# Patient Record
Sex: MALE | Race: WHITE | NOT HISPANIC OR LATINO | Employment: OTHER | URBAN - METROPOLITAN AREA
[De-identification: names, ages, dates, MRNs, and addresses within clinical notes are randomized per-mention and may not be internally consistent; named-entity substitution may affect disease eponyms.]

---

## 2021-05-04 ENCOUNTER — APPOINTMENT (OUTPATIENT)
Dept: CT IMAGING | Facility: HOSPITAL | Age: 71
End: 2021-05-04

## 2021-05-04 ENCOUNTER — HOSPITAL ENCOUNTER (INPATIENT)
Facility: HOSPITAL | Age: 71
LOS: 14 days | Discharge: NURSING, REHAB OR LONG-TERM CARE | End: 2021-05-18
Attending: EMERGENCY MEDICINE | Admitting: HOSPITALIST

## 2021-05-04 ENCOUNTER — APPOINTMENT (OUTPATIENT)
Dept: GENERAL RADIOLOGY | Facility: HOSPITAL | Age: 71
End: 2021-05-04

## 2021-05-04 DIAGNOSIS — R53.1 ACUTE RIGHT-SIDED WEAKNESS: Primary | ICD-10-CM

## 2021-05-04 DIAGNOSIS — I96 GANGRENE OF TOE OF LEFT FOOT (HCC): ICD-10-CM

## 2021-05-04 DIAGNOSIS — Z74.09 IMPAIRED FUNCTIONAL MOBILITY, BALANCE, GAIT, AND ENDURANCE: ICD-10-CM

## 2021-05-04 PROBLEM — I10 HTN (HYPERTENSION): Status: ACTIVE | Noted: 2021-05-04

## 2021-05-04 PROBLEM — D69.6 THROMBOCYTOPENIA: Status: ACTIVE | Noted: 2021-05-04

## 2021-05-04 PROBLEM — R09.89 SUSPECTED CEREBROVASCULAR ACCIDENT (CVA): Status: ACTIVE | Noted: 2021-05-04

## 2021-05-04 PROBLEM — D72.829 LEUKOCYTOSIS: Status: ACTIVE | Noted: 2021-05-04

## 2021-05-04 PROBLEM — I50.9 CHF (CONGESTIVE HEART FAILURE): Status: ACTIVE | Noted: 2021-05-04

## 2021-05-04 PROBLEM — Z95.0 PRESENCE OF CARDIAC PACEMAKER: Status: ACTIVE | Noted: 2021-05-04

## 2021-05-04 LAB
ALBUMIN SERPL-MCNC: 4.8 G/DL (ref 3.5–5.2)
ALBUMIN/GLOB SERPL: 1.5 G/DL
ALP SERPL-CCNC: 43 U/L (ref 39–117)
ALT SERPL W P-5'-P-CCNC: 26 U/L (ref 1–41)
ALT SERPL W P-5'-P-CCNC: 29 U/L (ref 1–41)
ANION GAP SERPL CALCULATED.3IONS-SCNC: 18 MMOL/L (ref 5–15)
APTT PPP: 31.1 SECONDS (ref 22–39)
AST SERPL-CCNC: 26 U/L (ref 1–40)
AST SERPL-CCNC: 26 U/L (ref 1–40)
BASOPHILS # BLD AUTO: 0.04 10*3/MM3 (ref 0–0.2)
BASOPHILS NFR BLD AUTO: 0.3 % (ref 0–1.5)
BILIRUB SERPL-MCNC: 0.5 MG/DL (ref 0–1.2)
BUN SERPL-MCNC: 13 MG/DL (ref 8–23)
BUN/CREAT SERPL: 18.6 (ref 7–25)
CALCIUM SPEC-SCNC: 9.8 MG/DL (ref 8.6–10.5)
CHLORIDE SERPL-SCNC: 94 MMOL/L (ref 98–107)
CK SERPL-CCNC: 118 U/L (ref 20–200)
CO2 SERPL-SCNC: 23 MMOL/L (ref 22–29)
CREAT SERPL-MCNC: 0.7 MG/DL (ref 0.76–1.27)
DEPRECATED RDW RBC AUTO: 42.8 FL (ref 37–54)
EOSINOPHIL # BLD AUTO: 0 10*3/MM3 (ref 0–0.4)
EOSINOPHIL NFR BLD AUTO: 0 % (ref 0.3–6.2)
ERYTHROCYTE [DISTWIDTH] IN BLOOD BY AUTOMATED COUNT: 12.8 % (ref 12.3–15.4)
GFR SERPL CREATININE-BSD FRML MDRD: 112 ML/MIN/1.73
GLOBULIN UR ELPH-MCNC: 3.1 GM/DL
GLUCOSE SERPL-MCNC: 240 MG/DL (ref 65–99)
HCT VFR BLD AUTO: 46.6 % (ref 37.5–51)
HGB BLD-MCNC: 16.5 G/DL (ref 13–17.7)
HOLD SPECIMEN: NORMAL
HOLD SPECIMEN: NORMAL
IMM GRANULOCYTES # BLD AUTO: 0.08 10*3/MM3 (ref 0–0.05)
IMM GRANULOCYTES NFR BLD AUTO: 0.6 % (ref 0–0.5)
INR PPP: 1 (ref 0.8–1.2)
LYMPHOCYTES # BLD AUTO: 1.03 10*3/MM3 (ref 0.7–3.1)
LYMPHOCYTES NFR BLD AUTO: 7.8 % (ref 19.6–45.3)
MCH RBC QN AUTO: 32.4 PG (ref 26.6–33)
MCHC RBC AUTO-ENTMCNC: 35.4 G/DL (ref 31.5–35.7)
MCV RBC AUTO: 91.6 FL (ref 79–97)
MONOCYTES # BLD AUTO: 0.49 10*3/MM3 (ref 0.1–0.9)
MONOCYTES NFR BLD AUTO: 3.7 % (ref 5–12)
NEUTROPHILS NFR BLD AUTO: 11.61 10*3/MM3 (ref 1.7–7)
NEUTROPHILS NFR BLD AUTO: 87.6 % (ref 42.7–76)
NRBC BLD AUTO-RTO: 0 /100 WBC (ref 0–0.2)
PLAT MORPH BLD: NORMAL
PLATELET # BLD AUTO: 105 10*3/MM3 (ref 140–450)
PMV BLD AUTO: 12.9 FL (ref 6–12)
POTASSIUM SERPL-SCNC: 5 MMOL/L (ref 3.5–5.2)
PROT SERPL-MCNC: 7.9 G/DL (ref 6–8.5)
PROTHROMBIN TIME: 11.8 SECONDS (ref 12.8–15.2)
QT INTERVAL: 466 MS
QTC INTERVAL: 547 MS
RBC # BLD AUTO: 5.09 10*6/MM3 (ref 4.14–5.8)
RBC MORPH BLD: NORMAL
SODIUM SERPL-SCNC: 135 MMOL/L (ref 136–145)
TROPONIN T SERPL-MCNC: <0.01 NG/ML (ref 0–0.03)
WBC # BLD AUTO: 13.25 10*3/MM3 (ref 3.4–10.8)
WBC MORPH BLD: NORMAL
WHOLE BLOOD HOLD SPECIMEN: NORMAL
WHOLE BLOOD HOLD SPECIMEN: NORMAL

## 2021-05-04 PROCEDURE — 70496 CT ANGIOGRAPHY HEAD: CPT

## 2021-05-04 PROCEDURE — 85730 THROMBOPLASTIN TIME PARTIAL: CPT | Performed by: EMERGENCY MEDICINE

## 2021-05-04 PROCEDURE — 82330 ASSAY OF CALCIUM: CPT

## 2021-05-04 PROCEDURE — 0042T HC CT CEREBRAL PERFUSION W/WO CONTRAST: CPT

## 2021-05-04 PROCEDURE — 80053 COMPREHEN METABOLIC PANEL: CPT | Performed by: INTERNAL MEDICINE

## 2021-05-04 PROCEDURE — 84450 TRANSFERASE (AST) (SGOT): CPT | Performed by: EMERGENCY MEDICINE

## 2021-05-04 PROCEDURE — G0378 HOSPITAL OBSERVATION PER HR: HCPCS

## 2021-05-04 PROCEDURE — 0 IOPAMIDOL PER 1 ML: Performed by: EMERGENCY MEDICINE

## 2021-05-04 PROCEDURE — 93005 ELECTROCARDIOGRAM TRACING: CPT | Performed by: EMERGENCY MEDICINE

## 2021-05-04 PROCEDURE — 84145 PROCALCITONIN (PCT): CPT | Performed by: NURSE PRACTITIONER

## 2021-05-04 PROCEDURE — 82550 ASSAY OF CK (CPK): CPT | Performed by: NURSE PRACTITIONER

## 2021-05-04 PROCEDURE — 85014 HEMATOCRIT: CPT

## 2021-05-04 PROCEDURE — 84132 ASSAY OF SERUM POTASSIUM: CPT

## 2021-05-04 PROCEDURE — 99223 1ST HOSP IP/OBS HIGH 75: CPT | Performed by: NURSE PRACTITIONER

## 2021-05-04 PROCEDURE — 84460 ALANINE AMINO (ALT) (SGPT): CPT | Performed by: EMERGENCY MEDICINE

## 2021-05-04 PROCEDURE — 85610 PROTHROMBIN TIME: CPT

## 2021-05-04 PROCEDURE — 71045 X-RAY EXAM CHEST 1 VIEW: CPT

## 2021-05-04 PROCEDURE — 82803 BLOOD GASES ANY COMBINATION: CPT

## 2021-05-04 PROCEDURE — 85007 BL SMEAR W/DIFF WBC COUNT: CPT | Performed by: EMERGENCY MEDICINE

## 2021-05-04 PROCEDURE — 87636 SARSCOV2 & INF A&B AMP PRB: CPT | Performed by: EMERGENCY MEDICINE

## 2021-05-04 PROCEDURE — 84295 ASSAY OF SERUM SODIUM: CPT

## 2021-05-04 PROCEDURE — 82565 ASSAY OF CREATININE: CPT

## 2021-05-04 PROCEDURE — 99222 1ST HOSP IP/OBS MODERATE 55: CPT | Performed by: FAMILY MEDICINE

## 2021-05-04 PROCEDURE — 70498 CT ANGIOGRAPHY NECK: CPT

## 2021-05-04 PROCEDURE — 84484 ASSAY OF TROPONIN QUANT: CPT | Performed by: EMERGENCY MEDICINE

## 2021-05-04 PROCEDURE — 85025 COMPLETE CBC W/AUTO DIFF WBC: CPT | Performed by: EMERGENCY MEDICINE

## 2021-05-04 PROCEDURE — 99285 EMERGENCY DEPT VISIT HI MDM: CPT

## 2021-05-04 PROCEDURE — 87040 BLOOD CULTURE FOR BACTERIA: CPT | Performed by: NURSE PRACTITIONER

## 2021-05-04 PROCEDURE — 70450 CT HEAD/BRAIN W/O DYE: CPT

## 2021-05-04 PROCEDURE — 82947 ASSAY GLUCOSE BLOOD QUANT: CPT

## 2021-05-04 RX ORDER — LISINOPRIL 10 MG/1
15 TABLET ORAL 2 TIMES DAILY
COMMUNITY
End: 2021-05-18 | Stop reason: HOSPADM

## 2021-05-04 RX ORDER — CARVEDILOL 25 MG/1
50 TABLET ORAL 2 TIMES DAILY WITH MEALS
COMMUNITY
End: 2022-10-24 | Stop reason: HOSPADM

## 2021-05-04 RX ORDER — ASPIRIN 300 MG/1
300 SUPPOSITORY RECTAL DAILY
Status: DISCONTINUED | OUTPATIENT
Start: 2021-05-04 | End: 2021-05-06

## 2021-05-04 RX ORDER — SODIUM CHLORIDE 0.9 % (FLUSH) 0.9 %
10 SYRINGE (ML) INJECTION AS NEEDED
Status: DISCONTINUED | OUTPATIENT
Start: 2021-05-04 | End: 2021-05-18 | Stop reason: HOSPADM

## 2021-05-04 RX ORDER — ASPIRIN 325 MG
325 TABLET ORAL DAILY
Status: DISCONTINUED | OUTPATIENT
Start: 2021-05-04 | End: 2021-05-06

## 2021-05-04 RX ADMIN — ASPIRIN 300 MG: 300 SUPPOSITORY RECTAL at 21:36

## 2021-05-04 RX ADMIN — IOPAMIDOL 115 ML: 755 INJECTION, SOLUTION INTRAVENOUS at 20:42

## 2021-05-05 ENCOUNTER — APPOINTMENT (OUTPATIENT)
Dept: CT IMAGING | Facility: HOSPITAL | Age: 71
End: 2021-05-05

## 2021-05-05 ENCOUNTER — APPOINTMENT (OUTPATIENT)
Dept: CARDIOLOGY | Facility: HOSPITAL | Age: 71
End: 2021-05-05

## 2021-05-05 PROBLEM — R73.9 HYPERGLYCEMIA: Status: ACTIVE | Noted: 2021-05-05

## 2021-05-05 LAB
ANION GAP SERPL CALCULATED.3IONS-SCNC: 12 MMOL/L (ref 5–15)
BACTERIA UR QL AUTO: ABNORMAL /HPF
BASE EXCESS BLDA CALC-SCNC: 3 MMOL/L (ref -5–5)
BASOPHILS # BLD AUTO: 0.02 10*3/MM3 (ref 0–0.2)
BASOPHILS NFR BLD AUTO: 0.2 % (ref 0–1.5)
BH CV ECHO MEAS - AO MAX PG (FULL): 0.9 MMHG
BH CV ECHO MEAS - AO MAX PG: 2.8 MMHG
BH CV ECHO MEAS - AO V2 MAX: 84.2 CM/SEC
BH CV ECHO MEAS - BSA(HAYCOCK): 2 M^2
BH CV ECHO MEAS - BSA: 2 M^2
BH CV ECHO MEAS - BZI_BMI: 25.8 KILOGRAMS/M^2
BH CV ECHO MEAS - BZI_METRIC_HEIGHT: 177.8 CM
BH CV ECHO MEAS - BZI_METRIC_WEIGHT: 81.6 KG
BH CV ECHO MEAS - EDV(CUBED): 180.2 ML
BH CV ECHO MEAS - EDV(TEICH): 156.7 ML
BH CV ECHO MEAS - EF(CUBED): 40.7 %
BH CV ECHO MEAS - EF(TEICH): 33.2 %
BH CV ECHO MEAS - ESV(CUBED): 106.9 ML
BH CV ECHO MEAS - ESV(TEICH): 104.7 ML
BH CV ECHO MEAS - FS: 16 %
BH CV ECHO MEAS - IVS/LVPW: 0.85
BH CV ECHO MEAS - IVSD: 1.1 CM
BH CV ECHO MEAS - LAD MAJOR: 4.7 CM
BH CV ECHO MEAS - LAT PEAK E' VEL: 3.5 CM/SEC
BH CV ECHO MEAS - LATERAL E/E' RATIO: 13
BH CV ECHO MEAS - LV IVRT: 0.16 SEC
BH CV ECHO MEAS - LV MASS(C)D: 277.4 GRAMS
BH CV ECHO MEAS - LV MASS(C)DI: 139 GRAMS/M^2
BH CV ECHO MEAS - LV MAX PG: 1.9 MMHG
BH CV ECHO MEAS - LV MEAN PG: 0.78 MMHG
BH CV ECHO MEAS - LV V1 MAX: 69.6 CM/SEC
BH CV ECHO MEAS - LV V1 MEAN: 39.6 CM/SEC
BH CV ECHO MEAS - LV V1 VTI: 13 CM
BH CV ECHO MEAS - LVIDD: 5.6 CM
BH CV ECHO MEAS - LVIDS: 4.7 CM
BH CV ECHO MEAS - LVPWD: 1.3 CM
BH CV ECHO MEAS - MED PEAK E' VEL: 4.8 CM/SEC
BH CV ECHO MEAS - MEDIAL E/E' RATIO: 9.5
BH CV ECHO MEAS - MV A MAX VEL: 98.7 CM/SEC
BH CV ECHO MEAS - MV DEC TIME: 0.24 SEC
BH CV ECHO MEAS - MV E MAX VEL: 47.4 CM/SEC
BH CV ECHO MEAS - MV E/A: 0.48
BH CV ECHO MEAS - SI(CUBED): 36.7 ML/M^2
BH CV ECHO MEAS - SI(TEICH): 26.1 ML/M^2
BH CV ECHO MEAS - SV(CUBED): 73.3 ML
BH CV ECHO MEAS - SV(TEICH): 52 ML
BH CV ECHO MEASUREMENTS AVERAGE E/E' RATIO: 11.42
BH CV LOWER ARTERIAL LEFT ABI RATIO: 0.85
BH CV LOWER ARTERIAL LEFT CALF RATIO: 0.89
BH CV LOWER ARTERIAL LEFT DORSALIS PEDIS SYS MAX: 136 MMHG
BH CV LOWER ARTERIAL LEFT GREAT TOE SYS MAX: 0 MMHG
BH CV LOWER ARTERIAL LEFT HIGH THIGH SYS MAX: 216 MMHG
BH CV LOWER ARTERIAL LEFT LOW THIGH SYS MAX: 219 MMHG
BH CV LOWER ARTERIAL LEFT POPLITEAL SYS MAX: 142 MMHG
BH CV LOWER ARTERIAL LEFT POST TIBIAL SYS MAX: 133 MMHG
BH CV LOWER ARTERIAL LEFT TBI RATIO: 0
BH CV LOWER ARTERIAL RIGHT ABI RATIO: 0.92
BH CV LOWER ARTERIAL RIGHT CALF RATIO: 0.96
BH CV LOWER ARTERIAL RIGHT DORSALIS PEDIS SYS MAX: 147 MMHG
BH CV LOWER ARTERIAL RIGHT GREAT TOE SYS MAX: 112 MMHG
BH CV LOWER ARTERIAL RIGHT HIGH THIGH SYS MAX: 200 MMHG
BH CV LOWER ARTERIAL RIGHT LOW THIGH SYS MAX: 200 MMHG
BH CV LOWER ARTERIAL RIGHT POPLITEAL SYS MAX: 153 MMHG
BH CV LOWER ARTERIAL RIGHT POST TIBIAL SYS MAX: 147 MMHG
BH CV LOWER ARTERIAL RIGHT TBI RATIO: 0.7
BH CV VAS BP LEFT ARM: NORMAL MMHG
BILIRUB UR QL STRIP: NEGATIVE
BLADDER EPITHELIAL: ABNORMAL
BUN SERPL-MCNC: 15 MG/DL (ref 8–23)
BUN/CREAT SERPL: 20.5 (ref 7–25)
CA-I BLDA-SCNC: 1.2 MMOL/L (ref 1.2–1.32)
CALCIUM SPEC-SCNC: 9.2 MG/DL (ref 8.6–10.5)
CHLORIDE SERPL-SCNC: 99 MMOL/L (ref 98–107)
CHOLEST SERPL-MCNC: 187 MG/DL (ref 0–200)
CLARITY UR: CLEAR
CO2 BLDA-SCNC: 30 MMOL/L (ref 24–29)
CO2 SERPL-SCNC: 25 MMOL/L (ref 22–29)
COLOR UR: YELLOW
CREAT BLDA-MCNC: 0.6 MG/DL (ref 0.6–1.3)
CREAT SERPL-MCNC: 0.73 MG/DL (ref 0.76–1.27)
D-LACTATE SERPL-SCNC: 2.5 MMOL/L (ref 0.5–2)
D-LACTATE SERPL-SCNC: 2.6 MMOL/L (ref 0.5–2)
DEPRECATED RDW RBC AUTO: 43.2 FL (ref 37–54)
EOSINOPHIL # BLD AUTO: 0.01 10*3/MM3 (ref 0–0.4)
EOSINOPHIL NFR BLD AUTO: 0.1 % (ref 0.3–6.2)
ERYTHROCYTE [DISTWIDTH] IN BLOOD BY AUTOMATED COUNT: 13 % (ref 12.3–15.4)
FLUAV RNA RESP QL NAA+PROBE: NOT DETECTED
FLUBV RNA RESP QL NAA+PROBE: NOT DETECTED
GFR SERPL CREATININE-BSD FRML MDRD: 107 ML/MIN/1.73
GLUCOSE BLDC GLUCOMTR-MCNC: 177 MG/DL (ref 70–130)
GLUCOSE BLDC GLUCOMTR-MCNC: 205 MG/DL (ref 70–130)
GLUCOSE BLDC GLUCOMTR-MCNC: 214 MG/DL (ref 70–130)
GLUCOSE BLDC GLUCOMTR-MCNC: 244 MG/DL (ref 70–130)
GLUCOSE BLDC GLUCOMTR-MCNC: 278 MG/DL (ref 70–130)
GLUCOSE SERPL-MCNC: 199 MG/DL (ref 65–99)
GLUCOSE UR STRIP-MCNC: ABNORMAL MG/DL
HBA1C MFR BLD: 7.6 % (ref 4.8–5.6)
HCO3 BLDA-SCNC: 28.2 MMOL/L (ref 22–26)
HCT VFR BLD AUTO: 43.7 % (ref 37.5–51)
HCT VFR BLDA CALC: 49 % (ref 38–51)
HDLC SERPL-MCNC: 38 MG/DL (ref 40–60)
HGB BLD-MCNC: 15.4 G/DL (ref 13–17.7)
HGB BLDA-MCNC: 16.7 G/DL (ref 12–17)
HGB UR QL STRIP.AUTO: ABNORMAL
HOLD SPECIMEN: NORMAL
HYALINE CASTS UR QL AUTO: ABNORMAL /LPF
IMM GRANULOCYTES # BLD AUTO: 0.05 10*3/MM3 (ref 0–0.05)
IMM GRANULOCYTES NFR BLD AUTO: 0.4 % (ref 0–0.5)
IVRT: 158 MSEC
KETONES UR QL STRIP: ABNORMAL
LDLC SERPL CALC-MCNC: 124 MG/DL (ref 0–100)
LDLC/HDLC SERPL: 3.19 {RATIO}
LEUKOCYTE ESTERASE UR QL STRIP.AUTO: NEGATIVE
LV EF 2D ECHO EST: 25 %
LYMPHOCYTES # BLD AUTO: 1.33 10*3/MM3 (ref 0.7–3.1)
LYMPHOCYTES NFR BLD AUTO: 10.5 % (ref 19.6–45.3)
MAGNESIUM SERPL-MCNC: 1.6 MG/DL (ref 1.6–2.4)
MAXIMAL PREDICTED HEART RATE: 152 BPM
MCH RBC QN AUTO: 32.3 PG (ref 26.6–33)
MCHC RBC AUTO-ENTMCNC: 35.2 G/DL (ref 31.5–35.7)
MCV RBC AUTO: 91.6 FL (ref 79–97)
MONOCYTES # BLD AUTO: 1.15 10*3/MM3 (ref 0.1–0.9)
MONOCYTES NFR BLD AUTO: 9.1 % (ref 5–12)
NEUTROPHILS NFR BLD AUTO: 10.11 10*3/MM3 (ref 1.7–7)
NEUTROPHILS NFR BLD AUTO: 79.7 % (ref 42.7–76)
NITRITE UR QL STRIP: NEGATIVE
NRBC BLD AUTO-RTO: 0 /100 WBC (ref 0–0.2)
PCO2 BLDA: 46.3 MM HG (ref 35–45)
PH BLDA: 7.39 PH UNITS (ref 7.35–7.6)
PH UR STRIP.AUTO: 6.5 [PH] (ref 5–8)
PLAT MORPH BLD: NORMAL
PLATELET # BLD AUTO: 119 10*3/MM3 (ref 140–450)
PMV BLD AUTO: 11.6 FL (ref 6–12)
PO2 BLDA: 22 MMHG (ref 80–105)
POTASSIUM BLDA-SCNC: 4.8 MMOL/L (ref 3.5–4.9)
POTASSIUM SERPL-SCNC: 4.4 MMOL/L (ref 3.5–5.2)
PROCALCITONIN SERPL-MCNC: 0.09 NG/ML (ref 0–0.25)
PROT UR QL STRIP: ABNORMAL
RBC # BLD AUTO: 4.77 10*6/MM3 (ref 4.14–5.8)
RBC # UR: ABNORMAL /HPF
RBC MORPH BLD: NORMAL
REF LAB TEST METHOD: ABNORMAL
SAO2 % BLDA: 37 % (ref 95–98)
SARS-COV-2 RNA RESP QL NAA+PROBE: NOT DETECTED
SODIUM BLD-SCNC: 134 MMOL/L (ref 138–146)
SODIUM SERPL-SCNC: 136 MMOL/L (ref 136–145)
SP GR UR STRIP: 1.05 (ref 1–1.03)
SQUAMOUS #/AREA URNS HPF: ABNORMAL /HPF
STRESS TARGET HR: 129 BPM
TRIGL SERPL-MCNC: 138 MG/DL (ref 0–150)
UPPER ARTERIAL LEFT ARM BRACHIAL SYS MAX: 160 MMHG
UROBILINOGEN UR QL STRIP: ABNORMAL
VLDLC SERPL-MCNC: 25 MG/DL (ref 5–40)
WBC # BLD AUTO: 12.67 10*3/MM3 (ref 3.4–10.8)
WBC MORPH BLD: NORMAL
WBC UR QL AUTO: ABNORMAL /HPF

## 2021-05-05 PROCEDURE — 25010000002 PIPERACILLIN SOD-TAZOBACTAM PER 1 G: Performed by: FAMILY MEDICINE

## 2021-05-05 PROCEDURE — 63710000001 INSULIN LISPRO (HUMAN) PER 5 UNITS: Performed by: PHYSICIAN ASSISTANT

## 2021-05-05 PROCEDURE — G0108 DIAB MANAGE TRN  PER INDIV: HCPCS | Performed by: REGISTERED NURSE

## 2021-05-05 PROCEDURE — 99233 SBSQ HOSP IP/OBS HIGH 50: CPT | Performed by: FAMILY MEDICINE

## 2021-05-05 PROCEDURE — 85007 BL SMEAR W/DIFF WBC COUNT: CPT | Performed by: NURSE PRACTITIONER

## 2021-05-05 PROCEDURE — 97530 THERAPEUTIC ACTIVITIES: CPT

## 2021-05-05 PROCEDURE — 82962 GLUCOSE BLOOD TEST: CPT

## 2021-05-05 PROCEDURE — 83605 ASSAY OF LACTIC ACID: CPT | Performed by: NURSE PRACTITIONER

## 2021-05-05 PROCEDURE — 93923 UPR/LXTR ART STDY 3+ LVLS: CPT | Performed by: INTERNAL MEDICINE

## 2021-05-05 PROCEDURE — 97166 OT EVAL MOD COMPLEX 45 MIN: CPT

## 2021-05-05 PROCEDURE — 80061 LIPID PANEL: CPT | Performed by: NURSE PRACTITIONER

## 2021-05-05 PROCEDURE — 93923 UPR/LXTR ART STDY 3+ LVLS: CPT

## 2021-05-05 PROCEDURE — 63710000001 INSULIN LISPRO (HUMAN) PER 5 UNITS: Performed by: FAMILY MEDICINE

## 2021-05-05 PROCEDURE — 99221 1ST HOSP IP/OBS SF/LOW 40: CPT | Performed by: THORACIC SURGERY (CARDIOTHORACIC VASCULAR SURGERY)

## 2021-05-05 PROCEDURE — 87040 BLOOD CULTURE FOR BACTERIA: CPT | Performed by: NURSE PRACTITIONER

## 2021-05-05 PROCEDURE — 92523 SPEECH SOUND LANG COMPREHEN: CPT

## 2021-05-05 PROCEDURE — 99291 CRITICAL CARE FIRST HOUR: CPT | Performed by: PSYCHIATRY & NEUROLOGY

## 2021-05-05 PROCEDURE — 81001 URINALYSIS AUTO W/SCOPE: CPT | Performed by: NURSE PRACTITIONER

## 2021-05-05 PROCEDURE — 85025 COMPLETE CBC W/AUTO DIFF WBC: CPT | Performed by: NURSE PRACTITIONER

## 2021-05-05 PROCEDURE — 25010000003 MAGNESIUM SULFATE 4 GM/100ML SOLUTION: Performed by: FAMILY MEDICINE

## 2021-05-05 PROCEDURE — 25010000002 PIPERACILLIN SOD-TAZOBACTAM PER 1 G: Performed by: NURSE PRACTITIONER

## 2021-05-05 PROCEDURE — 83735 ASSAY OF MAGNESIUM: CPT | Performed by: NURSE PRACTITIONER

## 2021-05-05 PROCEDURE — 73700 CT LOWER EXTREMITY W/O DYE: CPT

## 2021-05-05 PROCEDURE — 25010000002 VANCOMYCIN PER 500 MG

## 2021-05-05 PROCEDURE — 25010000002 VANCOMYCIN 10 G RECONSTITUTED SOLUTION

## 2021-05-05 PROCEDURE — 93306 TTE W/DOPPLER COMPLETE: CPT | Performed by: INTERNAL MEDICINE

## 2021-05-05 PROCEDURE — 80048 BASIC METABOLIC PNL TOTAL CA: CPT | Performed by: NURSE PRACTITIONER

## 2021-05-05 PROCEDURE — 93306 TTE W/DOPPLER COMPLETE: CPT

## 2021-05-05 PROCEDURE — 92610 EVALUATE SWALLOWING FUNCTION: CPT

## 2021-05-05 PROCEDURE — 83036 HEMOGLOBIN GLYCOSYLATED A1C: CPT | Performed by: NURSE PRACTITIONER

## 2021-05-05 RX ORDER — VANCOMYCIN HYDROCHLORIDE 1 G/200ML
1000 INJECTION, SOLUTION INTRAVENOUS EVERY 12 HOURS
Status: DISCONTINUED | OUTPATIENT
Start: 2021-05-05 | End: 2021-05-07

## 2021-05-05 RX ORDER — SODIUM CHLORIDE 0.9 % (FLUSH) 0.9 %
10 SYRINGE (ML) INJECTION EVERY 12 HOURS SCHEDULED
Status: DISCONTINUED | OUTPATIENT
Start: 2021-05-05 | End: 2021-05-12

## 2021-05-05 RX ORDER — MAGNESIUM SULFATE HEPTAHYDRATE 40 MG/ML
4 INJECTION, SOLUTION INTRAVENOUS AS NEEDED
Status: DISCONTINUED | OUTPATIENT
Start: 2021-05-05 | End: 2021-05-18 | Stop reason: HOSPADM

## 2021-05-05 RX ORDER — SODIUM CHLORIDE 0.9 % (FLUSH) 0.9 %
10 SYRINGE (ML) INJECTION AS NEEDED
Status: DISCONTINUED | OUTPATIENT
Start: 2021-05-05 | End: 2021-05-18 | Stop reason: HOSPADM

## 2021-05-05 RX ORDER — ATORVASTATIN CALCIUM 40 MG/1
80 TABLET, FILM COATED ORAL NIGHTLY
Status: DISCONTINUED | OUTPATIENT
Start: 2021-05-05 | End: 2021-05-18 | Stop reason: HOSPADM

## 2021-05-05 RX ORDER — NICOTINE POLACRILEX 4 MG
15 LOZENGE BUCCAL
Status: DISCONTINUED | OUTPATIENT
Start: 2021-05-05 | End: 2021-05-18 | Stop reason: HOSPADM

## 2021-05-05 RX ORDER — SODIUM CHLORIDE 0.9 % (FLUSH) 0.9 %
10 SYRINGE (ML) INJECTION EVERY 12 HOURS SCHEDULED
Status: DISCONTINUED | OUTPATIENT
Start: 2021-05-05 | End: 2021-05-18 | Stop reason: HOSPADM

## 2021-05-05 RX ORDER — CLOPIDOGREL BISULFATE 75 MG/1
300 TABLET ORAL ONCE
Status: COMPLETED | OUTPATIENT
Start: 2021-05-05 | End: 2021-05-05

## 2021-05-05 RX ORDER — ACETAMINOPHEN 325 MG/1
650 TABLET ORAL EVERY 4 HOURS PRN
Status: DISCONTINUED | OUTPATIENT
Start: 2021-05-05 | End: 2021-05-18 | Stop reason: HOSPADM

## 2021-05-05 RX ORDER — DEXTROSE MONOHYDRATE 25 G/50ML
25 INJECTION, SOLUTION INTRAVENOUS
Status: DISCONTINUED | OUTPATIENT
Start: 2021-05-05 | End: 2021-05-18 | Stop reason: HOSPADM

## 2021-05-05 RX ORDER — CLOPIDOGREL BISULFATE 75 MG/1
75 TABLET ORAL DAILY
Status: DISCONTINUED | OUTPATIENT
Start: 2021-05-06 | End: 2021-05-06

## 2021-05-05 RX ORDER — MAGNESIUM SULFATE HEPTAHYDRATE 40 MG/ML
2 INJECTION, SOLUTION INTRAVENOUS AS NEEDED
Status: DISCONTINUED | OUTPATIENT
Start: 2021-05-05 | End: 2021-05-18 | Stop reason: HOSPADM

## 2021-05-05 RX ADMIN — SODIUM CHLORIDE 250 ML: 9 INJECTION, SOLUTION INTRAVENOUS at 01:17

## 2021-05-05 RX ADMIN — VANCOMYCIN HYDROCHLORIDE 1750 MG: 10 INJECTION, POWDER, LYOPHILIZED, FOR SOLUTION INTRAVENOUS at 07:32

## 2021-05-05 RX ADMIN — INSULIN LISPRO 4 UNITS: 100 INJECTION, SOLUTION INTRAVENOUS; SUBCUTANEOUS at 13:10

## 2021-05-05 RX ADMIN — SODIUM CHLORIDE, PRESERVATIVE FREE 10 ML: 5 INJECTION INTRAVENOUS at 02:14

## 2021-05-05 RX ADMIN — MAGNESIUM SULFATE HEPTAHYDRATE 4 G: 40 INJECTION, SOLUTION INTRAVENOUS at 15:44

## 2021-05-05 RX ADMIN — TAZOBACTAM SODIUM AND PIPERACILLIN SODIUM 3.38 G: 375; 3 INJECTION, SOLUTION INTRAVENOUS at 06:41

## 2021-05-05 RX ADMIN — TAZOBACTAM SODIUM AND PIPERACILLIN SODIUM 3.38 G: 375; 3 INJECTION, SOLUTION INTRAVENOUS at 13:10

## 2021-05-05 RX ADMIN — TAZOBACTAM SODIUM AND PIPERACILLIN SODIUM 3.38 G: 375; 3 INJECTION, SOLUTION INTRAVENOUS at 20:43

## 2021-05-05 RX ADMIN — CLOPIDOGREL BISULFATE 300 MG: 75 TABLET ORAL at 11:06

## 2021-05-05 RX ADMIN — VANCOMYCIN HYDROCHLORIDE 1000 MG: 1 INJECTION, SOLUTION INTRAVENOUS at 18:35

## 2021-05-05 RX ADMIN — ATORVASTATIN CALCIUM 80 MG: 40 TABLET, FILM COATED ORAL at 20:43

## 2021-05-05 RX ADMIN — SODIUM CHLORIDE, PRESERVATIVE FREE 10 ML: 5 INJECTION INTRAVENOUS at 20:43

## 2021-05-05 RX ADMIN — ACETAMINOPHEN 650 MG: 325 TABLET ORAL at 20:43

## 2021-05-05 RX ADMIN — INSULIN LISPRO 3 UNITS: 100 INJECTION, SOLUTION INTRAVENOUS; SUBCUTANEOUS at 18:35

## 2021-05-06 ENCOUNTER — APPOINTMENT (OUTPATIENT)
Dept: CARDIOLOGY | Facility: HOSPITAL | Age: 71
End: 2021-05-06

## 2021-05-06 ENCOUNTER — APPOINTMENT (OUTPATIENT)
Dept: MRI IMAGING | Facility: HOSPITAL | Age: 71
End: 2021-05-06

## 2021-05-06 LAB
GLUCOSE BLDC GLUCOMTR-MCNC: 168 MG/DL (ref 70–130)
GLUCOSE BLDC GLUCOMTR-MCNC: 173 MG/DL (ref 70–130)
GLUCOSE BLDC GLUCOMTR-MCNC: 194 MG/DL (ref 70–130)
GLUCOSE BLDC GLUCOMTR-MCNC: 201 MG/DL (ref 70–130)
MAGNESIUM SERPL-MCNC: 2.3 MG/DL (ref 1.6–2.4)
VANCOMYCIN TROUGH SERPL-MCNC: 17 MCG/ML (ref 5–20)

## 2021-05-06 PROCEDURE — 82962 GLUCOSE BLOOD TEST: CPT

## 2021-05-06 PROCEDURE — 99231 SBSQ HOSP IP/OBS SF/LOW 25: CPT | Performed by: NURSE PRACTITIONER

## 2021-05-06 PROCEDURE — 99222 1ST HOSP IP/OBS MODERATE 55: CPT | Performed by: INTERNAL MEDICINE

## 2021-05-06 PROCEDURE — 93926 LOWER EXTREMITY STUDY: CPT

## 2021-05-06 PROCEDURE — 80202 ASSAY OF VANCOMYCIN: CPT

## 2021-05-06 PROCEDURE — 63710000001 INSULIN LISPRO (HUMAN) PER 5 UNITS: Performed by: FAMILY MEDICINE

## 2021-05-06 PROCEDURE — 97112 NEUROMUSCULAR REEDUCATION: CPT

## 2021-05-06 PROCEDURE — 93926 LOWER EXTREMITY STUDY: CPT | Performed by: INTERNAL MEDICINE

## 2021-05-06 PROCEDURE — 97162 PT EVAL MOD COMPLEX 30 MIN: CPT

## 2021-05-06 PROCEDURE — 83735 ASSAY OF MAGNESIUM: CPT | Performed by: FAMILY MEDICINE

## 2021-05-06 PROCEDURE — 99232 SBSQ HOSP IP/OBS MODERATE 35: CPT | Performed by: FAMILY MEDICINE

## 2021-05-06 PROCEDURE — 25010000002 VANCOMYCIN PER 500 MG

## 2021-05-06 PROCEDURE — 25010000002 PIPERACILLIN SOD-TAZOBACTAM PER 1 G: Performed by: NURSE PRACTITIONER

## 2021-05-06 RX ORDER — ASPIRIN 81 MG/1
81 TABLET, CHEWABLE ORAL DAILY
Status: DISCONTINUED | OUTPATIENT
Start: 2021-05-07 | End: 2021-05-18 | Stop reason: HOSPADM

## 2021-05-06 RX ADMIN — INSULIN LISPRO 2 UNITS: 100 INJECTION, SOLUTION INTRAVENOUS; SUBCUTANEOUS at 12:39

## 2021-05-06 RX ADMIN — CLOPIDOGREL BISULFATE 75 MG: 75 TABLET ORAL at 09:07

## 2021-05-06 RX ADMIN — APIXABAN 5 MG: 5 TABLET, FILM COATED ORAL at 23:11

## 2021-05-06 RX ADMIN — ASPIRIN 325 MG ORAL TABLET 325 MG: 325 PILL ORAL at 09:07

## 2021-05-06 RX ADMIN — Medication: at 17:57

## 2021-05-06 RX ADMIN — SODIUM CHLORIDE, PRESERVATIVE FREE 10 ML: 5 INJECTION INTRAVENOUS at 23:11

## 2021-05-06 RX ADMIN — VANCOMYCIN HYDROCHLORIDE 1000 MG: 1 INJECTION, SOLUTION INTRAVENOUS at 17:56

## 2021-05-06 RX ADMIN — TAZOBACTAM SODIUM AND PIPERACILLIN SODIUM 3.38 G: 375; 3 INJECTION, SOLUTION INTRAVENOUS at 05:42

## 2021-05-06 RX ADMIN — ACETAMINOPHEN 650 MG: 325 TABLET ORAL at 23:11

## 2021-05-06 RX ADMIN — INSULIN LISPRO 3 UNITS: 100 INJECTION, SOLUTION INTRAVENOUS; SUBCUTANEOUS at 09:07

## 2021-05-06 RX ADMIN — TAZOBACTAM SODIUM AND PIPERACILLIN SODIUM 3.38 G: 375; 3 INJECTION, SOLUTION INTRAVENOUS at 23:11

## 2021-05-06 RX ADMIN — INSULIN LISPRO 2 UNITS: 100 INJECTION, SOLUTION INTRAVENOUS; SUBCUTANEOUS at 17:56

## 2021-05-06 RX ADMIN — TAZOBACTAM SODIUM AND PIPERACILLIN SODIUM 3.38 G: 375; 3 INJECTION, SOLUTION INTRAVENOUS at 12:39

## 2021-05-06 RX ADMIN — VANCOMYCIN HYDROCHLORIDE 1000 MG: 1 INJECTION, SOLUTION INTRAVENOUS at 05:42

## 2021-05-06 RX ADMIN — ACETAMINOPHEN 650 MG: 325 TABLET ORAL at 05:41

## 2021-05-06 RX ADMIN — ATORVASTATIN CALCIUM 80 MG: 40 TABLET, FILM COATED ORAL at 23:11

## 2021-05-06 RX ADMIN — APIXABAN 5 MG: 5 TABLET, FILM COATED ORAL at 12:39

## 2021-05-07 ENCOUNTER — APPOINTMENT (OUTPATIENT)
Dept: MRI IMAGING | Facility: HOSPITAL | Age: 71
End: 2021-05-07

## 2021-05-07 LAB
BH CV GRAFT BRACHIAL PRESSURE LEFT: 204 MMHG
BH CV GRAFT BRACHIAL PRESSURE RIGHT: 200 MMHG
BH CV LEA LEFT ANT TIBIAL A DISTAL PSV: 22 CM/S
BH CV LEA LEFT ANT TIBIAL A MID PSV: 41 CM/S
BH CV LEA LEFT ANT TIBIAL A PROX EDV: 5.33 CM/S
BH CV LEA LEFT ANT TIBIAL A PROX PSV: 54.7 CM/S
BH CV LEA LEFT CFA DISTAL PSV: 93.5 CM/S
BH CV LEA LEFT CFA PROX PSV: 127 CM/S
BH CV LEA LEFT DFA PROX PSV: 148 CM/S
BH CV LEA LEFT DPA PRESSURE: 140 MMHG
BH CV LEA LEFT PERONEAL  MID PSV: 28.6 CM/S
BH CV LEA LEFT POPITEAL A  DISTAL EDV: 6.87 CM/S
BH CV LEA LEFT POPITEAL A  DISTAL PSV: 36.3 CM/S
BH CV LEA LEFT POPITEAL A  MID EDV: 5.3 CM/S
BH CV LEA LEFT POPITEAL A  MID PSV: 34.2 CM/S
BH CV LEA LEFT POPITEAL A  PROX EDV: 6.38 CM/S
BH CV LEA LEFT POPITEAL A  PROX PSV: 58.9 CM/S
BH CV LEA LEFT PTA DISTAL EDV: 9.43 CM/S
BH CV LEA LEFT PTA DISTAL PSV: 51.1 CM/S
BH CV LEA LEFT PTA MID EDV: 7.86 CM/S
BH CV LEA LEFT PTA MID PSV: 46 CM/S
BH CV LEA LEFT PTA PRESSURE: 130 MMHG
BH CV LEA LEFT PTA PROX EDV: 18.9 CM/S
BH CV LEA LEFT PTA PROX PSV: 87.6 CM/S
BH CV LEA LEFT SFA DISTAL EDV: 18.7 CM/S
BH CV LEA LEFT SFA DISTAL PSV: 234 CM/S
BH CV LEA LEFT SFA MID EDV: 27 CM/S
BH CV LEA LEFT SFA MID PSV: 154 CM/S
BH CV LEA LEFT SFA PROX EDV: 104 CM/S
BH CV LEA LEFT SFA PROX PSV: 517 CM/S
BH CV LEA LEFT TIBEOPERONEAL EDV: 8.55 CM/S
BH CV LEA LEFT TIBEOPERONEAL PSV: 47.4 CM/S
BH CV LEA RIGHT DPA PRESSURE: 140 MMHG
BH CV LEA RIGHT PTA PRESSURE: 150 MMHG
BH CV LOWER ARTERIAL LEFT ABI RATIO: 0.67
BH CV LOWER ARTERIAL LEFT HIGHEST VELOCITY RATIO: 8.87
BH CV LOWER ARTERIAL LEFT VELOCITY RATIO LOCATION: NORMAL
BH CV LOWER ARTERIAL RIGHT ABI RATIO: 0.74
GLUCOSE BLDC GLUCOMTR-MCNC: 121 MG/DL (ref 70–130)
GLUCOSE BLDC GLUCOMTR-MCNC: 180 MG/DL (ref 70–130)
GLUCOSE BLDC GLUCOMTR-MCNC: 185 MG/DL (ref 70–130)
GLUCOSE BLDC GLUCOMTR-MCNC: 188 MG/DL (ref 70–130)
MAXIMAL PREDICTED HEART RATE: 152 BPM
STRESS TARGET HR: 129 BPM

## 2021-05-07 PROCEDURE — 25010000002 CEFTRIAXONE PER 250 MG: Performed by: INTERNAL MEDICINE

## 2021-05-07 PROCEDURE — 25010000002 PIPERACILLIN SOD-TAZOBACTAM PER 1 G: Performed by: NURSE PRACTITIONER

## 2021-05-07 PROCEDURE — 25010000002 PIPERACILLIN SOD-TAZOBACTAM PER 1 G: Performed by: FAMILY MEDICINE

## 2021-05-07 PROCEDURE — 97110 THERAPEUTIC EXERCISES: CPT | Performed by: PHYSICAL THERAPIST

## 2021-05-07 PROCEDURE — 99232 SBSQ HOSP IP/OBS MODERATE 35: CPT | Performed by: NURSE PRACTITIONER

## 2021-05-07 PROCEDURE — 97530 THERAPEUTIC ACTIVITIES: CPT

## 2021-05-07 PROCEDURE — 82962 GLUCOSE BLOOD TEST: CPT

## 2021-05-07 PROCEDURE — 99231 SBSQ HOSP IP/OBS SF/LOW 25: CPT | Performed by: THORACIC SURGERY (CARDIOTHORACIC VASCULAR SURGERY)

## 2021-05-07 PROCEDURE — 25010000002 VANCOMYCIN PER 500 MG

## 2021-05-07 PROCEDURE — 99231 SBSQ HOSP IP/OBS SF/LOW 25: CPT | Performed by: NURSE PRACTITIONER

## 2021-05-07 PROCEDURE — 63710000001 INSULIN LISPRO (HUMAN) PER 5 UNITS: Performed by: FAMILY MEDICINE

## 2021-05-07 PROCEDURE — 97116 GAIT TRAINING THERAPY: CPT | Performed by: PHYSICAL THERAPIST

## 2021-05-07 RX ORDER — L.ACID,PARA/B.BIFIDUM/S.THERM 8B CELL
1 CAPSULE ORAL DAILY
Status: DISCONTINUED | OUTPATIENT
Start: 2021-05-07 | End: 2021-05-18 | Stop reason: HOSPADM

## 2021-05-07 RX ORDER — SODIUM BICARBONATE 650 MG/1
650 TABLET ORAL ONCE
Status: COMPLETED | OUTPATIENT
Start: 2021-05-07 | End: 2021-05-07

## 2021-05-07 RX ORDER — CARVEDILOL 12.5 MG/1
25 TABLET ORAL 2 TIMES DAILY WITH MEALS
Status: DISCONTINUED | OUTPATIENT
Start: 2021-05-07 | End: 2021-05-18 | Stop reason: HOSPADM

## 2021-05-07 RX ADMIN — APIXABAN 5 MG: 5 TABLET, FILM COATED ORAL at 20:34

## 2021-05-07 RX ADMIN — TAZOBACTAM SODIUM AND PIPERACILLIN SODIUM 3.38 G: 375; 3 INJECTION, SOLUTION INTRAVENOUS at 04:04

## 2021-05-07 RX ADMIN — TAZOBACTAM SODIUM AND PIPERACILLIN SODIUM 3.38 G: 375; 3 INJECTION, SOLUTION INTRAVENOUS at 12:31

## 2021-05-07 RX ADMIN — SODIUM CHLORIDE 2 G: 900 INJECTION INTRAVENOUS at 17:02

## 2021-05-07 RX ADMIN — APIXABAN 5 MG: 5 TABLET, FILM COATED ORAL at 08:57

## 2021-05-07 RX ADMIN — INSULIN LISPRO 2 UNITS: 100 INJECTION, SOLUTION INTRAVENOUS; SUBCUTANEOUS at 08:58

## 2021-05-07 RX ADMIN — VANCOMYCIN HYDROCHLORIDE 1000 MG: 1 INJECTION, SOLUTION INTRAVENOUS at 05:31

## 2021-05-07 RX ADMIN — ASPIRIN 81 MG: 81 TABLET, CHEWABLE ORAL at 08:57

## 2021-05-07 RX ADMIN — SODIUM BICARBONATE 650 MG TABLET 650 MG: at 20:42

## 2021-05-07 RX ADMIN — Medication 1 CAPSULE: at 17:02

## 2021-05-07 RX ADMIN — ACETAMINOPHEN 650 MG: 325 TABLET ORAL at 09:04

## 2021-05-07 RX ADMIN — SODIUM CHLORIDE, PRESERVATIVE FREE 10 ML: 5 INJECTION INTRAVENOUS at 08:58

## 2021-05-07 RX ADMIN — CARVEDILOL 25 MG: 12.5 TABLET, FILM COATED ORAL at 17:02

## 2021-05-07 RX ADMIN — SODIUM CHLORIDE, PRESERVATIVE FREE 10 ML: 5 INJECTION INTRAVENOUS at 20:34

## 2021-05-07 RX ADMIN — INSULIN LISPRO 2 UNITS: 100 INJECTION, SOLUTION INTRAVENOUS; SUBCUTANEOUS at 12:10

## 2021-05-07 RX ADMIN — ATORVASTATIN CALCIUM 80 MG: 40 TABLET, FILM COATED ORAL at 20:33

## 2021-05-08 LAB
ANION GAP SERPL CALCULATED.3IONS-SCNC: 13 MMOL/L (ref 5–15)
BASOPHILS # BLD AUTO: 0.04 10*3/MM3 (ref 0–0.2)
BASOPHILS NFR BLD AUTO: 0.5 % (ref 0–1.5)
BUN SERPL-MCNC: 19 MG/DL (ref 8–23)
BUN/CREAT SERPL: 18.6 (ref 7–25)
CALCIUM SPEC-SCNC: 9.3 MG/DL (ref 8.6–10.5)
CHLORIDE SERPL-SCNC: 99 MMOL/L (ref 98–107)
CO2 SERPL-SCNC: 26 MMOL/L (ref 22–29)
CREAT SERPL-MCNC: 1.02 MG/DL (ref 0.76–1.27)
CRP SERPL-MCNC: 1.29 MG/DL (ref 0–0.5)
DEPRECATED RDW RBC AUTO: 43.9 FL (ref 37–54)
EOSINOPHIL # BLD AUTO: 0.11 10*3/MM3 (ref 0–0.4)
EOSINOPHIL NFR BLD AUTO: 1.3 % (ref 0.3–6.2)
ERYTHROCYTE [DISTWIDTH] IN BLOOD BY AUTOMATED COUNT: 12.9 % (ref 12.3–15.4)
GFR SERPL CREATININE-BSD FRML MDRD: 73 ML/MIN/1.73
GLUCOSE BLDC GLUCOMTR-MCNC: 117 MG/DL (ref 70–130)
GLUCOSE BLDC GLUCOMTR-MCNC: 134 MG/DL (ref 70–130)
GLUCOSE BLDC GLUCOMTR-MCNC: 143 MG/DL (ref 70–130)
GLUCOSE BLDC GLUCOMTR-MCNC: 189 MG/DL (ref 70–130)
GLUCOSE SERPL-MCNC: 125 MG/DL (ref 65–99)
HCT VFR BLD AUTO: 42.3 % (ref 37.5–51)
HGB BLD-MCNC: 14.7 G/DL (ref 13–17.7)
IMM GRANULOCYTES # BLD AUTO: 0.03 10*3/MM3 (ref 0–0.05)
IMM GRANULOCYTES NFR BLD AUTO: 0.3 % (ref 0–0.5)
LYMPHOCYTES # BLD AUTO: 1.34 10*3/MM3 (ref 0.7–3.1)
LYMPHOCYTES NFR BLD AUTO: 15.3 % (ref 19.6–45.3)
MCH RBC QN AUTO: 32.4 PG (ref 26.6–33)
MCHC RBC AUTO-ENTMCNC: 34.8 G/DL (ref 31.5–35.7)
MCV RBC AUTO: 93.2 FL (ref 79–97)
MONOCYTES # BLD AUTO: 0.96 10*3/MM3 (ref 0.1–0.9)
MONOCYTES NFR BLD AUTO: 10.9 % (ref 5–12)
NEUTROPHILS NFR BLD AUTO: 6.29 10*3/MM3 (ref 1.7–7)
NEUTROPHILS NFR BLD AUTO: 71.7 % (ref 42.7–76)
NRBC BLD AUTO-RTO: 0 /100 WBC (ref 0–0.2)
PLATELET # BLD AUTO: 101 10*3/MM3 (ref 140–450)
PMV BLD AUTO: 12.3 FL (ref 6–12)
POTASSIUM SERPL-SCNC: 4.2 MMOL/L (ref 3.5–5.2)
RBC # BLD AUTO: 4.54 10*6/MM3 (ref 4.14–5.8)
SODIUM SERPL-SCNC: 138 MMOL/L (ref 136–145)
WBC # BLD AUTO: 8.77 10*3/MM3 (ref 3.4–10.8)

## 2021-05-08 PROCEDURE — 80048 BASIC METABOLIC PNL TOTAL CA: CPT | Performed by: NURSE PRACTITIONER

## 2021-05-08 PROCEDURE — 99232 SBSQ HOSP IP/OBS MODERATE 35: CPT | Performed by: PSYCHIATRY & NEUROLOGY

## 2021-05-08 PROCEDURE — 25010000002 CEFTRIAXONE PER 250 MG: Performed by: INTERNAL MEDICINE

## 2021-05-08 PROCEDURE — 86140 C-REACTIVE PROTEIN: CPT | Performed by: INTERNAL MEDICINE

## 2021-05-08 PROCEDURE — 85025 COMPLETE CBC W/AUTO DIFF WBC: CPT | Performed by: NURSE PRACTITIONER

## 2021-05-08 PROCEDURE — 99231 SBSQ HOSP IP/OBS SF/LOW 25: CPT | Performed by: THORACIC SURGERY (CARDIOTHORACIC VASCULAR SURGERY)

## 2021-05-08 PROCEDURE — 99232 SBSQ HOSP IP/OBS MODERATE 35: CPT | Performed by: NURSE PRACTITIONER

## 2021-05-08 PROCEDURE — 82962 GLUCOSE BLOOD TEST: CPT

## 2021-05-08 PROCEDURE — 92507 TX SP LANG VOICE COMM INDIV: CPT | Performed by: SPEECH-LANGUAGE PATHOLOGIST

## 2021-05-08 PROCEDURE — 63710000001 INSULIN LISPRO (HUMAN) PER 5 UNITS: Performed by: FAMILY MEDICINE

## 2021-05-08 RX ORDER — CALCIUM CARBONATE 200(500)MG
2 TABLET,CHEWABLE ORAL 3 TIMES DAILY PRN
Status: DISCONTINUED | OUTPATIENT
Start: 2021-05-08 | End: 2021-05-18 | Stop reason: HOSPADM

## 2021-05-08 RX ADMIN — APIXABAN 5 MG: 5 TABLET, FILM COATED ORAL at 08:21

## 2021-05-08 RX ADMIN — SODIUM CHLORIDE, PRESERVATIVE FREE 10 ML: 5 INJECTION INTRAVENOUS at 08:20

## 2021-05-08 RX ADMIN — APIXABAN 5 MG: 5 TABLET, FILM COATED ORAL at 20:50

## 2021-05-08 RX ADMIN — SODIUM CHLORIDE 2 G: 900 INJECTION INTRAVENOUS at 16:56

## 2021-05-08 RX ADMIN — ANTACID TABLETS 2 TABLET: 500 TABLET, CHEWABLE ORAL at 20:49

## 2021-05-08 RX ADMIN — ASPIRIN 81 MG: 81 TABLET, CHEWABLE ORAL at 08:21

## 2021-05-08 RX ADMIN — Medication 1 CAPSULE: at 08:21

## 2021-05-08 RX ADMIN — CARVEDILOL 25 MG: 12.5 TABLET, FILM COATED ORAL at 08:21

## 2021-05-08 RX ADMIN — INSULIN LISPRO 2 UNITS: 100 INJECTION, SOLUTION INTRAVENOUS; SUBCUTANEOUS at 12:50

## 2021-05-08 RX ADMIN — SODIUM CHLORIDE, PRESERVATIVE FREE 10 ML: 5 INJECTION INTRAVENOUS at 20:50

## 2021-05-08 RX ADMIN — CARVEDILOL 25 MG: 12.5 TABLET, FILM COATED ORAL at 17:01

## 2021-05-08 RX ADMIN — ATORVASTATIN CALCIUM 80 MG: 40 TABLET, FILM COATED ORAL at 20:50

## 2021-05-08 RX ADMIN — ANTACID TABLETS 2 TABLET: 500 TABLET, CHEWABLE ORAL at 16:55

## 2021-05-08 RX ADMIN — ACETAMINOPHEN 650 MG: 325 TABLET ORAL at 20:49

## 2021-05-09 LAB
ANION GAP SERPL CALCULATED.3IONS-SCNC: 14 MMOL/L (ref 5–15)
BASOPHILS # BLD AUTO: 0.03 10*3/MM3 (ref 0–0.2)
BASOPHILS NFR BLD AUTO: 0.3 % (ref 0–1.5)
BUN SERPL-MCNC: 21 MG/DL (ref 8–23)
BUN/CREAT SERPL: 25.9 (ref 7–25)
CALCIUM SPEC-SCNC: 9.5 MG/DL (ref 8.6–10.5)
CHLORIDE SERPL-SCNC: 101 MMOL/L (ref 98–107)
CO2 SERPL-SCNC: 23 MMOL/L (ref 22–29)
CREAT SERPL-MCNC: 0.81 MG/DL (ref 0.76–1.27)
DEPRECATED RDW RBC AUTO: 43.8 FL (ref 37–54)
EOSINOPHIL # BLD AUTO: 0.11 10*3/MM3 (ref 0–0.4)
EOSINOPHIL NFR BLD AUTO: 1.3 % (ref 0.3–6.2)
ERYTHROCYTE [DISTWIDTH] IN BLOOD BY AUTOMATED COUNT: 12.9 % (ref 12.3–15.4)
GFR SERPL CREATININE-BSD FRML MDRD: 95 ML/MIN/1.73
GLUCOSE BLDC GLUCOMTR-MCNC: 116 MG/DL (ref 70–130)
GLUCOSE BLDC GLUCOMTR-MCNC: 126 MG/DL (ref 70–130)
GLUCOSE BLDC GLUCOMTR-MCNC: 94 MG/DL (ref 70–130)
GLUCOSE BLDC GLUCOMTR-MCNC: 96 MG/DL (ref 70–130)
GLUCOSE SERPL-MCNC: 118 MG/DL (ref 65–99)
HCT VFR BLD AUTO: 41.9 % (ref 37.5–51)
HGB BLD-MCNC: 14.5 G/DL (ref 13–17.7)
IMM GRANULOCYTES # BLD AUTO: 0.03 10*3/MM3 (ref 0–0.05)
IMM GRANULOCYTES NFR BLD AUTO: 0.3 % (ref 0–0.5)
LYMPHOCYTES # BLD AUTO: 1.19 10*3/MM3 (ref 0.7–3.1)
LYMPHOCYTES NFR BLD AUTO: 13.8 % (ref 19.6–45.3)
MCH RBC QN AUTO: 32.3 PG (ref 26.6–33)
MCHC RBC AUTO-ENTMCNC: 34.6 G/DL (ref 31.5–35.7)
MCV RBC AUTO: 93.3 FL (ref 79–97)
MONOCYTES # BLD AUTO: 0.7 10*3/MM3 (ref 0.1–0.9)
MONOCYTES NFR BLD AUTO: 8.1 % (ref 5–12)
NEUTROPHILS NFR BLD AUTO: 6.54 10*3/MM3 (ref 1.7–7)
NEUTROPHILS NFR BLD AUTO: 76.2 % (ref 42.7–76)
NRBC BLD AUTO-RTO: 0 /100 WBC (ref 0–0.2)
PLATELET # BLD AUTO: 130 10*3/MM3 (ref 140–450)
PMV BLD AUTO: 11.3 FL (ref 6–12)
POTASSIUM SERPL-SCNC: 4.5 MMOL/L (ref 3.5–5.2)
RBC # BLD AUTO: 4.49 10*6/MM3 (ref 4.14–5.8)
SODIUM SERPL-SCNC: 138 MMOL/L (ref 136–145)
WBC # BLD AUTO: 8.6 10*3/MM3 (ref 3.4–10.8)

## 2021-05-09 PROCEDURE — 97530 THERAPEUTIC ACTIVITIES: CPT

## 2021-05-09 PROCEDURE — 97116 GAIT TRAINING THERAPY: CPT

## 2021-05-09 PROCEDURE — 97110 THERAPEUTIC EXERCISES: CPT

## 2021-05-09 PROCEDURE — 97535 SELF CARE MNGMENT TRAINING: CPT

## 2021-05-09 PROCEDURE — 82962 GLUCOSE BLOOD TEST: CPT

## 2021-05-09 PROCEDURE — 99231 SBSQ HOSP IP/OBS SF/LOW 25: CPT | Performed by: THORACIC SURGERY (CARDIOTHORACIC VASCULAR SURGERY)

## 2021-05-09 PROCEDURE — 85025 COMPLETE CBC W/AUTO DIFF WBC: CPT | Performed by: NURSE PRACTITIONER

## 2021-05-09 PROCEDURE — 99232 SBSQ HOSP IP/OBS MODERATE 35: CPT | Performed by: PSYCHIATRY & NEUROLOGY

## 2021-05-09 PROCEDURE — 25010000002 CEFTRIAXONE PER 250 MG: Performed by: INTERNAL MEDICINE

## 2021-05-09 PROCEDURE — 80048 BASIC METABOLIC PNL TOTAL CA: CPT | Performed by: NURSE PRACTITIONER

## 2021-05-09 PROCEDURE — 99232 SBSQ HOSP IP/OBS MODERATE 35: CPT | Performed by: NURSE PRACTITIONER

## 2021-05-09 RX ORDER — LISINOPRIL 10 MG/1
10 TABLET ORAL
Status: DISCONTINUED | OUTPATIENT
Start: 2021-05-09 | End: 2021-05-18 | Stop reason: HOSPADM

## 2021-05-09 RX ADMIN — LISINOPRIL 10 MG: 10 TABLET ORAL at 08:41

## 2021-05-09 RX ADMIN — CARVEDILOL 25 MG: 12.5 TABLET, FILM COATED ORAL at 17:03

## 2021-05-09 RX ADMIN — ACETAMINOPHEN 650 MG: 325 TABLET ORAL at 22:41

## 2021-05-09 RX ADMIN — SODIUM CHLORIDE 2 G: 900 INJECTION INTRAVENOUS at 17:03

## 2021-05-09 RX ADMIN — SODIUM CHLORIDE, PRESERVATIVE FREE 10 ML: 5 INJECTION INTRAVENOUS at 08:41

## 2021-05-09 RX ADMIN — ANTACID TABLETS 2 TABLET: 500 TABLET, CHEWABLE ORAL at 12:18

## 2021-05-09 RX ADMIN — APIXABAN 5 MG: 5 TABLET, FILM COATED ORAL at 08:41

## 2021-05-09 RX ADMIN — ACETAMINOPHEN 650 MG: 325 TABLET ORAL at 03:39

## 2021-05-09 RX ADMIN — SODIUM CHLORIDE, PRESERVATIVE FREE 10 ML: 5 INJECTION INTRAVENOUS at 20:32

## 2021-05-09 RX ADMIN — ANTACID TABLETS 2 TABLET: 500 TABLET, CHEWABLE ORAL at 20:30

## 2021-05-09 RX ADMIN — CARVEDILOL 25 MG: 12.5 TABLET, FILM COATED ORAL at 08:41

## 2021-05-09 RX ADMIN — Medication 1 CAPSULE: at 08:40

## 2021-05-09 RX ADMIN — ATORVASTATIN CALCIUM 80 MG: 40 TABLET, FILM COATED ORAL at 20:30

## 2021-05-09 RX ADMIN — ASPIRIN 81 MG: 81 TABLET, CHEWABLE ORAL at 08:40

## 2021-05-10 ENCOUNTER — ANESTHESIA EVENT (OUTPATIENT)
Dept: PERIOP | Facility: HOSPITAL | Age: 71
End: 2021-05-10

## 2021-05-10 PROBLEM — I96 GANGRENE OF TOE OF LEFT FOOT: Status: ACTIVE | Noted: 2021-05-04

## 2021-05-10 LAB
BACTERIA SPEC AEROBE CULT: NORMAL
BACTERIA SPEC AEROBE CULT: NORMAL
GLUCOSE BLDC GLUCOMTR-MCNC: 101 MG/DL (ref 70–130)
GLUCOSE BLDC GLUCOMTR-MCNC: 112 MG/DL (ref 70–130)
GLUCOSE BLDC GLUCOMTR-MCNC: 126 MG/DL (ref 70–130)
GLUCOSE BLDC GLUCOMTR-MCNC: 81 MG/DL (ref 70–130)

## 2021-05-10 PROCEDURE — 99231 SBSQ HOSP IP/OBS SF/LOW 25: CPT | Performed by: NURSE PRACTITIONER

## 2021-05-10 PROCEDURE — 99232 SBSQ HOSP IP/OBS MODERATE 35: CPT | Performed by: INTERNAL MEDICINE

## 2021-05-10 PROCEDURE — 99231 SBSQ HOSP IP/OBS SF/LOW 25: CPT | Performed by: THORACIC SURGERY (CARDIOTHORACIC VASCULAR SURGERY)

## 2021-05-10 PROCEDURE — 82962 GLUCOSE BLOOD TEST: CPT

## 2021-05-10 PROCEDURE — 97530 THERAPEUTIC ACTIVITIES: CPT

## 2021-05-10 RX ORDER — LIDOCAINE HYDROCHLORIDE 10 MG/ML
0.5 INJECTION, SOLUTION EPIDURAL; INFILTRATION; INTRACAUDAL; PERINEURAL ONCE AS NEEDED
Status: CANCELLED | OUTPATIENT
Start: 2021-05-10

## 2021-05-10 RX ORDER — FAMOTIDINE 20 MG/1
20 TABLET, FILM COATED ORAL DAILY
Status: DISCONTINUED | OUTPATIENT
Start: 2021-05-10 | End: 2021-05-18 | Stop reason: HOSPADM

## 2021-05-10 RX ORDER — CEFUROXIME AXETIL 250 MG/1
500 TABLET ORAL EVERY 12 HOURS SCHEDULED
Status: DISCONTINUED | OUTPATIENT
Start: 2021-05-10 | End: 2021-05-18 | Stop reason: HOSPADM

## 2021-05-10 RX ORDER — SODIUM CHLORIDE 0.9 % (FLUSH) 0.9 %
10 SYRINGE (ML) INJECTION EVERY 12 HOURS SCHEDULED
Status: CANCELLED | OUTPATIENT
Start: 2021-05-10

## 2021-05-10 RX ORDER — FAMOTIDINE 10 MG/ML
20 INJECTION, SOLUTION INTRAVENOUS ONCE
Status: CANCELLED | OUTPATIENT
Start: 2021-05-10 | End: 2021-05-10

## 2021-05-10 RX ADMIN — ANTACID TABLETS 2 TABLET: 500 TABLET, CHEWABLE ORAL at 22:13

## 2021-05-10 RX ADMIN — SODIUM CHLORIDE, PRESERVATIVE FREE 10 ML: 5 INJECTION INTRAVENOUS at 08:08

## 2021-05-10 RX ADMIN — CARVEDILOL 25 MG: 12.5 TABLET, FILM COATED ORAL at 08:07

## 2021-05-10 RX ADMIN — Medication 1 CAPSULE: at 08:07

## 2021-05-10 RX ADMIN — LISINOPRIL 10 MG: 10 TABLET ORAL at 08:07

## 2021-05-10 RX ADMIN — SODIUM CHLORIDE, PRESERVATIVE FREE 10 ML: 5 INJECTION INTRAVENOUS at 21:52

## 2021-05-10 RX ADMIN — CEFUROXIME AXETIL 500 MG: 250 TABLET ORAL at 13:54

## 2021-05-10 RX ADMIN — CARVEDILOL 25 MG: 12.5 TABLET, FILM COATED ORAL at 18:32

## 2021-05-10 RX ADMIN — ACETAMINOPHEN 650 MG: 325 TABLET ORAL at 22:13

## 2021-05-10 RX ADMIN — ATORVASTATIN CALCIUM 80 MG: 40 TABLET, FILM COATED ORAL at 21:51

## 2021-05-10 RX ADMIN — ASPIRIN 81 MG: 81 TABLET, CHEWABLE ORAL at 08:07

## 2021-05-10 RX ADMIN — SODIUM CHLORIDE, PRESERVATIVE FREE 10 ML: 5 INJECTION INTRAVENOUS at 21:51

## 2021-05-10 RX ADMIN — CEFUROXIME AXETIL 500 MG: 250 TABLET ORAL at 21:51

## 2021-05-10 RX ADMIN — FAMOTIDINE 20 MG: 20 TABLET, FILM COATED ORAL at 16:38

## 2021-05-11 ENCOUNTER — ANESTHESIA (OUTPATIENT)
Dept: PERIOP | Facility: HOSPITAL | Age: 71
End: 2021-05-11

## 2021-05-11 ENCOUNTER — APPOINTMENT (OUTPATIENT)
Dept: GENERAL RADIOLOGY | Facility: HOSPITAL | Age: 71
End: 2021-05-11

## 2021-05-11 ENCOUNTER — ANESTHESIA EVENT (OUTPATIENT)
Dept: PERIOP | Facility: HOSPITAL | Age: 71
End: 2021-05-11

## 2021-05-11 LAB
ANION GAP SERPL CALCULATED.3IONS-SCNC: 12 MMOL/L (ref 5–15)
BASOPHILS # BLD AUTO: 0.04 10*3/MM3 (ref 0–0.2)
BASOPHILS NFR BLD AUTO: 0.5 % (ref 0–1.5)
BUN SERPL-MCNC: 25 MG/DL (ref 8–23)
BUN/CREAT SERPL: 28.1 (ref 7–25)
CALCIUM SPEC-SCNC: 9.8 MG/DL (ref 8.6–10.5)
CHLORIDE SERPL-SCNC: 100 MMOL/L (ref 98–107)
CO2 SERPL-SCNC: 26 MMOL/L (ref 22–29)
CREAT SERPL-MCNC: 0.89 MG/DL (ref 0.76–1.27)
DEPRECATED RDW RBC AUTO: 45.1 FL (ref 37–54)
EOSINOPHIL # BLD AUTO: 0.11 10*3/MM3 (ref 0–0.4)
EOSINOPHIL NFR BLD AUTO: 1.4 % (ref 0.3–6.2)
ERYTHROCYTE [DISTWIDTH] IN BLOOD BY AUTOMATED COUNT: 12.8 % (ref 12.3–15.4)
GFR SERPL CREATININE-BSD FRML MDRD: 85 ML/MIN/1.73
GLUCOSE BLDC GLUCOMTR-MCNC: 102 MG/DL (ref 70–130)
GLUCOSE BLDC GLUCOMTR-MCNC: 118 MG/DL (ref 70–130)
GLUCOSE BLDC GLUCOMTR-MCNC: 123 MG/DL (ref 70–130)
GLUCOSE BLDC GLUCOMTR-MCNC: 130 MG/DL (ref 70–130)
GLUCOSE BLDC GLUCOMTR-MCNC: 179 MG/DL (ref 70–130)
GLUCOSE SERPL-MCNC: 101 MG/DL (ref 65–99)
HCT VFR BLD AUTO: 42.4 % (ref 37.5–51)
HGB BLD-MCNC: 14.5 G/DL (ref 13–17.7)
IMM GRANULOCYTES # BLD AUTO: 0.04 10*3/MM3 (ref 0–0.05)
IMM GRANULOCYTES NFR BLD AUTO: 0.5 % (ref 0–0.5)
LYMPHOCYTES # BLD AUTO: 1.57 10*3/MM3 (ref 0.7–3.1)
LYMPHOCYTES NFR BLD AUTO: 19.8 % (ref 19.6–45.3)
MCH RBC QN AUTO: 32.7 PG (ref 26.6–33)
MCHC RBC AUTO-ENTMCNC: 34.2 G/DL (ref 31.5–35.7)
MCV RBC AUTO: 95.7 FL (ref 79–97)
MONOCYTES # BLD AUTO: 0.9 10*3/MM3 (ref 0.1–0.9)
MONOCYTES NFR BLD AUTO: 11.4 % (ref 5–12)
NEUTROPHILS NFR BLD AUTO: 5.25 10*3/MM3 (ref 1.7–7)
NEUTROPHILS NFR BLD AUTO: 66.4 % (ref 42.7–76)
NRBC BLD AUTO-RTO: 0 /100 WBC (ref 0–0.2)
PLATELET # BLD AUTO: 125 10*3/MM3 (ref 140–450)
PMV BLD AUTO: 12 FL (ref 6–12)
POTASSIUM SERPL-SCNC: 4.4 MMOL/L (ref 3.5–5.2)
RBC # BLD AUTO: 4.43 10*6/MM3 (ref 4.14–5.8)
SODIUM SERPL-SCNC: 138 MMOL/L (ref 136–145)
WBC # BLD AUTO: 7.91 10*3/MM3 (ref 3.4–10.8)

## 2021-05-11 PROCEDURE — 047L3ZZ DILATION OF LEFT FEMORAL ARTERY, PERCUTANEOUS APPROACH: ICD-10-PCS | Performed by: THORACIC SURGERY (CARDIOTHORACIC VASCULAR SURGERY)

## 2021-05-11 PROCEDURE — C1894 INTRO/SHEATH, NON-LASER: HCPCS | Performed by: THORACIC SURGERY (CARDIOTHORACIC VASCULAR SURGERY)

## 2021-05-11 PROCEDURE — 25010000002 FENTANYL CITRATE (PF) 100 MCG/2ML SOLUTION: Performed by: NURSE ANESTHETIST, CERTIFIED REGISTERED

## 2021-05-11 PROCEDURE — 80048 BASIC METABOLIC PNL TOTAL CA: CPT | Performed by: INTERNAL MEDICINE

## 2021-05-11 PROCEDURE — 0 IODIXANOL PER 1 ML: Performed by: THORACIC SURGERY (CARDIOTHORACIC VASCULAR SURGERY)

## 2021-05-11 PROCEDURE — 99024 POSTOP FOLLOW-UP VISIT: CPT | Performed by: THORACIC SURGERY (CARDIOTHORACIC VASCULAR SURGERY)

## 2021-05-11 PROCEDURE — 25010000002 MIDAZOLAM PER 1 MG: Performed by: NURSE ANESTHETIST, CERTIFIED REGISTERED

## 2021-05-11 PROCEDURE — 25010000003 LIDOCAINE 1 % SOLUTION: Performed by: THORACIC SURGERY (CARDIOTHORACIC VASCULAR SURGERY)

## 2021-05-11 PROCEDURE — C2623 CATH, TRANSLUMIN, DRUG-COAT: HCPCS | Performed by: THORACIC SURGERY (CARDIOTHORACIC VASCULAR SURGERY)

## 2021-05-11 PROCEDURE — 25010000002 HEPARIN (PORCINE) PER 1000 UNITS: Performed by: THORACIC SURGERY (CARDIOTHORACIC VASCULAR SURGERY)

## 2021-05-11 PROCEDURE — B41D1ZZ FLUOROSCOPY OF AORTA AND BILATERAL LOWER EXTREMITY ARTERIES USING LOW OSMOLAR CONTRAST: ICD-10-PCS | Performed by: THORACIC SURGERY (CARDIOTHORACIC VASCULAR SURGERY)

## 2021-05-11 PROCEDURE — 75716 ARTERY X-RAYS ARMS/LEGS: CPT

## 2021-05-11 PROCEDURE — C1769 GUIDE WIRE: HCPCS | Performed by: THORACIC SURGERY (CARDIOTHORACIC VASCULAR SURGERY)

## 2021-05-11 PROCEDURE — 37224 PR REVSC OPN/PRG FEM/POP W/ANGIOPLASTY UNI: CPT | Performed by: THORACIC SURGERY (CARDIOTHORACIC VASCULAR SURGERY)

## 2021-05-11 PROCEDURE — 75716 ARTERY X-RAYS ARMS/LEGS: CPT | Performed by: THORACIC SURGERY (CARDIOTHORACIC VASCULAR SURGERY)

## 2021-05-11 PROCEDURE — C1887 CATHETER, GUIDING: HCPCS | Performed by: THORACIC SURGERY (CARDIOTHORACIC VASCULAR SURGERY)

## 2021-05-11 PROCEDURE — 75625 CONTRAST EXAM ABDOMINL AORTA: CPT

## 2021-05-11 PROCEDURE — 75625 CONTRAST EXAM ABDOMINL AORTA: CPT | Performed by: THORACIC SURGERY (CARDIOTHORACIC VASCULAR SURGERY)

## 2021-05-11 PROCEDURE — 25010000002 PHENYLEPHRINE 10 MG/ML SOLUTION 1 ML VIAL: Performed by: NURSE ANESTHETIST, CERTIFIED REGISTERED

## 2021-05-11 PROCEDURE — 25010000002 PHENYLEPHRINE 10 MG/ML SOLUTION: Performed by: NURSE ANESTHETIST, CERTIFIED REGISTERED

## 2021-05-11 PROCEDURE — 85025 COMPLETE CBC W/AUTO DIFF WBC: CPT | Performed by: INTERNAL MEDICINE

## 2021-05-11 PROCEDURE — 25010000003 LIDOCAINE 1 % SOLUTION: Performed by: NURSE ANESTHETIST, CERTIFIED REGISTERED

## 2021-05-11 PROCEDURE — 82962 GLUCOSE BLOOD TEST: CPT

## 2021-05-11 PROCEDURE — 25010000002 PROPOFOL 10 MG/ML EMULSION: Performed by: NURSE ANESTHETIST, CERTIFIED REGISTERED

## 2021-05-11 PROCEDURE — 25010000002 HEPARIN (PORCINE) PER 1000 UNITS: Performed by: NURSE ANESTHETIST, CERTIFIED REGISTERED

## 2021-05-11 PROCEDURE — 99232 SBSQ HOSP IP/OBS MODERATE 35: CPT | Performed by: INTERNAL MEDICINE

## 2021-05-11 RX ORDER — IODIXANOL 320 MG/ML
INJECTION, SOLUTION INTRAVASCULAR AS NEEDED
Status: DISCONTINUED | OUTPATIENT
Start: 2021-05-11 | End: 2021-05-11 | Stop reason: HOSPADM

## 2021-05-11 RX ORDER — SODIUM CHLORIDE 0.9 % (FLUSH) 0.9 %
10 SYRINGE (ML) INJECTION AS NEEDED
Status: DISCONTINUED | OUTPATIENT
Start: 2021-05-11 | End: 2021-05-11 | Stop reason: HOSPADM

## 2021-05-11 RX ORDER — HEPARIN SODIUM 1000 [USP'U]/ML
INJECTION, SOLUTION INTRAVENOUS; SUBCUTANEOUS AS NEEDED
Status: DISCONTINUED | OUTPATIENT
Start: 2021-05-11 | End: 2021-05-11 | Stop reason: SURG

## 2021-05-11 RX ORDER — PROPOFOL 10 MG/ML
VIAL (ML) INTRAVENOUS AS NEEDED
Status: DISCONTINUED | OUTPATIENT
Start: 2021-05-11 | End: 2021-05-11 | Stop reason: SURG

## 2021-05-11 RX ORDER — PROPOFOL 10 MG/ML
VIAL (ML) INTRAVENOUS CONTINUOUS PRN
Status: DISCONTINUED | OUTPATIENT
Start: 2021-05-11 | End: 2021-05-11 | Stop reason: SURG

## 2021-05-11 RX ORDER — FAMOTIDINE 20 MG/1
20 TABLET, FILM COATED ORAL ONCE
Status: COMPLETED | OUTPATIENT
Start: 2021-05-11 | End: 2021-05-11

## 2021-05-11 RX ORDER — MIDAZOLAM HYDROCHLORIDE 1 MG/ML
0.5 INJECTION INTRAMUSCULAR; INTRAVENOUS
Status: CANCELLED | OUTPATIENT
Start: 2021-05-11

## 2021-05-11 RX ORDER — SODIUM CHLORIDE 450 MG/100ML
100 INJECTION, SOLUTION INTRAVENOUS CONTINUOUS
Status: DISCONTINUED | OUTPATIENT
Start: 2021-05-11 | End: 2021-05-12

## 2021-05-11 RX ORDER — HYDROMORPHONE HYDROCHLORIDE 1 MG/ML
0.25 INJECTION, SOLUTION INTRAMUSCULAR; INTRAVENOUS; SUBCUTANEOUS
Status: DISCONTINUED | OUTPATIENT
Start: 2021-05-11 | End: 2021-05-11 | Stop reason: HOSPADM

## 2021-05-11 RX ORDER — MIDAZOLAM HYDROCHLORIDE 1 MG/ML
INJECTION INTRAMUSCULAR; INTRAVENOUS AS NEEDED
Status: DISCONTINUED | OUTPATIENT
Start: 2021-05-11 | End: 2021-05-11 | Stop reason: SURG

## 2021-05-11 RX ORDER — LIDOCAINE HYDROCHLORIDE 10 MG/ML
INJECTION, SOLUTION INFILTRATION; PERINEURAL AS NEEDED
Status: DISCONTINUED | OUTPATIENT
Start: 2021-05-11 | End: 2021-05-11 | Stop reason: SURG

## 2021-05-11 RX ORDER — LABETALOL HYDROCHLORIDE 5 MG/ML
5 INJECTION, SOLUTION INTRAVENOUS
Status: DISCONTINUED | OUTPATIENT
Start: 2021-05-11 | End: 2021-05-11 | Stop reason: HOSPADM

## 2021-05-11 RX ORDER — LIDOCAINE HYDROCHLORIDE 10 MG/ML
0.5 INJECTION, SOLUTION EPIDURAL; INFILTRATION; INTRACAUDAL; PERINEURAL ONCE AS NEEDED
Status: CANCELLED | OUTPATIENT
Start: 2021-05-11

## 2021-05-11 RX ORDER — SODIUM CHLORIDE, SODIUM LACTATE, POTASSIUM CHLORIDE, CALCIUM CHLORIDE 600; 310; 30; 20 MG/100ML; MG/100ML; MG/100ML; MG/100ML
9 INJECTION, SOLUTION INTRAVENOUS CONTINUOUS
Status: DISCONTINUED | OUTPATIENT
Start: 2021-05-11 | End: 2021-05-11

## 2021-05-11 RX ORDER — FENTANYL CITRATE 50 UG/ML
INJECTION, SOLUTION INTRAMUSCULAR; INTRAVENOUS AS NEEDED
Status: DISCONTINUED | OUTPATIENT
Start: 2021-05-11 | End: 2021-05-11 | Stop reason: SURG

## 2021-05-11 RX ORDER — FENTANYL CITRATE 50 UG/ML
25 INJECTION, SOLUTION INTRAMUSCULAR; INTRAVENOUS
Status: DISCONTINUED | OUTPATIENT
Start: 2021-05-11 | End: 2021-05-11 | Stop reason: HOSPADM

## 2021-05-11 RX ORDER — SODIUM CHLORIDE, SODIUM LACTATE, POTASSIUM CHLORIDE, CALCIUM CHLORIDE 600; 310; 30; 20 MG/100ML; MG/100ML; MG/100ML; MG/100ML
9 INJECTION, SOLUTION INTRAVENOUS CONTINUOUS
Status: CANCELLED | OUTPATIENT
Start: 2021-05-11

## 2021-05-11 RX ORDER — SODIUM CHLORIDE 0.9 % (FLUSH) 0.9 %
10 SYRINGE (ML) INJECTION EVERY 12 HOURS SCHEDULED
Status: CANCELLED | OUTPATIENT
Start: 2021-05-11

## 2021-05-11 RX ORDER — HYDROCODONE BITARTRATE AND ACETAMINOPHEN 5; 325 MG/1; MG/1
1 TABLET ORAL EVERY 6 HOURS PRN
Status: DISPENSED | OUTPATIENT
Start: 2021-05-11 | End: 2021-05-18

## 2021-05-11 RX ORDER — LIDOCAINE HYDROCHLORIDE 10 MG/ML
INJECTION, SOLUTION INFILTRATION; PERINEURAL AS NEEDED
Status: DISCONTINUED | OUTPATIENT
Start: 2021-05-11 | End: 2021-05-11 | Stop reason: HOSPADM

## 2021-05-11 RX ORDER — PHENYLEPHRINE HYDROCHLORIDE 10 MG/ML
INJECTION INTRAVENOUS AS NEEDED
Status: DISCONTINUED | OUTPATIENT
Start: 2021-05-11 | End: 2021-05-11 | Stop reason: SURG

## 2021-05-11 RX ORDER — MORPHINE SULFATE 2 MG/ML
2 INJECTION, SOLUTION INTRAMUSCULAR; INTRAVENOUS EVERY 4 HOURS PRN
Status: ACTIVE | OUTPATIENT
Start: 2021-05-11 | End: 2021-05-18

## 2021-05-11 RX ORDER — SODIUM CHLORIDE 0.9 % (FLUSH) 0.9 %
10 SYRINGE (ML) INJECTION AS NEEDED
Status: CANCELLED | OUTPATIENT
Start: 2021-05-11

## 2021-05-11 RX ORDER — MIDAZOLAM HYDROCHLORIDE 1 MG/ML
0.5 INJECTION INTRAMUSCULAR; INTRAVENOUS
Status: DISCONTINUED | OUTPATIENT
Start: 2021-05-11 | End: 2021-05-11 | Stop reason: HOSPADM

## 2021-05-11 RX ADMIN — Medication 1 CAPSULE: at 13:47

## 2021-05-11 RX ADMIN — ATORVASTATIN CALCIUM 80 MG: 40 TABLET, FILM COATED ORAL at 21:30

## 2021-05-11 RX ADMIN — FAMOTIDINE 20 MG: 20 TABLET, FILM COATED ORAL at 06:39

## 2021-05-11 RX ADMIN — PROPOFOL 40 MG: 10 INJECTION, EMULSION INTRAVENOUS at 06:59

## 2021-05-11 RX ADMIN — ACETAMINOPHEN 650 MG: 325 TABLET ORAL at 21:45

## 2021-05-11 RX ADMIN — CARVEDILOL 25 MG: 12.5 TABLET, FILM COATED ORAL at 16:43

## 2021-05-11 RX ADMIN — SODIUM CHLORIDE 100 ML/HR: 4.5 INJECTION, SOLUTION INTRAVENOUS at 13:34

## 2021-05-11 RX ADMIN — SODIUM CHLORIDE, PRESERVATIVE FREE 10 ML: 5 INJECTION INTRAVENOUS at 21:58

## 2021-05-11 RX ADMIN — CEFUROXIME AXETIL 500 MG: 250 TABLET ORAL at 15:54

## 2021-05-11 RX ADMIN — LIDOCAINE HYDROCHLORIDE 50 MG: 10 INJECTION, SOLUTION INFILTRATION; PERINEURAL at 06:59

## 2021-05-11 RX ADMIN — ASPIRIN 81 MG: 81 TABLET, CHEWABLE ORAL at 13:48

## 2021-05-11 RX ADMIN — HEPARIN SODIUM 5000 UNITS: 1000 INJECTION, SOLUTION INTRAVENOUS; SUBCUTANEOUS at 07:21

## 2021-05-11 RX ADMIN — PHENYLEPHRINE HYDROCHLORIDE 0.3 MCG/KG/MIN: 10 INJECTION INTRAVENOUS at 07:32

## 2021-05-11 RX ADMIN — SODIUM CHLORIDE, PRESERVATIVE FREE 10 ML: 5 INJECTION INTRAVENOUS at 13:36

## 2021-05-11 RX ADMIN — SODIUM CHLORIDE, PRESERVATIVE FREE 10 ML: 5 INJECTION INTRAVENOUS at 06:15

## 2021-05-11 RX ADMIN — CEFUROXIME AXETIL 500 MG: 250 TABLET ORAL at 21:30

## 2021-05-11 RX ADMIN — SODIUM CHLORIDE, PRESERVATIVE FREE 10 ML: 5 INJECTION INTRAVENOUS at 21:59

## 2021-05-11 RX ADMIN — PHENYLEPHRINE HYDROCHLORIDE 80 MCG: 10 INJECTION INTRAVENOUS at 07:14

## 2021-05-11 RX ADMIN — LISINOPRIL 10 MG: 10 TABLET ORAL at 13:47

## 2021-05-11 RX ADMIN — MIDAZOLAM HYDROCHLORIDE 2 MG: 1 INJECTION, SOLUTION INTRAMUSCULAR; INTRAVENOUS at 06:56

## 2021-05-11 RX ADMIN — SODIUM CHLORIDE, POTASSIUM CHLORIDE, SODIUM LACTATE AND CALCIUM CHLORIDE 9 ML/HR: 600; 310; 30; 20 INJECTION, SOLUTION INTRAVENOUS at 06:15

## 2021-05-11 RX ADMIN — ANTACID TABLETS 2 TABLET: 500 TABLET, CHEWABLE ORAL at 21:58

## 2021-05-11 RX ADMIN — SODIUM CHLORIDE 100 ML/HR: 4.5 INJECTION, SOLUTION INTRAVENOUS at 23:43

## 2021-05-11 RX ADMIN — PHENYLEPHRINE HYDROCHLORIDE 80 MCG: 10 INJECTION INTRAVENOUS at 07:11

## 2021-05-11 RX ADMIN — FENTANYL CITRATE 100 MCG: 50 INJECTION, SOLUTION INTRAMUSCULAR; INTRAVENOUS at 06:59

## 2021-05-11 RX ADMIN — FAMOTIDINE 20 MG: 20 TABLET, FILM COATED ORAL at 13:47

## 2021-05-11 RX ADMIN — PROPOFOL 85 MCG/KG/MIN: 10 INJECTION, EMULSION INTRAVENOUS at 07:01

## 2021-05-11 NOTE — ANESTHESIA POSTPROCEDURE EVALUATION
Patient: Timmy Guajardo    Procedure Summary     Date: 05/11/21 Room / Location:  [facility] 02 / [facility] HYBRID [facility]    Anesthesia Start: 0654 Anesthesia Stop:     Procedure: AORTAGRAM WITH RUNOFFS, LEFT SFA BALLOON ANGIOPLASTY (N/A ) Diagnosis:       Gangrene of toe of left foot (CMS/HCC)      (Gangrene of toe of left foot (CMS/HCC) [I96])    Surgeons: Tim Mahan MD Provider: Hiren Colbert MD    Anesthesia Type: general, MAC ASA Status: 3          Anesthesia Type: general, MAC    Vitals  Vitals Value Taken Time   BP 95/64 05/11/21 0845   Temp 98.1 °F (36.7 °C) 05/11/21 0845   Pulse 83 05/11/21 0845   Resp 16 05/11/21 0845   SpO2 98 % 05/11/21 0845           Post Anesthesia Care and Evaluation    Patient location during evaluation: PACU  Patient participation: complete - patient cannot participate  Level of consciousness: responsive to physical stimuli  Pain score: 0  Pain management: adequate  Airway patency: patent  Anesthetic complications: No anesthetic complications    Cardiovascular status: stable  Respiratory status: acceptable, nasal cannula, oral airway and spontaneous ventilation  Hydration status: stable    Comments: Pt transferred to PACU with O2. Vital signs stable. Report to PACU RN and care accepted.

## 2021-05-11 NOTE — ANESTHESIA PREPROCEDURE EVALUATION
Anesthesia Evaluation     Patient summary reviewed and Nursing notes reviewed   NPO Solid Status: > 8 hours  NPO Liquid Status: > 2 hours           Airway   Mallampati: I  TM distance: >3 FB  Neck ROM: full  No difficulty expected  Dental    (+) upper dentures and poor dentition        Pulmonary    (+) a smoker Former,   (-) COPD, asthma, shortness of breath, recent URI, sleep apnea  Cardiovascular     ECG reviewed    (+) pacemaker (4 year battery life) ICD interrogated Yesterday, hypertension, CAD, CHF Systolic <55%,   (-) past MI, angina    ROS comment: ECG Atrial-sensed ventricular-paced rhythm  Abnormal ECG    ECHO ·EF = 25% LV cavity is moderately dilateds   Valves are anatomically and functionally normal.    Neuro/Psych  (+) CVA (R sided weak Slurred speech  BUT CT NEG ),     (-) seizures  GI/Hepatic/Renal/Endo    (+)  GERD,  diabetes mellitus type 2,   (-) liver disease, no renal disease, no thyroid disorder    Musculoskeletal     Abdominal    Substance History      OB/GYN          Other   blood dyscrasia thrombocytopenia,     ROS/Med Hx Other: PLTS 130 K today          Phys Exam Other: sevedal lowers- all broken careous teeth              Anesthesia Plan    ASA 3     general and MAC   (Aim to Maintiain MAP >65 recent TIA /CVA  +/- non invasive BP monitoring  )  intravenous induction     Anesthetic plan, all risks, benefits, and alternatives have been provided, discussed and informed consent has been obtained with: patient.    Plan discussed with CRNA.

## 2021-05-12 ENCOUNTER — ANESTHESIA EVENT CONVERTED (OUTPATIENT)
Dept: ANESTHESIOLOGY | Facility: HOSPITAL | Age: 71
End: 2021-05-12

## 2021-05-12 ENCOUNTER — ANESTHESIA (OUTPATIENT)
Dept: PERIOP | Facility: HOSPITAL | Age: 71
End: 2021-05-12

## 2021-05-12 LAB
ANION GAP SERPL CALCULATED.3IONS-SCNC: 9 MMOL/L (ref 5–15)
BASOPHILS # BLD AUTO: 0.06 10*3/MM3 (ref 0–0.2)
BASOPHILS NFR BLD AUTO: 0.8 % (ref 0–1.5)
BUN SERPL-MCNC: 18 MG/DL (ref 8–23)
BUN/CREAT SERPL: 17 (ref 7–25)
CALCIUM SPEC-SCNC: 9 MG/DL (ref 8.6–10.5)
CHLORIDE SERPL-SCNC: 101 MMOL/L (ref 98–107)
CO2 SERPL-SCNC: 27 MMOL/L (ref 22–29)
CREAT SERPL-MCNC: 1.06 MG/DL (ref 0.76–1.27)
DEPRECATED RDW RBC AUTO: 44.1 FL (ref 37–54)
EOSINOPHIL # BLD AUTO: 0.12 10*3/MM3 (ref 0–0.4)
EOSINOPHIL NFR BLD AUTO: 1.7 % (ref 0.3–6.2)
ERYTHROCYTE [DISTWIDTH] IN BLOOD BY AUTOMATED COUNT: 12.8 % (ref 12.3–15.4)
GFR SERPL CREATININE-BSD FRML MDRD: 69 ML/MIN/1.73
GLUCOSE BLDC GLUCOMTR-MCNC: 102 MG/DL (ref 70–130)
GLUCOSE BLDC GLUCOMTR-MCNC: 112 MG/DL (ref 70–130)
GLUCOSE BLDC GLUCOMTR-MCNC: 137 MG/DL (ref 70–130)
GLUCOSE BLDC GLUCOMTR-MCNC: 215 MG/DL (ref 70–130)
GLUCOSE BLDC GLUCOMTR-MCNC: 264 MG/DL (ref 70–130)
GLUCOSE SERPL-MCNC: 87 MG/DL (ref 65–99)
HCT VFR BLD AUTO: 38.5 % (ref 37.5–51)
HGB BLD-MCNC: 13.3 G/DL (ref 13–17.7)
IMM GRANULOCYTES # BLD AUTO: 0.03 10*3/MM3 (ref 0–0.05)
IMM GRANULOCYTES NFR BLD AUTO: 0.4 % (ref 0–0.5)
LYMPHOCYTES # BLD AUTO: 1.17 10*3/MM3 (ref 0.7–3.1)
LYMPHOCYTES NFR BLD AUTO: 16.5 % (ref 19.6–45.3)
MCH RBC QN AUTO: 32.4 PG (ref 26.6–33)
MCHC RBC AUTO-ENTMCNC: 34.5 G/DL (ref 31.5–35.7)
MCV RBC AUTO: 93.9 FL (ref 79–97)
MONOCYTES # BLD AUTO: 0.91 10*3/MM3 (ref 0.1–0.9)
MONOCYTES NFR BLD AUTO: 12.8 % (ref 5–12)
NEUTROPHILS NFR BLD AUTO: 4.81 10*3/MM3 (ref 1.7–7)
NEUTROPHILS NFR BLD AUTO: 67.8 % (ref 42.7–76)
NRBC BLD AUTO-RTO: 0 /100 WBC (ref 0–0.2)
PLATELET # BLD AUTO: 75 10*3/MM3 (ref 140–450)
PMV BLD AUTO: 12.8 FL (ref 6–12)
POTASSIUM SERPL-SCNC: 4 MMOL/L (ref 3.5–5.2)
RBC # BLD AUTO: 4.1 10*6/MM3 (ref 4.14–5.8)
SODIUM SERPL-SCNC: 137 MMOL/L (ref 136–145)
WBC # BLD AUTO: 7.1 10*3/MM3 (ref 3.4–10.8)

## 2021-05-12 PROCEDURE — 63710000001 INSULIN LISPRO (HUMAN) PER 5 UNITS: Performed by: PHYSICIAN ASSISTANT

## 2021-05-12 PROCEDURE — 76942 ECHO GUIDE FOR BIOPSY: CPT

## 2021-05-12 PROCEDURE — 25010000002 PROPOFOL 10 MG/ML EMULSION: Performed by: NURSE ANESTHETIST, CERTIFIED REGISTERED

## 2021-05-12 PROCEDURE — 88311 DECALCIFY TISSUE: CPT | Performed by: THORACIC SURGERY (CARDIOTHORACIC VASCULAR SURGERY)

## 2021-05-12 PROCEDURE — 85025 COMPLETE CBC W/AUTO DIFF WBC: CPT | Performed by: PHYSICIAN ASSISTANT

## 2021-05-12 PROCEDURE — 25010000002 DEXAMETHASONE SODIUM PHOSPHATE 10 MG/ML SOLUTION: Performed by: NURSE ANESTHETIST, CERTIFIED REGISTERED

## 2021-05-12 PROCEDURE — 99024 POSTOP FOLLOW-UP VISIT: CPT | Performed by: THORACIC SURGERY (CARDIOTHORACIC VASCULAR SURGERY)

## 2021-05-12 PROCEDURE — 88305 TISSUE EXAM BY PATHOLOGIST: CPT | Performed by: THORACIC SURGERY (CARDIOTHORACIC VASCULAR SURGERY)

## 2021-05-12 PROCEDURE — 25010000002 FENTANYL CITRATE (PF) 100 MCG/2ML SOLUTION: Performed by: NURSE ANESTHETIST, CERTIFIED REGISTERED

## 2021-05-12 PROCEDURE — 87176 TISSUE HOMOGENIZATION CULTR: CPT | Performed by: PHYSICIAN ASSISTANT

## 2021-05-12 PROCEDURE — 25010000002 ONDANSETRON PER 1 MG: Performed by: NURSE ANESTHETIST, CERTIFIED REGISTERED

## 2021-05-12 PROCEDURE — 0Y6Q0Z2 DETACHMENT AT LEFT 1ST TOE, MID, OPEN APPROACH: ICD-10-PCS | Performed by: THORACIC SURGERY (CARDIOTHORACIC VASCULAR SURGERY)

## 2021-05-12 PROCEDURE — 87205 SMEAR GRAM STAIN: CPT | Performed by: PHYSICIAN ASSISTANT

## 2021-05-12 PROCEDURE — 99232 SBSQ HOSP IP/OBS MODERATE 35: CPT | Performed by: HOSPITALIST

## 2021-05-12 PROCEDURE — 82962 GLUCOSE BLOOD TEST: CPT

## 2021-05-12 PROCEDURE — 87070 CULTURE OTHR SPECIMN AEROBIC: CPT | Performed by: PHYSICIAN ASSISTANT

## 2021-05-12 PROCEDURE — 88304 TISSUE EXAM BY PATHOLOGIST: CPT | Performed by: THORACIC SURGERY (CARDIOTHORACIC VASCULAR SURGERY)

## 2021-05-12 PROCEDURE — 28810 AMPUTATION TOE & METATARSAL: CPT | Performed by: THORACIC SURGERY (CARDIOTHORACIC VASCULAR SURGERY)

## 2021-05-12 PROCEDURE — 80048 BASIC METABOLIC PNL TOTAL CA: CPT | Performed by: PHYSICIAN ASSISTANT

## 2021-05-12 PROCEDURE — 87075 CULTR BACTERIA EXCEPT BLOOD: CPT | Performed by: PHYSICIAN ASSISTANT

## 2021-05-12 PROCEDURE — 28810 AMPUTATION TOE & METATARSAL: CPT | Performed by: PHYSICIAN ASSISTANT

## 2021-05-12 PROCEDURE — 25010000002 BUPRENORPHINE PER 0.1 MG: Performed by: NURSE ANESTHETIST, CERTIFIED REGISTERED

## 2021-05-12 RX ORDER — ONDANSETRON 4 MG/1
4 TABLET, FILM COATED ORAL EVERY 6 HOURS PRN
Status: DISCONTINUED | OUTPATIENT
Start: 2021-05-12 | End: 2021-05-18 | Stop reason: HOSPADM

## 2021-05-12 RX ORDER — AMOXICILLIN 250 MG
2 CAPSULE ORAL 2 TIMES DAILY PRN
Status: DISCONTINUED | OUTPATIENT
Start: 2021-05-12 | End: 2021-05-18 | Stop reason: HOSPADM

## 2021-05-12 RX ORDER — ONDANSETRON 2 MG/ML
4 INJECTION INTRAMUSCULAR; INTRAVENOUS ONCE AS NEEDED
Status: DISCONTINUED | OUTPATIENT
Start: 2021-05-12 | End: 2021-05-12 | Stop reason: HOSPADM

## 2021-05-12 RX ORDER — BUPIVACAINE HYDROCHLORIDE 2.5 MG/ML
INJECTION, SOLUTION EPIDURAL; INFILTRATION; INTRACAUDAL
Status: COMPLETED | OUTPATIENT
Start: 2021-05-12 | End: 2021-05-12

## 2021-05-12 RX ORDER — FENTANYL CITRATE 50 UG/ML
50 INJECTION, SOLUTION INTRAMUSCULAR; INTRAVENOUS
Status: DISCONTINUED | OUTPATIENT
Start: 2021-05-12 | End: 2021-05-12 | Stop reason: HOSPADM

## 2021-05-12 RX ORDER — HEPARIN SODIUM 5000 [USP'U]/ML
5000 INJECTION, SOLUTION INTRAVENOUS; SUBCUTANEOUS EVERY 8 HOURS SCHEDULED
Status: DISCONTINUED | OUTPATIENT
Start: 2021-05-13 | End: 2021-05-13

## 2021-05-12 RX ORDER — ONDANSETRON 2 MG/ML
INJECTION INTRAMUSCULAR; INTRAVENOUS AS NEEDED
Status: DISCONTINUED | OUTPATIENT
Start: 2021-05-12 | End: 2021-05-12 | Stop reason: SURG

## 2021-05-12 RX ORDER — PROPOFOL 10 MG/ML
VIAL (ML) INTRAVENOUS AS NEEDED
Status: DISCONTINUED | OUTPATIENT
Start: 2021-05-12 | End: 2021-05-12 | Stop reason: SURG

## 2021-05-12 RX ORDER — FENTANYL CITRATE 50 UG/ML
INJECTION, SOLUTION INTRAMUSCULAR; INTRAVENOUS
Status: COMPLETED | OUTPATIENT
Start: 2021-05-12 | End: 2021-05-12

## 2021-05-12 RX ORDER — BUPIVACAINE HCL/0.9 % NACL/PF 0.125 %
PLASTIC BAG, INJECTION (ML) EPIDURAL AS NEEDED
Status: DISCONTINUED | OUTPATIENT
Start: 2021-05-12 | End: 2021-05-12 | Stop reason: SURG

## 2021-05-12 RX ORDER — BUPRENORPHINE HYDROCHLORIDE 0.32 MG/ML
INJECTION INTRAMUSCULAR; INTRAVENOUS
Status: COMPLETED | OUTPATIENT
Start: 2021-05-12 | End: 2021-05-12

## 2021-05-12 RX ORDER — SODIUM CHLORIDE, SODIUM LACTATE, POTASSIUM CHLORIDE, CALCIUM CHLORIDE 600; 310; 30; 20 MG/100ML; MG/100ML; MG/100ML; MG/100ML
9 INJECTION, SOLUTION INTRAVENOUS CONTINUOUS
Status: DISCONTINUED | OUTPATIENT
Start: 2021-05-12 | End: 2021-05-12

## 2021-05-12 RX ORDER — MAGNESIUM HYDROXIDE 1200 MG/15ML
LIQUID ORAL AS NEEDED
Status: DISCONTINUED | OUTPATIENT
Start: 2021-05-12 | End: 2021-05-12 | Stop reason: HOSPADM

## 2021-05-12 RX ORDER — LIDOCAINE HYDROCHLORIDE 10 MG/ML
INJECTION, SOLUTION EPIDURAL; INFILTRATION; INTRACAUDAL; PERINEURAL AS NEEDED
Status: DISCONTINUED | OUTPATIENT
Start: 2021-05-12 | End: 2021-05-12 | Stop reason: SURG

## 2021-05-12 RX ORDER — EPHEDRINE SULFATE 50 MG/ML
INJECTION, SOLUTION INTRAVENOUS AS NEEDED
Status: DISCONTINUED | OUTPATIENT
Start: 2021-05-12 | End: 2021-05-12 | Stop reason: SURG

## 2021-05-12 RX ORDER — DEXAMETHASONE SODIUM PHOSPHATE 10 MG/ML
INJECTION, SOLUTION INTRAMUSCULAR; INTRAVENOUS
Status: COMPLETED | OUTPATIENT
Start: 2021-05-12 | End: 2021-05-12

## 2021-05-12 RX ORDER — ONDANSETRON 2 MG/ML
4 INJECTION INTRAMUSCULAR; INTRAVENOUS EVERY 6 HOURS PRN
Status: DISCONTINUED | OUTPATIENT
Start: 2021-05-12 | End: 2021-05-18 | Stop reason: HOSPADM

## 2021-05-12 RX ORDER — SODIUM CHLORIDE 450 MG/100ML
50 INJECTION, SOLUTION INTRAVENOUS CONTINUOUS
Status: DISCONTINUED | OUTPATIENT
Start: 2021-05-12 | End: 2021-05-14

## 2021-05-12 RX ADMIN — ATORVASTATIN CALCIUM 80 MG: 40 TABLET, FILM COATED ORAL at 21:30

## 2021-05-12 RX ADMIN — INSULIN LISPRO 4 UNITS: 100 INJECTION, SOLUTION INTRAVENOUS; SUBCUTANEOUS at 17:04

## 2021-05-12 RX ADMIN — ASPIRIN 81 MG: 81 TABLET, CHEWABLE ORAL at 07:53

## 2021-05-12 RX ADMIN — LIDOCAINE HYDROCHLORIDE 50 MG: 10 INJECTION, SOLUTION EPIDURAL; INFILTRATION; INTRACAUDAL; PERINEURAL at 09:57

## 2021-05-12 RX ADMIN — Medication 160 MCG: at 10:10

## 2021-05-12 RX ADMIN — ANTACID TABLETS 2 TABLET: 500 TABLET, CHEWABLE ORAL at 21:30

## 2021-05-12 RX ADMIN — BUPRENORPHINE HYDROCHLORIDE 0.3 MG: 0.32 INJECTION INTRAMUSCULAR; INTRAVENOUS at 09:08

## 2021-05-12 RX ADMIN — FENTANYL CITRATE 100 MCG: 50 INJECTION, SOLUTION INTRAMUSCULAR; INTRAVENOUS at 09:08

## 2021-05-12 RX ADMIN — PROPOFOL 120 MG: 10 INJECTION, EMULSION INTRAVENOUS at 09:57

## 2021-05-12 RX ADMIN — SODIUM CHLORIDE 50 ML/HR: 4.5 INJECTION, SOLUTION INTRAVENOUS at 14:46

## 2021-05-12 RX ADMIN — BUPIVACAINE HYDROCHLORIDE 30 ML: 2.5 INJECTION, SOLUTION EPIDURAL; INFILTRATION; INTRACAUDAL; PERINEURAL at 09:08

## 2021-05-12 RX ADMIN — CEFUROXIME AXETIL 500 MG: 250 TABLET ORAL at 07:56

## 2021-05-12 RX ADMIN — CARVEDILOL 25 MG: 12.5 TABLET, FILM COATED ORAL at 17:04

## 2021-05-12 RX ADMIN — SODIUM CHLORIDE, PRESERVATIVE FREE 10 ML: 5 INJECTION INTRAVENOUS at 21:31

## 2021-05-12 RX ADMIN — DEXAMETHASONE SODIUM PHOSPHATE 2 MG: 10 INJECTION, SOLUTION INTRAMUSCULAR; INTRAVENOUS at 09:08

## 2021-05-12 RX ADMIN — CEFUROXIME AXETIL 500 MG: 250 TABLET ORAL at 21:30

## 2021-05-12 RX ADMIN — EPHEDRINE SULFATE 10 MG: 50 INJECTION, SOLUTION INTRAVENOUS at 10:17

## 2021-05-12 RX ADMIN — LISINOPRIL 10 MG: 10 TABLET ORAL at 07:56

## 2021-05-12 RX ADMIN — FAMOTIDINE 20 MG: 20 TABLET, FILM COATED ORAL at 07:55

## 2021-05-12 RX ADMIN — HYDROCODONE BITARTRATE AND ACETAMINOPHEN 1 TABLET: 5; 325 TABLET ORAL at 21:31

## 2021-05-12 RX ADMIN — Medication 1 CAPSULE: at 07:53

## 2021-05-12 RX ADMIN — CARVEDILOL 25 MG: 12.5 TABLET, FILM COATED ORAL at 07:56

## 2021-05-12 RX ADMIN — ONDANSETRON 4 MG: 2 INJECTION INTRAMUSCULAR; INTRAVENOUS at 10:27

## 2021-05-12 NOTE — ANESTHESIA PROCEDURE NOTES
Left Popliteal SS Block      Patient reassessed immediately prior to procedure    Patient location during procedure: pre-op  Reason for block: at surgeon's request and post-op pain management  Performed by  CRNA: Edda Morales CRNA  Assisted by: Alie Escobar RN  Preanesthetic Checklist  Completed: patient identified, IV checked, site marked, risks and benefits discussed, surgical consent, monitors and equipment checked, pre-op evaluation and timeout performed  Prep:  Pt Position: right lateral decubitus  Sterile barriers:cap, gloves, mask and sterile barriers  Prep: ChloraPrep  Patient monitoring: blood pressure monitoring, continuous pulse oximetry and EKG  Procedure  Sedation:yes  Performed under: local infiltration  Guidance:ultrasound guided  Images:still images obtained, printed/placed on chart    Laterality:left  Block Type:popliteal  Injection Technique:single-shot  Needle Type:echogenic  Needle Gauge:20 G  Resistance on Injection: none    Medications Used: fentaNYL citrate (PF) (SUBLIMAZE) injection, 100 mcg  bupivacaine PF (MARCAINE) 0.25 % injection, 30 mL  buprenorphine (BUPRENEX) injection, 0.3 mg  dexamethasone sodium phosphate injection, 2 mg  Med admintered at 5/12/2021 9:08 AM      Post Assessment  Injection Assessment: negative aspiration for heme, no paresthesia on injection and incremental injection  Patient Tolerance:comfortable throughout block  Complications:no  Additional Notes  Procedure:                                                         The pt was placed in  lateral position.  The Insertion site was  Prepped with Chloraprep.The pt was anesthetized with  IV Sedation( see meds).  Skin and cutaneous tissue was infiltrated and anesthetized with 1% Lidocaine 3 mls via a 25g needle.  A BBraun 4 inch 20g echogenic needle was then  inserted approximately 3 cm proximal to the popliteal fossa at the lateral mid biceps femoris and advanced In-plane with Ultrasound guidance.  Normal  Saline PSF was utilized for hydrodissection of tissue.  The popliteal artery was visualized and the common peroneal and tibial bifurcation was located.  LA injection spread was visualized, injection was incremental 1-5ml, injection pressure was normal or little, no intraneural injection, no vascular injection. Thank you.

## 2021-05-12 NOTE — ANESTHESIA POSTPROCEDURE EVALUATION
Patient: Timmy Guajardo    Procedure Summary     Date: 05/12/21 Room / Location:  [facility] /  [facility]    Anesthesia Start: 0951 Anesthesia Stop:     Procedure: AMPUTATION DIGIT left great toe amputation (Left Toes) Diagnosis:       Gangrene of toe of left foot (CMS/HCC)      (Gangrene of toe of left foot (CMS/HCC) [I96])    Surgeons: Dario Marmolejo MD Provider: Hiren Colbert MD    Anesthesia Type: general with block ASA Status: 3          Anesthesia Type: general with block    Vitals  Vitals Value Taken Time   /56 05/12/21 1043   Temp 97.6 °F (36.4 °C) 05/12/21 1043   Pulse 71 05/12/21 1043   Resp 14 05/12/21 1039   SpO2 99 % 05/12/21 1043           Post Anesthesia Care and Evaluation    Patient location during evaluation: PACU  Patient participation: waiting for patient participation  Level of consciousness: responsive to physical stimuli  Pain management: adequate  Airway patency: patent  Anesthetic complications: No anesthetic complications  PONV Status: none  Cardiovascular status: hemodynamically stable and acceptable  Respiratory status: nonlabored ventilation, acceptable and nasal cannula  Hydration status: acceptable

## 2021-05-12 NOTE — ANESTHESIA PREPROCEDURE EVALUATION
Anesthesia Evaluation     Patient summary reviewed and Nursing notes reviewed   NPO Solid Status: > 8 hours  NPO Liquid Status: > 8 hours           Airway   Mallampati: I  TM distance: >3 FB  Neck ROM: full  No difficulty expected  Dental    (+) poor dentition and upper dentures        Pulmonary    (+) a smoker Former,   (-) COPD, asthma, shortness of breath, recent URI, sleep apnea  Cardiovascular     ECG reviewed    (+) pacemaker (4 year life ) pacemaker interrogated Yesterday, hypertension, CAD, CHF Diastolic >=55%, hyperlipidemia,   (-) past MI, angina    ROS comment: ECG Atrial-sensed ventricular-paced rhythm    ECHO ·EF = 25% LV cavity is moderately dilateds   Valves are anatomically and functionally normal.    Neuro/Psych  (+) CVA (R side weak and slurred speach but OK now ),     (-) seizures  GI/Hepatic/Renal/Endo    (+)  GERD,  diabetes mellitus type 2,   (-) liver disease, no renal disease, no thyroid disorder    Musculoskeletal     Abdominal    Substance History      OB/GYN          Other        ROS/Med Hx Other:     HCT 42 ---->38   PLTS 125K -----> 75K since yesrterday            Phys Exam Other: sevedal lowers- all broken careous teeth                Anesthesia Plan    ASA 3     general with block   (Sciatic single shot   Light GA Aim to keep MAP >65 (EF 25%) )  intravenous induction     Anesthetic plan, all risks, benefits, and alternatives have been provided, discussed and informed consent has been obtained with: patient.    Plan discussed with CRNA.

## 2021-05-12 NOTE — ANESTHESIA PROCEDURE NOTES
Airway  Urgency: elective    Date/Time: 5/12/2021 9:58 AM  Airway not difficult    General Information and Staff    Patient location during procedure: OR  CRNA: Sancho Silva CRNA    Indications and Patient Condition  Indications for airway management: airway protection    Preoxygenated: yes  Mask difficulty assessment: 0 - not attempted    Final Airway Details  Final airway type: supraglottic airway      Successful airway: I-gel  Size 4    Number of attempts at approach: 1  Assessment: lips, teeth, and gum same as pre-op and atraumatic intubation    Additional Comments  LMA placed without difficulty, ventilation with assist, equal breath sounds and symmetric chest rise and fall

## 2021-05-13 LAB
ANION GAP SERPL CALCULATED.3IONS-SCNC: 7 MMOL/L (ref 5–15)
BASOPHILS # BLD AUTO: 0.01 10*3/MM3 (ref 0–0.2)
BASOPHILS NFR BLD AUTO: 0.1 % (ref 0–1.5)
BUN SERPL-MCNC: 15 MG/DL (ref 8–23)
BUN/CREAT SERPL: 16.3 (ref 7–25)
CALCIUM SPEC-SCNC: 9 MG/DL (ref 8.6–10.5)
CHLORIDE SERPL-SCNC: 102 MMOL/L (ref 98–107)
CO2 SERPL-SCNC: 26 MMOL/L (ref 22–29)
CREAT SERPL-MCNC: 0.92 MG/DL (ref 0.76–1.27)
DEPRECATED RDW RBC AUTO: 42.8 FL (ref 37–54)
EOSINOPHIL # BLD AUTO: 0 10*3/MM3 (ref 0–0.4)
EOSINOPHIL NFR BLD AUTO: 0 % (ref 0.3–6.2)
ERYTHROCYTE [DISTWIDTH] IN BLOOD BY AUTOMATED COUNT: 12.2 % (ref 12.3–15.4)
GFR SERPL CREATININE-BSD FRML MDRD: 82 ML/MIN/1.73
GLUCOSE BLDC GLUCOMTR-MCNC: 111 MG/DL (ref 70–130)
GLUCOSE BLDC GLUCOMTR-MCNC: 141 MG/DL (ref 70–130)
GLUCOSE BLDC GLUCOMTR-MCNC: 173 MG/DL (ref 70–130)
GLUCOSE BLDC GLUCOMTR-MCNC: 196 MG/DL (ref 70–130)
GLUCOSE SERPL-MCNC: 226 MG/DL (ref 65–99)
HCT VFR BLD AUTO: 38 % (ref 37.5–51)
HGB BLD-MCNC: 13.2 G/DL (ref 13–17.7)
IMM GRANULOCYTES # BLD AUTO: 0.13 10*3/MM3 (ref 0–0.05)
IMM GRANULOCYTES NFR BLD AUTO: 1.1 % (ref 0–0.5)
LYMPHOCYTES # BLD AUTO: 0.58 10*3/MM3 (ref 0.7–3.1)
LYMPHOCYTES NFR BLD AUTO: 5 % (ref 19.6–45.3)
MCH RBC QN AUTO: 32.7 PG (ref 26.6–33)
MCHC RBC AUTO-ENTMCNC: 34.7 G/DL (ref 31.5–35.7)
MCV RBC AUTO: 94.1 FL (ref 79–97)
MONOCYTES # BLD AUTO: 0.91 10*3/MM3 (ref 0.1–0.9)
MONOCYTES NFR BLD AUTO: 7.8 % (ref 5–12)
NEUTROPHILS NFR BLD AUTO: 10.02 10*3/MM3 (ref 1.7–7)
NEUTROPHILS NFR BLD AUTO: 86 % (ref 42.7–76)
NRBC BLD AUTO-RTO: 0 /100 WBC (ref 0–0.2)
PLATELET # BLD AUTO: 126 10*3/MM3 (ref 140–450)
PMV BLD AUTO: 11.8 FL (ref 6–12)
POTASSIUM SERPL-SCNC: 4.5 MMOL/L (ref 3.5–5.2)
RBC # BLD AUTO: 4.04 10*6/MM3 (ref 4.14–5.8)
SODIUM SERPL-SCNC: 135 MMOL/L (ref 136–145)
WBC # BLD AUTO: 11.65 10*3/MM3 (ref 3.4–10.8)

## 2021-05-13 PROCEDURE — 25010000002 HEPARIN (PORCINE) PER 1000 UNITS: Performed by: PHYSICIAN ASSISTANT

## 2021-05-13 PROCEDURE — 99024 POSTOP FOLLOW-UP VISIT: CPT | Performed by: THORACIC SURGERY (CARDIOTHORACIC VASCULAR SURGERY)

## 2021-05-13 PROCEDURE — 97168 OT RE-EVAL EST PLAN CARE: CPT

## 2021-05-13 PROCEDURE — 80048 BASIC METABOLIC PNL TOTAL CA: CPT | Performed by: PHYSICIAN ASSISTANT

## 2021-05-13 PROCEDURE — 63710000001 INSULIN LISPRO (HUMAN) PER 5 UNITS: Performed by: PHYSICIAN ASSISTANT

## 2021-05-13 PROCEDURE — 97110 THERAPEUTIC EXERCISES: CPT

## 2021-05-13 PROCEDURE — 85025 COMPLETE CBC W/AUTO DIFF WBC: CPT | Performed by: PHYSICIAN ASSISTANT

## 2021-05-13 PROCEDURE — 99232 SBSQ HOSP IP/OBS MODERATE 35: CPT | Performed by: HOSPITALIST

## 2021-05-13 PROCEDURE — 82962 GLUCOSE BLOOD TEST: CPT

## 2021-05-13 RX ADMIN — ANTACID TABLETS 2 TABLET: 500 TABLET, CHEWABLE ORAL at 22:19

## 2021-05-13 RX ADMIN — SODIUM CHLORIDE, PRESERVATIVE FREE 10 ML: 5 INJECTION INTRAVENOUS at 22:20

## 2021-05-13 RX ADMIN — CARVEDILOL 25 MG: 12.5 TABLET, FILM COATED ORAL at 08:26

## 2021-05-13 RX ADMIN — CEFUROXIME AXETIL 500 MG: 250 TABLET ORAL at 22:19

## 2021-05-13 RX ADMIN — HEPARIN SODIUM 5000 UNITS: 5000 INJECTION INTRAVENOUS; SUBCUTANEOUS at 06:50

## 2021-05-13 RX ADMIN — LISINOPRIL 10 MG: 10 TABLET ORAL at 08:26

## 2021-05-13 RX ADMIN — HYDROCODONE BITARTRATE AND ACETAMINOPHEN 1 TABLET: 5; 325 TABLET ORAL at 18:02

## 2021-05-13 RX ADMIN — HYDROCODONE BITARTRATE AND ACETAMINOPHEN 1 TABLET: 5; 325 TABLET ORAL at 08:31

## 2021-05-13 RX ADMIN — ATORVASTATIN CALCIUM 80 MG: 40 TABLET, FILM COATED ORAL at 22:19

## 2021-05-13 RX ADMIN — ASPIRIN 81 MG: 81 TABLET, CHEWABLE ORAL at 08:26

## 2021-05-13 RX ADMIN — SODIUM CHLORIDE 50 ML/HR: 4.5 INJECTION, SOLUTION INTRAVENOUS at 08:31

## 2021-05-13 RX ADMIN — FAMOTIDINE 20 MG: 20 TABLET, FILM COATED ORAL at 08:25

## 2021-05-13 RX ADMIN — CARVEDILOL 25 MG: 12.5 TABLET, FILM COATED ORAL at 18:02

## 2021-05-13 RX ADMIN — INSULIN LISPRO 2 UNITS: 100 INJECTION, SOLUTION INTRAVENOUS; SUBCUTANEOUS at 08:25

## 2021-05-13 RX ADMIN — SODIUM CHLORIDE, PRESERVATIVE FREE 10 ML: 5 INJECTION INTRAVENOUS at 08:26

## 2021-05-13 RX ADMIN — CEFUROXIME AXETIL 500 MG: 250 TABLET ORAL at 08:26

## 2021-05-13 RX ADMIN — Medication 1 CAPSULE: at 08:26

## 2021-05-13 RX ADMIN — APIXABAN 5 MG: 5 TABLET, FILM COATED ORAL at 22:19

## 2021-05-14 LAB
BACTERIA SPEC AEROBE CULT: NORMAL
CRP SERPL-MCNC: 1.16 MG/DL (ref 0–0.5)
CYTO UR: NORMAL
DEPRECATED RDW RBC AUTO: 45.1 FL (ref 37–54)
ERYTHROCYTE [DISTWIDTH] IN BLOOD BY AUTOMATED COUNT: 12.9 % (ref 12.3–15.4)
GLUCOSE BLDC GLUCOMTR-MCNC: 108 MG/DL (ref 70–130)
GLUCOSE BLDC GLUCOMTR-MCNC: 115 MG/DL (ref 70–130)
GLUCOSE BLDC GLUCOMTR-MCNC: 149 MG/DL (ref 70–130)
GLUCOSE BLDC GLUCOMTR-MCNC: 314 MG/DL (ref 70–130)
GRAM STN SPEC: NORMAL
HCT VFR BLD AUTO: 35.6 % (ref 37.5–51)
HGB BLD-MCNC: 12 G/DL (ref 13–17.7)
LAB AP CASE REPORT: NORMAL
LAB AP CLINICAL INFORMATION: NORMAL
MCH RBC QN AUTO: 32.1 PG (ref 26.6–33)
MCHC RBC AUTO-ENTMCNC: 33.7 G/DL (ref 31.5–35.7)
MCV RBC AUTO: 95.2 FL (ref 79–97)
PATH REPORT.FINAL DX SPEC: NORMAL
PATH REPORT.GROSS SPEC: NORMAL
PLATELET # BLD AUTO: 90 10*3/MM3 (ref 140–450)
PMV BLD AUTO: 12.3 FL (ref 6–12)
RBC # BLD AUTO: 3.74 10*6/MM3 (ref 4.14–5.8)
WBC # BLD AUTO: 8.75 10*3/MM3 (ref 3.4–10.8)

## 2021-05-14 PROCEDURE — 82962 GLUCOSE BLOOD TEST: CPT

## 2021-05-14 PROCEDURE — 97164 PT RE-EVAL EST PLAN CARE: CPT

## 2021-05-14 PROCEDURE — 63710000001 INSULIN LISPRO (HUMAN) PER 5 UNITS: Performed by: PHYSICIAN ASSISTANT

## 2021-05-14 PROCEDURE — 99232 SBSQ HOSP IP/OBS MODERATE 35: CPT | Performed by: HOSPITALIST

## 2021-05-14 PROCEDURE — 97110 THERAPEUTIC EXERCISES: CPT

## 2021-05-14 PROCEDURE — 86140 C-REACTIVE PROTEIN: CPT | Performed by: INTERNAL MEDICINE

## 2021-05-14 PROCEDURE — 85027 COMPLETE CBC AUTOMATED: CPT | Performed by: INTERNAL MEDICINE

## 2021-05-14 PROCEDURE — 99024 POSTOP FOLLOW-UP VISIT: CPT | Performed by: PHYSICIAN ASSISTANT

## 2021-05-14 RX ORDER — AMLODIPINE BESYLATE 5 MG/1
5 TABLET ORAL
Status: DISCONTINUED | OUTPATIENT
Start: 2021-05-14 | End: 2021-05-15

## 2021-05-14 RX ORDER — TERAZOSIN 1 MG/1
1 CAPSULE ORAL NIGHTLY
Status: DISCONTINUED | OUTPATIENT
Start: 2021-05-14 | End: 2021-05-14

## 2021-05-14 RX ADMIN — FAMOTIDINE 20 MG: 20 TABLET, FILM COATED ORAL at 08:05

## 2021-05-14 RX ADMIN — INSULIN LISPRO 5 UNITS: 100 INJECTION, SOLUTION INTRAVENOUS; SUBCUTANEOUS at 12:43

## 2021-05-14 RX ADMIN — SODIUM CHLORIDE, PRESERVATIVE FREE 10 ML: 5 INJECTION INTRAVENOUS at 08:06

## 2021-05-14 RX ADMIN — HYDROCODONE BITARTRATE AND ACETAMINOPHEN 1 TABLET: 5; 325 TABLET ORAL at 08:05

## 2021-05-14 RX ADMIN — ATORVASTATIN CALCIUM 80 MG: 40 TABLET, FILM COATED ORAL at 22:05

## 2021-05-14 RX ADMIN — HYDROCODONE BITARTRATE AND ACETAMINOPHEN 1 TABLET: 5; 325 TABLET ORAL at 22:05

## 2021-05-14 RX ADMIN — CEFUROXIME AXETIL 500 MG: 250 TABLET ORAL at 08:58

## 2021-05-14 RX ADMIN — CARVEDILOL 25 MG: 12.5 TABLET, FILM COATED ORAL at 06:14

## 2021-05-14 RX ADMIN — CARVEDILOL 25 MG: 12.5 TABLET, FILM COATED ORAL at 17:16

## 2021-05-14 RX ADMIN — HYDROCODONE BITARTRATE AND ACETAMINOPHEN 1 TABLET: 5; 325 TABLET ORAL at 17:16

## 2021-05-14 RX ADMIN — Medication 1 CAPSULE: at 08:05

## 2021-05-14 RX ADMIN — HYDROCODONE BITARTRATE AND ACETAMINOPHEN 1 TABLET: 5; 325 TABLET ORAL at 00:09

## 2021-05-14 RX ADMIN — APIXABAN 5 MG: 5 TABLET, FILM COATED ORAL at 22:05

## 2021-05-14 RX ADMIN — ASPIRIN 81 MG: 81 TABLET, CHEWABLE ORAL at 08:05

## 2021-05-14 RX ADMIN — AMLODIPINE BESYLATE 5 MG: 5 TABLET ORAL at 09:25

## 2021-05-14 RX ADMIN — SODIUM CHLORIDE, PRESERVATIVE FREE 10 ML: 5 INJECTION INTRAVENOUS at 22:06

## 2021-05-14 RX ADMIN — APIXABAN 5 MG: 5 TABLET, FILM COATED ORAL at 08:05

## 2021-05-14 RX ADMIN — SODIUM CHLORIDE 50 ML/HR: 4.5 INJECTION, SOLUTION INTRAVENOUS at 05:12

## 2021-05-14 RX ADMIN — CEFUROXIME AXETIL 500 MG: 250 TABLET ORAL at 22:06

## 2021-05-14 RX ADMIN — LISINOPRIL 10 MG: 10 TABLET ORAL at 06:14

## 2021-05-15 LAB
GLUCOSE BLDC GLUCOMTR-MCNC: 121 MG/DL (ref 70–130)
GLUCOSE BLDC GLUCOMTR-MCNC: 133 MG/DL (ref 70–130)
GLUCOSE BLDC GLUCOMTR-MCNC: 145 MG/DL (ref 70–130)
GLUCOSE BLDC GLUCOMTR-MCNC: 178 MG/DL (ref 70–130)

## 2021-05-15 PROCEDURE — 63710000001 INSULIN LISPRO (HUMAN) PER 5 UNITS: Performed by: PHYSICIAN ASSISTANT

## 2021-05-15 PROCEDURE — 82962 GLUCOSE BLOOD TEST: CPT

## 2021-05-15 PROCEDURE — 99232 SBSQ HOSP IP/OBS MODERATE 35: CPT | Performed by: HOSPITALIST

## 2021-05-15 RX ORDER — TERAZOSIN 1 MG/1
1 CAPSULE ORAL NIGHTLY
Status: DISCONTINUED | OUTPATIENT
Start: 2021-05-15 | End: 2021-05-18 | Stop reason: HOSPADM

## 2021-05-15 RX ORDER — AMLODIPINE BESYLATE 10 MG/1
10 TABLET ORAL
Status: DISCONTINUED | OUTPATIENT
Start: 2021-05-16 | End: 2021-05-18 | Stop reason: HOSPADM

## 2021-05-15 RX ADMIN — HYDROCODONE BITARTRATE AND ACETAMINOPHEN 1 TABLET: 5; 325 TABLET ORAL at 21:00

## 2021-05-15 RX ADMIN — AMLODIPINE BESYLATE 5 MG: 5 TABLET ORAL at 08:42

## 2021-05-15 RX ADMIN — APIXABAN 5 MG: 5 TABLET, FILM COATED ORAL at 21:00

## 2021-05-15 RX ADMIN — LISINOPRIL 10 MG: 10 TABLET ORAL at 08:42

## 2021-05-15 RX ADMIN — TERAZOSIN HYDROCHLORIDE 1 MG: 1 CAPSULE ORAL at 21:00

## 2021-05-15 RX ADMIN — SODIUM CHLORIDE, PRESERVATIVE FREE 10 ML: 5 INJECTION INTRAVENOUS at 21:00

## 2021-05-15 RX ADMIN — INSULIN LISPRO 2 UNITS: 100 INJECTION, SOLUTION INTRAVENOUS; SUBCUTANEOUS at 11:57

## 2021-05-15 RX ADMIN — CARVEDILOL 25 MG: 12.5 TABLET, FILM COATED ORAL at 17:09

## 2021-05-15 RX ADMIN — SODIUM CHLORIDE, PRESERVATIVE FREE 10 ML: 5 INJECTION INTRAVENOUS at 08:42

## 2021-05-15 RX ADMIN — FAMOTIDINE 20 MG: 20 TABLET, FILM COATED ORAL at 08:42

## 2021-05-15 RX ADMIN — CEFUROXIME AXETIL 500 MG: 250 TABLET ORAL at 21:00

## 2021-05-15 RX ADMIN — CARVEDILOL 25 MG: 12.5 TABLET, FILM COATED ORAL at 05:04

## 2021-05-15 RX ADMIN — APIXABAN 5 MG: 5 TABLET, FILM COATED ORAL at 08:42

## 2021-05-15 RX ADMIN — Medication 1 CAPSULE: at 08:42

## 2021-05-15 RX ADMIN — HYDROCODONE BITARTRATE AND ACETAMINOPHEN 1 TABLET: 5; 325 TABLET ORAL at 04:55

## 2021-05-15 RX ADMIN — CEFUROXIME AXETIL 500 MG: 250 TABLET ORAL at 08:42

## 2021-05-15 RX ADMIN — ASPIRIN 81 MG: 81 TABLET, CHEWABLE ORAL at 08:42

## 2021-05-15 RX ADMIN — ATORVASTATIN CALCIUM 80 MG: 40 TABLET, FILM COATED ORAL at 21:00

## 2021-05-15 NOTE — PLAN OF CARE
Goal Outcome Evaluation:  Plan of Care Reviewed With: patient     Outcome Summary: VSS, BP better controlled after norvasc started, room air, Paced on monitor, pt on bedrest dsg CDI, pain controlled with oral meds, NIHSS 1 r/t facial droop

## 2021-05-15 NOTE — PROGRESS NOTES
[facility] Medicine Services  PROGRESS NOTE    Patient Name: Timmy Guajardo  : 1950  MRN: [id number]    Date of Admission: 2021  Primary Care Physician: Arsen Seals APRN    Subjective   Subjective     CC:   Toe gangrene    HPI:   Blood pressure reported as high today.  He was on fluids.  Stopped fluids and started antihypertensive.  No bleeding reported after restart on oral anticoagulant (Eliquis)    ROS:    Gen- No fevers, chills  CV- No chest pain, palpitations  Resp- No cough, dyspnea  GI- No N/V/D, abd pain        Objective   Objective     Vital Signs:   Temp:  [96.8 °F (36 °C)-96.9 °F (36.1 °C)] 96.8 °F (36 °C)  Heart Rate:  [70-78] 70  Resp:  [18] 18  BP: (174-191)/() 180/106  Total (NIH Stroke Scale): 1     Physical Exam:    Constitutional: No acute distress, awake, alert  HENT: NCAT, mucous membranes moist  Respiratory: Clear to auscultation bilaterally, respiratory effort normal   Cardiovascular: RRR, s1 and s2  Gastrointestinal: Positive bowel sounds, soft, nontender, nondistended  Musculoskeletal:  Left foot wrapped with surgical dressing post op  Psychiatric: Appropriate affect, cooperative  Neurologic: Oriented x 3, strength symmetric in all extremities, Cranial Nerves grossly intact to confrontation, speech clear  Skin: No rashes    Results Reviewed:  Results from last 7 days   Lab Units 21  0400 21  0605 21  0532   WBC 10*3/mm3 8.75 11.65* 7.10   HEMOGLOBIN g/dL 12.0* 13.2 13.3   HEMATOCRIT % 35.6* 38.0 38.5   PLATELETS 10*3/mm3 90* 126* 75*     Results from last 7 days   Lab Units 21  0605 21  0532 21  0441   SODIUM mmol/L 135* 137 138   POTASSIUM mmol/L 4.5 4.0 4.4   CHLORIDE mmol/L 102 101 100   CO2 mmol/L 26.0 27.0 26.0   BUN mg/dL 15 18 25*   CREATININE mg/dL 0.92 1.06 0.89   GLUCOSE mg/dL 226* 87 101*   CALCIUM mg/dL 9.0 9.0 9.8     Estimated Creatinine Clearance: 82.4 mL/min (by C-G formula based on SCr  of 0.92 mg/dL).    Microbiology Results Abnormal     Procedure Component Value - Date/Time    Tissue / Bone Culture - Tissue, Toe, Left [[id number]] Collected: 05/12/21 1036    Lab Status: Final result Specimen: Tissue from Toe, Left Updated: 05/14/21 0914     Tissue Culture Heavy growth (4+) Normal Skin Kay     Gram Stain Rare (1+) WBCs seen      Rare (1+) Gram positive cocci in pairs and clusters      Rare (1+) Gram positive bacilli    Blood Culture - Blood, Wrist, Right [[id number]] Collected: 05/05/21 0002    Lab Status: Final result Specimen: Blood from Wrist, Right Updated: 05/10/21 0045     Blood Culture No growth at 5 days    Blood Culture - Blood, Arm, Left [[id number]] Collected: 05/04/21 2358    Lab Status: Final result Specimen: Blood from Arm, Left Updated: 05/10/21 0045     Blood Culture No growth at 5 days    COVID PRE-OP / PRE-PROCEDURE SCREENING ORDER (NO ISOLATION) - Swab, Nasopharynx [[id number]]  (Normal) Collected: 05/04/21 2354    Lab Status: Final result Specimen: Swab from Nasopharynx Updated: 05/05/21 0225    Narrative:      The following orders were created for panel order COVID PRE-OP / PRE-PROCEDURE SCREENING ORDER (NO ISOLATION) - Swab, Nasopharynx.  Procedure                               Abnormality         Status                     ---------                               -----------         ------                     COVID-19 and FLU A/B PCR...[[id number]]  Normal              Final result                 Please view results for these tests on the individual orders.    COVID-19 and FLU A/B PCR - Swab, Nasopharynx [[id number]]  (Normal) Collected: 05/04/21 2354    Lab Status: Final result Specimen: Swab from Nasopharynx Updated: 05/05/21 0225     COVID19 Not Detected     Influenza A PCR Not Detected     Influenza B PCR Not Detected    Narrative:      Fact sheet for providers: https://www.fda.gov/media/496353/download    Fact sheet for patients:  https://www.fda.gov/media/923295/download    Test performed by PCR.          Imaging Results (Last 24 Hours)     ** No results found for the last 24 hours. **          Results for orders placed during the hospital encounter of 05/04/21    Adult Transthoracic Echo Complete W/ Cont if Necessary Per Protocol (With Agitated Saline)    Interpretation Summary  · Left ventricular systolic function is moderately decreased. Estimated left ventricular EF = 25%  · The left ventricular cavity is moderately dilated.  · The cardiac valves are anatomically and functionally normal.  · Saline test results are negative.      I have reviewed the medications:  Scheduled Meds:amLODIPine, 5 mg, Oral, Q24H  apixaban, 5 mg, Oral, Q12H  aspirin, 81 mg, Oral, Daily  atorvastatin, 80 mg, Oral, Nightly  carvedilol, 25 mg, Oral, BID With Meals  cefuroxime, 500 mg, Oral, Q12H  famotidine, 20 mg, Oral, Daily  insulin lispro, 0-7 Units, Subcutaneous, TID AC  lactobacillus acidophilus, 1 capsule, Oral, Daily  lisinopril, 10 mg, Oral, Q24H  sodium chloride, 10 mL, Intravenous, Q12H  terazosin, 1 mg, Oral, Nightly      Continuous Infusions:   PRN Meds:.•  acetaminophen  •  calcium carbonate  •  dextrose  •  dextrose  •  glucagon (human recombinant)  •  HYDROcodone-acetaminophen  •  magnesium hydroxide  •  magnesium sulfate **OR** magnesium sulfate **OR** magnesium sulfate  •  Morphine  •  ondansetron **OR** ondansetron  •  sennosides-docusate  •  sodium chloride  •  sodium chloride  •  sodium chloride    Assessment/Plan   Assessment & Plan     Active Hospital Problems    Diagnosis  POA   • **Suspected cerebrovascular accident (CVA) [R09.89]  Yes   • Hyperglycemia [R73.9]  Yes   • Acute right-sided weakness [R53.1]  Yes   • Leukocytosis [D72.829]  Yes   • CHF (congestive heart failure) (CMS/HCC) [I50.9]  Yes   • Thrombocytopenia (CMS/HCC) [D69.6]  Yes   • HTN (hypertension) [I10]  Yes   • Presence of cardiac pacemaker [Z95.0]  Yes   • Gangrene of toe  of left foot (CMS/[facility]) [I96]  Unknown      Resolved Hospital Problems   No resolved problems to display.        Brief Hospital Course to date:  Timmy Guajardo is a 70 y.o. male with acute CVA. He had right-sided weakness and found to have a dry gangrenous necrotic left great toe.    Patient had revascularization on May 11, 2021 (Tuesday) - Dr. Mahan - Left SFA angioplasty with drug eluting ballon  Went to the OR Wednesday morning for Left Great Toe amputation with Dr. Marmolejo.    5/13 - restart Eliquis 5 mg BID the day after surgery on Thursday evening    Suspected CVA with right-sided weakness  -Negative head CT  -Unable to do MRI due to dual-chamber ICD  -Neurology consulted  -Medical management  -Aspirin + Eliquis and Lipitor  -Plavix was discontinued  Gangrene of the left great toe  -Status post OR with amputation today  Peripheral vascular disease/Severe Left SFA stenosis  -Status post drug-eluting angioplasty of the entire superficial femoral artery  Systolic congestive heart failure/cardiomyopathy  -Echo showed ejection fraction of 25%  -previous EF was worse in past at 15% and ICD placed  -Neurology following  Diabetes  -Hemoglobin A1c of 7.6  -Insulin sliding scale  Hypertension  -Coreg and lisinopril    Stop IV fluids today  Add to antihypertensive regimen earlier in day today    DVT Prophylaxis:  Eliquis 5 mg oral every 12 hours    Disposition: I expect the patient to be discharged to [facility]    CODE STATUS:   Code Status and Medical Interventions:   Ordered at: 05/05/21 0155     Level Of Support Discussed With:    Patient     Code Status:    CPR     Medical Interventions (Level of Support Prior to Arrest):    Full       Pastor Painting MD  05/14/21

## 2021-05-15 NOTE — PLAN OF CARE
Goal Outcome Evaluation:  Plan of Care Reviewed With: patient, family  Progress: no change         Pt has denied any complaints during shift. Dressing changed. Left toe incision CDI with sutures. Discussed with CTSx, pt can get out of bed, NWB on LLE. VSS on RA.

## 2021-05-15 NOTE — PROGRESS NOTES
Cardiothoracic Surgery Progress Note      POD #3Left great toe amputation to the proximal phalangeal bone     LOS: 11 days      Subjective:   No acute events overnight.  Patient denies any complaints of chest pain or dyspnea.    Objective:  Vital Signs vital signs below noted T-max past 2497.7 °F  Temp:  [97.3 °F (36.3 °C)-97.9 °F (36.6 °C)] 97.9 °F (36.6 °C)  Heart Rate:  [70-82] 82  Resp:  [18] 18  BP: (124-182)/() 155/86    Physical Exam:   General Appearance: Oriented x3   Lungs:   Heart:   Skin:   Incision: Amputation site clean, dry and intact  Wound examined and is clean and dry   Neurologically has right facial droop  Results:  Results from last 7 days   Lab Units 05/14/21  0400   WBC 10*3/mm3 8.75   HEMOGLOBIN g/dL 12.0*   HEMATOCRIT % 35.6*   PLATELETS 10*3/mm3 90*     Results from last 7 days   Lab Units 05/13/21  0605   SODIUM mmol/L 135*   POTASSIUM mmol/L 4.5   CHLORIDE mmol/L 102   CO2 mmol/L 26.0   BUN mg/dL 15   CREATININE mg/dL 0.92   GLUCOSE mg/dL 226*   CALCIUM mg/dL 9.0         Assessment: #1.  Recent left CVA-neurologically improving  #2 dry gangrene left second toe with severe peripheral vascular disease treated with drug-eluting angioplasty of the entire superficial palmar.  Good results clinically.  3.  Postop day 3 left great toe amputation.  Amputation site appears to be viable at this time  Right heart disease with EF of 15 to 25%  Plan: Daily dressing changes amputation site.  Overall medical manage per hospitalist.  Antibiotics per infectious disease.  Plavix has been discontinued  On aspirin statins and Eliquis    Bandar Bustos PA-C - 05/15/21 - 09:16 EDT

## 2021-05-15 NOTE — PROGRESS NOTES
[facility] Medicine Services  PROGRESS NOTE    Patient Name: Timmy Guajardo  : 1950  MRN: [id number]    Date of Admission: 2021  Primary Care Physician: Arsen Seals APRN    Subjective   Subjective     CC:   Toe gangrene    HPI:   Blood pressure has been high for last few days even after medication added.  He is doing well today.  No issues reported during day time.  No family in room.    ROS:    Gen- No fevers, chills  CV- No chest pain, palpitations  Resp- No cough, dyspnea  GI- No N/V/D, abd pain        Objective   Objective     Vital Signs:   Temp:  [97.3 °F (36.3 °C)-97.9 °F (36.6 °C)] 97.6 °F (36.4 °C)  Heart Rate:  [70-82] 78  Resp:  [18] 18  BP: (124-184)/() 161/88  Total (NIH Stroke Scale): 1     Physical Exam:    Constitutional: No acute distress, awake, alert  HENT: NCAT, mucous membranes moist  Respiratory: Clear to auscultation bilaterally, respiratory effort normal   Cardiovascular: RRR, s1 and s2  Gastrointestinal: Positive bowel sounds, soft, nontender, nondistended  Musculoskeletal:  Left foot wrapped with surgical dressing post op  Psychiatric: Appropriate affect, cooperative  Neurologic: Oriented x 3, strength symmetric in all extremities, Cranial Nerves grossly intact to confrontation, speech clear  Skin: No rashes    Results Reviewed:  Results from last 7 days   Lab Units 21  0400 21  0605 21  0532   WBC 10*3/mm3 8.75 11.65* 7.10   HEMOGLOBIN g/dL 12.0* 13.2 13.3   HEMATOCRIT % 35.6* 38.0 38.5   PLATELETS 10*3/mm3 90* 126* 75*     Results from last 7 days   Lab Units 21  0605 21  0532 21  0441   SODIUM mmol/L 135* 137 138   POTASSIUM mmol/L 4.5 4.0 4.4   CHLORIDE mmol/L 102 101 100   CO2 mmol/L 26.0 27.0 26.0   BUN mg/dL 15 18 25*   CREATININE mg/dL 0.92 1.06 0.89   GLUCOSE mg/dL 226* 87 101*   CALCIUM mg/dL 9.0 9.0 9.8     Estimated Creatinine Clearance: 83.8 mL/min (by C-G formula based on SCr of 0.92  mg/dL).    Microbiology Results Abnormal     Procedure Component Value - Date/Time    Tissue / Bone Culture - Tissue, Toe, Left [[id number]] Collected: 05/12/21 1036    Lab Status: Final result Specimen: Tissue from Toe, Left Updated: 05/14/21 0914     Tissue Culture Heavy growth (4+) Normal Skin Kay     Gram Stain Rare (1+) WBCs seen      Rare (1+) Gram positive cocci in pairs and clusters      Rare (1+) Gram positive bacilli    Blood Culture - Blood, Wrist, Right [[id number]] Collected: 05/05/21 0002    Lab Status: Final result Specimen: Blood from Wrist, Right Updated: 05/10/21 0045     Blood Culture No growth at 5 days    Blood Culture - Blood, Arm, Left [[id number]] Collected: 05/04/21 2358    Lab Status: Final result Specimen: Blood from Arm, Left Updated: 05/10/21 0045     Blood Culture No growth at 5 days    COVID PRE-OP / PRE-PROCEDURE SCREENING ORDER (NO ISOLATION) - Swab, Nasopharynx [[id number]]  (Normal) Collected: 05/04/21 2354    Lab Status: Final result Specimen: Swab from Nasopharynx Updated: 05/05/21 0225    Narrative:      The following orders were created for panel order COVID PRE-OP / PRE-PROCEDURE SCREENING ORDER (NO ISOLATION) - Swab, Nasopharynx.  Procedure                               Abnormality         Status                     ---------                               -----------         ------                     COVID-19 and FLU A/B PCR...[[id number]]  Normal              Final result                 Please view results for these tests on the individual orders.    COVID-19 and FLU A/B PCR - Swab, Nasopharynx [[id number]]  (Normal) Collected: 05/04/21 2354    Lab Status: Final result Specimen: Swab from Nasopharynx Updated: 05/05/21 0225     COVID19 Not Detected     Influenza A PCR Not Detected     Influenza B PCR Not Detected    Narrative:      Fact sheet for providers: https://www.fda.gov/media/729679/download    Fact sheet for patients: https://www.fda.gov/media/727356/download    Test  performed by PCR.          Imaging Results (Last 24 Hours)     ** No results found for the last 24 hours. **          Results for orders placed during the hospital encounter of 05/04/21    Adult Transthoracic Echo Complete W/ Cont if Necessary Per Protocol (With Agitated Saline)    Interpretation Summary  · Left ventricular systolic function is moderately decreased. Estimated left ventricular EF = 25%  · The left ventricular cavity is moderately dilated.  · The cardiac valves are anatomically and functionally normal.  · Saline test results are negative.      I have reviewed the medications:  Scheduled Meds:[START ON 5/16/2021] amLODIPine, 10 mg, Oral, Q24H  apixaban, 5 mg, Oral, Q12H  aspirin, 81 mg, Oral, Daily  atorvastatin, 80 mg, Oral, Nightly  carvedilol, 25 mg, Oral, BID With Meals  cefuroxime, 500 mg, Oral, Q12H  famotidine, 20 mg, Oral, Daily  insulin lispro, 0-7 Units, Subcutaneous, TID AC  lactobacillus acidophilus, 1 capsule, Oral, Daily  lisinopril, 10 mg, Oral, Q24H  sodium chloride, 10 mL, Intravenous, Q12H  terazosin, 1 mg, Oral, Nightly      Continuous Infusions:   PRN Meds:.•  acetaminophen  •  calcium carbonate  •  dextrose  •  dextrose  •  glucagon (human recombinant)  •  HYDROcodone-acetaminophen  •  magnesium hydroxide  •  magnesium sulfate **OR** magnesium sulfate **OR** magnesium sulfate  •  Morphine  •  ondansetron **OR** ondansetron  •  sennosides-docusate  •  sodium chloride  •  sodium chloride  •  sodium chloride    Assessment/Plan   Assessment & Plan     Active Hospital Problems    Diagnosis  POA   • **Suspected cerebrovascular accident (CVA) [R09.89]  Yes   • Hyperglycemia [R73.9]  Yes   • Acute right-sided weakness [R53.1]  Yes   • Leukocytosis [D72.829]  Yes   • CHF (congestive heart failure) (CMS/HCC) [I50.9]  Yes   • Thrombocytopenia (CMS/HCC) [D69.6]  Yes   • HTN (hypertension) [I10]  Yes   • Presence of cardiac pacemaker [Z95.0]  Yes   • Gangrene of toe of left foot (CMS/HCC) [I96]   Unknown      Resolved Hospital Problems   No resolved problems to display.        Brief Hospital Course to date:  Timmy Guajardo is a 70 y.o. male with acute CVA. He had right-sided weakness and found to have a dry gangrenous necrotic left great toe.    Patient had revascularization on May 11, 2021 (Tuesday) - Dr. Mahan - Left SFA angioplasty with drug eluting ballon  Went to the OR Wednesday morning for Left Great Toe amputation with Dr. Marmolejo.    5/13 - restart Eliquis 5 mg BID the day after surgery on Thursday evening    BP has been high for several days even after giving additional medicine    Suspected CVA with right-sided weakness  -Negative head CT  -Unable to do MRI due to dual-chamber ICD  -Neurology consulted  -Medical management  -Aspirin + Eliquis and Lipitor  -Plavix was discontinued  Gangrene of the left great toe  -Status post OR with amputation today  Peripheral vascular disease/Severe Left SFA stenosis  -Status post drug-eluting angioplasty of the entire superficial femoral artery  Systolic congestive heart failure/cardiomyopathy  -Echo showed ejection fraction of 25%  -previous EF was worse in past at 15% and ICD placed  -Neurology following  Diabetes  -Hemoglobin A1c of 7.6  -Insulin sliding scale  Refractory Hypertension  -Coreg and lisinopril  -184/104 after Norvasc  -has been high for several days  -start with baby dose terazosin at bedtime    Increase Norvasc today  Add baby dose of terazosin at bedtime    DVT Prophylaxis:  Eliquis 5 mg oral every 12 hours    Disposition: I expect the patient to be discharged to [facility]    CODE STATUS:   Code Status and Medical Interventions:   Ordered at: 05/05/21 0155     Level Of Support Discussed With:    Patient     Code Status:    CPR     Medical Interventions (Level of Support Prior to Arrest):    Full       Pastor aPinting MD  05/15/21

## 2021-05-16 LAB
GLUCOSE BLDC GLUCOMTR-MCNC: 108 MG/DL (ref 70–130)
GLUCOSE BLDC GLUCOMTR-MCNC: 173 MG/DL (ref 70–130)
GLUCOSE BLDC GLUCOMTR-MCNC: 212 MG/DL (ref 70–130)
GLUCOSE BLDC GLUCOMTR-MCNC: 233 MG/DL (ref 70–130)

## 2021-05-16 PROCEDURE — 99024 POSTOP FOLLOW-UP VISIT: CPT | Performed by: THORACIC SURGERY (CARDIOTHORACIC VASCULAR SURGERY)

## 2021-05-16 PROCEDURE — 63710000001 INSULIN LISPRO (HUMAN) PER 5 UNITS: Performed by: PHYSICIAN ASSISTANT

## 2021-05-16 PROCEDURE — 82962 GLUCOSE BLOOD TEST: CPT

## 2021-05-16 PROCEDURE — 99233 SBSQ HOSP IP/OBS HIGH 50: CPT | Performed by: HOSPITALIST

## 2021-05-16 RX ADMIN — CEFUROXIME AXETIL 500 MG: 250 TABLET ORAL at 09:03

## 2021-05-16 RX ADMIN — APIXABAN 5 MG: 5 TABLET, FILM COATED ORAL at 09:03

## 2021-05-16 RX ADMIN — SODIUM CHLORIDE, PRESERVATIVE FREE 10 ML: 5 INJECTION INTRAVENOUS at 22:01

## 2021-05-16 RX ADMIN — AMLODIPINE BESYLATE 10 MG: 10 TABLET ORAL at 05:32

## 2021-05-16 RX ADMIN — TERAZOSIN HYDROCHLORIDE 1 MG: 1 CAPSULE ORAL at 22:00

## 2021-05-16 RX ADMIN — HYDROCODONE BITARTRATE AND ACETAMINOPHEN 1 TABLET: 5; 325 TABLET ORAL at 17:19

## 2021-05-16 RX ADMIN — CEFUROXIME AXETIL 500 MG: 250 TABLET ORAL at 22:00

## 2021-05-16 RX ADMIN — Medication 1 CAPSULE: at 09:03

## 2021-05-16 RX ADMIN — CARVEDILOL 25 MG: 12.5 TABLET, FILM COATED ORAL at 05:32

## 2021-05-16 RX ADMIN — INSULIN LISPRO 2 UNITS: 100 INJECTION, SOLUTION INTRAVENOUS; SUBCUTANEOUS at 22:00

## 2021-05-16 RX ADMIN — LISINOPRIL 10 MG: 10 TABLET ORAL at 09:05

## 2021-05-16 RX ADMIN — INSULIN LISPRO 3 UNITS: 100 INJECTION, SOLUTION INTRAVENOUS; SUBCUTANEOUS at 09:03

## 2021-05-16 RX ADMIN — HYDROCODONE BITARTRATE AND ACETAMINOPHEN 1 TABLET: 5; 325 TABLET ORAL at 22:00

## 2021-05-16 RX ADMIN — INSULIN LISPRO 3 UNITS: 100 INJECTION, SOLUTION INTRAVENOUS; SUBCUTANEOUS at 12:22

## 2021-05-16 RX ADMIN — HYDROCODONE BITARTRATE AND ACETAMINOPHEN 1 TABLET: 5; 325 TABLET ORAL at 05:32

## 2021-05-16 RX ADMIN — FAMOTIDINE 20 MG: 20 TABLET, FILM COATED ORAL at 09:03

## 2021-05-16 RX ADMIN — ATORVASTATIN CALCIUM 80 MG: 40 TABLET, FILM COATED ORAL at 22:00

## 2021-05-16 RX ADMIN — ASPIRIN 81 MG: 81 TABLET, CHEWABLE ORAL at 09:03

## 2021-05-16 RX ADMIN — CARVEDILOL 25 MG: 12.5 TABLET, FILM COATED ORAL at 16:53

## 2021-05-16 RX ADMIN — SODIUM CHLORIDE, PRESERVATIVE FREE 10 ML: 5 INJECTION INTRAVENOUS at 09:04

## 2021-05-16 RX ADMIN — DOCUSATE SODIUM 50MG AND SENNOSIDES 8.6MG 2 TABLET: 8.6; 5 TABLET, FILM COATED ORAL at 05:32

## 2021-05-16 RX ADMIN — APIXABAN 5 MG: 5 TABLET, FILM COATED ORAL at 22:00

## 2021-05-16 NOTE — PROGRESS NOTES
Cardiothoracic Surgery Progress Note      POD #: 4-left great toe amputation to the proximal phalangeal bone     LOS: 12 days      Subjective: Awake and alert    Objective:  Vital Signs  vital signs below noted T-max past 24 hours 97.9 °F  Temp:  [97.3 °F (36.3 °C)-97.9 °F (36.6 °C)] 97.3 °F (36.3 °C)  Heart Rate:  [70-84] 72  Resp:  [18] 18  BP: (151-189)/() 151/88    Physical Exam:   General Appearance: Oriented x3   Lungs:   Heart:   Skin:   Incision: The amputation site dressing change.  Sutures intact skin margins viable skin flaps viable.     Results:  Results from last 7 days   Lab Units 05/14/21  0400   WBC 10*3/mm3 8.75   HEMOGLOBIN g/dL 12.0*   HEMATOCRIT % 35.6*   PLATELETS 10*3/mm3 90*     Results from last 7 days   Lab Units 05/13/21  0605   SODIUM mmol/L 135*   POTASSIUM mmol/L 4.5   CHLORIDE mmol/L 102   CO2 mmol/L 26.0   BUN mg/dL 15   CREATININE mg/dL 0.92   GLUCOSE mg/dL 226*   CALCIUM mg/dL 9.0         Assessment: #1.  Postop day 4 left great toe amputation site healing well  2.  Dry gangrene left second toe secondary to severe peripheral vessel disease treated with drug-eluting angioplasty of the entire superficial femoral artery.      Plan: Continue to address change amputation site overall medical manage per hospitalist antibiotics per infectious disease.      Dario Marmolejo MD - 05/16/21 - 08:55 EDT

## 2021-05-16 NOTE — PLAN OF CARE
Goal Outcome Evaluation:  Plan of Care Reviewed With: patient, family  Progress: improving       Pt denies any pain or discomfort currently. BP improving with med adjustments. NIHSS 1. VSS on RA. Pulmonary toileting encouraged. Left toe dressing CDI.

## 2021-05-16 NOTE — PLAN OF CARE
Goal Outcome Evaluation:  Plan of Care Reviewed With: patient     Outcome Summary: VSS, room air, BP better controlled with meds adjusted, paced on monitor, pain controlled with oral meds, NIHSS 1 r/t facial drop, NWB to left foot, dsg CDI waffle boots on, await further instuctions on mobility plan for [facility] at [facility]

## 2021-05-16 NOTE — PROGRESS NOTES
[facility] Medicine Services  PROGRESS NOTE    Patient Name: Timmy Guajardo  : 1950  MRN: [id number]    Date of Admission: 2021  Primary Care Physician: Arsen Seals APRN    Subjective   Subjective     CC:   Toe gangrene    HPI:  Up in chair without complaints.      Review of Systems   Constitutional: Positive for activity change and fatigue.   HENT: Negative.    Respiratory: Negative.    Cardiovascular: Negative.    Gastrointestinal: Negative.    Genitourinary: Negative.    Psychiatric/Behavioral: Negative.        Objective   Objective     Vital Signs:   Temp:  [97.3 °F (36.3 °C)-97.9 °F (36.6 °C)] 97.3 °F (36.3 °C)  Heart Rate:  [70-84] 72  Resp:  [18] 18  BP: (132-189)/() 132/70  Total (NIH Stroke Scale): 1     Physical Exam:  NAD, alert and oriented x 3  OP clear, MMM  PERRL  Neck supple  No LAD  RRR  CTAB  +BS, ND, NT, soft  LLE dressed  PARISH  Normal affect    Results Reviewed:  Results from last 7 days   Lab Units 21  0400 21  0605 21  0532   WBC 10*3/mm3 8.75 11.65* 7.10   HEMOGLOBIN g/dL 12.0* 13.2 13.3   HEMATOCRIT % 35.6* 38.0 38.5   PLATELETS 10*3/mm3 90* 126* 75*     Results from last 7 days   Lab Units 21  0605 21  0532 21  0441   SODIUM mmol/L 135* 137 138   POTASSIUM mmol/L 4.5 4.0 4.4   CHLORIDE mmol/L 102 101 100   CO2 mmol/L 26.0 27.0 26.0   BUN mg/dL 15 18 25*   CREATININE mg/dL 0.92 1.06 0.89   GLUCOSE mg/dL 226* 87 101*   CALCIUM mg/dL 9.0 9.0 9.8     Estimated Creatinine Clearance: 84.5 mL/min (by C-G formula based on SCr of 0.92 mg/dL).    Microbiology Results Abnormal     Procedure Component Value - Date/Time    Tissue / Bone Culture - Tissue, Toe, Left [[id number]] Collected: 21 1036    Lab Status: Final result Specimen: Tissue from Toe, Left Updated: 21 0914     Tissue Culture Heavy growth (4+) Normal Skin Kay     Gram Stain Rare (1+) WBCs seen      Rare (1+) Gram positive cocci in pairs and  clusters      Rare (1+) Gram positive bacilli    Blood Culture - Blood, Wrist, Right [[id number]] Collected: 05/05/21 0002    Lab Status: Final result Specimen: Blood from Wrist, Right Updated: 05/10/21 0045     Blood Culture No growth at 5 days    Blood Culture - Blood, Arm, Left [[id number]] Collected: 05/04/21 2358    Lab Status: Final result Specimen: Blood from Arm, Left Updated: 05/10/21 0045     Blood Culture No growth at 5 days    COVID PRE-OP / PRE-PROCEDURE SCREENING ORDER (NO ISOLATION) - Swab, Nasopharynx [[id number]]  (Normal) Collected: 05/04/21 2354    Lab Status: Final result Specimen: Swab from Nasopharynx Updated: 05/05/21 0225    Narrative:      The following orders were created for panel order COVID PRE-OP / PRE-PROCEDURE SCREENING ORDER (NO ISOLATION) - Swab, Nasopharynx.  Procedure                               Abnormality         Status                     ---------                               -----------         ------                     COVID-19 and FLU A/B PCR...[[id number]]  Normal              Final result                 Please view results for these tests on the individual orders.    COVID-19 and FLU A/B PCR - Swab, Nasopharynx [[id number]]  (Normal) Collected: 05/04/21 2354    Lab Status: Final result Specimen: Swab from Nasopharynx Updated: 05/05/21 0225     COVID19 Not Detected     Influenza A PCR Not Detected     Influenza B PCR Not Detected    Narrative:      Fact sheet for providers: https://www.fda.gov/media/934227/download    Fact sheet for patients: https://www.fda.gov/media/195626/download    Test performed by PCR.          Imaging Results (Last 24 Hours)     ** No results found for the last 24 hours. **          Results for orders placed during the hospital encounter of 05/04/21    Adult Transthoracic Echo Complete W/ Cont if Necessary Per Protocol (With Agitated Saline)    Interpretation Summary  · Left ventricular systolic function is moderately decreased. Estimated left  ventricular EF = 25%  · The left ventricular cavity is moderately dilated.  · The cardiac valves are anatomically and functionally normal.  · Saline test results are negative.      I have reviewed the medications:  Scheduled Meds:amLODIPine, 10 mg, Oral, Q24H  apixaban, 5 mg, Oral, Q12H  aspirin, 81 mg, Oral, Daily  atorvastatin, 80 mg, Oral, Nightly  carvedilol, 25 mg, Oral, BID With Meals  cefuroxime, 500 mg, Oral, Q12H  famotidine, 20 mg, Oral, Daily  insulin lispro, 0-7 Units, Subcutaneous, TID AC  lactobacillus acidophilus, 1 capsule, Oral, Daily  lisinopril, 10 mg, Oral, Q24H  sodium chloride, 10 mL, Intravenous, Q12H  terazosin, 1 mg, Oral, Nightly      Continuous Infusions:   PRN Meds:.•  acetaminophen  •  calcium carbonate  •  dextrose  •  dextrose  •  glucagon (human recombinant)  •  HYDROcodone-acetaminophen  •  magnesium hydroxide  •  magnesium sulfate **OR** magnesium sulfate **OR** magnesium sulfate  •  Morphine  •  ondansetron **OR** ondansetron  •  sennosides-docusate  •  sodium chloride  •  sodium chloride  •  sodium chloride    Assessment/Plan   Assessment & Plan     Active Hospital Problems    Diagnosis  POA   • **Suspected cerebrovascular accident (CVA) [R09.89]  Yes   • Hyperglycemia [R73.9]  Yes   • Acute right-sided weakness [R53.1]  Yes   • Leukocytosis [D72.829]  Yes   • CHF (congestive heart failure) (CMS/HCC) [I50.9]  Yes   • Thrombocytopenia (CMS/HCC) [D69.6]  Yes   • HTN (hypertension) [I10]  Yes   • Presence of cardiac pacemaker [Z95.0]  Yes   • Gangrene of toe of left foot (CMS/HCC) [I96]  Unknown      Resolved Hospital Problems   No resolved problems to display.        Brief Hospital Course to date:  Timmy Guajardo is a 70 y.o. male with acute CVA. He had right-sided weakness and found to have a dry gangrenous necrotic left great toe.      Suspected CVA with right-sided weakness  -negative head CT, MRI not performed secondary to ICD  -asa/eliquis/lipitor  -plavix stopped    Gangrene  of L great toe  -s/p amputation by Dr. Marmolejo    Peripheral vascular disease/Severe Left SFA stenosis  -s/p PCI    Systolic congestive heart failure/cardiomyopathy  -Most recent EF 25%, ICD placed    Diabetes  -SSI    Refractory Hypertension  -Titrating medications    TCP    DVT Prophylaxis:  Eliquis 5 mg oral every 12 hours    Disposition: I expect the patient to be discharged to [facility]    CODE STATUS:   Code Status and Medical Interventions:   Ordered at: 05/05/21 0155     Level Of Support Discussed With:    Patient     Code Status:    CPR     Medical Interventions (Level of Support Prior to Arrest):    Full       Trey Agudelo MD  05/16/21

## 2021-05-17 LAB
BACTERIA SPEC ANAEROBE CULT: NORMAL
GLUCOSE BLDC GLUCOMTR-MCNC: 125 MG/DL (ref 70–130)
GLUCOSE BLDC GLUCOMTR-MCNC: 148 MG/DL (ref 70–130)
GLUCOSE BLDC GLUCOMTR-MCNC: 191 MG/DL (ref 70–130)
GLUCOSE BLDC GLUCOMTR-MCNC: 230 MG/DL (ref 70–130)

## 2021-05-17 PROCEDURE — 97530 THERAPEUTIC ACTIVITIES: CPT

## 2021-05-17 PROCEDURE — 99024 POSTOP FOLLOW-UP VISIT: CPT | Performed by: THORACIC SURGERY (CARDIOTHORACIC VASCULAR SURGERY)

## 2021-05-17 PROCEDURE — 97110 THERAPEUTIC EXERCISES: CPT

## 2021-05-17 PROCEDURE — 63710000001 INSULIN LISPRO (HUMAN) PER 5 UNITS: Performed by: PHYSICIAN ASSISTANT

## 2021-05-17 PROCEDURE — 82962 GLUCOSE BLOOD TEST: CPT

## 2021-05-17 PROCEDURE — 99233 SBSQ HOSP IP/OBS HIGH 50: CPT | Performed by: HOSPITALIST

## 2021-05-17 RX ADMIN — ATORVASTATIN CALCIUM 80 MG: 40 TABLET, FILM COATED ORAL at 22:35

## 2021-05-17 RX ADMIN — CEFUROXIME AXETIL 500 MG: 250 TABLET ORAL at 08:38

## 2021-05-17 RX ADMIN — TERAZOSIN HYDROCHLORIDE 1 MG: 1 CAPSULE ORAL at 22:36

## 2021-05-17 RX ADMIN — CARVEDILOL 25 MG: 12.5 TABLET, FILM COATED ORAL at 16:51

## 2021-05-17 RX ADMIN — AMLODIPINE BESYLATE 10 MG: 10 TABLET ORAL at 08:38

## 2021-05-17 RX ADMIN — CARVEDILOL 25 MG: 12.5 TABLET, FILM COATED ORAL at 08:38

## 2021-05-17 RX ADMIN — ASPIRIN 81 MG: 81 TABLET, CHEWABLE ORAL at 08:38

## 2021-05-17 RX ADMIN — CEFUROXIME AXETIL 500 MG: 250 TABLET ORAL at 22:36

## 2021-05-17 RX ADMIN — FAMOTIDINE 20 MG: 20 TABLET, FILM COATED ORAL at 08:38

## 2021-05-17 RX ADMIN — SODIUM CHLORIDE, PRESERVATIVE FREE 10 ML: 5 INJECTION INTRAVENOUS at 22:39

## 2021-05-17 RX ADMIN — SODIUM CHLORIDE, PRESERVATIVE FREE 10 ML: 5 INJECTION INTRAVENOUS at 08:41

## 2021-05-17 RX ADMIN — INSULIN LISPRO 2 UNITS: 100 INJECTION, SOLUTION INTRAVENOUS; SUBCUTANEOUS at 16:51

## 2021-05-17 RX ADMIN — APIXABAN 5 MG: 5 TABLET, FILM COATED ORAL at 08:38

## 2021-05-17 RX ADMIN — LISINOPRIL 10 MG: 10 TABLET ORAL at 08:38

## 2021-05-17 RX ADMIN — HYDROCODONE BITARTRATE AND ACETAMINOPHEN 1 TABLET: 5; 325 TABLET ORAL at 22:39

## 2021-05-17 RX ADMIN — APIXABAN 5 MG: 5 TABLET, FILM COATED ORAL at 22:36

## 2021-05-17 RX ADMIN — Medication 1 CAPSULE: at 08:38

## 2021-05-17 RX ADMIN — INSULIN LISPRO 3 UNITS: 100 INJECTION, SOLUTION INTRAVENOUS; SUBCUTANEOUS at 12:38

## 2021-05-17 NOTE — PLAN OF CARE
Goal Outcome Evaluation:  Plan of Care Reviewed With: patient     Outcome Summary: Patient transitions to eob with standby assist, increased effort with use of bedrails and elevated HOB. Pt requires min to mod assist for functional mobility and transfers, difficulty maintaining L l/e NWB status limited by R sided weakness and balance deficits. Recommend inpt rehab

## 2021-05-17 NOTE — PROGRESS NOTES
[facility] Medicine Services  PROGRESS NOTE    Patient Name: Timmy Guajardo  : 1950  MRN: [id number]    Date of Admission: 2021  Primary Care Physician: Arsen Seals APRN    Subjective   Subjective     CC:   Toe gangrene    HPI:  Up in chair without complaints.  No n/v. Tolerating PO. Poor sleep. No dyspnea.    Review of Systems   Constitutional: Positive for activity change and fatigue.   HENT: Negative.    Respiratory: Negative.    Cardiovascular: Negative.    Gastrointestinal: Negative.    Genitourinary: Negative.    Psychiatric/Behavioral: Positive for dysphoric mood and sleep disturbance.       Objective   Objective     Vital Signs:   Temp:  [97 °F (36.1 °C)-97.9 °F (36.6 °C)] 97.9 °F (36.6 °C)  Heart Rate:  [70-82] (P) 74  Resp:  [18] 18  BP: (143-160)/(90-97) (P) 163/83  Total (NIH Stroke Scale): 1     Physical Exam:  NAD, alert and oriented x 3, stable  OP clear, MMM, stable  PERRL, stable  Neck supple  No LAD, stable  RRR, stable  CTAB, stable  +BS, ND, NT, soft, stable  LLE dressed, stable  PARISH  Normal affect    Results Reviewed:  Results from last 7 days   Lab Units 21  0400 21  0605 21  0532   WBC 10*3/mm3 8.75 11.65* 7.10   HEMOGLOBIN g/dL 12.0* 13.2 13.3   HEMATOCRIT % 35.6* 38.0 38.5   PLATELETS 10*3/mm3 90* 126* 75*     Results from last 7 days   Lab Units 21  0605 21  0532 21  0441   SODIUM mmol/L 135* 137 138   POTASSIUM mmol/L 4.5 4.0 4.4   CHLORIDE mmol/L 102 101 100   CO2 mmol/L 26.0 27.0 26.0   BUN mg/dL 15 18 25*   CREATININE mg/dL 0.92 1.06 0.89   GLUCOSE mg/dL 226* 87 101*   CALCIUM mg/dL 9.0 9.0 9.8     Estimated Creatinine Clearance: 83.8 mL/min (by C-G formula based on SCr of 0.92 mg/dL).    Microbiology Results Abnormal     Procedure Component Value - Date/Time    Tissue / Bone Culture - Tissue, Toe, Left [[id number]] Collected: 21 1036    Lab Status: Final result Specimen: Tissue from Toe, Left  Updated: 05/14/21 0914     Tissue Culture Heavy growth (4+) Normal Skin Kay     Gram Stain Rare (1+) WBCs seen      Rare (1+) Gram positive cocci in pairs and clusters      Rare (1+) Gram positive bacilli    Blood Culture - Blood, Wrist, Right [[id number]] Collected: 05/05/21 0002    Lab Status: Final result Specimen: Blood from Wrist, Right Updated: 05/10/21 0045     Blood Culture No growth at 5 days    Blood Culture - Blood, Arm, Left [[id number]] Collected: 05/04/21 2358    Lab Status: Final result Specimen: Blood from Arm, Left Updated: 05/10/21 0045     Blood Culture No growth at 5 days    COVID PRE-OP / PRE-PROCEDURE SCREENING ORDER (NO ISOLATION) - Swab, Nasopharynx [[id number]]  (Normal) Collected: 05/04/21 2354    Lab Status: Final result Specimen: Swab from Nasopharynx Updated: 05/05/21 0225    Narrative:      The following orders were created for panel order COVID PRE-OP / PRE-PROCEDURE SCREENING ORDER (NO ISOLATION) - Swab, Nasopharynx.  Procedure                               Abnormality         Status                     ---------                               -----------         ------                     COVID-19 and FLU A/B PCR...[[id number]]  Normal              Final result                 Please view results for these tests on the individual orders.    COVID-19 and FLU A/B PCR - Swab, Nasopharynx [[id number]]  (Normal) Collected: 05/04/21 2354    Lab Status: Final result Specimen: Swab from Nasopharynx Updated: 05/05/21 0225     COVID19 Not Detected     Influenza A PCR Not Detected     Influenza B PCR Not Detected    Narrative:      Fact sheet for providers: https://www.fda.gov/media/473581/download    Fact sheet for patients: https://www.fda.gov/media/306047/download    Test performed by PCR.          Imaging Results (Last 24 Hours)     ** No results found for the last 24 hours. **          Results for orders placed during the hospital encounter of 05/04/21    Adult Transthoracic Echo Complete W/  Cont if Necessary Per Protocol (With Agitated Saline)    Interpretation Summary  · Left ventricular systolic function is moderately decreased. Estimated left ventricular EF = 25%  · The left ventricular cavity is moderately dilated.  · The cardiac valves are anatomically and functionally normal.  · Saline test results are negative.      I have reviewed the medications:  Scheduled Meds:amLODIPine, 10 mg, Oral, Q24H  apixaban, 5 mg, Oral, Q12H  aspirin, 81 mg, Oral, Daily  atorvastatin, 80 mg, Oral, Nightly  carvedilol, 25 mg, Oral, BID With Meals  cefuroxime, 500 mg, Oral, Q12H  famotidine, 20 mg, Oral, Daily  insulin lispro, 0-7 Units, Subcutaneous, TID AC  lactobacillus acidophilus, 1 capsule, Oral, Daily  lisinopril, 10 mg, Oral, Q24H  sodium chloride, 10 mL, Intravenous, Q12H  terazosin, 1 mg, Oral, Nightly      Continuous Infusions:   PRN Meds:.•  acetaminophen  •  calcium carbonate  •  dextrose  •  dextrose  •  glucagon (human recombinant)  •  HYDROcodone-acetaminophen  •  magnesium hydroxide  •  magnesium sulfate **OR** magnesium sulfate **OR** magnesium sulfate  •  Morphine  •  ondansetron **OR** ondansetron  •  sennosides-docusate  •  sodium chloride  •  sodium chloride  •  sodium chloride    Assessment/Plan   Assessment & Plan     Active Hospital Problems    Diagnosis  POA   • **Suspected cerebrovascular accident (CVA) [R09.89]  Yes   • Hyperglycemia [R73.9]  Yes   • Acute right-sided weakness [R53.1]  Yes   • Leukocytosis [D72.829]  Yes   • CHF (congestive heart failure) (CMS/HCC) [I50.9]  Yes   • Thrombocytopenia (CMS/HCC) [D69.6]  Yes   • HTN (hypertension) [I10]  Yes   • Presence of cardiac pacemaker [Z95.0]  Yes   • Gangrene of toe of left foot (CMS/HCC) [I96]  Unknown      Resolved Hospital Problems   No resolved problems to display.        Brief Hospital Course to date:  Timmy Guajardo is a 70 y.o. male with acute CVA. He had right-sided weakness and found to have a dry gangrenous necrotic left  great toe.      Suspected CVA with right-sided weakness  -negative head CT, MRI not performed secondary to ICD  -asa/eliquis/lipitor  -plavix stopped  -PT/OT/rehab    Gangrene of L great toe  -s/p amputation by Dr. Marmolejo  -abx per ID, last recommendation for cefuroxime 500 mg BID for 2-3 weeks, with [facility]/Jaleel follow up    Peripheral vascular disease/Severe Left SFA stenosis  -s/p PCI/ballon angioplasty, on aspirin, eliquis    Systolic congestive heart failure/cardiomyopathy  -Most recent EF 25%, ICD placed    Diabetes  -SSI, stable on current regimen    Refractory Hypertension  -Titrating medications, improved    TCP  -stable    DVT Prophylaxis:  Eliquis 5 mg oral every 12 hours    Disposition: I expect the patient to be discharged to [facility]    CODE STATUS:   Code Status and Medical Interventions:   Ordered at: 05/05/21 0155     Level Of Support Discussed With:    Patient     Code Status:    CPR     Medical Interventions (Level of Support Prior to Arrest):    Full       Trey Agudelo MD  05/17/21

## 2021-05-17 NOTE — PLAN OF CARE
Goal Outcome Evaluation:  Plan of Care Reviewed With: patient     Outcome Summary: VSS, room air, Paced on monitor, pain controlled with oral meds, no issues overnight, possible dc [facility] today

## 2021-05-17 NOTE — PLAN OF CARE
Goal Outcome Evaluation:  Plan of Care Reviewed With: patient, family  Progress: improving       NIH 1 d/t right facial droop. No complaints of pain or other discomfort. VSS on RA. Awaiting insurance precert for [facility]. Dressings CDI.

## 2021-05-17 NOTE — PROGRESS NOTES
Cardiothoracic Surgery Progress Note      POD #: 5-left great toe amputation through the proximal phalangeal bone     LOS: 13 days      Subjective: Awake and alert    Objective:  Vital Signs vital signs below noted T-max past 2497.1 degrees.  Temp:  [97 °F (36.1 °C)-97.1 °F (36.2 °C)] 97 °F (36.1 °C)  Heart Rate:  [70-82] 82  Resp:  [18] 18  BP: (132-160)/(70-97) 144/97    Physical Exam:   General Appearance: Oriented x3   Lungs:   Heart:   Skin:   Incision: Amputation site dressing change.  Severe sutures intact.  Skin margins viable.  Skin flaps viable.     Results:  Results from last 7 days   Lab Units 05/14/21  0400   WBC 10*3/mm3 8.75   HEMOGLOBIN g/dL 12.0*   HEMATOCRIT % 35.6*   PLATELETS 10*3/mm3 90*     Results from last 7 days   Lab Units 05/13/21  0605   SODIUM mmol/L 135*   POTASSIUM mmol/L 4.5   CHLORIDE mmol/L 102   CO2 mmol/L 26.0   BUN mg/dL 15   CREATININE mg/dL 0.92   GLUCOSE mg/dL 226*   CALCIUM mg/dL 9.0         Assessment: #1.  Postop day 5 left great toe amputation primary closure healing well  2.  Dry gangrene of left second toe secondary peripheral vascular disease treated preoperatively drug-eluting angioplasty of the entire superficial femoral artery      Plan: Continue daily dressing change amputation site.  Medical manage per hospitalist.  Antibiotics per infectious these.  Okay to rehab facility anytime for my concerns.      Dario Marmolejo MD - 05/17/21 - 07:11 EDT

## 2021-05-17 NOTE — THERAPY TREATMENT NOTE
Patient Name: Timmy Guajardo  : 1950    MRN: [id number]                              Today's Date: 2021       Admit Date: 2021    Visit Dx:     ICD-10-CM ICD-9-CM   1. Acute right-sided weakness  R53.1 728.87   2. Impaired functional mobility, balance, gait, and endurance  Z74.09 V49.89   3. Gangrene of toe of left foot (CMS/HCC)  I96 785.4     Patient Active Problem List   Diagnosis   • Acute right-sided weakness   • Suspected cerebrovascular accident (CVA)   • Leukocytosis   • CHF (congestive heart failure) (CMS/HCC)   • Thrombocytopenia (CMS/HCC)   • HTN (hypertension)   • Presence of cardiac pacemaker   • Hyperglycemia   • Gangrene of toe of left foot (CMS/HCC)     Past Medical History:   Diagnosis Date   • Cardiomyopathy (CMS/HCC)    • CHF (congestive heart failure) (CMS/HCC)    • Coronary artery disease    • Diabetes mellitus (CMS/HCC)    • GERD (gastroesophageal reflux disease)    • HTN (hypertension)    • Stroke (CMS/HCC)     Right sided weakness     Past Surgical History:   Procedure Laterality Date   • AMPUTATION DIGIT Left 2021    Procedure: AMPUTATION DIGIT - GREAT TOE AMPUTATION LEFT;  Surgeon: Dario Marmolejo MD;  Location: [facility] OR;  Service: General;  Laterality: Left;   • AORTAGRAM N/A 2021    Procedure: AORTAGRAM WITH RUNOFFS, LEFT SFA BALLOON ANGIOPLASTY;  Surgeon: Tim Mahan MD;  Location: [facility];  Service: Vascular;  Laterality: N/A;  FL TIME 6 MIN 54 SECS  82 MGY  CONTRAST VISIPAQUE 100ML     • CARDIAC CATHETERIZATION     • CARDIAC PACEMAKER PLACEMENT      pacemaker/defibrillator, [employer]   • HERNIA REPAIR Bilateral     Inguinal hernia     General Information     Row Name 21 1046          OT Time and Intention    Document Type  therapy note (daily note)  -KF     Mode of Treatment  occupational therapy  -KF     Row Name 21 1046          General Information    Patient Profile Reviewed  yes  -KF     Existing  Precautions/Restrictions  fall;left;non-weight bearing;other (see comments) offloading shoe  -KF     Barriers to Rehab  medically complex  -     Row Name 05/17/21 1046          Cognition    Orientation Status (Cognition)  oriented x 3;verbal cues/prompts needed for orientation;situation  -     Row Name 05/17/21 1046          Safety Issues, Functional Mobility    Safety Issues Affecting Function (Mobility)  problem-solving;insight into deficits/self-awareness;safety precaution awareness  -     Impairments Affecting Function (Mobility)  coordination;motor control;motor planning;strength;balance;grasp  -KF       User Key  (r) = Recorded By, (t) = Taken By, (c) = Cosigned By    Initials Name Provider Type    KF Fanny Minor OT Occupational Therapist          Mobility/ADL's     Row Name 05/17/21 1047          Bed Mobility    Comment (Bed Mobility)  UIC  -     Row Name 05/17/21 1047          Transfers    Transfers  sit-stand transfer  -     Comment (Transfers)  3 partial STS with chair pushups then accomplished 2 full STS from recliner with physical assist for LLE NWB  -KF     Sit-Stand [place] (Transfers)  minimum assist (75% patient effort);2 person assist  -     Row Name 05/17/21 1047          Sit-Stand Transfer    Assistive Device (Sit-Stand Transfers)  -- BUE support  -     Row Name 05/17/21 1047          Functional Mobility    Functional Mobility- Comment  deferred to PT  -[facility] Name 05/17/21 1047          Activities of Daily Living    BADL Assessment/Intervention  lower body dressing  -     Row Name 05/17/21 1047          Lower Body Dressing Assessment/Training    Howell Level (Lower Body Dressing)  minimum assist (75% patient effort);verbal cues;nonverbal cues (demo/gesture);don;doff;shoes/slippers  -     Position (Lower Body Dressing)  supported sitting  -KF     Comment (Lower Body Dressing)  offloading shoe  -KF       User Key  (r) = Recorded By, (t) = Taken By, (c) =  Cosigned By    Initials Name Provider Type    KF Fanny Minor, OT Occupational Therapist        Obj/Interventions     Row Name 05/17/21 1049          Elbow/Forearm (Therapeutic Exercise)    Elbow/Forearm (Therapeutic Exercise)  strengthening exercise  -KF     Elbow/Forearm Strengthening (Therapeutic Exercise)  resistance band;red;flexion;extension;right;5 repetitions  -KF     Row Name 05/17/21 1049          Hand (Therapeutic Exercise)    Hand (Therapeutic Exercise)  strengthening exercise  -KF     Hand Strengthening (Therapeutic Exercise)  finger flexion;bilateral;finger extension; strengthening;therapy putty;10 repetitions;green  -KF     Row Name 05/17/21 1049          Balance    Balance Assessment  sitting static balance;standing static balance;sitting dynamic balance  -KF     Static Sitting Balance  WFL;unsupported;sitting in chair  -KF     Dynamic Sitting Balance  mild impairment;unsupported;sitting in chair  -KF     Static Standing Balance  mild impairment;supported;standing  -KF     Balance Interventions  sit to stand;sitting;occupation based/functional task;weight shifting activity  -KF     Row Name 05/17/21 1049          Therapeutic Exercise    Therapeutic Exercise  hand;elbow/forearm  -KF       User Key  (r) = Recorded By, (t) = Taken By, (c) = Cosigned By    Initials Name Provider Type    KF Fanny Minor, OT Occupational Therapist        Goals/Plan    No documentation.       Clinical Impression     Row Name 05/17/21 1051          Pain Assessment    Additional Documentation  Pain Scale: FACES Pre/Post-Treatment (Group)  -     Row Name 05/17/21 1051          Pain Scale: FACES Pre/Post-Treatment    Pain: FACES Scale, Pretreatment  2-->hurts little bit  -KF     Posttreatment Pain Rating  2-->hurts little bit  -KF     Pain Location - Side  Right  -KF     Pain Location - Orientation  lower  -KF     Pain Location  extremity  -KF     Pre/Posttreatment Pain Comment  stated just sore from sleeping  on it , RN notified  -KF     Row Name 05/17/21 1051          Plan of Care Review    Plan of Care Reviewed With  patient  -KF     Progress  improving  -KF     Outcome Summary  Pt completed chair pushups 3 reps with progression to 2 full STS with BUE support, tolerated well BUE ther ex with yellow theraband and green resistance putty for B hand coordination tasks for ADL completion, completed don/doff offloading shoe today Bonnie with Vc's for sequencing from chair progressing in LBD, recom IPOT d/c rehab  -     Row Name 05/17/21 1051          Therapy Assessment/Plan (OT)    Rehab Potential (OT)  good, to achieve stated therapy goals  -KF     Criteria for Skilled Therapeutic Interventions Met (OT)  yes;skilled treatment is necessary  -KF     Therapy Frequency (OT)  daily  -KF     Row Name 05/17/21 1051          Therapy Plan Review/Discharge Plan (OT)    Anticipated Discharge Disposition (OT)  inpatient rehabilitation facility  -     Row Name 05/17/21 1051          Vital Signs    Pre Systolic BP Rehab  163  -KF     Pre Treatment Diastolic BP  83  -KF     Post Systolic BP Rehab  121  -KF     Post Treatment Diastolic BP  70  -KF     O2 Delivery Pre Treatment  room air  -KF     Pre Patient Position  Sitting  -KF     Intra Patient Position  Standing  -KF     Post Patient Position  Sitting  -KF     Rest Breaks   2  -KF     Row Name 05/17/21 1051          Positioning and Restraints    Pre-Treatment Position  sitting in chair/recliner  -KF     Post Treatment Position  chair  -KF     In Chair  notified nsg;reclined;sitting;call light within reach;encouraged to call for assist;waffle cushion;legs elevated;waffle boot/both  -KF       User Key  (r) = Recorded By, (t) = Taken By, (c) = Cosigned By    Initials Name Provider Type    Fanny Lozano, OT Occupational Therapist        Outcome Measures     Row Name 05/17/21 1054          How much help from another is currently needed...    Putting on and taking off regular lower  body clothing?  2  -KF     Bathing (including washing, rinsing, and drying)  2  -KF     Toileting (which includes using toilet bed pan or urinal)  2  -KF     Putting on and taking off regular upper body clothing  2  -KF     Taking care of personal grooming (such as brushing teeth)  3  -KF     Eating meals  3  -KF     AM-PAC 6 Clicks Score (OT)  14  -KF     Row Name 05/17/21 1054          Functional Assessment    Outcome Measure Options  AM-PAC 6 Clicks Daily Activity (OT)  -KF       User Key  (r) = Recorded By, (t) = Taken By, (c) = Cosigned By    Initials Name Provider Type    KF Fanny Minor, OT Occupational Therapist        Occupational Therapy Education                 Title: PT OT SLP Therapies (Done)     Topic: Occupational Therapy (Done)     Point: ADL training (Done)     Description:   Instruct learner(s) on proper safety adaptation and remediation techniques during self care or transfers.   Instruct in proper use of assistive devices.              Learning Progress Summary           Patient Acceptance, E,TB,D, VU,DU,NR by  at 5/17/2021 1054    Acceptance, E, VU,NR by AN at 5/13/2021 1408    Comment: Re-eval POC.    Acceptance, E, VU,NR by AN at 5/7/2021 1011    Comment: ADL retraining.    Acceptance, E, VU,NR by AN at 5/5/2021 0806    Comment: Educated pt on current deficits, OT role, POC and discharge recommendations.                   Point: Home exercise program (Done)     Description:   Instruct learner(s) on appropriate technique for monitoring, assisting and/or progressing therapeutic exercises/activities.              Learning Progress Summary           Patient Acceptance, E,TB,D, VU,DU,NR by  at 5/17/2021 1054    Acceptance, E, VU,NR by AN at 5/13/2021 1408    Comment: Re-eval POC.    Acceptance, E,D, DU,NR by BAILEY at 5/9/2021 1621    Comment: UE HEP; spirometer use/goal setting   Family Acceptance, E,D, DU,NR by BAILEY at 5/9/2021 1621    Comment: UE HEP; spirometer use/goal setting                    Point: Precautions (Done)     Description:   Instruct learner(s) on prescribed precautions during self-care and functional transfers.              Learning Progress Summary           Patient Acceptance, E,TB,D, VU,DU,NR by  at 5/17/2021 1054    Acceptance, E,D, DU,NR by BAILEY at 5/9/2021 1621    Comment: UE HEP; spirometer use/goal setting   Family Acceptance, E,D, DU,NR by BAILEY at 5/9/2021 1621    Comment: UE HEP; spirometer use/goal setting                   Point: Body mechanics (Done)     Description:   Instruct learner(s) on proper positioning and spine alignment during self-care, functional mobility activities and/or exercises.              Learning Progress Summary           Patient Acceptance, E,TB,D, VU,DU,NR by  at 5/17/2021 1054    Acceptance, E,D, DU,NR by BAILEY at 5/9/2021 1621    Comment: UE HEP; spirometer use/goal setting   Family Acceptance, E,D, DU,NR by BAILEY at 5/9/2021 1621    Comment: UE HEP; spirometer use/goal setting                               User Key     Initials Effective Dates Name Provider Type Discipline     06/22/15 -  Ninfa Caballero, OT Occupational Therapist OT    KF 04/03/18 -  Fanny Minor, OT Occupational Therapist OT    BAILEY 02/14/20 -  Barbara Gee OT Occupational Therapist OT              OT Recommendation and Plan  Therapy Frequency (OT): daily  Plan of Care Review  Plan of Care Reviewed With: patient  Progress: improving  Outcome Summary: Pt completed chair pushups 3 reps with progression to 2 full STS with BUE support, tolerated well BUE ther ex with yellow theraband and green resistance putty for B hand coordination tasks for ADL completion, completed don/doff offloading shoe today Bonnie with Vc's for sequencing from chair progressing in LBD, recom IPOT d/c rehab     Time Calculation:   Time Calculation- OT     Row Name 05/17/21 1014             Time Calculation- OT    OT Start Time  1014  -KF      OT Stop Time  1045  -      OT Time Calculation (min)  31 min   -KF      OT Received On  05/17/21  -KF         Timed Charges    57453 - OT Therapeutic Exercise Minutes  13  -KF      34111 - OT Therapeutic Activity Minutes  15  -KF      54984 - OT Self Care/Mgmt Minutes  3  -KF         Total Minutes    Timed Charges Total Minutes  31  -KF       Total Minutes  31  -KF        User Key  (r) = Recorded By, (t) = Taken By, (c) = Cosigned By    Initials Name Provider Type    Fanny Lozano, OT Occupational Therapist        Therapy Charges for Today     Code Description Service Date Service Provider Modifiers Qty    10718901012 HC OT THERAPEUTIC ACT EA 15 MIN 5/17/2021 Fanny Minor OT GO 1    84919264812 HC OT THER PROC EA 15 MIN 5/17/2021 Fanny Minor OT GO 1    30383573257 HC OT THER SUPP EA 15 MIN 5/17/2021 Fanny Minor OT GO 1               Fanny Minor OT  5/17/2021

## 2021-05-17 NOTE — THERAPY TREATMENT NOTE
Patient Name: Timmy Guajardo  : 1950    MRN: [id number]                              Today's Date: 2021       Admit Date: 2021    Visit Dx:     ICD-10-CM ICD-9-CM   1. Acute right-sided weakness  R53.1 728.87   2. Impaired functional mobility, balance, gait, and endurance  Z74.09 V49.89   3. Gangrene of toe of left foot (CMS/HCC)  I96 785.4     Patient Active Problem List   Diagnosis   • Acute right-sided weakness   • Suspected cerebrovascular accident (CVA)   • Leukocytosis   • CHF (congestive heart failure) (CMS/HCC)   • Thrombocytopenia (CMS/HCC)   • HTN (hypertension)   • Presence of cardiac pacemaker   • Hyperglycemia   • Gangrene of toe of left foot (CMS/HCC)     Past Medical History:   Diagnosis Date   • Cardiomyopathy (CMS/HCC)    • CHF (congestive heart failure) (CMS/HCC)    • Coronary artery disease    • Diabetes mellitus (CMS/HCC)    • GERD (gastroesophageal reflux disease)    • HTN (hypertension)    • Stroke (CMS/HCC)     Right sided weakness     Past Surgical History:   Procedure Laterality Date   • AMPUTATION DIGIT Left 2021    Procedure: AMPUTATION DIGIT - GREAT TOE AMPUTATION LEFT;  Surgeon: Dario Marmolejo MD;  Location: [facility] OR;  Service: General;  Laterality: Left;   • AORTAGRAM N/A 2021    Procedure: AORTAGRAM WITH RUNOFFS, LEFT SFA BALLOON ANGIOPLASTY;  Surgeon: Tim Mahan MD;  Location: [facility];  Service: Vascular;  Laterality: N/A;  FL TIME 6 MIN 54 SECS  82 MGY  CONTRAST VISIPAQUE 100ML     • CARDIAC CATHETERIZATION     • CARDIAC PACEMAKER PLACEMENT      pacemaker/defibrillator, [employer]   • HERNIA REPAIR Bilateral     Inguinal hernia     General Information     Row Name 21 1007          Physical Therapy Time and Intention    Document Type  therapy note (daily note)  -KM     Mode of Treatment  physical therapy  -KM     Row Name 21 1007          General Information    Patient Profile Reviewed  yes  -KM     Existing  Precautions/Restrictions  fall;other (see comments);non-weight bearing L off loading shoe, L nwb following L 1st toe amp  -KM     Row Name 05/17/21 1007          Cognition    Orientation Status (Cognition)  oriented x 4  -KM     Row Name 05/17/21 1007          Safety Issues, Functional Mobility    Impairments Affecting Function (Mobility)  coordination;motor control;motor planning;strength  -KM       User Key  (r) = Recorded By, (t) = Taken By, (c) = Cosigned By    Initials Name Provider Type     Chasity Orr, PT Physical Therapist        Mobility     Row Name 05/17/21 1008          Bed Mobility    Bed Mobility  scooting/bridging;sit-supine  -KM     Scooting/Bridging [place] (Bed Mobility)  verbal cues;standby assist  -KM     Supine-Sit [place] (Bed Mobility)  verbal cues;set up;contact guard  -KM     Assistive Device (Bed Mobility)  head of bed elevated;bed rails  -     Row Name 05/17/21 1008          Transfers    Comment (Transfers)  pt requesting use BSC, transfer training bed to bsc and then to recliner  -     Row Name 05/17/21 1008          Bed-Chair Transfer    Bed-Chair [place] (Transfers)  verbal cues;moderate assist (50% patient effort) sit pivot bed to bsc to R side, wearing L offloading shoe  -     Row Name 05/17/21 1008          Sit-Stand Transfer    Sit-Stand [place] (Transfers)  verbal cues;minimum assist (75% patient effort);1 person assist;other (see comments) cues for hand placement, stood with r wx and cues for L nwb, CNA assisted with pericare  -KM     Assistive Device (Sit-Stand Transfers)  walker, front-wheeled  -KM     Row Name 05/17/21 1008          Gait/Stairs (Locomotion)    [place] Level (Gait)  moderate assist (50% patient effort)  -KM     Assistive Device (Gait)  walker, front-wheeled  -KM     Distance in Feet (Gait)  2  -KM     Deviations/Abnormal Patterns (Gait)  base of support, narrow;weight shifting decreased  -KM     Comment (Gait/Stairs)   Pt sidestepped to R, wearing L offloading shoe, pt unable to keep L foot of floor therefore side stepped bsc to chair to R, L heel contacting floor  -KM       User Key  (r) = Recorded By, (t) = Taken By, (c) = Cosigned By    Initials Name Provider Type    Chasity Adorno PT Physical Therapist        Obj/Interventions     Row Name 05/17/21 1013          Hip (Therapeutic Exercise)    Hip Strengthening (Therapeutic Exercise)  bilateral;aBduction;aDduction;external rotation;internal rotation;10 repetitions  -KM     Row Name 05/17/21 1013          Knee (Therapeutic Exercise)    Knee Strengthening (Therapeutic Exercise)  bilateral;LAQ (long arc quad);10 repetitions  -KM     Row Name 05/17/21 1013          Ankle (Therapeutic Exercise)    Ankle AROM (Therapeutic Exercise)  bilateral;10 repetitions  -KM     Row Name 05/17/21 1013          Balance    Static Sitting Balance  WFL  -KM     Static Standing Balance  mild impairment;supported  -KM     Dynamic Standing Balance  moderate impairment;supported  -KM     Balance Interventions  sit to stand;weight shifting activity;occupation based/functional task  -KM       User Key  (r) = Recorded By, (t) = Taken By, (c) = Cosigned By    Initials Name Provider Type    Chasity Adorno PT Physical Therapist        Goals/Plan    No documentation.       Clinical Impression     Row Name 05/17/21 1014          Pain Scale: Numbers Pre/Post-Treatment    Pretreatment Pain Rating  0/10 - no pain  -KM     Posttreatment Pain Rating  0/10 - no pain  -KM     Row Name 05/17/21 1014          Plan of Care Review    Plan of Care Reviewed With  patient  -KM     Outcome Summary  Patient transitions to eob with standby assist, increased effort with use of bedrails and elevated HOB. Pt requires min to mod assist for functional mobility and transfers, difficulty maintaining L l/e NWB status limited by R sided weakness and balance deficits. Recommend inpt rehab  -KM     Row Name 05/17/21  1014          Positioning and Restraints    Pre-Treatment Position  in bed  -KM     Post Treatment Position  chair  -KM     In Chair  reclined;call light within reach;encouraged to call for assist;heels elevated  -KM       User Key  (r) = Recorded By, (t) = Taken By, (c) = Cosigned By    Initials Name Provider Type    Chasity Adorno, PT Physical Therapist        Outcome Measures     Row Name 05/17/21 1017          How much help from another person do you currently need...    Turning from your back to your side while in flat bed without using bedrails?  3  -KM     Moving from lying on back to sitting on the side of a flat bed without bedrails?  3  -KM     Moving to and from a bed to a chair (including a wheelchair)?  2  -KM     Standing up from a chair using your arms (e.g., wheelchair, bedside chair)?  2  -KM     Climbing 3-5 steps with a railing?  1  -KM     To walk in hospital room?  2  -KM     AM-PAC 6 Clicks Score (PT)  13  -KM     Row Name 05/17/21 1017          Functional Assessment    Outcome Measure Options  AM-PAC 6 Clicks Basic Mobility (PT)  -KM       User Key  (r) = Recorded By, (t) = Taken By, (c) = Cosigned By    Initials Name Provider Type    Chasity Adorno, PT Physical Therapist        Physical Therapy Education                 Title: PT OT SLP Therapies (Done)     Topic: Physical Therapy (Done)     Point: Mobility training (Done)     Learning Progress Summary           Patient Acceptance, E, VU by KM at 5/17/2021 1018    Acceptance, E,TB, VU by KG at 5/10/2021 1612    Acceptance, E,TB, VU by KG at 5/9/2021 1434    Acceptance, E,D, VU,DU by  at 5/7/2021 1413    Comment: Educated patient on safe hand placement with transfers, gait mechanics, ther ex, bed mobility sequencing, and plan for progression of care.    Eager, E,D, VU,DU by  at 5/6/2021 1348                   Point: Home exercise program (Done)     Learning Progress Summary           Patient Acceptance, E, VU by KM at  5/17/2021 1018    Acceptance, E, NR by EJ at 5/14/2021 1436    Acceptance, E,TB, VU by KG at 5/10/2021 1612    Acceptance, E,TB, VU by KG at 5/9/2021 1434    Acceptance, E,D, VU,DU by CS at 5/7/2021 1413    Comment: Educated patient on safe hand placement with transfers, gait mechanics, ther ex, bed mobility sequencing, and plan for progression of care.    Eager, E,D, VU,DU by  at 5/6/2021 1348                   Point: Body mechanics (Done)     Learning Progress Summary           Patient Acceptance, E, VU by KM at 5/17/2021 1018    Acceptance, E,TB, VU by KG at 5/10/2021 1612    Acceptance, E,TB, VU by KG at 5/9/2021 1434    Acceptance, E,D, VU,DU by  at 5/7/2021 1413    Comment: Educated patient on safe hand placement with transfers, gait mechanics, ther ex, bed mobility sequencing, and plan for progression of care.    Eager, E,D, VU,DU by  at 5/6/2021 1348                   Point: Precautions (Done)     Learning Progress Summary           Patient Acceptance, E, VU by KM at 5/17/2021 1018    Acceptance, E, NR by ARASH at 5/14/2021 1436    Acceptance, E,TB, VU by KG at 5/10/2021 1612    Acceptance, E,TB, VU by KG at 5/9/2021 1434    Acceptance, E,D, VU,DU by  at 5/7/2021 1413    Comment: Educated patient on safe hand placement with transfers, gait mechanics, ther ex, bed mobility sequencing, and plan for progression of care.    Eager, E,D, VU,DU by  at 5/6/2021 1348                               User Key     Initials Effective Dates Name Provider Type Discipline    EJ 11/20/18 -  Xi Llanes, PT Physical Therapist PT    SJ 06/19/15 -  Debbie Ruiz, PT Physical Therapist PT    KM 06/19/15 -  Chasity Orr, PT Physical Therapist PT    CS 03/26/19 -  Latisha Lloyd, PT Physical Therapist PT    KG 08/28/19 -  Barbara Marcial Physical Therapist PT              PT Recommendation and Plan     Plan of Care Reviewed With: patient  Outcome Summary: Patient transitions to eob with standby  assist, increased effort with use of bedrails and elevated HOB. Pt requires min to mod assist for functional mobility and transfers, difficulty maintaining L l/e NWB status limited by R sided weakness and balance deficits. Recommend inpt rehab     Time Calculation:   PT Charges     Row Name 05/17/21 1018             Time Calculation    Start Time  0931  -KM      PT Received On  05/17/21  -KM      PT Goal Re-Cert Due Date  05/24/21  -KM         Time Calculation- PT    Total Timed Code Minutes- PT  24 minute(s)  -KM         Timed Charges    50391 - PT Therapeutic Activity Minutes  24  -KM         Total Minutes    Timed Charges Total Minutes  24  -KM       Total Minutes  24  -KM        User Key  (r) = Recorded By, (t) = Taken By, (c) = Cosigned By    Initials Name Provider Type    Chasity Adorno, PT Physical Therapist        Therapy Charges for Today     Code Description Service Date Service Provider Modifiers Qty    [id number]  PT THERAPEUTIC ACT EA 15 MIN 5/17/2021 Chasity Orr PT GP 2          PT G-Codes  Outcome Measure Options: AM-PAC 6 Clicks Basic Mobility (PT)  AM-PAC 6 Clicks Score (PT): 13  AM-PAC 6 Clicks Score (OT): 15  Modified [place] Scale: 4 - Moderately severe disability.  Unable to walk without assistance, and unable to attend to own bodily needs without assistance.    Chasity Orr, PT  5/17/2021

## 2021-05-17 NOTE — PLAN OF CARE
Goal Outcome Evaluation:  Plan of Care Reviewed With: patient  Progress: improving  Outcome Summary: Pt completed chair pushups 3 reps with progression to 2 full STS with BUE support, tolerated well BUE ther ex with yellow theraband and green resistance putty for B hand coordination tasks for ADL completion, completed don/doff offloading shoe today Bonnie with Vc's for sequencing from chair progressing in LBD, recom IPOT d/c rehab

## 2021-05-17 NOTE — CASE MANAGEMENT/SOCIAL WORK
Discharge Planning Assessment  Psychiatric     Patient Name: Timmy Guajardo  MRN: [id number]  Today's Date: 5/17/2021    Admit Date: 5/4/2021    Discharge Needs Assessment    No documentation.       Discharge Plan     Row Name 05/17/21 1459       Plan    Plan  IRF    Patient/Family in Agreement with Plan  yes    Plan Comments  Cont PO abx.  Spoke with April at [facility].  Updated PT/OT notes reviewed and she will start insurance precert today.  CM will cont to follow.    Final Discharge Disposition Code  62 - inpatient rehab facility        Continued Care and Services - Admitted Since 5/4/2021     Destination Coordination complete    Service Provider Request Status Selected Services Address Phone Fax Patient Preferred    [facility]   Selected Inpatient Rehabilitation [address] [address] [facility] [id number] [phone] --              Expected Discharge Date and Time     Expected Discharge Date Expected Discharge Time    May 19, 2021         Demographic Summary    No documentation.       Functional Status    No documentation.       Psychosocial    No documentation.       Abuse/Neglect    No documentation.       Legal    No documentation.       Substance Abuse    No documentation.       Patient Forms    No documentation.           Debbie Bennett, RN

## 2021-05-18 VITALS
OXYGEN SATURATION: 92 % | RESPIRATION RATE: 16 BRPM | WEIGHT: 172 LBS | DIASTOLIC BLOOD PRESSURE: 75 MMHG | HEART RATE: 70 BPM | HEIGHT: 70 IN | SYSTOLIC BLOOD PRESSURE: 151 MMHG | BODY MASS INDEX: 24.62 KG/M2 | TEMPERATURE: 98.4 F

## 2021-05-18 LAB
GLUCOSE BLDC GLUCOMTR-MCNC: 119 MG/DL (ref 70–130)
GLUCOSE BLDC GLUCOMTR-MCNC: 174 MG/DL (ref 70–130)

## 2021-05-18 PROCEDURE — 99024 POSTOP FOLLOW-UP VISIT: CPT | Performed by: THORACIC SURGERY (CARDIOTHORACIC VASCULAR SURGERY)

## 2021-05-18 PROCEDURE — 99239 HOSP IP/OBS DSCHRG MGMT >30: CPT | Performed by: HOSPITALIST

## 2021-05-18 PROCEDURE — 63710000001 INSULIN LISPRO (HUMAN) PER 5 UNITS: Performed by: PHYSICIAN ASSISTANT

## 2021-05-18 PROCEDURE — 82962 GLUCOSE BLOOD TEST: CPT

## 2021-05-18 RX ORDER — CEFUROXIME AXETIL 500 MG/1
500 TABLET ORAL EVERY 12 HOURS SCHEDULED
Qty: 28 TABLET | Refills: 0
Start: 2021-05-18 | End: 2021-06-01

## 2021-05-18 RX ORDER — AMLODIPINE BESYLATE 10 MG/1
10 TABLET ORAL
Status: ON HOLD
Start: 2021-05-19 | End: 2022-10-16 | Stop reason: ALTCHOICE

## 2021-05-18 RX ORDER — FAMOTIDINE 20 MG/1
20 TABLET, FILM COATED ORAL DAILY
Status: ON HOLD
Start: 2021-05-19

## 2021-05-18 RX ORDER — L.ACID,PARA/B.BIFIDUM/S.THERM 8B CELL
1 CAPSULE ORAL DAILY
Status: ON HOLD
Start: 2021-05-19 | End: 2022-10-16

## 2021-05-18 RX ORDER — LISINOPRIL 10 MG/1
10 TABLET ORAL
Start: 2021-05-19 | End: 2022-10-24 | Stop reason: HOSPADM

## 2021-05-18 RX ORDER — ASPIRIN 81 MG/1
81 TABLET, CHEWABLE ORAL DAILY
Start: 2021-05-19 | End: 2022-10-24 | Stop reason: HOSPADM

## 2021-05-18 RX ORDER — ATORVASTATIN CALCIUM 80 MG/1
80 TABLET, FILM COATED ORAL NIGHTLY
Status: ON HOLD
Start: 2021-05-18

## 2021-05-18 RX ORDER — HYDROCODONE BITARTRATE AND ACETAMINOPHEN 5; 325 MG/1; MG/1
1 TABLET ORAL EVERY 6 HOURS PRN
Start: 2021-05-18 | End: 2022-04-25

## 2021-05-18 RX ORDER — TERAZOSIN 1 MG/1
1 CAPSULE ORAL NIGHTLY
Start: 2021-05-18 | End: 2022-04-25

## 2021-05-18 RX ADMIN — CARVEDILOL 25 MG: 12.5 TABLET, FILM COATED ORAL at 08:36

## 2021-05-18 RX ADMIN — SODIUM CHLORIDE, PRESERVATIVE FREE 10 ML: 5 INJECTION INTRAVENOUS at 08:40

## 2021-05-18 RX ADMIN — AMLODIPINE BESYLATE 10 MG: 10 TABLET ORAL at 08:36

## 2021-05-18 RX ADMIN — Medication 1 CAPSULE: at 08:36

## 2021-05-18 RX ADMIN — ACETAMINOPHEN 650 MG: 325 TABLET ORAL at 08:53

## 2021-05-18 RX ADMIN — CEFUROXIME AXETIL 500 MG: 250 TABLET ORAL at 08:36

## 2021-05-18 RX ADMIN — LISINOPRIL 10 MG: 10 TABLET ORAL at 08:37

## 2021-05-18 RX ADMIN — FAMOTIDINE 20 MG: 20 TABLET, FILM COATED ORAL at 08:36

## 2021-05-18 RX ADMIN — APIXABAN 5 MG: 5 TABLET, FILM COATED ORAL at 08:40

## 2021-05-18 RX ADMIN — ASPIRIN 81 MG: 81 TABLET, CHEWABLE ORAL at 08:36

## 2021-05-18 RX ADMIN — INSULIN LISPRO 2 UNITS: 100 INJECTION, SOLUTION INTRAVENOUS; SUBCUTANEOUS at 12:18

## 2021-05-18 NOTE — CASE MANAGEMENT/SOCIAL WORK
Case Management Discharge Note      Final Note: Pt has insurance approval for a bed today on the stroke unit at [facility]. Spoke to pt and his sister and they are agreeable. Pt's sister cannot transport until late this afternoon after 4 pm. CM secured the [facility] van for 2:30 pm today, however the time may change and CM will notify pt's nurse when to have him at the maternity entrance for transport. Nurse to call report to [phone] and fax summary to [phone].    Provided Post Acute Provider List?: Yes  Delivered To: Patient  Method of Delivery: In person    Selected Continued Care - Admitted Since 5/4/2021     Destination Coordination complete    Service Provider Selected Services Address Phone Fax Patient Preferred    [facility]  Inpatient Rehabilitation [address] [id number] [phone] --          Durable Medical Equipment    No services have been selected for the patient.              Dialysis/Infusion    No services have been selected for the patient.              Home Medical Care    No services have been selected for the patient.              Therapy    No services have been selected for the patient.              Community Resources    No services have been selected for the patient.                       Final Discharge Disposition Code: 62 - inpatient rehab facility

## 2021-05-18 NOTE — DISCHARGE SUMMARY
[facility] Medicine Services  DISCHARGE SUMMARY    Patient Name: Timmy Guajardo  : 1950  MRN: [id number]    Date of Admission: 2021  8:11 PM  Date of Discharge:  2021  Primary Care Physician: Arsen Seals APRN    Consults     Date and Time Order Name Status Description    2021  1:11 PM Inpatient Infectious Diseases Consult Completed     2021 11:58 AM Inpatient Cardiology Consult Completed     2021  4:06 AM Inpatient Cardiothoracic Surgery Consult Completed     2021  1:55 AM Inpatient Cardiology Consult Completed     2021  9:24 PM Inpatient Neurology Consult Stroke Completed     2021  8:11 PM Inpatient Neurology Consult Stroke Completed           Hospital Course     Presenting Problem:   Acute right-sided weakness [R53.1]    Active Hospital Problems    Diagnosis  POA   • **Suspected cerebrovascular accident (CVA) [R09.89]  Yes   • Hyperglycemia [R73.9]  Yes   • Acute right-sided weakness [R53.1]  Yes   • Leukocytosis [D72.829]  Yes   • CHF (congestive heart failure) (CMS/HCC) [I50.9]  Yes   • Thrombocytopenia (CMS/HCC) [D69.6]  Yes   • HTN (hypertension) [I10]  Yes   • Presence of cardiac pacemaker [Z95.0]  Yes   • Gangrene of toe of left foot (CMS/HCC) [I96]  Unknown      Resolved Hospital Problems   No resolved problems to display.          Hospital Course:  Brief Hospital Course to date:  Timmy Guajardo is a 70 y.o. male with acute CVA. He had right-sided weakness and found to have a dry gangrenous necrotic left great toe.        Suspected CVA with right-sided weakness  -He was evaluated by the stroke neurology team and has a suspected stroke, although MRI could not be performed due to ICD. He was initially placed on aspirin, plavix, and lipitor but has since transitioned to eliquis, aspirin, and lipitor and will be discharged to a rehab facility. He will need stroke neurology follow up.     Gangrene of L great toe  -He underwent  amputation by Dr. Marmolejo without complications. He was followed by infectious disease and was placed on IV antibiotics and will be discharged on cefuroxime 500 mg PO BID for 2-3 weeks with follow up with ID and Dr. Marmolejo and will continue wound care per vascular surgery instructions.     Peripheral vascular disease/Severe Left SFA stenosis  -He underwent angiography with balloon angioplasty and will remain on aspirin, eliquis, and lipitor therapy with vascular surgery follow up     Systolic congestive heart failure/cardiomyopathy  -He recent underwent ICD placement and will remain on his current cardiac regimen      Discharge Follow Up Recommendations for outpatient labs/diagnostics:  As written below      Day of Discharge     HPI:   Denies pain or dyspnea. No f/c. No n/v. Intermittent constipation noted.    Review of Systems  Negative except for above    Vital Signs:   Temp:  [97 °F (36.1 °C)-98.4 °F (36.9 °C)] 97 °F (36.1 °C)  Heart Rate:  [70-95] (P) 95  Resp:  [18] 18  BP: (134-162)/(71-90) (P) 151/75     Physical Exam  NAD, alert and oriented  OP clear, MMM  PERRL  Neck supple  No LAD  RRR  CTAB  +BS, ND, NT, soft  NO c/c/e  No rashes  LLE dressed  Normal affect    Pertinent  and/or Most Recent Results     LAB RESULTS:      Lab 05/14/21  0400 05/13/21  0605 05/12/21  0532   WBC 8.75 11.65* 7.10   HEMOGLOBIN 12.0* 13.2 13.3   HEMATOCRIT 35.6* 38.0 38.5   PLATELETS 90* 126* 75*   NEUTROS ABS  --  10.02* 4.81   IMMATURE GRANS (ABS)  --  0.13* 0.03   LYMPHS ABS  --  0.58* 1.17   MONOS ABS  --  0.91* 0.91*   EOS ABS  --  0.00 0.12   MCV 95.2 94.1 93.9   CRP 1.16*  --   --          Lab 05/13/21  0605 05/12/21  0532   SODIUM 135* 137   POTASSIUM 4.5 4.0   CHLORIDE 102 101   CO2 26.0 27.0   ANION GAP 7.0 9.0   BUN 15 18   CREATININE 0.92 1.06   GLUCOSE 226* 87   CALCIUM 9.0 9.0                         Brief Urine Lab Results  (Last result in the past 365 days)      Color   Clarity   Blood   Leuk Est   Nitrite   Protein    CREAT   Urine HCG        05/05/21 0002 Yellow Clear Moderate (2+) Negative Negative >=300 mg/dL (3+)             Microbiology Results (last 10 days)     Procedure Component Value - Date/Time    Anaerobic Culture - Tissue, Toe, Left [[id number]] Collected: 05/12/21 1036    Lab Status: Final result Specimen: Tissue from Toe, Left Updated: 05/17/21 0920     Anaerobic Culture No anaerobes isolated at 5 days    Tissue / Bone Culture - Tissue, Toe, Left [[id number]] Collected: 05/12/21 1036    Lab Status: Final result Specimen: Tissue from Toe, Left Updated: 05/14/21 0914     Tissue Culture Heavy growth (4+) Normal Skin Kay     Gram Stain Rare (1+) WBCs seen      Rare (1+) Gram positive cocci in pairs and clusters      Rare (1+) Gram positive bacilli          Left Popliteal SS Block    Result Date: 5/12/2021  Edda Morales CRNA     5/12/2021  9:11 AM Left Popliteal SS Block Patient reassessed immediately prior to procedure Patient location during procedure: pre-op Reason for block: at surgeon's request and post-op pain management Performed by CRNA: Edda Morales CRNA Assisted by: Alie Escobar RN Preanesthetic Checklist Completed: patient identified, IV checked, site marked, risks and benefits discussed, surgical consent, monitors and equipment checked, pre-op evaluation and timeout performed Prep: Pt Position: right lateral decubitus Sterile barriers:cap, gloves, mask and sterile barriers Prep: ChloraPrep Patient monitoring: blood pressure monitoring, continuous pulse oximetry and EKG Procedure Sedation:yes Performed under: local infiltration Guidance:ultrasound guided Images:still images obtained, printed/placed on chart Laterality:left Block Type:popliteal Injection Technique:single-shot Needle Type:echogenic Needle Gauge:20 G Resistance on Injection: none Medications Used: fentaNYL citrate (PF) (SUBLIMAZE) injection, 100 mcg bupivacaine PF (MARCAINE) 0.25 % injection, 30 mL buprenorphine (BUPRENEX)  injection, 0.3 mg dexamethasone sodium phosphate injection, 2 mg Med admintered at 5/12/2021 9:08 AM Post Assessment Injection Assessment: negative aspiration for heme, no paresthesia on injection and incremental injection Patient Tolerance:comfortable throughout block Complications:no Additional Notes Procedure:                                                     The pt was placed in  lateral position.  The Insertion site was  Prepped with Chloraprep.The pt was anesthetized with  IV Sedation( see meds).  Skin and cutaneous tissue was infiltrated and anesthetized with 1% Lidocaine 3 mls via a 25g needle.  A BBraun 4 inch 20g echogenic needle was then  inserted approximately 3 cm proximal to the popliteal fossa at the lateral mid biceps femoris and advanced In-plane with Ultrasound guidance.  Normal Saline PSF was utilized for hydrodissection of tissue.  The popliteal artery was visualized and the common peroneal and tibial bifurcation was located.  LA injection spread was visualized, injection was incremental 1-5ml, injection pressure was normal or little, no intraneural injection, no vascular injection. Thank you.       Results for orders placed during the hospital encounter of 05/04/21    Duplex Lower Extremity Art / Grafts - Left CAR    Interpretation Summary  · Left RADHA is moderately reduced, 0.67  · There is atherosclerotic disease throughout the left lower extremity  · There are abnormal monophasic waveforms starting at the level of the SFA  · The left proximal superficial femoral artery demonstrates 76-99% stenosis.  · The left proximal deep femoral artery demonstrates <50% stenosis.  · The left distal superficial femoral artery demonstrates 50-75% stenosis.  · The infrapopliteal vessels are patent      Results for orders placed during the hospital encounter of 05/04/21    Duplex Lower Extremity Art / Grafts - Left CAR    Interpretation Summary  · Left RADHA is moderately reduced, 0.67  · There is  atherosclerotic disease throughout the left lower extremity  · There are abnormal monophasic waveforms starting at the level of the SFA  · The left proximal superficial femoral artery demonstrates 76-99% stenosis.  · The left proximal deep femoral artery demonstrates <50% stenosis.  · The left distal superficial femoral artery demonstrates 50-75% stenosis.  · The infrapopliteal vessels are patent      Results for orders placed during the hospital encounter of 05/04/21    Adult Transthoracic Echo Complete W/ Cont if Necessary Per Protocol (With Agitated Saline)    Interpretation Summary  · Left ventricular systolic function is moderately decreased. Estimated left ventricular EF = 25%  · The left ventricular cavity is moderately dilated.  · The cardiac valves are anatomically and functionally normal.  · Saline test results are negative.      Plan for Follow-up of Pending Labs/Results: Reviewed    Discharge Details        Discharge Medications      New Medications      Instructions Start Date   amLODIPine 10 MG tablet  Commonly known as: NORVASC   10 mg, Oral, Every 24 Hours Scheduled   Start Date: May 19, 2021     apixaban 5 MG tablet tablet  Commonly known as: ELIQUIS   5 mg, Oral, Every 12 Hours Scheduled      aspirin 81 MG chewable tablet   81 mg, Oral, Daily   Start Date: May 19, 2021     atorvastatin 80 MG tablet  Commonly known as: LIPITOR   80 mg, Oral, Nightly      cefuroxime 500 MG tablet  Commonly known as: CEFTIN   500 mg, Oral, Every 12 Hours Scheduled      famotidine 20 MG tablet  Commonly known as: PEPCID   20 mg, Oral, Daily   Start Date: May 19, 2021     HYDROcodone-acetaminophen 5-325 MG per tablet  Commonly known as: NORCO   1 tablet, Oral, Every 6 Hours PRN      insulin lispro 100 UNIT/ML injection  Commonly known as: humaLOG   0-7 Units, Subcutaneous, 3 Times Daily Before Meals      lactobacillus acidophilus capsule capsule   1 capsule, Oral, Daily   Start Date: May 19, 2021     terazosin 1 MG  capsule  Commonly known as: HYTRIN   1 mg, Oral, Nightly         Changes to Medications      Instructions Start Date   lisinopril 10 MG tablet  Commonly known as: PRINIVIL,ZESTRIL  What changed:   · how much to take  · when to take this   10 mg, Oral, Every 24 Hours Scheduled   Start Date: May 19, 2021        Continue These Medications      Instructions Start Date   carvedilol 25 MG tablet  Commonly known as: COREG   50 mg, Oral, 2 Times Daily With Meals         Stop These Medications    SPIRONOLACTONE PO            No Known Allergies      Discharge Disposition:  Rehab Facility or Unit (DC - External)    Diet:  Hospital:  Diet Order   Procedures   • Diet Regular; Cardiac       Activity:  Activity Instructions     Other Activity Instructions      Activity Instructions: restrictions per CT surgery/vascular surgery regarding weight bearing status          Restrictions or Other Recommendations:         CODE STATUS:    Code Status and Medical Interventions:   Ordered at: 05/05/21 0155     Level Of Support Discussed With:    Patient     Code Status:    CPR     Medical Interventions (Level of Support Prior to Arrest):    Full       Future Appointments   Date Time Provider Department Center   5/26/2021 11:00 AM [facility]       Additional Instructions for the Follow-ups that You Need to Schedule     Discharge Follow-up with PCP   As directed       Currently Documented PCP:    Arsen Seals APRN    PCP Phone Number:    [phone]     Follow Up Details: 1-2 weeks         Discharge Follow-up with Specified Provider: CARMEN Rivera 1-2 weeks or NADIA follow up   As directed      To: CARMEN Rivera 1-2 weeks or NADIA follow up         Discharge Follow-up with Specified Provider: Jaleel per his office   As directed      To: Jaleel, per his office         Discharge Follow-up with Specified Provider: Stroke NeurologyRasheed 3-4 weeks   As directed      To: Stroke NeurologyRasheed 3-4 weeks                      Trey Agudelo MD  05/18/21      Time Spent on Discharge:  I spent 33 minutes on this discharge activity which included: face-to-face encounter with the patient, reviewing the data in the system, coordination of the care with the nursing staff as well as consultants, documentation, and entering orders.

## 2021-05-18 NOTE — PROGRESS NOTES
Cardiothoracic Surgery Progress Note      POD #: 6-right great toe amputation through the proximal phalangeal bone     LOS: 14 days      Subjective: Awake and alert  Objective:  Vital Signs vital signs below noted T-max past 2490.4 °F  Temp:  [97 °F (36.1 °C)-98.4 °F (36.9 °C)] 97 °F (36.1 °C)  Heart Rate:  [70-85] 70  Resp:  [18] 18  BP: (134-163)/(71-94) 148/90    Physical Exam:   General Appearance: Oriented x3   Lungs:   Heart:   Skin:   Incision: Amputation site dressing change.  Sutures intact skin margins are viable.     Results:  Results from last 7 days   Lab Units 05/14/21  0400   WBC 10*3/mm3 8.75   HEMOGLOBIN g/dL 12.0*   HEMATOCRIT % 35.6*   PLATELETS 10*3/mm3 90*     Results from last 7 days   Lab Units 05/13/21  0605   SODIUM mmol/L 135*   POTASSIUM mmol/L 4.5   CHLORIDE mmol/L 102   CO2 mmol/L 26.0   BUN mg/dL 15   CREATININE mg/dL 0.92   GLUCOSE mg/dL 226*   CALCIUM mg/dL 9.0         Assessment: #1.  Postop day 6 left great toe amputation and primary closure healing well  2 dry gangrene left second toe treated preoperative with drug-eluting angioplasty of the entire superficial artery.      Plan: Continue daily dressing changes amputation site.  Medical manage per hospitalist.  Antibiotics per infectious disease.  Rehab facility okay with me at any time.      Dario Marmolejo MD - 05/18/21 - 06:37 EDT

## 2021-05-18 NOTE — PROGRESS NOTES
Infectious Disease Progress Note  Timmy Guajardo  1950  [id number]    Date of Consult: 5/7/2021  Admission Date: 5/4/2021    Requesting Provider: YUE Arroyo  Evaluating Physician: Piter Rivera MD    Chief Complaint: weakness    Reason for Consultation: toe gangrene    History of present illness:   Patient is a 70 y.o.  Yr old male with history of DM2, CVA, CHF (EF 25%), pacemaker, HTN. Presented to [facility] on 5/4 with acute right sided weakness and diagnosed with a stroke. Pt says he had dense hemiparesis on the right and was unable to move for 4 hours prior to being found. Noted to have dry gangrene of the left great toe. Started on vancomycin and zosyn. Dr Marmolejo consulted and recommending eventual amputation of toe. Afebrile. Blood cultures negative to date. ID was asked to managed antibiotics.     5/7/21: Currently resting comfortably.  Patient denies ever having history of staph infections.  No known antibiotic allergies.  He says right-sided weakness has significantly improved since arrival    5/8/21: Afebrile overnight.  Comfortable.  Slowly improving right-sided weakness.  Tolerated ceftriaxone.  No new pain in his toe.  He is agreeable to amputation at some point when needed.    5/10/21: Resting comfortably.  Right-sided strength slowly improving.  He has been up walking with physical therapy.  Angiogram planned tomorrow.  He is agreeable to discharge to [facility] once improved.  Toe stable without worsening pain, drainage.  He complains that IV antibiotics make him lose his appetite but no nausea or emesis.    5/12/21: Status post angioplasty of the left lower extremity yesterday with stent placement  Status post great toe amputation by Dr. Marmolejo this morning.  Recovering well postop.  Appetite good.  Pain well controlled.  Says he is less nausea with oral antibiotics than he did with IV antibiotics.  No diarrhea    5/13/21: Comfortable at rest.  Appetite improved.  Tolerating oral  antibiotics.  Toe dressed.  No worsening drainage or pain per patient    5/14/21: Resting comfortably.  No acute events overnight.  Tolerating oral antibiotics well.  Less GI side effects with oral antibiotics versus IV.  Toe amputation site stable with no worsening pain or drainage    5/18/21: stable over the weekend. No new complaints. Mild GI upset from PO abx but tolerable per patient and better than IV antibiotics. No emesis. Tolerating diet. Foot healing well per Dr Marmolejo. Discharged planned to [facility] today.     ROS:  No fevers or chills. No n/v/d. No rash. No new ADR to Abx.       No Known Allergies    Antibiotics:  Anti-Infectives (From admission, onward)    Ordered     Dose/Rate Route Frequency Start Stop    05/18/21 1037  cefuroxime (CEFTIN) 500 MG tablet     Ordering Provider: Trey Agudelo MD    500 mg Oral Every 12 Hours Scheduled 05/18/21 0000 06/01/21 2359    05/10/21 1203  cefuroxime (CEFTIN) tablet 500 mg     Piter Rivera MD reviewed the order on 05/14/21 1004.   Ordering Provider: Piter Rivera MD    500 mg Oral Every 12 Hours Scheduled 05/10/21 1300 05/23/21 2059    05/05/21 0438  ! Vancomycin level Thursday at 1730. Hold 1800 dose until lab collected     Ordering Provider: Aldo Veras IV PharmD     Does not apply Once 05/06/21 1700 05/06/21 1757    05/05/21 0432  vancomycin 1750 mg/500 mL 0.9% NS IVPB ([facility])     Ordering Provider: Aldo Veras IV PharmD    20 mg/kg × 81.6 kg  over 120 Minutes Intravenous Once 05/05/21 0530 05/05/21 0932    05/05/21 0406  piperacillin-tazobactam (ZOSYN) 3.375 g in iso-osmotic dextrose 50 ml (premix)     Ordering Provider: Adilia Portillo APRN    3.375 g  over 30 Minutes Intravenous Once 05/05/21 0500 05/05/21 0711          Other Medications:  Current Facility-Administered Medications   Medication Dose Route Frequency Provider Last Rate Last Admin   • acetaminophen (TYLENOL) tablet 650 mg  650 mg Oral Q4H PRN Farrukh Esposito  NATIVIDAD   650 mg at 05/18/21 0853   • amLODIPine (NORVASC) tablet 10 mg  10 mg Oral Q24H Pastor Painting MD   10 mg at 05/18/21 0836   • apixaban (ELIQUIS) tablet 5 mg  5 mg Oral Q12H Pastor Painting MD   5 mg at 05/18/21 0840   • aspirin chewable tablet 81 mg  81 mg Oral Daily Farrukh Esposito PA-C   81 mg at 05/18/21 0836   • atorvastatin (LIPITOR) tablet 80 mg  80 mg Oral Nightly Farrukh Esposito PA-C   80 mg at 05/17/21 2235   • calcium carbonate (TUMS) chewable tablet 500 mg (200 mg elemental)  2 tablet Oral TID PRN Farrukh Esposito PA-C   2 tablet at 05/13/21 2219   • carvedilol (COREG) tablet 25 mg  25 mg Oral BID With Meals Farrukh Esposito PA-C   25 mg at 05/18/21 0836   • cefuroxime (CEFTIN) tablet 500 mg  500 mg Oral Q12H Piter Rivera MD   500 mg at 05/18/21 0836   • dextrose (D50W) 25 g/ 50mL Intravenous Solution 25 g  25 g Intravenous Q15 Min PRN Farrukh Esposito PA-C       • dextrose (GLUTOSE) oral gel 15 g  15 g Oral Q15 Min PRN Farrukh Esposito PA-C       • famotidine (PEPCID) tablet 20 mg  20 mg Oral Daily Farrukh Esposito PA-C   20 mg at 05/18/21 0836   • glucagon (human recombinant) (GLUCAGEN DIAGNOSTIC) injection 1 mg  1 mg Subcutaneous Q15 Min PRN Farrukh Esposito PA-C       • insulin lispro (humaLOG) injection 0-7 Units  0-7 Units Subcutaneous TID AC Farrukh Esposito PA-C   2 Units at 05/17/21 1651   • lactobacillus acidophilus (RISAQUAD) capsule 1 capsule  1 capsule Oral Daily Farrukh Esposito PA-C   1 capsule at 05/18/21 0836   • lisinopril (PRINIVIL,ZESTRIL) tablet 10 mg  10 mg Oral Q24H Farrukh Esposiot PA-C   10 mg at 05/18/21 0837   • magnesium hydroxide (MILK OF MAGNESIA) suspension 2400 mg/10mL 10 mL  10 mL Oral Daily PRN Farrukh Esposito PA-C       • Magnesium Sulfate 2 gram Bolus, followed by 8 gram infusion (total Mg dose 10 grams)- Mg less than or equal to 1mg/dL  2 g Intravenous PRN  "Farrukh Esposito PA-C        Or   • Magnesium Sulfate 2 gram / 50mL Infusion (GIVE X 3 BAGS TO EQUAL 6GM TOTAL DOSE) - Mg 1.1 - 1.5 mg/dl  2 g Intravenous PRN Farrukh Esposito PA-C        Or   • Magnesium Sulfate 4 gram infusion- Mg 1.6-1.9 mg/dL  4 g Intravenous PRN Farrukh Esposito PA-C 25 mL/hr at 05/05/21 1544 4 g at 05/05/21 1544   • ondansetron (ZOFRAN) tablet 4 mg  4 mg Oral Q6H PRN Farrukh Esposito PA-C        Or   • ondansetron (ZOFRAN) injection 4 mg  4 mg Intravenous Q6H PRN Farruhk Esposito PA-C       • sennosides-docusate (PERICOLACE) 8.6-50 MG per tablet 2 tablet  2 tablet Oral BID PRN Farrukh Esposito PA-C   2 tablet at 05/16/21 0532   • sodium chloride 0.9 % flush 10 mL  10 mL Intravenous PRN Farrukh Esposito PA-C       • sodium chloride 0.9 % flush 10 mL  10 mL Intravenous PRN Farrukh Esposito PA-C       • sodium chloride 0.9 % flush 10 mL  10 mL Intravenous Q12H Farrukh Esposito PA-C   10 mL at 05/18/21 0840   • sodium chloride 0.9 % flush 10 mL  10 mL Intravenous PRN Farrukh Esposito PA-C   10 mL at 05/11/21 2158   • terazosin (HYTRIN) capsule 1 mg  1 mg Oral Nightly Pastor Painting MD   1 mg at 05/17/21 2236       Physical Exam:   Vital Signs   /75   Pulse 70   Temp 97 °F (36.1 °C) (Oral)   Resp 18   Ht 177.8 cm (70\")   Wt 78 kg (172 lb)   SpO2 93%   BMI 24.68 kg/m²     GENERAL: Awake and alert, in no acute distress.   HEENT: Normocephalic, atraumatic.  EOMI. No conjunctival injection. No icterus.   HEART: RRR; No murmur. Pacemaker in place  LUNGS: no cough. Normal respiratory effort. Nonlabored.  ABDOMEN: Soft, nontender, nondistended   : Without Rosenberg catheter.  MSK: no major joint swellling  SKIN: Status post amputation of the left great toe, dressed postoperatively, CDI.     NEURO: Oriented to PPT. Right sided weakness, 4/5 strength, improved  PSYCHIATRIC: Normal insight and judgement. Cooperative " with PE  PIV    Laboratory Data    Results from last 7 days   Lab Units 05/14/21  0400 05/13/21  0605 05/12/21  0532   WBC 10*3/mm3 8.75 11.65* 7.10   HEMOGLOBIN g/dL 12.0* 13.2 13.3   HEMATOCRIT % 35.6* 38.0 38.5   PLATELETS 10*3/mm3 90* 126* 75*     Results from last 7 days   Lab Units 05/13/21  0605   SODIUM mmol/L 135*   POTASSIUM mmol/L 4.5   CHLORIDE mmol/L 102   CO2 mmol/L 26.0   BUN mg/dL 15   CREATININE mg/dL 0.92   GLUCOSE mg/dL 226*   CALCIUM mg/dL 9.0     Estimated Creatinine Clearance: 84.8 mL/min (by C-G formula based on SCr of 0.92 mg/dL).          Results from last 7 days   Lab Units 05/14/21  0400   CRP mg/dL 1.16*       Microbiology:  5/4 blood cx NGTD    COVID negative    5/12 left great toe tissue cultures: gram stain with GPC and GPB, heavy growth of normal nathan on culture     Radiology:  No radiology results for the last 7 days       Impression:   1. Acute CVA and right sided weakness: improved since admission. Neurology, stroke team following. Likely discharge to [facility] for rehab  2. Dry gangrene of left great toe s/p amputation on 5/12: no clear evidence of active infection on external exam. Blood cx negative. CT w/o contrast did not show any evidence of osteomyelitis.  Operative cultures from gangrenous tissue with evidence of heavy growth normal skin nathan.  Pathology with viable margin. Site healing well per Dr Marmolejo.  3. PVD: Status post angioplasty and stent placement on 5/11  4. Neutrophilic Leukocytosis: resolved  5. DM2: reasonably well controlled  6. Chronic systolic heart failure: EF 25%  7. Pacemaker  8. HTN    PLAN:   - Follow CBC, CMP, CRP  - pathology and cultures reviewed    - PO cefuroxime 500 mg BID for 2 weeks post-op through 5/26.   - probiotic daily while on antibiotics     Noted plans for possible transfer to [facility] later today. I will check him there tomorrow.     Piter Rivera MD  5/18/2021

## 2021-05-24 ENCOUNTER — OFFICE VISIT (OUTPATIENT)
Dept: CARDIOLOGY | Facility: HOSPITAL | Age: 71
End: 2021-05-24

## 2021-05-24 VITALS
DIASTOLIC BLOOD PRESSURE: 75 MMHG | HEIGHT: 70 IN | SYSTOLIC BLOOD PRESSURE: 136 MMHG | TEMPERATURE: 97.6 F | BODY MASS INDEX: 24.68 KG/M2 | OXYGEN SATURATION: 96 % | HEART RATE: 110 BPM

## 2021-05-24 RX ORDER — ONDANSETRON 4 MG/1
4 TABLET, FILM COATED ORAL EVERY 8 HOURS PRN
COMMUNITY
End: 2022-04-25

## 2021-05-24 RX ORDER — CLONIDINE HYDROCHLORIDE 0.1 MG/1
0.1 TABLET ORAL AS NEEDED
COMMUNITY
End: 2022-04-25

## 2021-05-24 RX ORDER — SPIRONOLACTONE 25 MG/1
12.5 TABLET ORAL DAILY
COMMUNITY
Start: 2021-04-25 | End: 2022-04-25

## 2021-05-24 RX ORDER — AMOXICILLIN 250 MG
1 CAPSULE ORAL DAILY
COMMUNITY
End: 2022-04-25

## 2021-05-24 RX ORDER — OXYCODONE HYDROCHLORIDE 5 MG/1
5 CAPSULE ORAL EVERY 4 HOURS PRN
COMMUNITY
End: 2022-04-25

## 2021-05-24 RX ORDER — FUROSEMIDE 20 MG/1
20 TABLET ORAL DAILY
COMMUNITY
End: 2022-04-25

## 2021-05-24 RX ORDER — NITROGLYCERIN 0.4 MG/1
0.4 TABLET SUBLINGUAL
COMMUNITY
End: 2022-04-25

## 2021-05-24 RX ORDER — UREA 10 %
3 LOTION (ML) TOPICAL NIGHTLY PRN
Status: ON HOLD | COMMUNITY
End: 2022-10-16

## 2021-05-26 ENCOUNTER — OFFICE VISIT (OUTPATIENT)
Dept: CARDIAC SURGERY | Facility: CLINIC | Age: 71
End: 2021-05-26

## 2021-05-26 VITALS
TEMPERATURE: 98.1 F | SYSTOLIC BLOOD PRESSURE: 112 MMHG | HEIGHT: 71 IN | BODY MASS INDEX: 23.94 KG/M2 | HEART RATE: 68 BPM | DIASTOLIC BLOOD PRESSURE: 64 MMHG | OXYGEN SATURATION: 100 % | WEIGHT: 171 LBS

## 2021-05-26 DIAGNOSIS — I96 GANGRENE (HCC): Primary | ICD-10-CM

## 2021-05-26 PROCEDURE — 99024 POSTOP FOLLOW-UP VISIT: CPT | Performed by: THORACIC SURGERY (CARDIOTHORACIC VASCULAR SURGERY)

## 2021-05-27 NOTE — PROGRESS NOTES
"Patient Information  Timmy Guajardo                                                                                          [address]  [facility] [address]      1950  [unfilled]  [unfilled]    Chief Complaint   Patient presents with   • Follow-up     Hosp f/u s/p Left great toe amputation 5/12/21. Pt state that he is doing very well, has had some phantom pain but a is taking  Tylenol  to combat the pain and it is  doing well to help manage the pain.        History of Present Illness: Patient seen today for first postop visit following left great toe amputation on May 12, 2021.  Patient has known diabetes.  Patient had drug-eluting angioplasty prior to the toe amputation performed on the superficial femoral artery.    Vitals:    05/26/21 1523   BP: 112/64   Pulse: 68   Temp: 98.1 °F (36.7 °C)   SpO2: 100%   Weight: 77.6 kg (171 lb)   Height: 180.3 cm (71\")        Physical Exam examination reveals the toe amputation site to be healing reasonably well.  Betadine swab and new Xeroform gauze dressing with Kerlix wrap was applied.  Lab/other results:    Assessment: Status post a left great toe amputation on 5/12/2021 which is healing well at this time.    Plan: We will plan to see the patient back in 1 month for follow-up visit and probably remove the skin sutures at that time.    Dario Marmolejo M.D.   "

## 2021-06-07 ENCOUNTER — APPOINTMENT (OUTPATIENT)
Dept: CARDIOLOGY | Facility: HOSPITAL | Age: 71
End: 2021-06-07

## 2021-06-15 ENCOUNTER — DOCUMENTATION (OUTPATIENT)
Dept: DIABETES SERVICES | Facility: HOSPITAL | Age: 71
End: 2021-06-15

## 2021-06-15 NOTE — PLAN OF CARE
"Attempted to call patient for OP stroke follow up class several times yesterday and today. Phone did not ring only stated \"call cannot be completed at this time.\" No other phone listed for patient. Home and cell are the same number.   Patient was provided our contact information during his hospital admission.   "

## 2021-06-30 ENCOUNTER — OFFICE VISIT (OUTPATIENT)
Dept: CARDIAC SURGERY | Facility: CLINIC | Age: 71
End: 2021-06-30

## 2021-06-30 VITALS
TEMPERATURE: 97.8 F | WEIGHT: 171 LBS | HEIGHT: 70 IN | SYSTOLIC BLOOD PRESSURE: 118 MMHG | OXYGEN SATURATION: 98 % | DIASTOLIC BLOOD PRESSURE: 76 MMHG | BODY MASS INDEX: 24.48 KG/M2

## 2021-06-30 DIAGNOSIS — I96 GANGRENE (HCC): Primary | ICD-10-CM

## 2021-06-30 PROCEDURE — 99024 POSTOP FOLLOW-UP VISIT: CPT | Performed by: THORACIC SURGERY (CARDIOTHORACIC VASCULAR SURGERY)

## 2021-07-01 NOTE — PROGRESS NOTES
"Patient Information  Timmy Guajardo                                                                                          [address] [facility] [address]      1950  [unfilled]  [unfilled]    Chief Complaint   Patient presents with   • Follow-up     1 month follow up for left great toe amp. Patient reports drainage since last Thursday        History of Present Illness: Patient seen today for 2nd follow-up visit following left great toe amputation performed in May 12, 2021 for diabetes and ulceration.  He had drug-eluting angioplasty prior to this toe amputation of the superficial femoral artery of the left side.  Vitals:    06/30/21 1302   BP: 118/76   BP Location: Left arm   Patient Position: Sitting   Temp: 97.8 °F (36.6 °C)   SpO2: 98%   Weight: 77.6 kg (171 lb)   Height: 177.8 cm (70\")        Physical Exam examination reveals the amputation site to be healed well.  All suture removed and Steri-Strips were applied.    Lab/other results:    Assessment: #1.  Status post left great toe amputation on 5/12/2021 for diabetic ulceration and peripheral vascular is healed well  2.  Status post drug-eluting angioplasty to the left superficial femoral artery    Plan: We will plan to see the patient back in approximately 3 weeks for follow-up visit.  At that time if he is completely healed at the amputation site we will let him weight-bear.  Dario Marmolejo M.D.   "

## 2021-08-04 ENCOUNTER — OFFICE VISIT (OUTPATIENT)
Dept: CARDIAC SURGERY | Facility: CLINIC | Age: 71
End: 2021-08-04

## 2021-08-04 VITALS
OXYGEN SATURATION: 99 % | DIASTOLIC BLOOD PRESSURE: 61 MMHG | HEIGHT: 70 IN | SYSTOLIC BLOOD PRESSURE: 91 MMHG | HEART RATE: 74 BPM | BODY MASS INDEX: 23.19 KG/M2 | WEIGHT: 162 LBS | TEMPERATURE: 97.1 F

## 2021-08-04 DIAGNOSIS — I96 GANGRENE (HCC): Primary | ICD-10-CM

## 2021-08-04 PROCEDURE — 99024 POSTOP FOLLOW-UP VISIT: CPT | Performed by: THORACIC SURGERY (CARDIOTHORACIC VASCULAR SURGERY)

## 2021-08-05 NOTE — PROGRESS NOTES
"Patient Information  Timmy Guajardo                                                                                          [address] [facility] [address]      1950  [unfilled]  [unfilled]    Chief Complaint   Patient presents with   • Post-op Follow-up     3 week f/u after LT great toe amp on 5/12/21.       History of Present Illness: Patient seen today for third postop visit for left great toe amputation performed on May 12, 2021 for diabetes and ulceration from neuropathy.  Patient also has peripheral vessel disease had a drug-eluting angioplasty to this left leg prior to the great toe amputation.    Vitals:    08/04/21 1442   BP: 91/61   BP Location: Right arm   Pulse: 74   Temp: 97.1 °F (36.2 °C)   SpO2: 99%   Weight: 73.5 kg (162 lb)   Height: 177.8 cm (70\")        Physical Exam patient left great toe amputation site is completely healed.    Lab/other results:    Assessment: #1.  Status post left great toe amputation on May 12, 2021 for diabetic ulceration peripheral disease healed  2.  Status post drug-eluting angioplasty left superficial femoral artery with excellent results.    Plan: We will plan to see the patient on as needed basis.  He now is not limited to nonweightbearing.  He may wear his regular shoes as needed.    Dario Marmolejo M.D.   "

## 2022-01-31 PROCEDURE — U0004 COV-19 TEST NON-CDC HGH THRU: HCPCS | Performed by: PHYSICIAN ASSISTANT

## 2022-04-25 ENCOUNTER — CONSULT (OUTPATIENT)
Dept: ONCOLOGY | Facility: CLINIC | Age: 72
End: 2022-04-25

## 2022-04-25 ENCOUNTER — LAB (OUTPATIENT)
Dept: LAB | Facility: HOSPITAL | Age: 72
End: 2022-04-25

## 2022-04-25 VITALS
TEMPERATURE: 98.3 F | SYSTOLIC BLOOD PRESSURE: 92 MMHG | HEART RATE: 101 BPM | RESPIRATION RATE: 16 BRPM | DIASTOLIC BLOOD PRESSURE: 69 MMHG | OXYGEN SATURATION: 99 % | HEIGHT: 70 IN | BODY MASS INDEX: 23.82 KG/M2 | WEIGHT: 166.4 LBS

## 2022-04-25 DIAGNOSIS — D69.6 THROMBOCYTOPENIA: Primary | ICD-10-CM

## 2022-04-25 DIAGNOSIS — D69.6 THROMBOCYTOPENIA: ICD-10-CM

## 2022-04-25 LAB
ALBUMIN SERPL-MCNC: 4.5 G/DL (ref 3.5–5.2)
ALBUMIN/GLOB SERPL: 1.5 G/DL
ALP SERPL-CCNC: 59 U/L (ref 39–117)
ALT SERPL W P-5'-P-CCNC: 24 U/L (ref 1–41)
ANION GAP SERPL CALCULATED.3IONS-SCNC: 12 MMOL/L (ref 5–15)
AST SERPL-CCNC: 23 U/L (ref 1–40)
BILIRUB SERPL-MCNC: 0.7 MG/DL (ref 0–1.2)
BUN SERPL-MCNC: 14 MG/DL (ref 8–23)
BUN/CREAT SERPL: 12.6 (ref 7–25)
CALCIUM SPEC-SCNC: 9.5 MG/DL (ref 8.6–10.5)
CHLORIDE SERPL-SCNC: 99 MMOL/L (ref 98–107)
CO2 SERPL-SCNC: 26 MMOL/L (ref 22–29)
CREAT SERPL-MCNC: 1.11 MG/DL (ref 0.76–1.27)
EGFRCR SERPLBLD CKD-EPI 2021: 71.9 ML/MIN/1.73
ERYTHROCYTE [DISTWIDTH] IN BLOOD BY AUTOMATED COUNT: 12.5 % (ref 12.3–15.4)
GLOBULIN UR ELPH-MCNC: 3.1 GM/DL
GLUCOSE SERPL-MCNC: 197 MG/DL (ref 65–99)
HCT VFR BLD AUTO: 47.1 % (ref 37.5–51)
HGB BLD-MCNC: 15.3 G/DL (ref 13–17.7)
LYMPHOCYTES # BLD AUTO: 1.6 10*3/MM3 (ref 0.7–3.1)
LYMPHOCYTES NFR BLD AUTO: 18.5 % (ref 19.6–45.3)
MCH RBC QN AUTO: 29.9 PG (ref 26.6–33)
MCHC RBC AUTO-ENTMCNC: 32.5 G/DL (ref 31.5–35.7)
MCV RBC AUTO: 92.2 FL (ref 79–97)
MONOCYTES # BLD AUTO: 0.2 10*3/MM3 (ref 0.1–0.9)
MONOCYTES NFR BLD AUTO: 2.4 % (ref 5–12)
NEUTROPHILS NFR BLD AUTO: 7 10*3/MM3 (ref 1.7–7)
NEUTROPHILS NFR BLD AUTO: 79.1 % (ref 42.7–76)
PLATELET # BLD AUTO: 162 10*3/MM3 (ref 140–450)
PLATELETS (CITRATED) BY AUTOMATED COUNT: 77 10*3/MM3 (ref 140–450)
PMV BLD AUTO: 7.7 FL (ref 6–12)
POTASSIUM SERPL-SCNC: 5 MMOL/L (ref 3.5–5.2)
PROT SERPL-MCNC: 7.6 G/DL (ref 6–8.5)
RBC # BLD AUTO: 5.11 10*6/MM3 (ref 4.14–5.8)
SODIUM SERPL-SCNC: 137 MMOL/L (ref 136–145)
WBC NRBC COR # BLD: 8.8 10*3/MM3 (ref 3.4–10.8)

## 2022-04-25 PROCEDURE — 99203 OFFICE O/P NEW LOW 30 MIN: CPT | Performed by: INTERNAL MEDICINE

## 2022-04-25 PROCEDURE — 36415 COLL VENOUS BLD VENIPUNCTURE: CPT

## 2022-04-25 PROCEDURE — 85060 BLOOD SMEAR INTERPRETATION: CPT

## 2022-04-25 PROCEDURE — 85025 COMPLETE CBC W/AUTO DIFF WBC: CPT

## 2022-04-25 PROCEDURE — 80053 COMPREHEN METABOLIC PANEL: CPT

## 2022-04-25 PROCEDURE — 85049 AUTOMATED PLATELET COUNT: CPT

## 2022-04-25 RX ORDER — ORAL SEMAGLUTIDE 7 MG/1
TABLET ORAL DAILY
Status: ON HOLD | COMMUNITY
Start: 2022-04-06 | End: 2023-03-26

## 2022-04-25 RX ORDER — TERAZOSIN 1 MG/1
1 CAPSULE ORAL
Status: ON HOLD | COMMUNITY
Start: 2022-03-30

## 2022-04-25 RX ORDER — PANTOPRAZOLE SODIUM 40 MG/1
TABLET, DELAYED RELEASE ORAL
Status: ON HOLD | COMMUNITY
Start: 2022-03-31 | End: 2022-10-16

## 2022-04-25 RX ORDER — DULAGLUTIDE 0.75 MG/.5ML
INJECTION, SOLUTION SUBCUTANEOUS
Status: ON HOLD | COMMUNITY
Start: 2022-04-07

## 2022-04-25 NOTE — PROGRESS NOTES
ID: 71 y.o. year old male from [facility] [id number]    PCP: Arsen Seals APRN    REFERRING PHYSICIAN: YUE Fong    Reason for Consultation: Severe thrombocytopenia    Dear Mr. Seals    It is a pleasure to meet Mr. Nicolas today.  He is a very pleasant 71-year-old gentleman who presents with severe thrombocytopenia.  He is currently on aspirin and Eliquis.  He has a recent history of angioplasty in the left SFA to help with flow in his legs.  He had amputation of the great toe on the left leg.  Clinically he is doing reasonably well.  He denies any significant bruising though he has some traumatic bleeding in his right arm.  He reports that it was doing reasonably well at clotting also.        Past Medical History:   Diagnosis Date   • Cardiomyopathy (HCC)    • CHF (congestive heart failure) (HCC)    • Coronary artery disease    • Diabetes mellitus (HCC)    • GERD (gastroesophageal reflux disease)    • HTN (hypertension)    • Stroke (HCC)     Right sided weakness   • Stroke (HCC)        Past Surgical History:   Procedure Laterality Date   • AMPUTATION DIGIT Left 5/12/2021    Procedure: AMPUTATION DIGIT - GREAT TOE AMPUTATION LEFT;  Surgeon: Dario Marmolejo MD;  Location: [facility];  Service: General;  Laterality: Left;   • AORTAGRAM N/A 5/11/2021    Procedure: AORTAGRAM WITH RUNOFFS, LEFT SFA BALLOON ANGIOPLASTY;  Surgeon: Tim Mahan MD;  Location: [facility];  Service: Vascular;  Laterality: N/A;  FL TIME 6 MIN 54 SECS  82 MGY  CONTRAST VISIPAQUE 100ML     • CARDIAC CATHETERIZATION     • CARDIAC PACEMAKER PLACEMENT      pacemaker/defibrillator, [employer]   • HERNIA REPAIR Bilateral     Inguinal hernia       Social History     Socioeconomic History   • Marital status: Single   • Number of children: 0   Tobacco Use   • Smoking status: Former Smoker     Packs/day: 1.00     Years: 30.00     Pack years: 30.00   • Smokeless tobacco: Never Used   • Tobacco comment: quit 2015   Vaping Use    • Vaping Use: Never used   Substance and Sexual Activity   • Alcohol use: Never   • Drug use: Never   • Sexual activity: Defer       Family History   Problem Relation Age of Onset   • Alzheimer's disease Mother    • Kidney failure Mother    • Cancer Father    • Heart failure Father        Review of Systems:    16 point review of systems was performed and reviewed and scanned into the EMR    Review of Systems - Oncology      Current Outpatient Medications:   •  amLODIPine (NORVASC) 10 MG tablet, Take 1 tablet by mouth Daily., Disp: , Rfl:   •  apixaban (ELIQUIS) 5 MG tablet tablet, Take 1 tablet by mouth Every 12 (Twelve) Hours. Indications: Other - full anticoagulation, Disp: 60 tablet, Rfl:   •  aspirin 81 MG chewable tablet, Chew 1 tablet Daily., Disp: , Rfl:   •  atorvastatin (LIPITOR) 80 MG tablet, Take 1 tablet by mouth Every Night., Disp: , Rfl:   •  carvedilol (COREG) 25 MG tablet, Take 50 mg by mouth 2 (Two) Times a Day With Meals., Disp: , Rfl:   •  famotidine (PEPCID) 20 MG tablet, Take 1 tablet by mouth Daily., Disp: , Rfl:   •  lisinopril (PRINIVIL,ZESTRIL) 10 MG tablet, Take 1 tablet by mouth Daily., Disp: , Rfl:   •  melatonin 1 MG tablet, Take 3 mg by mouth At Night As Needed for Sleep., Disp: , Rfl:   •  pantoprazole (PROTONIX) 40 MG EC tablet, , Disp: , Rfl:   •  Rybelsus 7 MG tablet, , Disp: , Rfl:   •  terazosin (HYTRIN) 1 MG capsule, Take 1 capsule by mouth., Disp: , Rfl:   •  Trulicity 0.75 MG/0.5ML solution pen-injector, , Disp: , Rfl:   •  furosemide (LASIX) 20 MG tablet, Take 20 mg by mouth Daily., Disp: , Rfl:   •  lactobacillus acidophilus (RISAQUAD) capsule capsule, Take 1 capsule by mouth Daily. (Patient taking differently: Take 1 capsule by mouth.), Disp: , Rfl:     Pain Medications             aspirin 81 MG chewable tablet Chew 1 tablet Daily.           No Known Allergies      ECOG score: 0           Objective     Vitals:    04/25/22 1421   BP: 92/69   Pulse: 101   Resp: 16   Temp:  98.3 °F (36.8 °C)   SpO2: 99%     Body mass index is 23.88 kg/m².  Body surface area is 1.93 meters squared.        04/25/22 1421   Weight: 75.5 kg (166 lb 6.4 oz)     Pain Score    04/25/22 1421   PainSc: 0-No pain          Physical Exam    General: well appearing, in no acute distress  HEENT: sclera anicteric, oropharynx clear, neck is supple  Lymphatics: no cervical, supraclavicular, or axillary adenopathy  Cardiovascular: regular rate and rhythm, no murmurs, rubs or gallops  Lungs: clear to auscultation bilaterally  Abdomen: soft, nontender, nondistended.  No palpable organomegaly  Extremities: no lower extremity edema  Skin: no rashes, lesions, bruising, or petechiae  Msk:  Shows no weakness of the large muscle groups  Psych: Mood is stable        Lab Results   Component Value Date    GLUCOSE 226 (H) 05/13/2021    BUN 15 05/13/2021    CREATININE 0.92 05/13/2021     (L) 05/13/2021    K 4.5 05/13/2021     05/13/2021    CO2 26.0 05/13/2021    CALCIUM 9.0 05/13/2021    PROTEINTOT 7.9 05/04/2021    ALBUMIN 4.80 05/04/2021    BILITOT 0.5 05/04/2021    ALKPHOS 43 05/04/2021    AST 26 05/04/2021    AST 26 05/04/2021    ALT 26 05/04/2021    ALT 29 05/04/2021       Lab Results   Component Value Date    HGB 15.3 04/25/2022    HCT 47.1 04/25/2022    MCV 92.2 04/25/2022     04/25/2022    WBC 8.80 04/25/2022    NEUTROABS 7.00 04/25/2022    LYMPHSABS 1.60 04/25/2022    MONOSABS 0.20 04/25/2022    EOSABS 0.00 05/13/2021    BASOSABS 0.01 05/13/2021       Lab Results   Component Value Date     04/25/2022    PLT 90 (L) 05/14/2021     (L) 05/13/2021    PLT 75 (L) 05/12/2021     (L) 05/11/2021           Assessment/Plan      1.  Thrombocytopenia.  This appears to be either transient or pseudothrombocytopenia.  Regardless his numbers are returned to normal.  At this time no intervention is necessary.  He can continue antiplatelet therapy for his vasculopathy.  I will see him on a as needed  basis.        Thank you for allowing me to participate in the care of this patient.    Yours sincerely,    Trisha Beckett MD  [facility]  Hematology and Oncology         Orders Placed This Encounter   Procedures   • Comprehensive Metabolic Panel     Standing Status:   Future     Number of Occurrences:   1     Standing Expiration Date:   4/25/2023     Order Specific Question:   Release to patient     Answer:   Immediate   • Platelet Ct On Citrated Bld     Standing Status:   Future     Number of Occurrences:   1     Standing Expiration Date:   4/25/2023     Order Specific Question:   Release to patient     Answer:   Immediate   • ROMY     Standing Status:   Future     Number of Occurrences:   1     Standing Expiration Date:   4/25/2023     Order Specific Question:   Release to patient     Answer:   Immediate   • CBC & Differential     Standing Status:   Future     Number of Occurrences:   1     Standing Expiration Date:   4/25/2023     Order Specific Question:   Manual Differential     Answer:   No     Order Specific Question:   Release to patient     Answer:   Immediate

## 2022-04-26 LAB
CYTOLOGIST CVX/VAG CYTO: NORMAL
PATH INTERP BLD-IMP: NORMAL

## 2022-10-16 ENCOUNTER — APPOINTMENT (OUTPATIENT)
Dept: GENERAL RADIOLOGY | Facility: HOSPITAL | Age: 72
End: 2022-10-16

## 2022-10-16 ENCOUNTER — HOSPITAL ENCOUNTER (INPATIENT)
Facility: HOSPITAL | Age: 72
LOS: 8 days | Discharge: NURSING, REHAB OR LONG-TERM CARE | End: 2022-10-24
Attending: EMERGENCY MEDICINE | Admitting: INTERNAL MEDICINE

## 2022-10-16 DIAGNOSIS — S72.141A CLOSED DISPLACED INTERTROCHANTERIC FRACTURE OF RIGHT FEMUR, INITIAL ENCOUNTER: Primary | ICD-10-CM

## 2022-10-16 LAB
ABO GROUP BLD: NORMAL
ABO GROUP BLD: NORMAL
ALBUMIN SERPL-MCNC: 4.6 G/DL (ref 3.5–5.2)
ALBUMIN/GLOB SERPL: 1.4 G/DL
ALP SERPL-CCNC: 44 U/L (ref 39–117)
ALT SERPL W P-5'-P-CCNC: 18 U/L (ref 1–41)
ANION GAP SERPL CALCULATED.3IONS-SCNC: 12 MMOL/L (ref 5–15)
AST SERPL-CCNC: 20 U/L (ref 1–40)
BASOPHILS # BLD AUTO: 0.04 10*3/MM3 (ref 0–0.2)
BASOPHILS NFR BLD AUTO: 0.3 % (ref 0–1.5)
BILIRUB SERPL-MCNC: 0.7 MG/DL (ref 0–1.2)
BLD GP AB SCN SERPL QL: NEGATIVE
BUN SERPL-MCNC: 18 MG/DL (ref 8–23)
BUN/CREAT SERPL: 16.2 (ref 7–25)
CALCIUM SPEC-SCNC: 9.2 MG/DL (ref 8.6–10.5)
CHLORIDE SERPL-SCNC: 102 MMOL/L (ref 98–107)
CO2 SERPL-SCNC: 22 MMOL/L (ref 22–29)
CREAT SERPL-MCNC: 1.11 MG/DL (ref 0.76–1.27)
DEPRECATED RDW RBC AUTO: 44.7 FL (ref 37–54)
EGFRCR SERPLBLD CKD-EPI 2021: 71.4 ML/MIN/1.73
EOSINOPHIL # BLD AUTO: 0.01 10*3/MM3 (ref 0–0.4)
EOSINOPHIL NFR BLD AUTO: 0.1 % (ref 0.3–6.2)
ERYTHROCYTE [DISTWIDTH] IN BLOOD BY AUTOMATED COUNT: 12.5 % (ref 12.3–15.4)
GLOBULIN UR ELPH-MCNC: 3.3 GM/DL
GLUCOSE BLDC GLUCOMTR-MCNC: 197 MG/DL (ref 70–130)
GLUCOSE SERPL-MCNC: 239 MG/DL (ref 65–99)
HCT VFR BLD AUTO: 42.3 % (ref 37.5–51)
HGB BLD-MCNC: 14.4 G/DL (ref 13–17.7)
IMM GRANULOCYTES # BLD AUTO: 0.08 10*3/MM3 (ref 0–0.05)
IMM GRANULOCYTES NFR BLD AUTO: 0.5 % (ref 0–0.5)
LYMPHOCYTES # BLD AUTO: 1.14 10*3/MM3 (ref 0.7–3.1)
LYMPHOCYTES NFR BLD AUTO: 7.8 % (ref 19.6–45.3)
MCH RBC QN AUTO: 32.7 PG (ref 26.6–33)
MCHC RBC AUTO-ENTMCNC: 34 G/DL (ref 31.5–35.7)
MCV RBC AUTO: 96.1 FL (ref 79–97)
MONOCYTES # BLD AUTO: 1.08 10*3/MM3 (ref 0.1–0.9)
MONOCYTES NFR BLD AUTO: 7.4 % (ref 5–12)
NEUTROPHILS NFR BLD AUTO: 12.34 10*3/MM3 (ref 1.7–7)
NEUTROPHILS NFR BLD AUTO: 83.9 % (ref 42.7–76)
NRBC BLD AUTO-RTO: 0 /100 WBC (ref 0–0.2)
PLATELET # BLD AUTO: 111 10*3/MM3 (ref 140–450)
PMV BLD AUTO: 11 FL (ref 6–12)
POTASSIUM SERPL-SCNC: 5.5 MMOL/L (ref 3.5–5.2)
PROT SERPL-MCNC: 7.9 G/DL (ref 6–8.5)
RBC # BLD AUTO: 4.4 10*6/MM3 (ref 4.14–5.8)
RH BLD: POSITIVE
RH BLD: POSITIVE
SARS-COV-2 RDRP RESP QL NAA+PROBE: NORMAL
SODIUM SERPL-SCNC: 136 MMOL/L (ref 136–145)
T&S EXPIRATION DATE: NORMAL
WBC NRBC COR # BLD: 14.69 10*3/MM3 (ref 3.4–10.8)

## 2022-10-16 PROCEDURE — 71045 X-RAY EXAM CHEST 1 VIEW: CPT

## 2022-10-16 PROCEDURE — 85025 COMPLETE CBC W/AUTO DIFF WBC: CPT | Performed by: NURSE PRACTITIONER

## 2022-10-16 PROCEDURE — 99285 EMERGENCY DEPT VISIT HI MDM: CPT

## 2022-10-16 PROCEDURE — 63710000001 ONDANSETRON ODT 4 MG TABLET DISPERSIBLE: Performed by: EMERGENCY MEDICINE

## 2022-10-16 PROCEDURE — 86900 BLOOD TYPING SEROLOGIC ABO: CPT | Performed by: NURSE PRACTITIONER

## 2022-10-16 PROCEDURE — 73502 X-RAY EXAM HIP UNI 2-3 VIEWS: CPT

## 2022-10-16 PROCEDURE — 86901 BLOOD TYPING SEROLOGIC RH(D): CPT

## 2022-10-16 PROCEDURE — 73552 X-RAY EXAM OF FEMUR 2/>: CPT

## 2022-10-16 PROCEDURE — 25010000002 HYDROMORPHONE PER 4 MG: Performed by: EMERGENCY MEDICINE

## 2022-10-16 PROCEDURE — 80053 COMPREHEN METABOLIC PANEL: CPT | Performed by: NURSE PRACTITIONER

## 2022-10-16 PROCEDURE — 86850 RBC ANTIBODY SCREEN: CPT | Performed by: NURSE PRACTITIONER

## 2022-10-16 PROCEDURE — 99223 1ST HOSP IP/OBS HIGH 75: CPT | Performed by: INTERNAL MEDICINE

## 2022-10-16 PROCEDURE — 25010000002 MORPHINE PER 10 MG: Performed by: NURSE PRACTITIONER

## 2022-10-16 PROCEDURE — 86900 BLOOD TYPING SEROLOGIC ABO: CPT

## 2022-10-16 PROCEDURE — 93005 ELECTROCARDIOGRAM TRACING: CPT | Performed by: NURSE PRACTITIONER

## 2022-10-16 PROCEDURE — 82962 GLUCOSE BLOOD TEST: CPT

## 2022-10-16 PROCEDURE — 87635 SARS-COV-2 COVID-19 AMP PRB: CPT | Performed by: NURSE PRACTITIONER

## 2022-10-16 PROCEDURE — 86901 BLOOD TYPING SEROLOGIC RH(D): CPT | Performed by: NURSE PRACTITIONER

## 2022-10-16 RX ORDER — ACETAMINOPHEN 160 MG/5ML
650 SOLUTION ORAL EVERY 4 HOURS PRN
Status: DISCONTINUED | OUTPATIENT
Start: 2022-10-16 | End: 2022-10-17

## 2022-10-16 RX ORDER — CHOLECALCIFEROL (VITAMIN D3) 125 MCG
5 CAPSULE ORAL NIGHTLY PRN
Status: DISCONTINUED | OUTPATIENT
Start: 2022-10-16 | End: 2022-10-24 | Stop reason: HOSPADM

## 2022-10-16 RX ORDER — ONDANSETRON 4 MG/1
4 TABLET, ORALLY DISINTEGRATING ORAL ONCE
Status: COMPLETED | OUTPATIENT
Start: 2022-10-16 | End: 2022-10-16

## 2022-10-16 RX ORDER — MORPHINE SULFATE 2 MG/ML
1 INJECTION, SOLUTION INTRAMUSCULAR; INTRAVENOUS
Status: DISCONTINUED | OUTPATIENT
Start: 2022-10-16 | End: 2022-10-18 | Stop reason: SDUPTHER

## 2022-10-16 RX ORDER — PANTOPRAZOLE SODIUM 40 MG/1
40 TABLET, DELAYED RELEASE ORAL NIGHTLY
Status: DISCONTINUED | OUTPATIENT
Start: 2022-10-16 | End: 2022-10-24 | Stop reason: HOSPADM

## 2022-10-16 RX ORDER — DEXTROSE MONOHYDRATE 25 G/50ML
25 INJECTION, SOLUTION INTRAVENOUS
Status: DISCONTINUED | OUTPATIENT
Start: 2022-10-16 | End: 2022-10-24 | Stop reason: HOSPADM

## 2022-10-16 RX ORDER — FAMOTIDINE 20 MG/1
20 TABLET, FILM COATED ORAL DAILY
Status: DISCONTINUED | OUTPATIENT
Start: 2022-10-17 | End: 2022-10-24 | Stop reason: HOSPADM

## 2022-10-16 RX ORDER — INSULIN LISPRO 100 [IU]/ML
0-7 INJECTION, SOLUTION INTRAVENOUS; SUBCUTANEOUS
Status: DISCONTINUED | OUTPATIENT
Start: 2022-10-17 | End: 2022-10-24 | Stop reason: HOSPADM

## 2022-10-16 RX ORDER — ATORVASTATIN CALCIUM 40 MG/1
80 TABLET, FILM COATED ORAL NIGHTLY
Status: DISCONTINUED | OUTPATIENT
Start: 2022-10-16 | End: 2022-10-24 | Stop reason: HOSPADM

## 2022-10-16 RX ORDER — HYDROMORPHONE HYDROCHLORIDE 1 MG/ML
0.5 INJECTION, SOLUTION INTRAMUSCULAR; INTRAVENOUS; SUBCUTANEOUS ONCE
Status: COMPLETED | OUTPATIENT
Start: 2022-10-16 | End: 2022-10-16

## 2022-10-16 RX ORDER — ACETAMINOPHEN 325 MG/1
650 TABLET ORAL EVERY 4 HOURS PRN
Status: DISCONTINUED | OUTPATIENT
Start: 2022-10-16 | End: 2022-10-17

## 2022-10-16 RX ORDER — NALOXONE HCL 0.4 MG/ML
0.4 VIAL (ML) INJECTION
Status: DISCONTINUED | OUTPATIENT
Start: 2022-10-16 | End: 2022-10-18 | Stop reason: SDUPTHER

## 2022-10-16 RX ORDER — SODIUM CHLORIDE 0.9 % (FLUSH) 0.9 %
10 SYRINGE (ML) INJECTION AS NEEDED
Status: DISCONTINUED | OUTPATIENT
Start: 2022-10-16 | End: 2022-10-24 | Stop reason: HOSPADM

## 2022-10-16 RX ORDER — SODIUM CHLORIDE 0.9 % (FLUSH) 0.9 %
10 SYRINGE (ML) INJECTION EVERY 12 HOURS SCHEDULED
Status: DISCONTINUED | OUTPATIENT
Start: 2022-10-16 | End: 2022-10-24 | Stop reason: HOSPADM

## 2022-10-16 RX ORDER — HYDROCODONE BITARTRATE AND ACETAMINOPHEN 7.5; 325 MG/1; MG/1
1 TABLET ORAL ONCE
Status: COMPLETED | OUTPATIENT
Start: 2022-10-16 | End: 2022-10-16

## 2022-10-16 RX ORDER — TERAZOSIN 1 MG/1
1 CAPSULE ORAL NIGHTLY
Status: DISCONTINUED | OUTPATIENT
Start: 2022-10-16 | End: 2022-10-24 | Stop reason: HOSPADM

## 2022-10-16 RX ORDER — ACETAMINOPHEN 650 MG/1
650 SUPPOSITORY RECTAL EVERY 4 HOURS PRN
Status: DISCONTINUED | OUTPATIENT
Start: 2022-10-16 | End: 2022-10-17

## 2022-10-16 RX ORDER — NICOTINE POLACRILEX 4 MG
15 LOZENGE BUCCAL
Status: DISCONTINUED | OUTPATIENT
Start: 2022-10-16 | End: 2022-10-24 | Stop reason: HOSPADM

## 2022-10-16 RX ORDER — PANTOPRAZOLE SODIUM 40 MG/1
40 TABLET, DELAYED RELEASE ORAL DAILY
Status: ON HOLD | COMMUNITY

## 2022-10-16 RX ORDER — CARVEDILOL 12.5 MG/1
50 TABLET ORAL 2 TIMES DAILY WITH MEALS
Status: DISCONTINUED | OUTPATIENT
Start: 2022-10-16 | End: 2022-10-24 | Stop reason: HOSPADM

## 2022-10-16 RX ADMIN — PANTOPRAZOLE SODIUM 40 MG: 40 TABLET, DELAYED RELEASE ORAL at 22:06

## 2022-10-16 RX ADMIN — HYDROMORPHONE HYDROCHLORIDE 0.5 MG: 1 INJECTION, SOLUTION INTRAMUSCULAR; INTRAVENOUS; SUBCUTANEOUS at 17:41

## 2022-10-16 RX ADMIN — ATORVASTATIN CALCIUM 80 MG: 40 TABLET, FILM COATED ORAL at 22:06

## 2022-10-16 RX ADMIN — ONDANSETRON 4 MG: 4 TABLET, ORALLY DISINTEGRATING ORAL at 15:45

## 2022-10-16 RX ADMIN — MORPHINE SULFATE 1 MG: 2 INJECTION, SOLUTION INTRAMUSCULAR; INTRAVENOUS at 22:05

## 2022-10-16 RX ADMIN — TERAZOSIN HYDROCHLORIDE 1 MG: 1 CAPSULE ORAL at 22:06

## 2022-10-16 RX ADMIN — HYDROCODONE BITARTRATE AND ACETAMINOPHEN 1 TABLET: 7.5; 325 TABLET ORAL at 15:45

## 2022-10-16 RX ADMIN — CARVEDILOL 50 MG: 12.5 TABLET, FILM COATED ORAL at 22:06

## 2022-10-16 RX ADMIN — LISINOPRIL 15 MG: 10 TABLET ORAL at 22:06

## 2022-10-16 RX ADMIN — Medication 10 ML: at 22:07

## 2022-10-17 ENCOUNTER — ANESTHESIA EVENT (OUTPATIENT)
Dept: PERIOP | Facility: HOSPITAL | Age: 72
End: 2022-10-17

## 2022-10-17 ENCOUNTER — ANESTHESIA (OUTPATIENT)
Dept: TELEMETRY | Facility: HOSPITAL | Age: 72
End: 2022-10-17

## 2022-10-17 ENCOUNTER — ANESTHESIA EVENT (OUTPATIENT)
Dept: TELEMETRY | Facility: HOSPITAL | Age: 72
End: 2022-10-17

## 2022-10-17 ENCOUNTER — ANESTHESIA EVENT CONVERTED (OUTPATIENT)
Dept: ANESTHESIOLOGY | Facility: HOSPITAL | Age: 72
End: 2022-10-17

## 2022-10-17 ENCOUNTER — APPOINTMENT (OUTPATIENT)
Dept: CARDIOLOGY | Facility: HOSPITAL | Age: 72
End: 2022-10-17

## 2022-10-17 ENCOUNTER — APPOINTMENT (OUTPATIENT)
Dept: GENERAL RADIOLOGY | Facility: HOSPITAL | Age: 72
End: 2022-10-17

## 2022-10-17 ENCOUNTER — ANESTHESIA (OUTPATIENT)
Dept: PERIOP | Facility: HOSPITAL | Age: 72
End: 2022-10-17

## 2022-10-17 LAB
ANION GAP SERPL CALCULATED.3IONS-SCNC: 12 MMOL/L (ref 5–15)
ANION GAP SERPL CALCULATED.3IONS-SCNC: 13 MMOL/L (ref 5–15)
BASOPHILS # BLD AUTO: 0.02 10*3/MM3 (ref 0–0.2)
BASOPHILS NFR BLD AUTO: 0.2 % (ref 0–1.5)
BILIRUB UR QL STRIP: NEGATIVE
BUN SERPL-MCNC: 25 MG/DL (ref 8–23)
BUN SERPL-MCNC: 31 MG/DL (ref 8–23)
BUN/CREAT SERPL: 17.5 (ref 7–25)
BUN/CREAT SERPL: 22.3 (ref 7–25)
CALCIUM SPEC-SCNC: 8.7 MG/DL (ref 8.6–10.5)
CALCIUM SPEC-SCNC: 8.9 MG/DL (ref 8.6–10.5)
CHLORIDE SERPL-SCNC: 101 MMOL/L (ref 98–107)
CHLORIDE SERPL-SCNC: 99 MMOL/L (ref 98–107)
CLARITY UR: CLEAR
CO2 SERPL-SCNC: 22 MMOL/L (ref 22–29)
CO2 SERPL-SCNC: 23 MMOL/L (ref 22–29)
COLOR UR: YELLOW
CREAT SERPL-MCNC: 1.39 MG/DL (ref 0.76–1.27)
CREAT SERPL-MCNC: 1.43 MG/DL (ref 0.76–1.27)
DEPRECATED RDW RBC AUTO: 45.8 FL (ref 37–54)
EGFRCR SERPLBLD CKD-EPI 2021: 52.7 ML/MIN/1.73
EGFRCR SERPLBLD CKD-EPI 2021: 54.5 ML/MIN/1.73
EOSINOPHIL # BLD AUTO: 0.01 10*3/MM3 (ref 0–0.4)
EOSINOPHIL NFR BLD AUTO: 0.1 % (ref 0.3–6.2)
ERYTHROCYTE [DISTWIDTH] IN BLOOD BY AUTOMATED COUNT: 12.7 % (ref 12.3–15.4)
GLUCOSE BLDC GLUCOMTR-MCNC: 156 MG/DL (ref 70–130)
GLUCOSE BLDC GLUCOMTR-MCNC: 176 MG/DL (ref 70–130)
GLUCOSE SERPL-MCNC: 163 MG/DL (ref 65–99)
GLUCOSE SERPL-MCNC: 163 MG/DL (ref 65–99)
GLUCOSE UR STRIP-MCNC: NEGATIVE MG/DL
HBA1C MFR BLD: 6.5 % (ref 4.8–5.6)
HCT VFR BLD AUTO: 36.2 % (ref 37.5–51)
HGB BLD-MCNC: 11.9 G/DL (ref 13–17.7)
HGB UR QL STRIP.AUTO: NEGATIVE
IMM GRANULOCYTES # BLD AUTO: 0.05 10*3/MM3 (ref 0–0.05)
IMM GRANULOCYTES NFR BLD AUTO: 0.5 % (ref 0–0.5)
INR PPP: 1.35 (ref 0.84–1.13)
KETONES UR QL STRIP: NEGATIVE
LEUKOCYTE ESTERASE UR QL STRIP.AUTO: NEGATIVE
LYMPHOCYTES # BLD AUTO: 1.27 10*3/MM3 (ref 0.7–3.1)
LYMPHOCYTES NFR BLD AUTO: 12.7 % (ref 19.6–45.3)
MAGNESIUM SERPL-MCNC: 2 MG/DL (ref 1.6–2.4)
MCH RBC QN AUTO: 32.9 PG (ref 26.6–33)
MCHC RBC AUTO-ENTMCNC: 32.9 G/DL (ref 31.5–35.7)
MCV RBC AUTO: 100 FL (ref 79–97)
MONOCYTES # BLD AUTO: 0.91 10*3/MM3 (ref 0.1–0.9)
MONOCYTES NFR BLD AUTO: 9.1 % (ref 5–12)
NEUTROPHILS NFR BLD AUTO: 7.76 10*3/MM3 (ref 1.7–7)
NEUTROPHILS NFR BLD AUTO: 77.4 % (ref 42.7–76)
NITRITE UR QL STRIP: NEGATIVE
NRBC BLD AUTO-RTO: 0 /100 WBC (ref 0–0.2)
NT-PROBNP SERPL-MCNC: 391.9 PG/ML (ref 0–900)
PH UR STRIP.AUTO: 5.5 [PH] (ref 5–8)
PLATELET # BLD AUTO: 89 10*3/MM3 (ref 140–450)
PMV BLD AUTO: 10.8 FL (ref 6–12)
POTASSIUM SERPL-SCNC: 4.8 MMOL/L (ref 3.5–5.2)
POTASSIUM SERPL-SCNC: 5.4 MMOL/L (ref 3.5–5.2)
POTASSIUM SERPL-SCNC: 6.1 MMOL/L (ref 3.5–5.2)
PROT UR QL STRIP: NEGATIVE
PROTHROMBIN TIME: 16.6 SECONDS (ref 11.4–14.4)
QT INTERVAL: 438 MS
QTC INTERVAL: 529 MS
RBC # BLD AUTO: 3.62 10*6/MM3 (ref 4.14–5.8)
SODIUM SERPL-SCNC: 135 MMOL/L (ref 136–145)
SODIUM SERPL-SCNC: 135 MMOL/L (ref 136–145)
SP GR UR STRIP: 1.02 (ref 1–1.03)
UROBILINOGEN UR QL STRIP: NORMAL
WBC NRBC COR # BLD: 10.02 10*3/MM3 (ref 3.4–10.8)

## 2022-10-17 PROCEDURE — 93306 TTE W/DOPPLER COMPLETE: CPT

## 2022-10-17 PROCEDURE — 63710000001 INSULIN LISPRO (HUMAN) PER 5 UNITS: Performed by: NURSE PRACTITIONER

## 2022-10-17 PROCEDURE — 83036 HEMOGLOBIN GLYCOSYLATED A1C: CPT | Performed by: NURSE PRACTITIONER

## 2022-10-17 PROCEDURE — 25010000002 ROPIVACAINE PER 1 MG: Performed by: ORTHOPAEDIC SURGERY

## 2022-10-17 PROCEDURE — 99222 1ST HOSP IP/OBS MODERATE 55: CPT | Performed by: INTERNAL MEDICINE

## 2022-10-17 PROCEDURE — 25010000002 ROPIVACAINE PER 1 MG: Performed by: NURSE ANESTHETIST, CERTIFIED REGISTERED

## 2022-10-17 PROCEDURE — 99233 SBSQ HOSP IP/OBS HIGH 50: CPT | Performed by: INTERNAL MEDICINE

## 2022-10-17 PROCEDURE — 85025 COMPLETE CBC W/AUTO DIFF WBC: CPT | Performed by: NURSE PRACTITIONER

## 2022-10-17 PROCEDURE — 81003 URINALYSIS AUTO W/O SCOPE: CPT | Performed by: INTERNAL MEDICINE

## 2022-10-17 PROCEDURE — 63710000001 INSULIN REGULAR HUMAN PER 5 UNITS: Performed by: INTERNAL MEDICINE

## 2022-10-17 PROCEDURE — 85610 PROTHROMBIN TIME: CPT | Performed by: NURSE PRACTITIONER

## 2022-10-17 PROCEDURE — 80048 BASIC METABOLIC PNL TOTAL CA: CPT | Performed by: NURSE PRACTITIONER

## 2022-10-17 PROCEDURE — 25010000002 CALCIUM GLUCONATE-NACL 1-0.675 GM/50ML-% SOLUTION: Performed by: INTERNAL MEDICINE

## 2022-10-17 PROCEDURE — 83880 ASSAY OF NATRIURETIC PEPTIDE: CPT | Performed by: ORTHOPAEDIC SURGERY

## 2022-10-17 PROCEDURE — 99221 1ST HOSP IP/OBS SF/LOW 40: CPT | Performed by: ORTHOPAEDIC SURGERY

## 2022-10-17 PROCEDURE — 82962 GLUCOSE BLOOD TEST: CPT

## 2022-10-17 PROCEDURE — 93010 ELECTROCARDIOGRAM REPORT: CPT | Performed by: INTERNAL MEDICINE

## 2022-10-17 PROCEDURE — 80048 BASIC METABOLIC PNL TOTAL CA: CPT | Performed by: INTERNAL MEDICINE

## 2022-10-17 PROCEDURE — 93005 ELECTROCARDIOGRAM TRACING: CPT | Performed by: INTERNAL MEDICINE

## 2022-10-17 PROCEDURE — 25010000002 SULFUR HEXAFLUORIDE MICROSPH 60.7-25 MG RECONSTITUTED SUSPENSION: Performed by: INTERNAL MEDICINE

## 2022-10-17 PROCEDURE — 83735 ASSAY OF MAGNESIUM: CPT | Performed by: INTERNAL MEDICINE

## 2022-10-17 PROCEDURE — 93306 TTE W/DOPPLER COMPLETE: CPT | Performed by: INTERNAL MEDICINE

## 2022-10-17 PROCEDURE — G0463 HOSPITAL OUTPT CLINIC VISIT: HCPCS | Performed by: ORTHOPAEDIC SURGERY

## 2022-10-17 PROCEDURE — 84132 ASSAY OF SERUM POTASSIUM: CPT | Performed by: ANESTHESIOLOGY

## 2022-10-17 PROCEDURE — 63710000001 INSULIN LISPRO (HUMAN) PER 5 UNITS: Performed by: ORTHOPAEDIC SURGERY

## 2022-10-17 RX ORDER — OXYCODONE HYDROCHLORIDE AND ACETAMINOPHEN 5; 325 MG/1; MG/1
1 TABLET ORAL EVERY 6 HOURS PRN
Status: DISCONTINUED | OUTPATIENT
Start: 2022-10-17 | End: 2022-10-17

## 2022-10-17 RX ORDER — ROPIVACAINE HYDROCHLORIDE 2 MG/ML
INJECTION, SOLUTION EPIDURAL; INFILTRATION; PERINEURAL CONTINUOUS
Status: DISCONTINUED | OUTPATIENT
Start: 2022-10-17 | End: 2022-10-24 | Stop reason: HOSPADM

## 2022-10-17 RX ORDER — ACETAMINOPHEN 325 MG/1
650 TABLET ORAL EVERY 8 HOURS SCHEDULED
Status: DISCONTINUED | OUTPATIENT
Start: 2022-10-17 | End: 2022-10-24 | Stop reason: HOSPADM

## 2022-10-17 RX ORDER — FAMOTIDINE 20 MG/1
20 TABLET, FILM COATED ORAL ONCE
Status: CANCELLED | OUTPATIENT
Start: 2022-10-17 | End: 2022-10-17

## 2022-10-17 RX ORDER — HYDROCODONE BITARTRATE AND ACETAMINOPHEN 7.5; 325 MG/1; MG/1
1 TABLET ORAL ONCE
Status: COMPLETED | OUTPATIENT
Start: 2022-10-17 | End: 2022-10-17

## 2022-10-17 RX ORDER — CEFAZOLIN SODIUM IN 0.9 % NACL 2 G/100 ML
2 PLASTIC BAG, INJECTION (ML) INTRAVENOUS ONCE
Status: DISCONTINUED | OUTPATIENT
Start: 2022-10-17 | End: 2022-10-17 | Stop reason: HOSPADM

## 2022-10-17 RX ORDER — SODIUM CHLORIDE, SODIUM LACTATE, POTASSIUM CHLORIDE, CALCIUM CHLORIDE 600; 310; 30; 20 MG/100ML; MG/100ML; MG/100ML; MG/100ML
9 INJECTION, SOLUTION INTRAVENOUS CONTINUOUS
Status: DISCONTINUED | OUTPATIENT
Start: 2022-10-17 | End: 2022-10-17

## 2022-10-17 RX ORDER — FAMOTIDINE 10 MG/ML
20 INJECTION, SOLUTION INTRAVENOUS ONCE
Status: CANCELLED | OUTPATIENT
Start: 2022-10-17 | End: 2022-10-17

## 2022-10-17 RX ORDER — SODIUM CHLORIDE 0.9 % (FLUSH) 0.9 %
10 SYRINGE (ML) INJECTION EVERY 12 HOURS SCHEDULED
Status: DISCONTINUED | OUTPATIENT
Start: 2022-10-17 | End: 2022-10-17 | Stop reason: HOSPADM

## 2022-10-17 RX ORDER — SODIUM CHLORIDE 9 MG/ML
9 INJECTION, SOLUTION INTRAVENOUS CONTINUOUS
Status: DISCONTINUED | OUTPATIENT
Start: 2022-10-17 | End: 2022-10-17

## 2022-10-17 RX ORDER — TRANEXAMIC ACID 10 MG/ML
1000 INJECTION, SOLUTION INTRAVENOUS ONCE
Status: DISCONTINUED | OUTPATIENT
Start: 2022-10-17 | End: 2022-10-17 | Stop reason: HOSPADM

## 2022-10-17 RX ORDER — LIDOCAINE HYDROCHLORIDE 10 MG/ML
0.5 INJECTION, SOLUTION EPIDURAL; INFILTRATION; INTRACAUDAL; PERINEURAL ONCE AS NEEDED
Status: DISCONTINUED | OUTPATIENT
Start: 2022-10-17 | End: 2022-10-17 | Stop reason: HOSPADM

## 2022-10-17 RX ORDER — ROPIVACAINE HYDROCHLORIDE 5 MG/ML
INJECTION, SOLUTION EPIDURAL; INFILTRATION; PERINEURAL
Status: COMPLETED | OUTPATIENT
Start: 2022-10-17 | End: 2022-10-17

## 2022-10-17 RX ORDER — SODIUM CHLORIDE 9 MG/ML
50 INJECTION, SOLUTION INTRAVENOUS CONTINUOUS
Status: DISCONTINUED | OUTPATIENT
Start: 2022-10-17 | End: 2022-10-17

## 2022-10-17 RX ORDER — SODIUM CHLORIDE 0.9 % (FLUSH) 0.9 %
10 SYRINGE (ML) INJECTION AS NEEDED
Status: DISCONTINUED | OUTPATIENT
Start: 2022-10-17 | End: 2022-10-17 | Stop reason: HOSPADM

## 2022-10-17 RX ORDER — DEXTROSE MONOHYDRATE 25 G/50ML
50 INJECTION, SOLUTION INTRAVENOUS ONCE
Status: COMPLETED | OUTPATIENT
Start: 2022-10-17 | End: 2022-10-17

## 2022-10-17 RX ORDER — SODIUM CHLORIDE 9 MG/ML
50 INJECTION, SOLUTION INTRAVENOUS CONTINUOUS
Status: ACTIVE | OUTPATIENT
Start: 2022-10-17 | End: 2022-10-17

## 2022-10-17 RX ORDER — CALCIUM GLUCONATE 20 MG/ML
1 INJECTION, SOLUTION INTRAVENOUS ONCE
Status: COMPLETED | OUTPATIENT
Start: 2022-10-17 | End: 2022-10-17

## 2022-10-17 RX ORDER — OXYCODONE HYDROCHLORIDE 5 MG/1
5 TABLET ORAL EVERY 6 HOURS PRN
Status: DISCONTINUED | OUTPATIENT
Start: 2022-10-17 | End: 2022-10-18 | Stop reason: SDUPTHER

## 2022-10-17 RX ORDER — MIDAZOLAM HYDROCHLORIDE 1 MG/ML
0.5 INJECTION INTRAMUSCULAR; INTRAVENOUS
Status: DISCONTINUED | OUTPATIENT
Start: 2022-10-17 | End: 2022-10-17 | Stop reason: HOSPADM

## 2022-10-17 RX ADMIN — ROPIVACAINE HYDROCHLORIDE 25 ML: 5 INJECTION, SOLUTION EPIDURAL; INFILTRATION; PERINEURAL at 10:27

## 2022-10-17 RX ADMIN — INSULIN HUMAN 7 UNITS: 100 INJECTION, SOLUTION PARENTERAL at 14:28

## 2022-10-17 RX ADMIN — Medication 10 ML: at 19:37

## 2022-10-17 RX ADMIN — SODIUM CHLORIDE 250 ML: 9 INJECTION, SOLUTION INTRAVENOUS at 13:44

## 2022-10-17 RX ADMIN — SODIUM CHLORIDE 50 ML/HR: 9 INJECTION, SOLUTION INTRAVENOUS at 08:11

## 2022-10-17 RX ADMIN — Medication 10 ML: at 08:10

## 2022-10-17 RX ADMIN — CARVEDILOL 50 MG: 12.5 TABLET, FILM COATED ORAL at 17:38

## 2022-10-17 RX ADMIN — DEXTROSE MONOHYDRATE 50 ML: 25 INJECTION, SOLUTION INTRAVENOUS at 14:27

## 2022-10-17 RX ADMIN — SODIUM CHLORIDE 75 ML/HR: 9 INJECTION, SOLUTION INTRAVENOUS at 14:40

## 2022-10-17 RX ADMIN — ACETAMINOPHEN 650 MG: 325 TABLET, FILM COATED ORAL at 21:39

## 2022-10-17 RX ADMIN — OXYCODONE 5 MG: 5 TABLET ORAL at 15:50

## 2022-10-17 RX ADMIN — SODIUM ZIRCONIUM CYCLOSILICATE 10 G: 10 POWDER, FOR SUSPENSION ORAL at 19:37

## 2022-10-17 RX ADMIN — Medication 1000 MG: at 15:14

## 2022-10-17 RX ADMIN — CALCIUM GLUCONATE 1 G: 20 INJECTION, SOLUTION INTRAVENOUS at 14:35

## 2022-10-17 RX ADMIN — SODIUM ZIRCONIUM CYCLOSILICATE 10 G: 10 POWDER, FOR SUSPENSION ORAL at 14:35

## 2022-10-17 RX ADMIN — INSULIN LISPRO 2 UNITS: 100 INJECTION, SOLUTION INTRAVENOUS; SUBCUTANEOUS at 17:37

## 2022-10-17 RX ADMIN — PANTOPRAZOLE SODIUM 40 MG: 40 TABLET, DELAYED RELEASE ORAL at 19:38

## 2022-10-17 RX ADMIN — HYDROCODONE BITARTRATE AND ACETAMINOPHEN 1 TABLET: 7.5; 325 TABLET ORAL at 01:07

## 2022-10-17 RX ADMIN — SULFUR HEXAFLUORIDE 3 ML: KIT at 09:21

## 2022-10-17 RX ADMIN — ATORVASTATIN CALCIUM 80 MG: 40 TABLET, FILM COATED ORAL at 19:38

## 2022-10-17 RX ADMIN — SODIUM ZIRCONIUM CYCLOSILICATE 10 G: 10 POWDER, FOR SUSPENSION ORAL at 01:12

## 2022-10-17 RX ADMIN — INSULIN LISPRO 2 UNITS: 100 INJECTION, SOLUTION INTRAVENOUS; SUBCUTANEOUS at 08:11

## 2022-10-17 RX ADMIN — FAMOTIDINE 20 MG: 20 TABLET ORAL at 08:10

## 2022-10-17 NOTE — ANESTHESIA PROCEDURE NOTES
FICB      Patient reassessed immediately prior to procedure    Reason for block: at surgeon's request and post-op pain management  Performed by  CRNA/CAA: Eleni Saini CRNA  Assisted by: Cha Green RN  Preanesthetic Checklist  Completed: patient identified, IV checked, site marked, risks and benefits discussed, surgical consent, monitors and equipment checked, pre-op evaluation and timeout performed  Prep:  Pt Position: supine  Sterile barriers:cap, gloves, mask and washed/disinfected hands  Prep: ChloraPrep  Patient monitoring: blood pressure monitoring, continuous pulse oximetry and EKG  Procedure  Performed under: local infiltration  Guidance:ultrasound guided    ULTRASOUND INTERPRETATION.  Using ultrasound guidance a 20 G gauge needle was placed in close proximity to the nerve, at which point, under ultrasound guidance anesthetic was injected in the area of the nerve and spread of the anesthesia was seen on ultrasound in close proximity thereto.  There were no abnormalities seen on ultrasound; a digital image was taken; and the patient tolerated the procedure with no complications. Images:still images obtained, printed/placed on chart    Laterality:right  Block Type:fascia iliaca compartment  Injection Technique:catheter  Needle Type:echogenic and Tuohy  Needle Gauge:18 G  Resistance on Injection: none  Catheter Size:20 G (20g)  Cath Depth at skin: 10 cm    Medications Used: ropivacaine (NAROPIN) 0.5 % injection - Injection   25 mL - 10/17/2022 10:27:00 AM      Medications  Preservative Free Saline:25ml  Comment:Diluted to ROp 0.25% 50 ml given    Post Assessment  Injection Assessment: negative aspiration for heme, no paresthesia on injection and incremental injection  Patient Tolerance:comfortable throughout block  Complications:no  Additional Notes  SINGLE shot   A high-frequency linear transducer, with sterile cover, was placed in parasagittal plane on top of the Anterior Superior Iliac Spine (ASIS)  "and moved medially to identify the Internal Oblique muscle, Sartorius muscle, Iliacus Muscle, Fascia Iliaca (FI) and Fascia Latae. The insertion site was prepped and draped in sterile fashion. Skin and cutaneous tissue was infiltrated with 2-5 ml of 1% Lidocaine. Using ultrasound-guidance, a 20-gauge B-Brambila 4\" Ultraplex 360 non-stimulating echogenic needle was advanced in plane from caudad to cephalad. Preservative-free normal saline was utilized for hydro-dissection of tissue, advancement of needle, and to confirm final needle placement below FI. Local anesthetic in incremental 3-5 ml injections. Aspiration every 5 ml to prevent intravascular injection. Injection was completed with negative aspiration of blood and negative intravascular injection. Injection pressures were normal with minimal resistance.    CATHETER  A high-frequency linear transducer, with sterile cover, was placed in parasagittal plane on top of the Anterior Superior Iliac Spine (ASIS) and moved medially to identify the Internal Oblique muscle, Sartorius muscle, Iliacus Muscle, Fascia Iliaca (FI) and Fascia Latae. The insertion site was prepped and draped in sterile fashion. Skin and cutaneous tissue was infiltrated with 2-5 ml of 1% Lidocaine. Using ultrasound-guidance, an 18-gauge Contiplex Ultra 360 Touhy needle was advanced in plane from caudad to cephalad. Preservative-free normal saline was utilized for hydro-dissection of tissue, advancement of Touhy, and to confirm final needle placement below FI. Local anesthetic in incremental 3-5 ml injections. Aspiration every 5 ml to prevent intravascular injection. Injection was completed with negative aspiration of blood and negative intravascular injection. Injection pressures were normal with minimal resistance. A 20-gauge Contiplex Echo catheter was placed through the needle and advance out the tip of the Touhy 3-5 cm. The Touhy needle was then removed, and final catheter position verified below " "the FI. The catheter was secured in the usual fashion with skin glue, benzoin, steri-strips, CHG tegaderm and Label noting \"Nerve Block Catheter\". Jerk tape applied at yellow connector and catheter connection.          "

## 2022-10-18 ENCOUNTER — APPOINTMENT (OUTPATIENT)
Dept: GENERAL RADIOLOGY | Facility: HOSPITAL | Age: 72
End: 2022-10-18

## 2022-10-18 ENCOUNTER — ANESTHESIA (OUTPATIENT)
Dept: PERIOP | Facility: HOSPITAL | Age: 72
End: 2022-10-18

## 2022-10-18 ENCOUNTER — ANESTHESIA EVENT (OUTPATIENT)
Dept: PERIOP | Facility: HOSPITAL | Age: 72
End: 2022-10-18

## 2022-10-18 LAB
ANION GAP SERPL CALCULATED.3IONS-SCNC: 14 MMOL/L (ref 5–15)
BUN SERPL-MCNC: 35 MG/DL (ref 8–23)
BUN/CREAT SERPL: 22.4 (ref 7–25)
CALCIUM SPEC-SCNC: 8.3 MG/DL (ref 8.6–10.5)
CHLORIDE SERPL-SCNC: 101 MMOL/L (ref 98–107)
CO2 SERPL-SCNC: 21 MMOL/L (ref 22–29)
CREAT SERPL-MCNC: 1.56 MG/DL (ref 0.76–1.27)
EGFRCR SERPLBLD CKD-EPI 2021: 47.5 ML/MIN/1.73
GLUCOSE BLDC GLUCOMTR-MCNC: 134 MG/DL (ref 70–130)
GLUCOSE BLDC GLUCOMTR-MCNC: 135 MG/DL (ref 70–130)
GLUCOSE BLDC GLUCOMTR-MCNC: 140 MG/DL (ref 70–130)
GLUCOSE BLDC GLUCOMTR-MCNC: 154 MG/DL (ref 70–130)
GLUCOSE BLDC GLUCOMTR-MCNC: 175 MG/DL (ref 70–130)
GLUCOSE SERPL-MCNC: 127 MG/DL (ref 65–99)
MAGNESIUM SERPL-MCNC: 1.8 MG/DL (ref 1.6–2.4)
POTASSIUM SERPL-SCNC: 4.4 MMOL/L (ref 3.5–5.2)
SODIUM SERPL-SCNC: 136 MMOL/L (ref 136–145)

## 2022-10-18 PROCEDURE — 76000 FLUOROSCOPY <1 HR PHYS/QHP: CPT

## 2022-10-18 PROCEDURE — 0QS636Z REPOSITION RIGHT UPPER FEMUR WITH INTRAMEDULLARY INTERNAL FIXATION DEVICE, PERCUTANEOUS APPROACH: ICD-10-PCS | Performed by: ORTHOPAEDIC SURGERY

## 2022-10-18 PROCEDURE — C1713 ANCHOR/SCREW BN/BN,TIS/BN: HCPCS | Performed by: ORTHOPAEDIC SURGERY

## 2022-10-18 PROCEDURE — 25010000002 DEXAMETHASONE PER 1 MG: Performed by: NURSE ANESTHETIST, CERTIFIED REGISTERED

## 2022-10-18 PROCEDURE — 73502 X-RAY EXAM HIP UNI 2-3 VIEWS: CPT

## 2022-10-18 PROCEDURE — 27245 TREAT THIGH FRACTURE: CPT | Performed by: ORTHOPAEDIC SURGERY

## 2022-10-18 PROCEDURE — C1769 GUIDE WIRE: HCPCS | Performed by: ORTHOPAEDIC SURGERY

## 2022-10-18 PROCEDURE — 25010000002 ONDANSETRON PER 1 MG: Performed by: NURSE ANESTHETIST, CERTIFIED REGISTERED

## 2022-10-18 PROCEDURE — 63710000001 INSULIN LISPRO (HUMAN) PER 5 UNITS: Performed by: ORTHOPAEDIC SURGERY

## 2022-10-18 PROCEDURE — 25010000002 PROPOFOL 10 MG/ML EMULSION: Performed by: NURSE ANESTHETIST, CERTIFIED REGISTERED

## 2022-10-18 PROCEDURE — 0 LIDOCAINE 1 % SOLUTION: Performed by: NURSE ANESTHETIST, CERTIFIED REGISTERED

## 2022-10-18 PROCEDURE — 83735 ASSAY OF MAGNESIUM: CPT | Performed by: INTERNAL MEDICINE

## 2022-10-18 PROCEDURE — P9612 CATHETERIZE FOR URINE SPEC: HCPCS

## 2022-10-18 PROCEDURE — 99231 SBSQ HOSP IP/OBS SF/LOW 25: CPT | Performed by: INTERNAL MEDICINE

## 2022-10-18 PROCEDURE — 80048 BASIC METABOLIC PNL TOTAL CA: CPT | Performed by: INTERNAL MEDICINE

## 2022-10-18 PROCEDURE — 25010000002 ALBUMIN HUMAN 5% PER 50 ML: Performed by: NURSE ANESTHETIST, CERTIFIED REGISTERED

## 2022-10-18 PROCEDURE — 25010000002 CEFAZOLIN IN DEXTROSE 2-4 GM/100ML-% SOLUTION: Performed by: ORTHOPAEDIC SURGERY

## 2022-10-18 PROCEDURE — 99232 SBSQ HOSP IP/OBS MODERATE 35: CPT | Performed by: INTERNAL MEDICINE

## 2022-10-18 PROCEDURE — 82962 GLUCOSE BLOOD TEST: CPT

## 2022-10-18 PROCEDURE — 97162 PT EVAL MOD COMPLEX 30 MIN: CPT

## 2022-10-18 PROCEDURE — 25010000002 FENTANYL CITRATE (PF) 50 MCG/ML SOLUTION

## 2022-10-18 PROCEDURE — P9041 ALBUMIN (HUMAN),5%, 50ML: HCPCS | Performed by: NURSE ANESTHETIST, CERTIFIED REGISTERED

## 2022-10-18 PROCEDURE — 27245 TREAT THIGH FRACTURE: CPT | Performed by: PHYSICIAN ASSISTANT

## 2022-10-18 PROCEDURE — 25010000002 PHENYLEPHRINE 10 MG/ML SOLUTION 1 ML VIAL: Performed by: NURSE ANESTHETIST, CERTIFIED REGISTERED

## 2022-10-18 DEVICE — BLD FEM FIX HELI TFN ADV PERF 100MM STRL: Type: IMPLANTABLE DEVICE | Site: HIP | Status: FUNCTIONAL

## 2022-10-18 DEVICE — SCRW LK STRDRV TI 5X34MM STRL: Type: IMPLANTABLE DEVICE | Site: HIP | Status: FUNCTIONAL

## 2022-10-18 DEVICE — DEV CONTRL TISS STRATAFIX SPIRAL MNCRYL UD 3/0 PLS 60CM: Type: IMPLANTABLE DEVICE | Site: HIP | Status: FUNCTIONAL

## 2022-10-18 DEVICE — NAIL FEM TFN ADV PROX 125D SHT 10X170MM STRL: Type: IMPLANTABLE DEVICE | Site: HIP | Status: FUNCTIONAL

## 2022-10-18 RX ORDER — HYDROMORPHONE HYDROCHLORIDE 1 MG/ML
0.5 INJECTION, SOLUTION INTRAMUSCULAR; INTRAVENOUS; SUBCUTANEOUS
Status: DISCONTINUED | OUTPATIENT
Start: 2022-10-18 | End: 2022-10-18 | Stop reason: HOSPADM

## 2022-10-18 RX ORDER — HYDROMORPHONE HYDROCHLORIDE 1 MG/ML
0.5 INJECTION, SOLUTION INTRAMUSCULAR; INTRAVENOUS; SUBCUTANEOUS
Status: DISCONTINUED | OUTPATIENT
Start: 2022-10-18 | End: 2022-10-24 | Stop reason: HOSPADM

## 2022-10-18 RX ORDER — MAGNESIUM HYDROXIDE 1200 MG/15ML
LIQUID ORAL AS NEEDED
Status: DISCONTINUED | OUTPATIENT
Start: 2022-10-18 | End: 2022-10-18 | Stop reason: HOSPADM

## 2022-10-18 RX ORDER — MIDAZOLAM HYDROCHLORIDE 1 MG/ML
0.5 INJECTION INTRAMUSCULAR; INTRAVENOUS
Status: DISCONTINUED | OUTPATIENT
Start: 2022-10-18 | End: 2022-10-18 | Stop reason: HOSPADM

## 2022-10-18 RX ORDER — ROCURONIUM BROMIDE 10 MG/ML
INJECTION, SOLUTION INTRAVENOUS AS NEEDED
Status: DISCONTINUED | OUTPATIENT
Start: 2022-10-18 | End: 2022-10-18 | Stop reason: SURG

## 2022-10-18 RX ORDER — LIDOCAINE HYDROCHLORIDE 10 MG/ML
INJECTION, SOLUTION INFILTRATION; PERINEURAL AS NEEDED
Status: DISCONTINUED | OUTPATIENT
Start: 2022-10-18 | End: 2022-10-18 | Stop reason: SURG

## 2022-10-18 RX ORDER — ETOMIDATE 2 MG/ML
INJECTION INTRAVENOUS AS NEEDED
Status: DISCONTINUED | OUTPATIENT
Start: 2022-10-18 | End: 2022-10-18 | Stop reason: SURG

## 2022-10-18 RX ORDER — INSULIN LISPRO 100 [IU]/ML
0-7 INJECTION, SOLUTION INTRAVENOUS; SUBCUTANEOUS ONCE
Status: DISCONTINUED | OUTPATIENT
Start: 2022-10-18 | End: 2022-10-18 | Stop reason: HOSPADM

## 2022-10-18 RX ORDER — SODIUM CHLORIDE 0.9 % (FLUSH) 0.9 %
3 SYRINGE (ML) INJECTION EVERY 12 HOURS SCHEDULED
Status: DISCONTINUED | OUTPATIENT
Start: 2022-10-18 | End: 2022-10-18 | Stop reason: HOSPADM

## 2022-10-18 RX ORDER — OXYCODONE HYDROCHLORIDE 5 MG/1
5 TABLET ORAL EVERY 4 HOURS PRN
Status: DISCONTINUED | OUTPATIENT
Start: 2022-10-18 | End: 2022-10-24 | Stop reason: HOSPADM

## 2022-10-18 RX ORDER — FAMOTIDINE 10 MG/ML
20 INJECTION, SOLUTION INTRAVENOUS ONCE
Status: CANCELLED | OUTPATIENT
Start: 2022-10-18 | End: 2022-10-18

## 2022-10-18 RX ORDER — ASPIRIN 325 MG
325 TABLET, DELAYED RELEASE (ENTERIC COATED) ORAL DAILY
Status: DISCONTINUED | OUTPATIENT
Start: 2022-10-19 | End: 2022-10-19

## 2022-10-18 RX ORDER — FENTANYL CITRATE 50 UG/ML
50 INJECTION, SOLUTION INTRAMUSCULAR; INTRAVENOUS
Status: DISCONTINUED | OUTPATIENT
Start: 2022-10-18 | End: 2022-10-18 | Stop reason: HOSPADM

## 2022-10-18 RX ORDER — PROPOFOL 10 MG/ML
VIAL (ML) INTRAVENOUS AS NEEDED
Status: DISCONTINUED | OUTPATIENT
Start: 2022-10-18 | End: 2022-10-18 | Stop reason: SURG

## 2022-10-18 RX ORDER — TRANEXAMIC ACID 10 MG/ML
1000 INJECTION, SOLUTION INTRAVENOUS ONCE
Status: COMPLETED | OUTPATIENT
Start: 2022-10-18 | End: 2022-10-18

## 2022-10-18 RX ORDER — CEFAZOLIN SODIUM 2 G/100ML
2 INJECTION, SOLUTION INTRAVENOUS EVERY 8 HOURS
Status: COMPLETED | OUTPATIENT
Start: 2022-10-18 | End: 2022-10-19

## 2022-10-18 RX ORDER — SODIUM CHLORIDE 0.9 % (FLUSH) 0.9 %
10 SYRINGE (ML) INJECTION EVERY 12 HOURS SCHEDULED
Status: DISCONTINUED | OUTPATIENT
Start: 2022-10-18 | End: 2022-10-24 | Stop reason: HOSPADM

## 2022-10-18 RX ORDER — PROMETHAZINE HYDROCHLORIDE 25 MG/1
25 SUPPOSITORY RECTAL ONCE AS NEEDED
Status: DISCONTINUED | OUTPATIENT
Start: 2022-10-18 | End: 2022-10-18 | Stop reason: HOSPADM

## 2022-10-18 RX ORDER — DROPERIDOL 2.5 MG/ML
0.62 INJECTION, SOLUTION INTRAMUSCULAR; INTRAVENOUS ONCE AS NEEDED
Status: DISCONTINUED | OUTPATIENT
Start: 2022-10-18 | End: 2022-10-18 | Stop reason: HOSPADM

## 2022-10-18 RX ORDER — ONDANSETRON 4 MG/1
4 TABLET, FILM COATED ORAL EVERY 6 HOURS PRN
Status: DISCONTINUED | OUTPATIENT
Start: 2022-10-18 | End: 2022-10-24 | Stop reason: HOSPADM

## 2022-10-18 RX ORDER — PROMETHAZINE HYDROCHLORIDE 25 MG/1
25 TABLET ORAL ONCE AS NEEDED
Status: DISCONTINUED | OUTPATIENT
Start: 2022-10-18 | End: 2022-10-18 | Stop reason: HOSPADM

## 2022-10-18 RX ORDER — NALOXONE HCL 0.4 MG/ML
0.4 VIAL (ML) INJECTION
Status: DISCONTINUED | OUTPATIENT
Start: 2022-10-18 | End: 2022-10-24 | Stop reason: HOSPADM

## 2022-10-18 RX ORDER — SODIUM CHLORIDE 9 MG/ML
9 INJECTION, SOLUTION INTRAVENOUS ONCE
Status: COMPLETED | OUTPATIENT
Start: 2022-10-18 | End: 2022-10-18

## 2022-10-18 RX ORDER — TRANEXAMIC ACID 10 MG/ML
1000 INJECTION, SOLUTION INTRAVENOUS ONCE
Status: DISCONTINUED | OUTPATIENT
Start: 2022-10-18 | End: 2022-10-18

## 2022-10-18 RX ORDER — SODIUM CHLORIDE 9 MG/ML
50 INJECTION, SOLUTION INTRAVENOUS CONTINUOUS
Status: ACTIVE | OUTPATIENT
Start: 2022-10-18 | End: 2022-10-19

## 2022-10-18 RX ORDER — NALOXONE HCL 0.4 MG/ML
0.4 VIAL (ML) INJECTION AS NEEDED
Status: DISCONTINUED | OUTPATIENT
Start: 2022-10-18 | End: 2022-10-18 | Stop reason: HOSPADM

## 2022-10-18 RX ORDER — IPRATROPIUM BROMIDE AND ALBUTEROL SULFATE 2.5; .5 MG/3ML; MG/3ML
3 SOLUTION RESPIRATORY (INHALATION) ONCE AS NEEDED
Status: DISCONTINUED | OUTPATIENT
Start: 2022-10-18 | End: 2022-10-18 | Stop reason: HOSPADM

## 2022-10-18 RX ORDER — CEFAZOLIN SODIUM 2 G/100ML
2 INJECTION, SOLUTION INTRAVENOUS ONCE
Status: COMPLETED | OUTPATIENT
Start: 2022-10-18 | End: 2022-10-18

## 2022-10-18 RX ORDER — FENTANYL CITRATE 50 UG/ML
INJECTION, SOLUTION INTRAMUSCULAR; INTRAVENOUS
Status: COMPLETED
Start: 2022-10-18 | End: 2022-10-18

## 2022-10-18 RX ORDER — LIDOCAINE HYDROCHLORIDE 10 MG/ML
0.5 INJECTION, SOLUTION EPIDURAL; INFILTRATION; INTRACAUDAL; PERINEURAL ONCE AS NEEDED
Status: DISCONTINUED | OUTPATIENT
Start: 2022-10-18 | End: 2022-10-18 | Stop reason: HOSPADM

## 2022-10-18 RX ORDER — SODIUM CHLORIDE 0.9 % (FLUSH) 0.9 %
3-10 SYRINGE (ML) INJECTION AS NEEDED
Status: DISCONTINUED | OUTPATIENT
Start: 2022-10-18 | End: 2022-10-18 | Stop reason: HOSPADM

## 2022-10-18 RX ORDER — SODIUM CHLORIDE 0.9 % (FLUSH) 0.9 %
10 SYRINGE (ML) INJECTION AS NEEDED
Status: DISCONTINUED | OUTPATIENT
Start: 2022-10-18 | End: 2022-10-18 | Stop reason: HOSPADM

## 2022-10-18 RX ORDER — SODIUM CHLORIDE, SODIUM LACTATE, POTASSIUM CHLORIDE, CALCIUM CHLORIDE 600; 310; 30; 20 MG/100ML; MG/100ML; MG/100ML; MG/100ML
9 INJECTION, SOLUTION INTRAVENOUS CONTINUOUS
Status: DISCONTINUED | OUTPATIENT
Start: 2022-10-18 | End: 2022-10-18

## 2022-10-18 RX ORDER — BUPIVACAINE HCL/0.9 % NACL/PF 0.125 %
PLASTIC BAG, INJECTION (ML) EPIDURAL AS NEEDED
Status: DISCONTINUED | OUTPATIENT
Start: 2022-10-18 | End: 2022-10-18 | Stop reason: SURG

## 2022-10-18 RX ORDER — ONDANSETRON 2 MG/ML
INJECTION INTRAMUSCULAR; INTRAVENOUS AS NEEDED
Status: DISCONTINUED | OUTPATIENT
Start: 2022-10-18 | End: 2022-10-18 | Stop reason: SURG

## 2022-10-18 RX ORDER — DEXAMETHASONE SODIUM PHOSPHATE 4 MG/ML
INJECTION, SOLUTION INTRA-ARTICULAR; INTRALESIONAL; INTRAMUSCULAR; INTRAVENOUS; SOFT TISSUE AS NEEDED
Status: DISCONTINUED | OUTPATIENT
Start: 2022-10-18 | End: 2022-10-18 | Stop reason: SURG

## 2022-10-18 RX ORDER — SODIUM CHLORIDE 9 MG/ML
75 INJECTION, SOLUTION INTRAVENOUS CONTINUOUS
Status: ACTIVE | OUTPATIENT
Start: 2022-10-18 | End: 2022-10-18

## 2022-10-18 RX ORDER — ONDANSETRON 2 MG/ML
4 INJECTION INTRAMUSCULAR; INTRAVENOUS ONCE AS NEEDED
Status: DISCONTINUED | OUTPATIENT
Start: 2022-10-18 | End: 2022-10-18 | Stop reason: HOSPADM

## 2022-10-18 RX ORDER — ONDANSETRON 2 MG/ML
4 INJECTION INTRAMUSCULAR; INTRAVENOUS EVERY 6 HOURS PRN
Status: DISCONTINUED | OUTPATIENT
Start: 2022-10-18 | End: 2022-10-24 | Stop reason: HOSPADM

## 2022-10-18 RX ORDER — SODIUM CHLORIDE 0.9 % (FLUSH) 0.9 %
1-10 SYRINGE (ML) INJECTION AS NEEDED
Status: DISCONTINUED | OUTPATIENT
Start: 2022-10-18 | End: 2022-10-24 | Stop reason: HOSPADM

## 2022-10-18 RX ORDER — LABETALOL HYDROCHLORIDE 5 MG/ML
5 INJECTION, SOLUTION INTRAVENOUS
Status: DISCONTINUED | OUTPATIENT
Start: 2022-10-18 | End: 2022-10-18 | Stop reason: HOSPADM

## 2022-10-18 RX ORDER — DROPERIDOL 2.5 MG/ML
0.62 INJECTION, SOLUTION INTRAMUSCULAR; INTRAVENOUS
Status: DISCONTINUED | OUTPATIENT
Start: 2022-10-18 | End: 2022-10-18 | Stop reason: HOSPADM

## 2022-10-18 RX ORDER — HYDROCODONE BITARTRATE AND ACETAMINOPHEN 5; 325 MG/1; MG/1
1 TABLET ORAL ONCE AS NEEDED
Status: DISCONTINUED | OUTPATIENT
Start: 2022-10-18 | End: 2022-10-18 | Stop reason: HOSPADM

## 2022-10-18 RX ORDER — SODIUM CHLORIDE 0.9 % (FLUSH) 0.9 %
10 SYRINGE (ML) INJECTION EVERY 12 HOURS SCHEDULED
Status: CANCELLED | OUTPATIENT
Start: 2022-10-18

## 2022-10-18 RX ORDER — NALOXONE HCL 0.4 MG/ML
0.1 VIAL (ML) INJECTION
Status: DISCONTINUED | OUTPATIENT
Start: 2022-10-18 | End: 2022-10-18 | Stop reason: SDUPTHER

## 2022-10-18 RX ORDER — FAMOTIDINE 20 MG/1
20 TABLET, FILM COATED ORAL ONCE
Status: CANCELLED | OUTPATIENT
Start: 2022-10-18 | End: 2022-10-18

## 2022-10-18 RX ORDER — ALBUMIN, HUMAN INJ 5% 5 %
SOLUTION INTRAVENOUS CONTINUOUS PRN
Status: DISCONTINUED | OUTPATIENT
Start: 2022-10-18 | End: 2022-10-18 | Stop reason: SURG

## 2022-10-18 RX ADMIN — SODIUM CHLORIDE 75 ML/HR: 9 INJECTION, SOLUTION INTRAVENOUS at 08:04

## 2022-10-18 RX ADMIN — Medication 10 ML: at 12:15

## 2022-10-18 RX ADMIN — ALBUMIN HUMAN: 0.05 INJECTION, SOLUTION INTRAVENOUS at 13:55

## 2022-10-18 RX ADMIN — Medication 10 ML: at 08:04

## 2022-10-18 RX ADMIN — LIDOCAINE HYDROCHLORIDE 50 MG: 10 INJECTION, SOLUTION INFILTRATION; PERINEURAL at 12:44

## 2022-10-18 RX ADMIN — DEXAMETHASONE SODIUM PHOSPHATE 8 MG: 4 INJECTION, SOLUTION INTRA-ARTICULAR; INTRALESIONAL; INTRAMUSCULAR; INTRAVENOUS; SOFT TISSUE at 12:52

## 2022-10-18 RX ADMIN — PHENYLEPHRINE HYDROCHLORIDE 1.5 MCG/KG/MIN: 10 INJECTION INTRAVENOUS at 12:51

## 2022-10-18 RX ADMIN — OXYCODONE 5 MG: 5 TABLET ORAL at 08:09

## 2022-10-18 RX ADMIN — Medication 10 ML: at 19:38

## 2022-10-18 RX ADMIN — ATORVASTATIN CALCIUM 80 MG: 40 TABLET, FILM COATED ORAL at 19:34

## 2022-10-18 RX ADMIN — CEFAZOLIN SODIUM 2 G: 2 INJECTION, SOLUTION INTRAVENOUS at 12:40

## 2022-10-18 RX ADMIN — FAMOTIDINE 20 MG: 20 TABLET ORAL at 08:09

## 2022-10-18 RX ADMIN — ROCURONIUM BROMIDE 50 MG: 10 INJECTION, SOLUTION INTRAVENOUS at 12:44

## 2022-10-18 RX ADMIN — PANTOPRAZOLE SODIUM 40 MG: 40 TABLET, DELAYED RELEASE ORAL at 19:35

## 2022-10-18 RX ADMIN — TRANEXAMIC ACID 1000 MG: 10 INJECTION, SOLUTION INTRAVENOUS at 14:00

## 2022-10-18 RX ADMIN — ACETAMINOPHEN 650 MG: 325 TABLET, FILM COATED ORAL at 16:32

## 2022-10-18 RX ADMIN — ONDANSETRON 4 MG: 2 INJECTION INTRAMUSCULAR; INTRAVENOUS at 14:09

## 2022-10-18 RX ADMIN — CEFAZOLIN SODIUM 2 G: 2 INJECTION, SOLUTION INTRAVENOUS at 19:34

## 2022-10-18 RX ADMIN — ACETAMINOPHEN 650 MG: 325 TABLET, FILM COATED ORAL at 21:29

## 2022-10-18 RX ADMIN — ACETAMINOPHEN 650 MG: 325 TABLET, FILM COATED ORAL at 04:57

## 2022-10-18 RX ADMIN — ETOMIDATE 24 MG: 2 INJECTION, SOLUTION INTRAVENOUS at 12:44

## 2022-10-18 RX ADMIN — FENTANYL CITRATE 50 MCG: 50 INJECTION, SOLUTION INTRAMUSCULAR; INTRAVENOUS at 15:03

## 2022-10-18 RX ADMIN — SODIUM CHLORIDE 9 ML/HR: 9 INJECTION, SOLUTION INTRAVENOUS at 12:15

## 2022-10-18 RX ADMIN — PROPOFOL 20 MG: 10 INJECTION, EMULSION INTRAVENOUS at 12:44

## 2022-10-18 RX ADMIN — OXYCODONE 5 MG: 5 TABLET ORAL at 16:32

## 2022-10-18 RX ADMIN — SODIUM CHLORIDE 120 ML/HR: 9 INJECTION, SOLUTION INTRAVENOUS at 16:27

## 2022-10-18 RX ADMIN — TERAZOSIN HYDROCHLORIDE 1 MG: 1 CAPSULE ORAL at 19:35

## 2022-10-18 RX ADMIN — CARVEDILOL 50 MG: 12.5 TABLET, FILM COATED ORAL at 08:09

## 2022-10-18 RX ADMIN — INSULIN LISPRO 2 UNITS: 100 INJECTION, SOLUTION INTRAVENOUS; SUBCUTANEOUS at 17:30

## 2022-10-18 RX ADMIN — Medication 100 MCG: at 12:44

## 2022-10-18 RX ADMIN — SODIUM CHLORIDE, POTASSIUM CHLORIDE, SODIUM LACTATE AND CALCIUM CHLORIDE: 600; 310; 30; 20 INJECTION, SOLUTION INTRAVENOUS at 12:40

## 2022-10-18 RX ADMIN — SUGAMMADEX 200 MG: 100 INJECTION, SOLUTION INTRAVENOUS at 14:09

## 2022-10-18 RX ADMIN — TRANEXAMIC ACID 1000 MG: 10 INJECTION, SOLUTION INTRAVENOUS at 12:50

## 2022-10-18 RX ADMIN — CARVEDILOL 50 MG: 12.5 TABLET, FILM COATED ORAL at 17:31

## 2022-10-18 NOTE — ANESTHESIA PROCEDURE NOTES
Airway  Urgency: elective    Airway not difficult    General Information and Staff    Patient location during procedure: OR  CRNA/CAA: Neyda Duran CRNA    Indications and Patient Condition  Indications for airway management: airway protection    Preoxygenated: yes  MILS not maintained throughout  Mask difficulty assessment: 1 - vent by mask    Final Airway Details  Final airway type: endotracheal airway      Successful airway: ETT  Cuffed: yes   Successful intubation technique: direct laryngoscopy  Facilitating devices/methods: intubating stylet  Endotracheal tube insertion site: oral  Blade: Vane  Blade size: 4  ETT size (mm): 7.5  Cormack-Lehane Classification: grade IIa - partial view of glottis  Placement verified by: chest auscultation and capnometry   Cuff volume (mL): 6  Measured from: lips  ETT/EBT  to lips (cm): 1  Number of attempts at approach: 1  Assessment: lips, teeth, and gum same as pre-op and atraumatic intubation    Additional Comments  Negative epigastric sounds, Breath sound equal bilaterally with symmetric chest rise and fall

## 2022-10-18 NOTE — ANESTHESIA POSTPROCEDURE EVALUATION
Patient: Timmy Guajardo    Procedure Summary     Date: 10/18/22 Room / Location:  [facility] /  [facility]    Anesthesia Start: 1240 Anesthesia Stop: 1432    Procedure: RIGHT HIP TROCHANTERIC NAILING WITH INTRAMEDULLARY HIP SCREW (Right: Hip) Diagnosis:     Surgeons: Bj Steinberg MD Provider: Jose M Trujillo MD    Anesthesia Type: general ASA Status: 4          Anesthesia Type: general    Vitals  Vitals Value Taken Time   /79 10/18/22 1429   Temp     Pulse 77 10/18/22 1431   Resp     SpO2 99 % 10/18/22 1431   Vitals shown include unvalidated device data.        Post Anesthesia Care and Evaluation    Patient location during evaluation: PACU  Patient participation: complete - patient participated  Level of consciousness: awake and alert  Pain management: adequate    Airway patency: patent  Anesthetic complications: No anesthetic complications  PONV Status: none  Cardiovascular status: hemodynamically stable and acceptable  Respiratory status: nonlabored ventilation, acceptable and nasal cannula  Hydration status: acceptable

## 2022-10-18 NOTE — ANESTHESIA PREPROCEDURE EVALUATION
Anesthesia Evaluation     Patient summary reviewed and Nursing notes reviewed   NPO Solid Status: > 8 hours  NPO Liquid Status: > 8 hours           Airway   Mallampati: I  TM distance: >3 FB  Neck ROM: full  No difficulty expected  Dental    (+) edentulous    Pulmonary    (+) decreased breath sounds,   Cardiovascular   Exercise tolerance: poor (<4 METS)    Rhythm: regular  Rate: normal    (+) CHF Systolic <55%,       Neuro/Psych  GI/Hepatic/Renal/Endo      Musculoskeletal     Abdominal    Substance History      OB/GYN          Other                        Anesthesia Plan    ASA 4     general     intravenous induction     Anesthetic plan, risks, benefits, and alternatives have been provided, discussed and informed consent has been obtained with: patient.        CODE STATUS:    Level Of Support Discussed With: Patient  Code Status (Patient has no pulse and is not breathing): CPR (Attempt to Resuscitate)  Medical Interventions (Patient has pulse or is breathing): Full Support

## 2022-10-19 LAB
ANION GAP SERPL CALCULATED.3IONS-SCNC: 11 MMOL/L (ref 5–15)
BUN SERPL-MCNC: 27 MG/DL (ref 8–23)
BUN/CREAT SERPL: 24.1 (ref 7–25)
CALCIUM SPEC-SCNC: 8 MG/DL (ref 8.6–10.5)
CHLORIDE SERPL-SCNC: 106 MMOL/L (ref 98–107)
CO2 SERPL-SCNC: 20 MMOL/L (ref 22–29)
CREAT SERPL-MCNC: 1.12 MG/DL (ref 0.76–1.27)
DEPRECATED RDW RBC AUTO: 44.6 FL (ref 37–54)
EGFRCR SERPLBLD CKD-EPI 2021: 70.7 ML/MIN/1.73
ERYTHROCYTE [DISTWIDTH] IN BLOOD BY AUTOMATED COUNT: 12.5 % (ref 12.3–15.4)
GLUCOSE BLDC GLUCOMTR-MCNC: 138 MG/DL (ref 70–130)
GLUCOSE BLDC GLUCOMTR-MCNC: 160 MG/DL (ref 70–130)
GLUCOSE BLDC GLUCOMTR-MCNC: 167 MG/DL (ref 70–130)
GLUCOSE SERPL-MCNC: 214 MG/DL (ref 65–99)
HCT VFR BLD AUTO: 25.7 % (ref 37.5–51)
HGB BLD-MCNC: 8.6 G/DL (ref 13–17.7)
MAGNESIUM SERPL-MCNC: 2.1 MG/DL (ref 1.6–2.4)
MCH RBC QN AUTO: 33.1 PG (ref 26.6–33)
MCHC RBC AUTO-ENTMCNC: 33.5 G/DL (ref 31.5–35.7)
MCV RBC AUTO: 98.8 FL (ref 79–97)
PLATELET # BLD AUTO: 76 10*3/MM3 (ref 140–450)
PMV BLD AUTO: 10.5 FL (ref 6–12)
POTASSIUM SERPL-SCNC: 4.5 MMOL/L (ref 3.5–5.2)
RBC # BLD AUTO: 2.6 10*6/MM3 (ref 4.14–5.8)
SODIUM SERPL-SCNC: 137 MMOL/L (ref 136–145)
WBC NRBC COR # BLD: 9.04 10*3/MM3 (ref 3.4–10.8)

## 2022-10-19 PROCEDURE — 97166 OT EVAL MOD COMPLEX 45 MIN: CPT

## 2022-10-19 PROCEDURE — 25010000002 CEFAZOLIN IN DEXTROSE 2-4 GM/100ML-% SOLUTION: Performed by: ORTHOPAEDIC SURGERY

## 2022-10-19 PROCEDURE — 99231 SBSQ HOSP IP/OBS SF/LOW 25: CPT | Performed by: INTERNAL MEDICINE

## 2022-10-19 PROCEDURE — 80048 BASIC METABOLIC PNL TOTAL CA: CPT | Performed by: ORTHOPAEDIC SURGERY

## 2022-10-19 PROCEDURE — 99232 SBSQ HOSP IP/OBS MODERATE 35: CPT | Performed by: INTERNAL MEDICINE

## 2022-10-19 PROCEDURE — 85027 COMPLETE CBC AUTOMATED: CPT | Performed by: ORTHOPAEDIC SURGERY

## 2022-10-19 PROCEDURE — 99024 POSTOP FOLLOW-UP VISIT: CPT | Performed by: ORTHOPAEDIC SURGERY

## 2022-10-19 PROCEDURE — 97530 THERAPEUTIC ACTIVITIES: CPT

## 2022-10-19 PROCEDURE — P9612 CATHETERIZE FOR URINE SPEC: HCPCS

## 2022-10-19 PROCEDURE — 63710000001 ONDANSETRON PER 8 MG: Performed by: ORTHOPAEDIC SURGERY

## 2022-10-19 PROCEDURE — 63710000001 INSULIN LISPRO (HUMAN) PER 5 UNITS: Performed by: ORTHOPAEDIC SURGERY

## 2022-10-19 PROCEDURE — 97110 THERAPEUTIC EXERCISES: CPT

## 2022-10-19 PROCEDURE — 82962 GLUCOSE BLOOD TEST: CPT

## 2022-10-19 PROCEDURE — 97116 GAIT TRAINING THERAPY: CPT

## 2022-10-19 PROCEDURE — 83735 ASSAY OF MAGNESIUM: CPT | Performed by: ORTHOPAEDIC SURGERY

## 2022-10-19 PROCEDURE — 97535 SELF CARE MNGMENT TRAINING: CPT

## 2022-10-19 RX ORDER — POLYETHYLENE GLYCOL 3350 17 G/17G
17 POWDER, FOR SOLUTION ORAL DAILY
Status: DISCONTINUED | OUTPATIENT
Start: 2022-10-19 | End: 2022-10-24 | Stop reason: HOSPADM

## 2022-10-19 RX ORDER — AMOXICILLIN 250 MG
2 CAPSULE ORAL 2 TIMES DAILY PRN
Status: DISCONTINUED | OUTPATIENT
Start: 2022-10-19 | End: 2022-10-24 | Stop reason: HOSPADM

## 2022-10-19 RX ORDER — CYCLOBENZAPRINE HCL 10 MG
5 TABLET ORAL 3 TIMES DAILY PRN
Status: DISCONTINUED | OUTPATIENT
Start: 2022-10-19 | End: 2022-10-24 | Stop reason: HOSPADM

## 2022-10-19 RX ORDER — CYCLOBENZAPRINE HCL 5 MG
2.5 TABLET ORAL 3 TIMES DAILY PRN
Status: DISCONTINUED | OUTPATIENT
Start: 2022-10-19 | End: 2022-10-19

## 2022-10-19 RX ORDER — DOCUSATE SODIUM 100 MG/1
100 CAPSULE, LIQUID FILLED ORAL 2 TIMES DAILY
Status: DISCONTINUED | OUTPATIENT
Start: 2022-10-19 | End: 2022-10-24 | Stop reason: HOSPADM

## 2022-10-19 RX ORDER — ASPIRIN 81 MG/1
81 TABLET, CHEWABLE ORAL DAILY
Status: DISCONTINUED | OUTPATIENT
Start: 2022-10-20 | End: 2022-10-20

## 2022-10-19 RX ADMIN — CEFAZOLIN SODIUM 2 G: 2 INJECTION, SOLUTION INTRAVENOUS at 05:09

## 2022-10-19 RX ADMIN — DOCUSATE SODIUM 100 MG: 100 CAPSULE, LIQUID FILLED ORAL at 12:16

## 2022-10-19 RX ADMIN — ATORVASTATIN CALCIUM 80 MG: 40 TABLET, FILM COATED ORAL at 19:29

## 2022-10-19 RX ADMIN — ACETAMINOPHEN 650 MG: 325 TABLET, FILM COATED ORAL at 20:47

## 2022-10-19 RX ADMIN — Medication 10 ML: at 19:31

## 2022-10-19 RX ADMIN — OXYCODONE 5 MG: 5 TABLET ORAL at 18:23

## 2022-10-19 RX ADMIN — MAGNESIUM HYDROXIDE 10 ML: 2400 SUSPENSION ORAL at 16:52

## 2022-10-19 RX ADMIN — INSULIN LISPRO 2 UNITS: 100 INJECTION, SOLUTION INTRAVENOUS; SUBCUTANEOUS at 08:55

## 2022-10-19 RX ADMIN — Medication 10 ML: at 08:56

## 2022-10-19 RX ADMIN — ONDANSETRON HYDROCHLORIDE 4 MG: 4 TABLET, FILM COATED ORAL at 18:23

## 2022-10-19 RX ADMIN — Medication 10 ML: at 19:32

## 2022-10-19 RX ADMIN — Medication 5 MG: at 20:47

## 2022-10-19 RX ADMIN — DOCUSATE SODIUM 100 MG: 100 CAPSULE, LIQUID FILLED ORAL at 19:31

## 2022-10-19 RX ADMIN — CARVEDILOL 50 MG: 12.5 TABLET, FILM COATED ORAL at 18:14

## 2022-10-19 RX ADMIN — PANTOPRAZOLE SODIUM 40 MG: 40 TABLET, DELAYED RELEASE ORAL at 19:31

## 2022-10-19 RX ADMIN — ACETAMINOPHEN 650 MG: 325 TABLET, FILM COATED ORAL at 05:09

## 2022-10-19 RX ADMIN — CARVEDILOL 50 MG: 12.5 TABLET, FILM COATED ORAL at 08:56

## 2022-10-19 RX ADMIN — POLYETHYLENE GLYCOL 3350 17 G: 17 POWDER, FOR SOLUTION ORAL at 12:16

## 2022-10-19 RX ADMIN — CYCLOBENZAPRINE HYDROCHLORIDE 2.5 MG: 5 TABLET, FILM COATED ORAL at 14:28

## 2022-10-19 RX ADMIN — ACETAMINOPHEN 650 MG: 325 TABLET, FILM COATED ORAL at 13:20

## 2022-10-19 RX ADMIN — TERAZOSIN HYDROCHLORIDE 1 MG: 1 CAPSULE ORAL at 19:30

## 2022-10-19 RX ADMIN — ASPIRIN 325 MG: 325 TABLET, COATED ORAL at 08:56

## 2022-10-19 RX ADMIN — INSULIN LISPRO 2 UNITS: 100 INJECTION, SOLUTION INTRAVENOUS; SUBCUTANEOUS at 12:15

## 2022-10-19 RX ADMIN — FAMOTIDINE 20 MG: 20 TABLET ORAL at 08:56

## 2022-10-19 RX ADMIN — SENNOSIDES AND DOCUSATE SODIUM 2 TABLET: 50; 8.6 TABLET ORAL at 16:52

## 2022-10-20 ENCOUNTER — APPOINTMENT (OUTPATIENT)
Dept: CT IMAGING | Facility: HOSPITAL | Age: 72
End: 2022-10-20

## 2022-10-20 LAB
ANION GAP SERPL CALCULATED.3IONS-SCNC: 9 MMOL/L (ref 5–15)
BUN SERPL-MCNC: 22 MG/DL (ref 8–23)
BUN/CREAT SERPL: 25.6 (ref 7–25)
CALCIUM SPEC-SCNC: 7.9 MG/DL (ref 8.6–10.5)
CHLORIDE SERPL-SCNC: 108 MMOL/L (ref 98–107)
CO2 SERPL-SCNC: 21 MMOL/L (ref 22–29)
CREAT SERPL-MCNC: 0.86 MG/DL (ref 0.76–1.27)
DEPRECATED RDW RBC AUTO: 44.4 FL (ref 37–54)
EGFRCR SERPLBLD CKD-EPI 2021: 93.1 ML/MIN/1.73
ERYTHROCYTE [DISTWIDTH] IN BLOOD BY AUTOMATED COUNT: 13.1 % (ref 12.3–15.4)
GLUCOSE BLDC GLUCOMTR-MCNC: 126 MG/DL (ref 70–130)
GLUCOSE BLDC GLUCOMTR-MCNC: 153 MG/DL (ref 70–130)
GLUCOSE BLDC GLUCOMTR-MCNC: 161 MG/DL (ref 70–130)
GLUCOSE SERPL-MCNC: 123 MG/DL (ref 65–99)
HCT VFR BLD AUTO: 22 % (ref 37.5–51)
HCT VFR BLD AUTO: 22.9 % (ref 37.5–51)
HCT VFR BLD AUTO: 25.3 % (ref 37.5–51)
HGB BLD-MCNC: 7.6 G/DL (ref 13–17.7)
HGB BLD-MCNC: 7.7 G/DL (ref 13–17.7)
HGB BLD-MCNC: 8.5 G/DL (ref 13–17.7)
IRON 24H UR-MRATE: 39 MCG/DL (ref 59–158)
IRON SATN MFR SERPL: 18 % (ref 20–50)
MCH RBC QN AUTO: 32.9 PG (ref 26.6–33)
MCHC RBC AUTO-ENTMCNC: 35 G/DL (ref 31.5–35.7)
MCV RBC AUTO: 94 FL (ref 79–97)
PLATELET # BLD AUTO: 52 10*3/MM3 (ref 140–450)
PLATELETS (CITRATED) BY AUTOMATED COUNT: 75 10*3/MM3 (ref 140–450)
PMV BLD AUTO: 12 FL (ref 6–12)
POTASSIUM SERPL-SCNC: 4.3 MMOL/L (ref 3.5–5.2)
RBC # BLD AUTO: 2.34 10*6/MM3 (ref 4.14–5.8)
SODIUM SERPL-SCNC: 138 MMOL/L (ref 136–145)
TIBC SERPL-MCNC: 218 MCG/DL (ref 298–536)
TRANSFERRIN SERPL-MCNC: 146 MG/DL (ref 200–360)
WBC NRBC COR # BLD: 6.86 10*3/MM3 (ref 3.4–10.8)

## 2022-10-20 PROCEDURE — 80048 BASIC METABOLIC PNL TOTAL CA: CPT | Performed by: INTERNAL MEDICINE

## 2022-10-20 PROCEDURE — 72192 CT PELVIS W/O DYE: CPT

## 2022-10-20 PROCEDURE — 63710000001 INSULIN LISPRO (HUMAN) PER 5 UNITS: Performed by: ORTHOPAEDIC SURGERY

## 2022-10-20 PROCEDURE — 85049 AUTOMATED PLATELET COUNT: CPT | Performed by: INTERNAL MEDICINE

## 2022-10-20 PROCEDURE — 97116 GAIT TRAINING THERAPY: CPT

## 2022-10-20 PROCEDURE — 85027 COMPLETE CBC AUTOMATED: CPT | Performed by: INTERNAL MEDICINE

## 2022-10-20 PROCEDURE — 82962 GLUCOSE BLOOD TEST: CPT

## 2022-10-20 PROCEDURE — P9612 CATHETERIZE FOR URINE SPEC: HCPCS

## 2022-10-20 PROCEDURE — 36430 TRANSFUSION BLD/BLD COMPNT: CPT

## 2022-10-20 PROCEDURE — 85018 HEMOGLOBIN: CPT | Performed by: INTERNAL MEDICINE

## 2022-10-20 PROCEDURE — 99233 SBSQ HOSP IP/OBS HIGH 50: CPT | Performed by: INTERNAL MEDICINE

## 2022-10-20 PROCEDURE — 85060 BLOOD SMEAR INTERPRETATION: CPT | Performed by: INTERNAL MEDICINE

## 2022-10-20 PROCEDURE — 25010000002 NA FERRIC GLUC CPLX PER 12.5 MG: Performed by: INTERNAL MEDICINE

## 2022-10-20 PROCEDURE — 97535 SELF CARE MNGMENT TRAINING: CPT

## 2022-10-20 PROCEDURE — 99221 1ST HOSP IP/OBS SF/LOW 40: CPT | Performed by: INTERNAL MEDICINE

## 2022-10-20 PROCEDURE — 99024 POSTOP FOLLOW-UP VISIT: CPT | Performed by: ORTHOPAEDIC SURGERY

## 2022-10-20 PROCEDURE — P9035 PLATELET PHERES LEUKOREDUCED: HCPCS

## 2022-10-20 PROCEDURE — P9100 PATHOGEN TEST FOR PLATELETS: HCPCS

## 2022-10-20 PROCEDURE — 83540 ASSAY OF IRON: CPT | Performed by: INTERNAL MEDICINE

## 2022-10-20 PROCEDURE — 84466 ASSAY OF TRANSFERRIN: CPT | Performed by: INTERNAL MEDICINE

## 2022-10-20 PROCEDURE — 85014 HEMATOCRIT: CPT | Performed by: INTERNAL MEDICINE

## 2022-10-20 RX ADMIN — OXYCODONE 5 MG: 5 TABLET ORAL at 13:08

## 2022-10-20 RX ADMIN — TERAZOSIN HYDROCHLORIDE 1 MG: 1 CAPSULE ORAL at 20:54

## 2022-10-20 RX ADMIN — CYCLOBENZAPRINE 5 MG: 10 TABLET, FILM COATED ORAL at 09:29

## 2022-10-20 RX ADMIN — ACETAMINOPHEN 650 MG: 325 TABLET, FILM COATED ORAL at 13:08

## 2022-10-20 RX ADMIN — ATORVASTATIN CALCIUM 80 MG: 40 TABLET, FILM COATED ORAL at 20:54

## 2022-10-20 RX ADMIN — SODIUM CHLORIDE 125 MG: 9 INJECTION, SOLUTION INTRAVENOUS at 10:17

## 2022-10-20 RX ADMIN — DOCUSATE SODIUM 100 MG: 100 CAPSULE, LIQUID FILLED ORAL at 20:54

## 2022-10-20 RX ADMIN — Medication 10 ML: at 09:18

## 2022-10-20 RX ADMIN — SENNOSIDES AND DOCUSATE SODIUM 2 TABLET: 50; 8.6 TABLET ORAL at 13:07

## 2022-10-20 RX ADMIN — ACETAMINOPHEN 650 MG: 325 TABLET, FILM COATED ORAL at 21:01

## 2022-10-20 RX ADMIN — POLYETHYLENE GLYCOL 3350 17 G: 17 POWDER, FOR SOLUTION ORAL at 09:17

## 2022-10-20 RX ADMIN — ASPIRIN 81 MG 81 MG: 81 TABLET ORAL at 09:17

## 2022-10-20 RX ADMIN — PANTOPRAZOLE SODIUM 40 MG: 40 TABLET, DELAYED RELEASE ORAL at 20:54

## 2022-10-20 RX ADMIN — FAMOTIDINE 20 MG: 20 TABLET ORAL at 09:18

## 2022-10-20 RX ADMIN — INSULIN LISPRO 2 UNITS: 100 INJECTION, SOLUTION INTRAVENOUS; SUBCUTANEOUS at 16:53

## 2022-10-20 RX ADMIN — INSULIN LISPRO 2 UNITS: 100 INJECTION, SOLUTION INTRAVENOUS; SUBCUTANEOUS at 13:07

## 2022-10-20 RX ADMIN — OXYCODONE 5 MG: 5 TABLET ORAL at 23:27

## 2022-10-20 RX ADMIN — Medication 10 ML: at 20:54

## 2022-10-20 RX ADMIN — CARVEDILOL 50 MG: 12.5 TABLET, FILM COATED ORAL at 18:30

## 2022-10-20 RX ADMIN — CARVEDILOL 50 MG: 12.5 TABLET, FILM COATED ORAL at 09:18

## 2022-10-20 RX ADMIN — Medication 5 MG: at 20:54

## 2022-10-20 RX ADMIN — ACETAMINOPHEN 650 MG: 325 TABLET, FILM COATED ORAL at 04:40

## 2022-10-20 RX ADMIN — DOCUSATE SODIUM 100 MG: 100 CAPSULE, LIQUID FILLED ORAL at 09:18

## 2022-10-21 LAB
ANION GAP SERPL CALCULATED.3IONS-SCNC: 10 MMOL/L (ref 5–15)
BH BB BLOOD EXPIRATION DATE: NORMAL
BH BB BLOOD TYPE BARCODE: 6200
BH BB DISPENSE STATUS: NORMAL
BH BB PRODUCT CODE: NORMAL
BH BB UNIT NUMBER: NORMAL
BUN SERPL-MCNC: 16 MG/DL (ref 8–23)
BUN/CREAT SERPL: 22.9 (ref 7–25)
CALCIUM SPEC-SCNC: 8.1 MG/DL (ref 8.6–10.5)
CHLORIDE SERPL-SCNC: 107 MMOL/L (ref 98–107)
CO2 SERPL-SCNC: 23 MMOL/L (ref 22–29)
CREAT SERPL-MCNC: 0.7 MG/DL (ref 0.76–1.27)
CYTOLOGIST CVX/VAG CYTO: NORMAL
DEPRECATED RDW RBC AUTO: 45.9 FL (ref 37–54)
EGFRCR SERPLBLD CKD-EPI 2021: 99.1 ML/MIN/1.73
ERYTHROCYTE [DISTWIDTH] IN BLOOD BY AUTOMATED COUNT: 13.3 % (ref 12.3–15.4)
GLUCOSE BLDC GLUCOMTR-MCNC: 107 MG/DL (ref 70–130)
GLUCOSE BLDC GLUCOMTR-MCNC: 133 MG/DL (ref 70–130)
GLUCOSE BLDC GLUCOMTR-MCNC: 198 MG/DL (ref 70–130)
GLUCOSE SERPL-MCNC: 114 MG/DL (ref 65–99)
HCT VFR BLD AUTO: 23.6 % (ref 37.5–51)
HGB BLD-MCNC: 8 G/DL (ref 13–17.7)
MAGNESIUM SERPL-MCNC: 2.2 MG/DL (ref 1.6–2.4)
MCH RBC QN AUTO: 32.5 PG (ref 26.6–33)
MCHC RBC AUTO-ENTMCNC: 33.9 G/DL (ref 31.5–35.7)
MCV RBC AUTO: 95.9 FL (ref 79–97)
PATH INTERP BLD-IMP: NORMAL
PLATELET # BLD AUTO: 118 10*3/MM3 (ref 140–450)
PMV BLD AUTO: 12 FL (ref 6–12)
POTASSIUM SERPL-SCNC: 4.4 MMOL/L (ref 3.5–5.2)
QT INTERVAL: 426 MS
QTC INTERVAL: 518 MS
RBC # BLD AUTO: 2.46 10*6/MM3 (ref 4.14–5.8)
SODIUM SERPL-SCNC: 140 MMOL/L (ref 136–145)
UNIT  ABO: NORMAL
UNIT  RH: NORMAL
WBC NRBC COR # BLD: 6.47 10*3/MM3 (ref 3.4–10.8)

## 2022-10-21 PROCEDURE — 83735 ASSAY OF MAGNESIUM: CPT | Performed by: INTERNAL MEDICINE

## 2022-10-21 PROCEDURE — 85027 COMPLETE CBC AUTOMATED: CPT | Performed by: INTERNAL MEDICINE

## 2022-10-21 PROCEDURE — 80048 BASIC METABOLIC PNL TOTAL CA: CPT | Performed by: INTERNAL MEDICINE

## 2022-10-21 PROCEDURE — 97530 THERAPEUTIC ACTIVITIES: CPT

## 2022-10-21 PROCEDURE — 63710000001 INSULIN LISPRO (HUMAN) PER 5 UNITS: Performed by: ORTHOPAEDIC SURGERY

## 2022-10-21 PROCEDURE — 99232 SBSQ HOSP IP/OBS MODERATE 35: CPT | Performed by: INTERNAL MEDICINE

## 2022-10-21 PROCEDURE — 99024 POSTOP FOLLOW-UP VISIT: CPT | Performed by: ORTHOPAEDIC SURGERY

## 2022-10-21 PROCEDURE — 97116 GAIT TRAINING THERAPY: CPT

## 2022-10-21 PROCEDURE — P9612 CATHETERIZE FOR URINE SPEC: HCPCS

## 2022-10-21 PROCEDURE — 25010000002 NA FERRIC GLUC CPLX PER 12.5 MG: Performed by: INTERNAL MEDICINE

## 2022-10-21 PROCEDURE — 82962 GLUCOSE BLOOD TEST: CPT

## 2022-10-21 RX ORDER — BISACODYL 10 MG
10 SUPPOSITORY, RECTAL RECTAL DAILY PRN
Status: DISCONTINUED | OUTPATIENT
Start: 2022-10-21 | End: 2022-10-24 | Stop reason: HOSPADM

## 2022-10-21 RX ORDER — LISINOPRIL 5 MG/1
5 TABLET ORAL
Status: DISCONTINUED | OUTPATIENT
Start: 2022-10-21 | End: 2022-10-24 | Stop reason: HOSPADM

## 2022-10-21 RX ADMIN — CYCLOBENZAPRINE 5 MG: 10 TABLET, FILM COATED ORAL at 14:20

## 2022-10-21 RX ADMIN — Medication 10 ML: at 19:47

## 2022-10-21 RX ADMIN — DOCUSATE SODIUM 100 MG: 100 CAPSULE, LIQUID FILLED ORAL at 19:46

## 2022-10-21 RX ADMIN — CARVEDILOL 50 MG: 12.5 TABLET, FILM COATED ORAL at 17:59

## 2022-10-21 RX ADMIN — ACETAMINOPHEN 650 MG: 325 TABLET, FILM COATED ORAL at 21:47

## 2022-10-21 RX ADMIN — POLYETHYLENE GLYCOL 3350 17 G: 17 POWDER, FOR SOLUTION ORAL at 08:50

## 2022-10-21 RX ADMIN — OXYCODONE 5 MG: 5 TABLET ORAL at 08:57

## 2022-10-21 RX ADMIN — TERAZOSIN HYDROCHLORIDE 1 MG: 1 CAPSULE ORAL at 19:46

## 2022-10-21 RX ADMIN — CARVEDILOL 50 MG: 12.5 TABLET, FILM COATED ORAL at 08:50

## 2022-10-21 RX ADMIN — FAMOTIDINE 20 MG: 20 TABLET ORAL at 08:50

## 2022-10-21 RX ADMIN — LISINOPRIL 5 MG: 5 TABLET ORAL at 16:44

## 2022-10-21 RX ADMIN — ACETAMINOPHEN 650 MG: 325 TABLET, FILM COATED ORAL at 14:20

## 2022-10-21 RX ADMIN — DOCUSATE SODIUM 100 MG: 100 CAPSULE, LIQUID FILLED ORAL at 08:50

## 2022-10-21 RX ADMIN — MAGNESIUM HYDROXIDE 10 ML: 2400 SUSPENSION ORAL at 11:41

## 2022-10-21 RX ADMIN — ATORVASTATIN CALCIUM 80 MG: 40 TABLET, FILM COATED ORAL at 19:45

## 2022-10-21 RX ADMIN — SODIUM CHLORIDE 125 MG: 9 INJECTION, SOLUTION INTRAVENOUS at 08:50

## 2022-10-21 RX ADMIN — INSULIN LISPRO 2 UNITS: 100 INJECTION, SOLUTION INTRAVENOUS; SUBCUTANEOUS at 11:41

## 2022-10-21 RX ADMIN — OXYCODONE 5 MG: 5 TABLET ORAL at 19:03

## 2022-10-21 RX ADMIN — CYCLOBENZAPRINE 5 MG: 10 TABLET, FILM COATED ORAL at 00:56

## 2022-10-21 RX ADMIN — PANTOPRAZOLE SODIUM 40 MG: 40 TABLET, DELAYED RELEASE ORAL at 19:46

## 2022-10-21 RX ADMIN — Medication 10 ML: at 08:50

## 2022-10-21 RX ADMIN — SENNOSIDES AND DOCUSATE SODIUM 2 TABLET: 50; 8.6 TABLET ORAL at 11:41

## 2022-10-21 RX ADMIN — ACETAMINOPHEN 650 MG: 325 TABLET, FILM COATED ORAL at 05:55

## 2022-10-21 RX ADMIN — APIXABAN 2.5 MG: 2.5 TABLET, FILM COATED ORAL at 19:46

## 2022-10-22 LAB
ANION GAP SERPL CALCULATED.3IONS-SCNC: 8 MMOL/L (ref 5–15)
BUN SERPL-MCNC: 14 MG/DL (ref 8–23)
BUN/CREAT SERPL: 20 (ref 7–25)
CALCIUM SPEC-SCNC: 8.2 MG/DL (ref 8.6–10.5)
CHLORIDE SERPL-SCNC: 105 MMOL/L (ref 98–107)
CO2 SERPL-SCNC: 25 MMOL/L (ref 22–29)
CREAT SERPL-MCNC: 0.7 MG/DL (ref 0.76–1.27)
DEPRECATED RDW RBC AUTO: 45.4 FL (ref 37–54)
EGFRCR SERPLBLD CKD-EPI 2021: 99.1 ML/MIN/1.73
ERYTHROCYTE [DISTWIDTH] IN BLOOD BY AUTOMATED COUNT: 13.2 % (ref 12.3–15.4)
GLUCOSE BLDC GLUCOMTR-MCNC: 126 MG/DL (ref 70–130)
GLUCOSE BLDC GLUCOMTR-MCNC: 139 MG/DL (ref 70–130)
GLUCOSE BLDC GLUCOMTR-MCNC: 144 MG/DL (ref 70–130)
GLUCOSE SERPL-MCNC: 126 MG/DL (ref 65–99)
HCT VFR BLD AUTO: 22.6 % (ref 37.5–51)
HGB BLD-MCNC: 7.6 G/DL (ref 13–17.7)
MCH RBC QN AUTO: 32.1 PG (ref 26.6–33)
MCHC RBC AUTO-ENTMCNC: 33.6 G/DL (ref 31.5–35.7)
MCV RBC AUTO: 95.4 FL (ref 79–97)
PLATELET # BLD AUTO: 113 10*3/MM3 (ref 140–450)
PMV BLD AUTO: 12.2 FL (ref 6–12)
POTASSIUM SERPL-SCNC: 4.4 MMOL/L (ref 3.5–5.2)
RBC # BLD AUTO: 2.37 10*6/MM3 (ref 4.14–5.8)
SODIUM SERPL-SCNC: 138 MMOL/L (ref 136–145)
WBC NRBC COR # BLD: 5.69 10*3/MM3 (ref 3.4–10.8)

## 2022-10-22 PROCEDURE — 99232 SBSQ HOSP IP/OBS MODERATE 35: CPT | Performed by: INTERNAL MEDICINE

## 2022-10-22 PROCEDURE — P9612 CATHETERIZE FOR URINE SPEC: HCPCS

## 2022-10-22 PROCEDURE — 97110 THERAPEUTIC EXERCISES: CPT

## 2022-10-22 PROCEDURE — 82962 GLUCOSE BLOOD TEST: CPT

## 2022-10-22 PROCEDURE — 97530 THERAPEUTIC ACTIVITIES: CPT

## 2022-10-22 PROCEDURE — 85027 COMPLETE CBC AUTOMATED: CPT | Performed by: INTERNAL MEDICINE

## 2022-10-22 PROCEDURE — 80048 BASIC METABOLIC PNL TOTAL CA: CPT | Performed by: INTERNAL MEDICINE

## 2022-10-22 PROCEDURE — 25010000002 NA FERRIC GLUC CPLX PER 12.5 MG: Performed by: INTERNAL MEDICINE

## 2022-10-22 RX ADMIN — PANTOPRAZOLE SODIUM 40 MG: 40 TABLET, DELAYED RELEASE ORAL at 20:03

## 2022-10-22 RX ADMIN — OXYCODONE 5 MG: 5 TABLET ORAL at 23:16

## 2022-10-22 RX ADMIN — ACETAMINOPHEN 650 MG: 325 TABLET, FILM COATED ORAL at 15:17

## 2022-10-22 RX ADMIN — LISINOPRIL 5 MG: 5 TABLET ORAL at 08:39

## 2022-10-22 RX ADMIN — CYCLOBENZAPRINE 5 MG: 10 TABLET, FILM COATED ORAL at 15:17

## 2022-10-22 RX ADMIN — DOCUSATE SODIUM 100 MG: 100 CAPSULE, LIQUID FILLED ORAL at 20:04

## 2022-10-22 RX ADMIN — Medication 10 ML: at 08:43

## 2022-10-22 RX ADMIN — ACETAMINOPHEN 650 MG: 325 TABLET, FILM COATED ORAL at 04:42

## 2022-10-22 RX ADMIN — BISACODYL 10 MG: 10 SUPPOSITORY RECTAL at 17:47

## 2022-10-22 RX ADMIN — Medication 10 ML: at 20:03

## 2022-10-22 RX ADMIN — APIXABAN 2.5 MG: 2.5 TABLET, FILM COATED ORAL at 20:04

## 2022-10-22 RX ADMIN — APIXABAN 2.5 MG: 2.5 TABLET, FILM COATED ORAL at 08:39

## 2022-10-22 RX ADMIN — Medication 10 ML: at 20:04

## 2022-10-22 RX ADMIN — DOCUSATE SODIUM 100 MG: 100 CAPSULE, LIQUID FILLED ORAL at 08:39

## 2022-10-22 RX ADMIN — SENNOSIDES AND DOCUSATE SODIUM 2 TABLET: 50; 8.6 TABLET ORAL at 08:39

## 2022-10-22 RX ADMIN — CYCLOBENZAPRINE 5 MG: 10 TABLET, FILM COATED ORAL at 04:42

## 2022-10-22 RX ADMIN — OXYCODONE 5 MG: 5 TABLET ORAL at 08:43

## 2022-10-22 RX ADMIN — TERAZOSIN HYDROCHLORIDE 1 MG: 1 CAPSULE ORAL at 20:03

## 2022-10-22 RX ADMIN — OXYCODONE 5 MG: 5 TABLET ORAL at 01:43

## 2022-10-22 RX ADMIN — OXYCODONE 5 MG: 5 TABLET ORAL at 18:19

## 2022-10-22 RX ADMIN — SODIUM CHLORIDE 125 MG: 9 INJECTION, SOLUTION INTRAVENOUS at 08:46

## 2022-10-22 RX ADMIN — Medication 5 MG: at 23:16

## 2022-10-22 RX ADMIN — MAGNESIUM HYDROXIDE 10 ML: 2400 SUSPENSION ORAL at 04:42

## 2022-10-22 RX ADMIN — CARVEDILOL 50 MG: 12.5 TABLET, FILM COATED ORAL at 08:39

## 2022-10-22 RX ADMIN — ACETAMINOPHEN 650 MG: 325 TABLET, FILM COATED ORAL at 21:59

## 2022-10-22 RX ADMIN — CARVEDILOL 50 MG: 12.5 TABLET, FILM COATED ORAL at 17:47

## 2022-10-22 RX ADMIN — FAMOTIDINE 20 MG: 20 TABLET ORAL at 08:39

## 2022-10-22 RX ADMIN — POLYETHYLENE GLYCOL 3350 17 G: 17 POWDER, FOR SOLUTION ORAL at 08:39

## 2022-10-22 RX ADMIN — ATORVASTATIN CALCIUM 80 MG: 40 TABLET, FILM COATED ORAL at 20:03

## 2022-10-23 LAB
BASOPHILS # BLD AUTO: 0.03 10*3/MM3 (ref 0–0.2)
BASOPHILS NFR BLD AUTO: 0.5 % (ref 0–1.5)
DEPRECATED RDW RBC AUTO: 45.7 FL (ref 37–54)
EOSINOPHIL # BLD AUTO: 0.14 10*3/MM3 (ref 0–0.4)
EOSINOPHIL NFR BLD AUTO: 2.4 % (ref 0.3–6.2)
ERYTHROCYTE [DISTWIDTH] IN BLOOD BY AUTOMATED COUNT: 13.1 % (ref 12.3–15.4)
GLUCOSE BLDC GLUCOMTR-MCNC: 115 MG/DL (ref 70–130)
GLUCOSE BLDC GLUCOMTR-MCNC: 119 MG/DL (ref 70–130)
GLUCOSE BLDC GLUCOMTR-MCNC: 98 MG/DL (ref 70–130)
HCT VFR BLD AUTO: 24.4 % (ref 37.5–51)
HGB BLD-MCNC: 8.2 G/DL (ref 13–17.7)
IMM GRANULOCYTES # BLD AUTO: 0.06 10*3/MM3 (ref 0–0.05)
IMM GRANULOCYTES NFR BLD AUTO: 1 % (ref 0–0.5)
LYMPHOCYTES # BLD AUTO: 0.7 10*3/MM3 (ref 0.7–3.1)
LYMPHOCYTES NFR BLD AUTO: 11.9 % (ref 19.6–45.3)
MAGNESIUM SERPL-MCNC: 2.1 MG/DL (ref 1.6–2.4)
MCH RBC QN AUTO: 33.1 PG (ref 26.6–33)
MCHC RBC AUTO-ENTMCNC: 33.6 G/DL (ref 31.5–35.7)
MCV RBC AUTO: 98.4 FL (ref 79–97)
MONOCYTES # BLD AUTO: 0.69 10*3/MM3 (ref 0.1–0.9)
MONOCYTES NFR BLD AUTO: 11.8 % (ref 5–12)
NEUTROPHILS NFR BLD AUTO: 4.25 10*3/MM3 (ref 1.7–7)
NEUTROPHILS NFR BLD AUTO: 72.4 % (ref 42.7–76)
NRBC BLD AUTO-RTO: 0 /100 WBC (ref 0–0.2)
PHOSPHATE SERPL-MCNC: 3.4 MG/DL (ref 2.5–4.5)
PLATELET # BLD AUTO: 145 10*3/MM3 (ref 140–450)
PMV BLD AUTO: 10 FL (ref 6–12)
RBC # BLD AUTO: 2.48 10*6/MM3 (ref 4.14–5.8)
WBC NRBC COR # BLD: 5.87 10*3/MM3 (ref 3.4–10.8)

## 2022-10-23 PROCEDURE — P9612 CATHETERIZE FOR URINE SPEC: HCPCS

## 2022-10-23 PROCEDURE — 84100 ASSAY OF PHOSPHORUS: CPT | Performed by: INTERNAL MEDICINE

## 2022-10-23 PROCEDURE — 83735 ASSAY OF MAGNESIUM: CPT | Performed by: INTERNAL MEDICINE

## 2022-10-23 PROCEDURE — 85025 COMPLETE CBC W/AUTO DIFF WBC: CPT | Performed by: INTERNAL MEDICINE

## 2022-10-23 PROCEDURE — 99232 SBSQ HOSP IP/OBS MODERATE 35: CPT | Performed by: INTERNAL MEDICINE

## 2022-10-23 PROCEDURE — C9803 HOPD COVID-19 SPEC COLLECT: HCPCS | Performed by: INTERNAL MEDICINE

## 2022-10-23 PROCEDURE — 97110 THERAPEUTIC EXERCISES: CPT

## 2022-10-23 PROCEDURE — U0004 COV-19 TEST NON-CDC HGH THRU: HCPCS | Performed by: INTERNAL MEDICINE

## 2022-10-23 PROCEDURE — 82962 GLUCOSE BLOOD TEST: CPT

## 2022-10-23 RX ADMIN — ACETAMINOPHEN 650 MG: 325 TABLET, FILM COATED ORAL at 21:40

## 2022-10-23 RX ADMIN — CARVEDILOL 50 MG: 12.5 TABLET, FILM COATED ORAL at 08:19

## 2022-10-23 RX ADMIN — FAMOTIDINE 20 MG: 20 TABLET ORAL at 08:19

## 2022-10-23 RX ADMIN — Medication 10 ML: at 08:22

## 2022-10-23 RX ADMIN — DOCUSATE SODIUM 100 MG: 100 CAPSULE, LIQUID FILLED ORAL at 08:19

## 2022-10-23 RX ADMIN — PANTOPRAZOLE SODIUM 40 MG: 40 TABLET, DELAYED RELEASE ORAL at 21:40

## 2022-10-23 RX ADMIN — LISINOPRIL 5 MG: 5 TABLET ORAL at 08:19

## 2022-10-23 RX ADMIN — CARVEDILOL 50 MG: 12.5 TABLET, FILM COATED ORAL at 16:52

## 2022-10-23 RX ADMIN — Medication 10 ML: at 21:40

## 2022-10-23 RX ADMIN — APIXABAN 2.5 MG: 2.5 TABLET, FILM COATED ORAL at 08:19

## 2022-10-23 RX ADMIN — ATORVASTATIN CALCIUM 80 MG: 40 TABLET, FILM COATED ORAL at 21:40

## 2022-10-23 RX ADMIN — TERAZOSIN HYDROCHLORIDE 1 MG: 1 CAPSULE ORAL at 21:40

## 2022-10-23 RX ADMIN — ACETAMINOPHEN 650 MG: 325 TABLET, FILM COATED ORAL at 04:46

## 2022-10-23 RX ADMIN — ACETAMINOPHEN 650 MG: 325 TABLET, FILM COATED ORAL at 13:39

## 2022-10-23 RX ADMIN — CYCLOBENZAPRINE 5 MG: 10 TABLET, FILM COATED ORAL at 11:59

## 2022-10-23 RX ADMIN — APIXABAN 2.5 MG: 2.5 TABLET, FILM COATED ORAL at 21:40

## 2022-10-24 VITALS
BODY MASS INDEX: 25.93 KG/M2 | TEMPERATURE: 98.3 F | HEART RATE: 75 BPM | OXYGEN SATURATION: 96 % | SYSTOLIC BLOOD PRESSURE: 123 MMHG | RESPIRATION RATE: 18 BRPM | DIASTOLIC BLOOD PRESSURE: 72 MMHG | WEIGHT: 181.1 LBS | HEIGHT: 70 IN

## 2022-10-24 LAB
ANION GAP SERPL CALCULATED.3IONS-SCNC: 10 MMOL/L (ref 5–15)
BASOPHILS # BLD AUTO: 0.02 10*3/MM3 (ref 0–0.2)
BASOPHILS NFR BLD AUTO: 0.3 % (ref 0–1.5)
BH CV ECHO MEAS - AO MAX PG: 6.8 MMHG
BH CV ECHO MEAS - AO MEAN PG: 3.3 MMHG
BH CV ECHO MEAS - AO ROOT DIAM: 3.5 CM
BH CV ECHO MEAS - AO V2 MAX: 130.3 CM/SEC
BH CV ECHO MEAS - AO V2 VTI: 20.1 CM
BH CV ECHO MEAS - AVA(I,D): 1.2 CM2
BH CV ECHO MEAS - EDV(CUBED): 143.9 ML
BH CV ECHO MEAS - EDV(MOD-SP2): 111 ML
BH CV ECHO MEAS - EDV(MOD-SP4): 124 ML
BH CV ECHO MEAS - EF(MOD-BP): 31.9 %
BH CV ECHO MEAS - EF(MOD-SP2): 30.1 %
BH CV ECHO MEAS - EF(MOD-SP4): 31.6 %
BH CV ECHO MEAS - ESV(CUBED): 88 ML
BH CV ECHO MEAS - ESV(MOD-SP2): 77.6 ML
BH CV ECHO MEAS - ESV(MOD-SP4): 84.8 ML
BH CV ECHO MEAS - FS: 15.1 %
BH CV ECHO MEAS - IVS/LVPW: 1.05 CM
BH CV ECHO MEAS - IVSD: 1.44 CM
BH CV ECHO MEAS - LA DIMENSION: 4 CM
BH CV ECHO MEAS - LAT PEAK E' VEL: 7.9 CM/SEC
BH CV ECHO MEAS - LV MASS(C)D: 313.8 GRAMS
BH CV ECHO MEAS - LV MAX PG: 1.11 MMHG
BH CV ECHO MEAS - LV MEAN PG: 0.62 MMHG
BH CV ECHO MEAS - LV V1 MAX: 52.7 CM/SEC
BH CV ECHO MEAS - LV V1 VTI: 9.1 CM
BH CV ECHO MEAS - LVIDD: 5.2 CM
BH CV ECHO MEAS - LVIDS: 4.4 CM
BH CV ECHO MEAS - LVOT AREA: 2.7 CM2
BH CV ECHO MEAS - LVOT DIAM: 1.84 CM
BH CV ECHO MEAS - LVPWD: 1.37 CM
BH CV ECHO MEAS - MED PEAK E' VEL: 6.3 CM/SEC
BH CV ECHO MEAS - MV A MAX VEL: 36 CM/SEC
BH CV ECHO MEAS - MV DEC SLOPE: 751 CM/SEC2
BH CV ECHO MEAS - MV DEC TIME: 0.13 MSEC
BH CV ECHO MEAS - MV E MAX VEL: 85.3 CM/SEC
BH CV ECHO MEAS - MV E/A: 2.37
BH CV ECHO MEAS - MV MAX PG: 4.4 MMHG
BH CV ECHO MEAS - MV MEAN PG: 1.21 MMHG
BH CV ECHO MEAS - MV P1/2T: 39.2 MSEC
BH CV ECHO MEAS - MV V2 VTI: 17.8 CM
BH CV ECHO MEAS - MVA(P1/2T): 5.6 CM2
BH CV ECHO MEAS - MVA(VTI): 1.36 CM2
BH CV ECHO MEAS - PA ACC TIME: 0.1 SEC
BH CV ECHO MEAS - PA PR(ACCEL): 33.1 MMHG
BH CV ECHO MEAS - SV(LVOT): 24.2 ML
BH CV ECHO MEAS - SV(MOD-SP2): 33.4 ML
BH CV ECHO MEAS - SV(MOD-SP4): 39.2 ML
BH CV ECHO MEAS - TAPSE (>1.6): 1.42 CM
BH CV ECHO MEASUREMENTS AVERAGE E/E' RATIO: 12.01
BH CV VAS BP LEFT ARM: NORMAL MMHG
BH CV XLRA - RV BASE: 3.6 CM
BH CV XLRA - RV LENGTH: 6.1 CM
BH CV XLRA - RV MID: 2.11 CM
BH CV XLRA - TDI S': 10.2 CM/SEC
BUN SERPL-MCNC: 15 MG/DL (ref 8–23)
BUN/CREAT SERPL: 20.5 (ref 7–25)
CALCIUM SPEC-SCNC: 8.9 MG/DL (ref 8.6–10.5)
CHLORIDE SERPL-SCNC: 98 MMOL/L (ref 98–107)
CO2 SERPL-SCNC: 24 MMOL/L (ref 22–29)
CREAT SERPL-MCNC: 0.73 MG/DL (ref 0.76–1.27)
DEPRECATED RDW RBC AUTO: 43.2 FL (ref 37–54)
EGFRCR SERPLBLD CKD-EPI 2021: 97.9 ML/MIN/1.73
EOSINOPHIL # BLD AUTO: 0.17 10*3/MM3 (ref 0–0.4)
EOSINOPHIL NFR BLD AUTO: 2.6 % (ref 0.3–6.2)
ERYTHROCYTE [DISTWIDTH] IN BLOOD BY AUTOMATED COUNT: 13.3 % (ref 12.3–15.4)
GLUCOSE BLDC GLUCOMTR-MCNC: 154 MG/DL (ref 70–130)
GLUCOSE BLDC GLUCOMTR-MCNC: 93 MG/DL (ref 70–130)
GLUCOSE SERPL-MCNC: 89 MG/DL (ref 65–99)
HCT VFR BLD AUTO: 26.6 % (ref 37.5–51)
HGB BLD-MCNC: 9.4 G/DL (ref 13–17.7)
IMM GRANULOCYTES # BLD AUTO: 0.07 10*3/MM3 (ref 0–0.05)
IMM GRANULOCYTES NFR BLD AUTO: 1.1 % (ref 0–0.5)
LV EF 2D ECHO EST: 30 %
LYMPHOCYTES # BLD AUTO: 0.96 10*3/MM3 (ref 0.7–3.1)
LYMPHOCYTES NFR BLD AUTO: 14.5 % (ref 19.6–45.3)
MAGNESIUM SERPL-MCNC: 2.1 MG/DL (ref 1.6–2.4)
MAXIMAL PREDICTED HEART RATE: 150 BPM
MCH RBC QN AUTO: 33.1 PG (ref 26.6–33)
MCHC RBC AUTO-ENTMCNC: 35.3 G/DL (ref 31.5–35.7)
MCV RBC AUTO: 93.7 FL (ref 79–97)
MONOCYTES # BLD AUTO: 0.77 10*3/MM3 (ref 0.1–0.9)
MONOCYTES NFR BLD AUTO: 11.6 % (ref 5–12)
NEUTROPHILS NFR BLD AUTO: 4.62 10*3/MM3 (ref 1.7–7)
NEUTROPHILS NFR BLD AUTO: 69.9 % (ref 42.7–76)
NRBC BLD AUTO-RTO: 0 /100 WBC (ref 0–0.2)
PHOSPHATE SERPL-MCNC: 4.4 MG/DL (ref 2.5–4.5)
PLATELET # BLD AUTO: 161 10*3/MM3 (ref 140–450)
PMV BLD AUTO: 10.8 FL (ref 6–12)
POTASSIUM SERPL-SCNC: 4.3 MMOL/L (ref 3.5–5.2)
RBC # BLD AUTO: 2.84 10*6/MM3 (ref 4.14–5.8)
SARS-COV-2 RNA PNL SPEC NAA+PROBE: NOT DETECTED
SODIUM SERPL-SCNC: 132 MMOL/L (ref 136–145)
STRESS TARGET HR: 128 BPM
WBC NRBC COR # BLD: 6.61 10*3/MM3 (ref 3.4–10.8)

## 2022-10-24 PROCEDURE — 83735 ASSAY OF MAGNESIUM: CPT | Performed by: INTERNAL MEDICINE

## 2022-10-24 PROCEDURE — 90662 IIV NO PRSV INCREASED AG IM: CPT | Performed by: ORTHOPAEDIC SURGERY

## 2022-10-24 PROCEDURE — 99239 HOSP IP/OBS DSCHRG MGMT >30: CPT | Performed by: NURSE PRACTITIONER

## 2022-10-24 PROCEDURE — 97530 THERAPEUTIC ACTIVITIES: CPT

## 2022-10-24 PROCEDURE — 82962 GLUCOSE BLOOD TEST: CPT

## 2022-10-24 PROCEDURE — 99024 POSTOP FOLLOW-UP VISIT: CPT | Performed by: ORTHOPAEDIC SURGERY

## 2022-10-24 PROCEDURE — 97535 SELF CARE MNGMENT TRAINING: CPT

## 2022-10-24 PROCEDURE — G0008 ADMIN INFLUENZA VIRUS VAC: HCPCS | Performed by: ORTHOPAEDIC SURGERY

## 2022-10-24 PROCEDURE — 25010000002 INFLUENZA VAC HIGH-DOSE QUAD 0.7 ML SUSPENSION PREFILLED SYRINGE: Performed by: ORTHOPAEDIC SURGERY

## 2022-10-24 PROCEDURE — 84100 ASSAY OF PHOSPHORUS: CPT | Performed by: INTERNAL MEDICINE

## 2022-10-24 PROCEDURE — 85025 COMPLETE CBC W/AUTO DIFF WBC: CPT | Performed by: INTERNAL MEDICINE

## 2022-10-24 PROCEDURE — 63710000001 INSULIN LISPRO (HUMAN) PER 5 UNITS: Performed by: ORTHOPAEDIC SURGERY

## 2022-10-24 PROCEDURE — 80048 BASIC METABOLIC PNL TOTAL CA: CPT | Performed by: INTERNAL MEDICINE

## 2022-10-24 PROCEDURE — 97110 THERAPEUTIC EXERCISES: CPT

## 2022-10-24 RX ORDER — POLYETHYLENE GLYCOL 3350 17 G/17G
17 POWDER, FOR SOLUTION ORAL DAILY
Status: ON HOLD
Start: 2022-10-25

## 2022-10-24 RX ORDER — ACETAMINOPHEN 325 MG/1
650 TABLET ORAL EVERY 8 HOURS SCHEDULED
Status: ON HOLD
Start: 2022-10-24

## 2022-10-24 RX ORDER — CARVEDILOL 25 MG/1
50 TABLET ORAL 2 TIMES DAILY WITH MEALS
Status: ON HOLD
Start: 2022-10-24

## 2022-10-24 RX ORDER — CYCLOBENZAPRINE HCL 5 MG
5 TABLET ORAL 3 TIMES DAILY PRN
Status: ON HOLD
Start: 2022-10-24

## 2022-10-24 RX ORDER — PSEUDOEPHEDRINE HCL 30 MG
100 TABLET ORAL 2 TIMES DAILY
Status: ON HOLD
Start: 2022-10-24

## 2022-10-24 RX ORDER — CHOLECALCIFEROL (VITAMIN D3) 125 MCG
5 CAPSULE ORAL NIGHTLY PRN
Status: ON HOLD
Start: 2022-10-24

## 2022-10-24 RX ORDER — OXYCODONE HYDROCHLORIDE 5 MG/1
5 TABLET ORAL EVERY 6 HOURS PRN
Status: ON HOLD
Start: 2022-10-24

## 2022-10-24 RX ORDER — LISINOPRIL 5 MG/1
5 TABLET ORAL
Status: ON HOLD
Start: 2022-10-25

## 2022-10-24 RX ADMIN — APIXABAN 2.5 MG: 2.5 TABLET, FILM COATED ORAL at 08:52

## 2022-10-24 RX ADMIN — LISINOPRIL 5 MG: 5 TABLET ORAL at 08:52

## 2022-10-24 RX ADMIN — INFLUENZA A VIRUS A/VICTORIA/2570/2019 IVR-215 (H1N1) ANTIGEN (FORMALDEHYDE INACTIVATED), INFLUENZA A VIRUS A/DARWIN/9/2021 SAN-010 (H3N2) ANTIGEN (FORMALDEHYDE INACTIVATED), INFLUENZA B VIRUS B/PHUKET/3073/2013 ANTIGEN (FORMALDEHYDE INACTIVATED), AND INFLUENZA B VIRUS B/MICHIGAN/01/2021 ANTIGEN (FORMALDEHYDE INACTIVATED) 0.7 ML: 60; 60; 60; 60 INJECTION, SUSPENSION INTRAMUSCULAR at 13:41

## 2022-10-24 RX ADMIN — CARVEDILOL 50 MG: 12.5 TABLET, FILM COATED ORAL at 08:53

## 2022-10-24 RX ADMIN — ACETAMINOPHEN 650 MG: 325 TABLET, FILM COATED ORAL at 06:21

## 2022-10-24 RX ADMIN — INSULIN LISPRO 2 UNITS: 100 INJECTION, SOLUTION INTRAVENOUS; SUBCUTANEOUS at 12:26

## 2022-10-24 RX ADMIN — ACETAMINOPHEN 650 MG: 325 TABLET, FILM COATED ORAL at 13:50

## 2022-10-24 RX ADMIN — FAMOTIDINE 20 MG: 20 TABLET ORAL at 08:52

## 2022-10-24 RX ADMIN — POLYETHYLENE GLYCOL 3350 17 G: 17 POWDER, FOR SOLUTION ORAL at 08:50

## 2022-10-24 RX ADMIN — DOCUSATE SODIUM 100 MG: 100 CAPSULE, LIQUID FILLED ORAL at 08:52

## 2022-11-23 ENCOUNTER — HOSPITAL ENCOUNTER (EMERGENCY)
Facility: HOSPITAL | Age: 72
Discharge: HOME | End: 2022-11-23
Attending: EMERGENCY MEDICINE | Admitting: EMERGENCY MEDICINE

## 2022-11-23 ENCOUNTER — OFFICE VISIT (OUTPATIENT)
Dept: ORTHOPEDIC SURGERY | Facility: CLINIC | Age: 72
End: 2022-11-23

## 2022-11-23 VITALS
DIASTOLIC BLOOD PRESSURE: 88 MMHG | SYSTOLIC BLOOD PRESSURE: 150 MMHG | TEMPERATURE: 98.7 F | RESPIRATION RATE: 20 BRPM | HEIGHT: 70 IN | BODY MASS INDEX: 22.9 KG/M2 | HEART RATE: 82 BPM | WEIGHT: 160 LBS | OXYGEN SATURATION: 97 %

## 2022-11-23 VITALS — TEMPERATURE: 97.1 F

## 2022-11-23 DIAGNOSIS — S72.141D CLOSED DISPLACED INTERTROCHANTERIC FRACTURE OF RIGHT FEMUR WITH ROUTINE HEALING, SUBSEQUENT ENCOUNTER: ICD-10-CM

## 2022-11-23 DIAGNOSIS — Z09 SURGERY FOLLOW-UP: Primary | ICD-10-CM

## 2022-11-23 DIAGNOSIS — R82.998 DARK URINE: Primary | ICD-10-CM

## 2022-11-23 LAB
ALBUMIN SERPL-MCNC: 4.1 G/DL (ref 3.5–5.2)
ALBUMIN/GLOB SERPL: 1.2 G/DL
ALP SERPL-CCNC: 87 U/L (ref 39–117)
ALT SERPL W P-5'-P-CCNC: 27 U/L (ref 1–41)
ANION GAP SERPL CALCULATED.3IONS-SCNC: 14 MMOL/L (ref 5–15)
AST SERPL-CCNC: 35 U/L (ref 1–40)
BACTERIA UR QL AUTO: ABNORMAL /HPF
BASOPHILS # BLD AUTO: 0.02 10*3/MM3 (ref 0–0.2)
BASOPHILS NFR BLD AUTO: 0.3 % (ref 0–1.5)
BILIRUB SERPL-MCNC: 0.8 MG/DL (ref 0–1.2)
BILIRUB UR QL STRIP: ABNORMAL
BUN SERPL-MCNC: 15 MG/DL (ref 8–23)
BUN/CREAT SERPL: 17 (ref 7–25)
CALCIUM SPEC-SCNC: 9.2 MG/DL (ref 8.6–10.5)
CHLORIDE SERPL-SCNC: 104 MMOL/L (ref 98–107)
CLARITY UR: CLEAR
CO2 SERPL-SCNC: 23 MMOL/L (ref 22–29)
COLOR UR: ABNORMAL
CREAT SERPL-MCNC: 0.88 MG/DL (ref 0.76–1.27)
D-LACTATE SERPL-SCNC: 2.8 MMOL/L (ref 0.5–2)
D-LACTATE SERPL-SCNC: 2.8 MMOL/L (ref 0.5–2)
DEPRECATED RDW RBC AUTO: 48.5 FL (ref 37–54)
EGFRCR SERPLBLD CKD-EPI 2021: 92.5 ML/MIN/1.73
EOSINOPHIL # BLD AUTO: 0.14 10*3/MM3 (ref 0–0.4)
EOSINOPHIL NFR BLD AUTO: 1.9 % (ref 0.3–6.2)
ERYTHROCYTE [DISTWIDTH] IN BLOOD BY AUTOMATED COUNT: 13.4 % (ref 12.3–15.4)
GLOBULIN UR ELPH-MCNC: 3.5 GM/DL
GLUCOSE SERPL-MCNC: 164 MG/DL (ref 65–99)
GLUCOSE UR STRIP-MCNC: NEGATIVE MG/DL
HCT VFR BLD AUTO: 41.8 % (ref 37.5–51)
HGB BLD-MCNC: 13.8 G/DL (ref 13–17.7)
HGB UR QL STRIP.AUTO: NEGATIVE
HOLD SPECIMEN: NORMAL
HYALINE CASTS UR QL AUTO: ABNORMAL /LPF
IMM GRANULOCYTES # BLD AUTO: 0.02 10*3/MM3 (ref 0–0.05)
IMM GRANULOCYTES NFR BLD AUTO: 0.3 % (ref 0–0.5)
KETONES UR QL STRIP: ABNORMAL
LEUKOCYTE ESTERASE UR QL STRIP.AUTO: ABNORMAL
LIPASE SERPL-CCNC: 53 U/L (ref 13–60)
LYMPHOCYTES # BLD AUTO: 0.97 10*3/MM3 (ref 0.7–3.1)
LYMPHOCYTES NFR BLD AUTO: 13.4 % (ref 19.6–45.3)
MCH RBC QN AUTO: 32 PG (ref 26.6–33)
MCHC RBC AUTO-ENTMCNC: 33 G/DL (ref 31.5–35.7)
MCV RBC AUTO: 97 FL (ref 79–97)
MONOCYTES # BLD AUTO: 0.76 10*3/MM3 (ref 0.1–0.9)
MONOCYTES NFR BLD AUTO: 10.5 % (ref 5–12)
NEUTROPHILS NFR BLD AUTO: 5.34 10*3/MM3 (ref 1.7–7)
NEUTROPHILS NFR BLD AUTO: 73.6 % (ref 42.7–76)
NITRITE UR QL STRIP: NEGATIVE
NRBC BLD AUTO-RTO: 0 /100 WBC (ref 0–0.2)
PH UR STRIP.AUTO: <=5 [PH] (ref 5–8)
PLATELET # BLD AUTO: 91 10*3/MM3 (ref 140–450)
PMV BLD AUTO: 12.6 FL (ref 6–12)
POTASSIUM SERPL-SCNC: 4 MMOL/L (ref 3.5–5.2)
PROT SERPL-MCNC: 7.6 G/DL (ref 6–8.5)
PROT UR QL STRIP: ABNORMAL
RBC # BLD AUTO: 4.31 10*6/MM3 (ref 4.14–5.8)
RBC # UR STRIP: ABNORMAL /HPF
REF LAB TEST METHOD: ABNORMAL
SODIUM SERPL-SCNC: 141 MMOL/L (ref 136–145)
SP GR UR STRIP: 1.03 (ref 1–1.03)
SQUAMOUS #/AREA URNS HPF: ABNORMAL /HPF
UROBILINOGEN UR QL STRIP: ABNORMAL
WBC # UR STRIP: ABNORMAL /HPF
WBC NRBC COR # BLD: 7.25 10*3/MM3 (ref 3.4–10.8)
WHOLE BLOOD HOLD COAG: NORMAL
WHOLE BLOOD HOLD SPECIMEN: NORMAL

## 2022-11-23 PROCEDURE — 81001 URINALYSIS AUTO W/SCOPE: CPT | Performed by: EMERGENCY MEDICINE

## 2022-11-23 PROCEDURE — 99283 EMERGENCY DEPT VISIT LOW MDM: CPT

## 2022-11-23 PROCEDURE — 83605 ASSAY OF LACTIC ACID: CPT

## 2022-11-23 PROCEDURE — 83605 ASSAY OF LACTIC ACID: CPT | Performed by: EMERGENCY MEDICINE

## 2022-11-23 PROCEDURE — 80053 COMPREHEN METABOLIC PANEL: CPT

## 2022-11-23 PROCEDURE — 83690 ASSAY OF LIPASE: CPT

## 2022-11-23 PROCEDURE — 36415 COLL VENOUS BLD VENIPUNCTURE: CPT

## 2022-11-23 PROCEDURE — 85025 COMPLETE CBC W/AUTO DIFF WBC: CPT

## 2022-11-23 PROCEDURE — 99024 POSTOP FOLLOW-UP VISIT: CPT | Performed by: ORTHOPAEDIC SURGERY

## 2022-11-23 RX ORDER — ASCORBIC ACID 500 MG
TABLET ORAL
Status: ON HOLD | COMMUNITY
Start: 2022-11-21

## 2022-11-23 RX ORDER — SODIUM CHLORIDE 9 MG/ML
10 INJECTION INTRAVENOUS AS NEEDED
Status: DISCONTINUED | OUTPATIENT
Start: 2022-11-23 | End: 2022-11-24 | Stop reason: HOSPADM

## 2022-11-23 RX ORDER — ZINC SULFATE 50(220)MG
CAPSULE ORAL
Status: ON HOLD | COMMUNITY
Start: 2022-11-22

## 2022-11-23 NOTE — PROGRESS NOTES
[facility] Note    Subjective     Chief Complaint   Patient presents with   • Post-op     5 weeks status post HIP TROCHANTERIC NAILING WITH INTRAMEDULLARY HIP SCREW RIGHT- 10/18/22        HPI    It has been 5  week(s) since Mr. Guajardo's last visit. He returns to clinic today for postoperative follow-up of right hip fracture fixation. The issue has been ongoing for 5 week(s). He rates his pain a 5/10 on the pain scale. Previous/current treatments: physical therapy. Current symptoms: pain. The pain is worse with sitting;Overall, he is doing better.  Apparently has not been weightbearing significantly, and has been in wheelchair for the most part.  He just came home from [facility].  He had a previous stroke that affected his right side.    I have reviewed the following portions of the patient's history and agree with: History of Present Illness and Review of Systems    Patient Active Problem List   Diagnosis   • Acute right-sided weakness   • Suspected cerebrovascular accident (CVA)   • Leukocytosis   • CHF (congestive heart failure) ([facility])   • Thrombocytopenia ([facility])   • HTN (hypertension)   • Presence of cardiac pacemaker   • Hyperglycemia   • Gangrene of toe of left foot ([facility])   • Closed displaced intertrochanteric fracture of right femur, initial encounter ([facility])     Past Medical History:   Diagnosis Date   • Cardiomyopathy ([facility])    • CHF (congestive heart failure) ([facility])    • Coronary artery disease    • Diabetes mellitus ([facility])    • GERD (gastroesophageal reflux disease)    • HTN (hypertension)    • Stroke ([facility])     Right sided weakness   • Stroke ([facility])       Past Surgical History:   Procedure Laterality Date   • AMPUTATION DIGIT Left 5/12/2021    Procedure: AMPUTATION DIGIT - GREAT TOE AMPUTATION LEFT;  Surgeon: Dario Marmolejo MD;  Location: [facility] to [facility];  Service: General;  Laterality: Left;   • AORTAGRAM N/A 5/11/2021    Procedure: AORTAGRAM WITH RUNOFFS, LEFT SFA BALLOON ANGIOPLASTY;   Surgeon: Tim Mahan MD;  Location: [facility]BRID [facility]LIA;  Service: Vascular;  Laterality: N/A;  FL TIME 6 MIN 54 SECS  82 MGY  CONTRAST VISIPAQUE 100ML     • CARDIAC CATHETERIZATION     • CARDIAC PACEMAKER PLACEMENT      pacemaker/defibrillator, [employer]   • HERNIA REPAIR Bilateral     Inguinal hernia   • HIP TROCHANTERIC NAILING WITH INTRAMEDULLARY HIP SCREW Right 10/18/2022    Procedure: HIP TROCHANTERIC NAILING WITH INTRAMEDULLARY HIP SCREW RIGHT;  Surgeon: Bj Steinberg MD;  Location: [facility] OR;  Service: Orthopedics;  Laterality: Right;      Family History   Problem Relation Age of Onset   • Alzheimer's disease Mother    • Kidney failure Mother    • Cancer Father    • Heart failure Father      Social History     Socioeconomic History   • Marital status: Single   • Number of children: 0   Tobacco Use   • Smoking status: Former     Packs/day: 1.00     Years: 30.00     Pack years: 30.00     Types: Cigarettes   • Smokeless tobacco: Never   • Tobacco comments:     quit 2015   Vaping Use   • Vaping Use: Never used   Substance and Sexual Activity   • Alcohol use: Never   • Drug use: Never   • Sexual activity: Defer      Current Outpatient Medications on File Prior to Visit   Medication Sig Dispense Refill   • acetaminophen (TYLENOL) 325 MG tablet Take 2 tablets by mouth Every 8 (Eight) Hours.     • apixaban (ELIQUIS) 2.5 MG tablet tablet Take 1 tablet by mouth Every 12 (Twelve) Hours. Indications: Prevention of Unwanted Clot in Veins     • atorvastatin (LIPITOR) 80 MG tablet Take 1 tablet by mouth Every Night.     • carvedilol (COREG) 25 MG tablet Take 2 tablets by mouth 2 (Two) Times a Day With Meals.     • cyclobenzaprine (FLEXERIL) 5 MG tablet Take 1 tablet by mouth 3 (Three) Times a Day As Needed for Muscle Spasms.     • docusate sodium 100 MG capsule Take 1 capsule by mouth 2 (Two) Times a Day.     • famotidine (PEPCID) 20 MG tablet Take 1 tablet by mouth Daily.     • lisinopril (PRINIVIL,ZESTRIL) 5  MG tablet Take 1 tablet by mouth Daily.     • melatonin 5 MG tablet tablet Take 1 tablet by mouth At Night As Needed (sleep).     • oxyCODONE (ROXICODONE) 5 MG immediate release tablet Take 1 tablet by mouth Every 6 (Six) Hours As Needed for Moderate Pain.     • pantoprazole (PROTONIX) 40 MG EC tablet Take 1 tablet by mouth Daily. evening     • polyethylene glycol (MIRALAX) 17 g packet Take 17 g by mouth Daily.     • Rybelsus 7 MG tablet Take  by mouth Daily.     • terazosin (HYTRIN) 1 MG capsule Take 1 capsule by mouth.     • Trulicity 0.75 MG/0.5ML solution pen-injector      • vitamin C (ASCORBIC ACID) 500 MG tablet      • Zinc 220 (50 Zn) MG capsule        No current facility-administered medications on file prior to visit.      Allergies   Allergen Reactions   • Other Unknown - Low Severity     Mercury metals- as a child        Review of Systems   Constitutional: Negative for activity change, appetite change, chills, diaphoresis, fatigue, fever and unexpected weight change.   HENT: Negative for congestion, dental problem, drooling, ear discharge, ear pain, facial swelling, hearing loss, mouth sores, nosebleeds, postnasal drip, rhinorrhea, sinus pressure, sneezing, sore throat, tinnitus, trouble swallowing and voice change.    Eyes: Negative for photophobia, pain, discharge, redness, itching and visual disturbance.   Respiratory: Negative for apnea, cough, choking, chest tightness, shortness of breath, wheezing and stridor.    Cardiovascular: Negative for chest pain, palpitations and leg swelling.   Gastrointestinal: Negative for abdominal distention, abdominal pain, anal bleeding, blood in stool, constipation, diarrhea, nausea, rectal pain and vomiting.   Endocrine: Negative for cold intolerance, heat intolerance, polydipsia, polyphagia and polyuria.   Genitourinary: Negative for decreased urine volume, difficulty urinating, dysuria, enuresis, flank pain, frequency, genital sores, hematuria and urgency.    Musculoskeletal: Positive for arthralgias. Negative for back pain, gait problem, joint swelling, myalgias, neck pain and neck stiffness.   Skin: Negative for color change, pallor, rash and wound.   Allergic/Immunologic: Negative for environmental allergies, food allergies and immunocompromised state.   Neurological: Negative for dizziness, tremors, seizures, syncope, facial asymmetry, speech difficulty, weakness, light-headedness, numbness and headaches.   Hematological: Negative for adenopathy. Does not bruise/bleed easily.   Psychiatric/Behavioral: Negative for agitation, behavioral problems, confusion, decreased concentration, dysphoric mood, hallucinations, self-injury, sleep disturbance and suicidal ideas. The patient is not nervous/anxious and is not hyperactive.         Objective      Physical Exam  Temp 97.1 °F (36.2 °C)     There is no height or weight on file to calculate BMI.    General:   Mental Status:  Alert   Appearance: Cooperative, in no acute distress   Build and Nutrition: Deconditioned male   Orientation: Alert and oriented to person, place and time   Posture: Normal   Gait: Sitting in a wheelchair    Integument:   Right hip: Wounds well-healed    Lower Extremity:   Right Hip:    Tenderness:  None    Swelling:  None    Crepitus:  None    Range of motion:  External Rotation: 30°       Internal Rotation: 30°       Flexion:  100°       Extension:  0°    Deformities:  None  Functional testing: Negative Stinchfield      Imaging/Studies  Imaging Results (Last 24 Hours)     Procedure Component Value Units Date/Time    XR Hip With or Without Pelvis 2 - 3 View Right [[id number]] Resulted: 11/23/22 1148     Updated: 11/23/22 1149    Narrative:      Right Hip Radiographs  Indication: Status post intertrochanteric fracture with intramedullary   fixation  Views: low AP pelvis and lateral of the right hip    Comparison: 10/18/2022    Findings:   Slight subsidence of the fracture compared to the initial  postoperative   imaging, with no signs of hardware loosening or failure.            Assessment and Plan     Diagnoses and all orders for this visit:    1. Surgery follow-up (Primary)  -     XR Hip With or Without Pelvis 2 - 3 View Right    2. Closed displaced intertrochanteric fracture of right femur with routine healing, subsequent encounter        1. Surgery follow-up    2. Closed displaced intertrochanteric fracture of right femur with routine healing, subsequent encounter        I reviewed my findings with the patient and his sister.  Fracture appears to be healing, and at this point I will see him back in 2 months with a repeat x-ray.  I will see him back sooner for any problems.  Encouraged him to weight-bear with a walker, and continue with therapy.  He just came home from [facility], and therapy is being established following that stay.    Return in about 2 months (around 1/23/2023) for Recheck with X-Rays.      Bj Steinberg MD  11/23/22  12:01 EST

## 2022-11-24 NOTE — ED PROVIDER NOTES
Subjective   History of Present Illness    Pt presents with dark urine noticed today.  He is recovering from hip surgery and recently went home from [facility].  Now his sister, a retired nurse, is helping care for him. She felt his urine was dark and called his PCP office and the on call recommended he come in for evaluation.  He denies fever, chills, back pain, dysuria, frequency/urgency.  He feels at baseline but does feel like he's urinated less than usual.  No known history of renal disease.    History provided by:  Patient      Review of Systems   Constitutional: Negative for fever.   Respiratory: Negative for cough.    Gastrointestinal: Negative for abdominal pain and vomiting.   Genitourinary: Positive for decreased urine volume. Negative for difficulty urinating, dysuria, frequency and urgency.   All other systems reviewed and are negative.      Past Medical History:   Diagnosis Date   • Cardiomyopathy (HCC)    • CHF (congestive heart failure) (HCC)    • Coronary artery disease    • Diabetes mellitus (HCC)    • GERD (gastroesophageal reflux disease)    • HTN (hypertension)    • Stroke (HCC)     Right sided weakness   • Stroke (HCC)        Allergies   Allergen Reactions   • Other Unknown - Low Severity     Mercury metals- as a child       Past Surgical History:   Procedure Laterality Date   • AMPUTATION DIGIT Left 5/12/2021    Procedure: AMPUTATION DIGIT - GREAT TOE AMPUTATION LEFT;  Surgeon: Dario Marmolejo MD;  Location: [facility] OR;  Service: General;  Laterality: Left;   • AORTAGRAM N/A 5/11/2021    Procedure: AORTAGRAM WITH RUNOFFS, LEFT SFA BALLOON ANGIOPLASTY;  Surgeon: Tim Mahan MD;  Location: [facility];  Service: Vascular;  Laterality: N/A;  FL TIME 6 MIN 54 SECS  82 MGY  CONTRAST VISIPAQUE 100ML     • CARDIAC CATHETERIZATION     • CARDIAC PACEMAKER PLACEMENT      pacemaker/defibrillator, [employer]   • HERNIA REPAIR Bilateral     Inguinal hernia   • HIP TROCHANTERIC NAILING WITH  INTRAMEDULLARY HIP SCREW Right 10/18/2022    Procedure: HIP TROCHANTERIC NAILING WITH INTRAMEDULLARY HIP SCREW RIGHT;  Surgeon: Bj Steinberg MD;  [facility];  Service: Orthopedics;  Laterality: Right;       Family History   Problem Relation Age of Onset   • Alzheimer's disease Mother    • Kidney failure Mother    • Cancer Father    • Heart failure Father        Social History     Socioeconomic History   • Marital status: Single   • Number of children: 0   Tobacco Use   • Smoking status: Former     Packs/day: 1.00     Years: 30.00     Pack years: 30.00     Types: Cigarettes   • Smokeless tobacco: Never   • Tobacco comments:     quit 2015   Vaping Use   • Vaping Use: Never used   Substance and Sexual Activity   • Alcohol use: Never   • Drug use: Never   • Sexual activity: Defer           Objective   Physical Exam  Vitals and nursing note reviewed.   Constitutional:       General: He is not in acute distress.     Appearance: Normal appearance. He is not ill-appearing.   HENT:      Head: Normocephalic and atraumatic.      Mouth/Throat:      Mouth: Mucous membranes are moist.   Eyes:      General: No scleral icterus.        Right eye: No discharge.         Left eye: No discharge.      Conjunctiva/sclera: Conjunctivae normal.   Cardiovascular:      Rate and Rhythm: Normal rate and regular rhythm.      Heart sounds: No murmur heard.  Pulmonary:      Effort: Pulmonary effort is normal. No respiratory distress.      Breath sounds: Normal breath sounds. No wheezing.   Abdominal:      General: Bowel sounds are normal. There is no distension.      Palpations: Abdomen is soft.      Tenderness: There is no abdominal tenderness. There is no guarding or rebound.   Musculoskeletal:         General: No swelling. Normal range of motion.      Cervical back: Normal range of motion and neck supple.   Skin:     General: Skin is warm and dry.      Findings: No rash.   Neurological:      General: No focal deficit present.       Mental Status: He is alert. Mental status is at baseline.   Psychiatric:         Mood and Affect: Mood normal.         Behavior: Behavior normal.         Thought Content: Thought content normal.         Procedures           ED Course         UA negative. Normal labs.  Patient stable on serial rechecks.  Discussed findings, concerns, plan of care, expected course, reasons to return and followup.  Provided the opportunity to ask questions.    Recommend oral hydration.                                  MDM  Number of Diagnoses or Management Options     Amount and/or Complexity of Data Reviewed  Clinical lab tests: ordered and reviewed        Final diagnoses:   Dark urine       ED Disposition  ED Disposition     ED Disposition   Discharge    Condition   Stable    Comment   --             Arsen Seals, APRN  [address] [address]  [phone]    In 1 week  As needed         Medication List      No changes were made to your prescriptions during this visit.          Ezequiel Ndiaye MD  11/24/22 0123

## 2023-03-20 ENCOUNTER — TRANSCRIBE ORDERS (OUTPATIENT)
Dept: NUTRITION | Facility: HOSPITAL | Age: 73
End: 2023-03-20
Payer: MEDICARE

## 2023-03-20 DIAGNOSIS — R63.8 DECREASED ORAL INTAKE: ICD-10-CM

## 2023-03-20 DIAGNOSIS — R63.4 UNINTENTIONAL WEIGHT LOSS: Primary | ICD-10-CM

## 2023-03-21 ENCOUNTER — HOSPITAL ENCOUNTER (OUTPATIENT)
Dept: NUTRITION | Facility: HOSPITAL | Age: 73
Setting detail: RECURRING SERIES
Discharge: HOME | End: 2023-03-21

## 2023-03-21 PROCEDURE — 97802 MEDICAL NUTRITION INDIV IN: CPT

## 2023-03-21 NOTE — CONSULTS
[facility]          Initial 60 Minute Nutrition Visit    Date: 2023   Patient Name: Timmy Guajardo  : 1950   MRN: [id number]   Referring Provider: Jim Pina PA-C    Reason for Visit: unintentional wt loss, and decreased oral intake; pt is also being treated for Diabetes  Visit Format: PHONE    Nutrition Assessment       Social History:   Social History     Socioeconomic History   • Marital status: Single   • Number of children: 0   Tobacco Use   • Smoking status: Former     Packs/day: 1.00     Years: 30.00     Pack years: 30.00     Types: Cigarettes   • Smokeless tobacco: Never   • Tobacco comments:     quit 2015   Vaping Use   • Vaping Use: Never used   Substance and Sexual Activity   • Alcohol use: Never   • Drug use: Never   • Sexual activity: Defer     Active Problem List:   Patient Active Problem List    Diagnosis    • Closed displaced intertrochanteric fracture of right femur, initial encounter ([facility]) [S72.141A]    • Hyperglycemia [R73.9]    • Acute right-sided weakness [R53.1]    • Suspected cerebrovascular accident (CVA) [R09.89]    • Leukocytosis [D72.829]    • CHF (congestive heart failure) ([facility]) [I50.9]    • Thrombocytopenia ([facility]) [D69.6]    • HTN (hypertension) [I10]    • Presence of cardiac pacemaker [Z95.0]    • Gangrene of toe of left foot ([facility]) [I96]       Current Medications:   Current Outpatient Medications:   •  acetaminophen (TYLENOL) 325 MG tablet, Take 2 tablets by mouth Every 8 (Eight) Hours., Disp: , Rfl:   •  apixaban (ELIQUIS) 2.5 MG tablet tablet, Take 1 tablet by mouth Every 12 (Twelve) Hours. Indications: Prevention of Unwanted Clot in Veins, Disp: , Rfl:   •  atorvastatin (LIPITOR) 80 MG tablet, Take 1 tablet by mouth Every Night., Disp: , Rfl:   •  carvedilol (COREG) 25 MG tablet, Take 2 tablets by mouth 2 (Two) Times a Day With Meals., Disp: , Rfl:   •  cyclobenzaprine (FLEXERIL) 5 MG tablet, Take 1 tablet by mouth 3 (Three)  Times a Day As Needed for Muscle Spasms., Disp: , Rfl:   •  docusate sodium 100 MG capsule, Take 1 capsule by mouth 2 (Two) Times a Day., Disp: , Rfl:   •  famotidine (PEPCID) 20 MG tablet, Take 1 tablet by mouth Daily., Disp: , Rfl:   •  lisinopril (PRINIVIL,ZESTRIL) 5 MG tablet, Take 1 tablet by mouth Daily., Disp: , Rfl:   •  melatonin 5 MG tablet tablet, Take 1 tablet by mouth At Night As Needed (sleep)., Disp: , Rfl:   •  oxyCODONE (ROXICODONE) 5 MG immediate release tablet, Take 1 tablet by mouth Every 6 (Six) Hours As Needed for Moderate Pain., Disp: , Rfl:   •  pantoprazole (PROTONIX) 40 MG EC tablet, Take 1 tablet by mouth Daily. evening, Disp: , Rfl:   •  polyethylene glycol (MIRALAX) 17 g packet, Take 17 g by mouth Daily., Disp: , Rfl:   •  Rybelsus 7 MG tablet, Take  by mouth Daily., Disp: , Rfl:   •  terazosin (HYTRIN) 1 MG capsule, Take 1 capsule by mouth., Disp: , Rfl:   •  Trulicity 0.75 MG/0.5ML solution pen-injector, , Disp: , Rfl:   •  vitamin C (ASCORBIC ACID) 500 MG tablet, , Disp: , Rfl:   •  Zinc 220 (50 Zn) MG capsule, , Disp: , Rfl:     Labs: n/a (no recent A1c)    Hunger Vital Sign Food Insecurity Assessment:  Within the past 12 months I/we worried whether our food would run out before I/we got money to buy more: unknown   Within the past 12 months the food I/we bought just didn't last and I/we didn't have money to get more: unknown   Use of food assistance programs (WIC, food stamps, food maynard) no       Food & Nutrition Related History       Food Allergies: none indicated  Food Intolerances: garlic suspected   Food Behavior: can't taste from having COVID months ago  Nutrition Impact Symptoms: constipation  Gastrointestinal conditions that impact intake or food choices: none indicated  Details at home: lives with nephew and is getting ready to move into his sister's house   Who prepares most meals: nephew or sister   Who does grocery shopping: nephew or sister   How many meals are  "purchased from fast food/sit down restaurants per week: unknown, did not discuss  Difficulty chewing: pt says he chews fine but did mention his missing teeth  Difficulty swallowin - Normal  Diet requirement related to personal preference or cultural belief: none indicated  History of eating disorder/disordered eating habits: None  Language/communication details: english   Barriers to learning: No barriers identified at this time    24 Hour Recall:   Time Food/beverages consumed   B Cereal and milk       L Peanut butter and jelly sandwich       D Beef stew   mashed potatoes                  Additional comments: Pt says he has been starting to eat 3-4 times a day. Pt will drink an ensure as a snack but mentioned his doctor encouraged him not to drink an ensure as a meal. RD assured him it was okay to drink as part of his meal. Pt struggles with lack of appetite and is struggling with options he can incorporate. 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 Pt doesn't eat much meat because he says it's tastes bad at this time. RD focused on options he wanted to try and incorporate.     Anthropometrics      Height:   Ht Readings from Last 1 Encounters:   22 177.8 cm (70\")     Weight:   Wt Readings from Last 3 Encounters:   22 72.6 kg (160 lb)   10/24/22 82.1 kg (181 lb 1.6 oz)   22 75.5 kg (166 lb 6.4 oz)     BMI: There is no height or weight on file to calculate BMI.   Weight Change: Pt says he's lost 20 lbs within the last 2 months      Physical Activity     Barriers to physical activity: was in the hospital for a broken leg when he got COVID, he hasn't been active much since he broke his leg     Physical activity comments: Pt wants to incorporate more activity eventually.      Estimated Needs     Estimated Energy Needs: did not assess needs at this time    Estimated Protein Needs: did not assess " needs at this time    Estimated Fluid Needs: did not assess needs at this time    Discussion / Education      Consistency of meals: We discussed the importance of eating consistent, balanced meals to meet nutrient needs to promote satiety and satisfaction when eating. RD recommended patient to try and incorporate a small meal or snack every 3-4 hours. RD explained meal patterns should be individualized from person to person.      High Calorie Options: RD encouraged patient to try and incorporate nut butter on toast or with fruit, using butter for flavor or to cook with and/or cream based soups or pasta sauces. Pt has been incorporating some fruit, which should help with the constipation he's experiencing. RD encouraged drinkable greek yogurt, cottage cheese, cheese and crackers, etc.      Assessment of patient engagement: Engaged    Measurement of understanding: Patient verbalized understanding    Resources Provided: RD mailed resources after session:  The 3 macronutrients  Quick meal ideas  Quick breakfast ideas    Goal (s)      Goal 1: Eat a small meal/snack with protein and fiber every 3-4 hours     Plan of Care     PES Statement:   Inadequate oral intake related to skipping meals and not eating balanced meals as evidenced by dietary recall and interview.     Follow Up Visit      Follow Up:   4/21 at 10 AM    Total of 60 minutes spent with patient on nutrition counseling. Education based on Academy of Nutrition and Dietetics guidelines. Patient was provided with RD's contact information. Thank you for this referral.      Electronically signed by:  Tereza Cobb RD  03/21/23 13:24 EDT

## 2023-03-24 ENCOUNTER — APPOINTMENT (OUTPATIENT)
Dept: CT IMAGING | Facility: HOSPITAL | Age: 73
DRG: 163 | End: 2023-03-24
Payer: MEDICARE

## 2023-03-24 ENCOUNTER — HOSPITAL ENCOUNTER (INPATIENT)
Facility: HOSPITAL | Age: 73
LOS: 27 days | Discharge: NURSING, REHAB OR LONG-TERM CARE | DRG: 163 | End: 2023-04-20
Attending: EMERGENCY MEDICINE | Admitting: INTERNAL MEDICINE
Payer: MEDICARE

## 2023-03-24 DIAGNOSIS — K22.70 BARRETT'S ESOPHAGUS WITHOUT DYSPLASIA: ICD-10-CM

## 2023-03-24 DIAGNOSIS — R63.4 WEIGHT LOSS: Primary | ICD-10-CM

## 2023-03-24 DIAGNOSIS — R06.02 SHORTNESS OF BREATH: ICD-10-CM

## 2023-03-24 DIAGNOSIS — J90 PLEURAL EFFUSION: ICD-10-CM

## 2023-03-24 DIAGNOSIS — R07.89 CHEST PAIN, ATYPICAL: ICD-10-CM

## 2023-03-24 DIAGNOSIS — R63.4 UNINTENTIONAL WEIGHT LOSS: ICD-10-CM

## 2023-03-24 PROBLEM — I26.99 BILATERAL PULMONARY EMBOLISM: Status: ACTIVE | Noted: 2023-03-24

## 2023-03-24 PROBLEM — R53.81 DEBILITY: Status: ACTIVE | Noted: 2023-03-24

## 2023-03-24 PROBLEM — E43 SEVERE MALNUTRITION: Status: ACTIVE | Noted: 2023-03-24

## 2023-03-24 PROBLEM — E88.09 HYPOALBUMINEMIA: Status: ACTIVE | Noted: 2023-03-24

## 2023-03-24 PROBLEM — Z86.73 HISTORY OF CVA (CEREBROVASCULAR ACCIDENT): Status: ACTIVE | Noted: 2023-03-24

## 2023-03-24 LAB
ALBUMIN SERPL-MCNC: 2.8 G/DL (ref 3.5–5.2)
ALBUMIN/GLOB SERPL: 0.6 G/DL
ALP SERPL-CCNC: 98 U/L (ref 39–117)
ALT SERPL W P-5'-P-CCNC: 19 U/L (ref 1–41)
ANION GAP SERPL CALCULATED.3IONS-SCNC: 13 MMOL/L (ref 5–15)
AST SERPL-CCNC: 21 U/L (ref 1–40)
B PARAPERT DNA SPEC QL NAA+PROBE: NOT DETECTED
B PERT DNA SPEC QL NAA+PROBE: NOT DETECTED
BASOPHILS # BLD AUTO: 0.05 10*3/MM3 (ref 0–0.2)
BASOPHILS NFR BLD AUTO: 0.3 % (ref 0–1.5)
BILIRUB SERPL-MCNC: 0.5 MG/DL (ref 0–1.2)
BUN SERPL-MCNC: 26 MG/DL (ref 8–23)
BUN/CREAT SERPL: 26.8 (ref 7–25)
C PNEUM DNA NPH QL NAA+NON-PROBE: NOT DETECTED
CALCIUM SPEC-SCNC: 9 MG/DL (ref 8.6–10.5)
CHLORIDE SERPL-SCNC: 94 MMOL/L (ref 98–107)
CO2 SERPL-SCNC: 23 MMOL/L (ref 22–29)
CREAT SERPL-MCNC: 0.97 MG/DL (ref 0.76–1.27)
D-LACTATE SERPL-SCNC: 3.1 MMOL/L (ref 0.5–2)
D-LACTATE SERPL-SCNC: 3.2 MMOL/L (ref 0.5–2)
DEPRECATED RDW RBC AUTO: 53.3 FL (ref 37–54)
EGFRCR SERPLBLD CKD-EPI 2021: 84 ML/MIN/1.73
EOSINOPHIL # BLD AUTO: 0 10*3/MM3 (ref 0–0.4)
EOSINOPHIL NFR BLD AUTO: 0 % (ref 0.3–6.2)
ERYTHROCYTE [DISTWIDTH] IN BLOOD BY AUTOMATED COUNT: 14.8 % (ref 12.3–15.4)
FLUAV SUBTYP SPEC NAA+PROBE: NOT DETECTED
FLUBV RNA ISLT QL NAA+PROBE: NOT DETECTED
GLOBULIN UR ELPH-MCNC: 4.4 GM/DL
GLUCOSE SERPL-MCNC: 233 MG/DL (ref 65–99)
HADV DNA SPEC NAA+PROBE: NOT DETECTED
HCOV 229E RNA SPEC QL NAA+PROBE: NOT DETECTED
HCOV HKU1 RNA SPEC QL NAA+PROBE: NOT DETECTED
HCOV NL63 RNA SPEC QL NAA+PROBE: NOT DETECTED
HCOV OC43 RNA SPEC QL NAA+PROBE: NOT DETECTED
HCT VFR BLD AUTO: 41.9 % (ref 37.5–51)
HGB BLD-MCNC: 12.9 G/DL (ref 13–17.7)
HMPV RNA NPH QL NAA+NON-PROBE: NOT DETECTED
HOLD SPECIMEN: NORMAL
HPIV1 RNA ISLT QL NAA+PROBE: NOT DETECTED
HPIV2 RNA SPEC QL NAA+PROBE: NOT DETECTED
HPIV3 RNA NPH QL NAA+PROBE: NOT DETECTED
HPIV4 P GENE NPH QL NAA+PROBE: NOT DETECTED
IMM GRANULOCYTES # BLD AUTO: 0.17 10*3/MM3 (ref 0–0.05)
IMM GRANULOCYTES NFR BLD AUTO: 0.9 % (ref 0–0.5)
LIPASE SERPL-CCNC: 15 U/L (ref 13–60)
LYMPHOCYTES # BLD AUTO: 0.83 10*3/MM3 (ref 0.7–3.1)
LYMPHOCYTES NFR BLD AUTO: 4.6 % (ref 19.6–45.3)
M PNEUMO IGG SER IA-ACNC: NOT DETECTED
MAGNESIUM SERPL-MCNC: 2.1 MG/DL (ref 1.6–2.4)
MCH RBC QN AUTO: 30 PG (ref 26.6–33)
MCHC RBC AUTO-ENTMCNC: 30.8 G/DL (ref 31.5–35.7)
MCV RBC AUTO: 97.4 FL (ref 79–97)
MONOCYTES # BLD AUTO: 0.99 10*3/MM3 (ref 0.1–0.9)
MONOCYTES NFR BLD AUTO: 5.5 % (ref 5–12)
NEUTROPHILS NFR BLD AUTO: 15.92 10*3/MM3 (ref 1.7–7)
NEUTROPHILS NFR BLD AUTO: 88.7 % (ref 42.7–76)
NRBC BLD AUTO-RTO: 0 /100 WBC (ref 0–0.2)
PLATELET # BLD AUTO: 193 10*3/MM3 (ref 140–450)
PMV BLD AUTO: 9.1 FL (ref 6–12)
POTASSIUM SERPL-SCNC: 4.7 MMOL/L (ref 3.5–5.2)
PROCALCITONIN SERPL-MCNC: 0.14 NG/ML (ref 0–0.25)
PROT ?TM UR-MCNC: 62.2 MG/DL
PROT SERPL-MCNC: 7.2 G/DL (ref 6–8.5)
RBC # BLD AUTO: 4.3 10*6/MM3 (ref 4.14–5.8)
RHINOVIRUS RNA SPEC NAA+PROBE: NOT DETECTED
RSV RNA NPH QL NAA+NON-PROBE: NOT DETECTED
SARS-COV-2 RNA NPH QL NAA+NON-PROBE: NOT DETECTED
SODIUM SERPL-SCNC: 130 MMOL/L (ref 136–145)
T4 FREE SERPL-MCNC: 1.23 NG/DL (ref 0.93–1.7)
TSH SERPL DL<=0.05 MIU/L-ACNC: 1.61 UIU/ML (ref 0.27–4.2)
WBC NRBC COR # BLD: 17.96 10*3/MM3 (ref 3.4–10.8)
WHOLE BLOOD HOLD COAG: NORMAL
WHOLE BLOOD HOLD SPECIMEN: NORMAL

## 2023-03-24 PROCEDURE — 99223 1ST HOSP IP/OBS HIGH 75: CPT | Performed by: INTERNAL MEDICINE

## 2023-03-24 PROCEDURE — 25510000001 IOPAMIDOL PER 1 ML: Performed by: EMERGENCY MEDICINE

## 2023-03-24 PROCEDURE — 87077 CULTURE AEROBIC IDENTIFY: CPT | Performed by: INTERNAL MEDICINE

## 2023-03-24 PROCEDURE — 83605 ASSAY OF LACTIC ACID: CPT | Performed by: EMERGENCY MEDICINE

## 2023-03-24 PROCEDURE — 83690 ASSAY OF LIPASE: CPT | Performed by: EMERGENCY MEDICINE

## 2023-03-24 PROCEDURE — 80074 ACUTE HEPATITIS PANEL: CPT | Performed by: INTERNAL MEDICINE

## 2023-03-24 PROCEDURE — 84443 ASSAY THYROID STIM HORMONE: CPT | Performed by: EMERGENCY MEDICINE

## 2023-03-24 PROCEDURE — 80053 COMPREHEN METABOLIC PANEL: CPT | Performed by: EMERGENCY MEDICINE

## 2023-03-24 PROCEDURE — 85025 COMPLETE CBC W/AUTO DIFF WBC: CPT | Performed by: EMERGENCY MEDICINE

## 2023-03-24 PROCEDURE — 84156 ASSAY OF PROTEIN URINE: CPT | Performed by: INTERNAL MEDICINE

## 2023-03-24 PROCEDURE — 87086 URINE CULTURE/COLONY COUNT: CPT | Performed by: INTERNAL MEDICINE

## 2023-03-24 PROCEDURE — 81001 URINALYSIS AUTO W/SCOPE: CPT | Performed by: INTERNAL MEDICINE

## 2023-03-24 PROCEDURE — 74177 CT ABD & PELVIS W/CONTRAST: CPT

## 2023-03-24 PROCEDURE — 36415 COLL VENOUS BLD VENIPUNCTURE: CPT

## 2023-03-24 PROCEDURE — 82570 ASSAY OF URINE CREATININE: CPT | Performed by: INTERNAL MEDICINE

## 2023-03-24 PROCEDURE — 25510000001 IOPAMIDOL 61 % SOLUTION: Performed by: EMERGENCY MEDICINE

## 2023-03-24 PROCEDURE — 71275 CT ANGIOGRAPHY CHEST: CPT

## 2023-03-24 PROCEDURE — 83735 ASSAY OF MAGNESIUM: CPT | Performed by: EMERGENCY MEDICINE

## 2023-03-24 PROCEDURE — 84439 ASSAY OF FREE THYROXINE: CPT | Performed by: EMERGENCY MEDICINE

## 2023-03-24 PROCEDURE — 99284 EMERGENCY DEPT VISIT MOD MDM: CPT

## 2023-03-24 PROCEDURE — 84145 PROCALCITONIN (PCT): CPT | Performed by: INTERNAL MEDICINE

## 2023-03-24 PROCEDURE — 0202U NFCT DS 22 TRGT SARS-COV-2: CPT | Performed by: INTERNAL MEDICINE

## 2023-03-24 PROCEDURE — 87186 SC STD MICRODIL/AGAR DIL: CPT | Performed by: INTERNAL MEDICINE

## 2023-03-24 RX ORDER — HEPARIN SODIUM 1000 [USP'U]/ML
80 INJECTION, SOLUTION INTRAVENOUS; SUBCUTANEOUS ONCE
Status: COMPLETED | OUTPATIENT
Start: 2023-03-24 | End: 2023-03-25

## 2023-03-24 RX ORDER — HEPARIN SODIUM 10000 [USP'U]/100ML
18 INJECTION, SOLUTION INTRAVENOUS
Status: DISCONTINUED | OUTPATIENT
Start: 2023-03-24 | End: 2023-03-25

## 2023-03-24 RX ORDER — INSULIN LISPRO 100 [IU]/ML
0-7 INJECTION, SOLUTION INTRAVENOUS; SUBCUTANEOUS EVERY 6 HOURS SCHEDULED
Status: DISCONTINUED | OUTPATIENT
Start: 2023-03-25 | End: 2023-04-05

## 2023-03-24 RX ORDER — SODIUM CHLORIDE, SODIUM LACTATE, POTASSIUM CHLORIDE, CALCIUM CHLORIDE 600; 310; 30; 20 MG/100ML; MG/100ML; MG/100ML; MG/100ML
75 INJECTION, SOLUTION INTRAVENOUS CONTINUOUS
Status: ACTIVE | OUTPATIENT
Start: 2023-03-24 | End: 2023-03-26

## 2023-03-24 RX ORDER — LACTULOSE 10 G/15ML
30 SOLUTION ORAL ONCE
Status: COMPLETED | OUTPATIENT
Start: 2023-03-24 | End: 2023-03-25

## 2023-03-24 RX ORDER — DEXTROSE MONOHYDRATE 25 G/50ML
25 INJECTION, SOLUTION INTRAVENOUS
Status: DISCONTINUED | OUTPATIENT
Start: 2023-03-24 | End: 2023-04-20 | Stop reason: HOSPADM

## 2023-03-24 RX ORDER — IBUPROFEN 600 MG/1
1 TABLET ORAL
Status: DISCONTINUED | OUTPATIENT
Start: 2023-03-24 | End: 2023-04-20 | Stop reason: HOSPADM

## 2023-03-24 RX ORDER — HEPARIN SODIUM 1000 [USP'U]/ML
50 INJECTION, SOLUTION INTRAVENOUS; SUBCUTANEOUS AS NEEDED
Status: DISCONTINUED | OUTPATIENT
Start: 2023-03-24 | End: 2023-03-25 | Stop reason: SDUPTHER

## 2023-03-24 RX ORDER — SODIUM CHLORIDE 9 MG/ML
10 INJECTION INTRAVENOUS AS NEEDED
Status: DISCONTINUED | OUTPATIENT
Start: 2023-03-24 | End: 2023-04-06 | Stop reason: SDUPTHER

## 2023-03-24 RX ORDER — HEPARIN SODIUM 1000 [USP'U]/ML
25 INJECTION, SOLUTION INTRAVENOUS; SUBCUTANEOUS AS NEEDED
Status: DISCONTINUED | OUTPATIENT
Start: 2023-03-24 | End: 2023-03-25 | Stop reason: SDUPTHER

## 2023-03-24 RX ORDER — OXYCODONE HYDROCHLORIDE 5 MG/1
5 TABLET ORAL EVERY 6 HOURS PRN
Status: CANCELLED | OUTPATIENT
Start: 2023-03-24

## 2023-03-24 RX ORDER — NICOTINE POLACRILEX 4 MG
15 LOZENGE BUCCAL
Status: DISCONTINUED | OUTPATIENT
Start: 2023-03-24 | End: 2023-04-20 | Stop reason: HOSPADM

## 2023-03-24 RX ORDER — LUBIPROSTONE 24 UG/1
24 CAPSULE ORAL ONCE
Status: COMPLETED | OUTPATIENT
Start: 2023-03-24 | End: 2023-03-25

## 2023-03-24 RX ADMIN — IOPAMIDOL 100 ML: 612 INJECTION, SOLUTION INTRAVENOUS at 20:18

## 2023-03-24 RX ADMIN — IOPAMIDOL 80 ML: 755 INJECTION, SOLUTION INTRAVENOUS at 21:47

## 2023-03-24 RX ADMIN — SODIUM CHLORIDE, POTASSIUM CHLORIDE, SODIUM LACTATE AND CALCIUM CHLORIDE 1000 ML: 600; 310; 30; 20 INJECTION, SOLUTION INTRAVENOUS at 23:26

## 2023-03-25 LAB
ANION GAP SERPL CALCULATED.3IONS-SCNC: 19 MMOL/L (ref 5–15)
APTT PPP: 60.4 SECONDS (ref 60–90)
APTT PPP: 63.1 SECONDS (ref 60–90)
APTT PPP: 71.4 SECONDS (ref 60–90)
APTT PPP: >200 SECONDS (ref 60–90)
BACTERIA UR QL AUTO: ABNORMAL /HPF
BILIRUB UR QL STRIP: NEGATIVE
BUN SERPL-MCNC: 21 MG/DL (ref 8–23)
BUN/CREAT SERPL: 29.6 (ref 7–25)
CALCIUM SPEC-SCNC: 8.4 MG/DL (ref 8.6–10.5)
CHLORIDE SERPL-SCNC: 102 MMOL/L (ref 98–107)
CLARITY UR: ABNORMAL
CO2 SERPL-SCNC: 16 MMOL/L (ref 22–29)
COLOR UR: YELLOW
CREAT SERPL-MCNC: 0.71 MG/DL (ref 0.76–1.27)
CREAT UR-MCNC: 92.4 MG/DL
CRP SERPL-MCNC: 10.22 MG/DL (ref 0–0.5)
EGFRCR SERPLBLD CKD-EPI 2021: 98.7 ML/MIN/1.73
FINE GRAN CASTS URNS QL MICRO: ABNORMAL /LPF
GLUCOSE BLDC GLUCOMTR-MCNC: 105 MG/DL (ref 70–130)
GLUCOSE BLDC GLUCOMTR-MCNC: 140 MG/DL (ref 70–130)
GLUCOSE BLDC GLUCOMTR-MCNC: 148 MG/DL (ref 70–130)
GLUCOSE BLDC GLUCOMTR-MCNC: 71 MG/DL (ref 70–130)
GLUCOSE BLDC GLUCOMTR-MCNC: 86 MG/DL (ref 70–130)
GLUCOSE SERPL-MCNC: 139 MG/DL (ref 65–99)
GLUCOSE UR STRIP-MCNC: NEGATIVE MG/DL
HAV IGM SERPL QL IA: NORMAL
HBA1C MFR BLD: 6.3 % (ref 4.8–5.6)
HBV CORE IGM SERPL QL IA: NORMAL
HBV SURFACE AG SERPL QL IA: NORMAL
HCV AB SER DONR QL: NORMAL
HGB UR QL STRIP.AUTO: ABNORMAL
HYALINE CASTS UR QL AUTO: ABNORMAL /LPF
INR PPP: 3.62 (ref 0.84–1.13)
KETONES UR QL STRIP: NEGATIVE
LEUKOCYTE ESTERASE UR QL STRIP.AUTO: ABNORMAL
MAGNESIUM SERPL-MCNC: 1.8 MG/DL (ref 1.6–2.4)
NITRITE UR QL STRIP: NEGATIVE
PH UR STRIP.AUTO: <=5 [PH] (ref 5–8)
PHOSPHATE SERPL-MCNC: 3.9 MG/DL (ref 2.5–4.5)
POTASSIUM SERPL-SCNC: 4 MMOL/L (ref 3.5–5.2)
PREALB SERPL-MCNC: 9 MG/DL (ref 20–40)
PROT UR QL STRIP: ABNORMAL
PROTHROMBIN TIME: 36.4 SECONDS (ref 11.4–14.4)
RBC # UR STRIP: ABNORMAL /HPF
REF LAB TEST METHOD: ABNORMAL
SODIUM SERPL-SCNC: 137 MMOL/L (ref 136–145)
SP GR UR STRIP: 1.05 (ref 1–1.03)
SQUAMOUS #/AREA URNS HPF: ABNORMAL /HPF
UFH PPP CHRO-ACNC: >1.1 IU/ML (ref 0.3–0.7)
UROBILINOGEN UR QL STRIP: ABNORMAL
VIT B12 BLD-MCNC: 260 PG/ML (ref 211–946)
WBC # UR STRIP: ABNORMAL /HPF
WBC CASTS #/AREA URNS LPF: ABNORMAL /LPF

## 2023-03-25 PROCEDURE — 85610 PROTHROMBIN TIME: CPT | Performed by: INTERNAL MEDICINE

## 2023-03-25 PROCEDURE — 99233 SBSQ HOSP IP/OBS HIGH 50: CPT | Performed by: INTERNAL MEDICINE

## 2023-03-25 PROCEDURE — 83036 HEMOGLOBIN GLYCOSYLATED A1C: CPT | Performed by: INTERNAL MEDICINE

## 2023-03-25 PROCEDURE — 82607 VITAMIN B-12: CPT | Performed by: INTERNAL MEDICINE

## 2023-03-25 PROCEDURE — 85730 THROMBOPLASTIN TIME PARTIAL: CPT

## 2023-03-25 PROCEDURE — 25010000002 HEPARIN (PORCINE) 25000-0.45 UT/250ML-% SOLUTION

## 2023-03-25 PROCEDURE — 85520 HEPARIN ASSAY: CPT

## 2023-03-25 PROCEDURE — 25010000002 VANCOMYCIN 10 G RECONSTITUTED SOLUTION

## 2023-03-25 PROCEDURE — 85730 THROMBOPLASTIN TIME PARTIAL: CPT | Performed by: INTERNAL MEDICINE

## 2023-03-25 PROCEDURE — 25010000002 HEPARIN (PORCINE) 25000-0.45 UT/250ML-% SOLUTION: Performed by: INTERNAL MEDICINE

## 2023-03-25 PROCEDURE — 83735 ASSAY OF MAGNESIUM: CPT | Performed by: INTERNAL MEDICINE

## 2023-03-25 PROCEDURE — 84134 ASSAY OF PREALBUMIN: CPT

## 2023-03-25 PROCEDURE — 84100 ASSAY OF PHOSPHORUS: CPT | Performed by: INTERNAL MEDICINE

## 2023-03-25 PROCEDURE — 86140 C-REACTIVE PROTEIN: CPT

## 2023-03-25 PROCEDURE — 80048 BASIC METABOLIC PNL TOTAL CA: CPT | Performed by: INTERNAL MEDICINE

## 2023-03-25 PROCEDURE — 25010000002 PIPERACILLIN SOD-TAZOBACTAM PER 1 G: Performed by: INTERNAL MEDICINE

## 2023-03-25 PROCEDURE — 25010000002 HEPARIN (PORCINE) PER 1000 UNITS: Performed by: INTERNAL MEDICINE

## 2023-03-25 PROCEDURE — 82962 GLUCOSE BLOOD TEST: CPT

## 2023-03-25 RX ORDER — CARVEDILOL 12.5 MG/1
50 TABLET ORAL 2 TIMES DAILY WITH MEALS
Status: DISCONTINUED | OUTPATIENT
Start: 2023-03-25 | End: 2023-04-20 | Stop reason: HOSPADM

## 2023-03-25 RX ORDER — POLYETHYLENE GLYCOL 3350 17 G/17G
17 POWDER, FOR SOLUTION ORAL DAILY PRN
Status: DISCONTINUED | OUTPATIENT
Start: 2023-03-25 | End: 2023-04-20 | Stop reason: HOSPADM

## 2023-03-25 RX ORDER — ACETAMINOPHEN 325 MG/1
650 TABLET ORAL EVERY 8 HOURS SCHEDULED
Status: DISCONTINUED | OUTPATIENT
Start: 2023-03-25 | End: 2023-04-12

## 2023-03-25 RX ORDER — ASCORBIC ACID 500 MG
1000 TABLET ORAL DAILY
Status: DISCONTINUED | OUTPATIENT
Start: 2023-03-25 | End: 2023-04-20 | Stop reason: HOSPADM

## 2023-03-25 RX ORDER — BISACODYL 10 MG
10 SUPPOSITORY, RECTAL RECTAL DAILY PRN
Status: DISCONTINUED | OUTPATIENT
Start: 2023-03-25 | End: 2023-04-20 | Stop reason: HOSPADM

## 2023-03-25 RX ORDER — HEPARIN SODIUM 10000 [USP'U]/100ML
20 INJECTION, SOLUTION INTRAVENOUS
Status: DISPENSED | OUTPATIENT
Start: 2023-03-25 | End: 2023-03-29

## 2023-03-25 RX ORDER — ATORVASTATIN CALCIUM 40 MG/1
80 TABLET, FILM COATED ORAL NIGHTLY
Status: DISCONTINUED | OUTPATIENT
Start: 2023-03-25 | End: 2023-04-20 | Stop reason: HOSPADM

## 2023-03-25 RX ORDER — ACETAMINOPHEN 160 MG/5ML
650 SOLUTION ORAL EVERY 4 HOURS PRN
Status: DISCONTINUED | OUTPATIENT
Start: 2023-03-25 | End: 2023-04-12

## 2023-03-25 RX ORDER — SODIUM CHLORIDE 9 MG/ML
40 INJECTION, SOLUTION INTRAVENOUS AS NEEDED
Status: DISCONTINUED | OUTPATIENT
Start: 2023-03-25 | End: 2023-04-20 | Stop reason: HOSPADM

## 2023-03-25 RX ORDER — ASPIRIN 81 MG/1
81 TABLET, CHEWABLE ORAL DAILY
Status: DISCONTINUED | OUTPATIENT
Start: 2023-03-25 | End: 2023-04-20 | Stop reason: HOSPADM

## 2023-03-25 RX ORDER — ONDANSETRON 2 MG/ML
4 INJECTION INTRAMUSCULAR; INTRAVENOUS EVERY 6 HOURS PRN
Status: DISCONTINUED | OUTPATIENT
Start: 2023-03-25 | End: 2023-04-12 | Stop reason: SDUPTHER

## 2023-03-25 RX ORDER — LISINOPRIL 5 MG/1
5 TABLET ORAL
Status: DISCONTINUED | OUTPATIENT
Start: 2023-03-25 | End: 2023-04-13

## 2023-03-25 RX ORDER — CHOLECALCIFEROL (VITAMIN D3) 125 MCG
5 CAPSULE ORAL NIGHTLY PRN
Status: DISCONTINUED | OUTPATIENT
Start: 2023-03-25 | End: 2023-04-20 | Stop reason: HOSPADM

## 2023-03-25 RX ORDER — CYCLOBENZAPRINE HCL 10 MG
5 TABLET ORAL 3 TIMES DAILY PRN
Status: DISCONTINUED | OUTPATIENT
Start: 2023-03-25 | End: 2023-04-20 | Stop reason: HOSPADM

## 2023-03-25 RX ORDER — ACETAMINOPHEN 325 MG/1
650 TABLET ORAL EVERY 4 HOURS PRN
Status: DISCONTINUED | OUTPATIENT
Start: 2023-03-25 | End: 2023-04-12

## 2023-03-25 RX ORDER — DOCUSATE SODIUM 100 MG/1
100 CAPSULE, LIQUID FILLED ORAL 2 TIMES DAILY
Status: DISCONTINUED | OUTPATIENT
Start: 2023-03-25 | End: 2023-04-20 | Stop reason: HOSPADM

## 2023-03-25 RX ORDER — SODIUM CHLORIDE 0.9 % (FLUSH) 0.9 %
10 SYRINGE (ML) INJECTION AS NEEDED
Status: DISCONTINUED | OUTPATIENT
Start: 2023-03-25 | End: 2023-04-20 | Stop reason: HOSPADM

## 2023-03-25 RX ORDER — SODIUM CHLORIDE 0.9 % (FLUSH) 0.9 %
10 SYRINGE (ML) INJECTION EVERY 12 HOURS SCHEDULED
Status: DISCONTINUED | OUTPATIENT
Start: 2023-03-25 | End: 2023-04-20 | Stop reason: HOSPADM

## 2023-03-25 RX ORDER — FAMOTIDINE 20 MG/1
20 TABLET, FILM COATED ORAL DAILY
Status: DISCONTINUED | OUTPATIENT
Start: 2023-03-25 | End: 2023-04-20 | Stop reason: HOSPADM

## 2023-03-25 RX ORDER — BISACODYL 5 MG/1
5 TABLET, DELAYED RELEASE ORAL DAILY PRN
Status: DISCONTINUED | OUTPATIENT
Start: 2023-03-25 | End: 2023-04-20 | Stop reason: HOSPADM

## 2023-03-25 RX ORDER — POLYETHYLENE GLYCOL 3350 17 G/17G
17 POWDER, FOR SOLUTION ORAL DAILY
Status: DISCONTINUED | OUTPATIENT
Start: 2023-03-25 | End: 2023-04-20 | Stop reason: HOSPADM

## 2023-03-25 RX ORDER — AMOXICILLIN 250 MG
2 CAPSULE ORAL 2 TIMES DAILY
Status: DISCONTINUED | OUTPATIENT
Start: 2023-03-25 | End: 2023-04-20 | Stop reason: HOSPADM

## 2023-03-25 RX ORDER — ACETAMINOPHEN 650 MG/1
650 SUPPOSITORY RECTAL EVERY 4 HOURS PRN
Status: DISCONTINUED | OUTPATIENT
Start: 2023-03-25 | End: 2023-04-12

## 2023-03-25 RX ORDER — TERAZOSIN 1 MG/1
1 CAPSULE ORAL NIGHTLY
Status: DISCONTINUED | OUTPATIENT
Start: 2023-03-25 | End: 2023-04-20 | Stop reason: HOSPADM

## 2023-03-25 RX ORDER — PANTOPRAZOLE SODIUM 40 MG/1
40 TABLET, DELAYED RELEASE ORAL NIGHTLY
Status: DISCONTINUED | OUTPATIENT
Start: 2023-03-25 | End: 2023-04-20 | Stop reason: HOSPADM

## 2023-03-25 RX ADMIN — ASPIRIN 81 MG CHEWABLE TABLET 81 MG: 81 TABLET CHEWABLE at 16:32

## 2023-03-25 RX ADMIN — ACETAMINOPHEN 325MG 650 MG: 325 TABLET ORAL at 01:37

## 2023-03-25 RX ADMIN — DOCUSATE SODIUM 100 MG: 100 CAPSULE, LIQUID FILLED ORAL at 20:17

## 2023-03-25 RX ADMIN — HEPARIN SODIUM 18 UNITS/KG/HR: 10000 INJECTION, SOLUTION INTRAVENOUS at 01:00

## 2023-03-25 RX ADMIN — PANTOPRAZOLE SODIUM 40 MG: 40 TABLET, DELAYED RELEASE ORAL at 20:21

## 2023-03-25 RX ADMIN — LACTULOSE 30 G: 20 SOLUTION ORAL at 00:50

## 2023-03-25 RX ADMIN — Medication 10 ML: at 08:46

## 2023-03-25 RX ADMIN — FAMOTIDINE 20 MG: 20 TABLET ORAL at 08:46

## 2023-03-25 RX ADMIN — Medication 10 ML: at 20:17

## 2023-03-25 RX ADMIN — SENNOSIDES AND DOCUSATE SODIUM 2 TABLET: 8.6; 5 TABLET ORAL at 20:17

## 2023-03-25 RX ADMIN — SODIUM CHLORIDE, POTASSIUM CHLORIDE, SODIUM LACTATE AND CALCIUM CHLORIDE 75 ML/HR: 600; 310; 30; 20 INJECTION, SOLUTION INTRAVENOUS at 00:50

## 2023-03-25 RX ADMIN — LUBIPROSTONE 24 MCG: 24 CAPSULE, GELATIN COATED ORAL at 01:27

## 2023-03-25 RX ADMIN — NYSTATIN 500000 UNITS: 100000 SUSPENSION ORAL at 08:45

## 2023-03-25 RX ADMIN — ACETAMINOPHEN 325MG 650 MG: 325 TABLET ORAL at 06:04

## 2023-03-25 RX ADMIN — ATORVASTATIN CALCIUM 80 MG: 40 TABLET, FILM COATED ORAL at 20:21

## 2023-03-25 RX ADMIN — Medication 10 ML: at 01:28

## 2023-03-25 RX ADMIN — NYSTATIN 500000 UNITS: 100000 SUSPENSION ORAL at 20:16

## 2023-03-25 RX ADMIN — SODIUM CHLORIDE, POTASSIUM CHLORIDE, SODIUM LACTATE AND CALCIUM CHLORIDE 75 ML/HR: 600; 310; 30; 20 INJECTION, SOLUTION INTRAVENOUS at 23:42

## 2023-03-25 RX ADMIN — TAZOBACTAM SODIUM AND PIPERACILLIN SODIUM 3.38 G: 375; 3 INJECTION, SOLUTION INTRAVENOUS at 16:30

## 2023-03-25 RX ADMIN — TAZOBACTAM SODIUM AND PIPERACILLIN SODIUM 3.38 G: 375; 3 INJECTION, SOLUTION INTRAVENOUS at 03:19

## 2023-03-25 RX ADMIN — HEPARIN SODIUM 16 UNITS/KG/HR: 10000 INJECTION, SOLUTION INTRAVENOUS at 14:54

## 2023-03-25 RX ADMIN — Medication 5 MG: at 01:37

## 2023-03-25 RX ADMIN — ACETAMINOPHEN 325MG 650 MG: 325 TABLET ORAL at 20:16

## 2023-03-25 RX ADMIN — DOCUSATE SODIUM 100 MG: 100 CAPSULE, LIQUID FILLED ORAL at 08:46

## 2023-03-25 RX ADMIN — ACETAMINOPHEN 325MG 650 MG: 325 TABLET ORAL at 13:46

## 2023-03-25 RX ADMIN — NYSTATIN 500000 UNITS: 100000 SUSPENSION ORAL at 13:46

## 2023-03-25 RX ADMIN — SENNOSIDES AND DOCUSATE SODIUM 2 TABLET: 8.6; 5 TABLET ORAL at 08:46

## 2023-03-25 RX ADMIN — TERAZOSIN HYDROCHLORIDE 1 MG: 1 CAPSULE ORAL at 01:27

## 2023-03-25 RX ADMIN — OXYCODONE HYDROCHLORIDE AND ACETAMINOPHEN 1000 MG: 500 TABLET ORAL at 08:46

## 2023-03-25 RX ADMIN — CYCLOBENZAPRINE 5 MG: 10 TABLET, FILM COATED ORAL at 20:16

## 2023-03-25 RX ADMIN — Medication 5 MG: at 20:16

## 2023-03-25 RX ADMIN — ATORVASTATIN CALCIUM 80 MG: 40 TABLET, FILM COATED ORAL at 01:27

## 2023-03-25 RX ADMIN — VANCOMYCIN HYDROCHLORIDE 1250 MG: 10 INJECTION, POWDER, LYOPHILIZED, FOR SOLUTION INTRAVENOUS at 01:40

## 2023-03-25 RX ADMIN — TERAZOSIN HYDROCHLORIDE 1 MG: 1 CAPSULE ORAL at 20:21

## 2023-03-25 RX ADMIN — NYSTATIN 500000 UNITS: 100000 SUSPENSION ORAL at 18:29

## 2023-03-25 RX ADMIN — NYSTATIN 500000 UNITS: 100000 SUSPENSION ORAL at 00:50

## 2023-03-25 RX ADMIN — HEPARIN SODIUM 4540 UNITS: 1000 INJECTION INTRAVENOUS; SUBCUTANEOUS at 00:59

## 2023-03-25 RX ADMIN — CARVEDILOL 50 MG: 12.5 TABLET, FILM COATED ORAL at 18:29

## 2023-03-25 RX ADMIN — CARVEDILOL 50 MG: 12.5 TABLET, FILM COATED ORAL at 08:45

## 2023-03-25 RX ADMIN — CYCLOBENZAPRINE 5 MG: 10 TABLET, FILM COATED ORAL at 01:37

## 2023-03-25 RX ADMIN — TAZOBACTAM SODIUM AND PIPERACILLIN SODIUM 3.38 G: 375; 3 INJECTION, SOLUTION INTRAVENOUS at 08:44

## 2023-03-25 RX ADMIN — LISINOPRIL 5 MG: 5 TABLET ORAL at 08:46

## 2023-03-25 RX ADMIN — POLYETHYLENE GLYCOL 3350 17 G: 17 POWDER, FOR SOLUTION ORAL at 08:45

## 2023-03-25 RX ADMIN — TAZOBACTAM SODIUM AND PIPERACILLIN SODIUM 3.38 G: 375; 3 INJECTION, SOLUTION INTRAVENOUS at 23:40

## 2023-03-25 NOTE — PROGRESS NOTES
[facility] Medicine Services  PROGRESS NOTE    Patient Name: Timmy Guajardo  : 1950  MRN: [id number]    Date of Admission: 3/24/2023  Primary Care Physician: Arsen Seals APRN    Subjective   Subjective     CC:  Follow-up for weakness    HPI:  I have seen and evaluated the patient this morning.  Comfortable in bed.  Denies any chest pain or shortness of breath.  No fever.  Hemodynamics are stable.  Having multiple bowel movements after enema    ROS:  General : no fevers, chills,++ weakness  CVS: No chest pain, palpitations  Respiratory: No cough, dyspnea  GI: No N/V/D, abd pain  10 point review of systems is negative except for what is mentioned in HPI    Objective   Objective     Vital Signs:   Temp:  [97.1 °F (36.2 °C)-98 °F (36.7 °C)] 97.1 °F (36.2 °C)  Heart Rate:  [76-90] 90  Resp:  [18-20] 20  BP: (113-137)/(60-95) 113/64     Physical Exam:  General: Chronically ill looking, not in distress, conversant and cooperative  Head: Atraumatic and normocephalic  Eyes: No Icterus. No pallor  Ears:  Ears appear intact with no abnormalities noted  Throat: No oral lesions, no thrush  Neck: Supple, trachea midline  Lungs: Diminished air entry right lung zone.  No wheezing or crackles  Heart:  Normal S1 and S2, no murmur, no gallop, No JVD, no lower extremity swelling  Abdomen:  Soft, no tenderness, no organomegaly, normal bowel sounds, no organomegaly  Extremities: pulses equal bilaterally  Skin: No bleeding, bruising or rash, normal skin turgor and elasticity  Neurologic: Cranial nerves appear intact with no evidence of facial asymmetry, normal motor and sensory functions in all 4 extremities  Psych: Alert and oriented x 3, normal mood    Results Reviewed:  LAB RESULTS:      Lab 23  0120 23  2101 23  1938 23  1745   WBC  --   --   --  17.96*   HEMOGLOBIN  --   --   --  12.9*   HEMATOCRIT  --   --   --  41.9   PLATELETS  --   --   --  193   NEUTROS ABS  --    --   --  15.92*   IMMATURE GRANS (ABS)  --   --   --  0.17*   LYMPHS ABS  --   --   --  0.83   MONOS ABS  --   --   --  0.99*   EOS ABS  --   --   --  0.00   MCV  --   --   --  97.4*   PROCALCITONIN  --   --  0.14  --    LACTATE  --  3.2*  --  3.1*   PROTIME 36.4*  --   --   --    APTT >200.0*  --   --   --          Lab 03/24/23  1938 03/24/23  1745   SODIUM  --  130*   POTASSIUM  --  4.7   CHLORIDE  --  94*   CO2  --  23.0   ANION GAP  --  13.0   BUN  --  26*   CREATININE  --  0.97   EGFR  --  84.0   GLUCOSE  --  233*   CALCIUM  --  9.0   MAGNESIUM 2.1  --    TSH 1.610  --          Lab 03/24/23  1745   TOTAL PROTEIN 7.2   ALBUMIN 2.8*   GLOBULIN 4.4   ALT (SGPT) 19   AST (SGOT) 21   BILIRUBIN 0.5   ALK PHOS 98   LIPASE 15         Lab 03/25/23  0120   PROTIME 36.4*   INR 3.62*                 Brief Urine Lab Results  (Last result in the past 365 days)      Color   Clarity   Blood   Leuk Est   Nitrite   Protein   CREAT   Urine HCG        03/24/23 2329 Yellow   Turbid   Small (1+)   Moderate (2+)   Negative   30 mg/dL (1+)                 Microbiology Results Abnormal     Procedure Component Value - Date/Time    COVID PRE-OP / PRE-PROCEDURE SCREENING ORDER (NO ISOLATION) - Swab, Nasopharynx [[id number]]  (Normal) Collected: 03/24/23 2256    Lab Status: Final result Specimen: Swab from Nasopharynx Updated: 03/24/23 2356    Narrative:      The following orders were created for panel order COVID PRE-OP / PRE-PROCEDURE SCREENING ORDER (NO ISOLATION) - Swab, Nasopharynx.  Procedure                               Abnormality         Status                     ---------                               -----------         ------                     Respiratory Panel PCR w/...[[id number]]  Normal              Final result                 Please view results for these tests on the individual orders.    Respiratory Panel PCR w/COVID-19(SARS-CoV-2) SULEIMAN/TAWANNA/BLAISE/PAD/COR/MAD/ANTHONY In-House, NP Swab in UTM/VTM, 3-4 HR TAT - Swab,  Nasopharynx [[id number]]  (Normal) Collected: 03/24/23 2251    Lab Status: Final result Specimen: Swab from Nasopharynx Updated: 03/24/23 7007     ADENOVIRUS, PCR Not Detected     Coronavirus 229E Not Detected     Coronavirus HKU1 Not Detected     Coronavirus NL63 Not Detected     Coronavirus OC43 Not Detected     COVID19 Not Detected     Human Metapneumovirus Not Detected     Human Rhinovirus/Enterovirus Not Detected     Influenza A PCR Not Detected     Influenza B PCR Not Detected     Parainfluenza Virus 1 Not Detected     Parainfluenza Virus 2 Not Detected     Parainfluenza Virus 3 Not Detected     Parainfluenza Virus 4 Not Detected     RSV, PCR Not Detected     Bordetella pertussis pcr Not Detected     Bordetella parapertussis PCR Not Detected     Chlamydophila pneumoniae PCR Not Detected     Mycoplasma pneumo by PCR Not Detected    Narrative:      In the setting of a positive respiratory panel with a viral infection PLUS a negative procalcitonin without other underlying concern for bacterial infection, consider observing off antibiotics or discontinuation of antibiotics and continue supportive care. If the respiratory panel is positive for atypical bacterial infection (Bordetella pertussis, Chlamydophila pneumoniae, or Mycoplasma pneumoniae), consider antibiotic de-escalation to target atypical bacterial infection.          CT Abdomen Pelvis With Contrast    Result Date: 3/24/2023  CT ABDOMEN PELVIS W CONTRAST Date of Exam: 3/24/2023 8:05 PM EDT Indication: abd pain, obstipation. Comparison: None available. Technique: Axial CT images were obtained of the abdomen and pelvis following the uneventful intravenous administration of 100 mL Isovue-300. Reconstructed coronal and sagittal images were also obtained. Automated exposure control and iterative construction methods were used. Findings: There is a pulmonary embolism in the segmental pulmonary artery supplying the left lower lobe (series 2 image 12; there is  likely an additional pulmonary embolism in the segment pulmonary arteries supplying the right upper lobe (series 2 image 1). There is a partially loculated moderate right pleural effusion with associated atelectasis in the right lower lobe. No definite thickening or enhancement of the visceral or parietal pleura. The lungs demonstrate mild to moderate centrilobular emphysema. The left lower lung appears grossly clear. Unremarkable appearance of the liver, gallbladder, bile ducts, spleen, pancreas, and adrenal glands. There are a few small bilateral simple appearing renal cysts with otherwise unremarkable appearance of the kidneys. Normal appearance of the ureters, bladder, prostate, and seminal vesicles. There is mild inspissated rectal stool burden. No definite rectal wall thickening or perirectal fat stranding. There is sigmoid colonic diverticulosis without evidence of diverticulitis. Overall there is moderate colonic stool burden. Normal appendix. Mild fecalization of the distal ileum suggestive of delayed transit. No definite inflammatory change of the GI tract. No abdominopelvic free fluid. No pneumoperitoneum. There is severe atherosclerosis of the abdominal aorta with fusiform ectasia/small aneurysm of the distal infrarenal abdominal aorta measuring 3.0 cm in diameter. There are mild atherosclerotic narrowings of the origins of the celiac axis, SMA, and bilateral internal iliac arteries. No lymphadenopathy. Unremarkable appearance of the body wall soft tissues. The bones are demineralized. No acute or suspicious bony findings. Right hip cephalomedullary construct is noted.     Impression: Impression: Pulmonary emboli. Partially-loculated moderate right pleural effusion with associated atelectasis in the right lower lobe. No definite evidence of empyema. There is evidence of emphysema. Correlate with patient history and risk factors and please assess if the patient meets criteria for routine low dose CT lung  cancer screening. Moderate colonic stool burden with mild inspissated rectal stool burden without definite stercoral colitis. 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 Findings of pulmonary emboli discussed with ordering provider Dr. Kuhn by Dr. Fabián Mondragon via telephone at 9:10 PM 3/24/2023. Electronically Signed: Fabián Mondragon  3/24/2023 9:11 PM EDT  Workstation ID: [id number]    CT Angiogram Chest    Result Date: 3/24/2023  CT ANGIOGRAM CHEST Date of Exam: 3/24/2023 9:38 PM EDT Indication: pe eval. Comparison: None available. Technique: CTA of the chest was performed after the uneventful intravenous administration of 80 mL Isovue-370. Reconstructed coronal and sagittal images were also obtained. In addition, a 3-D volume rendered image was created for interpretation. Automated exposure control and iterative reconstruction methods were used. Findings: Redemonstration of pulmonary embolism in a segmental branch supplying the left lower lobe (series 4 image 62). There is likely an additional pulmonary embolism in the segmental pulmonary artery supplying the right upper lobe (series 4 image 39). No definite additional pulmonary emboli are identified. Normal caliber of the main pulmonary artery. No evidence of right heart strain. Unremarkable appearance of the cardiac chambers. Mild coronary artery calcifications are noted. A biventricular AICD with  left chest pulse generator is noted. Mild atherosclerosis of the thoracic aorta which appears normal in caliber. No pericardial effusion. There are shotty mediastinal lymph nodes, some of which demonstrate internal calcifications, likely sequelae of healed granulomatous disease or no bulky or clearly pathologic thoracic lymph nodes. Unremarkable appearance of the thoracic esophagus. Chest wall soft tissues are unremarkable. No acute or suspicious bony findings. The trachea and mainstem bronchi are patent. There is  moderate centrilobular emphysema. There is a moderate right-sided pleural effusion with subpulmonic fluid component and associated subsegmental atelectasis in the adjacent right lower lobe, with some pleural fluid tracking in the major fissure. Otherwise the lungs are grossly clear. There are small pleural-based linear scarring in the right upper lobe. No evidence of lung mass or suspicious pulmonary nodule. No pneumothorax.     Impression: Impression: Redemonstration of 1 or 2 pulmonary emboli as detailed above. No right heart strain. Moderate right pleural effusion and associated subsegmental atelectasis in the adjacent right lower lobe. Electronically Signed: Fabián Mondragon  3/24/2023 10:19 PM EDT  Workstation ID: [id number]      Results for orders placed during the hospital encounter of 10/16/22    Adult Transthoracic Echo Complete W/ Cont if Necessary Per Protocol    Interpretation Summary  •  Estimated left ventricular EF = 30%.  There is global hypokinesis.  The basal-mid posterior wall appears akinetic.  •  Normal right ventricular cavity size, wall thickness, systolic function and septal motion noted. Electronic lead present in the ventricle  •  Septal wall motion is abnormal, consistent with right ventricular pacing  •  No hemodynamically significant valvular stenosis or regurgitation noted.      Current medications:  Scheduled Meds:acetaminophen, 650 mg, Oral, Q8H  vitamin C, 1,000 mg, Oral, Daily  atorvastatin, 80 mg, Oral, Nightly  carvedilol, 50 mg, Oral, BID With Meals  docusate sodium, 100 mg, Oral, BID  famotidine, 20 mg, Oral, Daily  insulin lispro, 0-7 Units, Subcutaneous, Q6H  lisinopril, 5 mg, Oral, Q24H  nystatin, 5 mL, Oral, 4x Daily  pantoprazole, 40 mg, Oral, Nightly  piperacillin-tazobactam, 3.375 g, Intravenous, Q8H  polyethylene glycol, 17 g, Oral, Daily  Semaglutide, 7 mg, Oral, QAM AC  senna-docusate sodium, 2 tablet, Oral, BID  sodium chloride, 10 mL, Intravenous, Q12H  terazosin, 1 mg,  Oral, Nightly      Continuous Infusions:heparin, 18 Units/kg/hr, Last Rate: 18 Units/kg/hr (03/25/23 0100)  lactated ringers, 75 mL/hr, Last Rate: 75 mL/hr (03/25/23 0050)  Pharmacy to Dose Heparin,   Pharmacy to dose vancomycin,       PRN Meds:.•  acetaminophen **OR** acetaminophen **OR** acetaminophen  •  senna-docusate sodium **AND** polyethylene glycol **AND** bisacodyl **AND** bisacodyl  •  cyclobenzaprine  •  dextrose  •  dextrose  •  glucagon (human recombinant)  •  melatonin  •  ondansetron  •  Pharmacy to Dose Heparin  •  Pharmacy to dose vancomycin  •  Sodium Chloride (PF)  •  sodium chloride  •  sodium chloride    Assessment & Plan   Assessment & Plan     Active Hospital Problems    Diagnosis  POA   • **Bilateral pulmonary embolism (HCC) [I26.99]  Unknown   • Pleural effusion [J90]  Yes   • Unintentional weight loss [R63.4]  Unknown   • Hypoalbuminemia [E88.09]  Unknown   • Severe malnutrition (HCC) [E43]  Unknown   • History of CVA (cerebrovascular accident) [Z86.73]  Not Applicable   • Debility [R53.81]  Unknown   • Presence of cardiac pacemaker [Z95.0]  Yes   • HTN (hypertension) [I10]  Yes   • Leukocytosis [D72.829]  Yes      Resolved Hospital Problems   No resolved problems to display.        Brief Hospital Course to date:  Timmy Guajardo is a 72 y.o. 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, systolic heart failure with ejection fraction of 30%, coronary artery disease, GERD, history of DVT who presented to the hospital with weakness and functional decline, unintentional weight loss and severe constipation    Assessment and plan:  Bilateral pulmonary embolism  History of DVT  · Likely secondary to being on an adequate dose of Eliquis of 2.5 mg twice daily and medication noncompliance  · Continue heparin drip  · Transition to full dose Eliquis loading and maintenance upon discharge once he does not need any further  procedures     Moderate right-sided loculated pleural effusion  · Does appears to be empyema: No fever spikes, normal procalcitonin.  Does not appear to toxic  · Continue heparin drip with plan for thoracentesis on Monday  · Currently covered with IV Zosyn and Vanco    Severe malnutrition  Hypoalbuminemia  Significant unintentional weight loss  Generalized debility  · Patient reports 52lbs weight loss since oct  · Nutrition consult  · Oncology consult  · The patient has never had a colonoscopy or EGD as it was recommended against by his cardiologist. Patient had EF of 15 % at the time.     Leukocytosis  · Likely reactive versus infectious  · Normal procalcitonin and negative cultures to date  · Continue current metabolic therapy with Zosyn and vancomycin.  · Obtain MRSA swab to help distillate antibiotic therapy     CAD  Cardiomyopathy with EF 30 %  Essential HTN  Old CVA with right residual weakness  Dyslipidemia  · Start aspirin  · Continue coreg, lisinopril  · Continue statins    Constipation  · Having multiple bowel movements with enema  · Continue bowel regimen     GERD   · Continue PPI      Type 2 diabetes  · A1C 6.3%   · Continue insulin sliding scale     Total time spent:50 min   Time spent includes time reviewing chart, face-to-face time, counseling patient/family/caregiver, ordering medications/tests/procedures, communicating with other health care professionals, documenting clinical information in the electronic health record, and coordination of care.     Expected Discharge Location and Transportation: Home  Expected Discharge   Expected Discharge Date and Time     Expected Discharge Date Expected Discharge Time    Mar 28, 2023            DVT prophylaxis:  Medical and mechanical DVT prophylaxis orders are present.          CODE STATUS:   Code Status and Medical Interventions:   Ordered at: 03/24/23 1495     Code Status (Patient has no pulse and is not breathing):    CPR (Attempt to Resuscitate)      Medical Interventions (Patient has pulse or is breathing):    Full Support     Copied text in this note has been reviewed and is accurate as of 03/25/23.     Alan Cooper MD  03/25/23

## 2023-03-25 NOTE — CONSULTS
Clinical Nutrition     Nutrition Support Assessment  Reason for Visit: MST score 2+, BMI, Consult, Difficulty chewing/swallowing, Unintentional weight loss, Reduced oral intake      Patient Name: Timmy Guajardo  YOB: 1950  MRN: [id number]  Date of Encounter: 03/25/23 11:18 EDT  Admission date: 3/24/2023    Comments:    Pt meets criteria for chronic severe malnutrition with the indicators of severe muscle wasting and subcutaneous fat loss, <75% of EEN for >1 month, & a significant wt loss of 31.6% weight loss in 5 months    May benefit from soft, whole diet once appropriate for PO 2/2 edentulous    Nutrition Assessment   Admission Diagnosis:  Pleural effusion [J90]      Problem List:    Bilateral pulmonary embolism (HCC)    Leukocytosis    HTN (hypertension)    Presence of cardiac pacemaker    Pleural effusion    Unintentional weight loss    Hypoalbuminemia    Severe malnutrition (HCC)    History of CVA (cerebrovascular accident)    Debility  Severe malnutrition      PMH:   He  has a past medical history of Cardiomyopathy (HCC), CHF (congestive heart failure) (HCC), Coronary artery disease, Diabetes mellitus (HCC), GERD (gastroesophageal reflux disease), HTN (hypertension), Stroke (HCC), and Stroke (HCC).    PSH:  He  has a past surgical history that includes Cardiac pacemaker placement; Hernia repair (Bilateral); Cardiac catheterization; Aortagram (N/A, 5/11/2021); Finger amputation (Left, 5/12/2021); and Hip Trochanteric Nailing (Right, 10/18/2022).      Applicable Nutrition Concerns:   Skin:  Oral: edentulous  GI:       Applicable Interval History:         Reported/Observed/Food/Nutrition Related History:     Pt laying in bed - able to provide weight/nutrition hx. Endorsed anorexia for ~4 months following COVID infection with change in taste. Reports difficulty with PO intake began in November. Denies difficulty chewing/swallowing. Of note, pt had teeth pulled with plans for  "implants in sept/oct but unable to complete procedure 2/2 hip fx with repair followed by rehab. Endorsed increased time to eat a meal. Drinks ONS 2-3d daily to supplement poor intake. Continued functional decline noted. Pt reports significant weight loss of 58# in 5 months however weight hx unable to verify. Endorsed severe constipation prior to admission - received enema and produced BM x3. FA     Labs    Labs Reviewed: Yes     Results from last 7 days   Lab Units 03/25/23  0722 03/24/23  1938 03/24/23  1745   GLUCOSE mg/dL 139*  --  233*   BUN mg/dL 21  --  26*   CREATININE mg/dL 0.71*  --  0.97   SODIUM mmol/L 137  --  130*   CHLORIDE mmol/L 102  --  94*   POTASSIUM mmol/L 4.0  --  4.7   PHOSPHORUS mg/dL 3.9  --   --    MAGNESIUM mg/dL 1.8 2.1  --    ALT (SGPT) U/L  --   --  19       Results from last 7 days   Lab Units 03/25/23  0722 03/24/23  1745   ALBUMIN g/dL  --  2.8*   CRP mg/dL 10.22*  --        Results from last 7 days   Lab Units 03/25/23  0725 03/25/23  0700   GLUCOSE mg/dL 140* 148*     Lab Results   Lab Value Date/Time    HGBA1C 6.30 (H) 03/25/2023 0722    HGBA1C 6.50 (H) 10/17/2022 0422                 Medications    Medications Reviewed: Yes  Pertinent: Vit C, colace, pepcid, insulin, protonix, nystatin, Abx, miralax, pericolace  Infusion: LR @ 75mL/hr  PRN:       Intake/Ouptut 24 hrs (0701 - 0700)   I&O's Reviewed: Yes       Anthropometrics     Flowsheet Rows    Flowsheet Row First Filed Value   Admission Height 177.8 cm (70\") Documented at 03/24/2023 1718   Admission Weight 51.7 kg (114 lb) Documented at 03/24/2023 1718            Height: Height: 177.8 cm (70\")  Last Filed Weight: Weight: 56.7 kg (125 lb) (03/25/23 0002)  Method: ?  BMI: BMI (Calculated): 17.9  BMI classification: Underweight:<18.5kg/m2  IBW:  160lbs    UBW:     Last 15 Recorded Weights  View Complete Flowsheet  Weight Weight (kg) Weight (lbs) Weight Method VISIT REPORT   3/25/2023 56.7 kg 125 lb - -   3/24/2023 51.71 kg 114 lb " Stated -   11/23/2022 72.576 kg 160 lb Stated Report   10/24/2022 82.146 kg 181 lb 1.6 oz - -   10/17/2022 83.008 kg 183 lb Stated -   10/17/2022 83.008 kg 183 lb - -   10/17/2022 83.144 kg 183 lb 4.8 oz - -   10/16/2022 77.565 kg 171 lb Stated -   4/25/2022 75.479 kg 166 lb 6.4 oz - -   8/4/2021 73.483 kg 162 lb - Report   6/30/2021 77.565 kg 171 lb - Report   5/26/2021 77.565 kg 171 lb - Report     Weight change:   Noted 58# (31.6%) weight loss in 5 months    Nutrition Focused Physical Exam     Date: 3/25    Patient meets criteria for malnutrition diagnosis, see MSA note.      Current Nutrition Prescription     PO: NPO Diet NPO Type: Strict NPO  Oral Nutrition Supplement:   Intake: N/A      Nutrition Diagnosis   Date: 3/25 Updated:   Problem Malnutrition chronic, severe   Etiology Dysgeusia, anorexia   Signs/Symptoms severe muscle wasting and subcutaneous fat loss, <75% of EEN for >1 month, & a significant wt loss of 31.6% weight loss in 5 months   Status:     Date:  3/25 Updated:  Problem Predicted suboptimal energy intake   Etiology Continued dysgeusia   Signs/Symptoms NPO x1   Status:     Goal:   General: Nutrition to support treatment  PO: Advace diet as medically feasible/appropriate  EN/PN: N/A    Nutrition Intervention      Follow treatment progress, Care plan reviewed, Await begin PO    May benefit from soft, whole diet once appropriate for PO 2/2 edentulous      Monitoring/Evaluation:   Per protocol, I&O, Pertinent labs, Weight, Symptoms, POC/GOC, Swallow function      Xi Isaacs, MS,RD,LD  Time Spent: 30min

## 2023-03-25 NOTE — PROGRESS NOTES
"Pharmacy Consult-Vancomycin Dosing  Timmy Guajardo is a  72 y.o. male receiving vancomycin therapy.     Indication: Sepsis  Consulting Provider: Dr. Nunez  ID Consult:     Goal AUC: 400 - 600 mg/L*hr    Current Antimicrobial Therapy  Anti-Infectives (From admission, onward)      Ordered     Dose/Rate Route Frequency Start Stop    03/24/23 2320  piperacillin-tazobactam (ZOSYN) 3.375 g in iso-osmotic dextrose 50 ml (premix)        Ordering Provider: Ernst Nunez DO    3.375 g  over 4 Hours Intravenous Every 8 Hours 03/25/23 0600 03/30/23 0559    03/25/23 0019  vancomycin 1250 mg/250 mL 0.9% NS IVPB ([facility])        Ordering Provider: Bj Linton, RPH    20 mg/kg × 56.7 kg  over 90 Minutes Intravenous Once 03/25/23 0115      03/24/23 2320  piperacillin-tazobactam (ZOSYN) 3.375 g in iso-osmotic dextrose 50 ml (premix)        Ordering Provider: Ernst Nunez DO    3.375 g  over 30 Minutes Intravenous Once 03/24/23 2322      03/24/23 2320  Pharmacy to dose vancomycin        Ordering Provider: Ernst Nunez DO     Does not apply Continuous PRN 03/24/23 2320 03/25/23 2319            Allergies  Allergies as of 03/24/2023 - Reviewed 03/24/2023   Allergen Reaction Noted    Other Unknown - Low Severity 11/23/2022       Labs    Results from last 7 days   Lab Units 03/24/23  1745   BUN mg/dL 26*   CREATININE mg/dL 0.97       Results from last 7 days   Lab Units 03/24/23  1745   WBC 10*3/mm3 17.96*       Evaluation of Dosing     Last Dose Received in the ED/Outside Facility: None noted  Is Patient on Dialysis or Renal Replacement:     Ht - 177.8 cm (70\")  Wt - 56.7 kg (125 lb)    Estimated Creatinine Clearance: 56.8 mL/min (by C-G formula based on SCr of 0.97 mg/dL).    Intake & Output (last 3 days)       None            Microbiology and Radiology  Microbiology Results (last 10 days)       Procedure Component Value - Date/Time    COVID PRE-OP / PRE-PROCEDURE SCREENING ORDER (NO ISOLATION) - Swab, Nasopharynx [[id number]]  " (Normal) Collected: 03/24/23 2256    Lab Status: Final result Specimen: Swab from Nasopharynx Updated: 03/24/23 2356    Narrative:      The following orders were created for panel order COVID PRE-OP / PRE-PROCEDURE SCREENING ORDER (NO ISOLATION) - Swab, Nasopharynx.  Procedure                               Abnormality         Status                     ---------                               -----------         ------                     Respiratory Panel PCR w/...[[id number]]  Normal              Final result                 Please view results for these tests on the individual orders.    Respiratory Panel PCR w/COVID-19(SARS-CoV-2) [facility]WANNA/B[facility]ISE/[facility]/ANTHONY In-House, NP Swab in UTM/VTM, 3-4 HR TAT - Swab, Nasopharynx [[id number]]  (Normal) Collected: 03/24/23 2256    Lab Status: Final result Specimen: Swab from Nasopharynx Updated: 03/24/23 2356     ADENOVIRUS, PCR Not Detected     Coronavirus 229E Not Detected     Coronavirus HKU1 Not Detected     Coronavirus NL63 Not Detected     Coronavirus OC43 Not Detected     COVID19 Not Detected     Human Metapneumovirus Not Detected     Human Rhinovirus/Enterovirus Not Detected     Influenza A PCR Not Detected     Influenza B PCR Not Detected     Parainfluenza Virus 1 Not Detected     Parainfluenza Virus 2 Not Detected     Parainfluenza Virus 3 Not Detected     Parainfluenza Virus 4 Not Detected     RSV, PCR Not Detected     Bordetella pertussis pcr Not Detected     Bordetella parapertussis PCR Not Detected     Chlamydophila pneumoniae PCR Not Detected     Mycoplasma pneumo by PCR Not Detected    Narrative:      In the setting of a positive respiratory panel with a viral infection PLUS a negative procalcitonin without other underlying concern for bacterial infection, consider observing off antibiotics or discontinuation of antibiotics and continue supportive care. If the respiratory panel is positive for atypical bacterial infection (Bordetella pertussis, Chlamydophila  pneumoniae, or Mycoplasma pneumoniae), consider antibiotic de-escalation to target atypical bacterial infection.            Reported Vancomycin Levels                         [facility] AUC Calculation:    Current AUC: 0 mg/L*hr    Predicted Steady State AUC on Current Dose: mg/L*hr  _________________________________    Predicted Steady State AUC on New Dose:   mg/L*hr    Assessment/Plan:   Pharmacy to dose Vancomycin x 1 dose (confirmed with Dr. Nunez)  Vancomycin 1250 mg IV x 1,  Continue to follow for further orders on Saturday    [facility]  3/25/23 00:45

## 2023-03-25 NOTE — ED PROVIDER NOTES
[facility]    EMERGENCY DEPARTMENT ENCOUNTER      Pt Name: Timmy Guajardo  MRN: [id number]  YOB: 1950  Date of evaluation: 3/24/2023  Provider: Clifford Kuhn MD    CHIEF COMPLAINT       Chief Complaint   Patient presents with   • Abdominal Pain         HISTORY OF PRESENT ILLNESS   Timmy Guajardo is a 72 y.o. male who presents to the emergency department with complaint of nausea and moderate severity cramping generalized abdominal pain over the course the past 8 days.  Patient tells me he has not had a bowel movement about 8 days.  Denies any diarrhea, fever, chills, or urinary symptoms.  No obvious inciting or modifying factors.      Nursing notes were reviewed.    REVIEW OF SYSTEMS     ROS:  A chief complaint appropriate review of systems was completed and is negative except as noted in the HPI.      PAST MEDICAL HISTORY     Past Medical History:   Diagnosis Date   • Cardiomyopathy    • CHF (congestive heart failure)    • Coronary artery disease    • Diabetes mellitus    • GERD (gastroesophageal reflux disease)    • HTN (hypertension)    • Stroke     Right sided weakness   • Stroke          SURGICAL HISTORY       Past Surgical History:   Procedure Laterality Date   • AMPUTATION DIGIT Left 5/12/2021    Procedure: AMPUTATION DIGIT - GREAT TOE AMPUTATION LEFT;  Surgeon: Dario Marmolejo MD;  Location: [facility] OR;  Service: General;  Laterality: Left;   • AORTAGRAM N/A 5/11/2021    Procedure: AORTAGRAM WITH RUNOFFS, LEFT SFA BALLOON ANGIOPLASTY;  Surgeon: Tim Mahan MD;  Location: [facility] HYBRID SHELIA;  Service: Vascular;  Laterality: N/A;  FL TIME 6 MIN 54 SECS  82 MGY  CONTRAST VISIPAQUE 100ML     • CARDIAC CATHETERIZATION     • CARDIAC PACEMAKER PLACEMENT      pacemaker/defibrillator, [employer]   • COLONOSCOPY N/A 3/31/2023    Procedure: COLONOSCOPY;  Surgeon: Brunner, Mark I, MD;  Location: [facility] ENDOSCOPY;  Service: Gastroenterology;  Laterality: N/A;   • ENDOSCOPY N/A 3/29/2023     Procedure: ESOPHAGOGASTRODUODENOSCOPY;  Surgeon: Brunner, Mark I, MD;  Location: [facility] ENDOSCOPY;  Service: Gastroenterology;  Laterality: N/A;   • HERNIA REPAIR Bilateral     Inguinal hernia   • HIP TROCHANTERIC NAILING WITH INTRAMEDULLARY HIP SCREW Right 10/18/2022    Procedure: HIP TROCHANTERIC NAILING WITH INTRAMEDULLARY HIP SCREW RIGHT;  Surgeon: Bj Steinberg MD;  Location: [facility] OR;  Service: Orthopedics;  Laterality: Right;         CURRENT MEDICATIONS       Current Facility-Administered Medications:   •  acetaminophen (TYLENOL) tablet 650 mg, 650 mg, Oral, Q4H PRN, 650 mg at 03/26/23 0404 **OR** acetaminophen (TYLENOL) 160 MG/5ML solution 650 mg, 650 mg, Oral, Q4H PRN **OR** acetaminophen (TYLENOL) suppository 650 mg, 650 mg, Rectal, Q4H PRN, Brunner, Mark I, MD  •  acetaminophen (TYLENOL) tablet 650 mg, 650 mg, Oral, Q8H, Brunner, Mark I, MD, 650 mg at 04/11/23 0511  •  ascorbic acid (VITAMIN C) tablet 1,000 mg, 1,000 mg, Oral, Daily, Brunner, Mark I, MD, 1,000 mg at 04/10/23 0813  •  aspirin chewable tablet 81 mg, 81 mg, Oral, Daily, Brunner, Mark I, MD, 81 mg at 04/10/23 0813  •  atorvastatin (LIPITOR) tablet 80 mg, 80 mg, Oral, Nightly, Brunner, Mark I, MD, 80 mg at 04/10/23 2019  •  sennosides-docusate (PERICOLACE) 8.6-50 MG per tablet 2 tablet, 2 tablet, Oral, BID, 2 tablet at 04/10/23 0813 **AND** polyethylene glycol (MIRALAX) packet 17 g, 17 g, Oral, Daily PRN **AND** bisacodyl (DULCOLAX) EC tablet 5 mg, 5 mg, Oral, Daily PRN **AND** bisacodyl (DULCOLAX) suppository 10 mg, 10 mg, Rectal, Daily PRN, Brunner, Mark I, MD  •  Calcium Replacement - Follow Nurse / BPA Driven Protocol, , Does not apply, PRN, Brunner, Mark I, MD  •  carvedilol (COREG) tablet 50 mg, 50 mg, Oral, BID With Meals, Brunner, Mark I, MD, 50 mg at 04/10/23 1725  •  cyclobenzaprine (FLEXERIL) tablet 5 mg, 5 mg, Oral, TID PRN, Brunner, Mark I, MD, 5 mg at 04/09/23 2104  •  dextrose (D50W) (25 g/50 mL) IV injection 25 g, 25 g,  Intravenous, Q15 Min PRN, Brunner, Mark I, MD, 25 g at 03/26/23 0538  •  dextrose (GLUTOSE) oral gel 15 g, 15 g, Oral, Q15 Min PRN, Brunner, Mark I, MD  •  docusate sodium (COLACE) capsule 100 mg, 100 mg, Oral, BID, Brunner, Mark I, MD, 100 mg at 04/10/23 2019  •  famotidine (PEPCID) tablet 20 mg, 20 mg, Oral, Daily, Brunner, Mark I, MD, 20 mg at 04/10/23 0813  •  glucagon (GLUCAGEN) injection 1 mg, 1 mg, Intramuscular, Q15 Min PRN, Brunner, Mark I, MD  •  heparin 43628 units/250 mL (100 units/mL) in 0.45 % NaCl infusion, 24 Units/kg/hr, Intravenous, Titrated, Bryce Ponce MD, Last Rate: 13.6 mL/hr at 04/11/23 0329, 24 Units/kg/hr at 04/11/23 0329  •  Insulin Lispro (humaLOG) injection 0-7 Units, 0-7 Units, Subcutaneous, TID With Meals, Bryce Ponce MD, 3 Units at 04/10/23 1725  •  lactated ringers infusion, 9 mL/hr, Intravenous, Continuous, Brunner, Mark I, MD, Last Rate: 9 mL/hr at 03/29/23 0838, Restarted at 03/29/23 0857  •  lisinopril (PRINIVIL,ZESTRIL) tablet 5 mg, 5 mg, Oral, Q24H, Brunner, Mark I, MD, 5 mg at 04/10/23 0813  •  Magnesium Standard Dose Replacement - Follow Nurse / BPA Driven Protocol, , Does not apply, PRN, Brunner, Mark I, MD  •  megestrol (MEGACE) 40 MG/ML suspension 400 mg, 400 mg, Oral, Daily, Brunner, Mark I, MD, 400 mg at 04/10/23 0813  •  melatonin tablet 5 mg, 5 mg, Oral, Nightly PRN, Brunner, Mark I, MD, 5 mg at 04/10/23 2019  •  mirtazapine (REMERON) tablet 15 mg, 15 mg, Oral, Nightly, Brunner, Mark I, MD, 15 mg at 04/10/23 2019  •  ondansetron (ZOFRAN) injection 4 mg, 4 mg, Intravenous, Q6H PRN, Brunner, Mark I, MD  •  pantoprazole (PROTONIX) EC tablet 40 mg, 40 mg, Oral, Nightly, Brunner, Mark I, MD, 40 mg at 04/10/23 2019  •  Pharmacy to Dose Heparin, , Does not apply, Continuous PRN, Bryce Ponce MD  •  Phosphorus Replacement - Follow Nurse / BPA Driven Protocol, , Does not apply, PRN, Brunner, Mark I, MD  •  polyethylene glycol (MIRALAX) packet 17 g, 17 g, Oral,  Daily, Brunner, Mark I, MD, 17 g at 04/08/23 0844  •  Potassium Replacement - Follow Nurse / BPA Driven Protocol, , Does not apply, PRN, Brunner, Mark I, MD  •  sodium chloride 0.9 % flush 10 mL, 10 mL, Intravenous, Q12H, Brunner, Mark I, MD, 10 mL at 04/10/23 2019  •  sodium chloride 0.9 % flush 10 mL, 10 mL, Intravenous, PRN, Brunner, Mark I, MD  •  sodium chloride 0.9 % infusion 40 mL, 40 mL, Intravenous, PRN, Brunner, Mark I, MD  •  terazosin (HYTRIN) capsule 1 mg, 1 mg, Oral, Nightly, Brunner, Mark I, MD, 1 mg at 04/10/23 2019    ALLERGIES     Other    FAMILY HISTORY       Family History   Problem Relation Age of Onset   • Alzheimer's disease Mother    • Kidney failure Mother    • Cancer Father    • Heart failure Father           SOCIAL HISTORY       Social History     Socioeconomic History   • Marital status: Single   • Number of children: 0   Tobacco Use   • Smoking status: Former     Packs/day: 1.00     Years: 30.00     Pack years: 30.00     Types: Cigarettes   • Smokeless tobacco: Never   • Tobacco comments:     quit 2015   Vaping Use   • Vaping Use: Never used   Substance and Sexual Activity   • Alcohol use: Never   • Drug use: Never   • Sexual activity: Defer         PHYSICAL EXAM    (up to 7 for level 4, 8 or more for level 5)     Vitals:    04/10/23 0700 04/10/23 1600 04/10/23 2022 04/11/23 0409   BP: 162/73 143/66 162/90 164/79   BP Location: Left arm Left arm Right arm Left arm   Patient Position: Lying Lying Lying Lying   Pulse: 80 83  84   Resp: 18 18 20 18   Temp: 98 °F (36.7 °C) 97.8 °F (36.6 °C) 98.3 °F (36.8 °C) 98 °F (36.7 °C)   TempSrc: Oral Oral Oral Oral   SpO2: 97% 96%  97%   Weight:       Height:           General: Awake, alert, no acute distress.  HEENT: Conjunctivae normal.  Neck: Trachea midline.  Cardiac: Heart regular rate, rhythm, no murmurs, rubs, or gallops  Lungs: Lungs are clear to auscultation, there is no wheezing, rhonchi, or rales. There is no use of accessory muscles.  Chest  wall: There is no tenderness to palpation over the chest wall or over ribs  Abdomen: Mild generalized tenderness without focal finding, distention, rebound, or guarding  Musculoskeletal: No deformity.  Neuro: Alert and oriented x 4.  Dermatology: Skin is warm and dry  Psych: Mentation is grossly normal, cognition is grossly normal. Affect is appropriate.          DIAGNOSTIC RESULTS     EKG: All EKGs are interpreted by the Emergency Department Physician who either signs or Co-signs this chart in the absence of a cardiologist.    SCANNED - TELEMETRY     Final Result      SCANNED - TELEMETRY     Final Result      SCANNED - TELEMETRY     Final Result      SCANNED - TELEMETRY     Final Result      SCANNED - TELEMETRY     Final Result      SCANNED - TELEMETRY     Final Result      SCANNED - TELEMETRY     Final Result      SCANNED - TELEMETRY     Final Result      SCANNED - TELEMETRY     Final Result      SCANNED - TELEMETRY     Final Result      SCANNED - TELEMETRY     Final Result      SCANNED - TELEMETRY     Final Result      SCANNED - TELEMETRY     Final Result      ECG 12 Lead    (Results Pending)         RADIOLOGY:   [x] Radiologist's Report Reviewed:  XR Chest 1 View   Final Result   Impression:   No significant changes compared to the previous study. Right basilar atelectasis and right sided pleural effusion.      Electronically Signed: Huyen Aldana     4/10/2023 5:57 AM EDT     Workstation ID: [id number]      XR Chest 1 View   Final Result   Impression:   Stable right pleural effusion and atelectasis in the lower right lung. No pneumothorax      Electronically Signed: David Cardoso     4/9/2023 8:39 AM EDT     Workstation ID: [id number]      XR Chest 1 View   Final Result   Impression:   1. Stable chest tube overlying the right lung base with loculated small right pleural effusion and mild right basilar atelectasis.   2. No pneumothorax.   3. Clear left lung.      Electronically Signed: Jesse Brar     4/8/2023 8:48 AM  EDT     Workstation ID: [id number]      XR Chest 1 View   Final Result   Impression:   No significant interval change.      Electronically Signed: Nicolas Siddiqi     4/7/2023 9:00 AM EDT     Workstation ID: [id number]      CT Chest Without Contrast Diagnostic   Final Result   Impression:      1. Interval right thoracotomy tube placement since 4/4/2023 exam, with relatively little drainage of the patient's complex effusion, despite what appears to be adequate position of the thoracotomy tube..      2. Effusion appears to be partially loculated, and there is a suggestion of some increased hemorrhage/clot within the effusion, although not extensive.      3. Persistent medial right lower lobe atelectasis, present to some degree back to 2015. Effusion was present at that time as well, suggesting a component of chronic disease.      4. Probably incidental mildly nodular right apical lung scarring.      Electronically Signed: Trey Allen     4/6/2023 2:01 PM EDT     Workstation ID: [id number]      XR Chest 1 View   Final Result   Impression:   Stable chest. Stable right pleural effusion and right basilar atelectasis with chest tube in place. No pneumothorax      Electronically Signed: David Cardoso     4/6/2023 11:39 AM EDT     Workstation ID: [id number]      XR Chest 1 View   Final Result   Impression:   Mildly increased right basilar atelectasis. No pneumothorax or other new chest disease is seen.      Electronically Signed: Trey Allen     4/6/2023 7:25 AM EDT     Workstation ID: [id number]      XR Chest 1 View   Final Result   Impression:   Right-sided pleural drain projects unchanged. Possibly loculated effusion at the right lung base is also stable. There is no distinct pneumothorax. The left lung remains clear. Unchanged heart and mediastinal contours, with left ICD noted.      Electronically Signed: Nicolas Goodwinord     4/5/2023 9:18 AM EDT     Workstation ID: [id number]      XR Chest 1 View   Final Result   Impression:   1.Right  "thoracotomy tube   2.Residual parenchymal pleural changes right lower chest.      Electronically Signed: Fei Tolbert     4/4/2023 8:37 PM EDT     Workstation ID: [id number]      CT Chest Without Contrast Diagnostic   Final Result   Impression:      1. Stable, relatively large, loculated appearing right pleural fluid collection.      2. Persistent, but gradually decreasing air within the pleural fluid collection. Several new hyperdense nodular pleural-based areas within the pleural fluid resemble soft tissue nodules, but are likely either concretions of proteinaceous debris, or small    amounts of hemorrhage/clot.      3. Stable appearance of the chest elsewhere.      4. Incidentally noted gallbladder \"sludge\" or noncalcified gallstones.      Electronically Signed: Trey Allen     4/4/2023 1:00 PM EDT     Workstation ID: [id number]      XR Chest 1 View   Final Result   Impression:   Stable, relatively mild right basilar atelectasis and effusion.      Electronically Signed: Trey Allen     4/4/2023 8:09 AM EDT     Workstation ID: [id number]      XR Chest 1 View   Final Result   Impression:   Left chest wall ICD projects unchanged. Right-sided pleural effusion, volume loss and basilar opacity is unchanged from prior. There is no distinct pneumothorax or new focal consolidation. The left lung remains clear. Unchanged heart and mediastinal    contours.      Electronically Signed: Nicolas Siddiqi     4/3/2023 8:50 AM EDT     Workstation ID: [id number]      XR Chest 1 View   Final Result   Impression:   No significant change      Electronically Signed: Tim Arreaga     4/2/2023 9:03 AM EDT     Workstation ID: [id number]      XR Chest 1 View   Final Result   Impression:   Left chest wall ICD projects unchanged. Persistent small-to-moderate right pleural effusion with adjacent atelectasis. The left lung remains clear. There is no distinct pneumothorax. Unchanged heart and mediastinal contours.      Electronically Signed: Nicolas Siddiqi     " 4/1/2023 8:38 AM EDT     Workstation ID: [id number]      CT Chest Without Contrast Diagnostic   Final Result   Impression:   1. Stable appearance of patient's apparently loculated right pleural effusion, which contains a stable amount of scattered air bubbles, since yesterday's study. Empyema is not excluded, but this makes bronchopleural fistula less likely, and findings    could all reflect residual air from chest tube placement removal.      2. No new chest disease is seen.      Electronically Signed: Trey Allen     3/31/2023 3:27 PM EDT     Workstation ID: [id number]      XR Chest 1 View   Final Result   Impression:   Stable volume loss in the right lung with right basilar airspace disease/atelectasis and small left pleural effusion.      Electronically Signed: Aiden Harrison     3/31/2023 8:13 AM EDT     Workstation ID: [id number]      CT Chest With Contrast Diagnostic   Final Result   Impression:      1. Moderate size right pleural effusion. There is some reactive pleural enhancement and several gas bubbles which may be related to previous chest tube placement and removal. I cannot exclude an empyema or bronchopleural fistula.   2. Unchanged compressive atelectasis and consolidation of the right lower lobe. There is also mild compressive atelectasis on the right upper lobe.   3. New patchy inflammatory changes in the posterior segment of the right upper lobe.   4. There is a 1.4 cm irregular shaped density the anterior segment of the right upper lobe felt to represent pulmonary scarring. I would recommend a follow-up CT of the chest in 6 months to confirm stability.   5. Interval decrease in size in the embolus located in the segmental branch of the posterior segment of the left lower lobe.   6. Additional findings as noted above.      Electronically Signed: Wil Earl     3/30/2023 1:47 PM EDT     Workstation ID: [id number]      XR Chest 1 View   Final Result   Impression:   Interval right chest drain removal. No  "evidence of pneumothorax or other new chest disease.      Electronically Signed: Trey Allen     3/30/2023 12:36 PM EDT     Workstation ID: [id number]      XR Chest 1 View   Final Result   Impression:   Resolving right basilar atelectasis and effusion. No new chest disease.      Electronically Signed: Trey Allen     3/30/2023 7:21 AM EDT     Workstation ID: [id number]      XR Chest 1 View   Final Result   Impression:   Small bore pigtail catheter in the right pleural space. Layering right pleural effusion. No pneumothorax.      Electronically Signed: Williams Mattson     3/29/2023 10:50 AM EDT     Workstation ID: [id number]      XR Chest 1 View   Final Result   Impression:   Right basilar chest tube with small right sided pleural effusion with overlying atelectasis, improved as compared to the previous study given difference in modality. No pneumothorax.      Electronically Signed: Huyen Aldana     3/27/2023 5:17 PM EDT     Workstation ID: [id number]      CT Guided Chest Tube   Final Result      US Liver   Final Result   Impression:      1. Liver morphology and echotexture are considered within normal limits.      2. Liver elastography measurements are consistent with either normal liver parenchyma or only a mild degree of fibrosis.      3. Unusual appearance of the gallbladder wall, whether adherent, unusually well-defined gallbladder \"sludge\", inflammatory polyposis, or gallbladder neoplasm. Consider evaluation by general surgery.      Electronically Signed: Trey Aleln     3/27/2023 12:04 PM EDT     Workstation ID: [id number]      CT Angiogram Chest   Final Result   Impression:   Redemonstration of 1 or 2 pulmonary emboli as detailed above. No right heart strain.      Moderate right pleural effusion and associated subsegmental atelectasis in the adjacent right lower lobe.      Electronically Signed: Fabián Mondragon     3/24/2023 10:19 PM EDT     Workstation ID: [id number]      CT Abdomen Pelvis With Contrast   Final Result   Impression: "   Pulmonary emboli.      Partially-loculated moderate right pleural effusion with associated atelectasis in the right lower lobe. No definite evidence of empyema.      There is evidence of emphysema. Correlate with patient history and risk factors and please assess if the patient meets criteria for routine low dose CT lung cancer screening.      Moderate colonic stool burden with mild inspissated rectal stool burden without definite stercoral colitis.      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          Findings of pulmonary emboli discussed with ordering provider Dr. Kuhn by Dr. Fabián Mondragon via telephone at 9:10 PM 3/24/2023.      Electronically Signed: Fabián Mondragon     3/24/2023 9:11 PM EDT     Workstation ID: [id number]      XR Chest 1 View    (Results Pending)   XR Chest 1 View    (Results Pending)   XR Chest 1 View    (Results Pending)       I ordered and independently reviewed the above noted radiographic studies.          EMERGENCY DEPARTMENT COURSE and DIFFERENTIAL DIAGNOSIS/MDM:   Vitals:  AS OF 06:46 EDT    BP - 164/79  HR - 84  TEMP - 98 °F (36.7 °C) (Oral)  O2 SATS - 97%        Discussion below represents my analysis of pertinent findings related to patient's condition, differential diagnosis, treatment plan and final disposition.      Differential diagnosis:  The differential diagnosis associated with the patient's presentation includes: Biliary pathology, pancreatitis, obstruction, perforation, AAA, PE, pleural effusion, pneumonia, pneumothorax, ACS, dysrhythmia, anemia       Independent interpretations (ECG/rhythm strip/X-ray/US/CT scan): Independently interpreted the patient's cardiac monitoring which demonstrates sinus rhythm without dysrhythmia. I independently interpreted the patient's CTA chest and see evidence of small pulmonary emboli.      Additional sources:  Discussed/obtained information from independent historians:   []  Spouse:   [] Parent:   [] Friend:   [] EMS:   [] Other:  External (non-ED) record review:   [] Inpatient record:   [] Office record:   [] Outpatient record:   [] Prior Outpatient labs:   [] Prior Outpatient radiology:   [] Primary Care record:   [] Outside ED record:   [x] Other: Reviewed documentation from most recent hospitalization in October 2022.  Patient admitted for hip fracture.    Patient's care impacted by:   [x] Diabetes   [x] Hypertension   [x] Coronary Artery Disease   [] Cancer   [] Other:     Care significantly affected by Social Determinants of Health (housing and economic circumstances, unemployment)    [] Yes     [x] No   If yes, Patient's care significantly limited by  Social Determinants of Health including:    [] Inadequate housing    [] Low income    [] Alcoholism and drug addiction in family    [] Problems related to primary support group    [] Unemployment    [] Problems related to employment    [] Other Social Determinants of Health:        Consideration of admission/observation vs discharge: This patient presents with acute pulmonary embolism and large bilateral pleural effusion requiring admission.      I considered prescription management with:    [] Pain medication:   [] Antiviral:   [] Antibiotic:   [x] Other: Patient started on heparin for treatment of PE.            PROCEDURES:  Procedures    CRITICAL CARE TIME     Approximately 35 minutes of discontinuous critical care time was provided to this patient by myself absent of any time spent performing procedures.  Patient presents critically ill with acute PE placed in the cardiovascular, respiratory, neurologic systems at risk requiring the following interventions: Initiation of heparin infusion, interpretation of lab/ECG/imaging, frequent reassessment, coordination of admission.  Patient at high risk of deterioration and possibly death without these interventions.      FINAL IMPRESSION      1. Weight loss    2. Unintentional weight  loss    3. 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 Pleural effusion    5. Shortness of breath          DISPOSITION/PLAN     ED Disposition     ED Disposition   Decision to Admit    Condition   --    Comment   Level of Care: Telemetry [5]   Diagnosis: Pleural effusion [247482]   Certification: I Certify That Inpatient Hospital Services Are Medically Necessary For Greater Than 2 Midnights                 Comment: Please note this report has been produced using speech recognition software.      Clifford Kuhn MD  Attending Emergency Physician          Clifford Kuhn MD  04/11/23 0620

## 2023-03-25 NOTE — CONSULTS
Malnutrition Severity Assessment    Patient Name:  Timmy Guajardo  YOB: 1950  MRN: [id number]  Admit Date:  3/24/2023    Patient meets criteria for : Severe Malnutrition    Comments:  Pt meets criteria for chronic severe malnutrition with the indicators of severe muscle wasting and subcutaneous fat loss, <75% of EEN for >1 month, & a significant wt loss of 31.6% weight loss in 5 months    Malnutrition Severity Assessment  Malnutrition Type: Chronic Disease - Related Malnutrition  Malnutrition Type (last 8 hours)     Malnutrition Severity Assessment     Row Name 03/25/23 1120       Malnutrition Severity Assessment    Malnutrition Type Chronic Disease - Related Malnutrition    Row Name 03/25/23 1120       Insufficient Energy Intake     Insufficient Energy Intake Findings Severe    Insufficient Energy Intake  <75% of est. energy requirement for > or equal to 1 month    Row Name 03/25/23 1120       Unintentional Weight Loss     Unintentional Weight Loss Findings Severe  58# (31.6%) weight loss in 5 months    Unintentional Weight Loss  Weight loss greater than 10% in six months    Row Name 03/25/23 1120       Muscle Loss    Loss of Muscle Mass Findings Severe    Alevism Region Severe - deep hollowing/scooping, lack of muscle to touch, facial bones well defined    Clavicle Bone Region Severe - protruding prominent bone    Acromion Bone Region Severe - squared shoulders, bones, and acromion process protrusion prominent    Scapular Bone Region Severe - prominent bones, depressions easily visible between ribs, scapula, spine, shoulders    Dorsal Hand Region Severe - prominent depression    Patellar Region Severe - prominent bone, square looking, very little muscle definition    Anterior Thigh Region Severe - line/depression along thigh, obviously thin    Posterior Calf Region Severe - thin with very little definition/firmness    Row Name 03/25/23 1120       Fat Loss    Subcutaneous Fat Loss Findings Severe     Orbital Region  Severe - pronounced hollowness/depression, dark circles, loose saggy skin    Upper Arm Region Severe - mostly skin, very little space between folds, fingers touch    Row Name 03/25/23 1120       Criteria Met (Must meet criteria for severity in at least 2 of these categories: M Wasting, Fat Loss, Fluid, Secondary Signs, Wt. Status, Intake)    Patient meets criteria for  Severe Malnutrition                Electronically signed by:  Xi Isaacs MS,RD,LD  03/25/23 11:32 EDT

## 2023-03-25 NOTE — H&P
"    [facility] Medicine Services  HISTORY AND PHYSICAL    Patient Name: Timmy Guajardo  : 1950  MRN: [id number]  Primary Care Physician: Arsen Seals, YUE  Date of admission: 3/24/2023      Subjective   Subjective     Chief Complaint:  Nausea, decreased appetite, unintentional weight loss.  No BM x8 days    HPI:  Timmy Guajardo is a 72 y.o. male with past medical history of DVT, coronary artery disease, type 2 diabetes, GERD, essential htn, hx of stroke with mild residual right sided weakness, cardiomyopathy with EF 30% who presents to the ER with no BM in 7-8 days, progressive weakness/functional decline since October, and unintentional weight loss.     Sister is at bedside and both the patient and the sister provide history.  Patient has been having progressive worsening functional decline since October.  In October he suffered a fall with fracture and was then sent to [facility] for rehab.  While in [facility], his Eliquis was reduced to 2.5 mg twice daily for unclear reasons.  He states that he contracted COVID while at [facility].  He reports decreased appetite during this event and reports \"everything tasted bad\".  This continued for several months until he was diagnosed with thrush a few days ago.  He reports he has been on nystatin swish and swallow for last 4 to 5 days.  Reports his taste is improving, however his appetite remains reduced.  Patient reports a 58 pound unintentional weight loss since October.  Despite force-feeding himself over the past week he has lost an additional 4 pounds    Patient reports over the past several nights he has had fever with chills.  Describes episodes of what sounds like rigors.  He reports mild cough that is nonproductive.  Patient has not had a bowel movement in 8 days.  Patient reports that he was post to have a colonoscopy in , however his cardiologist advised against this given he had an EF of 15% at the " time.      Review of Systems   Gen-reports fevers, chills  CV- No chest pain, palpitations  Resp-reports mild nonproductive cough, dyspnea  GI-reports nausea without vomiting, reports constipation, denies abd pain        Personal History     Past Medical History:   Diagnosis Date   • Cardiomyopathy (HCC)    • CHF (congestive heart failure) (HCC)    • Coronary artery disease    • Diabetes mellitus (HCC)    • GERD (gastroesophageal reflux disease)    • HTN (hypertension)    • Stroke (HCC)     Right sided weakness   • Stroke (HCC)              Past Surgical History:   Procedure Laterality Date   • AMPUTATION DIGIT Left 5/12/2021    Procedure: AMPUTATION DIGIT - GREAT TOE AMPUTATION LEFT;  Surgeon: Dario Marmolejo MD;  Location: [facility] OR;  Service: General;  Laterality: Left;   • AORTAGRAM N/A 5/11/2021    Procedure: AORTAGRAM WITH RUNOFFS, LEFT SFA BALLOON ANGIOPLASTY;  Surgeon: Tim Mahan MD;  Location: [facility] HYBRID SHELIA;  Service: Vascular;  Laterality: N/A;  FL TIME 6 MIN 54 SECS  82 MGY  CONTRAST VISIPAQUE 100ML     • CARDIAC CATHETERIZATION     • CARDIAC PACEMAKER PLACEMENT      pacemaker/defibrillator, [employer]   • HERNIA REPAIR Bilateral     Inguinal hernia   • HIP TROCHANTERIC NAILING WITH INTRAMEDULLARY HIP SCREW Right 10/18/2022    Procedure: HIP TROCHANTERIC NAILING WITH INTRAMEDULLARY HIP SCREW RIGHT;  Surgeon: Bj Steinberg MD;  Location: [facility] OR;  Service: Orthopedics;  Laterality: Right;       Family History: family history includes Alzheimer's disease in his mother; Cancer in his father; Heart failure in his father; Kidney failure in his mother.     Social History:  reports that he has quit smoking. He has a 30.00 pack-year smoking history. He has never used smokeless tobacco. He reports that he does not drink alcohol and does not use drugs.  Social History     Social History Narrative    Pt lives in [place], [place].        Medications:  Available home medication information  reviewed.  (Not in a hospital admission)      Allergies   Allergen Reactions   • Other Unknown - Low Severity     Mercury metals- as a child       Objective   Objective     Vital Signs:   Temp:  [97.6 °F (36.4 °C)] 97.6 °F (36.4 °C)  Heart Rate:  [78-79] 78  Resp:  [18] 18  BP: (124-137)/(70-95) 137/95       Physical Exam   Constitutional: Awake, alert, appears nontoxic, appears chronically ill, cachectic  Eyes: PERRLA, sclerae anicteric, no conjunctival injection  HENT: NCAT, mucous membranes dry  Neck: Supple, no thyromegaly, no lymphadenopathy, trachea midline  Respiratory: Clear to auscultation bilaterally, nonlabored respirations   Cardiovascular: RRR, no murmurs, rubs, or gallops, palpable pedal pulses bilaterally  Gastrointestinal: Positive bowel sounds, soft, nontender, mildly distended  Musculoskeletal: No bilateral ankle edema, no clubbing or cyanosis to extremities  Psychiatric: Appropriate affect, cooperative  Neurologic: Oriented x 3, strength symmetric in all extremities, Cranial Nerves grossly intact to confrontation, speech clear  Skin: No rashes      Result Review:  I have personally reviewed the results from the time of this admission to 3/24/2023 23:23 EDT and agree with these findings:  [x]  Laboratory list / accordion  []  Microbiology  [x]  Radiology  []  EKG/Telemetry   []  Cardiology/Vascular   []  Pathology  []  Old records  []  Other:  Most notable findings include: Moderate right-sided loculated pleural effusion, bilateral pulmonary emboli, WBC of 17,000, lactic of 3.1, BUN 26, albumin 2.8, sodium slightly low at 130 but for glucose correction is near normal.        LAB RESULTS:      Lab 03/24/23 2101 03/24/23  1745   WBC  --  17.96*   HEMOGLOBIN  --  12.9*   HEMATOCRIT  --  41.9   PLATELETS  --  193   NEUTROS ABS  --  15.92*   IMMATURE GRANS (ABS)  --  0.17*   LYMPHS ABS  --  0.83   MONOS ABS  --  0.99*   EOS ABS  --  0.00   MCV  --  97.4*   LACTATE 3.2* 3.1*         Lab 03/24/23 1938  03/24/23  1745   SODIUM  --  130*   POTASSIUM  --  4.7   CHLORIDE  --  94*   CO2  --  23.0   ANION GAP  --  13.0   BUN  --  26*   CREATININE  --  0.97   EGFR  --  84.0   GLUCOSE  --  233*   CALCIUM  --  9.0   MAGNESIUM 2.1  --    TSH 1.610  --          Lab 03/24/23  1745   TOTAL PROTEIN 7.2   ALBUMIN 2.8*   GLOBULIN 4.4   ALT (SGPT) 19   AST (SGOT) 21   BILIRUBIN 0.5   ALK PHOS 98   LIPASE 15                     UA    Urinalysis 10/17/22 11/23/22 11/23/22     2203 2203   Squamous Epithelial Cells, UA   0-2   Specific Gravity, UA 1.020 1.027    Ketones, UA Negative Trace (A)    Blood, UA Negative Negative    Leukocytes, UA Negative Trace (A)    Nitrite, UA Negative Negative    RBC, UA   0-2   WBC, UA   3-5 (A)   Bacteria, UA   4+ (A)   (A) Abnormal value              Microbiology Results (last 10 days)     ** No results found for the last 240 hours. **          CT Abdomen Pelvis With Contrast    Result Date: 3/24/2023  CT ABDOMEN PELVIS W CONTRAST Date of Exam: 3/24/2023 8:05 PM EDT Indication: abd pain, obstipation. Comparison: None available. Technique: Axial CT images were obtained of the abdomen and pelvis following the uneventful intravenous administration of 100 mL Isovue-300. Reconstructed coronal and sagittal images were also obtained. Automated exposure control and iterative construction methods were used. Findings: There is a pulmonary embolism in the segmental pulmonary artery supplying the left lower lobe (series 2 image 12; there is likely an additional pulmonary embolism in the segment pulmonary arteries supplying the right upper lobe (series 2 image 1). There is a partially loculated moderate right pleural effusion with associated atelectasis in the right lower lobe. No definite thickening or enhancement of the visceral or parietal pleura. The lungs demonstrate mild to moderate centrilobular emphysema. The left lower lung appears grossly clear. Unremarkable appearance of the liver, gallbladder, bile  ducts, spleen, pancreas, and adrenal glands. There are a few small bilateral simple appearing renal cysts with otherwise unremarkable appearance of the kidneys. Normal appearance of the ureters, bladder, prostate, and seminal vesicles. There is mild inspissated rectal stool burden. No definite rectal wall thickening or perirectal fat stranding. There is sigmoid colonic diverticulosis without evidence of diverticulitis. Overall there is moderate colonic stool burden. Normal appendix. Mild fecalization of the distal ileum suggestive of delayed transit. No definite inflammatory change of the GI tract. No abdominopelvic free fluid. No pneumoperitoneum. There is severe atherosclerosis of the abdominal aorta with fusiform ectasia/small aneurysm of the distal infrarenal abdominal aorta measuring 3.0 cm in diameter. There are mild atherosclerotic narrowings of the origins of the celiac axis, SMA, and bilateral internal iliac arteries. No lymphadenopathy. Unremarkable appearance of the body wall soft tissues. The bones are demineralized. No acute or suspicious bony findings. Right hip cephalomedullary construct is noted.     Impression: Impression: Pulmonary emboli. Partially-loculated moderate right pleural effusion with associated atelectasis in the right lower lobe. No definite evidence of empyema. There is evidence of emphysema. Correlate with patient history and risk factors and please assess if the patient meets criteria for routine low dose CT lung cancer screening. Moderate colonic stool burden with mild inspissated rectal stool burden without definite stercoral colitis. 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 Findings of pulmonary emboli discussed with ordering provider Dr. Kuhn by Dr. Fabián Mondragon via telephone at 9:10 PM 3/24/2023. Electronically Signed: Fabián Mondragon  3/24/2023 9:11 PM EDT  Workstation ID: [id number]    CT Angiogram Chest    Result Date:  3/24/2023  CT ANGIOGRAM CHEST Date of Exam: 3/24/2023 9:38 PM EDT Indication: pe eval. Comparison: None available. Technique: CTA of the chest was performed after the uneventful intravenous administration of 80 mL Isovue-370. Reconstructed coronal and sagittal images were also obtained. In addition, a 3-D volume rendered image was created for interpretation. Automated exposure control and iterative reconstruction methods were used. Findings: Redemonstration of pulmonary embolism in a segmental branch supplying the left lower lobe (series 4 image 62). There is likely an additional pulmonary embolism in the segmental pulmonary artery supplying the right upper lobe (series 4 image 39). No definite additional pulmonary emboli are identified. Normal caliber of the main pulmonary artery. No evidence of right heart strain. Unremarkable appearance of the cardiac chambers. Mild coronary artery calcifications are noted. A biventricular AICD with  left chest pulse generator is noted. Mild atherosclerosis of the thoracic aorta which appears normal in caliber. No pericardial effusion. There are shotty mediastinal lymph nodes, some of which demonstrate internal calcifications, likely sequelae of healed granulomatous disease or no bulky or clearly pathologic thoracic lymph nodes. Unremarkable appearance of the thoracic esophagus. Chest wall soft tissues are unremarkable. No acute or suspicious bony findings. The trachea and mainstem bronchi are patent. There is moderate centrilobular emphysema. There is a moderate right-sided pleural effusion with subpulmonic fluid component and associated subsegmental atelectasis in the adjacent right lower lobe, with some pleural fluid tracking in the major fissure. Otherwise the lungs are grossly clear. There are small pleural-based linear scarring in the right upper lobe. No evidence of lung mass or suspicious pulmonary nodule. No pneumothorax.     Impression: Impression: Redemonstration of 1  or 2 pulmonary emboli as detailed above. No right heart strain. Moderate right pleural effusion and associated subsegmental atelectasis in the adjacent right lower lobe. Electronically Signed: Fabián Mondragon  3/24/2023 10:19 PM EDT  Workstation ID: [id number]      Results for orders placed during the hospital encounter of 10/16/22    Adult Transthoracic Echo Complete W/ Cont if Necessary Per Protocol    Interpretation Summary  •  Estimated left ventricular EF = 30%.  There is global hypokinesis.  The basal-mid posterior wall appears akinetic.  •  Normal right ventricular cavity size, wall thickness, systolic function and septal motion noted. Electronic lead present in the ventricle  •  Septal wall motion is abnormal, consistent with right ventricular pacing  •  No hemodynamically significant valvular stenosis or regurgitation noted.      Assessment & Plan   Assessment & Plan     Active Hospital Problems    Diagnosis  POA   • **Bilateral pulmonary embolism (HCC) [I26.99]  Unknown   • Pleural effusion [J90]  Yes   • Unintentional weight loss [R63.4]  Unknown   • Hypoalbuminemia [E88.09]  Unknown   • Severe malnutrition (HCC) [E43]  Unknown   • History of CVA (cerebrovascular accident) [Z86.73]  Not Applicable   • Debility [R53.81]  Unknown   • Presence of cardiac pacemaker [Z95.0]  Yes   • HTN (hypertension) [I10]  Yes   • Leukocytosis [D72.829]  Yes       Patient is a 72-year-old male with past medical history of DVT, coronary artery disease, type 2 diabetes, GERD, essential htn, hx of stroke with mild residual right sided weakness, cardiomyopathy with EF 30% who presents to the ER with no BM in 7-8 days, progressive weakness/functional decline since October, and unintentional weight loss.     Bilateral pulmonary embolism  -- Questionable failure of Eliquis  -- Recently had Eliquis reduced to 2.5 mg twice daily at [facility] in October  -- Missed 3 nighttime doses of Eliquis over the last 3 days  -- We will get  hematology opinion on possible transition to warfarin, especially if malignancy is confirmed.  -- Heparin drip  --echo    Moderate right-sided loculated pleural effusion  -- Concern for malignant pleural effusion  -- Heparin drip  -- Thoracentesis versus chest tube with IR vs CT surgery evaluation  -- Has had rigors the past several nights, rule out empyema  -- Cover with Vanco and Zosyn    Severe malnutrition  Hypoalbuminemia  Significant unintentional weight loss  Generalized debility  --52lbs weight loss since oct  --nutrition consult  --oncology consult  --significant prot/albumin differential. Check spot urine pr/cr. Oncology eval for workup for MM  --check prealbumin  --patient has never had a colonoscopy or EGD as it was recommended against by his cardiologist. Patient had EF of 15 % at the time.    Leukocytosis  --rule out underlying infection  --? Reactive  --? Elevated secondary to volume contraction  --check procal  --ua pending, although denies dysuria  --will need thoracentesis/chest tube for loculated pleural effusion    CVA  --mild right sided weakness  --continue anticoagulation    CAD  Cardiomyopathy with EF 30 %  Essential HTN  --? Not on aspirin, will need to confirm with family  --continue coreg, lisinopril    Constipation  --no bm in 8 days, Likely due to volume depletion  --moderate stool burden  --miralax daily, stool softener  --give one dose amitza, serial soap suds edema q8h x 3 days until result  --no abd pain or evidence of stericoral colitis    GERD   --famotidine and ppi    Hx of DVT  --AC as above    DM2  --ssi q6h  --a1c    Oral thrush  --check HIV status  --nystatin QID    Total time spent: 75  Time spent includes time reviewing chart, face-to-face time, counseling patient/family/caregiver, ordering medications/tests/procedures, communicating with other health care professionals, documenting clinical information in the electronic health record, and coordination of care.      DVT  prophylaxis:  Heparin drip      CODE STATUS:  Full code  Code Status and Medical Interventions:   Ordered at: 03/24/23 1088     Code Status (Patient has no pulse and is not breathing):    CPR (Attempt to Resuscitate)     Medical Interventions (Patient has pulse or is breathing):    Full Support       Expected Discharge   Expected Discharge Date and Time     Expected Discharge Date Expected Discharge Time    Mar 28, 2023            Ernst Nunez DO  03/24/23

## 2023-03-25 NOTE — PLAN OF CARE
Goal Outcome Evaluation:     0000- Patient arrived to the floor via gurney from the ER, pt is wheelchair bound at baseline. Pt is alert and oriented x4, cooperative and pleasant. Tele monitor noted with NSR. Lung sounds noted clear with diminished bases, Sp02 96% on roomair. Heparin drip started and bolus given. Hypoactive bowel sounds, pt given soap suds enema.

## 2023-03-26 LAB
ALBUMIN SERPL-MCNC: 2.6 G/DL (ref 3.5–5.2)
ALBUMIN/GLOB SERPL: 1 G/DL
ALP SERPL-CCNC: 76 U/L (ref 39–117)
ALT SERPL W P-5'-P-CCNC: 15 U/L (ref 1–41)
ANION GAP SERPL CALCULATED.3IONS-SCNC: 11 MMOL/L (ref 5–15)
APTT PPP: 45.5 SECONDS (ref 60–90)
APTT PPP: 57.5 SECONDS (ref 60–90)
APTT PPP: 77.1 SECONDS (ref 60–90)
AST SERPL-CCNC: 18 U/L (ref 1–40)
BASOPHILS # BLD AUTO: 0.05 10*3/MM3 (ref 0–0.2)
BASOPHILS NFR BLD AUTO: 0.5 % (ref 0–1.5)
BILIRUB SERPL-MCNC: 0.4 MG/DL (ref 0–1.2)
BUN SERPL-MCNC: 12 MG/DL (ref 8–23)
BUN/CREAT SERPL: 20.3 (ref 7–25)
CALCIUM SPEC-SCNC: 8.1 MG/DL (ref 8.6–10.5)
CHLORIDE SERPL-SCNC: 105 MMOL/L (ref 98–107)
CO2 SERPL-SCNC: 24 MMOL/L (ref 22–29)
CREAT SERPL-MCNC: 0.59 MG/DL (ref 0.76–1.27)
DEPRECATED RDW RBC AUTO: 50.8 FL (ref 37–54)
EGFRCR SERPLBLD CKD-EPI 2021: 104.4 ML/MIN/1.73
EOSINOPHIL # BLD AUTO: 0.05 10*3/MM3 (ref 0–0.4)
EOSINOPHIL NFR BLD AUTO: 0.5 % (ref 0.3–6.2)
ERYTHROCYTE [DISTWIDTH] IN BLOOD BY AUTOMATED COUNT: 15.1 % (ref 12.3–15.4)
GLOBULIN UR ELPH-MCNC: 2.7 GM/DL
GLUCOSE BLDC GLUCOMTR-MCNC: 127 MG/DL (ref 70–130)
GLUCOSE BLDC GLUCOMTR-MCNC: 128 MG/DL (ref 70–130)
GLUCOSE BLDC GLUCOMTR-MCNC: 186 MG/DL (ref 70–130)
GLUCOSE BLDC GLUCOMTR-MCNC: 69 MG/DL (ref 70–130)
GLUCOSE BLDC GLUCOMTR-MCNC: 77 MG/DL (ref 70–130)
GLUCOSE BLDC GLUCOMTR-MCNC: 92 MG/DL (ref 70–130)
GLUCOSE SERPL-MCNC: 68 MG/DL (ref 65–99)
HCT VFR BLD AUTO: 32.5 % (ref 37.5–51)
HGB BLD-MCNC: 10.6 G/DL (ref 13–17.7)
IMM GRANULOCYTES # BLD AUTO: 0.08 10*3/MM3 (ref 0–0.05)
IMM GRANULOCYTES NFR BLD AUTO: 0.8 % (ref 0–0.5)
LYMPHOCYTES # BLD AUTO: 1.07 10*3/MM3 (ref 0.7–3.1)
LYMPHOCYTES NFR BLD AUTO: 10.3 % (ref 19.6–45.3)
MAGNESIUM SERPL-MCNC: 1.8 MG/DL (ref 1.6–2.4)
MCH RBC QN AUTO: 30 PG (ref 26.6–33)
MCHC RBC AUTO-ENTMCNC: 32.6 G/DL (ref 31.5–35.7)
MCV RBC AUTO: 92.1 FL (ref 79–97)
MONOCYTES # BLD AUTO: 0.69 10*3/MM3 (ref 0.1–0.9)
MONOCYTES NFR BLD AUTO: 6.6 % (ref 5–12)
NEUTROPHILS NFR BLD AUTO: 8.48 10*3/MM3 (ref 1.7–7)
NEUTROPHILS NFR BLD AUTO: 81.3 % (ref 42.7–76)
NRBC BLD AUTO-RTO: 0 /100 WBC (ref 0–0.2)
PHOSPHATE SERPL-MCNC: 4.5 MG/DL (ref 2.5–4.5)
PLATELET # BLD AUTO: 162 10*3/MM3 (ref 140–450)
PMV BLD AUTO: 10 FL (ref 6–12)
POTASSIUM SERPL-SCNC: 3.6 MMOL/L (ref 3.5–5.2)
PROT SERPL-MCNC: 5.3 G/DL (ref 6–8.5)
RBC # BLD AUTO: 3.53 10*6/MM3 (ref 4.14–5.8)
SODIUM SERPL-SCNC: 140 MMOL/L (ref 136–145)
WBC NRBC COR # BLD: 10.42 10*3/MM3 (ref 3.4–10.8)

## 2023-03-26 PROCEDURE — 85730 THROMBOPLASTIN TIME PARTIAL: CPT

## 2023-03-26 PROCEDURE — 97530 THERAPEUTIC ACTIVITIES: CPT

## 2023-03-26 PROCEDURE — 97161 PT EVAL LOW COMPLEX 20 MIN: CPT

## 2023-03-26 PROCEDURE — 85025 COMPLETE CBC W/AUTO DIFF WBC: CPT | Performed by: INTERNAL MEDICINE

## 2023-03-26 PROCEDURE — 25010000002 HEPARIN (PORCINE) 25000-0.45 UT/250ML-% SOLUTION

## 2023-03-26 PROCEDURE — 83735 ASSAY OF MAGNESIUM: CPT | Performed by: INTERNAL MEDICINE

## 2023-03-26 PROCEDURE — 84100 ASSAY OF PHOSPHORUS: CPT | Performed by: INTERNAL MEDICINE

## 2023-03-26 PROCEDURE — 99233 SBSQ HOSP IP/OBS HIGH 50: CPT | Performed by: INTERNAL MEDICINE

## 2023-03-26 PROCEDURE — 82962 GLUCOSE BLOOD TEST: CPT

## 2023-03-26 PROCEDURE — 25010000002 PIPERACILLIN SOD-TAZOBACTAM PER 1 G: Performed by: INTERNAL MEDICINE

## 2023-03-26 PROCEDURE — 80053 COMPREHEN METABOLIC PANEL: CPT | Performed by: INTERNAL MEDICINE

## 2023-03-26 RX ADMIN — NYSTATIN 500000 UNITS: 100000 SUSPENSION ORAL at 21:59

## 2023-03-26 RX ADMIN — OXYCODONE HYDROCHLORIDE AND ACETAMINOPHEN 1000 MG: 500 TABLET ORAL at 09:38

## 2023-03-26 RX ADMIN — Medication 10 ML: at 21:59

## 2023-03-26 RX ADMIN — TAZOBACTAM SODIUM AND PIPERACILLIN SODIUM 3.38 G: 375; 3 INJECTION, SOLUTION INTRAVENOUS at 23:25

## 2023-03-26 RX ADMIN — Medication 10 ML: at 09:37

## 2023-03-26 RX ADMIN — FAMOTIDINE 20 MG: 20 TABLET ORAL at 09:36

## 2023-03-26 RX ADMIN — ACETAMINOPHEN 325MG 650 MG: 325 TABLET ORAL at 04:04

## 2023-03-26 RX ADMIN — ACETAMINOPHEN 325MG 650 MG: 325 TABLET ORAL at 21:58

## 2023-03-26 RX ADMIN — SENNOSIDES AND DOCUSATE SODIUM 2 TABLET: 8.6; 5 TABLET ORAL at 21:58

## 2023-03-26 RX ADMIN — CARVEDILOL 50 MG: 12.5 TABLET, FILM COATED ORAL at 17:33

## 2023-03-26 RX ADMIN — ATORVASTATIN CALCIUM 80 MG: 40 TABLET, FILM COATED ORAL at 21:58

## 2023-03-26 RX ADMIN — LISINOPRIL 5 MG: 5 TABLET ORAL at 09:36

## 2023-03-26 RX ADMIN — NYSTATIN 500000 UNITS: 100000 SUSPENSION ORAL at 17:59

## 2023-03-26 RX ADMIN — TAZOBACTAM SODIUM AND PIPERACILLIN SODIUM 3.38 G: 375; 3 INJECTION, SOLUTION INTRAVENOUS at 17:33

## 2023-03-26 RX ADMIN — CARVEDILOL 50 MG: 12.5 TABLET, FILM COATED ORAL at 09:36

## 2023-03-26 RX ADMIN — TAZOBACTAM SODIUM AND PIPERACILLIN SODIUM 3.38 G: 375; 3 INJECTION, SOLUTION INTRAVENOUS at 11:36

## 2023-03-26 RX ADMIN — DEXTROSE MONOHYDRATE 25 G: 25 INJECTION, SOLUTION INTRAVENOUS at 05:38

## 2023-03-26 RX ADMIN — NYSTATIN 500000 UNITS: 100000 SUSPENSION ORAL at 09:36

## 2023-03-26 RX ADMIN — HEPARIN SODIUM 16 UNITS/KG/HR: 10000 INJECTION, SOLUTION INTRAVENOUS at 13:27

## 2023-03-26 RX ADMIN — NYSTATIN 500000 UNITS: 100000 SUSPENSION ORAL at 12:37

## 2023-03-26 RX ADMIN — Medication 5 MG: at 21:59

## 2023-03-26 RX ADMIN — DOCUSATE SODIUM 100 MG: 100 CAPSULE, LIQUID FILLED ORAL at 21:59

## 2023-03-26 RX ADMIN — ACETAMINOPHEN 325MG 650 MG: 325 TABLET ORAL at 15:00

## 2023-03-26 RX ADMIN — CYCLOBENZAPRINE 5 MG: 10 TABLET, FILM COATED ORAL at 09:35

## 2023-03-26 RX ADMIN — CYCLOBENZAPRINE 5 MG: 10 TABLET, FILM COATED ORAL at 04:04

## 2023-03-26 RX ADMIN — TERAZOSIN HYDROCHLORIDE 1 MG: 1 CAPSULE ORAL at 21:58

## 2023-03-26 RX ADMIN — PANTOPRAZOLE SODIUM 40 MG: 40 TABLET, DELAYED RELEASE ORAL at 21:59

## 2023-03-26 RX ADMIN — ASPIRIN 81 MG CHEWABLE TABLET 81 MG: 81 TABLET CHEWABLE at 09:36

## 2023-03-26 NOTE — PROGRESS NOTES
[facility] Medicine Services  PROGRESS NOTE    Patient Name: Timmy Guajardo  : 1950  MRN: [id number]    Date of Admission: 3/24/2023  Primary Care Physician: Arsen Seals APRN    Subjective   Subjective     CC:  Follow-up for weakness    HPI:  I have seen and evaluated the patient this morning.  Comfortable in bed.  No acute complaints today.  No shortness of breath or chest pain.  Performed bedside point-of-care ultrasound showed complex right pleural effusion.  Patient is agreeable to draining at    ROS:  General : no fevers, chills,++ weakness  CVS: No chest pain, palpitations  Respiratory: No cough, dyspnea  GI: No N/V/D, abd pain  10 point review of systems is negative except for what is mentioned in HPI    Objective   Objective     Vital Signs:   Temp:  [97.1 °F (36.2 °C)-97.8 °F (36.6 °C)] 97.8 °F (36.6 °C)  Heart Rate:  [70] 70  Resp:  [18-20] 18  BP: (113-135)/(64-74) 125/70     Physical Exam:  General: Chronically ill looking, not in distress, conversant and cooperative  Head: Atraumatic and normocephalic  Eyes: No Icterus. No pallor  Ears:  Ears appear intact with no abnormalities noted  Throat: No oral lesions, no thrush  Neck: Supple, trachea midline  Lungs: Diminished air entry right lung zone.  No wheezing or crackles  Heart:  Normal S1 and S2, no murmur, no gallop, No JVD, no lower extremity swelling  Abdomen:  Soft, no tenderness, no organomegaly, normal bowel sounds, no organomegaly  Extremities: pulses equal bilaterally  Skin: No bleeding, bruising or rash, normal skin turgor and elasticity  Neurologic: Cranial nerves appear intact with no evidence of facial asymmetry, normal motor and sensory functions in all 4 extremities  Psych: Alert and oriented x 3, normal mood    Results Reviewed:  LAB RESULTS:      Lab 23  0436 23  2141 23  1254 23  0954 23  0722 23  0120 23  2101 23  1938 23  1745   WBC 10.42   --   --   --   --   --   --   --  17.96*   HEMOGLOBIN 10.6*  --   --   --   --   --   --   --  12.9*   HEMATOCRIT 32.5*  --   --   --   --   --   --   --  41.9   PLATELETS 162  --   --   --   --   --   --   --  193   NEUTROS ABS 8.48*  --   --   --   --   --   --   --  15.92*   IMMATURE GRANS (ABS) 0.08*  --   --   --   --   --   --   --  0.17*   LYMPHS ABS 1.07  --   --   --   --   --   --   --  0.83   MONOS ABS 0.69  --   --   --   --   --   --   --  0.99*   EOS ABS 0.05  --   --   --   --   --   --   --  0.00   MCV 92.1  --   --   --   --   --   --   --  97.4*   CRP  --   --   --   --  10.22*  --   --   --   --    PROCALCITONIN  --   --   --   --   --   --   --  0.14  --    LACTATE  --   --   --   --   --   --  3.2*  --  3.1*   PROTIME  --   --   --   --   --  36.4*  --   --   --    APTT 77.1 71.4 60.4 63.1  --  >200.0*  --   --   --    HEPARIN ANTI-XA  --   --  >1.10* >1.10* >1.10*  --   --   --   --          Lab 03/26/23  0436 03/25/23  0722 03/24/23  1938 03/24/23  1745   SODIUM 140 137  --  130*   POTASSIUM 3.6 4.0  --  4.7   CHLORIDE 105 102  --  94*   CO2 24.0 16.0*  --  23.0   ANION GAP 11.0 19.0*  --  13.0   BUN 12 21  --  26*   CREATININE 0.59* 0.71*  --  0.97   EGFR 104.4 98.7  --  84.0   GLUCOSE 68 139*  --  233*   CALCIUM 8.1* 8.4*  --  9.0   MAGNESIUM 1.8 1.8 2.1  --    PHOSPHORUS 4.5 3.9  --   --    HEMOGLOBIN A1C  --  6.30*  --   --    TSH  --   --  1.610  --          Lab 03/26/23  0436 03/24/23  1745   TOTAL PROTEIN 5.3* 7.2   ALBUMIN 2.6* 2.8*   GLOBULIN 2.7 4.4   ALT (SGPT) 15 19   AST (SGOT) 18 21   BILIRUBIN 0.4 0.5   ALK PHOS 76 98   LIPASE  --  15         Lab 03/25/23  0120   PROTIME 36.4*   INR 3.62*             Lab 03/25/23  0954   VITAMIN B 12 260         Brief Urine Lab Results  (Last result in the past 365 days)      Color   Clarity   Blood   Leuk Est   Nitrite   Protein   CREAT   Urine HCG        03/24/23 0319 Yellow   Turbid   Small (1+)   Moderate (2+)   Negative   30 mg/dL (1+)                  Microbiology Results Abnormal     Procedure Component Value - Date/Time    COVID PRE-OP / PRE-PROCEDURE SCREENING ORDER (NO ISOLATION) - Swab, Nasopharynx [[id number]]  (Normal) Collected: 03/24/23 2256    Lab Status: Final result Specimen: Swab from Nasopharynx Updated: 03/24/23 2356    Narrative:      The following orders were created for panel order COVID PRE-OP / PRE-PROCEDURE SCREENING ORDER (NO ISOLATION) - Swab, Nasopharynx.  Procedure                               Abnormality         Status                     ---------                               -----------         ------                     Respiratory Panel PCR w/...[[id number]]  Normal              Final result                 Please view results for these tests on the individual orders.    Respiratory Panel PCR w/COVID-19(SARS-CoV-2) SULEIMAN/TAWANNA/BLAISE/PAD/COR/MAD/ANTHONY In-House, NP Swab in UTM/VTM, 3-4 HR TAT - Swab, Nasopharynx [[id number]]  (Normal) Collected: 03/24/23 2256    Lab Status: Final result Specimen: Swab from Nasopharynx Updated: 03/24/23 2356     ADENOVIRUS, PCR Not Detected     Coronavirus 229E Not Detected     Coronavirus HKU1 Not Detected     Coronavirus NL63 Not Detected     Coronavirus OC43 Not Detected     COVID19 Not Detected     Human Metapneumovirus Not Detected     Human Rhinovirus/Enterovirus Not Detected     Influenza A PCR Not Detected     Influenza B PCR Not Detected     Parainfluenza Virus 1 Not Detected     Parainfluenza Virus 2 Not Detected     Parainfluenza Virus 3 Not Detected     Parainfluenza Virus 4 Not Detected     RSV, PCR Not Detected     Bordetella pertussis pcr Not Detected     Bordetella parapertussis PCR Not Detected     Chlamydophila pneumoniae PCR Not Detected     Mycoplasma pneumo by PCR Not Detected    Narrative:      In the setting of a positive respiratory panel with a viral infection PLUS a negative procalcitonin without other underlying concern for bacterial infection, consider observing off  antibiotics or discontinuation of antibiotics and continue supportive care. If the respiratory panel is positive for atypical bacterial infection (Bordetella pertussis, Chlamydophila pneumoniae, or Mycoplasma pneumoniae), consider antibiotic de-escalation to target atypical bacterial infection.          CT Abdomen Pelvis With Contrast    Result Date: 3/24/2023  CT ABDOMEN PELVIS W CONTRAST Date of Exam: 3/24/2023 8:05 PM EDT Indication: abd pain, obstipation. Comparison: None available. Technique: Axial CT images were obtained of the abdomen and pelvis following the uneventful intravenous administration of 100 mL Isovue-300. Reconstructed coronal and sagittal images were also obtained. Automated exposure control and iterative construction methods were used. Findings: There is a pulmonary embolism in the segmental pulmonary artery supplying the left lower lobe (series 2 image 12; there is likely an additional pulmonary embolism in the segment pulmonary arteries supplying the right upper lobe (series 2 image 1). There is a partially loculated moderate right pleural effusion with associated atelectasis in the right lower lobe. No definite thickening or enhancement of the visceral or parietal pleura. The lungs demonstrate mild to moderate centrilobular emphysema. The left lower lung appears grossly clear. Unremarkable appearance of the liver, gallbladder, bile ducts, spleen, pancreas, and adrenal glands. There are a few small bilateral simple appearing renal cysts with otherwise unremarkable appearance of the kidneys. Normal appearance of the ureters, bladder, prostate, and seminal vesicles. There is mild inspissated rectal stool burden. No definite rectal wall thickening or perirectal fat stranding. There is sigmoid colonic diverticulosis without evidence of diverticulitis. Overall there is moderate colonic stool burden. Normal appendix. Mild fecalization of the distal ileum suggestive of delayed transit. No definite  inflammatory change of the GI tract. No abdominopelvic free fluid. No pneumoperitoneum. There is severe atherosclerosis of the abdominal aorta with fusiform ectasia/small aneurysm of the distal infrarenal abdominal aorta measuring 3.0 cm in diameter. There are mild atherosclerotic narrowings of the origins of the celiac axis, SMA, and bilateral internal iliac arteries. No lymphadenopathy. Unremarkable appearance of the body wall soft tissues. The bones are demineralized. No acute or suspicious bony findings. Right hip cephalomedullary construct is noted.     Impression: Impression: Pulmonary emboli. Partially-loculated moderate right pleural effusion with associated atelectasis in the right lower lobe. No definite evidence of empyema. There is evidence of emphysema. Correlate with patient history and risk factors and please assess if the patient meets criteria for routine low dose CT lung cancer screening. Moderate colonic stool burden with mild inspissated rectal stool burden without definite stercoral colitis. 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 Findings of pulmonary emboli discussed with ordering provider Dr. Kuhn by Dr. Fabián Mondragon via telephone at 9:10 PM 3/24/2023. Electronically Signed: Fabián Mondragon  3/24/2023 9:11 PM EDT  Workstation ID: [id number]    CT Angiogram Chest    Result Date: 3/24/2023  CT ANGIOGRAM CHEST Date of Exam: 3/24/2023 9:38 PM EDT Indication: pe eval. Comparison: None available. Technique: CTA of the chest was performed after the uneventful intravenous administration of 80 mL Isovue-370. Reconstructed coronal and sagittal images were also obtained. In addition, a 3-D volume rendered image was created for interpretation. Automated exposure control and iterative reconstruction methods were used. Findings: Redemonstration of pulmonary embolism in a segmental branch supplying the left lower lobe (series 4 image 62). There is  likely an additional pulmonary embolism in the segmental pulmonary artery supplying the right upper lobe (series 4 image 39). No definite additional pulmonary emboli are identified. Normal caliber of the main pulmonary artery. No evidence of right heart strain. Unremarkable appearance of the cardiac chambers. Mild coronary artery calcifications are noted. A biventricular AICD with  left chest pulse generator is noted. Mild atherosclerosis of the thoracic aorta which appears normal in caliber. No pericardial effusion. There are shotty mediastinal lymph nodes, some of which demonstrate internal calcifications, likely sequelae of healed granulomatous disease or no bulky or clearly pathologic thoracic lymph nodes. Unremarkable appearance of the thoracic esophagus. Chest wall soft tissues are unremarkable. No acute or suspicious bony findings. The trachea and mainstem bronchi are patent. There is moderate centrilobular emphysema. There is a moderate right-sided pleural effusion with subpulmonic fluid component and associated subsegmental atelectasis in the adjacent right lower lobe, with some pleural fluid tracking in the major fissure. Otherwise the lungs are grossly clear. There are small pleural-based linear scarring in the right upper lobe. No evidence of lung mass or suspicious pulmonary nodule. No pneumothorax.     Impression: Impression: Redemonstration of 1 or 2 pulmonary emboli as detailed above. No right heart strain. Moderate right pleural effusion and associated subsegmental atelectasis in the adjacent right lower lobe. Electronically Signed: Fabián Mondragon  3/24/2023 10:19 PM EDT  Workstation ID: [id number]      Results for orders placed during the hospital encounter of 10/16/22    Adult Transthoracic Echo Complete W/ Cont if Necessary Per Protocol    Interpretation Summary  •  Estimated left ventricular EF = 30%.  There is global hypokinesis.  The basal-mid posterior wall appears akinetic.  •  Normal right  ventricular cavity size, wall thickness, systolic function and septal motion noted. Electronic lead present in the ventricle  •  Septal wall motion is abnormal, consistent with right ventricular pacing  •  No hemodynamically significant valvular stenosis or regurgitation noted.      Current medications:  Scheduled Meds:acetaminophen, 650 mg, Oral, Q8H  vitamin C, 1,000 mg, Oral, Daily  aspirin, 81 mg, Oral, Daily  atorvastatin, 80 mg, Oral, Nightly  carvedilol, 50 mg, Oral, BID With Meals  docusate sodium, 100 mg, Oral, BID  famotidine, 20 mg, Oral, Daily  insulin lispro, 0-7 Units, Subcutaneous, Q6H  lisinopril, 5 mg, Oral, Q24H  nystatin, 5 mL, Oral, 4x Daily  pantoprazole, 40 mg, Oral, Nightly  piperacillin-tazobactam, 3.375 g, Intravenous, Q8H  polyethylene glycol, 17 g, Oral, Daily  Semaglutide, 7 mg, Oral, QAM AC  senna-docusate sodium, 2 tablet, Oral, BID  sodium chloride, 10 mL, Intravenous, Q12H  terazosin, 1 mg, Oral, Nightly      Continuous Infusions:heparin, 16 Units/kg/hr, Last Rate: 16 Units/kg/hr (03/25/23 1454)  Pharmacy to Dose Heparin,       PRN Meds:.•  acetaminophen **OR** acetaminophen **OR** acetaminophen  •  senna-docusate sodium **AND** polyethylene glycol **AND** bisacodyl **AND** bisacodyl  •  Calcium Replacement - Follow Nurse / BPA Driven Protocol  •  cyclobenzaprine  •  dextrose  •  dextrose  •  glucagon (human recombinant)  •  Magnesium Standard Dose Replacement - Follow Nurse / BPA Driven Protocol  •  melatonin  •  ondansetron  •  Pharmacy to Dose Heparin  •  Phosphorus Replacement - Follow Nurse / BPA Driven Protocol  •  Potassium Replacement - Follow Nurse / BPA Driven Protocol  •  Sodium Chloride (PF)  •  sodium chloride  •  sodium chloride    Assessment & Plan   Assessment & Plan     Active Hospital Problems    Diagnosis  POA   • **Bilateral pulmonary embolism (HCC) [I26.99]  Unknown   • Pleural effusion [J90]  Yes   • Unintentional weight loss [R63.4]  Unknown   • Hypoalbuminemia  [E88.09]  Unknown   • Severe malnutrition (HCC) [E43]  Unknown   • History of CVA (cerebrovascular accident) [Z86.73]  Not Applicable   • Debility [R53.81]  Unknown   • Presence of cardiac pacemaker [Z95.0]  Yes   • HTN (hypertension) [I10]  Yes   • Leukocytosis [D72.829]  Yes      Resolved Hospital Problems   No resolved problems to display.        Brief Hospital Course to date:  Timmy Guajardo is a 72 y.o. 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, systolic heart failure with ejection fraction of 30%, coronary artery disease, GERD, history of DVT who presented to the hospital with weakness and functional decline, unintentional weight loss and severe constipation    Assessment and plan:  Bilateral pulmonary embolism  History of DVT  · Likely secondary to being on an adequate dose of Eliquis of 2.5 mg twice daily and medication noncompliance  · Continue heparin drip while hospitalized in case he needs any intervention/procedures  · Transition to full dose Eliquis loading and maintenance upon discharge once he does not need any further procedures     Moderate right-sided loculated pleural effusion  · With bedside point-of-care ultrasound revealed complex pleural effusion  · Cardiothoracic surgery consulted.  Recommended chest tube placement by IR.  IR consulted  · Keep n.p.o. from midnight and hold heparin in the morning for chest tube placement  · Currently covered with IV Zosyn and Vanco    Severe malnutrition  Hypoalbuminemia  Significant unintentional weight loss  Generalized debility  · Patient reports 52lbs weight loss since oct  · Nutrition consult  · Oncology consult  · The patient has never had a colonoscopy or EGD as it was recommended against by his cardiologist. Patient had EF of 15 % at the time.     Leukocytosis, improving  · Likely reactive versus infectious  · Normal procalcitonin and negative cultures to date  · Continue  current metabolic therapy with Zosyn and vancomycin.     CAD  Cardiomyopathy with EF 30 %  Essential HTN  Old CVA with right residual weakness  Dyslipidemia  · Start aspirin  · Continue coreg, lisinopril  · Continue statins    Constipation  · Having multiple bowel movements with enema  · Continue bowel regimen     GERD   · Continue PPI      Type 2 diabetes  · A1C 6.3%   · Continue insulin sliding scale     Total time spent:50 min   Time spent includes time reviewing chart, face-to-face time, counseling patient/family/caregiver, ordering medications/tests/procedures, communicating with other health care professionals, documenting clinical information in the electronic health record, and coordination of care.     Expected Discharge Location and Transportation: Home  Expected Discharge   Expected Discharge Date and Time     Expected Discharge Date Expected Discharge Time    Mar 28, 2023            DVT prophylaxis:  Medical and mechanical DVT prophylaxis orders are present.          CODE STATUS:   Code Status and Medical Interventions:   Ordered at: 03/24/23 2318     Code Status (Patient has no pulse and is not breathing):    CPR (Attempt to Resuscitate)     Medical Interventions (Patient has pulse or is breathing):    Full Support     Copied text in this note has been reviewed and is accurate as of 03/26/23.     Alan Cooper MD  03/26/23

## 2023-03-26 NOTE — CONSULTS
Consult Note  Timmy Guajardo  [id number]  1950    Referring Provider:  Reason for Consultation: Right pleural effusion    Patient Care Team:  Arsen Seals APRN as PCP - General (Family Medicine)    Chief complaint: Right pleural effusion      History of present illness: 72-year-old male with past history negative for DVT, coronary disease, diabetes mellitus type 2, hypertension, history of stroke with mild residual and right-sided weaknesses, and cardiomyopathy with a 30% EF.  Presented to the emergency room here with no bowel movement for 7 to 8 days has had progressive weakness and functional decline since October when he had an accident where he sustained leg fracture he was sent to [facility] for rehabilitation.  He said decreased appetite and as well as having abnormal since problem with his diet he says everything tastes bad and he has had a 52 pound weight loss since October last year he was also found to have oral thrush and had been treated with nystatin swish and swallow.  Prior to his admission he has had several nights of night sweats with fever and chills.  He has had a nonproductive cough.        Review of Systems    Neuro: Struve CVA CVA with right-sided weakness   constitutional: Fever chills and night sweats   hematologic:  No bleeding disorders or DVT.  Endocrine:   No tremor, fatigue, weight loss or weight gain.  GI: Anorexia and constipation  : No hematuria or frequent urination.  Pulmonary: He does have shortness of breath no hemoptysis   cardiovascular: Coronary disease with a EF of 15 to 30%   HEENT: No blurred vision, glaucoma, hearing loss or nosebleed.  Musculoskeletal: No joint pain or back pain.    All other review of systems is negative      History  Past Medical History:   Diagnosis Date   • Cardiomyopathy (HCC)    • CHF (congestive heart failure) (HCC)    • Coronary artery disease    • Diabetes mellitus (HCC)    • GERD (gastroesophageal reflux disease)    • HTN  (hypertension)    • Stroke (HCC)     Right sided weakness   • Stroke (HCC)    ,   Past Surgical History:   Procedure Laterality Date   • AMPUTATION DIGIT Left 5/12/2021    Procedure: AMPUTATION DIGIT - GREAT TOE AMPUTATION LEFT;  Surgeon: Dario Marmolejo MD;  Location: [facility] OR;  Service: General;  Laterality: Left;   • AORTAGRAM N/A 5/11/2021    Procedure: AORTAGRAM WITH RUNOFFS, LEFT SFA BALLOON ANGIOPLASTY;  Surgeon: Tim Mahan MD;  Location: [facility] HYBRID SHELIA;  Service: Vascular;  Laterality: N/A;  FL TIME 6 MIN 54 SECS  82 MGY  CONTRAST VISIPAQUE 100ML     • CARDIAC CATHETERIZATION     • CARDIAC PACEMAKER PLACEMENT      pacemaker/defibrillator, [employer]   • HERNIA REPAIR Bilateral     Inguinal hernia   • HIP TROCHANTERIC NAILING WITH INTRAMEDULLARY HIP SCREW Right 10/18/2022    Procedure: HIP TROCHANTERIC NAILING WITH INTRAMEDULLARY HIP SCREW RIGHT;  Surgeon: Bj Steinberg MD;  Location: [facility] OR;  Service: Orthopedics;  Laterality: Right;   ,   Family History   Problem Relation Age of Onset   • Alzheimer's disease Mother    • Kidney failure Mother    • Cancer Father    • Heart failure Father    ,   Social History     Tobacco Use   • Smoking status: Former     Packs/day: 1.00     Years: 30.00     Pack years: 30.00     Types: Cigarettes   • Smokeless tobacco: Never   • Tobacco comments:     quit 2015   Vaping Use   • Vaping Use: Never used   Substance Use Topics   • Alcohol use: Never   • Drug use: Never   ,   Medications Prior to Admission   Medication Sig Dispense Refill Last Dose   • acetaminophen (TYLENOL) 325 MG tablet Take 2 tablets by mouth Every 8 (Eight) Hours.   Past Week   • apixaban (ELIQUIS) 2.5 MG tablet tablet Take 1 tablet by mouth Every 12 (Twelve) Hours. Indications: Prevention of Unwanted Clot in Veins   3/25/2023   • atorvastatin (LIPITOR) 80 MG tablet Take 1 tablet by mouth Every Night.   3/24/2023   • carvedilol (COREG) 25 MG tablet Take 2 tablets by mouth 2 (Two) Times a  Day With Meals.   3/25/2023   • cyclobenzaprine (FLEXERIL) 5 MG tablet Take 1 tablet by mouth 3 (Three) Times a Day As Needed for Muscle Spasms.   Past Week   • docusate sodium 100 MG capsule Take 1 capsule by mouth 2 (Two) Times a Day.   Past Week   • famotidine (PEPCID) 20 MG tablet Take 1 tablet by mouth Daily.   3/25/2023   • lisinopril (PRINIVIL,ZESTRIL) 5 MG tablet Take 1 tablet by mouth Daily.   3/25/2023   • melatonin 5 MG tablet tablet Take 1 tablet by mouth At Night As Needed (sleep).   Past Week   • oxyCODONE (ROXICODONE) 5 MG immediate release tablet Take 1 tablet by mouth Every 6 (Six) Hours As Needed for Moderate Pain.   Past Week   • pantoprazole (PROTONIX) 40 MG EC tablet Take 1 tablet by mouth Daily. evening   3/25/2023   • polyethylene glycol (MIRALAX) 17 g packet Take 17 g by mouth Daily.   Past Month   • Rybelsus 7 MG tablet Take  by mouth Daily.   3/25/2023   • terazosin (HYTRIN) 1 MG capsule Take 1 capsule by mouth.   3/25/2023   • Trulicity 0.75 MG/0.5ML solution pen-injector    Unknown   • vitamin C (ASCORBIC ACID) 500 MG tablet    Unknown   • Zinc 220 (50 Zn) MG capsule          Scheduled Meds:  acetaminophen, 650 mg, Oral, Q8H  vitamin C, 1,000 mg, Oral, Daily  aspirin, 81 mg, Oral, Daily  atorvastatin, 80 mg, Oral, Nightly  carvedilol, 50 mg, Oral, BID With Meals  docusate sodium, 100 mg, Oral, BID  famotidine, 20 mg, Oral, Daily  insulin lispro, 0-7 Units, Subcutaneous, Q6H  lisinopril, 5 mg, Oral, Q24H  nystatin, 5 mL, Oral, 4x Daily  pantoprazole, 40 mg, Oral, Nightly  piperacillin-tazobactam, 3.375 g, Intravenous, Q8H  polyethylene glycol, 17 g, Oral, Daily  Semaglutide, 7 mg, Oral, QAM AC  senna-docusate sodium, 2 tablet, Oral, BID  sodium chloride, 10 mL, Intravenous, Q12H  terazosin, 1 mg, Oral, Nightly       Continuous Infusions:  heparin, 16 Units/kg/hr, Last Rate: Stopped (03/26/23 0933)  Pharmacy to Dose Heparin,        PRN Meds:  •  acetaminophen **OR** acetaminophen **OR**  "acetaminophen  •  senna-docusate sodium **AND** polyethylene glycol **AND** bisacodyl **AND** bisacodyl  •  Calcium Replacement - Follow Nurse / BPA Driven Protocol  •  cyclobenzaprine  •  dextrose  •  dextrose  •  glucagon (human recombinant)  •  Magnesium Standard Dose Replacement - Follow Nurse / BPA Driven Protocol  •  melatonin  •  ondansetron  •  Pharmacy to Dose Heparin  •  Phosphorus Replacement - Follow Nurse / BPA Driven Protocol  •  Potassium Replacement - Follow Nurse / BPA Driven Protocol  •  Sodium Chloride (PF)  •  sodium chloride  •  sodium chloride and Allergies:  Other    Objective     Vital Sign Min/Max for last 24 hours  Temp  Min: 97.5 °F (36.4 °C)  Max: 98 °F (36.7 °C)   BP  Min: 110/66  Max: 135/69   Pulse  Min: 70  Max: 70   Resp  Min: 18  Max: 20   SpO2  Min: 93 %  Max: 94 %   No data recorded   No data recorded     Flowsheet Rows    Flowsheet Row First Filed Value   Admission Height 177.8 cm (70\") Documented at 03/24/2023 1718   Admission Weight 51.7 kg (114 lb) Documented at 03/24/2023 1718          Physical Exam:     General Appearance:    Alert, cooperative, in no acute distress   Head:    Normocephalic, without obvious abnormality, atraumatic   Eyes:            Lids and lashes normal, conjunctivae and sclerae normal,   no icterus, no pallor, corneas clear, PERRLA   Ears:    Ears appear intact with no abnormalities noted   Throat:   No oral lesions, no thrush, oral mucosa moist   Neck:   No adenopathy, supple, trachea midline, no thyromegaly, no carotid bruit, no JVD   Back:   No kyphosis present, no scoliosis present, no skin lesions, erythema or scars, no tenderness to percussion or                palpation, range of motion normal   Lungs:     Clear to auscultation,respirations regular, even and                unlabored    Heart:    Regular rhythm and normal rate, normal S1 and S2, no         murmur, no gallop, no rub, no click   Chest Wall:    No abnormalities observed   Abdomen:     " Normal bowel sounds, no masses, no organomegaly, soft     nontender, nondistended, no guarding, no rebound               tenderness   Rectal:     Deferred   Extremities:  Slight right-sided weakness   Pulses:   Pulses palpable and equal bilaterally   Skin:   No bleeding, bruising or rash   Lymph nodes:   No palpable adenopathy   Neurologic:   Cranial nerves 2 - 12 grossly intact, sensation intact, DTR     present and equal bilaterally             Assessment & Plan   Bilateral pulmonary embolus  Coronary disease with EF of 30%  Diabetes mellitus type 2  Weight loss uncertain etiology  Small infrarenal abdominal aortic aneurysm      Plan   Anticoagulation started for bilateral  PEs  We recommend a IR placed right pigtail catheter for  drainage of right effusion  Hospitalist service covering diabetes, hypertension pulmonary embolus  Incidental finding of a small aneurysm of his abdominal aorta  Nutritionist is adjusting his diet weight discussed the patient's findings and my recommendations with patient  On empiric antibiotics    Please note that portions of this note were completed with a voice recognition program. Efforts were made to edit the dictations, but words may be mistranscribed    Bandar Bustos PA-C  03/26/23  13:03 EDT

## 2023-03-26 NOTE — CONSULTS
Nutrition Services    Patient Name:  Timmy Guajardo  YOB: 1950  MRN: [id number]  Admit Date:  3/24/2023    Nutrition consult received. RD completed full assessment on 3/25 with MSA at which time pt met criteria for chronic severe malnutrition. Will change diet to soft, whole 2/2 edentulous & add ONS 3x daily. Will continue to monitor per protocol.       Electronically signed by:  Xi Isaacs MS,GEORGE,LD  03/26/23 09:56 EDT

## 2023-03-26 NOTE — THERAPY EVALUATION
Patient Name: Timmy Guajardo  : 1950    MRN: [id number]                              Today's Date: 3/26/2023       Admit Date: 3/24/2023    Visit Dx: No diagnosis found.  Patient Active Problem List   Diagnosis   • Acute right-sided weakness   • Suspected cerebrovascular accident (CVA)   • Leukocytosis   • CHF (congestive heart failure) (HCC)   • Thrombocytopenia (HCC)   • HTN (hypertension)   • Presence of cardiac pacemaker   • Hyperglycemia   • Gangrene of toe of left foot (HCC)   • Closed displaced intertrochanteric fracture of right femur, initial encounter (HCC)   • Pleural effusion   • Unintentional weight loss   • Hypoalbuminemia   • Severe malnutrition (HCC)   • Bilateral pulmonary embolism (HCC)   • History of CVA (cerebrovascular accident)   • Debility     Past Medical History:   Diagnosis Date   • Cardiomyopathy (HCC)    • CHF (congestive heart failure) (HCC)    • Coronary artery disease    • Diabetes mellitus (HCC)    • GERD (gastroesophageal reflux disease)    • HTN (hypertension)    • Stroke (HCC)     Right sided weakness   • Stroke (HCC)      Past Surgical History:   Procedure Laterality Date   • AMPUTATION DIGIT Left 2021    Procedure: AMPUTATION DIGIT - GREAT TOE AMPUTATION LEFT;  Surgeon: Dario Marmolejo MD;  Location: [facility] OR;  Service: General;  Laterality: Left;   • AORTAGRAM N/A 2021    Procedure: AORTAGRAM WITH RUNOFFS, LEFT SFA BALLOON ANGIOPLASTY;  Surgeon: Tim Mahan MD;  Location: [facility];  Service: Vascular;  Laterality: N/A;  FL TIME 6 MIN 54 SECS  82 MGY  CONTRAST VISIPAQUE 100ML     • CARDIAC CATHETERIZATION     • CARDIAC PACEMAKER PLACEMENT      pacemaker/defibrillator, [employer]   • HERNIA REPAIR Bilateral     Inguinal hernia   • HIP TROCHANTERIC NAILING WITH INTRAMEDULLARY HIP SCREW Right 10/18/2022    Procedure: HIP TROCHANTERIC NAILING WITH INTRAMEDULLARY HIP SCREW RIGHT;  Surgeon: Bj Steinberg MD;  Location: [facility] OR;  Service:  Orthopedics;  Laterality: Right;      General Information     Row Name 03/26/23 1515          Physical Therapy Time and Intention    Document Type evaluation  -     Mode of Treatment physical therapy  -     Row Name 03/26/23 1515          General Information    Patient Profile Reviewed yes  -     Prior Level of Function independent:;w/c or scooter;min assist:;bed mobility;mod assist:;gait;transfer;max assist:;using stairs  pt seems to be a poor historian. pt is w/c bound at baseline per chart.  -     Existing Precautions/Restrictions fall;other (see comments)  mild R sided weakness prom previous CVA  -     Barriers to Rehab medically complex;cognitive status;previous functional deficit  -     Row Name 03/26/23 1515          Living Environment    People in Home alone  -[facility] Name 03/26/23 1515          Cognition    Orientation Status (Cognition) oriented to;person;place;disoriented to;situation;time  -     Row Name 03/26/23 1515          Safety Issues, Functional Mobility    Safety Issues Affecting Function (Mobility) awareness of need for assistance;impulsivity;insight into deficits/self-awareness;safety precaution awareness;safety precautions follow-through/compliance  -     Impairments Affecting Function (Mobility) balance;endurance/activity tolerance;postural/trunk control;strength  -           User Key  (r) = Recorded By, (t) = Taken By, (c) = Cosigned By    Initials Name Provider Type     Gabino Tirado, PT Physical Therapist               Mobility     Row Name 03/26/23 1521          Bed Mobility    Bed Mobility supine-sit-supine  -KL     Supine-Sit-Supine [place] (Bed Mobility) minimum assist (75% patient effort);1 person assist  -     Assistive Device (Bed Mobility) bed rails;head of bed elevated  -     Comment, (Bed Mobility) pt sits EOB min A. pt reports he is fatigued and wants to rest in supine quickly  -     Row Name 03/26/23 1521          Bed-Chair Transfer     Bed-Chair [place] (Transfers) not tested  -     Comment, (Bed-Chair Transfer) pt declines getting OOB on this day  -           User Key  (r) = Recorded By, (t) = Taken By, (c) = Cosigned By    Initials Name Provider Type    Gabino Duncan, PT Physical Therapist               Obj/Interventions     Row Name 03/26/23 1524          Range of Motion Comprehensive    General Range of Motion bilateral lower extremity ROM WFL  -     Row Name 03/26/23 1524          Strength Comprehensive (MMT)    General Manual Muscle Testing (MMT) Assessment lower extremity strength deficits identified  -     Comment, General Manual Muscle Testing (MMT) Assessment L LE grossly 4+/5 and R LE grossly 3+/5  -[facility] Name 03/26/23 1524          Motor Skills    Therapeutic Exercise hip;knee;ankle  -[facility] Name 03/26/23 1524          Hip (Therapeutic Exercise)    Hip (Therapeutic Exercise) AROM (active range of motion)  -     Hip AROM (Therapeutic Exercise) left;right;flexion;aBduction;aDduction;sitting;supine;15 repititions;other (see comments)  performed in supine and sitting EOB  -     Row Name 03/26/23 1524          Knee (Therapeutic Exercise)    Knee (Therapeutic Exercise) AROM (active range of motion)  -     Knee AROM (Therapeutic Exercise) left;right;flexion;extension;sitting;20 repititions  -[facility] Name 03/26/23 1524          Ankle (Therapeutic Exercise)    Ankle (Therapeutic Exercise) AROM (active range of motion)  -     Ankle AROM (Therapeutic Exercise) left;right;dorsiflexion;plantarflexion;supine;20 repititions  -     Row Name 03/26/23 1524          Balance    Balance Assessment sitting static balance;sitting dynamic balance  -     Static Sitting Balance contact guard  -     Dynamic Sitting Balance minimal assist  -     Position, Sitting Balance unsupported;sitting edge of bed  -     Balance Interventions sitting;dynamic reaching  -     Row Name 03/26/23 1524          Sensory Assessment  (Somatosensory)    Sensory Assessment (Somatosensory) LE sensation intact  -     Row Name 03/26/23 1524          Lower Extremity (Manual Muscle Testing)    Lower Extremity: Manual Muscle Testing (MMT) right LE strength is WFL;left LE strength is WFL  -           User Key  (r) = Recorded By, (t) = Taken By, (c) = Cosigned By    Initials Name Provider Type    Gabino Duncan P, PT Physical Therapist               Goals/Plan     Row Name 03/26/23 1534          Bed Mobility Goal 1 (PT)    Activity/Assistive Device (Bed Mobility Goal 1, PT) sit to supine/supine to sit  -     [place] Level/Cues Needed (Bed Mobility Goal 1, PT) modified independence  -KL     Time Frame (Bed Mobility Goal 1, PT) long term goal (LTG);10 days  -KL     Progress/Outcomes (Bed Mobility Goal 1, PT) new goal  -     Row Name 03/26/23 1534          Transfer Goal 1 (PT)    Activity/Assistive Device (Transfer Goal 1, PT) sit-to-stand/stand-to-sit;wheelchair transfer  -     [place] Level/Cues Needed (Transfer Goal 1, PT) minimum assist (75% or more patient effort)  -     Time Frame (Transfer Goal 1, PT) long term goal (LTG);10 days  -KL     Progress/Outcome (Transfer Goal 1, PT) new goal  -     Row Name 03/26/23 1534          Therapy Assessment/Plan (PT)    Planned Therapy Interventions (PT) balance training;bed mobility training;home exercise program;joint mobilization;motor coordination training;neuromuscular re-education;patient/family education;postural re-education;ROM (range of motion);strengthening;transfer training;wheelchair management/propulsion training  -           User Key  (r) = Recorded By, (t) = Taken By, (c) = Cosigned By    Initials Name Provider Type    Gabino Duncan P, PT Physical Therapist               Clinical Impression     Row Name 03/26/23 1527          Pain    Pretreatment Pain Rating 0/10 - no pain  -KL     Posttreatment Pain Rating 0/10 - no pain  -     Pain Intervention(s)  Repositioned;Ambulation/increased activity  -     Row Name 03/26/23 1527          Plan of Care Review    Plan of Care Reviewed With patient;family  -     Outcome Evaluation PT initial eval completed. Pt limited by R sided weakness, balanace deficits, and decreased functional endurance compared to baseline. pt demos bed mobility and MMT decreased from PLoF that negatively affects ability to perform transfers safely. per chart pt is w/c bound at baseline. Rec skilled IPPT to increase indep with mobility and transfers. d/c rec for IRF. subject to change based on course fo stay.  -     Row Name 03/26/23 1527          Therapy Assessment/Plan (PT)    Patient/Family Therapy Goals Statement (PT) return home at PLoF (independent with w/c propulsion and transfers)  -     Rehab Potential (PT) good, to achieve stated therapy goals  -     Criteria for Skilled Interventions Met (PT) yes;skilled treatment is necessary  -     Therapy Frequency (PT) daily  -     Row Name 03/26/23 1527          Vital Signs    Pre Systolic BP Rehab 110  -     Pre Treatment Diastolic BP 66  -KL     Post Systolic BP Rehab 115  -KL     Post Treatment Diastolic BP 71  -KL     Pretreatment Heart Rate (beats/min) 74  -KL     Intratreatment Heart Rate (beats/min) 84  -KL     Posttreatment Heart Rate (beats/min) 77  -     Pre SpO2 (%) 95  -KL     O2 Delivery Pre Treatment room air  -     Intra SpO2 (%) 95  -KL     O2 Delivery Intra Treatment room air  -     Post SpO2 (%) 95  -KL     O2 Delivery Post Treatment room air  -     Pre Patient Position Supine  -     Intra Patient Position Sitting  -     Post Patient Position Supine  -     Row Name 03/26/23 1527          Positioning and Restraints    Pre-Treatment Position in bed  -     Post Treatment Position bed  -KL     In Bed notified nsg;fowlers;call light within reach;encouraged to call for assist;exit alarm on;patient within staff view;with family/caregiver;side rails up x3;legs  elevated  -           User Key  (r) = Recorded By, (t) = Taken By, (c) = Cosigned By    Initials Name Provider Type    Gabino Duncan, PT Physical Therapist               Outcome Measures     Row Name 03/26/23 1535          How much help from another person do you currently need...    Turning from your back to your side while in flat bed without using bedrails? 3  -KL     Moving from lying on back to sitting on the side of a flat bed without bedrails? 3  -KL     Moving to and from a bed to a chair (including a wheelchair)? 2  -KL     Standing up from a chair using your arms (e.g., wheelchair, bedside chair)? 2  -KL     Climbing 3-5 steps with a railing? 1  -KL     To walk in hospital room? 1  -KL     AM-PAC 6 Clicks Score (PT) 12  -KL     Highest level of mobility 4 --> Transferred to chair/commode  -     Row Name 03/26/23 1535          Functional Assessment    Outcome Measure Options AM-PAC 6 Clicks Basic Mobility (PT)  -           User Key  (r) = Recorded By, (t) = Taken By, (c) = Cosigned By    Initials Name Provider Type    Gabino Duncan, PT Physical Therapist                             Physical Therapy Education     Title: PT OT SLP Therapies (In Progress)     Topic: Physical Therapy (Done)     Point: Mobility training (Done)     Learning Progress Summary           Patient Acceptance, E,TB, VU,NR by  at 3/26/2023 1536   Family Acceptance, E,TB, VU,NR by  at 3/26/2023 1536                   Point: Home exercise program (Done)     Learning Progress Summary           Patient Acceptance, E,TB, VU,NR by  at 3/26/2023 1536   Family Acceptance, E,TB, VU,NR by  at 3/26/2023 1536                   Point: Body mechanics (Done)     Learning Progress Summary           Patient Acceptance, E,TB, VU,NR by  at 3/26/2023 1536   Family Acceptance, E,TB, VU,NR by  at 3/26/2023 1536                   Point: Precautions (Done)     Learning Progress Summary           Patient Acceptance, E,TB, VU,NR by   at 3/26/2023 1536   Family Acceptance, E,TB, VU,NR by  at 3/26/2023 1536                               User Key     Initials Effective Dates Name Provider Type Discipline     11/02/22 -  Gabino Tirado, PT Physical Therapist PT              PT Recommendation and Plan  Planned Therapy Interventions (PT): balance training, bed mobility training, home exercise program, joint mobilization, motor coordination training, neuromuscular re-education, patient/family education, postural re-education, ROM (range of motion), strengthening, transfer training, wheelchair management/propulsion training  Plan of Care Reviewed With: patient, family  Outcome Evaluation: PT initial eval completed. Pt limited by R sided weakness, balanace deficits, and decreased functional endurance compared to baseline. pt demos bed mobility and MMT decreased from PLoF that negatively affects ability to perform transfers safely. per chart pt is w/c bound at baseline. Rec skilled IPPT to increase indep with mobility and transfers. d/c rec for IRF. subject to change based on course fo stay.     Time Calculation:    PT Charges     Row Name 03/26/23 1538             Time Calculation    Start Time 1430  -KL      PT Received On 03/26/23  -      PT Goal Re-Cert Due Date 04/05/23  -         Timed Charges    74502 - PT Therapeutic Activity Minutes 25  -KL         Untimed Charges    PT Eval/Re-eval Minutes 24  -KL         Total Minutes    Timed Charges Total Minutes 25  -KL      Untimed Charges Total Minutes 24  -       Total Minutes 49  -KL            User Key  (r) = Recorded By, (t) = Taken By, (c) = Cosigned By    Initials Name Provider Type     Gabino Tirado, PT Physical Therapist              Therapy Charges for Today     Code Description Service Date Service Provider Modifiers Qty    50387680257 HC PT THERAPEUTIC ACT EA 15 MIN 3/26/2023 Gabino Tirado, PT GP 2    15341185246 HC PT EVAL LOW COMPLEXITY 2 3/26/2023 Gabino Tirado, PT GP 1           PT G-Codes  Outcome Measure Options: AM-PAC 6 Clicks Basic Mobility (PT)  AM-PAC 6 Clicks Score (PT): 12  PT Discharge Summary  Anticipated Discharge Disposition (PT): inpatient rehabilitation facility    Gabino Tirado, PT  3/26/2023

## 2023-03-27 ENCOUNTER — APPOINTMENT (OUTPATIENT)
Dept: ULTRASOUND IMAGING | Facility: HOSPITAL | Age: 73
DRG: 163 | End: 2023-03-27
Payer: MEDICARE

## 2023-03-27 ENCOUNTER — APPOINTMENT (OUTPATIENT)
Dept: GENERAL RADIOLOGY | Facility: HOSPITAL | Age: 73
DRG: 163 | End: 2023-03-27
Payer: MEDICARE

## 2023-03-27 ENCOUNTER — APPOINTMENT (OUTPATIENT)
Dept: CT IMAGING | Facility: HOSPITAL | Age: 73
DRG: 163 | End: 2023-03-27
Payer: MEDICARE

## 2023-03-27 LAB
ALBUMIN FLD-MCNC: 2.3 G/DL
ALBUMIN SERPL-MCNC: 2.5 G/DL (ref 3.5–5.2)
ALBUMIN/GLOB SERPL: 0.7 G/DL
ALP SERPL-CCNC: 76 U/L (ref 39–117)
ALT SERPL W P-5'-P-CCNC: 14 U/L (ref 1–41)
ANION GAP SERPL CALCULATED.3IONS-SCNC: 13 MMOL/L (ref 5–15)
APPEARANCE FLD: CLEAR
APTT PPP: 40.7 SECONDS (ref 60–90)
APTT PPP: 88.1 SECONDS (ref 60–90)
AST SERPL-CCNC: 15 U/L (ref 1–40)
BACTERIA SPEC AEROBE CULT: ABNORMAL
BASOPHILS # BLD AUTO: 0.05 10*3/MM3 (ref 0–0.2)
BASOPHILS NFR BLD AUTO: 0.6 % (ref 0–1.5)
BILIRUB SERPL-MCNC: 0.4 MG/DL (ref 0–1.2)
BUN SERPL-MCNC: 9 MG/DL (ref 8–23)
BUN/CREAT SERPL: 14.8 (ref 7–25)
CALCIUM SPEC-SCNC: 8.5 MG/DL (ref 8.6–10.5)
CHLORIDE SERPL-SCNC: 105 MMOL/L (ref 98–107)
CO2 SERPL-SCNC: 22 MMOL/L (ref 22–29)
COLOR FLD: YELLOW
CREAT SERPL-MCNC: 0.61 MG/DL (ref 0.76–1.27)
DEPRECATED RDW RBC AUTO: 52.2 FL (ref 37–54)
EGFRCR SERPLBLD CKD-EPI 2021: 103.3 ML/MIN/1.73
EOSINOPHIL # BLD AUTO: 0.11 10*3/MM3 (ref 0–0.4)
EOSINOPHIL NFR BLD AUTO: 1.3 % (ref 0.3–6.2)
ERYTHROCYTE [DISTWIDTH] IN BLOOD BY AUTOMATED COUNT: 15.1 % (ref 12.3–15.4)
GLOBULIN UR ELPH-MCNC: 3.5 GM/DL
GLUCOSE BLDC GLUCOMTR-MCNC: 72 MG/DL (ref 70–130)
GLUCOSE BLDC GLUCOMTR-MCNC: 75 MG/DL (ref 70–130)
GLUCOSE BLDC GLUCOMTR-MCNC: 86 MG/DL (ref 70–130)
GLUCOSE FLD-MCNC: 57 MG/DL
GLUCOSE SERPL-MCNC: 77 MG/DL (ref 65–99)
HCT VFR BLD AUTO: 34.9 % (ref 37.5–51)
HGB BLD-MCNC: 10.9 G/DL (ref 13–17.7)
IMM GRANULOCYTES # BLD AUTO: 0.07 10*3/MM3 (ref 0–0.05)
IMM GRANULOCYTES NFR BLD AUTO: 0.8 % (ref 0–0.5)
INR PPP: 1.38 (ref 0.84–1.13)
LDH FLD-CCNC: 322 U/L
LDH SERPL-CCNC: 217 U/L (ref 135–225)
LYMPHOCYTES # BLD AUTO: 1.08 10*3/MM3 (ref 0.7–3.1)
LYMPHOCYTES NFR BLD AUTO: 12.6 % (ref 19.6–45.3)
LYMPHOCYTES NFR FLD MANUAL: 82 %
MACROPHAGE FLUID: 10 %
MAGNESIUM SERPL-MCNC: 1.8 MG/DL (ref 1.6–2.4)
MCH RBC QN AUTO: 29.7 PG (ref 26.6–33)
MCHC RBC AUTO-ENTMCNC: 31.2 G/DL (ref 31.5–35.7)
MCV RBC AUTO: 95.1 FL (ref 79–97)
MONOCYTES # BLD AUTO: 0.62 10*3/MM3 (ref 0.1–0.9)
MONOCYTES NFR BLD AUTO: 7.2 % (ref 5–12)
MONOCYTES NFR FLD: 1 %
MRSA DNA SPEC QL NAA+PROBE: NEGATIVE
NEUTROPHILS NFR BLD AUTO: 6.67 10*3/MM3 (ref 1.7–7)
NEUTROPHILS NFR BLD AUTO: 77.5 % (ref 42.7–76)
NEUTROPHILS NFR FLD MANUAL: 7 %
NRBC BLD AUTO-RTO: 0 /100 WBC (ref 0–0.2)
PH FLD: 7.31 [PH]
PHOSPHATE SERPL-MCNC: 3.8 MG/DL (ref 2.5–4.5)
PLATELET # BLD AUTO: 155 10*3/MM3 (ref 140–450)
PMV BLD AUTO: 12.2 FL (ref 6–12)
POTASSIUM SERPL-SCNC: 3.4 MMOL/L (ref 3.5–5.2)
POTASSIUM SERPL-SCNC: 4.8 MMOL/L (ref 3.5–5.2)
PROT FLD-MCNC: 4.4 G/DL
PROT SERPL-MCNC: 6 G/DL (ref 6–8.5)
PROTHROMBIN TIME: 16.9 SECONDS (ref 11.4–14.4)
RBC # BLD AUTO: 3.67 10*6/MM3 (ref 4.14–5.8)
RBC # FLD AUTO: 3000 /MM3
SODIUM SERPL-SCNC: 140 MMOL/L (ref 136–145)
WBC # FLD AUTO: 159 /MM3
WBC NRBC COR # BLD: 8.6 10*3/MM3 (ref 3.4–10.8)

## 2023-03-27 PROCEDURE — 25010000002 PIPERACILLIN SOD-TAZOBACTAM PER 1 G: Performed by: INTERNAL MEDICINE

## 2023-03-27 PROCEDURE — 82962 GLUCOSE BLOOD TEST: CPT

## 2023-03-27 PROCEDURE — 84157 ASSAY OF PROTEIN OTHER: CPT | Performed by: INTERNAL MEDICINE

## 2023-03-27 PROCEDURE — 85730 THROMBOPLASTIN TIME PARTIAL: CPT

## 2023-03-27 PROCEDURE — 84100 ASSAY OF PHOSPHORUS: CPT | Performed by: INTERNAL MEDICINE

## 2023-03-27 PROCEDURE — 75989 ABSCESS DRAINAGE UNDER X-RAY: CPT

## 2023-03-27 PROCEDURE — 99223 1ST HOSP IP/OBS HIGH 75: CPT | Performed by: INTERNAL MEDICINE

## 2023-03-27 PROCEDURE — 0W9930Z DRAINAGE OF RIGHT PLEURAL CAVITY WITH DRAINAGE DEVICE, PERCUTANEOUS APPROACH: ICD-10-PCS | Performed by: RADIOLOGY

## 2023-03-27 PROCEDURE — 83986 ASSAY PH BODY FLUID NOS: CPT | Performed by: INTERNAL MEDICINE

## 2023-03-27 PROCEDURE — 82042 OTHER SOURCE ALBUMIN QUAN EA: CPT | Performed by: INTERNAL MEDICINE

## 2023-03-27 PROCEDURE — 99231 SBSQ HOSP IP/OBS SF/LOW 25: CPT | Performed by: THORACIC SURGERY (CARDIOTHORACIC VASCULAR SURGERY)

## 2023-03-27 PROCEDURE — 99232 SBSQ HOSP IP/OBS MODERATE 35: CPT | Performed by: INTERNAL MEDICINE

## 2023-03-27 PROCEDURE — 84132 ASSAY OF SERUM POTASSIUM: CPT | Performed by: INTERNAL MEDICINE

## 2023-03-27 PROCEDURE — 83615 LACTATE (LD) (LDH) ENZYME: CPT | Performed by: INTERNAL MEDICINE

## 2023-03-27 PROCEDURE — 85025 COMPLETE CBC W/AUTO DIFF WBC: CPT | Performed by: INTERNAL MEDICINE

## 2023-03-27 PROCEDURE — 0 LIDOCAINE 1 % SOLUTION: Performed by: RADIOLOGY

## 2023-03-27 PROCEDURE — 89051 BODY FLUID CELL COUNT: CPT | Performed by: INTERNAL MEDICINE

## 2023-03-27 PROCEDURE — 87205 SMEAR GRAM STAIN: CPT | Performed by: INTERNAL MEDICINE

## 2023-03-27 PROCEDURE — 83735 ASSAY OF MAGNESIUM: CPT | Performed by: INTERNAL MEDICINE

## 2023-03-27 PROCEDURE — 32557 INSERT CATH PLEURA W/ IMAGE: CPT | Performed by: RADIOLOGY

## 2023-03-27 PROCEDURE — 97530 THERAPEUTIC ACTIVITIES: CPT

## 2023-03-27 PROCEDURE — 80053 COMPREHEN METABOLIC PANEL: CPT | Performed by: INTERNAL MEDICINE

## 2023-03-27 PROCEDURE — 76705 ECHO EXAM OF ABDOMEN: CPT

## 2023-03-27 PROCEDURE — 71045 X-RAY EXAM CHEST 1 VIEW: CPT

## 2023-03-27 PROCEDURE — 0 MAGNESIUM SULFATE 4 GM/100ML SOLUTION: Performed by: INTERNAL MEDICINE

## 2023-03-27 PROCEDURE — C1729 CATH, DRAINAGE: HCPCS

## 2023-03-27 PROCEDURE — 76981 USE PARENCHYMA: CPT

## 2023-03-27 PROCEDURE — 87641 MR-STAPH DNA AMP PROBE: CPT | Performed by: INTERNAL MEDICINE

## 2023-03-27 PROCEDURE — 87015 SPECIMEN INFECT AGNT CONCNTJ: CPT | Performed by: INTERNAL MEDICINE

## 2023-03-27 PROCEDURE — 85610 PROTHROMBIN TIME: CPT | Performed by: INTERNAL MEDICINE

## 2023-03-27 PROCEDURE — 97166 OT EVAL MOD COMPLEX 45 MIN: CPT

## 2023-03-27 PROCEDURE — 82945 GLUCOSE OTHER FLUID: CPT | Performed by: INTERNAL MEDICINE

## 2023-03-27 PROCEDURE — 87070 CULTURE OTHR SPECIMN AEROBIC: CPT | Performed by: INTERNAL MEDICINE

## 2023-03-27 RX ORDER — MAGNESIUM SULFATE HEPTAHYDRATE 40 MG/ML
4 INJECTION, SOLUTION INTRAVENOUS ONCE
Status: COMPLETED | OUTPATIENT
Start: 2023-03-27 | End: 2023-03-27

## 2023-03-27 RX ORDER — LIDOCAINE HYDROCHLORIDE 10 MG/ML
10 INJECTION, SOLUTION INFILTRATION; PERINEURAL ONCE
Status: COMPLETED | OUTPATIENT
Start: 2023-03-27 | End: 2023-03-27

## 2023-03-27 RX ORDER — POTASSIUM CHLORIDE 750 MG/1
40 CAPSULE, EXTENDED RELEASE ORAL EVERY 4 HOURS
Status: COMPLETED | OUTPATIENT
Start: 2023-03-27 | End: 2023-03-27

## 2023-03-27 RX ADMIN — ACETAMINOPHEN 325MG 650 MG: 325 TABLET ORAL at 13:29

## 2023-03-27 RX ADMIN — CYCLOBENZAPRINE 5 MG: 10 TABLET, FILM COATED ORAL at 22:08

## 2023-03-27 RX ADMIN — ACETAMINOPHEN 325MG 650 MG: 325 TABLET ORAL at 22:07

## 2023-03-27 RX ADMIN — SENNOSIDES AND DOCUSATE SODIUM 2 TABLET: 8.6; 5 TABLET ORAL at 08:54

## 2023-03-27 RX ADMIN — PANTOPRAZOLE SODIUM 40 MG: 40 TABLET, DELAYED RELEASE ORAL at 22:07

## 2023-03-27 RX ADMIN — NYSTATIN 500000 UNITS: 100000 SUSPENSION ORAL at 11:18

## 2023-03-27 RX ADMIN — ATORVASTATIN CALCIUM 80 MG: 40 TABLET, FILM COATED ORAL at 22:08

## 2023-03-27 RX ADMIN — NYSTATIN 500000 UNITS: 100000 SUSPENSION ORAL at 18:11

## 2023-03-27 RX ADMIN — TAZOBACTAM SODIUM AND PIPERACILLIN SODIUM 3.38 G: 375; 3 INJECTION, SOLUTION INTRAVENOUS at 09:12

## 2023-03-27 RX ADMIN — NYSTATIN 500000 UNITS: 100000 SUSPENSION ORAL at 22:08

## 2023-03-27 RX ADMIN — FAMOTIDINE 20 MG: 20 TABLET ORAL at 08:53

## 2023-03-27 RX ADMIN — OXYCODONE HYDROCHLORIDE AND ACETAMINOPHEN 1000 MG: 500 TABLET ORAL at 08:53

## 2023-03-27 RX ADMIN — POTASSIUM CHLORIDE 40 MEQ: 10 CAPSULE, COATED, EXTENDED RELEASE ORAL at 11:18

## 2023-03-27 RX ADMIN — DOCUSATE SODIUM 100 MG: 100 CAPSULE, LIQUID FILLED ORAL at 22:07

## 2023-03-27 RX ADMIN — NYSTATIN 500000 UNITS: 100000 SUSPENSION ORAL at 08:52

## 2023-03-27 RX ADMIN — LIDOCAINE HYDROCHLORIDE 10 ML: 10 INJECTION, SOLUTION INFILTRATION; PERINEURAL at 15:31

## 2023-03-27 RX ADMIN — CARVEDILOL 50 MG: 12.5 TABLET, FILM COATED ORAL at 18:11

## 2023-03-27 RX ADMIN — TERAZOSIN HYDROCHLORIDE 1 MG: 1 CAPSULE ORAL at 22:08

## 2023-03-27 RX ADMIN — LISINOPRIL 5 MG: 5 TABLET ORAL at 08:53

## 2023-03-27 RX ADMIN — DOCUSATE SODIUM 100 MG: 100 CAPSULE, LIQUID FILLED ORAL at 08:54

## 2023-03-27 RX ADMIN — ASPIRIN 81 MG CHEWABLE TABLET 81 MG: 81 TABLET CHEWABLE at 08:53

## 2023-03-27 RX ADMIN — POTASSIUM CHLORIDE 40 MEQ: 10 CAPSULE, COATED, EXTENDED RELEASE ORAL at 16:29

## 2023-03-27 RX ADMIN — Medication 5 MG: at 22:08

## 2023-03-27 RX ADMIN — Medication 10 ML: at 08:51

## 2023-03-27 RX ADMIN — ACETAMINOPHEN 325MG 650 MG: 325 TABLET ORAL at 05:17

## 2023-03-27 RX ADMIN — MAGNESIUM SULFATE HEPTAHYDRATE 4 G: 40 INJECTION, SOLUTION INTRAVENOUS at 11:18

## 2023-03-27 RX ADMIN — TAZOBACTAM SODIUM AND PIPERACILLIN SODIUM 3.38 G: 375; 3 INJECTION, SOLUTION INTRAVENOUS at 16:28

## 2023-03-27 RX ADMIN — Medication 10 ML: at 22:08

## 2023-03-27 RX ADMIN — CARVEDILOL 50 MG: 12.5 TABLET, FILM COATED ORAL at 08:52

## 2023-03-27 RX ADMIN — CYCLOBENZAPRINE 5 MG: 10 TABLET, FILM COATED ORAL at 05:19

## 2023-03-27 NOTE — CONSULTS
Subjective     CHIEF COMPLAINT: Shortness of breath    HISTORY OF PRESENT ILLNESS:  The patient is a 72 y.o. male, referred by Alan Cooper MD for pulmonary embolism.  The patient was admitted to hospitalist service from March 24, 2023 with shortness of breath.  Found to have 2 small right pulmonary embolisms.  Also had a small right pleural effusion.  The patient was started on heparin drip.  I was consulted to further assist in his care.  When I saw the patient today he is status post right chest tube placement.  He is feeling better.  He does have history of recent DVT felt to be induced by immobility.  He has been on low dose Eliquis 2.5 mg twice a day.      REVIEW OF SYSTEMS:  A 14 point review of systems was performed and is negative except as noted above.    Past Medical History:   Diagnosis Date   • Cardiomyopathy (HCC)    • CHF (congestive heart failure) (HCC)    • Coronary artery disease    • Diabetes mellitus (HCC)    • GERD (gastroesophageal reflux disease)    • HTN (hypertension)    • Stroke (HCC)     Right sided weakness   • Stroke (HCC)        No current facility-administered medications on file prior to encounter.     Current Outpatient Medications on File Prior to Encounter   Medication Sig Dispense Refill   • acetaminophen (TYLENOL) 325 MG tablet Take 2 tablets by mouth Every 8 (Eight) Hours.     • apixaban (ELIQUIS) 2.5 MG tablet tablet Take 1 tablet by mouth Every 12 (Twelve) Hours. Indications: Prevention of Unwanted Clot in Veins     • atorvastatin (LIPITOR) 80 MG tablet Take 1 tablet by mouth Every Night.     • carvedilol (COREG) 25 MG tablet Take 2 tablets by mouth 2 (Two) Times a Day With Meals.     • cyclobenzaprine (FLEXERIL) 5 MG tablet Take 1 tablet by mouth 3 (Three) Times a Day As Needed for Muscle Spasms.     • docusate sodium 100 MG capsule Take 1 capsule by mouth 2 (Two) Times a Day.     • famotidine (PEPCID) 20 MG tablet Take 1 tablet by mouth Daily.     • lisinopril  (PRINIVIL,ZESTRIL) 5 MG tablet Take 1 tablet by mouth Daily.     • melatonin 5 MG tablet tablet Take 1 tablet by mouth At Night As Needed (sleep).     • oxyCODONE (ROXICODONE) 5 MG immediate release tablet Take 1 tablet by mouth Every 6 (Six) Hours As Needed for Moderate Pain.     • pantoprazole (PROTONIX) 40 MG EC tablet Take 1 tablet by mouth Daily. evening     • polyethylene glycol (MIRALAX) 17 g packet Take 17 g by mouth Daily.     • terazosin (HYTRIN) 1 MG capsule Take 1 capsule by mouth.     • Trulicity 0.75 MG/0.5ML solution pen-injector      • vitamin C (ASCORBIC ACID) 500 MG tablet      • Zinc 220 (50 Zn) MG capsule          Allergies   Allergen Reactions   • Other Unknown - Low Severity     Mercury metals- as a child       Past Surgical History:   Procedure Laterality Date   • AMPUTATION DIGIT Left 5/12/2021    Procedure: AMPUTATION DIGIT - GREAT TOE AMPUTATION LEFT;  Surgeon: Dario Marmolejo MD;  Location:  [facility];  Service: General;  Laterality: Left;   • AORTAGRAM N/A 5/11/2021    Procedure: AORTAGRAM WITH RUNOFFS, LEFT SFA BALLOON ANGIOPLASTY;  Surgeon: Tim Mahan MD;  Location: [facility] HYBRID SHELIA;  Service: Vascular;  Laterality: N/A;  FL TIME 6 MIN 54 SECS  82 MGY  CONTRAST VISIPAQUE 100ML     • CARDIAC CATHETERIZATION     • CARDIAC PACEMAKER PLACEMENT      pacemaker/defibrillator, [employer]   • HERNIA REPAIR Bilateral     Inguinal hernia   • HIP TROCHANTERIC NAILING WITH INTRAMEDULLARY HIP SCREW Right 10/18/2022    Procedure: HIP TROCHANTERIC NAILING WITH INTRAMEDULLARY HIP SCREW RIGHT;  Surgeon: Bj Steinberg MD;  Location:  [facility];  Service: Orthopedics;  Laterality: Right;       OB History   No obstetric history on file.       Social History     Socioeconomic History   • Marital status: Single   • Number of children: 0   Tobacco Use   • Smoking status: Former     Packs/day: 1.00     Years: 30.00     Pack years: 30.00     Types: Cigarettes   • Smokeless tobacco: Never   • Tobacco  comments:     quit 2015   Vaping Use   • Vaping Use: Never used   Substance and Sexual Activity   • Alcohol use: Never   • Drug use: Never   • Sexual activity: Defer       Family History   Problem Relation Age of Onset   • Alzheimer's disease Mother    • Kidney failure Mother    • Cancer Father    • Heart failure Father        Objective     Vitals:    03/27/23 1206 03/27/23 1510 03/27/23 1514 03/27/23 1604   BP: 140/78 148/87 129/78 147/83   BP Location: Right arm   Right arm   Patient Position: Lying   Lying   Pulse: 72 70 70 70   Resp: 18 16 15 16   Temp: 98 °F (36.7 °C)   97.6 °F (36.4 °C)   TempSrc: Oral   Oral   SpO2: 97% 93% 92% 96%   Weight:       Height:                            ECOG Performance Status: 3 - Symptomatic, >50% confined to bed  General: well appearing male in no acute distress  Neuro/Psych: A&O x 3, gait steady, appropriate affect, strength 5/5 in all muscle groups  HEENT: sclerae anicteric, oropharynx clear  Lymphatics: no cervical, supraclavicular, or axillary adenopathy  Cardiovascular: regular rate and rhythm, no murmurs  Lungs: clear to auscultation bilaterally  Abdomen: soft, nontender, nondistended.  No palpable organomegaly  Extremities: no lower extremity edema  Skin: no rashes, lesions, bruising, or petechiae      No results displayed because visit has over 200 results.           No results found.    ASSESSMENT 72 years old gentleman with pulmonary embolism    PLAN  1.  Bilateral PEs:  A.  I reviewed the patient's chart including admission note blood results and imaging reports.  B.  Suspecting his PE is induced by his previous and undertreated DVT.  C.  The patient is currently on heparin drip.  If he is not going through any further procedures I recommend transition him to Eliquis 10 mg twice daily for 1 week followed by 5 mg twice daily.  D.  Given the patient limited mobility and advanced cardiac disease with a EF less than 15% I think he might benefit from lifelong  anticoagulation.    2.  Unexplained weight loss:  A.  CT chest abdomen pelvis did not reveal any evidence of malignancy.  B.  Recommend age-appropriate screening as an outpatient.      Oneyda Mccartney MD    3/27/2023

## 2023-03-27 NOTE — CASE MANAGEMENT/SOCIAL WORK
Discharge Planning Assessment  [facility]     Patient Name: Timmy Guajardo  MRN: [id number]  Today's Date: 3/27/2023    Admit Date: 3/24/2023    Plan: discharge plan   Discharge Needs Assessment     Row Name 03/27/23 1202       Living Environment    People in Home alone    Unique Family Situation States he will probably stay with his sister when discharged home.    Current Living Arrangements apartment    Primary Care Provided by self    Provides Primary Care For no one, unable/limited ability to care for self    Family Caregiver if Needed sibling(s)    Family Caregiver Names Mary Ellen Miranda(sister)    Quality of Family Relationships involved    Able to Return to Prior Arrangements yes    Living Arrangement Comments I met with pt at bedside with permision regarding discharge plan.  Pt reports he resides in an apartment in [facility] alone,  but may stay with his sister.       Transition Planning    Patient/Family Anticipates Transition to other (see comments)  Pt reports he is considering inpatient rehab but wants to talk it over with his sister.    Patient/Family Anticipated Services at Transition     Transportation Anticipated family or friend will provide       Discharge Needs Assessment    Readmission Within the Last 30 Days no previous admission in last 30 days    Equipment Currently Used at Home grab bar;wheelchair;walker, rolling;shower chair    Concerns to be Addressed discharge planning    Equipment Needed After Discharge grab bar, toilet;grab bar, tub/shower;shower chair;walker, rolling;walker, standard    Outpatient/Agency/Support Group Needs skilled nursing facility    Discharge Facility/Level of Care Needs nursing facility, skilled    Discharge Coordination/Progress Pt confirmed that he has [employer] Medicare Replacement with prescription coverage and uses [facility] at [facility]. Pt states he was independent with ADLs until he broke his leg back in Oct 2022. Pt is here wit bilateral  pulmonary embolism. Pt is aware of PT and OT's recommendations of inpatient rehab. Pt states he does not want to go back to [facility], but will consider other facilities. I provided pt with a map of rehab/skilled nursing facilities in [facility] and pt wants to talk it over with his sister and will let CM know. Pt did state when he goes home from the hospital/rehab, he plans to stay with his sister. CM will cont to follow               Discharge Plan     Row Name 03/27/23 1212       Plan    Plan discharge plan    Plan Comments Pt is aware of PT and OT's recommendations of inpatient rehab. Pt states he does not want to go back to [facility], but will consider other facilities. I provided pt with a map of rehab/skilled nursing facilities in [facility] and pt wants to talk it over with his sister and will let CM know. Pt did state when he goes home from the hospital/rehab, he plans to stay with his sister. CM will cont to follow    Final Discharge Disposition Code 03 - skilled nursing facility (SNF)              Continued Care and Services - Admitted Since 3/24/2023    Coordination has not been started for this encounter.       Expected Discharge Date and Time     Expected Discharge Date Expected Discharge Time    Mar 31, 2023          Demographic Summary     Row Name 03/27/23 5671       General Information    General Information Comments PCP is BENNETT SAWYER       Contact Information    Permission Granted to Share Info With     Contact Information Obtained for     Contact Information Comments Mary Ellen Miranda(sister) [phone]               Functional Status    No documentation.                Psychosocial    No documentation.                Abuse/Neglect    No documentation.                Legal    No documentation.                Substance Abuse    No documentation.                Patient Forms    No documentation.                   Violeta Miranda, RN

## 2023-03-27 NOTE — PROGRESS NOTES
HEPARIN INFUSION  Timmy Guajardo is a  72 y.o. male receiving heparin infusion.     Therapy for (VTE/Cardiac):   VTE  Patient Weight: 56.7 kg  Initial Bolus (Y/N):   Yes  Any Bolus (Y/N):   Yes         VTE (PE/DVT)   Initial Bolus: 80 units/kg (Max 10,000 units)  Initial rate: 18 units/kg/hr (Max 1,500 units/hr)    aPTT  Rebolus Infusion Hold time Change infusion Dose (Units/kg/hr) Next aPTT Level Due   <45 50 Units/kg  (4000 Units Max) None Increase by  4 Units/kg/hr 6 hours   45 - 52 25 Units/kg  (2000 Units Max) None Increase by  3 Units/kg/hr 6 hours   53 - 59 0 None Increase by  2 Units/kg/hr 6 hours   60 - 90 0 None No Change 6 hours (after 2 consecutive levels in range check qAM)   91 - 98 0 None Decrease by  1 Units/kg/hr 6 hours   99 - 105 0 None Decrease by  2 Units/kg/hr 6 hours   106 - 113 0 60 Minutes Decrease by  3 Units/kg/hr 6 hours   >113 0 Hold  After aPTT less than 90 decrease previous rate by  4 Units/kg/hr Every 2 hours until aPTT less than 90 then when infusion restarts in 6 hours     Results from last 7 days   Lab Units 03/27/23  0708 03/26/23  0436 03/25/23  0120 03/24/23  1745   INR  1.38*  --  3.62*  --    HEMOGLOBIN g/dL 10.9* 10.6*  --  12.9*   HEMATOCRIT % 34.9* 32.5*  --  41.9   PLATELETS 10*3/mm3 155 162  --  193       Date   Time   Anti-Xa Current Rate (Unit/kg/hr) Bolus   (Units) Rate Change   (Unit/kg/hr) New Rate (Unit/kg/hr) Next anti-Xa Comments  Pump Check Daily   3/25 0025 pending 0 4540 +18 18 0700 New start   3/25 0722 >1.1 18 -- -18 0 0930 [address]   3/25 0954 >1.1 0 -- -- 0 1300 [address]249   3/25  aPTT 0        3/25 0954 63 0  +16 16 2100 [address]   3/25 2141 71.4 16 -- -- 16 0400 [address] [facility]   3/26 0436 77.1 16 -- -- 16 1200 [address]184 [facility]   3/26 1136 45.5  (heparin was held for 2.5hrs prior to draw) 16 --  16 2000 [address] 3250   3/26 2035 57.5 16 -- +1 17 0600 Nadia 3249 -[facility]   3/27 0708 88.1 17 -- -- 17 1300 [address] 3250                                                                                                                             Johan Joshua, [facility]  3/27/2023  08:58 EDT

## 2023-03-27 NOTE — NURSING NOTE
8.5 Eritrean locking, Right sided Image Guided chest tube placed by Dr Cook. 50 mls removed from pleural space.  Patient tolerated well. Report called to nurse.

## 2023-03-27 NOTE — PROGRESS NOTES
Cardiothoracic Surgery Progress Note      Chief complaint: Pleural effusion     LOS: 3 days      Subjective:  Denies shortness of breath.  No complaints this morning    Objective:  Vital Signs  Temp:  [97.9 °F (36.6 °C)-98.4 °F (36.9 °C)] 98.4 °F (36.9 °C)  Heart Rate:  [52-83] 69  Resp:  [16-18] 16  BP: (110-159)/(64-83) 159/83    Physical Exam:   General Appearance: alert, appears stated age and cooperative   Lungs: respirations regular, respirations even and respirations unlabored, decreased breath sounds on right   Heart: regular rhythm & normal rate, normal S1, S2 and no murmur, no gallop, no rub     Results:  Results from last 7 days   Lab Units 03/26/23  0436   WBC 10*3/mm3 10.42   HEMOGLOBIN g/dL 10.6*   HEMATOCRIT % 32.5*   PLATELETS 10*3/mm3 162     Results from last 7 days   Lab Units 03/26/23  0436   SODIUM mmol/L 140   POTASSIUM mmol/L 3.6   CHLORIDE mmol/L 105   CO2 mmol/L 24.0   BUN mg/dL 12   CREATININE mg/dL 0.59*   GLUCOSE mg/dL 68   CALCIUM mg/dL 8.1*       Assessment:  Pulmonary embolus  Moderate right pleural effusion    Plan:  To IR for right tube thoracostomy  INR - 3.62 on 3/25  Labs pending today    iTm Mahan MD  03/27/23  06:53 EDT

## 2023-03-27 NOTE — PLAN OF CARE
Goal Outcome Evaluation:  Plan of Care Reviewed With: patient        Progress: improving  Outcome Evaluation: Pt. presents with impaired balance, ROM, strength, coordination, endurance impacting BADL independence and related transfers warranting continued skilled OT services to promote return to higher PLOF. Pt. report he was more independent prior to getting Covid and RLE fx. and has never regained ability priorly had.  Recommend IRF at this time.

## 2023-03-27 NOTE — PROGRESS NOTES
[facility] Medicine Services  PROGRESS NOTE    Patient Name: Timmy Guajardo  : 1950  MRN: [id number]    Date of Admission: 3/24/2023  Primary Care Physician: Arsen Seals APRN    Subjective   Subjective     CC:  Follow-up for weakness    HPI:  I have seen and evaluated the patient this morning.  Comfortable in bed.  Denies any shortness of breath or chest pain.  N.p.o. for right chest tube placement today    ROS:  General : no fevers, chills,++ weakness  CVS: No chest pain, palpitations  Respiratory: No cough, dyspnea  GI: No N/V/D, abd pain  10 point review of systems is negative except for what is mentioned in HPI    Objective   Objective     Vital Signs:   Temp:  [97.9 °F (36.6 °C)-98.4 °F (36.9 °C)] 98.4 °F (36.9 °C)  Heart Rate:  [52-83] 69  Resp:  [16-18] 16  BP: (110-159)/(64-83) 159/83     Physical Exam:  General: Chronically ill looking, not in distress, conversant and cooperative  Head: Atraumatic and normocephalic  Eyes: No Icterus. No pallor  Ears:  Ears appear intact with no abnormalities noted  Throat: No oral lesions, no thrush  Neck: Supple, trachea midline  Lungs: Diminished air entry right lung zone.  No wheezing or crackles  Heart:  Normal S1 and S2, no murmur, no gallop, No JVD, no lower extremity swelling  Abdomen:  Soft, no tenderness, no organomegaly, normal bowel sounds, no organomegaly  Extremities: pulses equal bilaterally  Skin: No bleeding, bruising or rash, normal skin turgor and elasticity  Neurologic: Cranial nerves appear intact with no evidence of facial asymmetry, normal motor and sensory functions in all 4 extremities  Psych: Alert and oriented x 3, normal mood    Results Reviewed:  LAB RESULTS:      Lab 23  2035 23  1136 23  0436 23  2141 23  1254 23  0954 23  0722 23  0120 23  0120 23  2101 23  1938 23  1745   WBC  --   --  10.42  --   --   --   --   --   --   --    --  17.96*   HEMOGLOBIN  --   --  10.6*  --   --   --   --   --   --   --   --  12.9*   HEMATOCRIT  --   --  32.5*  --   --   --   --   --   --   --   --  41.9   PLATELETS  --   --  162  --   --   --   --   --   --   --   --  193   NEUTROS ABS  --   --  8.48*  --   --   --   --   --   --   --   --  15.92*   IMMATURE GRANS (ABS)  --   --  0.08*  --   --   --   --   --   --   --   --  0.17*   LYMPHS ABS  --   --  1.07  --   --   --   --   --   --   --   --  0.83   MONOS ABS  --   --  0.69  --   --   --   --   --   --   --   --  0.99*   EOS ABS  --   --  0.05  --   --   --   --   --   --   --   --  0.00   MCV  --   --  92.1  --   --   --   --   --   --   --   --  97.4*   CRP  --   --   --   --   --   --  10.22*  --   --   --   --   --    PROCALCITONIN  --   --   --   --   --   --   --   --   --   --  0.14  --    LACTATE  --   --   --   --   --   --   --   --   --  3.2*  --  3.1*   PROTIME  --   --   --   --   --   --   --   --  36.4*  --   --   --    APTT 57.5* 45.5* 77.1 71.4 60.4 63.1  --    < > >200.0*  --   --   --    HEPARIN ANTI-XA  --   --   --   --  >1.10* >1.10* >1.10*  --   --   --   --   --     < > = values in this interval not displayed.         Lab 03/26/23  0436 03/25/23  0722 03/24/23 1938 03/24/23  2665   SODIUM 140 137  --  130*   POTASSIUM 3.6 4.0  --  4.7   CHLORIDE 105 102  --  94*   CO2 24.0 16.0*  --  23.0   ANION GAP 11.0 19.0*  --  13.0   BUN 12 21  --  26*   CREATININE 0.59* 0.71*  --  0.97   EGFR 104.4 98.7  --  84.0   GLUCOSE 68 139*  --  233*   CALCIUM 8.1* 8.4*  --  9.0   MAGNESIUM 1.8 1.8 2.1  --    PHOSPHORUS 4.5 3.9  --   --    HEMOGLOBIN A1C  --  6.30*  --   --    TSH  --   --  1.610  --          Lab 03/26/23  0436 03/24/23  1745   TOTAL PROTEIN 5.3* 7.2   ALBUMIN 2.6* 2.8*   GLOBULIN 2.7 4.4   ALT (SGPT) 15 19   AST (SGOT) 18 21   BILIRUBIN 0.4 0.5   ALK PHOS 76 98   LIPASE  --  15         Lab 03/25/23  0120   PROTIME 36.4*   INR 3.62*             Lab 03/25/23  0954   VITAMIN B 12 260          Brief Urine Lab Results  (Last result in the past 365 days)      Color   Clarity   Blood   Leuk Est   Nitrite   Protein   CREAT   Urine HCG        03/24/23 2329 Yellow   Turbid   Small (1+)   Moderate (2+)   Negative   30 mg/dL (1+)                 Microbiology Results Abnormal     Procedure Component Value - Date/Time    COVID PRE-OP / PRE-PROCEDURE SCREENING ORDER (NO ISOLATION) - Swab, Nasopharynx [[id number]]  (Normal) Collected: 03/24/23 2256    Lab Status: Final result Specimen: Swab from Nasopharynx Updated: 03/24/23 2356    Narrative:      The following orders were created for panel order COVID PRE-OP / PRE-PROCEDURE SCREENING ORDER (NO ISOLATION) - Swab, Nasopharynx.  Procedure                               Abnormality         Status                     ---------                               -----------         ------                     Respiratory Panel PCR w/...[[id number]]  Normal              Final result                 Please view results for these tests on the individual orders.    Respiratory Panel PCR w/COVID-19(SARS-CoV-2) SULEIMAN/TAWANNA/BLAISE/PAD/COR/MAD/ANTHONY In-House, NP Swab in UTM/VTM, 3-4 HR TAT - Swab, Nasopharynx [[id number]]  (Normal) Collected: 03/24/23 2256    Lab Status: Final result Specimen: Swab from Nasopharynx Updated: 03/24/23 2356     ADENOVIRUS, PCR Not Detected     Coronavirus 229E Not Detected     Coronavirus HKU1 Not Detected     Coronavirus NL63 Not Detected     Coronavirus OC43 Not Detected     COVID19 Not Detected     Human Metapneumovirus Not Detected     Human Rhinovirus/Enterovirus Not Detected     Influenza A PCR Not Detected     Influenza B PCR Not Detected     Parainfluenza Virus 1 Not Detected     Parainfluenza Virus 2 Not Detected     Parainfluenza Virus 3 Not Detected     Parainfluenza Virus 4 Not Detected     RSV, PCR Not Detected     Bordetella pertussis pcr Not Detected     Bordetella parapertussis PCR Not Detected     Chlamydophila pneumoniae PCR Not Detected      Mycoplasma pneumo by PCR Not Detected    Narrative:      In the setting of a positive respiratory panel with a viral infection PLUS a negative procalcitonin without other underlying concern for bacterial infection, consider observing off antibiotics or discontinuation of antibiotics and continue supportive care. If the respiratory panel is positive for atypical bacterial infection (Bordetella pertussis, Chlamydophila pneumoniae, or Mycoplasma pneumoniae), consider antibiotic de-escalation to target atypical bacterial infection.          No radiology results from the last 24 hrs    Results for orders placed during the hospital encounter of 10/16/22    Adult Transthoracic Echo Complete W/ Cont if Necessary Per Protocol    Interpretation Summary  •  Estimated left ventricular EF = 30%.  There is global hypokinesis.  The basal-mid posterior wall appears akinetic.  •  Normal right ventricular cavity size, wall thickness, systolic function and septal motion noted. Electronic lead present in the ventricle  •  Septal wall motion is abnormal, consistent with right ventricular pacing  •  No hemodynamically significant valvular stenosis or regurgitation noted.      Current medications:  Scheduled Meds:acetaminophen, 650 mg, Oral, Q8H  vitamin C, 1,000 mg, Oral, Daily  aspirin, 81 mg, Oral, Daily  atorvastatin, 80 mg, Oral, Nightly  carvedilol, 50 mg, Oral, BID With Meals  docusate sodium, 100 mg, Oral, BID  famotidine, 20 mg, Oral, Daily  insulin lispro, 0-7 Units, Subcutaneous, Q6H  lisinopril, 5 mg, Oral, Q24H  nystatin, 5 mL, Oral, 4x Daily  pantoprazole, 40 mg, Oral, Nightly  piperacillin-tazobactam, 3.375 g, Intravenous, Q8H  polyethylene glycol, 17 g, Oral, Daily  senna-docusate sodium, 2 tablet, Oral, BID  sodium chloride, 10 mL, Intravenous, Q12H  terazosin, 1 mg, Oral, Nightly      Continuous Infusions:heparin, 17 Units/kg/hr, Last Rate: 17 Units/kg/hr (03/26/23 3577)  Pharmacy to Dose Heparin,       PRN Meds:.•   acetaminophen **OR** acetaminophen **OR** acetaminophen  •  senna-docusate sodium **AND** polyethylene glycol **AND** bisacodyl **AND** bisacodyl  •  Calcium Replacement - Follow Nurse / BPA Driven Protocol  •  cyclobenzaprine  •  dextrose  •  dextrose  •  glucagon (human recombinant)  •  Magnesium Standard Dose Replacement - Follow Nurse / BPA Driven Protocol  •  melatonin  •  ondansetron  •  Pharmacy to Dose Heparin  •  Phosphorus Replacement - Follow Nurse / BPA Driven Protocol  •  Potassium Replacement - Follow Nurse / BPA Driven Protocol  •  Sodium Chloride (PF)  •  sodium chloride  •  sodium chloride    Assessment & Plan   Assessment & Plan     Active Hospital Problems    Diagnosis  POA   • **Bilateral pulmonary embolism (HCC) [I26.99]  Unknown   • Pleural effusion [J90]  Yes   • Unintentional weight loss [R63.4]  Unknown   • Hypoalbuminemia [E88.09]  Unknown   • Severe malnutrition (HCC) [E43]  Unknown   • History of CVA (cerebrovascular accident) [Z86.73]  Not Applicable   • Debility [R53.81]  Unknown   • Presence of cardiac pacemaker [Z95.0]  Yes   • HTN (hypertension) [I10]  Yes   • Leukocytosis [D72.829]  Yes      Resolved Hospital Problems   No resolved problems to display.        Brief Hospital Course to date:  Timmy Guajardo is a 72 y.o. 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, systolic heart failure with ejection fraction of 30%, coronary artery disease, GERD, history of DVT who presented to the hospital with weakness and functional decline, unintentional weight loss and severe constipation    Assessment and plan:  Bilateral pulmonary embolism  History of DVT  · Likely secondary to being on an adequate dose of Eliquis of 2.5 mg twice daily and medication noncompliance  · Continue heparin drip, Currently heparin drip on hold for planned right chest tube today   · Will transition to full dose Eliquis loading and  maintenance upon discharge once he does not need any further procedures     Moderate right-sided loculated pleural effusion  · With bedside point-of-care ultrasound revealed complex pleural effusion  · Cardiothoracic surgery consulted.  Recommended chest tube placement by IR.  IR consulted  · Keep n.p.o. from midnight and hold heparin in the morning for chest tube placement  · Currently covered with IV Zosyn and Vanco    Severe malnutrition  Hypoalbuminemia  Significant unintentional weight loss  Generalized debility  · Patient reports 52lbs weight loss since oct  · Nutrition consult  · Oncology consult  · The patient has never had a colonoscopy or EGD as it was recommended against by his cardiologist. Patient had EF of 15 % at the time.     Leukocytosis, improving  · Likely reactive versus infectious  · Normal procalcitonin and negative cultures to date  · Continue current metabolic therapy with Zosyn and vancomycin.     CAD  Cardiomyopathy with EF 30 %  Essential HTN  Old CVA with right residual weakness  Dyslipidemia  · Start aspirin  · Continue coreg, lisinopril  · Continue statins    Constipation  · Having multiple bowel movements with enema  · Continue bowel regimen     GERD   · Continue PPI      Type 2 diabetes  · A1C 6.3%   · Continue insulin sliding scale     Acute debility  · PT recommended acute rehab    Total time spent:40 min   Time spent includes time reviewing chart, face-to-face time, counseling patient/family/caregiver, ordering medications/tests/procedures, communicating with other health care professionals, documenting clinical information in the electronic health record, and coordination of care.     Expected Discharge Location and Transportation: Acute rehab  Expected Discharge   Expected Discharge Date and Time     Expected Discharge Date Expected Discharge Time    Mar 31, 2023            DVT prophylaxis:  Medical and mechanical DVT prophylaxis orders are present.     AM-PAC 6 Clicks Score  (PT): 12 (03/26/23 1110)    CODE STATUS:   Code Status and Medical Interventions:   Ordered at: 03/24/23 1769     Code Status (Patient has no pulse and is not breathing):    CPR (Attempt to Resuscitate)     Medical Interventions (Patient has pulse or is breathing):    Full Support     Copied text in this note has been reviewed and is accurate as of 03/27/23.     Alan Cooper MD  03/27/23

## 2023-03-27 NOTE — THERAPY EVALUATION
Patient Name: Timmy Guajardo  : 1950    MRN: [id number]                              Today's Date: 3/27/2023       Admit Date: 3/24/2023    Visit Dx: No diagnosis found.  Patient Active Problem List   Diagnosis   • Acute right-sided weakness   • Suspected cerebrovascular accident (CVA)   • Leukocytosis   • CHF (congestive heart failure) (HCC)   • Thrombocytopenia (HCC)   • HTN (hypertension)   • Presence of cardiac pacemaker   • Hyperglycemia   • Gangrene of toe of left foot (HCC)   • Closed displaced intertrochanteric fracture of right femur, initial encounter (HCC)   • Pleural effusion   • Unintentional weight loss   • Hypoalbuminemia   • Severe malnutrition (HCC)   • Bilateral pulmonary embolism (HCC)   • History of CVA (cerebrovascular accident)   • Debility     Past Medical History:   Diagnosis Date   • Cardiomyopathy (HCC)    • CHF (congestive heart failure) (HCC)    • Coronary artery disease    • Diabetes mellitus (HCC)    • GERD (gastroesophageal reflux disease)    • HTN (hypertension)    • Stroke (HCC)     Right sided weakness   • Stroke (HCC)      Past Surgical History:   Procedure Laterality Date   • AMPUTATION DIGIT Left 2021    Procedure: AMPUTATION DIGIT - GREAT TOE AMPUTATION LEFT;  Surgeon: Dario Marmolejo MD;  Location: [facility] OR;  Service: General;  Laterality: Left;   • AORTAGRAM N/A 2021    Procedure: AORTAGRAM WITH RUNOFFS, LEFT SFA BALLOON ANGIOPLASTY;  Surgeon: Tim Mahan MD;  Location: [facility];  Service: Vascular;  Laterality: N/A;  FL TIME 6 MIN 54 SECS  82 MGY  CONTRAST VISIPAQUE 100ML     • CARDIAC CATHETERIZATION     • CARDIAC PACEMAKER PLACEMENT      pacemaker/defibrillator, [employer]   • HERNIA REPAIR Bilateral     Inguinal hernia   • HIP TROCHANTERIC NAILING WITH INTRAMEDULLARY HIP SCREW Right 10/18/2022    Procedure: HIP TROCHANTERIC NAILING WITH INTRAMEDULLARY HIP SCREW RIGHT;  Surgeon: Bj Steinberg MD;  Location: [facility] OR;  Service:  Orthopedics;  Laterality: Right;      General Information     Row Name 03/27/23 1140          OT Time and Intention    Document Type evaluation  -BAILEY     Mode of Treatment individual therapy;occupational therapy  -BAILEY     Row Name 03/27/23 1140          General Information    Patient Profile Reviewed yes  -BAILEY     Prior Level of Function independent:;w/c or scooter;feeding;grooming;mod assist:;max assist:;dressing;bathing;transfer;dependent:;home management;cooking;cleaning;driving;shopping  per pt., he is sleeping in a recliner at home, pt. limited ability to give history fully  -BAILEY     Existing Precautions/Restrictions fall  mild R sided weakness from prior CVA  -BAILEY     Barriers to Rehab medically complex;cognitive status;previous functional deficit  -BAILEY     Row Name 03/27/23 1140          Occupational Profile    Environmental Supports and Barriers (Occupational Profile) pt. reports he will be leaving his home and returning to live with his sister, per pt. he has a w/c/scooter can take, also a shower chair, with a BSC, but has been missing  -BAILEY     Row Name 03/27/23 1140          Living Environment    People in Home alone;other (see comments)  per pt. 7 different relatives that were staying with him at all times including a sister, neice, nephew, Rob and 2 brothers  -BAILEY     Row Name 03/27/23 1140          Stairs Within Home, Primary    Stairs, Within Home, Primary per pt. not returning to his home, will be going to sisters, unsure of steps  -BAILEY     Row Name 03/27/23 1140          Cognition    Orientation Status (Cognition) oriented to;person;place  pt. anxious about procedure today, possible pigtail catheter  -BAILEY     Row Name 03/27/23 1140          Safety Issues, Functional Mobility    Safety Issues Affecting Function (Mobility) safety precaution awareness;safety precautions follow-through/compliance;sequencing abilities  -BAILEY     Impairments Affecting Function (Mobility) balance;endurance/activity  tolerance;coordination;postural/trunk control;motor control;strength;range of motion (ROM)  -           User Key  (r) = Recorded By, (t) = Taken By, (c) = Cosigned By    Initials Name Provider Type    Mary Orr, OT Occupational Therapist                 Mobility/ADL's     Row Name 03/27/23 1146          Bed Mobility    Bed Mobility sit-supine;rolling left;sidelying-sit  -BAILEY     Rolling Left [place] (Bed Mobility) minimum assist (75% patient effort);verbal cues  -BAILEY     Sit-Supine [place] (Bed Mobility) minimum assist (75% patient effort)  -BAILEY     Sidelying-Sit [place] (Bed Mobility) moderate assist (50% patient effort);verbal cues  -BAILEY     Bed Mobility, Safety Issues decreased use of arms for pushing/pulling;decreased use of legs for bridging/pushing;impaired trunk control for bed mobility  -BAILEY     Assistive Device (Bed Mobility) bed rails;draw sheet  -BAILEY     Comment, (Bed Mobility) pt. was able to move LE's off EOb, but assist for trunk, may have been more independent if HOB raised, but pt. toward bottom of bed, per pt. he sleeps in a recliner at home  -     Row Name 03/27/23 1146          Transfers    Transfers sit-stand transfer;stand-sit transfer  -     Row Name 03/27/23 1146          Sit-Stand Transfer    Sit-Stand [place] (Transfers) maximum assist (25% patient effort);verbal cues  -BAILEY     Assistive Device (Sit-Stand Transfers) walker, front-wheeled  -BAILEY     Comment, (Sit-Stand Transfer) cues for hand placement  -     Row Name 03/27/23 1146          Stand-Sit Transfer    Stand-Sit [place] (Transfers) moderate assist (50% patient effort);verbal cues  -BAILEY     Assistive Device (Stand-Sit Transfers) walker, front-wheeled  -BAILEY     Row Name 03/27/23 1146          Functional Mobility    Functional Mobility- Ind. Level moderate assist (50% patient effort);verbal cues required  -BAILEY     Functional Mobility- Device walker, front-wheeled  -BAILEY     Functional Mobility-Distance  (Feet) 2  -     Functional Mobility- Safety Issues step length decreased;loses balance backward  -     Functional Mobility- Comment side steps to HOB, assist with walker and for posterior lean, noted RLE without heel on ground and unable to fully extend knee  -     Row Name 03/27/23 1146          Activities of Daily Living    BADL Assessment/Intervention lower body dressing;grooming  -[facility] Name 03/27/23 1146          Lower Body Dressing Assessment/Training    [place] Level (Lower Body Dressing) doff;don;socks;dependent (less than 25% patient effort)  -     Position (Lower Body Dressing) edge of bed sitting  -     Row Name 03/27/23 1146          Grooming Assessment/Training    Comment, (Grooming) pt. declined need  -           User Key  (r) = Recorded By, (t) = Taken By, (c) = Cosigned By    Initials Name Provider Type    Mary Orr, OT Occupational Therapist               Obj/Interventions     [facility] Name 03/27/23 1149          Sensory Assessment (Somatosensory)    Sensory Assessment (Somatosensory) UE sensation intact  -[facility] Name 03/27/23 1149          Vision Assessment/Intervention    Visual Impairment/Limitations corrective lenses full-time  -     Row Name 03/27/23 1149          Range of Motion Comprehensive    General Range of Motion upper extremity range of motion deficits identified  -     Comment, General Range of Motion R shoulder grossly 90 degrees shoulder abd/flex  -[facility] Name 03/27/23 1149          Strength Comprehensive (MMT)    General Manual Muscle Testing (MMT) Assessment upper extremity strength deficits identified  -     Comment, General Manual Muscle Testing (MMT) Assessment LUE 4 to4+/5, RUE 3+ to 4/5  -[facility] Name 03/27/23 1149          Shoulder (Therapeutic Exercise)    Shoulder (Therapeutic Exercise) AROM (active range of motion)  -     Shoulder AROM (Therapeutic Exercise) bilateral;flexion;extension;aBduction;aDduction;horizontal  aBduction/aDduction;10 repetitions;supine  -BAILEY     Row Name 03/27/23 1149          Elbow/Forearm (Therapeutic Exercise)    Elbow/Forearm (Therapeutic Exercise) strengthening exercise  -BAILEY     Elbow/Forearm Strengthening (Therapeutic Exercise) bilateral;flexion;extension;10 repetitions  mild manual resistance given  -BAILEY     Row Name 03/27/23 1149          Motor Skills    Motor Skills coordination;functional endurance  -BAILEY     Coordination fine motor deficit;gross motor deficit;right;upper extremity  mild deficit from prior CVA  -BAILEY     Functional Endurance pt. fatigued quickly especially with standing activity  -BAILEY     Therapeutic Exercise shoulder;elbow/forearm  -BAILEY     Row Name 03/27/23 1149          Balance    Balance Assessment standing static balance;standing dynamic balance  -BAILEY     Static Sitting Balance contact guard  -BAILEY     Dynamic Sitting Balance minimal assist  on first sitting assist for posterior lean, pt. able to progress, LUE support on railing  -BAILEY     Position, Sitting Balance supported  -BAILEY     Static Standing Balance moderate assist  -BAILEY     Dynamic Standing Balance maximum assist  -BAILEY     Position/Device Used, Standing Balance walker, rolling  -BAILEY     Balance Interventions sit to stand  -BAILEY           User Key  (r) = Recorded By, (t) = Taken By, (c) = Cosigned By    Initials Name Provider Type    BAILEY Mary Nath, OT Occupational Therapist               Goals/Plan     Row Name 03/27/23 1159          Transfer Goal 1 (OT)    Activity/Assistive Device (Transfer Goal 1, OT) sit-to-stand/stand-to-sit;commode, bedside without drop arms;walker, rolling  -BAILEY     [place] Level/Cues Needed (Transfer Goal 1, OT) moderate assist (50-74% patient effort)  -BAILEY     Time Frame (Transfer Goal 1, OT) long term goal (LTG);10 days  -BAILEY     Progress/Outcome (Transfer Goal 1, OT) new goal  -     Row Name 03/27/23 1158          Dressing Goal 1 (OT)    Activity/Device (Dressing Goal 1, OT) lower body dressing   -BAILEY     [place]/Cues Needed (Dressing Goal 1, OT) minimum assist (75% or more patient effort)  -BAILEY     Time Frame (Dressing Goal 1, OT) long term goal (LTG);10 days  -BAILEY     Strategies/Barriers (Dressing Goal 1, OT) socks and undergarment on up to knees and off, with AE prn,  -BAILEY     Progress/Outcome (Dressing Goal 1, OT) new goal  -BAILEY     Row Name 03/27/23 1157          Strength Goal 1 (OT)    Strength Goal 1 (OT) Pt. will complete 15 reps each UE strengthening and AROM TE to support ADL independence and related trasnfers.  -BAILEY     Time Frame (Strength Goal 1, OT) long term goal (LTG);10 days  -BAILEY     Progress/Outcome (Strength Goal 1, OT) new goal  -     Row Name 03/27/23 1157          Problem Specific Goal 1 (OT)    Problem Specific Goal 1 (OT) Pt. will standing in walker for one minute with min A to support caregiver care with BADL tasks  -BAILEY     Time Frame (Problem Specific Goal 1, OT) long term goal (LTG);10 days  -BAILEY     Progress/Outcome (Problem Specific Goal 1, OT) new goal  -     Row Name 03/27/23 4518          Therapy Assessment/Plan (OT)    Planned Therapy Interventions (OT) activity tolerance training;adaptive equipment training;BADL retraining;functional balance retraining;occupation/activity based interventions;patient/caregiver education/training;ROM/therapeutic exercise;strengthening exercise;transfer/mobility retraining  -BAILEY           User Key  (r) = Recorded By, (t) = Taken By, (c) = Cosigned By    Initials Name Provider Type    Mary Orr, OT Occupational Therapist               Clinical Impression     Row Name 03/27/23 7691          Pain Assessment    Pretreatment Pain Rating 0/10 - no pain  -BAILEY     Posttreatment Pain Rating 0/10 - no pain  -BAILEY     Row Name 03/27/23 1151          Plan of Care Review    Plan of Care Reviewed With patient  -BAILEY     Progress improving  -BAILEY     Outcome Evaluation Pt. presents with impaired balance, ROM, strength, coordination, endurance impacting  BADL independence and related transfers warranting continued skilled OT services to promote return to higher PLOF. Pt. report he was more independent prior to getting Covid and RLE fx. and has never regained ability priorly had.  Recommend IRF at this time.  -BAILEY     Row Name 03/27/23 1153          Therapy Assessment/Plan (OT)    Patient/Family Therapy Goal Statement (OT) pt. unable to state due to focus on procedure minus planning to move from home to sisters home  -BAILEY     Rehab Potential (OT) fair, will monitor progress closely  multiple prior deficits  -BAILEY     Criteria for Skilled Therapeutic Interventions Met (OT) yes;meets criteria;skilled treatment is necessary  -BAILEY     Therapy Frequency (OT) daily  -BAILEY     Row Name 03/27/23 1153          Therapy Plan Review/Discharge Plan (OT)    Anticipated Discharge Disposition (OT) inpatient rehabilitation facility  -BAILEY     Row Name 03/27/23 1156          Vital Signs    Pre Systolic BP Rehab 159  -BAILEY     Pre Treatment Diastolic BP 83  -BAILEY     Post Systolic BP Rehab 145  -BAILEY     Post Treatment Diastolic BP 88  -BAILEY     Pretreatment Heart Rate (beats/min) 72  -BAILEY     Posttreatment Heart Rate (beats/min) 69  -BAILEY     Pre SpO2 (%) 95  -BAILEY     O2 Delivery Pre Treatment nasal cannula  -BAILEY     O2 Delivery Intra Treatment nasal cannula  -BAILEY     Post SpO2 (%) 98  -BAILEY     O2 Delivery Post Treatment nasal cannula  -BAILEY     Pre Patient Position Supine  -BAILEY     Intra Patient Position Standing  -BAILEY     Post Patient Position Supine  -BAILEY     Row Name 03/27/23 1153          Positioning and Restraints    Pre-Treatment Position in bed  -BAILEY     Post Treatment Position bed  -BAILEY     In Bed supine;call light within reach;encouraged to call for assist;exit alarm on;side rails up x2  nurse in during session and aware  -BAILEY           User Key  (r) = Recorded By, (t) = Taken By, (c) = Cosigned By    Initials Name Provider Type    Mary Orr, OT Occupational Therapist               Outcome  Measures     Row Name 03/27/23 1201          How much help from another is currently needed...    Putting on and taking off regular lower body clothing? 1  -BAILEY     Bathing (including washing, rinsing, and drying) 2  -BAILEY     Toileting (which includes using toilet bed pan or urinal) 1  -BAILEY     Putting on and taking off regular upper body clothing 3  -BAILEY     Taking care of personal grooming (such as brushing teeth) 3  -BAILEY     Eating meals 4  -BAILEY     AM-PAC 6 Clicks Score (OT) 14  -BAILEY     Row Name 03/27/23 1201          Functional Assessment    Outcome Measure Options AM-PAC 6 Clicks Daily Activity (OT)  -BAILEY           User Key  (r) = Recorded By, (t) = Taken By, (c) = Cosigned By    Initials Name Provider Type    Mary Orr, OT Occupational Therapist                Occupational Therapy Education     Title: PT OT SLP Therapies (In Progress)     Topic: Occupational Therapy (In Progress)     Point: ADL training (Not Started)     Description:   Instruct learner(s) on proper safety adaptation and remediation techniques during self care or transfers.   Instruct in proper use of assistive devices.              Learner Progress:  Not documented in this visit.          Point: Home exercise program (Done)     Description:   Instruct learner(s) on appropriate technique for monitoring, assisting and/or progressing therapeutic exercises/activities.              Learning Progress Summary           Patient Acceptance, E,D, VU,NR by BAILEY at 3/27/2023 1201    Comment: reason for consult, noted deficits, UE TE, bed mobility and transfer sequencing, benefit of mvmt/activity for health and not loosing strength and endurance                   Point: Precautions (Done)     Description:   Instruct learner(s) on prescribed precautions during self-care and functional transfers.              Learning Progress Summary           Patient Acceptance, E,D, VU,NR by  at 3/27/2023 1201    Comment: reason for consult, noted deficits, UE TE, bed  mobility and transfer sequencing, benefit of mvmt/activity for health and not loosing strength and endurance                   Point: Body mechanics (Done)     Description:   Instruct learner(s) on proper positioning and spine alignment during self-care, functional mobility activities and/or exercises.              Learning Progress Summary           Patient Acceptance, E,D, VU,NR by  at 3/27/2023 1201    Comment: reason for consult, noted deficits, UE TE, bed mobility and transfer sequencing, benefit of mvmt/activity for health and not loosing strength and endurance                               User Key     Initials Effective Dates Name Provider Type Discipline     02/03/23 -  Mary Nath, OT Occupational Therapist OT              OT Recommendation and Plan  Planned Therapy Interventions (OT): activity tolerance training, adaptive equipment training, BADL retraining, functional balance retraining, occupation/activity based interventions, patient/caregiver education/training, ROM/therapeutic exercise, strengthening exercise, transfer/mobility retraining  Therapy Frequency (OT): daily  Plan of Care Review  Plan of Care Reviewed With: patient  Progress: improving  Outcome Evaluation: Pt. presents with impaired balance, ROM, strength, coordination, endurance impacting BADL independence and related transfers warranting continued skilled OT services to promote return to higher PLOF. Pt. report he was more independent prior to getting Covid and RLE fx. and has never regained ability priorly had.  Recommend IRF at this time.     Time Calculation:    Time Calculation- OT     Row Name 03/27/23 1202             Time Calculation- OT    OT Start Time 1110  -BAILEY      OT Received On 03/27/23  -BAILEY      OT Goal Re-Cert Due Date 04/06/23  -BAILEY         Timed Charges    83422 - OT Therapeutic Exercise Minutes 8  -BAILEY      33590 - OT Therapeutic Activity Minutes 10  -BAILEY         Untimed Charges    OT Eval/Re-eval Minutes 39  -BAILEY          Total Minutes    Timed Charges Total Minutes 18  -BAILEY      Untimed Charges Total Minutes 39  -BAILEY       Total Minutes 57  -BAILEY            User Key  (r) = Recorded By, (t) = Taken By, (c) = Cosigned By    Initials Name Provider Type    Mary Orr, OT Occupational Therapist              Therapy Charges for Today     Code Description Service Date Service Provider Modifiers Qty    [id number]  OT THERAPEUTIC ACT EA 15 MIN 3/27/2023 Mary Nath, OT GO 1    [id number]  OT EVAL MOD COMPLEXITY 3 3/27/2023 Mary Nath OT GO 1               Mary Nath OT  3/27/2023

## 2023-03-28 ENCOUNTER — ANESTHESIA EVENT (OUTPATIENT)
Dept: GASTROENTEROLOGY | Facility: HOSPITAL | Age: 73
DRG: 163 | End: 2023-03-28
Payer: MEDICARE

## 2023-03-28 LAB
ALBUMIN SERPL-MCNC: 2.5 G/DL (ref 3.5–5.2)
ALBUMIN/GLOB SERPL: 0.7 G/DL
ALP SERPL-CCNC: 77 U/L (ref 39–117)
ALT SERPL W P-5'-P-CCNC: 11 U/L (ref 1–41)
ANION GAP SERPL CALCULATED.3IONS-SCNC: 9 MMOL/L (ref 5–15)
APTT PPP: 132.6 SECONDS (ref 60–90)
APTT PPP: 42.4 SECONDS (ref 60–90)
APTT PPP: 46.3 SECONDS (ref 60–90)
AST SERPL-CCNC: 22 U/L (ref 1–40)
BASOPHILS # BLD AUTO: 0.06 10*3/MM3 (ref 0–0.2)
BASOPHILS NFR BLD AUTO: 0.6 % (ref 0–1.5)
BILIRUB SERPL-MCNC: 0.4 MG/DL (ref 0–1.2)
BUN SERPL-MCNC: 10 MG/DL (ref 8–23)
BUN/CREAT SERPL: 15.4 (ref 7–25)
CALCIUM SPEC-SCNC: 8.2 MG/DL (ref 8.6–10.5)
CHLORIDE SERPL-SCNC: 108 MMOL/L (ref 98–107)
CO2 SERPL-SCNC: 23 MMOL/L (ref 22–29)
CREAT SERPL-MCNC: 0.65 MG/DL (ref 0.76–1.27)
DEPRECATED RDW RBC AUTO: 53.4 FL (ref 37–54)
EGFRCR SERPLBLD CKD-EPI 2021: 101.4 ML/MIN/1.73
EOSINOPHIL # BLD AUTO: 0.09 10*3/MM3 (ref 0–0.4)
EOSINOPHIL NFR BLD AUTO: 0.9 % (ref 0.3–6.2)
ERYTHROCYTE [DISTWIDTH] IN BLOOD BY AUTOMATED COUNT: 15.2 % (ref 12.3–15.4)
GLOBULIN UR ELPH-MCNC: 3.5 GM/DL
GLUCOSE BLDC GLUCOMTR-MCNC: 104 MG/DL (ref 70–130)
GLUCOSE BLDC GLUCOMTR-MCNC: 107 MG/DL (ref 70–130)
GLUCOSE BLDC GLUCOMTR-MCNC: 175 MG/DL (ref 70–130)
GLUCOSE BLDC GLUCOMTR-MCNC: 84 MG/DL (ref 70–130)
GLUCOSE SERPL-MCNC: 87 MG/DL (ref 65–99)
HCT VFR BLD AUTO: 35.8 % (ref 37.5–51)
HGB BLD-MCNC: 11.2 G/DL (ref 13–17.7)
IMM GRANULOCYTES # BLD AUTO: 0.09 10*3/MM3 (ref 0–0.05)
IMM GRANULOCYTES NFR BLD AUTO: 0.9 % (ref 0–0.5)
LYMPHOCYTES # BLD AUTO: 1.23 10*3/MM3 (ref 0.7–3.1)
LYMPHOCYTES NFR BLD AUTO: 12.3 % (ref 19.6–45.3)
MAGNESIUM SERPL-MCNC: 2.2 MG/DL (ref 1.6–2.4)
MCH RBC QN AUTO: 30 PG (ref 26.6–33)
MCHC RBC AUTO-ENTMCNC: 31.3 G/DL (ref 31.5–35.7)
MCV RBC AUTO: 96 FL (ref 79–97)
MONOCYTES # BLD AUTO: 0.7 10*3/MM3 (ref 0.1–0.9)
MONOCYTES NFR BLD AUTO: 7 % (ref 5–12)
NEUTROPHILS NFR BLD AUTO: 7.84 10*3/MM3 (ref 1.7–7)
NEUTROPHILS NFR BLD AUTO: 78.3 % (ref 42.7–76)
NRBC BLD AUTO-RTO: 0 /100 WBC (ref 0–0.2)
PHOSPHATE SERPL-MCNC: 3 MG/DL (ref 2.5–4.5)
PLATELET # BLD AUTO: 193 10*3/MM3 (ref 140–450)
PMV BLD AUTO: 10.1 FL (ref 6–12)
POTASSIUM SERPL-SCNC: 4.3 MMOL/L (ref 3.5–5.2)
PROT SERPL-MCNC: 6 G/DL (ref 6–8.5)
RBC # BLD AUTO: 3.73 10*6/MM3 (ref 4.14–5.8)
SODIUM SERPL-SCNC: 140 MMOL/L (ref 136–145)
UFH PPP CHRO-ACNC: 0.15 IU/ML (ref 0.3–0.7)
WBC NRBC COR # BLD: 10.01 10*3/MM3 (ref 3.4–10.8)

## 2023-03-28 PROCEDURE — 97530 THERAPEUTIC ACTIVITIES: CPT

## 2023-03-28 PROCEDURE — 99233 SBSQ HOSP IP/OBS HIGH 50: CPT | Performed by: INTERNAL MEDICINE

## 2023-03-28 PROCEDURE — 85025 COMPLETE CBC W/AUTO DIFF WBC: CPT | Performed by: INTERNAL MEDICINE

## 2023-03-28 PROCEDURE — 25010000002 HEPARIN (PORCINE) PER 1000 UNITS

## 2023-03-28 PROCEDURE — 82962 GLUCOSE BLOOD TEST: CPT

## 2023-03-28 PROCEDURE — 63710000001 INSULIN LISPRO (HUMAN) PER 5 UNITS: Performed by: INTERNAL MEDICINE

## 2023-03-28 PROCEDURE — 25010000002 HEPARIN (PORCINE) PER 1000 UNITS: Performed by: INTERNAL MEDICINE

## 2023-03-28 PROCEDURE — 85520 HEPARIN ASSAY: CPT

## 2023-03-28 PROCEDURE — 80053 COMPREHEN METABOLIC PANEL: CPT | Performed by: INTERNAL MEDICINE

## 2023-03-28 PROCEDURE — 25010000002 PIPERACILLIN SOD-TAZOBACTAM PER 1 G: Performed by: INTERNAL MEDICINE

## 2023-03-28 PROCEDURE — 97110 THERAPEUTIC EXERCISES: CPT

## 2023-03-28 PROCEDURE — 83735 ASSAY OF MAGNESIUM: CPT | Performed by: INTERNAL MEDICINE

## 2023-03-28 PROCEDURE — 84100 ASSAY OF PHOSPHORUS: CPT | Performed by: INTERNAL MEDICINE

## 2023-03-28 PROCEDURE — 85730 THROMBOPLASTIN TIME PARTIAL: CPT

## 2023-03-28 PROCEDURE — 99024 POSTOP FOLLOW-UP VISIT: CPT | Performed by: PHYSICIAN ASSISTANT

## 2023-03-28 PROCEDURE — 25010000002 HEPARIN (PORCINE) 25000-0.45 UT/250ML-% SOLUTION

## 2023-03-28 PROCEDURE — 99222 1ST HOSP IP/OBS MODERATE 55: CPT | Performed by: NURSE PRACTITIONER

## 2023-03-28 RX ORDER — HEPARIN SODIUM 1000 [USP'U]/ML
1400 INJECTION, SOLUTION INTRAVENOUS; SUBCUTANEOUS ONCE
Status: COMPLETED | OUTPATIENT
Start: 2023-03-28 | End: 2023-03-28

## 2023-03-28 RX ORDER — FAMOTIDINE 20 MG/1
20 TABLET, FILM COATED ORAL ONCE
Status: CANCELLED | OUTPATIENT
Start: 2023-03-28 | End: 2023-03-28

## 2023-03-28 RX ORDER — FAMOTIDINE 10 MG/ML
20 INJECTION, SOLUTION INTRAVENOUS ONCE
Status: CANCELLED | OUTPATIENT
Start: 2023-03-28 | End: 2023-03-28

## 2023-03-28 RX ORDER — HEPARIN SODIUM 1000 [USP'U]/ML
2000 INJECTION, SOLUTION INTRAVENOUS; SUBCUTANEOUS ONCE
Status: COMPLETED | OUTPATIENT
Start: 2023-03-28 | End: 2023-03-28

## 2023-03-28 RX ADMIN — NYSTATIN 500000 UNITS: 100000 SUSPENSION ORAL at 12:05

## 2023-03-28 RX ADMIN — OXYCODONE HYDROCHLORIDE AND ACETAMINOPHEN 1000 MG: 500 TABLET ORAL at 08:42

## 2023-03-28 RX ADMIN — LISINOPRIL 5 MG: 5 TABLET ORAL at 08:42

## 2023-03-28 RX ADMIN — PANTOPRAZOLE SODIUM 40 MG: 40 TABLET, DELAYED RELEASE ORAL at 21:07

## 2023-03-28 RX ADMIN — CARVEDILOL 50 MG: 12.5 TABLET, FILM COATED ORAL at 08:42

## 2023-03-28 RX ADMIN — Medication 10 ML: at 21:07

## 2023-03-28 RX ADMIN — Medication 10 ML: at 08:42

## 2023-03-28 RX ADMIN — CYCLOBENZAPRINE 5 MG: 10 TABLET, FILM COATED ORAL at 05:24

## 2023-03-28 RX ADMIN — ACETAMINOPHEN 325MG 650 MG: 325 TABLET ORAL at 21:07

## 2023-03-28 RX ADMIN — TAZOBACTAM SODIUM AND PIPERACILLIN SODIUM 3.38 G: 375; 3 INJECTION, SOLUTION INTRAVENOUS at 17:06

## 2023-03-28 RX ADMIN — TERAZOSIN HYDROCHLORIDE 1 MG: 1 CAPSULE ORAL at 21:07

## 2023-03-28 RX ADMIN — HEPARIN SODIUM 2000 UNITS: 1000 INJECTION INTRAVENOUS; SUBCUTANEOUS at 01:59

## 2023-03-28 RX ADMIN — CARVEDILOL 50 MG: 12.5 TABLET, FILM COATED ORAL at 18:06

## 2023-03-28 RX ADMIN — HEPARIN SODIUM 1400 UNITS: 1000 INJECTION INTRAVENOUS; SUBCUTANEOUS at 22:32

## 2023-03-28 RX ADMIN — ACETAMINOPHEN 325MG 650 MG: 325 TABLET ORAL at 14:05

## 2023-03-28 RX ADMIN — TAZOBACTAM SODIUM AND PIPERACILLIN SODIUM 3.38 G: 375; 3 INJECTION, SOLUTION INTRAVENOUS at 08:43

## 2023-03-28 RX ADMIN — NYSTATIN 500000 UNITS: 100000 SUSPENSION ORAL at 18:06

## 2023-03-28 RX ADMIN — ATORVASTATIN CALCIUM 80 MG: 40 TABLET, FILM COATED ORAL at 21:07

## 2023-03-28 RX ADMIN — NYSTATIN 500000 UNITS: 100000 SUSPENSION ORAL at 21:07

## 2023-03-28 RX ADMIN — TAZOBACTAM SODIUM AND PIPERACILLIN SODIUM 3.38 G: 375; 3 INJECTION, SOLUTION INTRAVENOUS at 00:31

## 2023-03-28 RX ADMIN — CYCLOBENZAPRINE 5 MG: 10 TABLET, FILM COATED ORAL at 21:07

## 2023-03-28 RX ADMIN — SENNOSIDES AND DOCUSATE SODIUM 2 TABLET: 8.6; 5 TABLET ORAL at 08:42

## 2023-03-28 RX ADMIN — Medication 5 MG: at 21:07

## 2023-03-28 RX ADMIN — ACETAMINOPHEN 325MG 650 MG: 325 TABLET ORAL at 05:24

## 2023-03-28 RX ADMIN — DOCUSATE SODIUM 100 MG: 100 CAPSULE, LIQUID FILLED ORAL at 08:45

## 2023-03-28 RX ADMIN — NYSTATIN 500000 UNITS: 100000 SUSPENSION ORAL at 08:45

## 2023-03-28 RX ADMIN — HEPARIN SODIUM 17 UNITS/KG/HR: 10000 INJECTION, SOLUTION INTRAVENOUS at 00:29

## 2023-03-28 RX ADMIN — INSULIN LISPRO 2 UNITS: 100 INJECTION, SOLUTION INTRAVENOUS; SUBCUTANEOUS at 00:31

## 2023-03-28 RX ADMIN — FAMOTIDINE 20 MG: 20 TABLET ORAL at 08:42

## 2023-03-28 RX ADMIN — ASPIRIN 81 MG CHEWABLE TABLET 81 MG: 81 TABLET CHEWABLE at 08:42

## 2023-03-28 NOTE — PROGRESS NOTES
[facility] Medicine Services  PROGRESS NOTE    Patient Name: Timmy Guajardo  : 1950  MRN: [id number]    Date of Admission: 3/24/2023  Primary Care Physician: Arsen Seals APRN    Subjective   Subjective     CC:  Follow-up for weakness    HPI:  I have seen and evaluated the patient this morning.  Remains comfortable.  Denies any pain.  Received his right-sided chest tube yesterday.  Does not have much appetite.  Discussed with his sister at bedside and she reported that he has been depressed recently with poor oral intake.    ROS:  General : no fevers, chills,++ weakness  CVS: No chest pain, palpitations  Respiratory: No cough, dyspnea  GI: No N/V/D, abd pain  10 point review of systems is negative except for what is mentioned in HPI    Objective   Objective     Vital Signs:   Temp:  [97.6 °F (36.4 °C)-98.1 °F (36.7 °C)] 97.8 °F (36.6 °C)  Heart Rate:  [70-78] 73  Resp:  [15-18] 18  BP: (117-149)/(69-96) 140/96     Physical Exam:  General: Chronically ill looking, not in distress, conversant and cooperative  Head: Atraumatic and normocephalic  Eyes: No Icterus. No pallor  Ears:  Ears appear intact with no abnormalities noted  Throat: No oral lesions, no thrush  Neck: Supple, trachea midline  Lungs: Diminished air entry right lung zone.  No wheezing or crackles, right chest tube in place  Heart:  Normal S1 and S2, no murmur, no gallop, No JVD, no lower extremity swelling  Abdomen:  Soft, no tenderness, no organomegaly, normal bowel sounds, no organomegaly  Extremities: pulses equal bilaterally  Skin: No bleeding, bruising or rash, normal skin turgor and elasticity  Neurologic: Cranial nerves appear intact with no evidence of facial asymmetry, normal motor and sensory functions in all 4 extremities  Psych: Alert and oriented x 3, normal mood    Results Reviewed:  LAB RESULTS:      Lab 23  0649 23  2254 23  1344 23  0708 23  2035 23  1136  03/26/23  0436 03/25/23  2141 03/25/23  1254 03/25/23  0954 03/25/23  0722 03/25/23  0120 03/25/23  0120 03/24/23  2101 03/24/23  1938 03/24/23  1745   WBC 10.01  --   --  8.60  --   --  10.42  --   --   --   --   --   --   --   --  17.96*   HEMOGLOBIN 11.2*  --   --  10.9*  --   --  10.6*  --   --   --   --   --   --   --   --  12.9*   HEMATOCRIT 35.8*  --   --  34.9*  --   --  32.5*  --   --   --   --   --   --   --   --  41.9   PLATELETS 193  --   --  155  --   --  162  --   --   --   --   --   --   --   --  193   NEUTROS ABS 7.84*  --   --  6.67  --   --  8.48*  --   --   --   --   --   --   --   --  15.92*   IMMATURE GRANS (ABS) 0.09*  --   --  0.07*  --   --  0.08*  --   --   --   --   --   --   --   --  0.17*   LYMPHS ABS 1.23  --   --  1.08  --   --  1.07  --   --   --   --   --   --   --   --  0.83   MONOS ABS 0.70  --   --  0.62  --   --  0.69  --   --   --   --   --   --   --   --  0.99*   EOS ABS 0.09  --   --  0.11  --   --  0.05  --   --   --   --   --   --   --   --  0.00   MCV 96.0  --   --  95.1  --   --  92.1  --   --   --   --   --   --   --   --  97.4*   CRP  --   --   --   --   --   --   --   --   --   --  10.22*  --   --   --   --   --    PROCALCITONIN  --   --   --   --   --   --   --   --   --   --   --   --   --   --  0.14  --    LACTATE  --   --   --   --   --   --   --   --   --   --   --   --   --  3.2*  --  3.1*   LDH  --   --   --  217  --   --   --   --   --   --   --   --   --   --   --   --    PROTIME  --   --   --  16.9*  --   --   --   --   --   --   --   --  36.4*  --   --   --    APTT  --  42.4* 40.7* 88.1 57.5* 45.5* 77.1   < > 60.4 63.1  --    < > >200.0*  --   --   --    HEPARIN ANTI-XA  --   --   --   --   --   --   --   --  >1.10* >1.10* >1.10*  --   --   --   --   --     < > = values in this interval not displayed.         Lab 03/28/23  0649 03/27/23  1945 03/27/23  0708 03/26/23  0436 03/25/23  0722 03/24/23  1938 03/24/23  1745   SODIUM 140  --  140 140 137  --  130*    POTASSIUM 4.3 4.8 3.4* 3.6 4.0  --  4.7   CHLORIDE 108*  --  105 105 102  --  94*   CO2 23.0  --  22.0 24.0 16.0*  --  23.0   ANION GAP 9.0  --  13.0 11.0 19.0*  --  13.0   BUN 10  --  9 12 21  --  26*   CREATININE 0.65*  --  0.61* 0.59* 0.71*  --  0.97   EGFR 101.4  --  103.3 104.4 98.7  --  84.0   GLUCOSE 87  --  77 68 139*  --  233*   CALCIUM 8.2*  --  8.5* 8.1* 8.4*  --  9.0   MAGNESIUM 2.2  --  1.8 1.8 1.8 2.1  --    PHOSPHORUS 3.0  --  3.8 4.5 3.9  --   --    HEMOGLOBIN A1C  --   --   --   --  6.30*  --   --    TSH  --   --   --   --   --  1.610  --          Lab 03/28/23  0649 03/27/23  0708 03/26/23  0436 03/24/23  1745   TOTAL PROTEIN 6.0 6.0 5.3* 7.2   ALBUMIN 2.5* 2.5* 2.6* 2.8*   GLOBULIN 3.5 3.5 2.7 4.4   ALT (SGPT) 11 14 15 19   AST (SGOT) 22 15 18 21   BILIRUBIN 0.4 0.4 0.4 0.5   ALK PHOS 77 76 76 98   LIPASE  --   --   --  15         Lab 03/27/23  0708 03/25/23  0120   PROTIME 16.9* 36.4*   INR 1.38* 3.62*             Lab 03/25/23  0954   VITAMIN B 12 260         Brief Urine Lab Results  (Last result in the past 365 days)      Color   Clarity   Blood   Leuk Est   Nitrite   Protein   CREAT   Urine HCG        03/24/23 2329 Yellow   Turbid   Small (1+)   Moderate (2+)   Negative   30 mg/dL (1+)                 Microbiology Results Abnormal     Procedure Component Value - Date/Time    MRSA Screen, PCR (Inpatient) - Swab, Nares [[id number]]  (Normal) Collected: 03/27/23 0857    Lab Status: Final result Specimen: Swab from Nares Updated: 03/27/23 1107     MRSA PCR Negative    Narrative:      The negative predictive value of this diagnostic test is high and should only be used to consider de-escalating anti-MRSA therapy. A positive result may indicate colonization with MRSA and must be correlated clinically.  MRSA Negative    COVID PRE-OP / PRE-PROCEDURE SCREENING ORDER (NO ISOLATION) - Swab, Nasopharynx [[id number]]  (Normal) Collected: 03/24/23 7591    Lab Status: Final result Specimen: Swab from  Nasopharynx Updated: 03/24/23 2356    Narrative:      The following orders were created for panel order COVID PRE-OP / PRE-PROCEDURE SCREENING ORDER (NO ISOLATION) - Swab, Nasopharynx.  Procedure                               Abnormality         Status                     ---------                               -----------         ------                     Respiratory Panel PCR w/...[[id number]]  Normal              Final result                 Please view results for these tests on the individual orders.    Respiratory Panel PCR w/COVID-19(SARS-CoV-2) SULEIMAN/TAWANNA/BLAISE/PAD/COR/MAD/ANTHONY In-House, NP Swab in UTM/VTM, 3-4 HR TAT - Swab, Nasopharynx [[id number]]  (Normal) Collected: 03/24/23 2256    Lab Status: Final result Specimen: Swab from Nasopharynx Updated: 03/24/23 2356     ADENOVIRUS, PCR Not Detected     Coronavirus 229E Not Detected     Coronavirus HKU1 Not Detected     Coronavirus NL63 Not Detected     Coronavirus OC43 Not Detected     COVID19 Not Detected     Human Metapneumovirus Not Detected     Human Rhinovirus/Enterovirus Not Detected     Influenza A PCR Not Detected     Influenza B PCR Not Detected     Parainfluenza Virus 1 Not Detected     Parainfluenza Virus 2 Not Detected     Parainfluenza Virus 3 Not Detected     Parainfluenza Virus 4 Not Detected     RSV, PCR Not Detected     Bordetella pertussis pcr Not Detected     Bordetella parapertussis PCR Not Detected     Chlamydophila pneumoniae PCR Not Detected     Mycoplasma pneumo by PCR Not Detected    Narrative:      In the setting of a positive respiratory panel with a viral infection PLUS a negative procalcitonin without other underlying concern for bacterial infection, consider observing off antibiotics or discontinuation of antibiotics and continue supportive care. If the respiratory panel is positive for atypical bacterial infection (Bordetella pertussis, Chlamydophila pneumoniae, or Mycoplasma pneumoniae), consider antibiotic de-escalation to target  "atypical bacterial infection.          US Liver    Result Date: 3/27/2023  US LIVER Date of Exam: 3/27/2023 10:50 AM EDT Indication: liver cirrhosis. Comparison: Abdomen pelvis CT scan 3/24/2023 Technique: Grayscale and color Doppler ultrasound evaluation of the right upper quadrant was performed. Findings: Pancreas is moderately well visualized and appears grossly normal. Right and left liver lobes appear normal in morphology and echotexture. No hepatic lesions are identified. There is expected portal vein and hepatic vein waveform and directional flow. Median shear wave velocity is calculated as 1.56 m/s, which could be considered to either reflect normal parenchyma or a mild degree of fibrosis. There is trace pericholecystic fluid. Gallbladder wall appears the upper range of normal but not pathologically enlarged. There are several rounded echogenic areas appearing as nonmobile plaque-like and polypoid lesions the gallbladder wall. No color Doppler flow can be detected, and the appearance is equivocal for noncalcified adherent gallstones versus irregular gallbladder mass. Appearance is unusual, but inflammatory polyps might have this appearance. There is no evidence of invasion of the gallbladder wall. No calcified gallstones are seen. Common bile duct is normal in caliber at 4 mm. Right kidney appears normal and measures 10.1 cm in length, with normal cortical thickness and echogenicity.     Impression: Impression: 1. Liver morphology and echotexture are considered within normal limits. 2. Liver elastography measurements are consistent with either normal liver parenchyma or only a mild degree of fibrosis. 3. Unusual appearance of the gallbladder wall, whether adherent, unusually well-defined gallbladder \"sludge\", inflammatory polyposis, or gallbladder neoplasm. Consider evaluation by general surgery. Electronically Signed: Trey Allen  3/27/2023 12:04 PM EDT  Workstation ID: [id number]    XR Chest 1 View    Result " "Date: 3/27/2023  XR CHEST 1 VW Date of Exam: 3/27/2023 4:43 PM EDT Indication: pl eff s/p chest tube post op. Comparison: 3/24/2023 Findings: Patchy airspace disease is seen within the right lung base. There is left subclavian AICD/pacemaker device. Right basilar chest tube present. No pneumothorax. Patchy airspace disease is seen within the right lung base. Small right-sided pleural effusion present. The cardiac and mediastinal silhouette appear unremarkable. No pneumothorax. No acute osseous abnormality identified.     Impression: Impression: Right basilar chest tube with small right sided pleural effusion with overlying atelectasis, improved as compared to the previous study given difference in modality. No pneumothorax. Electronically Signed: Huyen Aldana  3/27/2023 5:17 PM EDT  Workstation ID: [id number]    CT Guided Chest Tube    Result Date: 3/27/2023  Clinical indication: Right-sided pleural effusion : Dr. Abhishek Cook Procedure: CT-guided right-sided chest tube placement Contrast usage: None Estimated blood loss: Negligible Conscious sedation: None Complication: None apparent. Technique: Formal written consent for the procedure was obtained from the patient after explaining risks to include bleeding, infection, injury to lung/adjacent organs, pneumothorax, and need for additional surgery/procedure.  The patient's right mid axillary region was prepped and draped in the usual, sterile fashion.  Local anesthesia with 1% lidocaine infiltration was achieved.  Utilizing CT guidance, a 10 Swiss locking pigtail drainage catheter was placed into the moderate sized right-sided pleural effusion without difficulty.  Approximately 50 mL of yellowish pleural fluid was drawn off, with sample sent for culture/labs/cytology, per the referring service.  The catheter was then positioned in the \"locked\" position and sutured into place with a sterile bandage applied to the puncture site.  The catheter was placed to drain " on an [facility] Pleur-evac system.  There were no immediate complications. Impression: Successful CT-guided right-sided chest tube placement.      Results for orders placed during the hospital encounter of 10/16/22    Adult Transthoracic Echo Complete W/ Cont if Necessary Per Protocol    Interpretation Summary  •  Estimated left ventricular EF = 30%.  There is global hypokinesis.  The basal-mid posterior wall appears akinetic.  •  Normal right ventricular cavity size, wall thickness, systolic function and septal motion noted. Electronic lead present in the ventricle  •  Septal wall motion is abnormal, consistent with right ventricular pacing  •  No hemodynamically significant valvular stenosis or regurgitation noted.      Current medications:  Scheduled Meds:acetaminophen, 650 mg, Oral, Q8H  vitamin C, 1,000 mg, Oral, Daily  aspirin, 81 mg, Oral, Daily  atorvastatin, 80 mg, Oral, Nightly  carvedilol, 50 mg, Oral, BID With Meals  docusate sodium, 100 mg, Oral, BID  famotidine, 20 mg, Oral, Daily  insulin lispro, 0-7 Units, Subcutaneous, Q6H  lisinopril, 5 mg, Oral, Q24H  nystatin, 5 mL, Oral, 4x Daily  pantoprazole, 40 mg, Oral, Nightly  piperacillin-tazobactam, 3.375 g, Intravenous, Q8H  polyethylene glycol, 17 g, Oral, Daily  senna-docusate sodium, 2 tablet, Oral, BID  sodium chloride, 10 mL, Intravenous, Q12H  terazosin, 1 mg, Oral, Nightly      Continuous Infusions:heparin, 19 Units/kg/hr, Last Rate: 19 Units/kg/hr (03/28/23 0159)  Pharmacy to Dose Heparin,       PRN Meds:.•  acetaminophen **OR** acetaminophen **OR** acetaminophen  •  senna-docusate sodium **AND** polyethylene glycol **AND** bisacodyl **AND** bisacodyl  •  Calcium Replacement - Follow Nurse / BPA Driven Protocol  •  cyclobenzaprine  •  dextrose  •  dextrose  •  glucagon (human recombinant)  •  Magnesium Standard Dose Replacement - Follow Nurse / BPA Driven Protocol  •  melatonin  •  ondansetron  •  Pharmacy to Dose Heparin  •  Phosphorus Replacement  - Follow Nurse / BPA Driven Protocol  •  Potassium Replacement - Follow Nurse / BPA Driven Protocol  •  Sodium Chloride (PF)  •  sodium chloride  •  sodium chloride    Assessment & Plan   Assessment & Plan     Active Hospital Problems    Diagnosis  POA   • **Bilateral pulmonary embolism (HCC) [I26.99]  Unknown   • Pleural effusion [J90]  Yes   • Unintentional weight loss [R63.4]  Unknown   • Hypoalbuminemia [E88.09]  Unknown   • Severe malnutrition (HCC) [E43]  Unknown   • History of CVA (cerebrovascular accident) [Z86.73]  Not Applicable   • Debility [R53.81]  Unknown   • Presence of cardiac pacemaker [Z95.0]  Yes   • HTN (hypertension) [I10]  Yes   • Leukocytosis [D72.829]  Yes      Resolved Hospital Problems   No resolved problems to display.        Brief Hospital Course to date:  Timmy Guajardo is a 72 y.o. 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, systolic heart failure with ejection fraction of 30%, coronary artery disease, GERD, history of DVT who presented to the hospital with weakness and functional decline, unintentional weight loss and severe constipation    Assessment and plan:  Bilateral pulmonary embolism  History of DVT  · Likely secondary to being on an adequate dose of Eliquis of 2.5 mg twice daily and medication noncompliance  · Continue heparin drip  · Will transition to full dose Eliquis loading and maintenance upon discharge once he does not need any further procedures     Moderate exudative right-sided loculated pleural effusion  · With bedside point-of-care ultrasound revealed complex pleural effusion  · Cardiothoracic surgery consulted.  Recommended chest tube placement by IR.    · Underwent right-sided chest tube placement by IR on 3/27/2023.  Continue negative pressure suction.  CTS managing chest tube  · Analysis of the fluid is consistent with exudate, ?  Parapneumonic  · Continue IV Zosyn.  Vancomycin  discontinued after MRSA swab came back negative    Severe malnutrition  Hypoalbuminemia  Significant unintentional weight loss  Generalized debility  · Patient reports 52lbs weight loss since oct  · No EGD or colonoscopy since 2008  · Does not have much appetite.  We will start mirtazapine 15 mg nightly  · Given his unintentional weight loss, will ask GI to evaluate the patient for EGD and colonoscopy  · CT chest and CT abdomen with no convincing evidence of solid malignancy or lymphadenopathy.  Oncology service evaluated the patient recommended age-appropriate screening  · Nutrition team following     UTI with Enterococcus faecalis  · POA  · On Zosyn    Leukocytosis, improving  · Likely reactive versus infectious  · Normal procalcitonin and negative cultures to date  · Continue current metabolic therapy with Zosyn and vancomycin.     CAD  Cardiomyopathy with EF 30 %  Essential HTN  Old CVA with right residual weakness  Dyslipidemia  · Start aspirin  · Continue coreg, lisinopril  · Continue statins    Constipation  · Having multiple bowel movements with enema  · Continue bowel regimen     GERD   · Continue PPI      Type 2 diabetes  · A1C 6.3%   · Continue insulin sliding scale     Acute debility  · PT recommended acute rehab    Total time spent: 50  min   Time spent includes time reviewing chart, face-to-face time, counseling patient/family/caregiver, ordering medications/tests/procedures, communicating with other health care professionals, documenting clinical information in the electronic health record, and coordination of care.     Expected Discharge Location and Transportation: Acute rehab  Expected Discharge   Expected Discharge Date and Time     Expected Discharge Date Expected Discharge Time    Mar 31, 2023            DVT prophylaxis:  Medical and mechanical DVT prophylaxis orders are present.     AM-PAC 6 Clicks Score (PT): 12 (03/26/23 0475)    CODE STATUS:   Code Status and Medical Interventions:   Ordered  at: 03/24/23 2318     Code Status (Patient has no pulse and is not breathing):    CPR (Attempt to Resuscitate)     Medical Interventions (Patient has pulse or is breathing):    Full Support     Copied text in this note has been reviewed and is accurate as of 03/28/23.     Alan Cooper MD  03/28/23

## 2023-03-28 NOTE — PROGRESS NOTES
Cardiothoracic Surgery Progress Note      Chief complaint: Pleural effusion     LOS: 4 days      Subjective:  No acute events overnight.  Denies dyspnea or chest pain.    Objective:  Vital Signs  Temp:  [97.6 °F (36.4 °C)-98.1 °F (36.7 °C)] 97.7 °F (36.5 °C)  Heart Rate:  [70-78] 70  Resp:  [15-18] 18  BP: (117-149)/(69-93) 149/93    Physical Exam:   General Appearance: Well-developed, well-nourished in no acute distress   Lungs: Respirations even and unlabored and clear to auscultation bilaterally no wheezes, rales or rhonchi   Heart: Regular rate rhythm no murmurs, rubs or gallops   CT Output: 195 mL / 24 hours.  No air leak.     Results:    Results from last 7 days   Lab Units 03/28/23  0649   WBC 10*3/mm3 10.01   HEMOGLOBIN g/dL 11.2*   HEMATOCRIT % 35.8*   PLATELETS 10*3/mm3 193     Results from last 7 days   Lab Units 03/28/23  0649   SODIUM mmol/L 140   POTASSIUM mmol/L 4.3   CHLORIDE mmol/L 108*   CO2 mmol/L 23.0   BUN mg/dL 10   CREATININE mg/dL 0.65*   GLUCOSE mg/dL 87   CALCIUM mg/dL 8.2*       Assessment:  Pulmonary embolus  Moderate right pleural effusion    Plan:  Chest tube to suction  Chest x-ray in a.m.  Await cytology results    LISA Abel  03/28/23  07:47 EDT

## 2023-03-28 NOTE — PLAN OF CARE
Goal Outcome Evaluation:  Plan of Care Reviewed With: patient        Progress: improving  Outcome Evaluation: Physical therapy treatment complete. The patient progressed with sit to stand transfer and static standing tolerance compared to previous treatment session. Patient continues to present below baseline for functional mobility. The patient would continue to benefit from skilled PT to address strength, balance and activity tolerance deficits. At hospital discharge continue to recommend IPR.

## 2023-03-28 NOTE — THERAPY TREATMENT NOTE
Patient Name: Timmy Guajardo  : 1950    MRN: [id number]                              Today's Date: 3/28/2023       Admit Date: 3/24/2023    Visit Dx: No diagnosis found.  Patient Active Problem List   Diagnosis   • Acute right-sided weakness   • Suspected cerebrovascular accident (CVA)   • Leukocytosis   • CHF (congestive heart failure) (HCC)   • Thrombocytopenia (HCC)   • HTN (hypertension)   • Presence of cardiac pacemaker   • Hyperglycemia   • Gangrene of toe of left foot (HCC)   • Closed displaced intertrochanteric fracture of right femur, initial encounter (HCC)   • Pleural effusion   • Unintentional weight loss   • Hypoalbuminemia   • Severe malnutrition (HCC)   • Bilateral pulmonary embolism (HCC)   • History of CVA (cerebrovascular accident)   • Debility     Past Medical History:   Diagnosis Date   • Cardiomyopathy (HCC)    • CHF (congestive heart failure) (HCC)    • Coronary artery disease    • Diabetes mellitus (HCC)    • GERD (gastroesophageal reflux disease)    • HTN (hypertension)    • Stroke (HCC)     Right sided weakness   • Stroke (HCC)      Past Surgical History:   Procedure Laterality Date   • AMPUTATION DIGIT Left 2021    Procedure: AMPUTATION DIGIT - GREAT TOE AMPUTATION LEFT;  Surgeon: Dario Marmolejo MD;  Location: [facility] OR;  Service: General;  Laterality: Left;   • AORTAGRAM N/A 2021    Procedure: AORTAGRAM WITH RUNOFFS, LEFT SFA BALLOON ANGIOPLASTY;  Surgeon: Tim Mahan MD;  Location: [facility];  Service: Vascular;  Laterality: N/A;  FL TIME 6 MIN 54 SECS  82 MGY  CONTRAST VISIPAQUE 100ML     • CARDIAC CATHETERIZATION     • CARDIAC PACEMAKER PLACEMENT      pacemaker/defibrillator, [employer]   • HERNIA REPAIR Bilateral     Inguinal hernia   • HIP TROCHANTERIC NAILING WITH INTRAMEDULLARY HIP SCREW Right 10/18/2022    Procedure: HIP TROCHANTERIC NAILING WITH INTRAMEDULLARY HIP SCREW RIGHT;  Surgeon: Bj Steinberg MD;  Location: [facility] OR;  Service:  Orthopedics;  Laterality: Right;      General Information     Row Name 03/28/23 1314          Physical Therapy Time and Intention    Document Type therapy note (daily note)  -ML     Mode of Treatment physical therapy  -ML     Row Name 03/28/23 1314          General Information    Patient Profile Reviewed yes  -ML     Existing Precautions/Restrictions fall  mild R sided weakness from prior CVA  -ML     Barriers to Rehab medically complex;previous functional deficit  -ML     Row Name 03/28/23 1314          Cognition    Orientation Status (Cognition) oriented x 3  -ML     Row Name 03/28/23 1314          Safety Issues, Functional Mobility    Safety Issues Affecting Function (Mobility) insight into deficits/self-awareness;safety precaution awareness;safety precautions follow-through/compliance;sequencing abilities  -ML     Impairments Affecting Function (Mobility) balance;endurance/activity tolerance;coordination;postural/trunk control;motor control;strength;range of motion (ROM)  -ML           User Key  (r) = Recorded By, (t) = Taken By, (c) = Cosigned By    Initials Name Provider Type    ML Sharon Heller Physical Therapist               Mobility     Row Name 03/28/23 1315          Bed Mobility    Bed Mobility supine-sit;sit-supine  -ML     Supine-Sit [place] (Bed Mobility) verbal cues;moderate assist (50% patient effort);1 person assist  -ML     Sit-Supine [place] (Bed Mobility) minimum assist (75% patient effort);1 person assist  -ML     Assistive Device (Bed Mobility) bed rails;head of bed elevated  -ML     Row Name 03/28/23 1315          Sit-Stand Transfer    Sit-Stand [place] (Transfers) verbal cues;minimum assist (75% patient effort);1 person assist  -ML     Assistive Device (Sit-Stand Transfers) walker, front-wheeled  -ML     Comment, (Sit-Stand Transfer) Verbal cues for hand placement prior to sit to stand transfer  -ML           User Key  (r) = Recorded By, (t) = Taken By, (c) = Cosigned By     Initials Name Provider Type    ML Sharon Heller Physical Therapist               Obj/Interventions     Row Name 03/28/23 1316          Motor Skills    Therapeutic Exercise shoulder;hip;knee;ankle  -ML     Row Name 03/28/23 1316          Shoulder (Therapeutic Exercise)    Shoulder AROM (Therapeutic Exercise) bilateral;scapular retraction;10 repetitions  -ML     Row Name 03/28/23 1316          Hip (Therapeutic Exercise)    Hip (Therapeutic Exercise) strengthening exercise  -     Hip Strengthening (Therapeutic Exercise) bilateral;aBduction;aDduction;marching while standing;15 repititions  -ML     Row Name 03/28/23 1316          Knee (Therapeutic Exercise)    Knee (Therapeutic Exercise) strengthening exercise;isometric exercises  -     Knee Isometrics (Therapeutic Exercise) bilateral;gluteal sets;quad sets;10 repetitions;5 second hold  -ML     Knee Strengthening (Therapeutic Exercise) bilateral;LAQ (long arc quad);15 repititions  -     Row Name 03/28/23 1316          Ankle (Therapeutic Exercise)    Ankle (Therapeutic Exercise) AROM (active range of motion)  -     Ankle AROM (Therapeutic Exercise) bilateral;dorsiflexion;plantarflexion;10 repetitions  -ML     Row Name 03/28/23 1316          Balance    Balance Assessment sitting static balance;sitting dynamic balance;sit to stand dynamic balance;standing static balance  -ML     Static Sitting Balance standby assist  -ML     Dynamic Sitting Balance supervision  -ML     Position, Sitting Balance unsupported;sitting edge of bed  -ML     Sit to Stand Dynamic Balance verbal cues;minimal assist;1-person assist  -ML     Static Standing Balance verbal cues;contact guard  -ML     Dynamic Standing Balance not tested  -ML     Position/Device Used, Standing Balance supported;walker, rolling  -ML     Balance Interventions sitting;sit to stand;supported;static;dynamic;standing  -ML     Comment, Balance Patient tolerated static standing with RW for approx 1 minute, verbal cues to  maintain upright standing posture  -ML           User Key  (r) = Recorded By, (t) = Taken By, (c) = Cosigned By    Initials Name Provider Type    Sharon Alvarez Physical Therapist               Goals/Plan    No documentation.                Clinical Impression     Row Name 03/28/23 1319          Pain    Pretreatment Pain Rating 0/10 - no pain  -ML     Posttreatment Pain Rating 0/10 - no pain  -ML     Row Name 03/28/23 1319          Plan of Care Review    Plan of Care Reviewed With patient  -ML     Progress improving  -ML     Outcome Evaluation Physical therapy treatment complete. The patient progressed with sit to stand transfer and static standing tolerance compared to previous treatment session. Patient continues to present below baseline for functional mobility. The patient would continue to benefit from skilled PT to address strength, balance and activity tolerance deficits. At hospital discharge continue to recommend IPR.  -ML     Row Name 03/28/23 1319          Vital Signs    Pretreatment Heart Rate (beats/min) 70  -ML     Posttreatment Heart Rate (beats/min) 70  -ML     Pre SpO2 (%) 96  -ML     O2 Delivery Pre Treatment room air  -ML     Post SpO2 (%) 97  -ML     O2 Delivery Post Treatment room air  -ML     Pre Patient Position Supine  -ML     Intra Patient Position Standing  -ML     Post Patient Position Supine  -ML     Row Name 03/28/23 1319          Positioning and Restraints    Pre-Treatment Position in bed  -ML     Post Treatment Position bed  -ML     In Bed notified nsg;fowlers;call light within reach;encouraged to call for assist;exit alarm on;side rails up x2  -ML           User Key  (r) = Recorded By, (t) = Taken By, (c) = Cosigned By    Initials Name Provider Type    Sharon Alvarez Physical Therapist               Outcome Measures     Row Name 03/28/23 8936          How much help from another person do you currently need...    Turning from your back to your side while in flat bed without using  bedrails? 3  -ML     Moving from lying on back to sitting on the side of a flat bed without bedrails? 3  -ML     Moving to and from a bed to a chair (including a wheelchair)? 3  -ML     Standing up from a chair using your arms (e.g., wheelchair, bedside chair)? 3  -ML     Climbing 3-5 steps with a railing? 2  -ML     To walk in hospital room? 2  -ML     AM-PAC 6 Clicks Score (PT) 16  -ML     Highest level of mobility 5 --> Static standing  -ML     Row Name 03/28/23 [facility]          Functional Assessment    Outcome Measure Options AM-PAC 6 Clicks Basic Mobility (PT)  -ML           User Key  (r) = Recorded By, (t) = Taken By, (c) = Cosigned By    Initials Name Provider Type     Sharon Heller Physical Therapist                             Physical Therapy Education     Title: PT OT SLP Therapies (In Progress)     Topic: Physical Therapy (Done)     Point: Mobility training (Done)     Learning Progress Summary           Patient Acceptance, E, VU,NR by  at 3/28/2023 1323    Acceptance, E,TB, VU,NR by  at 3/26/2023 1536   Family Acceptance, E,TB, VU,NR by  at 3/26/2023 1536                   Point: Home exercise program (Done)     Learning Progress Summary           Patient Acceptance, E, VU,NR by  at 3/28/2023 1323    Acceptance, E,TB, VU,NR by  at 3/26/2023 1536   Family Acceptance, E,TB, VU,NR by  at 3/26/2023 1536                   Point: Body mechanics (Done)     Learning Progress Summary           Patient Acceptance, E,TB, VU,NR by  at 3/26/2023 1536   Family Acceptance, E,TB, VU,NR by  at 3/26/2023 1536                   Point: Precautions (Done)     Learning Progress Summary           Patient Acceptance, E, VU,NR by  at 3/28/2023 1323    Acceptance, E,TB, VU,NR by  at 3/26/2023 1536   Family Acceptance, E,TB, VU,NR by  at 3/26/2023 1536                               User Key     Initials Effective Dates Name Provider Type [facility] 11/02/22 -  Gabino Tirado, PT Physical Therapist PT     CARLOS 04/22/21 -  Sharon Heller Physical Therapist PT              PT Recommendation and Plan     Plan of Care Reviewed With: patient  Progress: improving  Outcome Evaluation: Physical therapy treatment complete. The patient progressed with sit to stand transfer and static standing tolerance compared to previous treatment session. Patient continues to present below baseline for functional mobility. The patient would continue to benefit from skilled PT to address strength, balance and activity tolerance deficits. At hospital discharge continue to recommend IPR.     Time Calculation:    PT Charges     Row Name 03/28/23 1324             Time Calculation    Start Time 1122  -ML      PT Received On 03/28/23  -ML         Timed Charges    85563 - PT Therapeutic Exercise Minutes 12  -ML      61626 - PT Therapeutic Activity Minutes 20  -ML         Total Minutes    Timed Charges Total Minutes 32  -ML       Total Minutes 32  -ML            User Key  (r) = Recorded By, (t) = Taken By, (c) = Cosigned By    Initials Name Provider Type    Sharon Alvarez Physical Therapist              Therapy Charges for Today     Code Description Service Date Service Provider Modifiers Qty    08071621663 HC PT THER PROC EA 15 MIN 3/28/2023 Sharon Heller GP 1    04714036394 HC PT THERAPEUTIC ACT EA 15 MIN 3/28/2023 Sharon Heller GP 1          PT G-Codes  Outcome Measure Options: AM-PAC 6 Clicks Basic Mobility (PT)  AM-PAC 6 Clicks Score (PT): 16  AM-PAC 6 Clicks Score (OT): 14       Sharon Heller  3/28/2023

## 2023-03-28 NOTE — CONSULTS
[facility]    Referring Provider: Arsen Seals APRN    PCP: Arsen Seals APRN    Reason for Consultation: weight loss    Chief complaint: weight loss    History of present illness:    Timmy Guajardo is a 72 y.o. male who is admitted with right pulmonary embolism and right pleural effusion.  On admission, he reported that he has had a 52 pound weight loss in the past 5 months.  He reports that he quit eating after he had an accident and broke his leg in October 2022.  He noted a white coating on his tongue sometime in late October early November which she never reported to anyone.  He began taking some over-the-counter medications that did not help clear up this white coating.  He reports that everything that he ate tasted terrible and so he just did not eat.    About 2 weeks ago he establish care with a new primary care physician and his weight loss was noted.  He was diagnosed with thrush and began on medication.  He says that since he started on the medication, and its only been about a week his thrush has improved greatly and he is eating things very well.  He believes he has already gained some weight.    He has had some problems with constipation.  Last night he received an enema and has taken a stool softener today.  He reports that his bowels seem to be moving just fine currently.    Allergies:  Other    Scheduled Meds:  acetaminophen, 650 mg, Oral, Q8H  vitamin C, 1,000 mg, Oral, Daily  aspirin, 81 mg, Oral, Daily  atorvastatin, 80 mg, Oral, Nightly  carvedilol, 50 mg, Oral, BID With Meals  docusate sodium, 100 mg, Oral, BID  famotidine, 20 mg, Oral, Daily  insulin lispro, 0-7 Units, Subcutaneous, Q6H  lisinopril, 5 mg, Oral, Q24H  nystatin, 5 mL, Oral, 4x Daily  pantoprazole, 40 mg, Oral, Nightly  piperacillin-tazobactam, 3.375 g, Intravenous, Q8H  polyethylene glycol, 17 g, Oral, Daily  senna-docusate sodium, 2 tablet, Oral, BID  sodium chloride, 10 mL, Intravenous,  Q12H  terazosin, 1 mg, Oral, Nightly         Infusions:  heparin, 19 Units/kg/hr, Last Rate: Stopped (03/28/23 1045)  Pharmacy to Dose Heparin,         PRN Meds:  •  acetaminophen **OR** acetaminophen **OR** acetaminophen  •  senna-docusate sodium **AND** polyethylene glycol **AND** bisacodyl **AND** bisacodyl  •  Calcium Replacement - Follow Nurse / BPA Driven Protocol  •  cyclobenzaprine  •  dextrose  •  dextrose  •  glucagon (human recombinant)  •  Magnesium Standard Dose Replacement - Follow Nurse / BPA Driven Protocol  •  melatonin  •  ondansetron  •  Pharmacy to Dose Heparin  •  Phosphorus Replacement - Follow Nurse / BPA Driven Protocol  •  Potassium Replacement - Follow Nurse / BPA Driven Protocol  •  Sodium Chloride (PF)  •  sodium chloride  •  sodium chloride    Home Meds:  Medications Prior to Admission   Medication Sig Dispense Refill Last Dose   • acetaminophen (TYLENOL) 325 MG tablet Take 2 tablets by mouth Every 8 (Eight) Hours.   Past Week   • apixaban (ELIQUIS) 2.5 MG tablet tablet Take 1 tablet by mouth Every 12 (Twelve) Hours. Indications: Prevention of Unwanted Clot in Veins   3/25/2023   • atorvastatin (LIPITOR) 80 MG tablet Take 1 tablet by mouth Every Night.   3/24/2023   • carvedilol (COREG) 25 MG tablet Take 2 tablets by mouth 2 (Two) Times a Day With Meals.   3/25/2023   • cyclobenzaprine (FLEXERIL) 5 MG tablet Take 1 tablet by mouth 3 (Three) Times a Day As Needed for Muscle Spasms.   Past Week   • docusate sodium 100 MG capsule Take 1 capsule by mouth 2 (Two) Times a Day.   Past Week   • famotidine (PEPCID) 20 MG tablet Take 1 tablet by mouth Daily.   3/25/2023   • lisinopril (PRINIVIL,ZESTRIL) 5 MG tablet Take 1 tablet by mouth Daily.   3/25/2023   • melatonin 5 MG tablet tablet Take 1 tablet by mouth At Night As Needed (sleep).   Past Week   • oxyCODONE (ROXICODONE) 5 MG immediate release tablet Take 1 tablet by mouth Every 6 (Six) Hours As Needed for Moderate Pain.   Past Week   •  pantoprazole (PROTONIX) 40 MG EC tablet Take 1 tablet by mouth Daily. evening   3/25/2023   • polyethylene glycol (MIRALAX) 17 g packet Take 17 g by mouth Daily.   Past Month   • terazosin (HYTRIN) 1 MG capsule Take 1 capsule by mouth.   3/25/2023   • Trulicity 0.75 MG/0.5ML solution pen-injector    Unknown   • vitamin C (ASCORBIC ACID) 500 MG tablet    Unknown   • Zinc 220 (50 Zn) MG capsule           ROS: Review of Systems    PAST MED HX:  Past Medical History:   Diagnosis Date   • Cardiomyopathy (HCC)    • CHF (congestive heart failure) (HCC)    • Coronary artery disease    • Diabetes mellitus (HCC)    • GERD (gastroesophageal reflux disease)    • HTN (hypertension)    • Stroke (HCC)     Right sided weakness   • Stroke (HCC)        PAST SURG HX:  Past Surgical History:   Procedure Laterality Date   • AMPUTATION DIGIT Left 5/12/2021    Procedure: AMPUTATION DIGIT - GREAT TOE AMPUTATION LEFT;  Surgeon: Dario Marmolejo MD;  Location:  [facility];  Service: General;  Laterality: Left;   • AORTAGRAM N/A 5/11/2021    Procedure: AORTAGRAM WITH RUNOFFS, LEFT SFA BALLOON ANGIOPLASTY;  Surgeon: Tim Mahan MD;  Location:  [facility];  Service: Vascular;  Laterality: N/A;  FL TIME 6 MIN 54 SECS  82 MGY  CONTRAST VISIPAQUE 100ML     • CARDIAC CATHETERIZATION     • CARDIAC PACEMAKER PLACEMENT      pacemaker/defibrillator, [employer]   • HERNIA REPAIR Bilateral     Inguinal hernia   • HIP TROCHANTERIC NAILING WITH INTRAMEDULLARY HIP SCREW Right 10/18/2022    Procedure: HIP TROCHANTERIC NAILING WITH INTRAMEDULLARY HIP SCREW RIGHT;  Surgeon: Bj Steinberg MD;  Location:  [facility];  Service: Orthopedics;  Laterality: Right;       FAM HX:  Family History   Problem Relation Age of Onset   • Alzheimer's disease Mother    • Kidney failure Mother    • Cancer Father    • Heart failure Father        SOC HX:  Social History     Socioeconomic History   • Marital status: Single   • Number of children: 0   Tobacco Use   •  "Smoking status: Former     Packs/day: 1.00     Years: 30.00     Pack years: 30.00     Types: Cigarettes   • Smokeless tobacco: Never   • Tobacco comments:     quit 2015   Vaping Use   • Vaping Use: Never used   Substance and Sexual Activity   • Alcohol use: Never   • Drug use: Never   • Sexual activity: Defer       PHYSICAL EXAM  /78 (BP Location: Right arm, Patient Position: Lying)   Pulse 70   Temp 97.9 °F (36.6 °C) (Oral)   Resp 18   Ht 177.8 cm (70\")   Wt 56.7 kg (125 lb)   SpO2 95%   BMI 17.94 kg/m²   Wt Readings from Last 3 Encounters:   03/25/23 56.7 kg (125 lb)   11/23/22 72.6 kg (160 lb)   10/24/22 82.1 kg (181 lb 1.6 oz)   ,body mass index is 17.94 kg/m².  Physical Exam    Results Review:   I reviewed the patient's new clinical results.    Lab Results   Component Value Date    WBC 10.01 03/28/2023    HGB 11.2 (L) 03/28/2023    HGB 10.9 (L) 03/27/2023    HGB 10.6 (L) 03/26/2023    HCT 35.8 (L) 03/28/2023    MCV 96.0 03/28/2023     03/28/2023       Lab Results   Component Value Date    INR 1.38 (H) 03/27/2023    INR 3.62 (H) 03/25/2023    INR 1.35 (H) 10/17/2022       Lab Results   Component Value Date    GLUCOSE 87 03/28/2023    BUN 10 03/28/2023    CREATININE 0.65 (L) 03/28/2023    EGFRIFNONA 82 05/13/2021    BCR 15.4 03/28/2023     03/28/2023    K 4.3 03/28/2023    CO2 23.0 03/28/2023    CALCIUM 8.2 (L) 03/28/2023    ALBUMIN 2.5 (L) 03/28/2023    ALKPHOS 77 03/28/2023    BILITOT 0.4 03/28/2023    ALT 11 03/28/2023    AST 22 03/28/2023       ASSESSMENTS/PLANS  1.  Abnormal weight loss  >> EGD tomorrow  >> N.p.o. after midnight  >> Colonoscopy as an outpatient when patient can be off of anticoagulation    I discussed the patient's findings and my recommendations with patient and family    Sancho Helm, APRN  03/28/23  14:43 EDT    "

## 2023-03-29 ENCOUNTER — ANESTHESIA (OUTPATIENT)
Dept: GASTROENTEROLOGY | Facility: HOSPITAL | Age: 73
DRG: 163 | End: 2023-03-29
Payer: MEDICARE

## 2023-03-29 ENCOUNTER — APPOINTMENT (OUTPATIENT)
Dept: GENERAL RADIOLOGY | Facility: HOSPITAL | Age: 73
DRG: 163 | End: 2023-03-29
Payer: MEDICARE

## 2023-03-29 LAB
ANION GAP SERPL CALCULATED.3IONS-SCNC: 13 MMOL/L (ref 5–15)
BASOPHILS # BLD AUTO: 0.07 10*3/MM3 (ref 0–0.2)
BASOPHILS NFR BLD AUTO: 0.7 % (ref 0–1.5)
BUN SERPL-MCNC: 10 MG/DL (ref 8–23)
BUN/CREAT SERPL: 16.1 (ref 7–25)
CALCIUM SPEC-SCNC: 8.3 MG/DL (ref 8.6–10.5)
CHLORIDE SERPL-SCNC: 108 MMOL/L (ref 98–107)
CO2 SERPL-SCNC: 21 MMOL/L (ref 22–29)
CREAT SERPL-MCNC: 0.62 MG/DL (ref 0.76–1.27)
DEPRECATED RDW RBC AUTO: 53.8 FL (ref 37–54)
EGFRCR SERPLBLD CKD-EPI 2021: 102.8 ML/MIN/1.73
EOSINOPHIL # BLD AUTO: 0.15 10*3/MM3 (ref 0–0.4)
EOSINOPHIL NFR BLD AUTO: 1.5 % (ref 0.3–6.2)
ERYTHROCYTE [DISTWIDTH] IN BLOOD BY AUTOMATED COUNT: 15.2 % (ref 12.3–15.4)
GLUCOSE BLDC GLUCOMTR-MCNC: 121 MG/DL (ref 70–130)
GLUCOSE BLDC GLUCOMTR-MCNC: 140 MG/DL (ref 70–130)
GLUCOSE BLDC GLUCOMTR-MCNC: 80 MG/DL (ref 70–130)
GLUCOSE BLDC GLUCOMTR-MCNC: 93 MG/DL (ref 70–130)
GLUCOSE SERPL-MCNC: 72 MG/DL (ref 65–99)
HCT VFR BLD AUTO: 38.3 % (ref 37.5–51)
HGB BLD-MCNC: 12.1 G/DL (ref 13–17.7)
IMM GRANULOCYTES # BLD AUTO: 0.09 10*3/MM3 (ref 0–0.05)
IMM GRANULOCYTES NFR BLD AUTO: 0.9 % (ref 0–0.5)
LYMPHOCYTES # BLD AUTO: 1.48 10*3/MM3 (ref 0.7–3.1)
LYMPHOCYTES NFR BLD AUTO: 14.8 % (ref 19.6–45.3)
MAGNESIUM SERPL-MCNC: 2 MG/DL (ref 1.6–2.4)
MCH RBC QN AUTO: 30.3 PG (ref 26.6–33)
MCHC RBC AUTO-ENTMCNC: 31.6 G/DL (ref 31.5–35.7)
MCV RBC AUTO: 96 FL (ref 79–97)
MONOCYTES # BLD AUTO: 0.69 10*3/MM3 (ref 0.1–0.9)
MONOCYTES NFR BLD AUTO: 6.9 % (ref 5–12)
NEUTROPHILS NFR BLD AUTO: 7.51 10*3/MM3 (ref 1.7–7)
NEUTROPHILS NFR BLD AUTO: 75.2 % (ref 42.7–76)
NRBC BLD AUTO-RTO: 0 /100 WBC (ref 0–0.2)
PHOSPHATE SERPL-MCNC: 3.3 MG/DL (ref 2.5–4.5)
PLATELET # BLD AUTO: 132 10*3/MM3 (ref 140–450)
PMV BLD AUTO: 12 FL (ref 6–12)
POTASSIUM SERPL-SCNC: 4.2 MMOL/L (ref 3.5–5.2)
RBC # BLD AUTO: 3.99 10*6/MM3 (ref 4.14–5.8)
RBC MORPH BLD: NORMAL
REF LAB TEST METHOD: NORMAL
SMALL PLATELETS BLD QL SMEAR: NORMAL
SODIUM SERPL-SCNC: 142 MMOL/L (ref 136–145)
UFH PPP CHRO-ACNC: 0.27 IU/ML (ref 0.3–0.7)
UFH PPP CHRO-ACNC: 0.35 IU/ML (ref 0.3–0.7)
UFH PPP CHRO-ACNC: 0.5 IU/ML (ref 0.3–0.7)
WBC MORPH BLD: NORMAL
WBC NRBC COR # BLD: 9.99 10*3/MM3 (ref 3.4–10.8)

## 2023-03-29 PROCEDURE — 25010000002 PIPERACILLIN SOD-TAZOBACTAM PER 1 G: Performed by: INTERNAL MEDICINE

## 2023-03-29 PROCEDURE — 83735 ASSAY OF MAGNESIUM: CPT | Performed by: INTERNAL MEDICINE

## 2023-03-29 PROCEDURE — 85520 HEPARIN ASSAY: CPT | Performed by: INTERNAL MEDICINE

## 2023-03-29 PROCEDURE — 82962 GLUCOSE BLOOD TEST: CPT

## 2023-03-29 PROCEDURE — 0DB68ZX EXCISION OF STOMACH, VIA NATURAL OR ARTIFICIAL OPENING ENDOSCOPIC, DIAGNOSTIC: ICD-10-PCS | Performed by: INTERNAL MEDICINE

## 2023-03-29 PROCEDURE — 25010000002 PROPOFOL 10 MG/ML EMULSION

## 2023-03-29 PROCEDURE — 88305 TISSUE EXAM BY PATHOLOGIST: CPT | Performed by: INTERNAL MEDICINE

## 2023-03-29 PROCEDURE — 85520 HEPARIN ASSAY: CPT

## 2023-03-29 PROCEDURE — 99233 SBSQ HOSP IP/OBS HIGH 50: CPT | Performed by: INTERNAL MEDICINE

## 2023-03-29 PROCEDURE — 25010000002 HEPARIN (PORCINE) 25000-0.45 UT/250ML-% SOLUTION: Performed by: INTERNAL MEDICINE

## 2023-03-29 PROCEDURE — 85025 COMPLETE CBC W/AUTO DIFF WBC: CPT | Performed by: INTERNAL MEDICINE

## 2023-03-29 PROCEDURE — 71045 X-RAY EXAM CHEST 1 VIEW: CPT

## 2023-03-29 PROCEDURE — 85007 BL SMEAR W/DIFF WBC COUNT: CPT | Performed by: INTERNAL MEDICINE

## 2023-03-29 PROCEDURE — 43239 EGD BIOPSY SINGLE/MULTIPLE: CPT | Performed by: INTERNAL MEDICINE

## 2023-03-29 PROCEDURE — 80048 BASIC METABOLIC PNL TOTAL CA: CPT | Performed by: INTERNAL MEDICINE

## 2023-03-29 PROCEDURE — 84100 ASSAY OF PHOSPHORUS: CPT | Performed by: INTERNAL MEDICINE

## 2023-03-29 RX ORDER — MIDAZOLAM HYDROCHLORIDE 1 MG/ML
0.5 INJECTION INTRAMUSCULAR; INTRAVENOUS
Status: DISCONTINUED | OUTPATIENT
Start: 2023-03-29 | End: 2023-03-29 | Stop reason: HOSPADM

## 2023-03-29 RX ORDER — ONDANSETRON 2 MG/ML
4 INJECTION INTRAMUSCULAR; INTRAVENOUS ONCE AS NEEDED
Status: DISCONTINUED | OUTPATIENT
Start: 2023-03-29 | End: 2023-03-29 | Stop reason: HOSPADM

## 2023-03-29 RX ORDER — LIDOCAINE HYDROCHLORIDE 10 MG/ML
0.5 INJECTION, SOLUTION EPIDURAL; INFILTRATION; INTRACAUDAL; PERINEURAL ONCE AS NEEDED
Status: DISCONTINUED | OUTPATIENT
Start: 2023-03-29 | End: 2023-03-29 | Stop reason: HOSPADM

## 2023-03-29 RX ORDER — SODIUM CHLORIDE, SODIUM LACTATE, POTASSIUM CHLORIDE, CALCIUM CHLORIDE 600; 310; 30; 20 MG/100ML; MG/100ML; MG/100ML; MG/100ML
9 INJECTION, SOLUTION INTRAVENOUS CONTINUOUS
Status: DISCONTINUED | OUTPATIENT
Start: 2023-03-29 | End: 2023-04-13

## 2023-03-29 RX ORDER — SODIUM CHLORIDE 0.9 % (FLUSH) 0.9 %
10 SYRINGE (ML) INJECTION AS NEEDED
Status: DISCONTINUED | OUTPATIENT
Start: 2023-03-29 | End: 2023-03-29 | Stop reason: HOSPADM

## 2023-03-29 RX ORDER — PEG-3350, SODIUM SULFATE, SODIUM CHLORIDE, POTASSIUM CHLORIDE, SODIUM ASCORBATE AND ASCORBIC ACID 7.5-2.691G
1000 KIT ORAL
Status: COMPLETED | OUTPATIENT
Start: 2023-03-30 | End: 2023-03-30

## 2023-03-29 RX ORDER — PEG-3350, SODIUM SULFATE, SODIUM CHLORIDE, POTASSIUM CHLORIDE, SODIUM ASCORBATE AND ASCORBIC ACID 7.5-2.691G
1000 KIT ORAL
Status: COMPLETED | OUTPATIENT
Start: 2023-03-29 | End: 2023-03-29

## 2023-03-29 RX ORDER — MIRTAZAPINE 15 MG/1
15 TABLET, FILM COATED ORAL NIGHTLY
Status: DISCONTINUED | OUTPATIENT
Start: 2023-03-29 | End: 2023-04-20 | Stop reason: HOSPADM

## 2023-03-29 RX ORDER — SODIUM CHLORIDE 0.9 % (FLUSH) 0.9 %
10 SYRINGE (ML) INJECTION EVERY 12 HOURS SCHEDULED
Status: DISCONTINUED | OUTPATIENT
Start: 2023-03-29 | End: 2023-03-29 | Stop reason: HOSPADM

## 2023-03-29 RX ORDER — PHENYLEPHRINE HCL IN 0.9% NACL 1 MG/10 ML
SYRINGE (ML) INTRAVENOUS AS NEEDED
Status: DISCONTINUED | OUTPATIENT
Start: 2023-03-29 | End: 2023-03-29 | Stop reason: SURG

## 2023-03-29 RX ORDER — LIDOCAINE HYDROCHLORIDE 10 MG/ML
INJECTION, SOLUTION EPIDURAL; INFILTRATION; INTRACAUDAL; PERINEURAL AS NEEDED
Status: DISCONTINUED | OUTPATIENT
Start: 2023-03-29 | End: 2023-03-29 | Stop reason: SURG

## 2023-03-29 RX ORDER — SODIUM CHLORIDE 9 MG/ML
40 INJECTION, SOLUTION INTRAVENOUS AS NEEDED
Status: DISCONTINUED | OUTPATIENT
Start: 2023-03-29 | End: 2023-03-29 | Stop reason: HOSPADM

## 2023-03-29 RX ORDER — ETOMIDATE 2 MG/ML
INJECTION INTRAVENOUS AS NEEDED
Status: DISCONTINUED | OUTPATIENT
Start: 2023-03-29 | End: 2023-03-29 | Stop reason: SURG

## 2023-03-29 RX ORDER — BUMETANIDE 0.25 MG/ML
2 INJECTION INTRAMUSCULAR; INTRAVENOUS ONCE
Status: COMPLETED | OUTPATIENT
Start: 2023-03-29 | End: 2023-03-29

## 2023-03-29 RX ORDER — IPRATROPIUM BROMIDE AND ALBUTEROL SULFATE 2.5; .5 MG/3ML; MG/3ML
3 SOLUTION RESPIRATORY (INHALATION) ONCE AS NEEDED
Status: DISCONTINUED | OUTPATIENT
Start: 2023-03-29 | End: 2023-03-29 | Stop reason: HOSPADM

## 2023-03-29 RX ORDER — MEGESTROL ACETATE 40 MG/ML
400 SUSPENSION ORAL DAILY
Status: DISCONTINUED | OUTPATIENT
Start: 2023-03-29 | End: 2023-04-20 | Stop reason: HOSPADM

## 2023-03-29 RX ADMIN — NYSTATIN 500000 UNITS: 100000 SUSPENSION ORAL at 11:55

## 2023-03-29 RX ADMIN — DOCUSATE SODIUM 100 MG: 100 CAPSULE, LIQUID FILLED ORAL at 10:46

## 2023-03-29 RX ADMIN — SENNOSIDES AND DOCUSATE SODIUM 2 TABLET: 8.6; 5 TABLET ORAL at 21:50

## 2023-03-29 RX ADMIN — MIRTAZAPINE 15 MG: 15 TABLET, FILM COATED ORAL at 21:49

## 2023-03-29 RX ADMIN — Medication 100 MCG: at 09:01

## 2023-03-29 RX ADMIN — PROPOFOL 50 MCG/KG/MIN: 10 INJECTION, EMULSION INTRAVENOUS at 09:01

## 2023-03-29 RX ADMIN — LISINOPRIL 5 MG: 5 TABLET ORAL at 10:46

## 2023-03-29 RX ADMIN — ACETAMINOPHEN 325MG 650 MG: 325 TABLET ORAL at 21:49

## 2023-03-29 RX ADMIN — CYCLOBENZAPRINE 5 MG: 10 TABLET, FILM COATED ORAL at 05:11

## 2023-03-29 RX ADMIN — PANTOPRAZOLE SODIUM 40 MG: 40 TABLET, DELAYED RELEASE ORAL at 21:49

## 2023-03-29 RX ADMIN — FAMOTIDINE 20 MG: 20 TABLET ORAL at 10:46

## 2023-03-29 RX ADMIN — ACETAMINOPHEN 325MG 650 MG: 325 TABLET ORAL at 05:11

## 2023-03-29 RX ADMIN — HEPARIN SODIUM 18 UNITS/KG/HR: 10000 INJECTION, SOLUTION INTRAVENOUS at 05:11

## 2023-03-29 RX ADMIN — TAZOBACTAM SODIUM AND PIPERACILLIN SODIUM 3.38 G: 375; 3 INJECTION, SOLUTION INTRAVENOUS at 16:49

## 2023-03-29 RX ADMIN — LIDOCAINE HYDROCHLORIDE 100 MG: 10 INJECTION, SOLUTION EPIDURAL; INFILTRATION; INTRACAUDAL; PERINEURAL at 09:01

## 2023-03-29 RX ADMIN — TAZOBACTAM SODIUM AND PIPERACILLIN SODIUM 3.38 G: 375; 3 INJECTION, SOLUTION INTRAVENOUS at 10:46

## 2023-03-29 RX ADMIN — Medication 10 ML: at 10:47

## 2023-03-29 RX ADMIN — POLYETHYLENE GLYCOL 3350, SODIUM SULFATE, SODIUM CHLORIDE, POTASSIUM CHLORIDE, SODIUM ASCORBATE, AND ASCORBIC ACID 1000 ML: KIT at 23:26

## 2023-03-29 RX ADMIN — Medication 100 MCG: at 09:05

## 2023-03-29 RX ADMIN — SODIUM CHLORIDE, POTASSIUM CHLORIDE, SODIUM LACTATE AND CALCIUM CHLORIDE 9 ML/HR: 600; 310; 30; 20 INJECTION, SOLUTION INTRAVENOUS at 08:38

## 2023-03-29 RX ADMIN — Medication 5 MG: at 21:49

## 2023-03-29 RX ADMIN — TERAZOSIN HYDROCHLORIDE 1 MG: 1 CAPSULE ORAL at 21:49

## 2023-03-29 RX ADMIN — BUMETANIDE 2 MG: 0.25 INJECTION, SOLUTION INTRAMUSCULAR; INTRAVENOUS at 21:50

## 2023-03-29 RX ADMIN — ASPIRIN 81 MG CHEWABLE TABLET 81 MG: 81 TABLET CHEWABLE at 10:46

## 2023-03-29 RX ADMIN — ETOMIDATE 30 MG: 2 INJECTION, SOLUTION INTRAVENOUS at 09:01

## 2023-03-29 RX ADMIN — NYSTATIN 500000 UNITS: 100000 SUSPENSION ORAL at 10:46

## 2023-03-29 RX ADMIN — DOCUSATE SODIUM 100 MG: 100 CAPSULE, LIQUID FILLED ORAL at 21:49

## 2023-03-29 RX ADMIN — NYSTATIN 500000 UNITS: 100000 SUSPENSION ORAL at 17:58

## 2023-03-29 RX ADMIN — ATORVASTATIN CALCIUM 80 MG: 40 TABLET, FILM COATED ORAL at 21:49

## 2023-03-29 RX ADMIN — CARVEDILOL 50 MG: 12.5 TABLET, FILM COATED ORAL at 10:46

## 2023-03-29 RX ADMIN — OXYCODONE HYDROCHLORIDE AND ACETAMINOPHEN 1000 MG: 500 TABLET ORAL at 10:46

## 2023-03-29 RX ADMIN — TAZOBACTAM SODIUM AND PIPERACILLIN SODIUM 3.38 G: 375; 3 INJECTION, SOLUTION INTRAVENOUS at 00:48

## 2023-03-29 RX ADMIN — CARVEDILOL 50 MG: 12.5 TABLET, FILM COATED ORAL at 17:58

## 2023-03-29 RX ADMIN — MEGESTROL ACETATE 400 MG: 40 SUSPENSION ORAL at 10:57

## 2023-03-29 RX ADMIN — ACETAMINOPHEN 325MG 650 MG: 325 TABLET ORAL at 14:03

## 2023-03-29 NOTE — ANESTHESIA PREPROCEDURE EVALUATION
Anesthesia Evaluation     Patient summary reviewed and Nursing notes reviewed   no history of anesthetic complications:  NPO Solid Status: > 8 hours  NPO Liquid Status: > 8 hours           Airway   Mallampati: I  TM distance: >3 FB  Neck ROM: full  No difficulty expected  Dental    (+) edentulous, upper dentures and lower dentures    Pulmonary - normal exam   (+) pleural effusion, pulmonary embolism, a smoker Former,   (-) asthma, shortness of breath, recent URI, no home oxygen  Cardiovascular - normal exam    ECG reviewed    (+) pacemaker pacemaker, ICD interrogated 3-6 months ago, hypertension, CAD, CHF Systolic <55%, PVD,     ROS comment: ECG Atrial-sensed ventricular-paced rhythm      Interpretation Summary       •  Estimated left ventricular EF = 30%.  There is global hypokinesis.  The basal-mid posterior wall appears akinetic.  •  Normal right ventricular cavity size, wall thickness, systolic function and septal motion noted. Electronic lead present in the ventricle  •  Septal wall motion is abnormal, consistent with right ventricular pacing  •  No hemodynamically significant valvular stenosis or regurgitation noted.         Neuro/Psych  (+) CVA (right sided weakness),    (-) seizures  GI/Hepatic/Renal/Endo    (+)  GERD,  renal disease (creat 1.4   K 5.4 ) CRI, diabetes mellitus type 2,     Musculoskeletal     Abdominal    Substance History      OB/GYN          Other   blood dyscrasia ( pLT count 132),     ROS/Med Hx Other: On heparin drip for PE                  Anesthesia Plan    ASA 4     general     intravenous induction     Anesthetic plan, risks, benefits, and alternatives have been provided, discussed and informed consent has been obtained with: patient.    Plan discussed with CRNA.        CODE STATUS:

## 2023-03-29 NOTE — PROGRESS NOTES
[facility] Medicine Services  PROGRESS NOTE    Patient Name: Timmy Guajardo  : 1950  MRN: [id number]    Date of Admission: 3/24/2023  Primary Care Physician: Arsen Seals APRN    Subjective   Subjective     CC:  Follow-up for weakness    HPI:  I have seen and evaluated the patient this morning.  Comfortable in bed.  Just returned from EGD has no acute particular complaint    ROS:  General : no fevers, chills,++ weakness  CVS: No chest pain, palpitations  Respiratory: No cough, dyspnea  GI: No N/V/D, abd pain  10 point review of systems is negative except for what is mentioned in HPI    Objective   Objective     Vital Signs:   Temp:  [97.6 °F (36.4 °C)-97.9 °F (36.6 °C)] 97.6 °F (36.4 °C)  Heart Rate:  [70-73] 72  Resp:  [18] 18  BP: (120-156)/(75-96) 151/81     Physical Exam:  General: Chronically ill looking, not in distress, conversant and cooperative  Head: Atraumatic and normocephalic  Eyes: No Icterus. No pallor  Ears:  Ears appear intact with no abnormalities noted  Throat: No oral lesions, no thrush  Neck: Supple, trachea midline  Lungs: Diminished air entry right lung zone.  No wheezing or crackles, right chest tube in place  Heart:  Normal S1 and S2, no murmur, no gallop, No JVD, no lower extremity swelling  Abdomen:  Soft, no tenderness, no organomegaly, normal bowel sounds, no organomegaly  Extremities: pulses equal bilaterally  Skin: No bleeding, bruising or rash, normal skin turgor and elasticity  Neurologic: Cranial nerves appear intact with no evidence of facial asymmetry, normal motor and sensory functions in all 4 extremities  Psych: Alert and oriented x 3, normal mood    Results Reviewed:  LAB RESULTS:      Lab 23  2037 23  1357 23  0842 23  0649 23  2254 23  1344 23  0708 23  1136 23  0436 23  2141 23  1254 23  0954 23  0722 23  0120 23  01223  2103  03/24/23 1938 03/24/23  1745   WBC  --   --   --  10.01  --   --  8.60  --  10.42  --   --   --   --   --   --   --   --  17.96*   HEMOGLOBIN  --   --   --  11.2*  --   --  10.9*  --  10.6*  --   --   --   --   --   --   --   --  12.9*   HEMATOCRIT  --   --   --  35.8*  --   --  34.9*  --  32.5*  --   --   --   --   --   --   --   --  41.9   PLATELETS  --   --   --  193  --   --  155  --  162  --   --   --   --   --   --   --   --  193   NEUTROS ABS  --   --   --  7.84*  --   --  6.67  --  8.48*  --   --   --   --   --   --   --   --  15.92*   IMMATURE GRANS (ABS)  --   --   --  0.09*  --   --  0.07*  --  0.08*  --   --   --   --   --   --   --   --  0.17*   LYMPHS ABS  --   --   --  1.23  --   --  1.08  --  1.07  --   --   --   --   --   --   --   --  0.83   MONOS ABS  --   --   --  0.70  --   --  0.62  --  0.69  --   --   --   --   --   --   --   --  0.99*   EOS ABS  --   --   --  0.09  --   --  0.11  --  0.05  --   --   --   --   --   --   --   --  0.00   MCV  --   --   --  96.0  --   --  95.1  --  92.1  --   --   --   --   --   --   --   --  97.4*   CRP  --   --   --   --   --   --   --   --   --   --   --   --  10.22*  --   --   --   --   --    PROCALCITONIN  --   --   --   --   --   --   --   --   --   --   --   --   --   --   --   --  0.14  --    LACTATE  --   --   --   --   --   --   --   --   --   --   --   --   --   --   --  3.2*  --  3.1*   LDH  --   --   --   --   --   --  217  --   --   --   --   --   --   --   --   --   --   --    PROTIME  --   --   --   --   --   --  16.9*  --   --   --   --   --   --   --  36.4*  --   --   --    APTT  --  46.3* 132.6*  --  42.4* 40.7* 88.1   < > 77.1   < > 60.4 63.1  --    < > >200.0*  --   --   --    HEPARIN ANTI-XA 0.15*  --   --   --   --   --   --   --   --   --  >1.10* >1.10* >1.10*  --   --   --   --   --     < > = values in this interval not displayed.         Lab 03/28/23  0649 03/27/23  1945 03/27/23  0708 03/26/23  0436 03/25/23  0722 03/24/23 1938  03/24/23  1745   SODIUM 140  --  140 140 137  --  130*   POTASSIUM 4.3 4.8 3.4* 3.6 4.0  --  4.7   CHLORIDE 108*  --  105 105 102  --  94*   CO2 23.0  --  22.0 24.0 16.0*  --  23.0   ANION GAP 9.0  --  13.0 11.0 19.0*  --  13.0   BUN 10  --  9 12 21  --  26*   CREATININE 0.65*  --  0.61* 0.59* 0.71*  --  0.97   EGFR 101.4  --  103.3 104.4 98.7  --  84.0   GLUCOSE 87  --  77 68 139*  --  233*   CALCIUM 8.2*  --  8.5* 8.1* 8.4*  --  9.0   MAGNESIUM 2.2  --  1.8 1.8 1.8 2.1  --    PHOSPHORUS 3.0  --  3.8 4.5 3.9  --   --    HEMOGLOBIN A1C  --   --   --   --  6.30*  --   --    TSH  --   --   --   --   --  1.610  --          Lab 03/28/23  0649 03/27/23  0708 03/26/23  0436 03/24/23  1745   TOTAL PROTEIN 6.0 6.0 5.3* 7.2   ALBUMIN 2.5* 2.5* 2.6* 2.8*   GLOBULIN 3.5 3.5 2.7 4.4   ALT (SGPT) 11 14 15 19   AST (SGOT) 22 15 18 21   BILIRUBIN 0.4 0.4 0.4 0.5   ALK PHOS 77 76 76 98   LIPASE  --   --   --  15         Lab 03/27/23  0708 03/25/23  0120   PROTIME 16.9* 36.4*   INR 1.38* 3.62*             Lab 03/25/23  0954   VITAMIN B 12 260         Brief Urine Lab Results  (Last result in the past 365 days)      Color   Clarity   Blood   Leuk Est   Nitrite   Protein   CREAT   Urine HCG        03/24/23 2329 Yellow   Turbid   Small (1+)   Moderate (2+)   Negative   30 mg/dL (1+)                 Microbiology Results Abnormal     Procedure Component Value - Date/Time    Body Fluid Culture - Body Fluid, Pleural Cavity [[id number]] Collected: 03/27/23 1532    Lab Status: Preliminary result Specimen: Body Fluid from Pleural Cavity Updated: 03/28/23 1453     Gram Stain Rare (1+) WBCs per low power field      No organisms seen    MRSA Screen, PCR (Inpatient) - Swab, Nares [[id number]]  (Normal) Collected: 03/27/23 0857    Lab Status: Final result Specimen: Swab from Nares Updated: 03/27/23 1107     MRSA PCR Negative    Narrative:      The negative predictive value of this diagnostic test is high and should only be used to consider  de-escalating anti-MRSA therapy. A positive result may indicate colonization with MRSA and must be correlated clinically.  MRSA Negative    COVID PRE-OP / PRE-PROCEDURE SCREENING ORDER (NO ISOLATION) - Swab, Nasopharynx [[id number]]  (Normal) Collected: 03/24/23 2256    Lab Status: Final result Specimen: Swab from Nasopharynx Updated: 03/24/23 2356    Narrative:      The following orders were created for panel order COVID PRE-OP / PRE-PROCEDURE SCREENING ORDER (NO ISOLATION) - Swab, Nasopharynx.  Procedure                               Abnormality         Status                     ---------                               -----------         ------                     Respiratory Panel PCR w/...[[id number]]  Normal              Final result                 Please view results for these tests on the individual orders.    Respiratory Panel PCR w/COVID-19(SARS-CoV-2) SULEIMAN/TAWANNA/BLAISE/PAD/COR/MAD/ANTHONY In-House, NP Swab in UTM/VTM, 3-4 HR TAT - Swab, Nasopharynx [[id number]]  (Normal) Collected: 03/24/23 2256    Lab Status: Final result Specimen: Swab from Nasopharynx Updated: 03/24/23 2356     ADENOVIRUS, PCR Not Detected     Coronavirus 229E Not Detected     Coronavirus HKU1 Not Detected     Coronavirus NL63 Not Detected     Coronavirus OC43 Not Detected     COVID19 Not Detected     Human Metapneumovirus Not Detected     Human Rhinovirus/Enterovirus Not Detected     Influenza A PCR Not Detected     Influenza B PCR Not Detected     Parainfluenza Virus 1 Not Detected     Parainfluenza Virus 2 Not Detected     Parainfluenza Virus 3 Not Detected     Parainfluenza Virus 4 Not Detected     RSV, PCR Not Detected     Bordetella pertussis pcr Not Detected     Bordetella parapertussis PCR Not Detected     Chlamydophila pneumoniae PCR Not Detected     Mycoplasma pneumo by PCR Not Detected    Narrative:      In the setting of a positive respiratory panel with a viral infection PLUS a negative procalcitonin without other underlying concern  for bacterial infection, consider observing off antibiotics or discontinuation of antibiotics and continue supportive care. If the respiratory panel is positive for atypical bacterial infection (Bordetella pertussis, Chlamydophila pneumoniae, or Mycoplasma pneumoniae), consider antibiotic de-escalation to target atypical bacterial infection.          US Liver    Result Date: 3/27/2023  US LIVER Date of Exam: 3/27/2023 10:50 AM EDT Indication: liver cirrhosis. Comparison: Abdomen pelvis CT scan 3/24/2023 Technique: Grayscale and color Doppler ultrasound evaluation of the right upper quadrant was performed. Findings: Pancreas is moderately well visualized and appears grossly normal. Right and left liver lobes appear normal in morphology and echotexture. No hepatic lesions are identified. There is expected portal vein and hepatic vein waveform and directional flow. Median shear wave velocity is calculated as 1.56 m/s, which could be considered to either reflect normal parenchyma or a mild degree of fibrosis. There is trace pericholecystic fluid. Gallbladder wall appears the upper range of normal but not pathologically enlarged. There are several rounded echogenic areas appearing as nonmobile plaque-like and polypoid lesions the gallbladder wall. No color Doppler flow can be detected, and the appearance is equivocal for noncalcified adherent gallstones versus irregular gallbladder mass. Appearance is unusual, but inflammatory polyps might have this appearance. There is no evidence of invasion of the gallbladder wall. No calcified gallstones are seen. Common bile duct is normal in caliber at 4 mm. Right kidney appears normal and measures 10.1 cm in length, with normal cortical thickness and echogenicity.     Impression: Impression: 1. Liver morphology and echotexture are considered within normal limits. 2. Liver elastography measurements are consistent with either normal liver parenchyma or only a mild degree of  "fibrosis. 3. Unusual appearance of the gallbladder wall, whether adherent, unusually well-defined gallbladder \"sludge\", inflammatory polyposis, or gallbladder neoplasm. Consider evaluation by general surgery. Electronically Signed: Trey Allen  3/27/2023 12:04 PM EDT  Workstation ID: [id number]    XR Chest 1 View    Result Date: 3/27/2023  XR CHEST 1 VW Date of Exam: 3/27/2023 4:43 PM EDT Indication: pl eff s/p chest tube post op. Comparison: 3/24/2023 Findings: Patchy airspace disease is seen within the right lung base. There is left subclavian AICD/pacemaker device. Right basilar chest tube present. No pneumothorax. Patchy airspace disease is seen within the right lung base. Small right-sided pleural effusion present. The cardiac and mediastinal silhouette appear unremarkable. No pneumothorax. No acute osseous abnormality identified.     Impression: Impression: Right basilar chest tube with small right sided pleural effusion with overlying atelectasis, improved as compared to the previous study given difference in modality. No pneumothorax. Electronically Signed: Huyen Aldana  3/27/2023 5:17 PM EDT  Workstation ID: [id number]    CT Guided Chest Tube    Result Date: 3/27/2023  Clinical indication: Right-sided pleural effusion : Dr. Abhishek Cook Procedure: CT-guided right-sided chest tube placement Contrast usage: None Estimated blood loss: Negligible Conscious sedation: None Complication: None apparent. Technique: Formal written consent for the procedure was obtained from the patient after explaining risks to include bleeding, infection, injury to lung/adjacent organs, pneumothorax, and need for additional surgery/procedure.  The patient's right mid axillary region was prepped and draped in the usual, sterile fashion.  Local anesthesia with 1% lidocaine infiltration was achieved.  Utilizing CT guidance, a 10 [place] locking pigtail drainage catheter was placed into the moderate sized right-sided pleural effusion " "without difficulty.  Approximately 50 mL of yellowish pleural fluid was drawn off, with sample sent for culture/labs/cytology, per the referring service.  The catheter was then positioned in the \"locked\" position and sutured into place with a sterile bandage applied to the puncture site.  The catheter was placed to drain on an at[facility] Pleur-evac system.  There were no immediate complications. Impression: Successful CT-guided right-sided chest tube placement.      Results for orders placed during the hospital encounter of 10/16/22    Adult Transthoracic Echo Complete W/ Cont if Necessary Per Protocol    Interpretation Summary  •  Estimated left ventricular EF = 30%.  There is global hypokinesis.  The basal-mid posterior wall appears akinetic.  •  Normal right ventricular cavity size, wall thickness, systolic function and septal motion noted. Electronic lead present in the ventricle  •  Septal wall motion is abnormal, consistent with right ventricular pacing  •  No hemodynamically significant valvular stenosis or regurgitation noted.      Current medications:  Scheduled Meds:acetaminophen, 650 mg, Oral, Q8H  vitamin C, 1,000 mg, Oral, Daily  aspirin, 81 mg, Oral, Daily  atorvastatin, 80 mg, Oral, Nightly  carvedilol, 50 mg, Oral, BID With Meals  docusate sodium, 100 mg, Oral, BID  famotidine, 20 mg, Oral, Daily  insulin lispro, 0-7 Units, Subcutaneous, Q6H  lisinopril, 5 mg, Oral, Q24H  megestrol, 400 mg, Oral, Daily  mirtazapine, 15 mg, Oral, Nightly  nystatin, 5 mL, Oral, 4x Daily  pantoprazole, 40 mg, Oral, Nightly  piperacillin-tazobactam, 3.375 g, Intravenous, Q8H  polyethylene glycol, 17 g, Oral, Daily  senna-docusate sodium, 2 tablet, Oral, BID  sodium chloride, 10 mL, Intravenous, Q12H  terazosin, 1 mg, Oral, Nightly      Continuous Infusions:heparin, 18 Units/kg/hr, Last Rate: 18 Units/kg/hr (03/29/23 0511)  Pharmacy to Dose Heparin,       PRN Meds:.•  acetaminophen **OR** acetaminophen **OR** acetaminophen  • "  senna-docusate sodium **AND** polyethylene glycol **AND** bisacodyl **AND** bisacodyl  •  Calcium Replacement - Follow Nurse / BPA Driven Protocol  •  cyclobenzaprine  •  dextrose  •  dextrose  •  glucagon (human recombinant)  •  Magnesium Standard Dose Replacement - Follow Nurse / BPA Driven Protocol  •  melatonin  •  ondansetron  •  Pharmacy to Dose Heparin  •  Phosphorus Replacement - Follow Nurse / BPA Driven Protocol  •  Potassium Replacement - Follow Nurse / BPA Driven Protocol  •  Sodium Chloride (PF)  •  sodium chloride  •  sodium chloride    Assessment & Plan   Assessment & Plan     Active Hospital Problems    Diagnosis  POA   • **Bilateral pulmonary embolism (HCC) [I26.99]  Unknown   • Pleural effusion [J90]  Yes   • Unintentional weight loss [R63.4]  Unknown   • Hypoalbuminemia [E88.09]  Unknown   • Severe malnutrition (HCC) [E43]  Unknown   • History of CVA (cerebrovascular accident) [Z86.73]  Not Applicable   • Debility [R53.81]  Unknown   • Presence of cardiac pacemaker [Z95.0]  Yes   • HTN (hypertension) [I10]  Yes   • Leukocytosis [D72.829]  Yes      Resolved Hospital Problems   No resolved problems to display.        Brief Hospital Course to date:  Timmy Guajardo is a 72 y.o. 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  We will hold from midnight in anticipation for colonoscopy tomorrow  · Will transition to full dose Eliquis loading and maintenance upon discharge once he does not need any further  procedures     Moderate exudative right-sided loculated pleural effusion  · Bedside point-of-care ultrasound revealed complex pleural effusion  · Cardiothoracic surgery consulted.  Recommended chest tube placement by IR.    · Underwent right-sided chest tube placement by IR on 3/27/2023.  Continue negative pressure suction.  CTS managing chest tube  · Repeat chest x-ray showed persistent right pleural effusion  · Analysis of the fluid is consistent with exudate, ?  Parapneumonic  · Culture from pleural fluid is negative to date  · Cytology pending  · Continue IV Zosyn.  Vancomycin discontinued after MRSA swab came back negative    Severe malnutrition  Hypoalbuminemia  Significant unintentional weight loss  Generalized debility  · Patient reports 52lbs weight loss since oct  · No EGD or colonoscopy since 2008  · CT chest and CT abdomen with no convincing evidence of solid malignancy or lymphadenopathy.  Oncology service evaluated the patient recommended age-appropriate screening  · GI evaluated the patient for EGD and colonoscopy.  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  Hold heparin drip from midnight  · Does not have much appetite.  Continue mirtazapine 15 mg nightly  · Nutrition team following     UTI with Enterococcus faecalis  · POA  · On Zosyn    Leukocytosis, improving  · Likely reactive versus infectious  · Normal procalcitonin and negative cultures to date  · Continue current metabolic therapy with Zosyn.     CAD  Cardiomyopathy with EF 30 %  Essential HTN  Old CVA with right residual weakness  Dyslipidemia  · Start aspirin  · Continue coreg, lisinopril  · Continue statins    Constipation  · Having multiple bowel movements with enema  · Continue bowel regimen     GERD   · Continue PPI      Type 2 diabetes  · A1C 6.3%   · Continue insulin sliding scale     Acute debility  · PT recommended acute rehab    Total time spent: 50  min   Time spent includes time  reviewing chart, face-to-face time, counseling patient/family/caregiver, ordering medications/tests/procedures, communicating with other health care professionals, documenting clinical information in the electronic health record, and coordination of care.     Expected Discharge Location and Transportation: Acute rehab  Expected Discharge   Expected Discharge Date and Time     Expected Discharge Date Expected Discharge Time    Mar 31, 2023            DVT prophylaxis:  Medical and mechanical DVT prophylaxis orders are present.     AM-PAC 6 Clicks Score (PT): 16 (03/28/23 1323)    CODE STATUS:   Code Status and Medical Interventions:   Ordered at: 03/24/23 7225     Code Status (Patient has no pulse and is not breathing):    CPR (Attempt to Resuscitate)     Medical Interventions (Patient has pulse or is breathing):    Full Support     Copied text in this note has been reviewed and is accurate as of 03/29/23.     Alan Cooper MD  03/29/23

## 2023-03-29 NOTE — PROGRESS NOTES
HEPARIN INFUSION  Timmy Guajardo is a  72 y.o. male receiving heparin infusion.     Therapy for (VTE/Cardiac):  VTE  Patient Weight: 56.7 kg  Initial Bolus (Y/N):  Y  Any Bolus (Y/N): Y        VTE (PE/DVT)   Initial Bolus: 80 units/kg (Max 10,000 units)  Initial rate: 18 units/kg/hr (Max 1,500 units/hr)    Anti Xa Rebolus Infusion Hold time Change infusion Dose (Units/kg/hr) Next Anti Xa Level Due   < 0.11 50 Units/kg  (4000 Units Max) None Increase by  4 Units/kg/hr 6 hours   0.11 - 0.19 25 Units/kg  (2000 Units Max) None Increase by  3 Units/kg/hr 6 hours   0.2 - 0.29 0 None Increase by  2 Units/kg/hr 6 hours   0.3 - 0.7 0 None No Change 6 hours (after 2 consecutive levels in range check qAM)   0.71 - 0.8 0 None Decrease by  1 Units/kg/hr 6 hours   0.81 - 0.9 0 None Decrease by  2 Units/kg/hr 6 hours   0.91 - 1 0 60 Minutes Decrease by  3 Units/kg/hr 6 hours   >1 0 Hold  After Anti Xa less than 0.7 decrease previous rate by  4 Units/kg/hr  Every 2 hours until Anti Xa is less than 0.7 then when infusion restarts in 6 hours     Results from last 7 days   Lab Units 03/29/23  0444 03/28/23  0649 03/27/23  0708 03/26/23  0436 03/25/23  0120   INR   --   --  1.38*  --  3.62*   HEMOGLOBIN g/dL 12.1* 11.2* 10.9*   < >  --    HEMATOCRIT % 38.3 35.8* 34.9*   < >  --    PLATELETS 10*3/mm3 132* 193 155   < >  --     < > = values in this interval not displayed.       Date   Time   Anti-Xa Current Rate (Unit/kg/hr) Bolus   (Units) Rate Change   (Unit/kg/hr) New Rate (Unit/kg/hr) Next anti-Xa Comments  Pump Check Daily   3/25 0025 pending 0 4540 +18 18 0700 New start   3/25 0722 >1.1 18 -- -18 0 0930 [place] 3249   3/25 0954 >1.1 0 -- -- 0 [address] [place] 3249   3/25  aPTT 0        3/25 0954 63 0  +16 16 2100 [place] 3249   3/25 2141 71.4 16 -- -- 16 0400 [place] 3184 -b   3/26 0436 77.1 16 -- -- 16 1200 [address] 3184 -acb   3/26 1136 45.5  (heparin was held for 2.5hrs prior to draw) 16 --  16 2000 [address] 3250   3/26 2035 57.5 16 -- +1 17  0600 [address] 3249 -b   3/27 0708 88.1 17 -- -- 17 1300 [address] 3250   3/27 1630 -- off since 0730 for CT plcmt -- resume at prev rate 17 2300 Heparin resumed s/p R CT placement   3/27 2254 42.4 17 2000 +2 19 0800 [place] 3252 -acb   3/28 0842 132.6 19 -- hold -- 1200 Jonathan OLVERA   3/28 1357 46.3 -- -- +15 15 2100 JONATHAN RN   3/28 2037 0.15 15 1400 +3 18 0400 D/w WADE Zuniga   3/29 0444 0.35 18 -- -- 18 1200 [address] 3252 -b   3/29 1137 0.27 18 -- +2 20 2000 [address] 3250                                               Johan Joshua, [facility]   3/29/2023  12:57 EDT

## 2023-03-29 NOTE — BRIEF OP NOTE
ESOPHAGOGASTRODUODENOSCOPY  Progress Note    Timmy Guajardo  3/29/2023    EGD shows a 4 cm tongue of Meyers's in the posterior lower esophagus, without nodules or ulceration.  There is mild antral gastritis.  Biopsy taken for H. pylori.  Duodenum is normal.    >> Continue PPI.  >> Await H. pylori biopsy.  >> We will plan for OP EGD for Meyers's biopsies in 2 to 3 months or when anticoagulation can be held.    Mark I. Brunner, MD     Date: 3/29/2023  Time: 09:14 EDT

## 2023-03-29 NOTE — PROGRESS NOTES
Cardiothoracic Surgery Progress Note      Chief complaint: Pleural effusion     LOS: 5 days      Subjective:  No acute events overnight.  On heparin drip.  Vital stable on room air saturations 96%.  Denies chest pain or shortness of breath.    Objective:  Vital Signs  Temp:  [97.1 °F (36.2 °C)-98.4 °F (36.9 °C)] 98.4 °F (36.9 °C)  Heart Rate:  [69-74] 74  Resp:  [16-18] 18  BP: (117-156)/(74-90) 132/74    Physical Exam:   General Appearance: Well-developed, well-nourished in no acute distress   Lungs: Respirations even and unlabored and clear to auscultation bilaterally no wheezes, rales or rhonchi   Heart: Regular rate rhythm no murmurs, rubs or gallops   Chest tube: 230 cc/24 hours     Results:    Results from last 7 days   Lab Units 03/29/23  0444   WBC 10*3/mm3 9.99   HEMOGLOBIN g/dL 12.1*   HEMATOCRIT % 38.3   PLATELETS 10*3/mm3 132*     Results from last 7 days   Lab Units 03/29/23  0444   SODIUM mmol/L 142   POTASSIUM mmol/L 4.2   CHLORIDE mmol/L 108*   CO2 mmol/L 21.0*   BUN mg/dL 10   CREATININE mg/dL 0.62*   GLUCOSE mg/dL 72   CALCIUM mg/dL 8.3*       Assessment:  Pulmonary embolus  Moderate right pleural effusion    Plan:  Continue chest tube to suction until output decreases  Daily chest x-ray-awaiting results today  Await final pathology and cytology results    Alexandra Azul, APRN  03/29/23  10:08 EDT

## 2023-03-29 NOTE — CASE MANAGEMENT/SOCIAL WORK
Continued Stay Note  [facility]     Patient Name: Timmy Guajardo  MRN: [id number]  Today's Date: 3/29/2023    Admit Date: 3/24/2023    Plan: discharge plan   Discharge Plan     Row Name 03/29/23 1543       Plan    Plan discharge plan    Plan Comments Per discussion in MDR, pt had an EGD today and having colonoscopy tomorrow.  I spoke with pt's sister, Mary Ellen on cell regarding PT's recommendations at discharge.  Per sister's request[facility], and [facility] [place]. Pt plans to stay with sister Mary Ellen after rehab. CM will cont to follow    Final Discharge Disposition Code 03 - skilled nursing facility (SNF)               Discharge Codes    No documentation.               Expected Discharge Date and Time     Expected Discharge Date Expected Discharge Time    Mar 31, 2023             Violeta Miranda RN

## 2023-03-29 NOTE — DISCHARGE PLACEMENT REQUEST
"Timmy Viera (72 y.o. Male)     To [facility]  From Gia() [phone]    Date of Birth   1950    Social Security Number       Address   [address] [place] [address] [facility] [address]    Home Phone   [phone]    MRN   [id number]       Judaism   None    Marital Status   Single                            Admission Date   3/24/23    Admission Type   Emergency    Admitting Provider   Alan Cooper MD    Attending Provider   Alan Cooper MD    Department, Room/Bed   [facility] 6A, N604/1       Discharge Date       Discharge Disposition       Discharge Destination                               Attending Provider: Alan Cooper MD    Allergies: Other    Isolation: None   Infection: None   Code Status: CPR    Ht: 177.8 cm (70\")   Wt: 56.7 kg (125 lb)    Admission Cmt: None   Principal Problem: Bilateral pulmonary embolism (HCC) [I26.99]                 Active Insurance as of 3/24/2023     Primary Coverage     Payor Plan Insurance Group Employer/Plan Group    HUMANA MEDICARE REPLACEMENT HUMANA MEDICARE REPLACEMENT [id number]     Payor Plan Address Payor Plan Phone Number Payor Plan Fax Number Effective Dates    [address] [address] [phone]  2018 - None Entered    [facility] [address]       Subscriber Name Subscriber Birth Date Member ID       TIMMY VIERA 1950 [id number]                 Emergency Contacts      (Rel.) Home Phone Work Phone Mobile Phone    JULIAN TAMAYO (Sister) [phone] -- --    Mario Alberto Viera (Brother) -- -- [phone]               History & Physical      Ernst Nunez DO at [address] [facility] [place]              [facility]  HISTORY AND PHYSICAL    Patient Name: Timmy Viera  : 1950  MRN: [id number]  Primary Care Physician: Arsen Seals APRN  Date of admission: 3/24/2023      Subjective    Subjective     Chief Complaint:  Nausea, decreased appetite, unintentional weight " "loss.  No BM x8 days    HPI:  Timmy Guajardo is a 72 y.o. male with past medical history of DVT, coronary artery disease, type 2 diabetes, GERD, essential htn, hx of stroke with mild residual right sided weakness, cardiomyopathy with EF 30% who presents to the ER with no BM in 7-8 days, progressive weakness/functional decline since October, and unintentional weight loss.     Sister is at bedside and both the patient and the sister provide history.  Patient has been having progressive worsening functional decline since October.  In October he suffered a fall with fracture and was then sent to [facility] for rehab.  While in [facility], his Eliquis was reduced to 2.5 mg twice daily for unclear reasons.  He states that he contracted COVID while at [facility].  He reports decreased appetite during this event and reports \"everything tasted bad\".  This continued for several months until he was diagnosed with thrush a few days ago.  He reports he has been on nystatin swish and swallow for last 4 to 5 days.  Reports his taste is improving, however his appetite remains reduced.  Patient reports a 58 pound unintentional weight loss since October.  Despite force-feeding himself over the past week he has lost an additional 4 pounds    Patient reports over the past several nights he has had fever with chills.  Describes episodes of what sounds like rigors.  He reports mild cough that is nonproductive.  Patient has not had a bowel movement in 8 days.  Patient reports that he was post to have a colonoscopy in 2015, however his cardiologist advised against this given he had an EF of 15% at the time.      Review of Systems   Gen-reports fevers, chills  CV- No chest pain, palpitations  Resp-reports mild nonproductive cough, dyspnea  GI-reports nausea without vomiting, reports constipation, denies abd pain        Personal History     Past Medical History:   Diagnosis Date   • Cardiomyopathy (HCC)    • CHF (congestive heart " failure) (HCC)    • Coronary artery disease    • Diabetes mellitus (HCC)    • GERD (gastroesophageal reflux disease)    • HTN (hypertension)    • Stroke (HCC)     Right sided weakness   • Stroke (HCC)              Past Surgical History:   Procedure Laterality Date   • AMPUTATION DIGIT Left 5/12/2021    Procedure: AMPUTATION DIGIT - GREAT TOE AMPUTATION LEFT;  Surgeon: Dario Marmolejo MD;  Location: [facility] OR;  Service: General;  Laterality: Left;   • AORTAGRAM N/A 5/11/2021    Procedure: AORTAGRAM WITH RUNOFFS, LEFT SFA BALLOON ANGIOPLASTY;  Surgeon: Tim Mahan MD;  Location: [facility] HYBRID SHELIA;  Service: Vascular;  Laterality: N/A;  FL TIME 6 MIN 54 SECS  82 MGY  CONTRAST VISIPAQUE 100ML     • CARDIAC CATHETERIZATION     • CARDIAC PACEMAKER PLACEMENT      pacemaker/defibrillator, [place] tu[employer]jaSuma   • HERNIA REPAIR Bilateral     Inguinal hernia   • HIP TROCHANTERIC NAILING WITH INTRAMEDULLARY HIP SCREW Right 10/18/2022    Procedure: HIP TROCHANTERIC NAILING WITH INTRAMEDULLARY HIP SCREW RIGHT;  Surgeon: Bj Steinberg MD;  Location: [facility] OR;  Service: Orthopedics;  Laterality: Right;       Family History: family history includes Alzheimer's disease in his mother; Cancer in his father; Heart failure in his father; Kidney failure in his mother.     Social History:  reports that he has quit smoking. He has a 30.00 pack-year smoking history. He has never used smokeless tobacco. He reports that he does not drink alcohol and does not use drugs.  Social History     Social History Narrative    Pt lives in [place], [place].        Medications:  Available home medication information reviewed.  (Not in a hospital admission)      Allergies   Allergen Reactions   • Other Unknown - Low Severity     Mercury metals- as a child       Objective    Objective     Vital Signs:   Temp:  [97.6 °F (36.4 °C)] 97.6 °F (36.4 °C)  Heart Rate:  [78-79] 78  Resp:  [18] 18  BP: (124-137)/(70-95) 137/95       Physical Exam   Constitutional:  Awake, alert, appears nontoxic, appears chronically ill, cachectic  Eyes: PERRLA, sclerae anicteric, no conjunctival injection  HENT: NCAT, mucous membranes dry  Neck: Supple, no thyromegaly, no lymphadenopathy, trachea midline  Respiratory: Clear to auscultation bilaterally, nonlabored respirations   Cardiovascular: RRR, no murmurs, rubs, or gallops, palpable pedal pulses bilaterally  Gastrointestinal: Positive bowel sounds, soft, nontender, mildly distended  Musculoskeletal: No bilateral ankle edema, no clubbing or cyanosis to extremities  Psychiatric: Appropriate affect, cooperative  Neurologic: Oriented x 3, strength symmetric in all extremities, Cranial Nerves grossly intact to confrontation, speech clear  Skin: No rashes      Result Review:  I have personally reviewed the results from the time of this admission to 3/24/2023 23:23 EDT and agree with these findings:  [x]  Laboratory list / accordion  []  Microbiology  [x]  Radiology  []  EKG/Telemetry   []  Cardiology/Vascular   []  Pathology  []  Old records  []  Other:  Most notable findings include: Moderate right-sided loculated pleural effusion, bilateral pulmonary emboli, WBC of 17,000, lactic of 3.1, BUN 26, albumin 2.8, sodium slightly low at 130 but for glucose correction is near normal.        LAB RESULTS:      Lab 03/24/23 2101 03/24/23  1745   WBC  --  17.96*   HEMOGLOBIN  --  12.9*   HEMATOCRIT  --  41.9   PLATELETS  --  193   NEUTROS ABS  --  15.92*   IMMATURE GRANS (ABS)  --  0.17*   LYMPHS ABS  --  0.83   MONOS ABS  --  0.99*   EOS ABS  --  0.00   MCV  --  97.4*   LACTATE 3.2* 3.1*         Lab 03/24/23  1938 03/24/23  1745   SODIUM  --  130*   POTASSIUM  --  4.7   CHLORIDE  --  94*   CO2  --  23.0   ANION GAP  --  13.0   BUN  --  26*   CREATININE  --  0.97   EGFR  --  84.0   GLUCOSE  --  233*   CALCIUM  --  9.0   MAGNESIUM 2.1  --    TSH 1.610  --          Lab 03/24/23  1745   TOTAL PROTEIN 7.2   ALBUMIN 2.8*   GLOBULIN 4.4   ALT (SGPT) 19   AST  (SGOT) 21   BILIRUBIN 0.5   ALK PHOS 98   LIPASE 15                     UA    Urinalysis 10/17/22 11/23/22 11/23/22     2203 2203   Squamous Epithelial Cells, UA   0-2   Specific Gravity, UA 1.020 1.027    Ketones, UA Negative Trace (A)    Blood, UA Negative Negative    Leukocytes, UA Negative Trace (A)    Nitrite, UA Negative Negative    RBC, UA   0-2   WBC, UA   3-5 (A)   Bacteria, UA   4+ (A)   (A) Abnormal value              Microbiology Results (last 10 days)     ** No results found for the last 240 hours. **          CT Abdomen Pelvis With Contrast    Result Date: 3/24/2023  CT ABDOMEN PELVIS W CONTRAST Date of Exam: 3/24/2023 8:05 PM EDT Indication: abd pain, obstipation. Comparison: None available. Technique: Axial CT images were obtained of the abdomen and pelvis following the uneventful intravenous administration of 100 mL Isovue-300. Reconstructed coronal and sagittal images were also obtained. Automated exposure control and iterative construction methods were used. Findings: There is a pulmonary embolism in the segmental pulmonary artery supplying the left lower lobe (series 2 image 12; there is likely an additional pulmonary embolism in the segment pulmonary arteries supplying the right upper lobe (series 2 image 1). There is a partially loculated moderate right pleural effusion with associated atelectasis in the right lower lobe. No definite thickening or enhancement of the visceral or parietal pleura. The lungs demonstrate mild to moderate centrilobular emphysema. The left lower lung appears grossly clear. Unremarkable appearance of the liver, gallbladder, bile ducts, spleen, pancreas, and adrenal glands. There are a few small bilateral simple appearing renal cysts with otherwise unremarkable appearance of the kidneys. Normal appearance of the ureters, bladder, prostate, and seminal vesicles. There is mild inspissated rectal stool burden. No definite rectal wall thickening or perirectal fat stranding.  There is sigmoid colonic diverticulosis without evidence of diverticulitis. Overall there is moderate colonic stool burden. Normal appendix. Mild fecalization of the distal ileum suggestive of delayed transit. No definite inflammatory change of the GI tract. No abdominopelvic free fluid. No pneumoperitoneum. There is severe atherosclerosis of the abdominal aorta with fusiform ectasia/small aneurysm of the distal infrarenal abdominal aorta measuring 3.0 cm in diameter. There are mild atherosclerotic narrowings of the origins of the celiac axis, SMA, and bilateral internal iliac arteries. No lymphadenopathy. Unremarkable appearance of the body wall soft tissues. The bones are demineralized. No acute or suspicious bony findings. Right hip cephalomedullary construct is noted.     Impression: Impression: Pulmonary emboli. Partially-loculated moderate right pleural effusion with associated atelectasis in the right lower lobe. No definite evidence of empyema. There is evidence of emphysema. Correlate with patient history and risk factors and please assess if the patient meets criteria for routine low dose CT lung cancer screening. Moderate colonic stool burden with mild inspissated rectal stool burden without definite stercoral colitis. 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 Findings of pulmonary emboli discussed with ordering provider Dr. Kuhn by Dr. Fabián Mondragon via telephone at 9:10 PM 3/24/2023. Electronically Signed: Fabián Mondragon  3/24/2023 9:11 PM EDT  Workstation ID: [id number]    CT Angiogram Chest    Result Date: 3/24/2023  CT ANGIOGRAM CHEST Date of Exam: 3/24/2023 9:38 PM EDT Indication: pe eval. Comparison: None available. Technique: CTA of the chest was performed after the uneventful intravenous administration of 80 mL Isovue-370. Reconstructed coronal and sagittal images were also obtained. In addition, a 3-D volume rendered image was created for  interpretation. Automated exposure control and iterative reconstruction methods were used. Findings: Redemonstration of pulmonary embolism in a segmental branch supplying the left lower lobe (series 4 image 62). There is likely an additional pulmonary embolism in the segmental pulmonary artery supplying the right upper lobe (series 4 image 39). No definite additional pulmonary emboli are identified. Normal caliber of the main pulmonary artery. No evidence of right heart strain. Unremarkable appearance of the cardiac chambers. Mild coronary artery calcifications are noted. A biventricular AICD with  left chest pulse generator is noted. Mild atherosclerosis of the thoracic aorta which appears normal in caliber. No pericardial effusion. There are shotty mediastinal lymph nodes, some of which demonstrate internal calcifications, likely sequelae of healed granulomatous disease or no bulky or clearly pathologic thoracic lymph nodes. Unremarkable appearance of the thoracic esophagus. Chest wall soft tissues are unremarkable. No acute or suspicious bony findings. The trachea and mainstem bronchi are patent. There is moderate centrilobular emphysema. There is a moderate right-sided pleural effusion with subpulmonic fluid component and associated subsegmental atelectasis in the adjacent right lower lobe, with some pleural fluid tracking in the major fissure. Otherwise the lungs are grossly clear. There are small pleural-based linear scarring in the right upper lobe. No evidence of lung mass or suspicious pulmonary nodule. No pneumothorax.     Impression: Impression: Redemonstration of 1 or 2 pulmonary emboli as detailed above. No right heart strain. Moderate right pleural effusion and associated subsegmental atelectasis in the adjacent right lower lobe. Electronically Signed: Fabián Mondragon  3/24/2023 10:19 PM EDT  Workstation ID: [id number]      Results for orders placed during the hospital encounter of 10/16/22    Adult  Transthoracic Echo Complete W/ Cont if Necessary Per Protocol    Interpretation Summary  •  Estimated left ventricular EF = 30%.  There is global hypokinesis.  The basal-mid posterior wall appears akinetic.  •  Normal right ventricular cavity size, wall thickness, systolic function and septal motion noted. Electronic lead present in the ventricle  •  Septal wall motion is abnormal, consistent with right ventricular pacing  •  No hemodynamically significant valvular stenosis or regurgitation noted.      Assessment & Plan   Assessment & Plan     Active Hospital Problems    Diagnosis  POA   • **Bilateral pulmonary embolism (HCC) [I26.99]  Unknown   • Pleural effusion [J90]  Yes   • Unintentional weight loss [R63.4]  Unknown   • Hypoalbuminemia [E88.09]  Unknown   • Severe malnutrition (HCC) [E43]  Unknown   • History of CVA (cerebrovascular accident) [Z86.73]  Not Applicable   • Debility [R53.81]  Unknown   • Presence of cardiac pacemaker [Z95.0]  Yes   • HTN (hypertension) [I10]  Yes   • Leukocytosis [D72.829]  Yes       Patient is a 72-year-old male with past medical history of DVT, coronary artery disease, type 2 diabetes, GERD, essential htn, hx of stroke with mild residual right sided weakness, cardiomyopathy with EF 30% who presents to the ER with no BM in 7-8 days, progressive weakness/functional decline since October, and unintentional weight loss.     Bilateral pulmonary embolism  -- Questionable failure of Eliquis  -- Recently had Eliquis reduced to 2.5 mg twice daily at [facility] in October  -- Missed 3 nighttime doses of Eliquis over the last 3 days  -- We will get hematology opinion on possible transition to warfarin, especially if malignancy is confirmed.  -- Heparin drip  --echo    Moderate right-sided loculated pleural effusion  -- Concern for malignant pleural effusion  -- Heparin drip  -- Thoracentesis versus chest tube with IR vs CT surgery evaluation  -- Has had rigors the past several nights,  rule out empyema  -- Cover with Vanco and Zosyn    Severe malnutrition  Hypoalbuminemia  Significant unintentional weight loss  Generalized debility  --52lbs weight loss since oct  --nutrition consult  --oncology consult  --significant prot/albumin differential. Check spot urine pr/cr. Oncology eval for workup for MM  --check prealbumin  --patient has never had a colonoscopy or EGD as it was recommended against by his cardiologist. Patient had EF of 15 % at the time.    Leukocytosis  --rule out underlying infection  --? Reactive  --? Elevated secondary to volume contraction  --check procal  --ua pending, although denies dysuria  --will need thoracentesis/chest tube for loculated pleural effusion    CVA  --mild right sided weakness  --continue anticoagulation    CAD  Cardiomyopathy with EF 30 %  Essential HTN  --? Not on aspirin, will need to confirm with family  --continue coreg, lisinopril    Constipation  --no bm in 8 days, Likely due to volume depletion  --moderate stool burden  --miralax daily, stool softener  --give one dose amitza, serial soap suds edema q8h x 3 days until result  --no abd pain or evidence of stericoral colitis    GERD   --famotidine and ppi    Hx of DVT  --AC as above    DM2  --ssi q6h  --a1c    Oral thrush  --check HIV status  --nystatin QID    Total time spent: 75  Time spent includes time reviewing chart, face-to-face time, counseling patient/family/caregiver, ordering medications/tests/procedures, communicating with other health care professionals, documenting clinical information in the electronic health record, and coordination of care.      DVT prophylaxis:  Heparin drip      CODE STATUS:  Full code  Code Status and Medical Interventions:   Ordered at: 03/24/23 7832     Code Status (Patient has no pulse and is not breathing):    CPR (Attempt to Resuscitate)     Medical Interventions (Patient has pulse or is breathing):    Full Support       Expected Discharge   Expected Discharge Date  and Time     Expected Discharge Date Expected Discharge Time    Mar 28, 2023            Ernst Nunez DO  03/24/23      Electronically signed by Ernst Nunez DO at 03/24/23 3832         Current Facility-Administered Medications   Medication Dose Route Frequency Provider Last Rate Last Admin   • acetaminophen (TYLENOL) tablet 650 mg  650 mg Oral Q4H PRN Brunner, Mark I, MD   650 mg at 03/26/23 0404    Or   • acetaminophen (TYLENOL) 160 MG/5ML solution 650 mg  650 mg Oral Q4H PRN Brunner, Mark I, MD        Or   • acetaminophen (TYLENOL) suppository 650 mg  650 mg Rectal Q4H PRN Brunner, Mark I, MD       • acetaminophen (TYLENOL) tablet 650 mg  650 mg Oral Q8H Brunner, Mark I, MD   650 mg at 03/29/23 1403   • ascorbic acid (VITAMIN C) tablet 1,000 mg  1,000 mg Oral Daily Brunner, Mark I, MD   1,000 mg at 03/29/23 1046   • aspirin chewable tablet 81 mg  81 mg Oral Daily Brunner, Mark I, MD   81 mg at 03/29/23 1046   • atorvastatin (LIPITOR) tablet 80 mg  80 mg Oral Nightly Brunner, Mark I, MD   80 mg at 03/28/23 2107   • sennosides-docusate (PERICOLACE) 8.6-50 MG per tablet 2 tablet  2 tablet Oral BID Brunner, Mark I, MD   2 tablet at 03/28/23 0842    And   • polyethylene glycol (MIRALAX) packet 17 g  17 g Oral Daily PRN Brunner, Mark I, MD        And   • bisacodyl (DULCOLAX) EC tablet 5 mg  5 mg Oral Daily PRN Brunner, Mark I, MD        And   • bisacodyl (DULCOLAX) suppository 10 mg  10 mg Rectal Daily PRN Brunner, Mark I, MD       • Calcium Replacement - Follow Nurse / BPA Driven Protocol   Does not apply PRN Brunner, Mark I, MD       • carvedilol (COREG) tablet 50 mg  50 mg Oral BID With Meals Brunner, Mark I, MD   50 mg at 03/29/23 1046   • cyclobenzaprine (FLEXERIL) tablet 5 mg  5 mg Oral TID PRN Brunner, Mark I, MD   5 mg at 03/29/23 0511   • dextrose (D50W) (25 g/50 mL) IV injection 25 g  25 g Intravenous Q15 Min PRN Brunner, Mark I, MD   25 g at 03/26/23 0538   • dextrose (GLUTOSE) oral gel 15 g  15 g Oral  Q15 Min PRN Brunner, Mark I, MD       • docusate sodium (COLACE) capsule 100 mg  100 mg Oral BID Brunner, Mark I, MD   100 mg at 03/29/23 1046   • famotidine (PEPCID) tablet 20 mg  20 mg Oral Daily Brunner, Mark I, MD   20 mg at 03/29/23 1046   • glucagon (GLUCAGEN) injection 1 mg  1 mg Intramuscular Q15 Min PRN Brunner, Mark I, MD       • heparin 75023 units/250 mL (100 units/mL) in 0.45 % NaCl infusion  20 Units/kg/hr Intravenous Titrated Johan Joshua, [facility] 11.34 mL/hr at 03/29/23 1316 20 Units/kg/hr at 03/29/23 1316   • Insulin Lispro (humaLOG) injection 0-7 Units  0-7 Units Subcutaneous Q6H Brunner, Mark I, MD   2 Units at 03/28/23 0031   • lactated ringers infusion  9 mL/hr Intravenous Continuous Brunner, Mark I, MD 9 mL/hr at 03/29/23 0838 Restarted at 03/29/23 0857   • lisinopril (PRINIVIL,ZESTRIL) tablet 5 mg  5 mg Oral Q24H Brunner, Mark I, MD   5 mg at 03/29/23 1046   • Magnesium Standard Dose Replacement - Follow Nurse / BPA Driven Protocol   Does not apply PRN Brunner, Mark I, MD       • megestrol (MEGACE) 40 MG/ML suspension 400 mg  400 mg Oral Daily Brunner, Mark I, MD   400 mg at 03/29/23 1057   • melatonin tablet 5 mg  5 mg Oral Nightly PRN Brunner, Mark I, MD   5 mg at 03/28/23 2107   • mirtazapine (REMERON) tablet 15 mg  15 mg Oral Nightly Brunner, Mark I, MD       • nystatin (MYCOSTATIN) 100,000 unit/mL suspension 500,000 Units  5 mL Oral 4x Daily Brunner, Mark I, MD   500,000 Units at 03/29/23 1155   • ondansetron (ZOFRAN) injection 4 mg  4 mg Intravenous Q6H PRN Brunner, Mark I, MD       • pantoprazole (PROTONIX) EC tablet 40 mg  40 mg Oral Nightly Brunner, Mark I, MD   40 mg at 03/28/23 2107   • Pharmacy to Dose Heparin   Does not apply Continuous PRN Brunner, Mark I, MD       • Phosphorus Replacement - Follow Nurse / BPA Driven Protocol   Does not apply PRN Brunner, Mark I, MD       • piperacillin-tazobactam (ZOSYN) 3.375 g in iso-osmotic dextrose 50 ml (premix)  3.375 g Intravenous Q8H  Brunner, Mark I, MD   3.375 g at 03/29/23 1046   • polyethylene glycol (MIRALAX) packet 17 g  17 g Oral Daily Brunner, Mark I, MD   17 g at 03/25/23 0845   • Potassium Replacement - Follow Nurse / BPA Driven Protocol   Does not apply PRN Brunner, Mark I, MD       • Sodium Chloride (PF) 0.9 % 10 mL  10 mL Intravenous PRN Brunner, Mark I, MD       • sodium chloride 0.9 % flush 10 mL  10 mL Intravenous Q12H Brunner, Mark I, MD   10 mL at 03/29/23 1047   • sodium chloride 0.9 % flush 10 mL  10 mL Intravenous PRN Brunner, Mark I, MD       • sodium chloride 0.9 % infusion 40 mL  40 mL Intravenous PRN Brunner, Mark I, MD       • terazosin (HYTRIN) capsule 1 mg  1 mg Oral Nightly Brunner, Mark I, MD   1 mg at 03/28/23 2107        Physician Progress Notes (most recent note)      Alexandra Azul APRN at 03/29/23 1008     Attestation signed by Tim Mahan MD at 03/29/23 1047    I have personally evaluated and examined the patient.  The above documentation has been reviewed and I agree.  Patient without complaints.  Obtain CXR today.  Continue chest tubes to suction, will likely remove tomorrow if output down.  Diuresis.  Await fluid analysis results.                    Cardiothoracic Surgery Progress Note      Chief complaint: Pleural effusion     LOS: 5 days      Subjective:  No acute events overnight.  On heparin drip.  Vital stable on room air saturations 96%.  Denies chest pain or shortness of breath.    Objective:  Vital Signs  Temp:  [97.1 °F (36.2 °C)-98.4 °F (36.9 °C)] 98.4 °F (36.9 °C)  Heart Rate:  [69-74] 74  Resp:  [16-18] 18  BP: (117-156)/(74-90) 132/74    Physical Exam:   General Appearance: Well-developed, well-nourished in no acute distress   Lungs: Respirations even and unlabored and clear to auscultation bilaterally no wheezes, rales or rhonchi   Heart: Regular rate rhythm no murmurs, rubs or gallops   Chest tube: 230 cc/24 hours     Results:    Results from last 7 days   Lab Units 03/29/23  0444   WBC  10*3/mm3 9.99   HEMOGLOBIN g/dL 12.1*   HEMATOCRIT % 38.3   PLATELETS 10*3/mm3 132*     Results from last 7 days   Lab Units 23  0444   SODIUM mmol/L 142   POTASSIUM mmol/L 4.2   CHLORIDE mmol/L 108*   CO2 mmol/L 21.0*   BUN mg/dL 10   CREATININE mg/dL 0.62*   GLUCOSE mg/dL 72   CALCIUM mg/dL 8.3*       Assessment:  Pulmonary embolus  Moderate right pleural effusion    Plan:  Continue chest tube to suction until output decreases  Daily chest x-ray-awaiting results today  Await final pathology and cytology results    Alexandra Azul, APRN  23  10:08 EDT          Electronically signed by Tim Mahan MD at 23 1047          Physical Therapy Notes (most recent note)      Sharon Heller at 23 1122  Version 1 of 1         Patient Name: Timmy Guajardo  : 1950    MRN: [id number]                              Today's Date: 3/28/2023       Admit Date: 3/24/2023    Visit Dx: No diagnosis found.  Patient Active Problem List   Diagnosis   • Acute right-sided weakness   • Suspected cerebrovascular accident (CVA)   • Leukocytosis   • CHF (congestive heart failure) (HCC)   • Thrombocytopenia (HCC)   • HTN (hypertension)   • Presence of cardiac pacemaker   • Hyperglycemia   • Gangrene of toe of left foot (HCC)   • Closed displaced intertrochanteric fracture of right femur, initial encounter ([facility])   • Pleural effusion   • Unintentional weight loss   • Hypoalbuminemia   • Severe malnutrition ([facility])   • Bilateral pulmonary embolism (HCC)   • History of CVA (cerebrovascular accident)   • Debility     Past Medical History:   Diagnosis Date   • Cardiomyopathy (HCC)    • CHF (congestive heart failure) (HCC)    • Coronary artery disease    • Diabetes mellitus (HCC)    • GERD (gastroesophageal reflux disease)    • HTN (hypertension)    • Stroke (HCC)     Right sided weakness   • Stroke (HCC)      Past Surgical History:   Procedure Laterality Date   • AMPUTATION DIGIT Left 2021    Procedure: AMPUTATION  DIGIT - GREAT TOE AMPUTATION LEFT;  Surgeon: Dario Marmolejo MD;  Location: [facility] OR;  Service: General;  Laterality: Left;   • AORTAGRAM N/A 5/11/2021    Procedure: AORTAGRAM WITH RUNOFFS, LEFT SFA BALLOON ANGIOPLASTY;  Surgeon: Tim Mahan MD;  Location: [facility] HYBRID SHELIA;  Service: Vascular;  Laterality: N/A;  FL TIME 6 MIN 54 SECS  82 MGY  CONTRAST VISIPAQUE 100ML     • CARDIAC CATHETERIZATION     • CARDIAC PACEMAKER PLACEMENT      pacemaker/defibrillator, [employer]   • HERNIA REPAIR Bilateral     Inguinal hernia   • HIP TROCHANTERIC NAILING WITH INTRAMEDULLARY HIP SCREW Right 10/18/2022    Procedure: HIP TROCHANTERIC NAILING WITH INTRAMEDULLARY HIP SCREW RIGHT;  Surgeon: Bj Steinberg MD;  Location: [facility] OR;  Service: Orthopedics;  Laterality: Right;      General Information     Row Name 03/28/23 1314          Physical Therapy Time and Intention    Document Type therapy note (daily note)  -ML     Mode of Treatment physical therapy  -ML     Row Name 03/28/23 1314          General Information    Patient Profile Reviewed yes  -ML     Existing Precautions/Restrictions fall  mild R sided weakness from prior CVA  -ML     Barriers to Rehab medically complex;previous functional deficit  -ML     Row Name 03/28/23 1314          Cognition    Orientation Status (Cognition) oriented x 3  -ML     Row Name 03/28/23 1314          Safety Issues, Functional Mobility    Safety Issues Affecting Function (Mobility) insight into deficits/self-awareness;safety precaution awareness;safety precautions follow-through/compliance;sequencing abilities  -ML     Impairments Affecting Function (Mobility) balance;endurance/activity tolerance;coordination;postural/trunk control;motor control;strength;range of motion (ROM)  -ML           User Key  (r) = Recorded By, (t) = Taken By, (c) = Cosigned By    Initials Name Provider Type    ML Sharon Heller Physical Therapist               Mobility     Row Name 03/28/23 1315          Bed  Mobility    Bed Mobility supine-sit;sit-supine  -ML     Supine-Sit [place] (Bed Mobility) verbal cues;moderate assist (50% patient effort);1 person assist  -ML     Sit-Supine [place] (Bed Mobility) minimum assist (75% patient effort);1 person assist  -ML     Assistive Device (Bed Mobility) bed rails;head of bed elevated  -     Row Name 03/28/23 1315          Sit-Stand Transfer    Sit-Stand [place] (Transfers) verbal cues;minimum assist (75% patient effort);1 person assist  -ML     Assistive Device (Sit-Stand Transfers) walker, front-wheeled  -ML     Comment, (Sit-Stand Transfer) Verbal cues for hand placement prior to sit to stand transfer  -           User Key  (r) = Recorded By, (t) = Taken By, (c) = Cosigned By    Initials Name Provider Type    ML Sharon Heller Physical Therapist               Obj/Interventions     Row Name 03/28/23 1316          Motor Skills    Therapeutic Exercise shoulder;hip;knee;ankle  -     Row Name 03/28/23 1316          Shoulder (Therapeutic Exercise)    Shoulder AROM (Therapeutic Exercise) bilateral;scapular retraction;10 repetitions  -ML     Row Name 03/28/23 1316          Hip (Therapeutic Exercise)    Hip (Therapeutic Exercise) strengthening exercise  -     Hip Strengthening (Therapeutic Exercise) bilateral;aBduction;aDduction;marching while standing;15 repititions  -     Row Name 03/28/23 1316          Knee (Therapeutic Exercise)    Knee (Therapeutic Exercise) strengthening exercise;isometric exercises  -     Knee Isometrics (Therapeutic Exercise) bilateral;gluteal sets;quad sets;10 repetitions;5 second hold  -     Knee Strengthening (Therapeutic Exercise) bilateral;LAQ (long arc quad);15 repititions  -     Row Name 03/28/23 1316          Ankle (Therapeutic Exercise)    Ankle (Therapeutic Exercise) AROM (active range of motion)  -     Ankle AROM (Therapeutic Exercise) bilateral;dorsiflexion;plantarflexion;10 repetitions  -ML     Row Name 03/28/23 1316           Balance    Balance Assessment sitting static balance;sitting dynamic balance;sit to stand dynamic balance;standing static balance  -ML     Static Sitting Balance standby assist  -ML     Dynamic Sitting Balance supervision  -ML     Position, Sitting Balance unsupported;sitting edge of bed  -ML     Sit to Stand Dynamic Balance verbal cues;minimal assist;1-person assist  -ML     Static Standing Balance verbal cues;contact guard  -ML     Dynamic Standing Balance not tested  -ML     Position/Device Used, Standing Balance supported;walker, rolling  -ML     Balance Interventions sitting;sit to stand;supported;static;dynamic;standing  -ML     Comment, Balance Patient tolerated static standing with RW for approx 1 minute, verbal cues to maintain upright standing posture  -ML           User Key  (r) = Recorded By, (t) = Taken By, (c) = Cosigned By    Initials Name Provider Type    ML Sharon Heller Physical Therapist               Goals/Plan    No documentation.                Clinical Impression     Row Name 03/28/23 1319          Pain    Pretreatment Pain Rating 0/10 - no pain  -ML     Posttreatment Pain Rating 0/10 - no pain  -ML     Row Name 03/28/23 1319          Plan of Care Review    Plan of Care Reviewed With patient  -ML     Progress improving  -ML     Outcome Evaluation Physical therapy treatment complete. The patient progressed with sit to stand transfer and static standing tolerance compared to previous treatment session. Patient continues to present below baseline for functional mobility. The patient would continue to benefit from skilled PT to address strength, balance and activity tolerance deficits. At hospital discharge continue to recommend IPR.  -ML     Row Name 03/28/23 1319          Vital Signs    Pretreatment Heart Rate (beats/min) 70  -ML     Posttreatment Heart Rate (beats/min) 70  -ML     Pre SpO2 (%) 96  -ML     O2 Delivery Pre Treatment room air  -ML     Post SpO2 (%) 97  -ML     O2 Delivery  Post Treatment room air  -ML     Pre Patient Position Supine  -ML     Intra Patient Position Standing  -ML     Post Patient Position Supine  -ML     Row Name 03/28/23 1319          Positioning and Restraints    Pre-Treatment Position in bed  -ML     Post Treatment Position bed  -ML     In Bed notified nsg;fowlers;call light within reach;encouraged to call for assist;exit alarm on;side rails up x2  -ML           User Key  (r) = Recorded By, (t) = Taken By, (c) = Cosigned By    Initials Name Provider Type    Sharon Alvarez Physical Therapist               Outcome Measures     Row Name 03/28/23 1323          How much help from another person do you currently need...    Turning from your back to your side while in flat bed without using bedrails? 3  -ML     Moving from lying on back to sitting on the side of a flat bed without bedrails? 3  -ML     Moving to and from a bed to a chair (including a wheelchair)? 3  -ML     Standing up from a chair using your arms (e.g., wheelchair, bedside chair)? 3  -ML     Climbing 3-5 steps with a railing? 2  -ML     To walk in hospital room? 2  -ML     AM-PAC 6 Clicks Score (PT) 16  -ML     Highest level of mobility 5 --> Static standing  -ML     Row Name 03/28/23 1323          Functional Assessment    Outcome Measure Options AM-PAC 6 Clicks Basic Mobility (PT)  -ML           User Key  (r) = Recorded By, (t) = Taken By, (c) = Cosigned By    Initials Name Provider Type    Sharon Alvarez Physical Therapist                             Physical Therapy Education     Title: PT OT SLP Therapies (In Progress)     Topic: Physical Therapy (Done)     Point: Mobility training (Done)     Learning Progress Summary           Patient Acceptance, E, VU,NR by CARLOS at 3/28/2023 1323    Acceptance, E,TB, VU,NR by SELVIN at 3/26/2023 1536   Family Acceptance, E,TB, VU,NR by SELVIN at 3/26/2023 1536                   Point: Home exercise program (Done)     Learning Progress Summary           Patient Acceptance, E,  VU,NR by  at 3/28/2023 1323    Acceptance, E,TB, VU,NR by  at 3/26/2023 1536   Family Acceptance, E,TB, VU,NR by  at 3/26/2023 1536                   Point: Body mechanics (Done)     Learning Progress Summary           Patient Acceptance, E,TB, VU,NR by  at 3/26/2023 1536   Family Acceptance, E,TB, VU,NR by  at 3/26/2023 1536                   Point: Precautions (Done)     Learning Progress Summary           Patient Acceptance, E, VU,NR by  at 3/28/2023 1323    Acceptance, E,TB, VU,NR by  at 3/26/2023 1536   Family Acceptance, E,TB, VU,NR by  at 3/26/2023 1536                               User Key     Initials Effective Dates Name Provider Type Discipline     11/02/22 -  Gabino Tirado, PT Physical Therapist PT     04/22/21 -  Sharon Heller Physical Therapist PT              PT Recommendation and Plan     Plan of Care Reviewed With: patient  Progress: improving  Outcome Evaluation: Physical therapy treatment complete. The patient progressed with sit to stand transfer and static standing tolerance compared to previous treatment session. Patient continues to present below baseline for functional mobility. The patient would continue to benefit from skilled PT to address strength, balance and activity tolerance deficits. At hospital discharge continue to recommend IPR.     Time Calculation:    PT Charges     Row Name 03/28/23 1324             Time Calculation    Start Time 1122  -ML      PT Received On 03/28/23  -ML         Timed Charges    44872 - PT Therapeutic Exercise Minutes 12  -ML      83271 - PT Therapeutic Activity Minutes 20  -ML         Total Minutes    Timed Charges Total Minutes 32  -ML       Total Minutes 32  -ML            User Key  (r) = Recorded By, (t) = Taken By, (c) = Cosigned By    Initials Name Provider Type     Sharon Heller Physical Therapist              Therapy Charges for Today     Code Description Service Date Service Provider Modifiers Qty    04302835556  PT THER PROC  EA 15 MIN 3/28/2023 Sharon Heller GP 1    31889715007 HC PT THERAPEUTIC ACT EA 15 MIN 3/28/2023 Sharon Heller GP 1          PT G-Codes  Outcome Measure Options: AM-PAC 6 Clicks Basic Mobility (PT)  AM-PAC 6 Clicks Score (PT): 16  AM-PAC 6 Clicks Score (OT): 14       Sharon Heller  3/28/2023      Electronically signed by Sharon Heller at 23 1324          Occupational Therapy Notes (most recent note)      Mary Nath, OT at 23 1110          Patient Name: Timmy Guajardo  : 1950    MRN: [id number]                              Today's Date: 3/27/2023       Admit Date: 3/24/2023    Visit Dx: No diagnosis found.  Patient Active Problem List   Diagnosis   • Acute right-sided weakness   • Suspected cerebrovascular accident (CVA)   • Leukocytosis   • CHF (congestive heart failure) (HCC)   • Thrombocytopenia (HCC)   • HTN (hypertension)   • Presence of cardiac pacemaker   • Hyperglycemia   • Gangrene of toe of left foot (HCC)   • Closed displaced intertrochanteric fracture of right femur, initial encounter (HCC)   • Pleural effusion   • Unintentional weight loss   • Hypoalbuminemia   • Severe malnutrition (HCC)   • Bilateral pulmonary embolism (HCC)   • History of CVA (cerebrovascular accident)   • Debility     Past Medical History:   Diagnosis Date   • Cardiomyopathy (HCC)    • CHF (congestive heart failure) (HCC)    • Coronary artery disease    • Diabetes mellitus (HCC)    • GERD (gastroesophageal reflux disease)    • HTN (hypertension)    • Stroke (HCC)     Right sided weakness   • Stroke (HCC)      Past Surgical History:   Procedure Laterality Date   • AMPUTATION DIGIT Left 2021    Procedure: AMPUTATION DIGIT - GREAT TOE AMPUTATION LEFT;  Surgeon: Dario Marmolejo MD;  Location: [facility] OR;  Service: General;  Laterality: Left;   • AORTAGRAM N/A 2021    Procedure: AORTAGRAM WITH RUNOFFS, LEFT SFA BALLOON ANGIOPLASTY;  Surgeon: Tim Mahan MD;  Location: [facility] SHELIA;  Service: Vascular;   Laterality: N/A;  FL TIME 6 MIN 54 SECS  82 MGY  CONTRAST VISIPAQUE 100ML     • CARDIAC CATHETERIZATION     • CARDIAC PACEMAKER PLACEMENT      pacemaker/defibrillator, [employer]   • HERNIA REPAIR Bilateral     Inguinal hernia   • HIP TROCHANTERIC NAILING WITH INTRAMEDULLARY HIP SCREW Right 10/18/2022    Procedure: HIP TROCHANTERIC NAILING WITH INTRAMEDULLARY HIP SCREW RIGHT;  Surgeon: Bj Steinberg MD;  Location: [facility];  Service: Orthopedics;  Laterality: Right;      General Information     Row Name 03/27/23 1140          OT Time and Intention    Document Type evaluation  -BAILEY     Mode of Treatment individual therapy;occupational therapy  -BAILEY     Row Name 03/27/23 1140          General Information    Patient Profile Reviewed yes  -BAILEY     Prior Level of Function independent:;w/c or scooter;feeding;grooming;mod assist:;max assist:;dressing;bathing;transfer;dependent:;home management;cooking;cleaning;driving;shopping  per pt., he is sleeping in a recliner at home, pt. limited ability to give history fully  -BAILEY     Existing Precautions/Restrictions fall  mild R sided weakness from prior CVA  -BAILEY     Barriers to Rehab medically complex;cognitive status;previous functional deficit  -BAILEY     Row Name 03/27/23 1148          Occupational Profile    Environmental Supports and Barriers (Occupational Profile) pt. reports he will be leaving his home and returning to live with his sister, per pt. he has a w/c/scooter can take, also a shower chair, with a BSC, but has been missing  -BAILEY     Row Name 03/27/23 1145          Living Environment    People in Home alone;other (see comments)  per pt. 7 different relatives that were staying with him at all times including a sister, neice, nephew, Rob and 2 brothers  -BAILEY     Row Name 03/27/23 1140          Stairs Within Home, Primary    Stairs, Within Home, Primary per pt. not returning to his home, will be going to sisters, unsure of steps  -BAILEY     Row Name 03/27/23 1144           Cognition    Orientation Status (Cognition) oriented to;person;place  pt. anxious about procedure today, possible pigtail catheter  -BAILEY     Row Name 03/27/23 1140          Safety Issues, Functional Mobility    Safety Issues Affecting Function (Mobility) safety precaution awareness;safety precautions follow-through/compliance;sequencing abilities  -BAILEY     Impairments Affecting Function (Mobility) balance;endurance/activity tolerance;coordination;postural/trunk control;motor control;strength;range of motion (ROM)  -BAILEY           User Key  (r) = Recorded By, (t) = Taken By, (c) = Cosigned By    Initials Name Provider Type    Mary Orr, OT Occupational Therapist                 Mobility/ADL's     Row Name 03/27/23 1146          Bed Mobility    Bed Mobility sit-supine;rolling left;sidelying-sit  -BAILEY     Rolling Left [place] (Bed Mobility) minimum assist (75% patient effort);verbal cues  -BAILEY     Sit-Supine [place] (Bed Mobility) minimum assist (75% patient effort)  -BAILEY     Sidelying-Sit [place] (Bed Mobility) moderate assist (50% patient effort);verbal cues  -BAILEY     Bed Mobility, Safety Issues decreased use of arms for pushing/pulling;decreased use of legs for bridging/pushing;impaired trunk control for bed mobility  -BAILEY     Assistive Device (Bed Mobility) bed rails;draw sheet  -BAILEY     Comment, (Bed Mobility) pt. was able to move LE's off EOb, but assist for trunk, may have been more independent if HOB raised, but pt. toward bottom of bed, per pt. he sleeps in a recliner at home  -BAILEY     Row Name 03/27/23 1146          Transfers    Transfers sit-stand transfer;stand-sit transfer  -     Row Name 03/27/23 1146          Sit-Stand Transfer    Sit-Stand [place] (Transfers) maximum assist (25% patient effort);verbal cues  -BAILEY     Assistive Device (Sit-Stand Transfers) walker, front-wheeled  -BAILEY     Comment, (Sit-Stand Transfer) cues for hand placement  -     Row Name 03/27/23 1141           Stand-Sit Transfer    Stand-Sit [place] (Transfers) moderate assist (50% patient effort);verbal cues  -     Assistive Device (Stand-Sit Transfers) walker, front-wheeled  -     Row Name 03/27/23 1146          Functional Mobility    Functional Mobility- Ind. Level moderate assist (50% patient effort);verbal cues required  -     Functional Mobility- Device walker, front-wheeled  -BAILEY     Functional Mobility-Distance (Feet) 2  -     Functional Mobility- Safety Issues step length decreased;loses balance backward  -     Functional Mobility- Comment side steps to HOB, assist with walker and for posterior lean, noted RLE without heel on ground and unable to fully extend knee  -     Row Name 03/27/23 1146          Activities of Daily Living    BADL Assessment/Intervention lower body dressing;grooming  -     Row Name 03/27/23 1146          Lower Body Dressing Assessment/Training    [place] Level (Lower Body Dressing) doff;don;socks;dependent (less than 25% patient effort)  -     Position (Lower Body Dressing) edge of bed sitting  -     Row Name 03/27/23 1146          Grooming Assessment/Training    Comment, (Grooming) pt. declined need  -           User Key  (r) = Recorded By, (t) = Taken By, (c) = Cosigned By    Initials Name Provider Type    Mary Orr, OT Occupational Therapist               Obj/Interventions     Row Name 03/27/23 1149          Sensory Assessment (Somatosensory)    Sensory Assessment (Somatosensory) UE sensation intact  -     Row Name 03/27/23 1149          Vision Assessment/Intervention    Visual Impairment/Limitations corrective lenses full-time  -     Row Name 03/27/23 1149          Range of Motion Comprehensive    General Range of Motion upper extremity range of motion deficits identified  -     Comment, General Range of Motion R shoulder grossly 90 degrees shoulder abd/flex  -     Row Name 03/27/23 1149          Strength Comprehensive (MMT)    General  Manual Muscle Testing (MMT) Assessment upper extremity strength deficits identified  -     Comment, General Manual Muscle Testing (MMT) Assessment LUE 4 to4+/5, RUE 3+ to 4/5  -     Row Name 03/27/23 1149          Shoulder (Therapeutic Exercise)    Shoulder (Therapeutic Exercise) AROM (active range of motion)  -     Shoulder AROM (Therapeutic Exercise) bilateral;flexion;extension;aBduction;aDduction;horizontal aBduction/aDduction;10 repetitions;supine  -     Row Name 03/27/23 1149          Elbow/Forearm (Therapeutic Exercise)    Elbow/Forearm (Therapeutic Exercise) strengthening exercise  -     Elbow/Forearm Strengthening (Therapeutic Exercise) bilateral;flexion;extension;10 repetitions  mild manual resistance given  -     Row Name 03/27/23 1149          Motor Skills    Motor Skills coordination;functional endurance  -     Coordination fine motor deficit;gross motor deficit;right;upper extremity  mild deficit from prior CVA  -     Functional Endurance pt. fatigued quickly especially with standing activity  -     Therapeutic Exercise shoulder;elbow/forearm  -     Row Name 03/27/23 1149          Balance    Balance Assessment standing static balance;standing dynamic balance  -     Static Sitting Balance contact guard  -     Dynamic Sitting Balance minimal assist  on first sitting assist for posterior lean, pt. able to progress, LUE support on railing  -BAILEY     Position, Sitting Balance supported  -     Static Standing Balance moderate assist  -     Dynamic Standing Balance maximum assist  -BAILEY     Position/Device Used, Standing Balance walker, rolling  -     Balance Interventions sit to stand  -           User Key  (r) = Recorded By, (t) = Taken By, (c) = Cosigned By    Initials Name Provider Type     Mary Nath OT Occupational Therapist               Goals/Plan     Row Name 03/27/23 4291          Transfer Goal 1 (OT)    Activity/Assistive Device (Transfer Goal 1, OT)  sit-to-stand/stand-to-sit;commode, bedside without drop arms;walker, rolling  -BAILEY     [place] Level/Cues Needed (Transfer Goal 1, OT) moderate assist (50-74% patient effort)  -BAILEY     Time Frame (Transfer Goal 1, OT) long term goal (LTG);10 days  -BAILEY     Progress/Outcome (Transfer Goal 1, OT) new goal  -BAILEY     Row Name 03/27/23 1157          Dressing Goal 1 (OT)    Activity/Device (Dressing Goal 1, OT) lower body dressing  -BAILEY     [place]/Cues Needed (Dressing Goal 1, OT) minimum assist (75% or more patient effort)  -BAILEY     Time Frame (Dressing Goal 1, OT) long term goal (LTG);10 days  -BAILEY     Strategies/Barriers (Dressing Goal 1, OT) socks and undergarment on up to knees and off, with AE prn,  -BAILEY     Progress/Outcome (Dressing Goal 1, OT) new goal  -BAILEY     Row Name 03/27/23 1157          Strength Goal 1 (OT)    Strength Goal 1 (OT) Pt. will complete 15 reps each UE strengthening and AROM TE to support ADL independence and related trasnfers.  -BAILEY     Time Frame (Strength Goal 1, OT) long term goal (LTG);10 days  -BAILEY     Progress/Outcome (Strength Goal 1, OT) new goal  -BAILEY     Row Name 03/27/23 1157          Problem Specific Goal 1 (OT)    Problem Specific Goal 1 (OT) Pt. will standing in walker for one minute with min A to support caregiver care with BADL tasks  -BAILEY     Time Frame (Problem Specific Goal 1, OT) long term goal (LTG);10 days  -BAILEY     Progress/Outcome (Problem Specific Goal 1, OT) new goal  -BAILEY     Row Name 03/27/23 1151          Therapy Assessment/Plan (OT)    Planned Therapy Interventions (OT) activity tolerance training;adaptive equipment training;BADL retraining;functional balance retraining;occupation/activity based interventions;patient/caregiver education/training;ROM/therapeutic exercise;strengthening exercise;transfer/mobility retraining  -BAILEY           User Key  (r) = Recorded By, (t) = Taken By, (c) = Cosigned By    Initials Name Provider Type    Mary Orr, OT Occupational  Therapist               Clinical Impression     Row Name 03/27/23 1153          Pain Assessment    Pretreatment Pain Rating 0/10 - no pain  -BAILEY     Posttreatment Pain Rating 0/10 - no pain  -BAILEY     Row Name 03/27/23 1153          Plan of Care Review    Plan of Care Reviewed With patient  -     Progress improving  -     Outcome Evaluation Pt. presents with impaired balance, ROM, strength, coordination, endurance impacting BADL independence and related transfers warranting continued skilled OT services to promote return to higher PLOF. Pt. report he was more independent prior to getting Covid and RLE fx. and has never regained ability priorly had.  Recommend IRF at this time.  -     Row Name 03/27/23 1152          Therapy Assessment/Plan (OT)    Patient/Family Therapy Goal Statement (OT) pt. unable to state due to focus on procedure minus planning to move from home to sisters home  -     Rehab Potential (OT) fair, will monitor progress closely  multiple prior deficits  -BAILEY     Criteria for Skilled Therapeutic Interventions Met (OT) yes;meets criteria;skilled treatment is necessary  -     Therapy Frequency (OT) daily  -     Row Name 03/27/23 0246          Therapy Plan Review/Discharge Plan (OT)    Anticipated Discharge Disposition (OT) inpatient rehabilitation facility  -     Row Name 03/27/23 1154          Vital Signs    Pre Systolic BP Rehab 159  -BAILEY     Pre Treatment Diastolic BP 83  -BAILEY     Post Systolic BP Rehab 145  -BAILEY     Post Treatment Diastolic BP 88  -BAILEY     Pretreatment Heart Rate (beats/min) 72  -BAILEY     Posttreatment Heart Rate (beats/min) 69  -BAILEY     Pre SpO2 (%) 95  -BAILEY     O2 Delivery Pre Treatment nasal cannula  -BAILEY     O2 Delivery Intra Treatment nasal cannula  -BAILEY     Post SpO2 (%) 98  -BAILEY     O2 Delivery Post Treatment nasal cannula  -BAILEY     Pre Patient Position Supine  -BAILEY     Intra Patient Position Standing  -BAILEY     Post Patient Position Supine  -BAILEY     Row Name 03/27/23 6869           Positioning and Restraints    Pre-Treatment Position in bed  -BAILEY     Post Treatment Position bed  -BAILEY     In Bed supine;call light within reach;encouraged to call for assist;exit alarm on;side rails up x2  nurse in during session and aware  -BAILEY           User Key  (r) = Recorded By, (t) = Taken By, (c) = Cosigned By    Initials Name Provider Type    Mary Orr OT Occupational Therapist               Outcome Measures     Row Name 03/27/23 1201          How much help from another is currently needed...    Putting on and taking off regular lower body clothing? 1  -BAILEY     Bathing (including washing, rinsing, and drying) 2  -BAILEY     Toileting (which includes using toilet bed pan or urinal) 1  -BAILEY     Putting on and taking off regular upper body clothing 3  -BAILEY     Taking care of personal grooming (such as brushing teeth) 3  -BAILEY     Eating meals 4  -BAILEY     AM-PAC 6 Clicks Score (OT) 14  -BAILEY     Row Name 03/27/23 1201          Functional Assessment    Outcome Measure Options AM-PAC 6 Clicks Daily Activity (OT)  -BAILEY           User Key  (r) = Recorded By, (t) = Taken By, (c) = Cosigned By    Initials Name Provider Type    Mary Orr, RICARDO Occupational Therapist                Occupational Therapy Education     Title: PT OT SLP Therapies (In Progress)     Topic: Occupational Therapy (In Progress)     Point: ADL training (Not Started)     Description:   Instruct learner(s) on proper safety adaptation and remediation techniques during self care or transfers.   Instruct in proper use of assistive devices.              Learner Progress:  Not documented in this visit.          Point: Home exercise program (Done)     Description:   Instruct learner(s) on appropriate technique for monitoring, assisting and/or progressing therapeutic exercises/activities.              Learning Progress Summary           Patient Acceptance, E,D, VU,NR by BAILEY at 3/27/2023 1201    Comment: reason for consult, noted deficits, UE TE, bed  mobility and transfer sequencing, benefit of mvmt/activity for health and not loosing strength and endurance                   Point: Precautions (Done)     Description:   Instruct learner(s) on prescribed precautions during self-care and functional transfers.              Learning Progress Summary           Patient Acceptance, E,D, VU,NR by BAILEY at 3/27/2023 1201    Comment: reason for consult, noted deficits, UE TE, bed mobility and transfer sequencing, benefit of mvmt/activity for health and not loosing strength and endurance                   Point: Body mechanics (Done)     Description:   Instruct learner(s) on proper positioning and spine alignment during self-care, functional mobility activities and/or exercises.              Learning Progress Summary           Patient Acceptance, E,D, VU,NR by BAILEY at 3/27/2023 1201    Comment: reason for consult, noted deficits, UE TE, bed mobility and transfer sequencing, benefit of mvmt/activity for health and not loosing strength and endurance                               User Key     Initials Effective Dates Name Provider Type Discipline    BAILEY 02/03/23 -  Mary Nath, OT Occupational Therapist OT              OT Recommendation and Plan  Planned Therapy Interventions (OT): activity tolerance training, adaptive equipment training, BADL retraining, functional balance retraining, occupation/activity based interventions, patient/caregiver education/training, ROM/therapeutic exercise, strengthening exercise, transfer/mobility retraining  Therapy Frequency (OT): daily  Plan of Care Review  Plan of Care Reviewed With: patient  Progress: improving  Outcome Evaluation: Pt. presents with impaired balance, ROM, strength, coordination, endurance impacting BADL independence and related transfers warranting continued skilled OT services to promote return to higher PLOF. Pt. report he was more independent prior to getting Covid and RLE fx. and has never regained ability priorly had.   Recommend IRF at this time.     Time Calculation:    Time Calculation- OT     Row Name 03/27/23 1202             Time Calculation- OT    OT Start Time 1110  -BAILEY      OT Received On 03/27/23  -BAILEY      OT Goal Re-Cert Due Date 04/06/23  -BAILEY         Timed Charges    59551 - OT Therapeutic Exercise Minutes 8  -BAILEY      95381 - OT Therapeutic Activity Minutes 10  -BAILEY         Untimed Charges    OT Eval/Re-eval Minutes 39  -BAILEY         Total Minutes    Timed Charges Total Minutes 18  -BAILEY      Untimed Charges Total Minutes 39  -BAILEY       Total Minutes 57  -BAILEY            User Key  (r) = Recorded By, (t) = Taken By, (c) = Cosigned By    Initials Name Provider Type    Mary Orr OT Occupational Therapist              Therapy Charges for Today     Code Description Service Date Service Provider Modifiers Qty    38775052541 HC OT THERAPEUTIC ACT EA 15 MIN 3/27/2023 Mary Nath OT GO 1    16464650894 HC OT EVAL MOD COMPLEXITY 3 3/27/2023 Mary Nath OT GO 1               Mary Nath OT  3/27/2023    Electronically signed by Mary Nath OT at 03/27/23 1204

## 2023-03-29 NOTE — ANESTHESIA POSTPROCEDURE EVALUATION
Patient: Timmy Guajardo    Procedure Summary     Date: 03/29/23 Room / Location:  [facility]    Anesthesia Start: 0857 Anesthesia Stop: 0913    Procedure: ESOPHAGOGASTRODUODENOSCOPY Diagnosis:       Weight loss      (Weight loss [R63.4])    Surgeons: Brunner, Mark I, MD Provider:     Anesthesia Type: general ASA Status: 4          Anesthesia Type: general    Vitals  Vitals Value Taken Time   BP     Temp     Pulse 70 03/29/23 0912   Resp     SpO2 100 % 03/29/23 0912   Vitals shown include unvalidated device data.        Post Anesthesia Care and Evaluation    Patient location during evaluation: PACU  Patient participation: complete - patient participated  Level of consciousness: sleepy but conscious  Pain score: 0  Pain management: adequate    Airway patency: patent  Anesthetic complications: No anesthetic complications  PONV Status: none  Cardiovascular status: hemodynamically stable and acceptable  Respiratory status: nonlabored ventilation, acceptable and nasal cannula  Hydration status: acceptable

## 2023-03-30 ENCOUNTER — APPOINTMENT (OUTPATIENT)
Dept: GENERAL RADIOLOGY | Facility: HOSPITAL | Age: 73
DRG: 163 | End: 2023-03-30
Payer: MEDICARE

## 2023-03-30 ENCOUNTER — ANESTHESIA EVENT (OUTPATIENT)
Dept: GASTROENTEROLOGY | Facility: HOSPITAL | Age: 73
DRG: 163 | End: 2023-03-30
Payer: MEDICARE

## 2023-03-30 ENCOUNTER — APPOINTMENT (OUTPATIENT)
Dept: CT IMAGING | Facility: HOSPITAL | Age: 73
DRG: 163 | End: 2023-03-30
Payer: MEDICARE

## 2023-03-30 LAB
CYTO UR: NORMAL
GLUCOSE BLDC GLUCOMTR-MCNC: 77 MG/DL (ref 70–130)
GLUCOSE BLDC GLUCOMTR-MCNC: 82 MG/DL (ref 70–130)
LAB AP CASE REPORT: NORMAL
LAB AP CLINICAL INFORMATION: NORMAL
PATH REPORT.FINAL DX SPEC: NORMAL
PATH REPORT.GROSS SPEC: NORMAL

## 2023-03-30 PROCEDURE — 71045 X-RAY EXAM CHEST 1 VIEW: CPT

## 2023-03-30 PROCEDURE — 82962 GLUCOSE BLOOD TEST: CPT

## 2023-03-30 PROCEDURE — 99232 SBSQ HOSP IP/OBS MODERATE 35: CPT | Performed by: INTERNAL MEDICINE

## 2023-03-30 PROCEDURE — 97110 THERAPEUTIC EXERCISES: CPT

## 2023-03-30 PROCEDURE — 85520 HEPARIN ASSAY: CPT | Performed by: INTERNAL MEDICINE

## 2023-03-30 PROCEDURE — 25010000002 HEPARIN (PORCINE) 25000-0.45 UT/250ML-% SOLUTION: Performed by: INTERNAL MEDICINE

## 2023-03-30 PROCEDURE — 71260 CT THORAX DX C+: CPT

## 2023-03-30 PROCEDURE — 25010000002 PIPERACILLIN SOD-TAZOBACTAM PER 1 G: Performed by: INTERNAL MEDICINE

## 2023-03-30 PROCEDURE — 25510000001 IOPAMIDOL 61 % SOLUTION: Performed by: INTERNAL MEDICINE

## 2023-03-30 RX ORDER — SODIUM CHLORIDE 0.9 % (FLUSH) 0.9 %
10 SYRINGE (ML) INJECTION EVERY 12 HOURS SCHEDULED
Status: CANCELLED | OUTPATIENT
Start: 2023-03-30

## 2023-03-30 RX ORDER — FAMOTIDINE 20 MG/1
20 TABLET, FILM COATED ORAL ONCE
Status: CANCELLED | OUTPATIENT
Start: 2023-03-30 | End: 2023-03-30

## 2023-03-30 RX ORDER — MIDAZOLAM HYDROCHLORIDE 1 MG/ML
0.5 INJECTION INTRAMUSCULAR; INTRAVENOUS
Status: CANCELLED | OUTPATIENT
Start: 2023-03-30

## 2023-03-30 RX ORDER — SODIUM CHLORIDE 0.9 % (FLUSH) 0.9 %
10 SYRINGE (ML) INJECTION AS NEEDED
Status: CANCELLED | OUTPATIENT
Start: 2023-03-30

## 2023-03-30 RX ORDER — HEPARIN SODIUM 10000 [USP'U]/100ML
20 INJECTION, SOLUTION INTRAVENOUS
Status: DISPENSED | OUTPATIENT
Start: 2023-03-30 | End: 2023-03-31

## 2023-03-30 RX ORDER — SODIUM CHLORIDE 9 MG/ML
40 INJECTION, SOLUTION INTRAVENOUS AS NEEDED
Status: CANCELLED | OUTPATIENT
Start: 2023-03-30

## 2023-03-30 RX ORDER — FAMOTIDINE 10 MG/ML
20 INJECTION, SOLUTION INTRAVENOUS ONCE
Status: CANCELLED | OUTPATIENT
Start: 2023-03-30 | End: 2023-03-30

## 2023-03-30 RX ORDER — LIDOCAINE HYDROCHLORIDE 10 MG/ML
0.5 INJECTION, SOLUTION EPIDURAL; INFILTRATION; INTRACAUDAL; PERINEURAL ONCE AS NEEDED
Status: CANCELLED | OUTPATIENT
Start: 2023-03-30

## 2023-03-30 RX ADMIN — SENNOSIDES AND DOCUSATE SODIUM 2 TABLET: 8.6; 5 TABLET ORAL at 20:50

## 2023-03-30 RX ADMIN — OXYCODONE HYDROCHLORIDE AND ACETAMINOPHEN 1000 MG: 500 TABLET ORAL at 08:01

## 2023-03-30 RX ADMIN — LISINOPRIL 5 MG: 5 TABLET ORAL at 08:01

## 2023-03-30 RX ADMIN — CARVEDILOL 50 MG: 12.5 TABLET, FILM COATED ORAL at 18:05

## 2023-03-30 RX ADMIN — CARVEDILOL 50 MG: 12.5 TABLET, FILM COATED ORAL at 08:00

## 2023-03-30 RX ADMIN — POLYETHYLENE GLYCOL 3350 17 G: 17 POWDER, FOR SOLUTION ORAL at 08:01

## 2023-03-30 RX ADMIN — ASPIRIN 81 MG CHEWABLE TABLET 81 MG: 81 TABLET CHEWABLE at 08:01

## 2023-03-30 RX ADMIN — ACETAMINOPHEN 325MG 650 MG: 325 TABLET ORAL at 20:50

## 2023-03-30 RX ADMIN — TERAZOSIN HYDROCHLORIDE 1 MG: 1 CAPSULE ORAL at 20:50

## 2023-03-30 RX ADMIN — MIRTAZAPINE 15 MG: 15 TABLET, FILM COATED ORAL at 20:51

## 2023-03-30 RX ADMIN — FAMOTIDINE 20 MG: 20 TABLET ORAL at 08:01

## 2023-03-30 RX ADMIN — TAZOBACTAM SODIUM AND PIPERACILLIN SODIUM 3.38 G: 375; 3 INJECTION, SOLUTION INTRAVENOUS at 23:49

## 2023-03-30 RX ADMIN — PANTOPRAZOLE SODIUM 40 MG: 40 TABLET, DELAYED RELEASE ORAL at 20:51

## 2023-03-30 RX ADMIN — HEPARIN SODIUM 20 UNITS/KG/HR: 10000 INJECTION, SOLUTION INTRAVENOUS at 18:21

## 2023-03-30 RX ADMIN — Medication 10 ML: at 20:51

## 2023-03-30 RX ADMIN — DOCUSATE SODIUM 100 MG: 100 CAPSULE, LIQUID FILLED ORAL at 20:51

## 2023-03-30 RX ADMIN — ATORVASTATIN CALCIUM 80 MG: 40 TABLET, FILM COATED ORAL at 20:50

## 2023-03-30 RX ADMIN — TAZOBACTAM SODIUM AND PIPERACILLIN SODIUM 3.38 G: 375; 3 INJECTION, SOLUTION INTRAVENOUS at 00:57

## 2023-03-30 RX ADMIN — TAZOBACTAM SODIUM AND PIPERACILLIN SODIUM 3.38 G: 375; 3 INJECTION, SOLUTION INTRAVENOUS at 17:58

## 2023-03-30 RX ADMIN — IOPAMIDOL 85 ML: 612 INJECTION, SOLUTION INTRAVENOUS at 13:00

## 2023-03-30 RX ADMIN — POLYETHYLENE GLYCOL 3350, SODIUM SULFATE, SODIUM CHLORIDE, POTASSIUM CHLORIDE, SODIUM ASCORBATE, AND ASCORBIC ACID 1000 ML: KIT at 04:42

## 2023-03-30 RX ADMIN — TAZOBACTAM SODIUM AND PIPERACILLIN SODIUM 3.38 G: 375; 3 INJECTION, SOLUTION INTRAVENOUS at 08:38

## 2023-03-30 RX ADMIN — DOCUSATE SODIUM 100 MG: 100 CAPSULE, LIQUID FILLED ORAL at 08:01

## 2023-03-30 RX ADMIN — Medication 5 MG: at 20:51

## 2023-03-30 NOTE — CASE MANAGEMENT/SOCIAL WORK
Continued Stay Note  [facility]     Patient Name: Timmy Guajardo  MRN: [id number]  Today's Date: 3/30/2023    Admit Date: 3/24/2023    Plan: discharge plan   Discharge Plan     Row Name 03/30/23 1612       Plan    Plan discharge plan    Plan Comments I attempted to contact Mi at [facility] and Julissa at [facility] and had to leave a voice message.  I received a message from Carolina(Viola) that [facility] has no beds but [facility] may have a bed, pending insurance approval and medical readiness. I also received a call from Parisa with [facility] facilities stating [facility] can offer pt a bed, pending insurance approval and medical readiness.  I spoke with pt and pt's sister regarding bed offers. Pt wants to go to [facility]. Per sister, Mary Ellen, [facility] is 2nd choice. I attempted to call Carolina with the [facility] and had to leave a voice message. CM will follow up tomorrow.    Final Discharge Disposition Code 03 - skilled nursing facility (SNF)               Discharge Codes    No documentation.               Expected Discharge Date and Time     Expected Discharge Date Expected Discharge Time    Apr 1, 2023             Violeta Miranda, WADE

## 2023-03-30 NOTE — PROGRESS NOTES
[facility] Medicine Services  PROGRESS NOTE    Patient Name: Timmy Guajardo  : 1950  MRN: [id number]    Date of Admission: 3/24/2023  Primary Care Physician: Arsen Seals APRN    Subjective   Subjective     CC:  Follow-up for weakness    HPI:  I have seen and evaluated the patient this morning.  Comfortable in bed.  Had multiple bowel movements overnight in preparation for colonoscopy this morning.  Minimal drainage from right chest tube    ROS:  General : no fevers, chills,++ weakness  CVS: No chest pain, palpitations  Respiratory: No cough, dyspnea  GI: No N/V/D, abd pain  10 point review of systems is negative except for what is mentioned in HPI    Objective   Objective     Vital Signs:   Temp:  [97.1 °F (36.2 °C)-98.4 °F (36.9 °C)] 97.5 °F (36.4 °C)  Heart Rate:  [69-74] 70  Resp:  [16-18] 16  BP: (117-163)/(71-90) 163/87  Flow (L/min):  [2-3] 2     Physical Exam:  General: Chronically ill looking, not in distress, conversant and cooperative  Head: Atraumatic and normocephalic  Eyes: No Icterus. No pallor  Ears:  Ears appear intact with no abnormalities noted  Throat: No oral lesions, no thrush  Neck: Supple, trachea midline  Lungs: Diminished air entry right lung zone.  No wheezing or crackles, right chest tube in place  Heart:  Normal S1 and S2, no murmur, no gallop, No JVD, no lower extremity swelling  Abdomen:  Soft, no tenderness, no organomegaly, normal bowel sounds, no organomegaly  Extremities: pulses equal bilaterally  Skin: No bleeding, bruising or rash, normal skin turgor and elasticity  Neurologic: Cranial nerves appear intact with no evidence of facial asymmetry, normal motor and sensory functions in all 4 extremities  Psych: Alert and oriented x 3, normal mood    Results Reviewed:  LAB RESULTS:      Lab 23  1137 23  0444 237 23  1357 23  0842 23  0649 23  2254 23  1344 23  0708  03/26/23  1136 03/26/23  0436 03/25/23  2141 03/25/23  1254 03/25/23  0954 03/25/23  0722 03/25/23  0120 03/24/23  2101 03/24/23  1938 03/24/23  1745   WBC  --   --  9.99  --   --   --  10.01  --   --  8.60  --  10.42  --   --   --   --   --   --   --  17.96*   HEMOGLOBIN  --   --  12.1*  --   --   --  11.2*  --   --  10.9*  --  10.6*  --   --   --   --   --   --   --  12.9*   HEMATOCRIT  --   --  38.3  --   --   --  35.8*  --   --  34.9*  --  32.5*  --   --   --   --   --   --   --  41.9   PLATELETS  --   --  132*  --   --   --  193  --   --  155  --  162  --   --   --   --   --   --   --  193   NEUTROS ABS  --   --  7.51*  --   --   --  7.84*  --   --  6.67  --  8.48*  --   --   --   --   --   --   --  15.92*   IMMATURE GRANS (ABS)  --   --  0.09*  --   --   --  0.09*  --   --  0.07*  --  0.08*  --   --   --   --   --   --   --  0.17*   LYMPHS ABS  --   --  1.48  --   --   --  1.23  --   --  1.08  --  1.07  --   --   --   --   --   --   --  0.83   MONOS ABS  --   --  0.69  --   --   --  0.70  --   --  0.62  --  0.69  --   --   --   --   --   --   --  0.99*   EOS ABS  --   --  0.15  --   --   --  0.09  --   --  0.11  --  0.05  --   --   --   --   --   --   --  0.00   MCV  --   --  96.0  --   --   --  96.0  --   --  95.1  --  92.1  --   --   --   --   --   --   --  97.4*   CRP  --   --   --   --   --   --   --   --   --   --   --   --   --   --   --  10.22*  --   --   --   --    PROCALCITONIN  --   --   --   --   --   --   --   --   --   --   --   --   --   --   --   --   --   --  0.14  --    LACTATE  --   --   --   --   --   --   --   --   --   --   --   --   --   --   --   --   --  3.2*  --  3.1*   LDH  --   --   --   --   --   --   --   --   --  217  --   --   --   --   --   --   --   --   --   --    PROTIME  --   --   --   --   --   --   --   --   --  16.9*  --   --   --   --   --   --  36.4*  --   --   --    APTT  --   --   --   --  46.3* 132.6*  --  42.4* 40.7* 88.1   < > 77.1   < > 60.4   < >  --  >200.0*  --    --   --    HEPARIN ANTI-XA 0.50 0.27* 0.35 0.15*  --   --   --   --   --   --   --   --   --  >1.10*   < > >1.10*  --   --   --   --     < > = values in this interval not displayed.         Lab 03/29/23  0444 03/28/23  0649 03/27/23  1945 03/27/23  0708 03/26/23  0436 03/25/23  0722 03/24/23  1938   SODIUM 142 140  --  140 140 137  --    POTASSIUM 4.2 4.3 4.8 3.4* 3.6 4.0  --    CHLORIDE 108* 108*  --  105 105 102  --    CO2 21.0* 23.0  --  22.0 24.0 16.0*  --    ANION GAP 13.0 9.0  --  13.0 11.0 19.0*  --    BUN 10 10  --  9 12 21  --    CREATININE 0.62* 0.65*  --  0.61* 0.59* 0.71*  --    EGFR 102.8 101.4  --  103.3 104.4 98.7  --    GLUCOSE 72 87  --  77 68 139*  --    CALCIUM 8.3* 8.2*  --  8.5* 8.1* 8.4*  --    MAGNESIUM 2.0 2.2  --  1.8 1.8 1.8 2.1   PHOSPHORUS 3.3 3.0  --  3.8 4.5 3.9  --    HEMOGLOBIN A1C  --   --   --   --   --  6.30*  --    TSH  --   --   --   --   --   --  1.610         Lab 03/28/23  0649 03/27/23  0708 03/26/23  0436 03/24/23  1745   TOTAL PROTEIN 6.0 6.0 5.3* 7.2   ALBUMIN 2.5* 2.5* 2.6* 2.8*   GLOBULIN 3.5 3.5 2.7 4.4   ALT (SGPT) 11 14 15 19   AST (SGOT) 22 15 18 21   BILIRUBIN 0.4 0.4 0.4 0.5   ALK PHOS 77 76 76 98   LIPASE  --   --   --  15         Lab 03/27/23  0708 03/25/23  0120   PROTIME 16.9* 36.4*   INR 1.38* 3.62*             Lab 03/25/23  0954   VITAMIN B 12 260         Brief Urine Lab Results  (Last result in the past 365 days)      Color   Clarity   Blood   Leuk Est   Nitrite   Protein   CREAT   Urine HCG        03/24/23 2329 Yellow   Turbid   Small (1+)   Moderate (2+)   Negative   30 mg/dL (1+)                 Microbiology Results Abnormal     Procedure Component Value - Date/Time    Body Fluid Culture - Body Fluid, Pleural Cavity [[id number]] Collected: 03/27/23 1532    Lab Status: Preliminary result Specimen: Body Fluid from Pleural Cavity Updated: 03/29/23 0742     Body Fluid Culture No growth     Gram Stain Rare (1+) WBCs per low power field      No organisms seen     MRSA Screen, PCR (Inpatient) - Swab, Nares [[id number]]  (Normal) Collected: 03/27/23 0857    Lab Status: Final result Specimen: Swab from Nares Updated: 03/27/23 1107     MRSA PCR Negative    Narrative:      The negative predictive value of this diagnostic test is high and should only be used to consider de-escalating anti-MRSA therapy. A positive result may indicate colonization with MRSA and must be correlated clinically.  MRSA Negative    COVID PRE-OP / PRE-PROCEDURE SCREENING ORDER (NO ISOLATION) - Swab, Nasopharynx [[id number]]  (Normal) Collected: 03/24/23 2256    Lab Status: Final result Specimen: Swab from Nasopharynx Updated: 03/24/23 2356    Narrative:      The following orders were created for panel order COVID PRE-OP / PRE-PROCEDURE SCREENING ORDER (NO ISOLATION) - Swab, Nasopharynx.  Procedure                               Abnormality         Status                     ---------                               -----------         ------                     Respiratory Panel PCR w/...[[id number]]  Normal              Final result                 Please view results for these tests on the individual orders.    Respiratory Panel PCR w/COVID-19(SARS-CoV-2) SULEIMAN/TAWANNA/BLAISE/PAD/COR/MAD/ANTHONY In-House, NP Swab in UTM/VTM, 3-4 HR TAT - Swab, Nasopharynx [[id number]]  (Normal) Collected: 03/24/23 2256    Lab Status: Final result Specimen: Swab from Nasopharynx Updated: 03/24/23 2356     ADENOVIRUS, PCR Not Detected     Coronavirus 229E Not Detected     Coronavirus HKU1 Not Detected     Coronavirus NL63 Not Detected     Coronavirus OC43 Not Detected     COVID19 Not Detected     Human Metapneumovirus Not Detected     Human Rhinovirus/Enterovirus Not Detected     Influenza A PCR Not Detected     Influenza B PCR Not Detected     Parainfluenza Virus 1 Not Detected     Parainfluenza Virus 2 Not Detected     Parainfluenza Virus 3 Not Detected     Parainfluenza Virus 4 Not Detected     RSV, PCR Not Detected     Bordetella  pertussis pcr Not Detected     Bordetella parapertussis PCR Not Detected     Chlamydophila pneumoniae PCR Not Detected     Mycoplasma pneumo by PCR Not Detected    Narrative:      In the setting of a positive respiratory panel with a viral infection PLUS a negative procalcitonin without other underlying concern for bacterial infection, consider observing off antibiotics or discontinuation of antibiotics and continue supportive care. If the respiratory panel is positive for atypical bacterial infection (Bordetella pertussis, Chlamydophila pneumoniae, or Mycoplasma pneumoniae), consider antibiotic de-escalation to target atypical bacterial infection.          XR Chest 1 View    Result Date: 3/29/2023  XR CHEST 1 VW Date of Exam: 3/29/2023 10:14 AM EDT Indication: Pleural effusion. Comparison: March 27, 2023 Findings: There is a left subclavian AICD device in place with leads over the right atrium and right ventricle. The heart is normal in size. The pulmonary vascular markings are normal. The left lung is clear. There is a small bore pigtail catheter in the right pleural space. There is density seen throughout the right lung suggestive of a layering pleural effusion. There is right basilar atelectasis. There is no pneumothorax.     Impression: Impression: Small bore pigtail catheter in the right pleural space. Layering right pleural effusion. No pneumothorax. Electronically Signed: Williams Mattson  3/29/2023 10:50 AM EDT  Workstation ID: [id number]      Results for orders placed during the hospital encounter of 10/16/22    Adult Transthoracic Echo Complete W/ Cont if Necessary Per Protocol    Interpretation Summary  •  Estimated left ventricular EF = 30%.  There is global hypokinesis.  The basal-mid posterior wall appears akinetic.  •  Normal right ventricular cavity size, wall thickness, systolic function and septal motion noted. Electronic lead present in the ventricle  •  Septal wall motion is abnormal, consistent with  right ventricular pacing  •  No hemodynamically significant valvular stenosis or regurgitation noted.      Current medications:  Scheduled Meds:acetaminophen, 650 mg, Oral, Q8H  vitamin C, 1,000 mg, Oral, Daily  aspirin, 81 mg, Oral, Daily  atorvastatin, 80 mg, Oral, Nightly  carvedilol, 50 mg, Oral, BID With Meals  docusate sodium, 100 mg, Oral, BID  famotidine, 20 mg, Oral, Daily  insulin lispro, 0-7 Units, Subcutaneous, Q6H  lisinopril, 5 mg, Oral, Q24H  megestrol, 400 mg, Oral, Daily  mirtazapine, 15 mg, Oral, Nightly  pantoprazole, 40 mg, Oral, Nightly  piperacillin-tazobactam, 3.375 g, Intravenous, Q8H  polyethylene glycol, 17 g, Oral, Daily  senna-docusate sodium, 2 tablet, Oral, BID  sodium chloride, 10 mL, Intravenous, Q12H  terazosin, 1 mg, Oral, Nightly      Continuous Infusions:lactated ringers, 9 mL/hr, Last Rate: 9 mL/hr (03/29/23 0838)  Pharmacy to Dose Heparin,       PRN Meds:.•  acetaminophen **OR** acetaminophen **OR** acetaminophen  •  senna-docusate sodium **AND** polyethylene glycol **AND** bisacodyl **AND** bisacodyl  •  Calcium Replacement - Follow Nurse / BPA Driven Protocol  •  cyclobenzaprine  •  dextrose  •  dextrose  •  glucagon (human recombinant)  •  Magnesium Standard Dose Replacement - Follow Nurse / BPA Driven Protocol  •  melatonin  •  ondansetron  •  Pharmacy to Dose Heparin  •  Phosphorus Replacement - Follow Nurse / BPA Driven Protocol  •  Potassium Replacement - Follow Nurse / BPA Driven Protocol  •  Sodium Chloride (PF)  •  sodium chloride  •  sodium chloride    Assessment & Plan   Assessment & Plan     Active Hospital Problems    Diagnosis  POA   • **Bilateral pulmonary embolism (HCC) [I26.99]  Unknown   • Pleural effusion [J90]  Yes   • Unintentional weight loss [R63.4]  Unknown   • Hypoalbuminemia [E88.09]  Unknown   • Severe malnutrition (HCC) [E43]  Unknown   • History of CVA (cerebrovascular accident) [Z86.73]  Not Applicable   • Debility [R53.81]  Unknown   • Presence of  cardiac pacemaker [Z95.0]  Yes   • HTN (hypertension) [I10]  Yes   • Leukocytosis [D72.829]  Yes      Resolved Hospital Problems   No resolved problems to display.        Brief Hospital Course to date:  Timmy Guajardo is a 72 y.o. 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  We will hold from midnight in anticipation for colonoscopy tomorrow  · Will transition to full dose Eliquis loading and maintenance upon discharge once he does not need any further procedures     Moderate exudative right-sided loculated pleural effusion  · Bedside point-of-care ultrasound revealed complex pleural effusion  · Cardiothoracic surgery consulted.  Recommended chest tube placement by IR.    · Underwent right-sided chest tube placement by IR on 3/27/2023.  Continue negative pressure suction.  CTS managing chest tube  · Repeat chest x-ray showed persistent right pleural effusion  · Analysis of the fluid is consistent with exudate, ?  Parapneumonic  · Culture from pleural fluid is negative to date  · Cytology pending  · Continue IV Zosyn.  Vancomycin discontinued after MRSA swab came back negative  · Point-of-care ultrasound at bedside revealed persistent moderate complex pleural effusion  · Tentative plan to remove chest tube today by CTS    Severe malnutrition  Hypoalbuminemia  Significant unintentional weight loss  Generalized debility  · Patient reports 52lbs weight loss since oct  · No EGD or colonoscopy since 2008  · CT chest and CT  abdomen with no convincing evidence of solid malignancy or lymphadenopathy.  Oncology service evaluated the patient recommended age-appropriate screening  · GI evaluated the patient for EGD and colonoscopy.  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  Restart heparin drip after studies complete  · Continue Megace and mirtazapine 15 mg nightly for poor appetite  · Nutrition team following     UTI with Enterococcus faecalis  · POA  · On Zosyn    Leukocytosis, improving  · Likely reactive versus infectious  · Normal procalcitonin and negative cultures to date  · Continue current metabolic therapy with Zosyn.     CAD  Cardiomyopathy with EF 30 %  Essential HTN  Old CVA with right residual weakness  Dyslipidemia  · Start aspirin  · Continue coreg, lisinopril  · Continue statins    Constipation  · Having multiple bowel movements with enema  · Continue bowel regimen     GERD   · Continue PPI      Type 2 diabetes  · A1C 6.3%   · Continue insulin sliding scale     Acute debility  · PT recommended acute rehab    Total time spent: 40 min   Time spent includes time reviewing chart, face-to-face time, counseling patient/family/caregiver, ordering medications/tests/procedures, communicating with other health care professionals, documenting clinical information in the electronic health record, and coordination of care.     Expected Discharge Location and Transportation: Acute rehab  Expected Discharge   Expected Discharge Date and Time     Expected Discharge Date Expected Discharge Time    Mar 31, 2023            DVT prophylaxis:  Medical and mechanical DVT prophylaxis orders are present.     AM-PAC 6 Clicks Score (PT): 16 (03/29/23 2050)    CODE STATUS:   Code Status and Medical Interventions:   Ordered at: 03/24/23 7263     Code Status (Patient has no pulse and is not breathing):    CPR (Attempt to Resuscitate)     Medical Interventions (Patient has pulse or is  breathing):    Full Support     Copied text in this note has been reviewed and is accurate as of 03/30/23.     Alan Cooper MD  03/30/23

## 2023-03-30 NOTE — PROGRESS NOTES
Cardiothoracic Surgery Progress Note      Chief complaint: Pleural effusion     LOS: 6 days      Subjective:  No complaints this am    Objective:  Vital Signs  Temp:  [97.5 °F (36.4 °C)-98.4 °F (36.9 °C)] 97.5 °F (36.4 °C)  Heart Rate:  [69-74] 70  Resp:  [16-18] 16  BP: (117-163)/(71-90) 163/87    Physical Exam:   General Appearance: Well-developed, well-nourished in no acute distress   Lungs: Respirations even and unlabored and clear to auscultation bilaterally no wheezes, rales or rhonchi   Heart: Regular rate rhythm no murmurs, rubs or gallops   Chest tube: 70 cc/24 hours     Results:    Results from last 7 days   Lab Units 03/29/23  0444   WBC 10*3/mm3 9.99   HEMOGLOBIN g/dL 12.1*   HEMATOCRIT % 38.3   PLATELETS 10*3/mm3 132*     Results from last 7 days   Lab Units 03/29/23  0444   SODIUM mmol/L 142   POTASSIUM mmol/L 4.2   CHLORIDE mmol/L 108*   CO2 mmol/L 21.0*   BUN mg/dL 10   CREATININE mg/dL 0.62*   GLUCOSE mg/dL 72   CALCIUM mg/dL 8.3*       Assessment:  Pulmonary embolus  Moderate right pleural effusion    Plan:  D/C chest tube, will obtain postpull CXR  Await final pathology and cytology results    Keyanna Turk PA-C  03/30/23  09:15 EDT

## 2023-03-30 NOTE — THERAPY TREATMENT NOTE
Patient Name: Timmy Guajardo  : 1950    MRN: [id number]                              Today's Date: 3/30/2023       Admit Date: 3/24/2023    Visit Dx:     ICD-10-CM ICD-9-CM   1. Weight loss  R63.4 783.21   2. Unintentional weight loss  R63.4 783.21     Patient Active Problem List   Diagnosis   • Acute right-sided weakness   • Suspected cerebrovascular accident (CVA)   • Leukocytosis   • CHF (congestive heart failure) (HCC)   • Thrombocytopenia (HCC)   • HTN (hypertension)   • Presence of cardiac pacemaker   • Hyperglycemia   • Gangrene of toe of left foot (HCC)   • Closed displaced intertrochanteric fracture of right femur, initial encounter ([facility])   • Pleural effusion   • Unintentional weight loss   • Hypoalbuminemia   • Severe malnutrition (HCC)   • Bilateral pulmonary embolism (HCC)   • History of CVA (cerebrovascular accident)   • Debility     Past Medical History:   Diagnosis Date   • Cardiomyopathy (HCC)    • CHF (congestive heart failure) (HCC)    • Coronary artery disease    • Diabetes mellitus (HCC)    • GERD (gastroesophageal reflux disease)    • HTN (hypertension)    • Stroke (HCC)     Right sided weakness   • Stroke (HCC)      Past Surgical History:   Procedure Laterality Date   • AMPUTATION DIGIT Left 2021    Procedure: AMPUTATION DIGIT - GREAT TOE AMPUTATION LEFT;  Surgeon: Dario Marmolejo MD;  Location: [facility] OR;  Service: General;  Laterality: Left;   • AORTAGRAM N/A 2021    Procedure: AORTAGRAM WITH RUNOFFS, LEFT SFA BALLOON ANGIOPLASTY;  Surgeon: Tim Mahan MD;  Location: [facility] HYBRID SHELIA;  Service: Vascular;  Laterality: N/A;  FL TIME 6 MIN 54 SECS  82 MGY  CONTRAST VISIPAQUE 100ML     • CARDIAC CATHETERIZATION     • CARDIAC PACEMAKER PLACEMENT      pacemaker/defibrillator, [employer]   • ENDOSCOPY N/A 3/29/2023    Procedure: ESOPHAGOGASTRODUODENOSCOPY;  Surgeon: Brunner, Mark I, MD;  Location: [facility] ENDOSCOPY;  Service: Gastroenterology;  Laterality: N/A;   •  HERNIA REPAIR Bilateral     Inguinal hernia   • HIP TROCHANTERIC NAILING WITH INTRAMEDULLARY HIP SCREW Right 10/18/2022    Procedure: HIP TROCHANTERIC NAILING WITH INTRAMEDULLARY HIP SCREW RIGHT;  Surgeon: Bj Steinberg MD;  Location: [facility];  Service: Orthopedics;  Laterality: Right;      General Information     Row Name 03/30/23 1428          OT Time and Intention    Document Type therapy note (daily note)  -BAILEY     Mode of Treatment individual therapy;occupational therapy  -BAILEY     Row Name 03/30/23 1428          General Information    Patient Profile Reviewed yes  -BAILEY     Existing Precautions/Restrictions fall  mild R sided weakness for CVA  -BAILEY     Barriers to Rehab medically complex;previous functional deficit  -BAILEY     Row Name 03/30/23 1428          Cognition    Orientation Status (Cognition) oriented x 3  -BAILEY     Row Name 03/30/23 1428          Safety Issues, Functional Mobility    Impairments Affecting Function (Mobility) balance;endurance/activity tolerance;coordination;strength;range of motion (ROM);motor control;postural/trunk control  -BAILEY           User Key  (r) = Recorded By, (t) = Taken By, (c) = Cosigned By    Initials Name Provider Type    Mary Orr OT Occupational Therapist                 Mobility/ADL's     Row Name 03/30/23 1429          Bed Mobility    Comment, (Bed Mobility) pt. declined stating he was doing bowel prep for colonoscopy tomorrow  -BAILEY     Row Name 03/30/23 1429          Transfers    Comment, (Transfers) pt. declined stating he would not be able to get [place] [facility] [place] if had BM  -BAILEY     Row Name 03/30/23 1429          Activities of Daily Living    BADL Assessment/Intervention --  pt. declined until post colonoscopy  -BAILEY           User Key  (r) = Recorded By, (t) = Taken By, (c) = Cosigned By    Initials Name Provider Type    Mary Orr OT Occupational Therapist               Obj/Interventions     Row Name 03/30/23 1430          Shoulder (Therapeutic  Exercise)    Shoulder (Therapeutic Exercise) strengthening exercise  -     Shoulder Strengthening (Therapeutic Exercise) bilateral;flexion;extension;horizontal aBduction/aDduction;external rotation;internal rotation;10 repetitions;yellow;resistance band  minus R flexion AROM only, OT holding one end of band for some of TE  -     Row Name 03/30/23 1430          Elbow/Forearm (Therapeutic Exercise)    Elbow/Forearm (Therapeutic Exercise) strengthening exercise  -     Elbow/Forearm Strengthening (Therapeutic Exercise) bilateral;flexion;extension;supine;10 repetitions;resistance band;yellow  -     Row Name 03/30/23 1430          Motor Skills    Therapeutic Exercise shoulder;elbow/forearm  Pt. also completed 10 reps hip F/E, IR/ER, Abd/add and abd with knees bent and gluteal set, knee F/E, ankle F/E, LLE periods of AAROM otherwise AROM.  -BAILEY           User Key  (r) = Recorded By, (t) = Taken By, (c) = Cosigned By    Initials Name Provider Type    Mary Orr OT Occupational Therapist               Goals/Plan     Row Name 03/30/23 1437          Transfer Goal 1 (OT)    Progress/Outcome (Transfer Goal 1, OT) goal ongoing  -     Row Name 03/30/23 1437          Dressing Goal 1 (OT)    Progress/Outcome (Dressing Goal 1, OT) goal ongoing  -     Row Name 03/30/23 1437          Strength Goal 1 (OT)    Strength Goal 1 (OT) Pt. will complete 15 reps each UE strengthening and AROM TE to support ADL independence and related tranfers.  -BAILEY     Progress/Outcome (Strength Goal 1, OT) continuing progress toward goal  -     Row Name 03/30/23 1437          Problem Specific Goal 1 (OT)    Progress/Outcome (Problem Specific Goal 1, OT) goal ongoing  -           User Key  (r) = Recorded By, (t) = Taken By, (c) = Cosigned By    Initials Name Provider Type    Mary Orr OT Occupational Therapist               Clinical Impression     Row Name 03/30/23 1433          Pain Assessment    Pretreatment Pain Rating 0/10  - no pain  -BAILEY     Posttreatment Pain Rating 0/10 - no pain  -BAILEY     Row Name 03/30/23 1433          Plan of Care Review    Plan of Care Reviewed With patient  -BAILEY     Progress no change  -BAILEY     Outcome Evaluation Pt. agreeable to only in bed TE due to doing bowel prep for colonoscopy tomorrow.  Pt. completed many UE and LE strengthening and AROM/AAROM TE to support ADL and related transfer independence.  Pt. remains appropriate for skilled OT services to address deficit areas and promote return to prior higher ADL independence level.  Continue to recommend IRF at discharge.  -BAILEY     Row Name 03/30/23 1433          Therapy Assessment/Plan (OT)    Therapy Frequency (OT) daily  -BAILEY     Row Name 03/30/23 1433          Therapy Plan Review/Discharge Plan (OT)    Anticipated Discharge Disposition (OT) inpatient rehabilitation facility  -BAILEY     Row Name 03/30/23 1433          Vital Signs    Pre Systolic BP Rehab 158  -BAILEY     Pre Treatment Diastolic BP 81  -BAILEY     Post Systolic BP Rehab 155  -BAILEY     Post Treatment Diastolic BP 79  -BAILEY     Pretreatment Heart Rate (beats/min) 73  -BAILEY     Posttreatment Heart Rate (beats/min) 72  -BAILEY     Pre SpO2 (%) 95  -BAILEY     O2 Delivery Pre Treatment room air  -BAILEY     O2 Delivery Intra Treatment room air  -BAILEY     Post SpO2 (%) 95  -BAILEY     O2 Delivery Post Treatment room air  -BAILEY     Pre Patient Position Supine  -BAILEY     Intra Patient Position Supine  -BAILEY     Post Patient Position Supine  -BAILEY     Row Name 03/30/23 1433          Positioning and Restraints    Pre-Treatment Position in bed  -BAILEY     Post Treatment Position bed  -BAILEY     In Bed supine;call light within reach;encouraged to call for assist;side rails up x2  did not change alarm setting due to no OOB  -BAILEY           User Key  (r) = Recorded By, (t) = Taken By, (c) = Cosigned By    Initials Name Provider Type    Mary Orr, OT Occupational Therapist               Outcome Measures     Row Name 03/30/23 1437          How much  help from another is currently needed...    Putting on and taking off regular lower body clothing? 1  -BAILEY     Bathing (including washing, rinsing, and drying) 2  -BAILEY     Toileting (which includes using toilet bed pan or urinal) 1  -BAILEY     Putting on and taking off regular upper body clothing 3  -BAILEY     Taking care of personal grooming (such as brushing teeth) 3  -BAILEY     Eating meals 4  -BAILEY     AM-PAC 6 Clicks Score (OT) 14  -BAILEY     Row Name 03/30/23 1437          Functional Assessment    Outcome Measure Options AM-PAC 6 Clicks Daily Activity (OT)  -BAILEY           User Key  (r) = Recorded By, (t) = Taken By, (c) = Cosigned By    Initials Name Provider Type    Mary Orr, OT Occupational Therapist                Occupational Therapy Education     Title: PT OT SLP Therapies (In Progress)     Topic: Occupational Therapy (In Progress)     Point: ADL training (Not Started)     Description:   Instruct learner(s) on proper safety adaptation and remediation techniques during self care or transfers.   Instruct in proper use of assistive devices.              Learner Progress:  Not documented in this visit.          Point: Home exercise program (In Progress)     Description:   Instruct learner(s) on appropriate technique for monitoring, assisting and/or progressing therapeutic exercises/activities.              Learning Progress Summary           Patient Acceptance, E,D, NR by BAILEY at 3/30/2023 1438    Comment: UE and LE TE to support BADL and related transfer independence    Acceptance, E,D, VU,NR by BAILEY at 3/27/2023 1201    Comment: reason for consult, noted deficits, UE TE, bed mobility and transfer sequencing, benefit of mvmt/activity for health and not loosing strength and endurance                   Point: Precautions (Done)     Description:   Instruct learner(s) on prescribed precautions during self-care and functional transfers.              Learning Progress Summary           Patient Acceptance, E,D, VU,NR by BAILEY at  3/27/2023 1201    Comment: reason for consult, noted deficits, UE TE, bed mobility and transfer sequencing, benefit of mvmt/activity for health and not loosing strength and endurance                   Point: Body mechanics (Done)     Description:   Instruct learner(s) on proper positioning and spine alignment during self-care, functional mobility activities and/or exercises.              Learning Progress Summary           Patient Acceptance, E,D, VU,NR by  at 3/27/2023 1201    Comment: reason for consult, noted deficits, UE TE, bed mobility and transfer sequencing, benefit of mvmt/activity for health and not loosing strength and endurance                               User Key     Initials Effective Dates Name Provider Type Discipline     02/03/23 -  Mary Nath, OT Occupational Therapist OT              OT Recommendation and Plan  Planned Therapy Interventions (OT): activity tolerance training, adaptive equipment training, BADL retraining, functional balance retraining, occupation/activity based interventions, patient/caregiver education/training, ROM/therapeutic exercise, strengthening exercise, transfer/mobility retraining  Therapy Frequency (OT): daily  Plan of Care Review  Plan of Care Reviewed With: patient  Progress: no change  Outcome Evaluation: Pt. agreeable to only in bed TE due to doing bowel prep for colonoscopy tomorrow.  Pt. completed many UE and LE strengthening and AROM/AAROM TE to support ADL and related transfer independence.  Pt. remains appropriate for skilled OT services to address deficit areas and promote return to prior higher ADL independence level.  Continue to recommend IRF at discharge.     Time Calculation:    Time Calculation- OT     Row Name 03/30/23 1439             Time Calculation- OT    OT Start Time 1401  -BAILEY      OT Received On 03/30/23  -BAILEY      OT Goal Re-Cert Due Date 04/06/23  -         Timed Charges    88126 - OT Therapeutic Exercise Minutes 24  -BAILEY         Total  Minutes    Timed Charges Total Minutes 24  -BAILEY       Total Minutes 24  -BAILEY            User Key  (r) = Recorded By, (t) = Taken By, (c) = Cosigned By    Initials Name Provider Type    Mary Orr OT Occupational Therapist              Therapy Charges for Today     Code Description Service Date Service Provider Modifiers Qty    [id number]  OT THER PROC EA 15 MIN 3/30/2023 Mary Nath OT GO 2               Mary Nath OT  3/30/2023

## 2023-03-30 NOTE — PLAN OF CARE
Goal Outcome Evaluation:  Plan of Care Reviewed With: patient        Progress: no change  Outcome Evaluation: Pt. agreeable to only in bed TE due to doing bowel prep for colonoscopy tomorrow.  Pt. completed many UE and LE strengthening and AROM/AAROM TE to support ADL and related transfer independence.  Pt. remains appropriate for skilled OT services to address deficit areas and promote return to prior higher ADL independence level.  Continue to recommend IRF at discharge.

## 2023-03-30 NOTE — PLAN OF CARE
Goal Outcome Evaluation:      VSS. Paced on tele. Pt colonscopy cancelled today, clear liquid diet put in this afternoon- NPO at midnight. Heparin gtt restarted, to hold at 6AM.

## 2023-03-31 ENCOUNTER — APPOINTMENT (OUTPATIENT)
Dept: CT IMAGING | Facility: HOSPITAL | Age: 73
DRG: 163 | End: 2023-03-31
Payer: MEDICARE

## 2023-03-31 ENCOUNTER — APPOINTMENT (OUTPATIENT)
Dept: GENERAL RADIOLOGY | Facility: HOSPITAL | Age: 73
DRG: 163 | End: 2023-03-31
Payer: MEDICARE

## 2023-03-31 ENCOUNTER — ANESTHESIA (OUTPATIENT)
Dept: GASTROENTEROLOGY | Facility: HOSPITAL | Age: 73
DRG: 163 | End: 2023-03-31
Payer: MEDICARE

## 2023-03-31 LAB
ALBUMIN SERPL-MCNC: 2.7 G/DL (ref 3.5–5.2)
ALBUMIN/GLOB SERPL: 0.9 G/DL
ALP SERPL-CCNC: 76 U/L (ref 39–117)
ALT SERPL W P-5'-P-CCNC: 8 U/L (ref 1–41)
ANION GAP SERPL CALCULATED.3IONS-SCNC: 15 MMOL/L (ref 5–15)
AST SERPL-CCNC: 17 U/L (ref 1–40)
BACTERIA FLD CULT: NORMAL
BASOPHILS # BLD AUTO: 0.07 10*3/MM3 (ref 0–0.2)
BASOPHILS NFR BLD AUTO: 0.7 % (ref 0–1.5)
BILIRUB SERPL-MCNC: 0.3 MG/DL (ref 0–1.2)
BUN SERPL-MCNC: 9 MG/DL (ref 8–23)
BUN/CREAT SERPL: 15.5 (ref 7–25)
CALCIUM SPEC-SCNC: 8.4 MG/DL (ref 8.6–10.5)
CHLORIDE SERPL-SCNC: 111 MMOL/L (ref 98–107)
CO2 SERPL-SCNC: 19 MMOL/L (ref 22–29)
CREAT SERPL-MCNC: 0.58 MG/DL (ref 0.76–1.27)
DEPRECATED RDW RBC AUTO: 54.8 FL (ref 37–54)
EGFRCR SERPLBLD CKD-EPI 2021: 104.9 ML/MIN/1.73
EOSINOPHIL # BLD AUTO: 0.14 10*3/MM3 (ref 0–0.4)
EOSINOPHIL NFR BLD AUTO: 1.4 % (ref 0.3–6.2)
ERYTHROCYTE [DISTWIDTH] IN BLOOD BY AUTOMATED COUNT: 15.5 % (ref 12.3–15.4)
GLOBULIN UR ELPH-MCNC: 3 GM/DL
GLUCOSE BLDC GLUCOMTR-MCNC: 107 MG/DL (ref 70–130)
GLUCOSE BLDC GLUCOMTR-MCNC: 111 MG/DL (ref 70–130)
GLUCOSE BLDC GLUCOMTR-MCNC: 122 MG/DL (ref 70–130)
GLUCOSE BLDC GLUCOMTR-MCNC: 71 MG/DL (ref 70–130)
GLUCOSE BLDC GLUCOMTR-MCNC: 71 MG/DL (ref 70–130)
GLUCOSE SERPL-MCNC: 71 MG/DL (ref 65–99)
GRAM STN SPEC: NORMAL
GRAM STN SPEC: NORMAL
HCT VFR BLD AUTO: 38.5 % (ref 37.5–51)
HGB BLD-MCNC: 12 G/DL (ref 13–17.7)
IMM GRANULOCYTES # BLD AUTO: 0.12 10*3/MM3 (ref 0–0.05)
IMM GRANULOCYTES NFR BLD AUTO: 1.2 % (ref 0–0.5)
LYMPHOCYTES # BLD AUTO: 1.71 10*3/MM3 (ref 0.7–3.1)
LYMPHOCYTES NFR BLD AUTO: 16.5 % (ref 19.6–45.3)
MAGNESIUM SERPL-MCNC: 2.2 MG/DL (ref 1.6–2.4)
MCH RBC QN AUTO: 30.1 PG (ref 26.6–33)
MCHC RBC AUTO-ENTMCNC: 31.2 G/DL (ref 31.5–35.7)
MCV RBC AUTO: 96.5 FL (ref 79–97)
MONOCYTES # BLD AUTO: 0.84 10*3/MM3 (ref 0.1–0.9)
MONOCYTES NFR BLD AUTO: 8.1 % (ref 5–12)
NEUTROPHILS NFR BLD AUTO: 7.49 10*3/MM3 (ref 1.7–7)
NEUTROPHILS NFR BLD AUTO: 72.1 % (ref 42.7–76)
NRBC BLD AUTO-RTO: 0 /100 WBC (ref 0–0.2)
PHOSPHATE SERPL-MCNC: 4 MG/DL (ref 2.5–4.5)
PLATELET # BLD AUTO: 157 10*3/MM3 (ref 140–450)
PMV BLD AUTO: 11.4 FL (ref 6–12)
POTASSIUM SERPL-SCNC: 3.9 MMOL/L (ref 3.5–5.2)
PROT SERPL-MCNC: 5.7 G/DL (ref 6–8.5)
RBC # BLD AUTO: 3.99 10*6/MM3 (ref 4.14–5.8)
SODIUM SERPL-SCNC: 145 MMOL/L (ref 136–145)
UFH PPP CHRO-ACNC: 0.35 IU/ML (ref 0.3–0.7)
UFH PPP CHRO-ACNC: 0.36 IU/ML (ref 0.3–0.7)
WBC NRBC COR # BLD: 10.37 10*3/MM3 (ref 3.4–10.8)

## 2023-03-31 PROCEDURE — 80053 COMPREHEN METABOLIC PANEL: CPT | Performed by: INTERNAL MEDICINE

## 2023-03-31 PROCEDURE — 82962 GLUCOSE BLOOD TEST: CPT

## 2023-03-31 PROCEDURE — 85520 HEPARIN ASSAY: CPT

## 2023-03-31 PROCEDURE — 25010000002 HEPARIN (PORCINE) 25000-0.45 UT/250ML-% SOLUTION: Performed by: INTERNAL MEDICINE

## 2023-03-31 PROCEDURE — 84100 ASSAY OF PHOSPHORUS: CPT | Performed by: INTERNAL MEDICINE

## 2023-03-31 PROCEDURE — 0DJD8ZZ INSPECTION OF LOWER INTESTINAL TRACT, VIA NATURAL OR ARTIFICIAL OPENING ENDOSCOPIC: ICD-10-PCS | Performed by: INTERNAL MEDICINE

## 2023-03-31 PROCEDURE — 45378 DIAGNOSTIC COLONOSCOPY: CPT | Performed by: INTERNAL MEDICINE

## 2023-03-31 PROCEDURE — 97110 THERAPEUTIC EXERCISES: CPT

## 2023-03-31 PROCEDURE — 99231 SBSQ HOSP IP/OBS SF/LOW 25: CPT | Performed by: PHYSICIAN ASSISTANT

## 2023-03-31 PROCEDURE — 83735 ASSAY OF MAGNESIUM: CPT | Performed by: INTERNAL MEDICINE

## 2023-03-31 PROCEDURE — 71045 X-RAY EXAM CHEST 1 VIEW: CPT

## 2023-03-31 PROCEDURE — 25010000002 PIPERACILLIN SOD-TAZOBACTAM PER 1 G: Performed by: INTERNAL MEDICINE

## 2023-03-31 PROCEDURE — 99232 SBSQ HOSP IP/OBS MODERATE 35: CPT | Performed by: INTERNAL MEDICINE

## 2023-03-31 PROCEDURE — 25010000002 PROPOFOL 10 MG/ML EMULSION: Performed by: NURSE ANESTHETIST, CERTIFIED REGISTERED

## 2023-03-31 PROCEDURE — 85025 COMPLETE CBC W/AUTO DIFF WBC: CPT | Performed by: INTERNAL MEDICINE

## 2023-03-31 PROCEDURE — 97530 THERAPEUTIC ACTIVITIES: CPT

## 2023-03-31 PROCEDURE — 71250 CT THORAX DX C-: CPT

## 2023-03-31 RX ORDER — HEPARIN SODIUM 10000 [USP'U]/100ML
23 INJECTION, SOLUTION INTRAVENOUS
Status: DISCONTINUED | OUTPATIENT
Start: 2023-03-31 | End: 2023-04-04

## 2023-03-31 RX ORDER — PROPOFOL 10 MG/ML
VIAL (ML) INTRAVENOUS AS NEEDED
Status: DISCONTINUED | OUTPATIENT
Start: 2023-03-31 | End: 2023-03-31 | Stop reason: SURG

## 2023-03-31 RX ORDER — LIDOCAINE HYDROCHLORIDE 10 MG/ML
INJECTION, SOLUTION EPIDURAL; INFILTRATION; INTRACAUDAL; PERINEURAL AS NEEDED
Status: DISCONTINUED | OUTPATIENT
Start: 2023-03-31 | End: 2023-03-31 | Stop reason: SURG

## 2023-03-31 RX ORDER — SODIUM CHLORIDE, SODIUM LACTATE, POTASSIUM CHLORIDE, CALCIUM CHLORIDE 600; 310; 30; 20 MG/100ML; MG/100ML; MG/100ML; MG/100ML
9 INJECTION, SOLUTION INTRAVENOUS CONTINUOUS
Status: DISCONTINUED | OUTPATIENT
Start: 2023-03-31 | End: 2023-04-06 | Stop reason: SDUPTHER

## 2023-03-31 RX ORDER — ONDANSETRON 2 MG/ML
4 INJECTION INTRAMUSCULAR; INTRAVENOUS ONCE AS NEEDED
Status: DISCONTINUED | OUTPATIENT
Start: 2023-03-31 | End: 2023-03-31 | Stop reason: HOSPADM

## 2023-03-31 RX ORDER — IPRATROPIUM BROMIDE AND ALBUTEROL SULFATE 2.5; .5 MG/3ML; MG/3ML
3 SOLUTION RESPIRATORY (INHALATION) ONCE AS NEEDED
Status: DISCONTINUED | OUTPATIENT
Start: 2023-03-31 | End: 2023-03-31 | Stop reason: HOSPADM

## 2023-03-31 RX ADMIN — ACETAMINOPHEN 325MG 650 MG: 325 TABLET ORAL at 21:21

## 2023-03-31 RX ADMIN — OXYCODONE HYDROCHLORIDE AND ACETAMINOPHEN 1000 MG: 500 TABLET ORAL at 09:59

## 2023-03-31 RX ADMIN — CARVEDILOL 50 MG: 12.5 TABLET, FILM COATED ORAL at 09:58

## 2023-03-31 RX ADMIN — HEPARIN SODIUM 20 UNITS/KG/HR: 10000 INJECTION, SOLUTION INTRAVENOUS at 11:53

## 2023-03-31 RX ADMIN — MIRTAZAPINE 15 MG: 15 TABLET, FILM COATED ORAL at 21:21

## 2023-03-31 RX ADMIN — ACETAMINOPHEN 325MG 650 MG: 325 TABLET ORAL at 15:03

## 2023-03-31 RX ADMIN — FAMOTIDINE 20 MG: 20 TABLET ORAL at 09:57

## 2023-03-31 RX ADMIN — ATORVASTATIN CALCIUM 80 MG: 40 TABLET, FILM COATED ORAL at 21:21

## 2023-03-31 RX ADMIN — Medication 5 MG: at 21:21

## 2023-03-31 RX ADMIN — PROPOFOL 20 MG: 10 INJECTION, EMULSION INTRAVENOUS at 08:39

## 2023-03-31 RX ADMIN — MEGESTROL ACETATE 400 MG: 40 SUSPENSION ORAL at 09:59

## 2023-03-31 RX ADMIN — PANTOPRAZOLE SODIUM 40 MG: 40 TABLET, DELAYED RELEASE ORAL at 21:21

## 2023-03-31 RX ADMIN — PROPOFOL 100 MG: 10 INJECTION, EMULSION INTRAVENOUS at 08:36

## 2023-03-31 RX ADMIN — TERAZOSIN HYDROCHLORIDE 1 MG: 1 CAPSULE ORAL at 21:21

## 2023-03-31 RX ADMIN — TAZOBACTAM SODIUM AND PIPERACILLIN SODIUM 3.38 G: 375; 3 INJECTION, SOLUTION INTRAVENOUS at 15:07

## 2023-03-31 RX ADMIN — CARVEDILOL 50 MG: 12.5 TABLET, FILM COATED ORAL at 17:45

## 2023-03-31 RX ADMIN — LIDOCAINE HYDROCHLORIDE 50 MG: 10 INJECTION, SOLUTION EPIDURAL; INFILTRATION; INTRACAUDAL; PERINEURAL at 08:36

## 2023-03-31 RX ADMIN — TAZOBACTAM SODIUM AND PIPERACILLIN SODIUM 3.38 G: 375; 3 INJECTION, SOLUTION INTRAVENOUS at 09:58

## 2023-03-31 RX ADMIN — CYCLOBENZAPRINE 5 MG: 10 TABLET, FILM COATED ORAL at 21:24

## 2023-03-31 RX ADMIN — ASPIRIN 81 MG CHEWABLE TABLET 81 MG: 81 TABLET CHEWABLE at 09:58

## 2023-03-31 RX ADMIN — PROPOFOL 20 MG: 10 INJECTION, EMULSION INTRAVENOUS at 08:41

## 2023-03-31 RX ADMIN — SODIUM CHLORIDE, POTASSIUM CHLORIDE, SODIUM LACTATE AND CALCIUM CHLORIDE 9 ML/HR: 600; 310; 30; 20 INJECTION, SOLUTION INTRAVENOUS at 07:59

## 2023-03-31 RX ADMIN — LISINOPRIL 5 MG: 5 TABLET ORAL at 09:57

## 2023-03-31 NOTE — ANESTHESIA PREPROCEDURE EVALUATION
Anesthesia Evaluation     Patient summary reviewed and Nursing notes reviewed   no history of anesthetic complications:  NPO Solid Status: > 8 hours  NPO Liquid Status: > 8 hours           Airway   Mallampati: I  TM distance: >3 FB  Neck ROM: full  No difficulty expected  Dental    (+) edentulous, upper dentures and lower dentures    Pulmonary - normal exam   (+) pleural effusion, pulmonary embolism, a smoker Former,   (-) asthma, shortness of breath, recent URI, no home oxygen  Cardiovascular - normal exam    ECG reviewed    (+) pacemaker pacemaker, ICD interrogated 3-6 months ago, hypertension, CAD, CHF Systolic <55%, PVD,     ROS comment: ECG Atrial-sensed ventricular-paced rhythm      Interpretation Summary       •  Estimated left ventricular EF = 30%.  There is global hypokinesis.  The basal-mid posterior wall appears akinetic.  •  Normal right ventricular cavity size, wall thickness, systolic function and septal motion noted. Electronic lead present in the ventricle  •  Septal wall motion is abnormal, consistent with right ventricular pacing  •  No hemodynamically significant valvular stenosis or regurgitation noted.         Neuro/Psych  (+) CVA (right sided weakness),    (-) seizures  GI/Hepatic/Renal/Endo    (+)  GERD,  renal disease (creat 0.5   K wnl) CRI, diabetes mellitus type 2,     Musculoskeletal     Abdominal    Substance History      OB/GYN          Other   blood dyscrasia ( pLT count 152),     ROS/Med Hx Other: On heparin drip for PE                    Anesthesia Plan    ASA 4     general     intravenous induction     Anesthetic plan, risks, benefits, and alternatives have been provided, discussed and informed consent has been obtained with: patient.    Plan discussed with CRNA.        CODE STATUS:

## 2023-03-31 NOTE — ANESTHESIA POSTPROCEDURE EVALUATION
Patient: Timmy Guajardo    Procedure Summary     Date: 03/31/23 Room / Location:  [facility]    Anesthesia Start: 0832 Anesthesia Stop: 0901    Procedure: COLONOSCOPY Diagnosis:     Surgeons: Brunner, Mark I, MD Provider: Travon Glynn Jr., MD    Anesthesia Type: general ASA Status: 4          Anesthesia Type: general    Vitals  Vitals Value Taken Time   /64 03/31/23 0900   Temp 96.5 °F (35.8 °C) 03/31/23 0853   Pulse 70 03/31/23 0900   Resp 16 03/31/23 0900   SpO2 97 % 03/31/23 0900           Post Anesthesia Care and Evaluation    Patient location during evaluation: PACU  Patient participation: complete - patient participated  Level of consciousness: awake and alert  Pain management: adequate    Airway patency: patent  Anesthetic complications: No anesthetic complications  PONV Status: none  Cardiovascular status: hemodynamically stable and acceptable  Respiratory status: nonlabored ventilation, acceptable and nasal cannula  Hydration status: acceptable

## 2023-03-31 NOTE — PROGRESS NOTES
CTS Progress Note      Chief Complaint: Pleural effusion      Subjective  Sitting up in bed.  Conversant.  Pleasant.  No new complaint.  Just back from colonoscopy and reportedly everything looked good.      Objective    Physical Exam:   Vital Signs   Temp:  [97.8 °F (36.6 °C)-98 °F (36.7 °C)] 97.8 °F (36.6 °C)  Heart Rate:  [69-78] 70  Resp:  [16-18] 16  BP: (139-163)/(78-91) 163/90   GEN: NAD   CV: Regular rate and rhythm   RESP: Decreased bilateral bases but otherwise clear      EXT: Warm without significant edema   Chest tube removal site examined, DuoDERM patch in place.  No surrounding erythema or drainage or swelling   Results     Results from last 7 days   Lab Units 03/31/23  0328   WBC 10*3/mm3 10.37   HEMOGLOBIN g/dL 12.0*   HEMATOCRIT % 38.5   PLATELETS 10*3/mm3 157     Results from last 7 days   Lab Units 03/31/23  0328   SODIUM mmol/L 145   POTASSIUM mmol/L 3.9   CHLORIDE mmol/L 111*   CO2 mmol/L 19.0*   BUN mg/dL 9   CREATININE mg/dL 0.58*   GLUCOSE mg/dL 71   CALCIUM mg/dL 8.4*     Results from last 7 days   Lab Units 03/28/23  1357 03/27/23  1344 03/27/23  0708   INR   --   --  1.38*   APTT seconds 46.3*   < > 88.1    < > = values in this interval not displayed.       CXR: IMPRESSION:  Impression:  Stable volume loss in the right lung with right basilar airspace disease/atelectasis and small left pleural effusion      Assessment & Plan   Bilateral pulmonary embolus with moderate right pleural effusion status post IR placed chest tube discontinued 3/30/2023,.  Continued moderate size right-sided pleural effusion    Plan   Question need for further drainage of right-sided effusion with a large bore chest tube versus observation.    LISA Mcginnis  03/31/23  08:50 EDT

## 2023-03-31 NOTE — PLAN OF CARE
Goal Outcome Evaluation:      VSS this shift. Patient a/o. Clear liquid diet, NPO at midnight for procedure today. Patient's heparin gtt to be stopped at 0600. No acute events overnight.

## 2023-03-31 NOTE — THERAPY TREATMENT NOTE
Patient Name: Timmy Guajardo  : 1950    MRN: [id number]                              Today's Date: 3/31/2023       Admit Date: 3/24/2023    Visit Dx:     ICD-10-CM ICD-9-CM   1. Weight loss  R63.4 783.21   2. Unintentional weight loss  R63.4 783.21   3. 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  K22.70 530.85     Patient Active Problem List   Diagnosis   • Acute right-sided weakness   • Suspected cerebrovascular accident (CVA)   • Leukocytosis   • CHF (congestive heart failure) (HCC)   • Thrombocytopenia (HCC)   • HTN (hypertension)   • Presence of cardiac pacemaker   • Hyperglycemia   • Gangrene of toe of left foot (HCC)   • Closed displaced intertrochanteric fracture of right femur, initial encounter ([facility])   • Pleural effusion   • Unintentional weight loss   • Hypoalbuminemia   • Severe malnutrition (HCC)   • Bilateral pulmonary embolism (HCC)   • History of CVA (cerebrovascular accident)   • Debility     Past Medical History:   Diagnosis Date   • Cardiomyopathy (HCC)    • CHF (congestive heart failure) (HCC)    • Coronary artery disease    • Diabetes mellitus (HCC)    • GERD (gastroesophageal reflux disease)    • HTN (hypertension)    • Stroke (HCC)     Right sided weakness   • Stroke (HCC)      Past Surgical History:   Procedure Laterality Date   • AMPUTATION DIGIT Left 2021    Procedure: AMPUTATION DIGIT - GREAT TOE AMPUTATION LEFT;  Surgeon: Dario Marmolejo MD;  Location: [facility] OR;  Service: General;  Laterality: Left;   • AORTAGRAM N/A 2021    Procedure: AORTAGRAM WITH RUNOFFS, LEFT SFA BALLOON ANGIOPLASTY;  Surgeon: Tim Mahan MD;  Location: [facility] HYBRID SHELIA;  Service: Vascular;  Laterality: N/A;  FL TIME 6 MIN 54 SECS  82 MGY  CONTRAST VISIPAQUE 100ML     • CARDIAC CATHETERIZATION     • CARDIAC PACEMAKER PLACEMENT      pacemaker/defibrillator, [employer]   • ENDOSCOPY N/A 3/29/2023    Procedure: ESOPHAGOGASTRODUODENOSCOPY;  Surgeon: Brunner, Mark I, MD;  Location: [facility]  ENDOSCOPY;  Service: Gastroenterology;  Laterality: N/A;   • HERNIA REPAIR Bilateral     Inguinal hernia   • HIP TROCHANTERIC NAILING WITH INTRAMEDULLARY HIP SCREW Right 10/18/2022    Procedure: HIP TROCHANTERIC NAILING WITH INTRAMEDULLARY HIP SCREW RIGHT;  Surgeon: Bj Steinberg MD;  Location: [facility];  Service: Orthopedics;  Laterality: Right;      General Information     Row Name 03/31/23 1552          Physical Therapy Time and Intention    Document Type therapy note (daily note)  -ML     Mode of Treatment physical therapy  -ML     Row Name 03/31/23 1552          General Information    Patient Profile Reviewed yes  -ML     Existing Precautions/Restrictions fall  mild R sided weakness for CVA  -ML     Barriers to Rehab medically complex;previous functional deficit  -ML     Row Name 03/31/23 1552          Cognition    Orientation Status (Cognition) oriented x 3  -ML     Row Name 03/31/23 1552          Safety Issues, Functional Mobility    Safety Issues Affecting Function (Mobility) insight into deficits/self-awareness;safety precaution awareness;safety precautions follow-through/compliance;sequencing abilities  -ML     Impairments Affecting Function (Mobility) balance;endurance/activity tolerance;coordination;strength;range of motion (ROM);motor control;postural/trunk control  -ML           User Key  (r) = Recorded By, (t) = Taken By, (c) = Cosigned By    Initials Name Provider Type    ML Sharon Heller Physical Therapist               Mobility     Row Name 03/31/23 1554          Bed Mobility    Bed Mobility supine-sit;sit-supine  -ML     Supine-Sit [place] (Bed Mobility) verbal cues;minimum assist (75% patient effort);1 person assist  -ML     Sit-Supine [place] (Bed Mobility) minimum assist (75% patient effort);1 person assist  -ML     Assistive Device (Bed Mobility) bed rails;head of bed elevated  -ML     Row Name 03/31/23 1557          Bed-Chair Transfer    Bed-Chair [place] (Transfers) not  tested  -ML     Comment, (Bed-Chair Transfer) Patient declines transfer to chair, patient limited by fear of falling  -ML     Row Name 03/31/23 1554          Sit-Stand Transfer    Sit-Stand [place] (Transfers) verbal cues;minimum assist (75% patient effort);1 person assist  -ML     Assistive Device (Sit-Stand Transfers) walker, front-wheeled  -ML     Comment, (Sit-Stand Transfer) Patient completed 2x sit to stand transfer from EOB  -ML     Row Name 03/31/23 1554          Gait/Stairs (Locomotion)    [place] Level (Gait) verbal cues;minimum assist (75% patient effort);1 person assist  -ML     Assistive Device (Gait) walker, front-wheeled  -ML     Distance in Feet (Gait) 2  -ML     Deviations/Abnormal Patterns (Gait) base of support, narrow  -ML     Bilateral Gait Deviations forward flexed posture  -ML     Comment, (Gait/Stairs) Patient able to take 2 side steps up toawrds HOB  -ML           User Key  (r) = Recorded By, (t) = Taken By, (c) = Cosigned By    Initials Name Provider Type    ML Sharon Heller Physical Therapist               Obj/Interventions     Row Name 03/31/23 1557          Motor Skills    Therapeutic Exercise shoulder;hip;knee  -ML     Row Name 03/31/23 1557          Shoulder (Therapeutic Exercise)    Shoulder (Therapeutic Exercise) AROM (active range of motion)  -ML     Shoulder AROM (Therapeutic Exercise) bilateral;scapular retraction;10 repetitions  -ML     Row Name 03/31/23 1557          Hip (Therapeutic Exercise)    Hip (Therapeutic Exercise) strengthening exercise;AROM (active range of motion);AAROM (active assistive range of motion);isometric exercises  -ML     Hip AROM (Therapeutic Exercise) left;aBduction;aDduction;15 repititions  -ML     Hip AAROM (Therapeutic Exercise) right;aBduction;aDduction;10 repetitions  -ML     Hip Isometrics (Therapeutic Exercise) gluteal sets;10 repetitions;5 second hold  -ML     Hip Strengthening (Therapeutic Exercise) bilateral;marching while seated;10  repetitions  -ML     Row Name 03/31/23 1557          Knee (Therapeutic Exercise)    Knee (Therapeutic Exercise) strengthening exercise  -ML     Knee AROM (Therapeutic Exercise) bilateral;LAQ (long arc quad);heel slides;10 repetitions;SLR (straight leg raise)  -ML     Row Name 03/31/23 1557          Balance    Balance Assessment sitting static balance;sitting dynamic balance;sit to stand dynamic balance;standing static balance;standing dynamic balance  -ML     Static Sitting Balance supervision  -ML     Dynamic Sitting Balance contact guard  -ML     Position, Sitting Balance unsupported;sitting edge of bed  -ML     Sit to Stand Dynamic Balance verbal cues;minimal assist;1-person assist  -ML     Static Standing Balance verbal cues;contact guard  -ML     Dynamic Standing Balance verbal cues;minimal assist  -ML     Position/Device Used, Standing Balance supported;walker, rolling  -ML     Balance Interventions sitting;standing;sit to stand;supported;static;dynamic;dynamic reaching  -ML           User Key  (r) = Recorded By, (t) = Taken By, (c) = Cosigned By    Initials Name Provider Type    ML Sharon Heller Physical Therapist               Goals/Plan    No documentation.                Clinical Impression     [facility] Name 03/31/23 1600          Pain    Pretreatment Pain Rating 0/10 - no pain  -ML     Posttreatment Pain Rating 0/10 - no pain  -ML     Row Name 03/31/23 1600          Plan of Care Review    Plan of Care Reviewed With patient  -ML     Progress improving  -ML     Outcome Evaluation Physical therapy treatment complete. The patient completed LE therex at EOB and in supine position. Patient able to take 2 side steps up towards HOB with minAx1 and RW. Patient declined transfer to chair. Patient limited in mobility partially by fear of falling. The patient continues to present below baseline for mobility. The patient would continue to benefit from skilled PT to address strength, balance and activity tolerance deficits.  Continue to recommed IPR at discharge.  -ML     Row Name 03/31/23 1600          Vital Signs    Pre Patient Position Supine  -ML     Intra Patient Position Sitting  -ML     Post Patient Position Supine  -ML     Row Name 03/31/23 1600          Positioning and Restraints    Pre-Treatment Position in bed  -ML     Post Treatment Position bed  -ML     In Bed notified nsg;fowlers;call light within reach;encouraged to call for assist;exit alarm on;side rails up x2  -ML           User Key  (r) = Recorded By, (t) = Taken By, (c) = Cosigned By    Initials Name Provider Type    Sharon Alvarez Physical Therapist               Outcome Measures     Row Name 03/31/23 1603          How much help from another person do you currently need...    Turning from your back to your side while in flat bed without using bedrails? 3  -ML     Moving from lying on back to sitting on the side of a flat bed without bedrails? 3  -ML     Moving to and from a bed to a chair (including a wheelchair)? 3  -ML     Standing up from a chair using your arms (e.g., wheelchair, bedside chair)? 3  -ML     Climbing 3-5 steps with a railing? 2  -ML     To walk in hospital room? 3  -ML     AM-PAC 6 Clicks Score (PT) 17  -ML     Highest level of mobility 5 --> Static standing  -ML     Row Name 03/31/23 1603          Functional Assessment    Outcome Measure Options AM-PAC 6 Clicks Basic Mobility (PT)  -ML           User Key  (r) = Recorded By, (t) = Taken By, (c) = Cosigned By    Initials Name Provider Type    Sharon Alvarez Physical Therapist                             Physical Therapy Education     Title: PT OT SLP Therapies (In Progress)     Topic: Physical Therapy (Done)     Point: Mobility training (Done)     Learning Progress Summary           Patient Acceptance, E, VU,NR by  at 3/31/2023 1604    Acceptance, E, VU,NR by  at 3/28/2023 1323    Acceptance, E,TB, VU,NR by  at 3/26/2023 1536   Family Acceptance, E,TB, VU,NR by  at 3/26/2023 1536                    Point: Home exercise program (Done)     Learning Progress Summary           Patient Acceptance, E, VU,NR by  at 3/31/2023 1604    Acceptance, E, VU,NR by  at 3/28/2023 1323    Acceptance, E,TB, VU,NR by  at 3/26/2023 1536   Family Acceptance, E,TB, VU,NR by  at 3/26/2023 1536                   Point: Body mechanics (Done)     Learning Progress Summary           Patient Acceptance, E,TB, VU,NR by  at 3/26/2023 1536   Family Acceptance, E,TB, VU,NR by  at 3/26/2023 1536                   Point: Precautions (Done)     Learning Progress Summary           Patient Acceptance, E, VU,NR by  at 3/31/2023 1604    Acceptance, E, VU,NR by  at 3/28/2023 1323    Acceptance, E,TB, VU,NR by  at 3/26/2023 1536   Family Acceptance, E,TB, VU,NR by  at 3/26/2023 1536                               User Key     Initials Effective Dates Name Provider Type Discipline     11/02/22 -  Gabino Tirado, PT Physical Therapist PT     04/22/21 -  Sharon Heller Physical Therapist PT              PT Recommendation and Plan     Plan of Care Reviewed With: patient  Progress: improving  Outcome Evaluation: Physical therapy treatment complete. The patient completed LE therex at EOB and in supine position. Patient able to take 2 side steps up towards HOB with minAx1 and RW. Patient declined transfer to chair. Patient limited in mobility partially by fear of falling. The patient continues to present below baseline for mobility. The patient would continue to benefit from skilled PT to address strength, balance and activity tolerance deficits. Continue to recommed IPR at discharge.     Time Calculation:    PT Charges     Row Name 03/31/23 1604             Time Calculation    Start Time 1526  -ML      PT Received On 03/31/23  -ML         Timed Charges    68586 - PT Therapeutic Exercise Minutes 12  -ML      65067 - PT Therapeutic Activity Minutes 14  -ML         Total Minutes    Timed Charges Total Minutes 26  -ML       Total  Minutes 26  -ML            User Key  (r) = Recorded By, (t) = Taken By, (c) = Cosigned By    Initials Name Provider Type    Sharon Alvarez Physical Therapist              Therapy Charges for Today     Code Description Service Date Service Provider Modifiers Qty    68943008245  PT THER PROC EA 15 MIN 3/31/2023 Sharon Heller GP 1    [id number]  PT THERAPEUTIC ACT EA 15 MIN 3/31/2023 Sharon Heller GP 1          PT G-Codes  Outcome Measure Options: AM-PAC 6 Clicks Basic Mobility (PT)  AM-PAC 6 Clicks Score (PT): 17  AM-PAC 6 Clicks Score (OT): 14       Sharon Heller  3/31/2023

## 2023-03-31 NOTE — BRIEF OP NOTE
COLONOSCOPY  Progress Note    Timmy Guajardo  3/31/2023    Colonoscopy shows a few sigmoid diverticula.  No polyps or masses seen.  Terminal ileum is normal.    >> Screening colonoscopy in 10 years, health permitting.  >> Follow-up gastroenterology in 2-3 months.  Will consider biopsy Meyers's esophagus at that time (off anticoagulation).  >> Needs lifelong PPI.    Will sign off.  Please call with questions or concerns    Mark I. Brunner, MD     Date: 3/31/2023  Time: 08:57 EDT

## 2023-03-31 NOTE — PROGRESS NOTES
[facility] Medicine Services  PROGRESS NOTE    Patient Name: Timmy Guajardo  : 1950  MRN: [id number]    Date of Admission: 3/24/2023  Primary Care Physician: Arsen Seals APRN    Subjective   Subjective     CC:  Follow-up for weakness    HPI:  I have seen and evaluated the patient this morning.  Comfortable in bed.  No acute complaints.  Scheduled for colonoscopy today.  Feeling hungry    ROS:  General : no fevers, chills,++ weakness  CVS: No chest pain, palpitations  Respiratory: No cough, dyspnea  GI: No N/V/D, abd pain  10 point review of systems is negative except for what is mentioned in HPI    Objective   Objective     Vital Signs:   Temp:  [96.5 °F (35.8 °C)-98 °F (36.7 °C)] 97.7 °F (36.5 °C)  Heart Rate:  [69-78] 70  Resp:  [16-18] 16  BP: ()/(59-91) 140/73     Physical Exam:  General: Chronically ill looking, not in distress, conversant and cooperative  Head: Atraumatic and normocephalic  Eyes: No Icterus. No pallor  Ears:  Ears appear intact with no abnormalities noted  Throat: No oral lesions, no thrush  Neck: Supple, trachea midline  Lungs: Diminished air entry right lung zone.  No wheezing or crackles, right chest tube in place  Heart:  Normal S1 and S2, no murmur, no gallop, No JVD, no lower extremity swelling  Abdomen:  Soft, no tenderness, no organomegaly, normal bowel sounds, no organomegaly  Extremities: pulses equal bilaterally  Skin: No bleeding, bruising or rash, normal skin turgor and elasticity  Neurologic: Cranial nerves appear intact with no evidence of facial asymmetry, normal motor and sensory functions in all 4 extremities  Psych: Alert and oriented x 3, normal mood    Results Reviewed:  LAB RESULTS:      Lab 23  0328 23  2346 23  1137 23  0444 23  2037 23  1357 23  0842 23  0649 23  2254 23  1344 23  0708 23  1136 23  0436 23  0954  03/25/23  0722 03/25/23  0120 03/24/23  2101 03/24/23  1938 03/24/23  1745   WBC 10.37  --   --   --  9.99  --   --   --  10.01  --   --  8.60  --  10.42  --   --   --   --   --  17.96*   HEMOGLOBIN 12.0*  --   --   --  12.1*  --   --   --  11.2*  --   --  10.9*  --  10.6*  --   --   --   --   --  12.9*   HEMATOCRIT 38.5  --   --   --  38.3  --   --   --  35.8*  --   --  34.9*  --  32.5*  --   --   --   --   --  41.9   PLATELETS 157  --   --   --  132*  --   --   --  193  --   --  155  --  162  --   --   --   --   --  193   NEUTROS ABS 7.49*  --   --   --  7.51*  --   --   --  7.84*  --   --  6.67  --  8.48*  --   --   --   --   --  15.92*   IMMATURE GRANS (ABS) 0.12*  --   --   --  0.09*  --   --   --  0.09*  --   --  0.07*  --  0.08*  --   --   --   --   --  0.17*   LYMPHS ABS 1.71  --   --   --  1.48  --   --   --  1.23  --   --  1.08  --  1.07  --   --   --   --   --  0.83   MONOS ABS 0.84  --   --   --  0.69  --   --   --  0.70  --   --  0.62  --  0.69  --   --   --   --   --  0.99*   EOS ABS 0.14  --   --   --  0.15  --   --   --  0.09  --   --  0.11  --  0.05  --   --   --   --   --  0.00   MCV 96.5  --   --   --  96.0  --   --   --  96.0  --   --  95.1  --  92.1  --   --   --   --   --  97.4*   CRP  --   --   --   --   --   --   --   --   --   --   --   --   --   --   --  10.22*  --   --   --   --    PROCALCITONIN  --   --   --   --   --   --   --   --   --   --   --   --   --   --   --   --   --   --  0.14  --    LACTATE  --   --   --   --   --   --   --   --   --   --   --   --   --   --   --   --   --  3.2*  --  3.1*   LDH  --   --   --   --   --   --   --   --   --   --   --  217  --   --   --   --   --   --   --   --    PROTIME  --   --   --   --   --   --   --   --   --   --   --  16.9*  --   --   --   --  36.4*  --   --   --    APTT  --   --   --   --   --   --  46.3* 132.6*  --  42.4* 40.7* 88.1   < > 77.1   < >  --  >200.0*  --   --   --    HEPARIN ANTI-XA  --  0.35 0.50 0.27* 0.35 0.15*  --   --    --   --   --   --   --   --    < > >1.10*  --   --   --   --     < > = values in this interval not displayed.         Lab 03/31/23 0328 03/29/23 0444 03/28/23 0649 03/27/23 1945 03/27/23 0708 03/26/23 0436 03/25/23 0722 03/24/23 1938 03/24/23  1745   SODIUM 145 142 140  --  140 140 137  --   --    POTASSIUM 3.9 4.2 4.3 4.8 3.4* 3.6 4.0  --   --    CHLORIDE 111* 108* 108*  --  105 105 102  --   --    CO2 19.0* 21.0* 23.0  --  22.0 24.0 16.0*  --   --    ANION GAP 15.0 13.0 9.0  --  13.0 11.0 19.0*  --   --    BUN 9 10 10  --  9 12 21  --   --    CREATININE 0.58* 0.62* 0.65*  --  0.61* 0.59* 0.71*  --   --    EGFR 104.9 102.8 101.4  --  103.3 104.4 98.7  --   --    GLUCOSE 71 72 87  --  77 68 139*  --   --    CALCIUM 8.4* 8.3* 8.2*  --  8.5* 8.1* 8.4*  --   --    MAGNESIUM 2.2 2.0 2.2  --  1.8 1.8 1.8 2.1  --    PHOSPHORUS 4.0 3.3 3.0  --  3.8 4.5 3.9  --    < >   HEMOGLOBIN A1C  --   --   --   --   --   --  6.30*  --   --    TSH  --   --   --   --   --   --   --  1.610  --     < > = values in this interval not displayed.         Lab 03/31/23 0328 03/28/23 0649 03/27/23 0708 03/26/23 0436 03/24/23  1745   TOTAL PROTEIN 5.7* 6.0 6.0 5.3* 7.2   ALBUMIN 2.7* 2.5* 2.5* 2.6* 2.8*   GLOBULIN 3.0 3.5 3.5 2.7 4.4   ALT (SGPT) 8 11 14 15 19   AST (SGOT) 17 22 15 18 21   BILIRUBIN 0.3 0.4 0.4 0.4 0.5   ALK PHOS 76 77 76 76 98   LIPASE  --   --   --   --  15         Lab 03/27/23  0708 03/25/23  0120   PROTIME 16.9* 36.4*   INR 1.38* 3.62*             Lab 03/25/23  0954   VITAMIN B 12 260         Brief Urine Lab Results  (Last result in the past 365 days)      Color   Clarity   Blood   Leuk Est   Nitrite   Protein   CREAT   Urine HCG        03/24/23 2329 Yellow   Turbid   Small (1+)   Moderate (2+)   Negative   30 mg/dL (1+)                 Microbiology Results Abnormal     Procedure Component Value - Date/Time    Body Fluid Culture - Body Fluid, Pleural Cavity [[id number]] Collected: 03/27/23 1532    Lab Status: Final  result Specimen: Body Fluid from Pleural Cavity Updated: 03/31/23 0952     Body Fluid Culture No growth at 3 days     Gram Stain Rare (1+) WBCs per low power field      No organisms seen    MRSA Screen, PCR (Inpatient) - Swab, Nares [[id number]]  (Normal) Collected: 03/27/23 0857    Lab Status: Final result Specimen: Swab from Nares Updated: 03/27/23 1107     MRSA PCR Negative    Narrative:      The negative predictive value of this diagnostic test is high and should only be used to consider de-escalating anti-MRSA therapy. A positive result may indicate colonization with MRSA and must be correlated clinically.  MRSA Negative    COVID PRE-OP / PRE-PROCEDURE SCREENING ORDER (NO ISOLATION) - Swab, Nasopharynx [[id number]]  (Normal) Collected: 03/24/23 2256    Lab Status: Final result Specimen: Swab from Nasopharynx Updated: 03/24/23 2356    Narrative:      The following orders were created for panel order COVID PRE-OP / PRE-PROCEDURE SCREENING ORDER (NO ISOLATION) - Swab, Nasopharynx.  Procedure                               Abnormality         Status                     ---------                               -----------         ------                     Respiratory Panel PCR w/...[[id number]]  Normal              Final result                 Please view results for these tests on the individual orders.    Respiratory Panel PCR w/COVID-19(SARS-CoV-2) SULEIMAN/TAWANNA/BLAISE/PAD/COR/MAD/ANTHONY In-House, NP Swab in UTM/VTM, 3-4 HR TAT - Swab, Nasopharynx [[id number]]  (Normal) Collected: 03/24/23 2256    Lab Status: Final result Specimen: Swab from Nasopharynx Updated: 03/24/23 2356     ADENOVIRUS, PCR Not Detected     Coronavirus 229E Not Detected     Coronavirus HKU1 Not Detected     Coronavirus NL63 Not Detected     Coronavirus OC43 Not Detected     COVID19 Not Detected     Human Metapneumovirus Not Detected     Human Rhinovirus/Enterovirus Not Detected     Influenza A PCR Not Detected     Influenza B PCR Not Detected      Parainfluenza Virus 1 Not Detected     Parainfluenza Virus 2 Not Detected     Parainfluenza Virus 3 Not Detected     Parainfluenza Virus 4 Not Detected     RSV, PCR Not Detected     Bordetella pertussis pcr Not Detected     Bordetella parapertussis PCR Not Detected     Chlamydophila pneumoniae PCR Not Detected     Mycoplasma pneumo by PCR Not Detected    Narrative:      In the setting of a positive respiratory panel with a viral infection PLUS a negative procalcitonin without other underlying concern for bacterial infection, consider observing off antibiotics or discontinuation of antibiotics and continue supportive care. If the respiratory panel is positive for atypical bacterial infection (Bordetella pertussis, Chlamydophila pneumoniae, or Mycoplasma pneumoniae), consider antibiotic de-escalation to target atypical bacterial infection.          CT Chest With Contrast Diagnostic    Result Date: 3/30/2023  CT CHEST W CONTRAST DIAGNOSTIC Date of Exam: 3/30/2023 12:50 PM EDT Indication: Pleural effusions, status post right chest tube removal, pulmonary embolus, follow-up. Comparison: 3/24/2023. Technique: Axial CT images were obtained of the chest after the uneventful intravenous administration of 85 cc of Isovue-300 contrast.  Reconstructed coronal and sagittal images were also obtained. Automated exposure control and iterative construction methods were used. Findings: There continues to be a moderate-sized right pleural effusion with most of the pleural fluid subpulmonic in location. There is reactive pleural enhancement. There are gas bubbles within the pleural fluid presumably secondary to recent chest tube placement. I cannot exclude an empyema or bronchopleural fistula. There is compressive atelectasis and consolidation of the right lower lobe similar to the previous exam. The right lower lobe is partially collapsed. There is a mild compressive atelectasis on the right upper lobe. There is some patchy  inflammatory changes which have developed in the posterior segment of the right upper lobe. There is an irregular shaped density in the anterior segment of the right upper lobe felt to represent pulmonary scarring. It measures 1.4 cm in diameter. I would recommend a follow-up CT of the chest in 6 months to confirm stability. The left lung and pleural space are clear. There is interval decrease in size of the embolus located at the segmental branch point of the posterior segment of the left lower lobe. There are no additional filling defects seen to indicate emboli. The heart size is normal. There is a trace pericardial effusion. There is a left-sided transvenous pacemaker in place. There are atherosclerotic vascular calcifications which includes involvement of the coronary arteries. There are calcified mediastinal lymph nodes consistent with old healed granulomatous disease. There is a benign cyst extending off the visualized left kidney. There are no acute findings beneath the hemidiaphragms. There are no suspicious osteolytic or sclerotic lesions within the bony thorax.     Impression: Impression: 1. Moderate size right pleural effusion. There is some reactive pleural enhancement and several gas bubbles which may be related to previous chest tube placement and removal. I cannot exclude an empyema or bronchopleural fistula. 2. Unchanged compressive atelectasis and consolidation of the right lower lobe. There is also mild compressive atelectasis on the right upper lobe. 3. New patchy inflammatory changes in the posterior segment of the right upper lobe. 4. There is a 1.4 cm irregular shaped density the anterior segment of the right upper lobe felt to represent pulmonary scarring. I would recommend a follow-up CT of the chest in 6 months to confirm stability. 5. Interval decrease in size in the embolus located in the segmental branch of the posterior segment of the left lower lobe. 6. Additional findings as noted  above. Electronically Signed: Wil Mady  3/30/2023 1:47 PM EDT  Workstation ID: [id number]    XR Chest 1 View    Result Date: 3/31/2023  XR CHEST 1 VW Date of Exam: 3/31/2023 5:44 AM EDT Indication: Pleural effusion. Comparison: 3/30/2023. Findings: Stable left-sided ICD. There is persistent volume loss in the right lung with right basilar airspace disease/atelectasis and likely small left pleural effusion. The left lung remains clear. Heart size is normal. Pulmonary vasculature is within normal limits. No acute osseous or normalities.     Impression: Impression: Stable volume loss in the right lung with right basilar airspace disease/atelectasis and small left pleural effusion. Electronically Signed: Aiden Harrison  3/31/2023 8:13 AM EDT  Workstation ID: [id number]    XR Chest 1 View    Result Date: 3/30/2023  XR CHEST 1 VW Date of Exam: 3/30/2023 10:04 AM EDT Indication: s/p chest tube removal. Comparison: 4:14 AM exam of same date Findings: Current exam is timed 10:27 AM. Small bore right pleural drain has been removed. There is stable mild residual right basilar atelectasis and effusion. No new pulmonary parenchymal disease or pneumothorax is seen. Heart and vasculature appear normal in size.     Impression: Impression: Interval right chest drain removal. No evidence of pneumothorax or other new chest disease. Electronically Signed: Trey Allen  3/30/2023 12:36 PM EDT  Workstation ID: [id number]    XR Chest 1 View    Result Date: 3/30/2023  XR CHEST 1 VW Date of Exam: 3/30/2023 4:19 AM EDT Indication: Pleural effusion. Comparison: 3/29/2023 Findings: Left-sided triple lead ICD is again noted. Right basilar effusion and atelectasis continues to improve. Right pleural drain remains in place. No new pulmonary parenchymal disease is seen. Heart and vasculature appear normal in size.     Impression: Impression: Resolving right basilar atelectasis and effusion. No new chest disease. Electronically Signed: Trey Allen  3/30/2023  7:21 AM EDT  Workstation ID: [id number]      Results for orders placed during the hospital encounter of 10/16/22    Adult Transthoracic Echo Complete W/ Cont if Necessary Per Protocol    Interpretation Summary  •  Estimated left ventricular EF = 30%.  There is global hypokinesis.  The basal-mid posterior wall appears akinetic.  •  Normal right ventricular cavity size, wall thickness, systolic function and septal motion noted. Electronic lead present in the ventricle  •  Septal wall motion is abnormal, consistent with right ventricular pacing  •  No hemodynamically significant valvular stenosis or regurgitation noted.      Current medications:  Scheduled Meds:acetaminophen, 650 mg, Oral, Q8H  vitamin C, 1,000 mg, Oral, Daily  aspirin, 81 mg, Oral, Daily  atorvastatin, 80 mg, Oral, Nightly  carvedilol, 50 mg, Oral, BID With Meals  docusate sodium, 100 mg, Oral, BID  famotidine, 20 mg, Oral, Daily  insulin lispro, 0-7 Units, Subcutaneous, Q6H  lisinopril, 5 mg, Oral, Q24H  megestrol, 400 mg, Oral, Daily  mirtazapine, 15 mg, Oral, Nightly  pantoprazole, 40 mg, Oral, Nightly  piperacillin-tazobactam, 3.375 g, Intravenous, Q8H  polyethylene glycol, 17 g, Oral, Daily  senna-docusate sodium, 2 tablet, Oral, BID  sodium chloride, 10 mL, Intravenous, Q12H  terazosin, 1 mg, Oral, Nightly      Continuous Infusions:heparin, 20 Units/kg/hr, Last Rate: 20 Units/kg/hr (03/31/23 1153)  lactated ringers, 9 mL/hr, Last Rate: 9 mL/hr (03/29/23 0838)  lactated ringers, 9 mL/hr, Last Rate: 9 mL/hr (03/31/23 0832)  Pharmacy to Dose Heparin,       PRN Meds:.•  acetaminophen **OR** acetaminophen **OR** acetaminophen  •  senna-docusate sodium **AND** polyethylene glycol **AND** bisacodyl **AND** bisacodyl  •  Calcium Replacement - Follow Nurse / BPA Driven Protocol  •  cyclobenzaprine  •  dextrose  •  dextrose  •  glucagon (human recombinant)  •  Magnesium Standard Dose Replacement - Follow Nurse / BPA Driven Protocol  •  melatonin  •   ondansetron  •  Pharmacy to Dose Heparin  •  Phosphorus Replacement - Follow Nurse / BPA Driven Protocol  •  Potassium Replacement - Follow Nurse / BPA Driven Protocol  •  Sodium Chloride (PF)  •  sodium chloride  •  sodium chloride    Assessment & Plan   Assessment & Plan     Active Hospital Problems    Diagnosis  POA   • **Bilateral pulmonary embolism (HCC) [I26.99]  Unknown   • Pleural effusion [J90]  Yes   • Unintentional weight loss [R63.4]  Unknown   • Hypoalbuminemia [E88.09]  Unknown   • Severe malnutrition (HCC) [E43]  Unknown   • History of CVA (cerebrovascular accident) [Z86.73]  Not Applicable   • Debility [R53.81]  Unknown   • Presence of cardiac pacemaker [Z95.0]  Yes   • HTN (hypertension) [I10]  Yes   • Leukocytosis [D72.829]  Yes      Resolved Hospital Problems   No resolved problems to display.        Brief Hospital Course to date:  Timmy Guajardo is a 72 y.o. 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, systolic heart failure with ejection fraction of 30%, coronary artery disease, GERD, history of DVT who presented to the hospital with weakness and functional decline, unintentional weight loss and severe constipation    Assessment and plan:  Bilateral pulmonary embolism  History of DVT  · Likely secondary to being on an adequate dose of Eliquis of 2.5 mg twice daily and medication noncompliance  · Continue heparin drip while hospitalized since he might need further procedures  · Plan to transition to full dose Eliquis (loading and maintenance)     Moderate exudative right-sided loculated pleural effusion  Possible organized right pleural effusion  · Bedside point-of-care ultrasound revealed complex pleural effusion  · Cardiothoracic surgery consulted.  Recommended chest tube placement by IR.    · Underwent right-sided chest tube placement by IR on 3/27/2023.  Chest tube removed 3/30/2023   · Repeat CT scan chest after  removal of chest tube showed persistent moderate right pleural effusion, likely organized   · Analysis of the fluid is consistent with exudate, ?  Parapneumonic  · Culture from pleural fluid is negative to date  · Cytology pending  · Continue IV Zosyn.  Vancomycin discontinued after MRSA swab came back negative  · Awaiting input from cardiothoracic surgery service (observation versus large bore chest tube re-placement)    Severe malnutrition  Hypoalbuminemia  Significant unintentional weight loss  Generalized debility  · Patient reports 52lbs weight loss since October 2022.  · No EGD or colonoscopy since 2008  · CT chest and CT abdomen with no convincing evidence of solid malignancy or lymphadenopathy.  Oncology service evaluated the patient recommended age-appropriate screening  · GI evaluated the patient for EGD and colonoscopy.  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 Continue Megace and mirtazapine 15 mg nightly for poor appetite  · Nutrition team following     UTI with Enterococcus faecalis  · POA  · On Zosyn    Leukocytosis, improving  · Likely reactive versus infectious  · Normal procalcitonin and negative cultures to date  · Continue current metabolic therapy with Zosyn.     CAD  Cardiomyopathy with EF 30 %  Essential HTN  Old CVA with right residual weakness  Dyslipidemia  · Continue aspirin  · Continue coreg, lisinopril  · Continue statins    Constipation  · Having multiple bowel movements with enema  · Continue bowel regimen     GERD   · Continue PPI      Type 2 diabetes  · A1C 6.3%   · Continue insulin sliding scale     Acute debility  · PT recommended acute rehab    Total time spent: 40 min   Time spent includes time reviewing chart, face-to-face time, counseling  patient/family/caregiver, ordering medications/tests/procedures, communicating with other health care professionals, documenting clinical information in the electronic health record, and coordination of care.     Expected Discharge Location and Transportation: Acute rehab  Expected Discharge   Expected Discharge Date and Time     Expected Discharge Date Expected Discharge Time    Apr 4, 2023            DVT prophylaxis:  Medical and mechanical DVT prophylaxis orders are present.     AM-PAC 6 Clicks Score (PT): 16 (03/29/23 2050)    CODE STATUS:   Code Status and Medical Interventions:   Ordered at: 03/24/23 5449     Code Status (Patient has no pulse and is not breathing):    CPR (Attempt to Resuscitate)     Medical Interventions (Patient has pulse or is breathing):    Full Support     Copied text in this note has been reviewed and is accurate as of 03/31/23.     Alan Cooper MD  03/31/23

## 2023-03-31 NOTE — PLAN OF CARE
Goal Outcome Evaluation:  Plan of Care Reviewed With: patient        Progress: improving  Outcome Evaluation: Physical therapy treatment complete. The patient completed LE therex at EOB and in supine position. Patient able to take 2 side steps up towards HOB with minAx1 and RW. Patient declined transfer to chair. Patient limited in mobility partially by fear of falling. The patient continues to present below baseline for mobility. The patient would continue to benefit from skilled PT to address strength, balance and activity tolerance deficits. Continue to recommend IPR at discharge.

## 2023-03-31 NOTE — CASE MANAGEMENT/SOCIAL WORK
Continued Stay Note  [facility]     Patient Name: Timmy Guajardo  MRN: [id number]  Today's Date: 3/31/2023    Admit Date: 3/24/2023    Plan: discharge plan   Discharge Plan     Row Name 03/31/23 1409       Plan    Plan discharge plan    Plan Comments Per discussion in MDR, pt will be here over weekend. Per CTS note , pt will need repeat CT scan of the chest to see if decortication.  I spoke with Carolina(liason for the [facility]) and they may be able to offer pt a bed pending insurance approval and medical readiness. II updated pt and pt's sister, Mary Ellen.  CM will follow up Monday.    Final Discharge Disposition Code 03 - skilled nursing facility (SNF)               Discharge Codes    No documentation.               Expected Discharge Date and Time     Expected Discharge Date Expected Discharge Time    Apr 3, 2023             Violeta Miranda RN

## 2023-04-01 ENCOUNTER — APPOINTMENT (OUTPATIENT)
Dept: GENERAL RADIOLOGY | Facility: HOSPITAL | Age: 73
DRG: 163 | End: 2023-04-01
Payer: MEDICARE

## 2023-04-01 LAB
ANION GAP SERPL CALCULATED.3IONS-SCNC: 13 MMOL/L (ref 5–15)
BASOPHILS # BLD AUTO: 0.04 10*3/MM3 (ref 0–0.2)
BASOPHILS NFR BLD AUTO: 0.4 % (ref 0–1.5)
BUN SERPL-MCNC: 10 MG/DL (ref 8–23)
BUN/CREAT SERPL: 13.7 (ref 7–25)
CALCIUM SPEC-SCNC: 8.3 MG/DL (ref 8.6–10.5)
CHLORIDE SERPL-SCNC: 110 MMOL/L (ref 98–107)
CO2 SERPL-SCNC: 22 MMOL/L (ref 22–29)
CREAT SERPL-MCNC: 0.73 MG/DL (ref 0.76–1.27)
DEPRECATED RDW RBC AUTO: 53.8 FL (ref 37–54)
EGFRCR SERPLBLD CKD-EPI 2021: 97.9 ML/MIN/1.73
EOSINOPHIL # BLD AUTO: 0.19 10*3/MM3 (ref 0–0.4)
EOSINOPHIL NFR BLD AUTO: 2 % (ref 0.3–6.2)
ERYTHROCYTE [DISTWIDTH] IN BLOOD BY AUTOMATED COUNT: 15.6 % (ref 12.3–15.4)
GLUCOSE BLDC GLUCOMTR-MCNC: 104 MG/DL (ref 70–130)
GLUCOSE BLDC GLUCOMTR-MCNC: 110 MG/DL (ref 70–130)
GLUCOSE BLDC GLUCOMTR-MCNC: 92 MG/DL (ref 70–130)
GLUCOSE BLDC GLUCOMTR-MCNC: 95 MG/DL (ref 70–130)
GLUCOSE SERPL-MCNC: 105 MG/DL (ref 65–99)
HCT VFR BLD AUTO: 36 % (ref 37.5–51)
HGB BLD-MCNC: 11.4 G/DL (ref 13–17.7)
IMM GRANULOCYTES # BLD AUTO: 0.06 10*3/MM3 (ref 0–0.05)
IMM GRANULOCYTES NFR BLD AUTO: 0.6 % (ref 0–0.5)
LYMPHOCYTES # BLD AUTO: 1.51 10*3/MM3 (ref 0.7–3.1)
LYMPHOCYTES NFR BLD AUTO: 16.1 % (ref 19.6–45.3)
MCH RBC QN AUTO: 29.7 PG (ref 26.6–33)
MCHC RBC AUTO-ENTMCNC: 31.7 G/DL (ref 31.5–35.7)
MCV RBC AUTO: 93.8 FL (ref 79–97)
MONOCYTES # BLD AUTO: 0.81 10*3/MM3 (ref 0.1–0.9)
MONOCYTES NFR BLD AUTO: 8.6 % (ref 5–12)
NEUTROPHILS NFR BLD AUTO: 6.77 10*3/MM3 (ref 1.7–7)
NEUTROPHILS NFR BLD AUTO: 72.3 % (ref 42.7–76)
NRBC BLD AUTO-RTO: 0 /100 WBC (ref 0–0.2)
PLATELET # BLD AUTO: 158 10*3/MM3 (ref 140–450)
PMV BLD AUTO: 11.8 FL (ref 6–12)
POTASSIUM SERPL-SCNC: 3.4 MMOL/L (ref 3.5–5.2)
POTASSIUM SERPL-SCNC: 3.6 MMOL/L (ref 3.5–5.2)
RBC # BLD AUTO: 3.84 10*6/MM3 (ref 4.14–5.8)
SODIUM SERPL-SCNC: 145 MMOL/L (ref 136–145)
UFH PPP CHRO-ACNC: 0.38 IU/ML (ref 0.3–0.7)
UFH PPP CHRO-ACNC: 0.49 IU/ML (ref 0.3–0.7)
WBC NRBC COR # BLD: 9.38 10*3/MM3 (ref 3.4–10.8)

## 2023-04-01 PROCEDURE — 85520 HEPARIN ASSAY: CPT

## 2023-04-01 PROCEDURE — 85520 HEPARIN ASSAY: CPT | Performed by: INTERNAL MEDICINE

## 2023-04-01 PROCEDURE — 82962 GLUCOSE BLOOD TEST: CPT

## 2023-04-01 PROCEDURE — 80048 BASIC METABOLIC PNL TOTAL CA: CPT | Performed by: INTERNAL MEDICINE

## 2023-04-01 PROCEDURE — 85025 COMPLETE CBC W/AUTO DIFF WBC: CPT | Performed by: INTERNAL MEDICINE

## 2023-04-01 PROCEDURE — 25010000002 HEPARIN (PORCINE) 25000-0.45 UT/250ML-% SOLUTION: Performed by: INTERNAL MEDICINE

## 2023-04-01 PROCEDURE — 99232 SBSQ HOSP IP/OBS MODERATE 35: CPT | Performed by: INTERNAL MEDICINE

## 2023-04-01 PROCEDURE — 71045 X-RAY EXAM CHEST 1 VIEW: CPT

## 2023-04-01 PROCEDURE — 99232 SBSQ HOSP IP/OBS MODERATE 35: CPT | Performed by: THORACIC SURGERY (CARDIOTHORACIC VASCULAR SURGERY)

## 2023-04-01 PROCEDURE — 25010000002 PIPERACILLIN SOD-TAZOBACTAM PER 1 G: Performed by: INTERNAL MEDICINE

## 2023-04-01 PROCEDURE — 84132 ASSAY OF SERUM POTASSIUM: CPT | Performed by: INTERNAL MEDICINE

## 2023-04-01 RX ORDER — POTASSIUM CHLORIDE 750 MG/1
40 CAPSULE, EXTENDED RELEASE ORAL EVERY 4 HOURS
Status: COMPLETED | OUTPATIENT
Start: 2023-04-01 | End: 2023-04-01

## 2023-04-01 RX ADMIN — SENNOSIDES AND DOCUSATE SODIUM 2 TABLET: 8.6; 5 TABLET ORAL at 21:38

## 2023-04-01 RX ADMIN — ASPIRIN 81 MG CHEWABLE TABLET 81 MG: 81 TABLET CHEWABLE at 09:14

## 2023-04-01 RX ADMIN — SENNOSIDES AND DOCUSATE SODIUM 2 TABLET: 8.6; 5 TABLET ORAL at 09:14

## 2023-04-01 RX ADMIN — ACETAMINOPHEN 325MG 650 MG: 325 TABLET ORAL at 05:36

## 2023-04-01 RX ADMIN — NYSTATIN 500000 UNITS: 100000 SUSPENSION ORAL at 13:28

## 2023-04-01 RX ADMIN — MEGESTROL ACETATE 400 MG: 40 SUSPENSION ORAL at 09:13

## 2023-04-01 RX ADMIN — PANTOPRAZOLE SODIUM 40 MG: 40 TABLET, DELAYED RELEASE ORAL at 21:38

## 2023-04-01 RX ADMIN — ATORVASTATIN CALCIUM 80 MG: 40 TABLET, FILM COATED ORAL at 21:37

## 2023-04-01 RX ADMIN — POTASSIUM CHLORIDE 40 MEQ: 10 CAPSULE, COATED, EXTENDED RELEASE ORAL at 13:29

## 2023-04-01 RX ADMIN — DOCUSATE SODIUM 100 MG: 100 CAPSULE, LIQUID FILLED ORAL at 21:37

## 2023-04-01 RX ADMIN — TERAZOSIN HYDROCHLORIDE 1 MG: 1 CAPSULE ORAL at 21:38

## 2023-04-01 RX ADMIN — TAZOBACTAM SODIUM AND PIPERACILLIN SODIUM 3.38 G: 375; 3 INJECTION, SOLUTION INTRAVENOUS at 23:58

## 2023-04-01 RX ADMIN — CARVEDILOL 50 MG: 12.5 TABLET, FILM COATED ORAL at 17:14

## 2023-04-01 RX ADMIN — NYSTATIN 500000 UNITS: 100000 SUSPENSION ORAL at 17:14

## 2023-04-01 RX ADMIN — CARVEDILOL 50 MG: 12.5 TABLET, FILM COATED ORAL at 09:14

## 2023-04-01 RX ADMIN — ACETAMINOPHEN 325MG 650 MG: 325 TABLET ORAL at 21:37

## 2023-04-01 RX ADMIN — TAZOBACTAM SODIUM AND PIPERACILLIN SODIUM 3.38 G: 375; 3 INJECTION, SOLUTION INTRAVENOUS at 09:15

## 2023-04-01 RX ADMIN — NYSTATIN 500000 UNITS: 100000 SUSPENSION ORAL at 21:37

## 2023-04-01 RX ADMIN — Medication 10 ML: at 21:39

## 2023-04-01 RX ADMIN — MIRTAZAPINE 15 MG: 15 TABLET, FILM COATED ORAL at 21:37

## 2023-04-01 RX ADMIN — DOCUSATE SODIUM 100 MG: 100 CAPSULE, LIQUID FILLED ORAL at 09:14

## 2023-04-01 RX ADMIN — TAZOBACTAM SODIUM AND PIPERACILLIN SODIUM 3.38 G: 375; 3 INJECTION, SOLUTION INTRAVENOUS at 16:12

## 2023-04-01 RX ADMIN — FAMOTIDINE 20 MG: 20 TABLET ORAL at 09:14

## 2023-04-01 RX ADMIN — CYCLOBENZAPRINE 5 MG: 10 TABLET, FILM COATED ORAL at 21:38

## 2023-04-01 RX ADMIN — POLYETHYLENE GLYCOL 3350 17 G: 17 POWDER, FOR SOLUTION ORAL at 09:13

## 2023-04-01 RX ADMIN — ACETAMINOPHEN 325MG 650 MG: 325 TABLET ORAL at 13:28

## 2023-04-01 RX ADMIN — HEPARIN SODIUM 20 UNITS/KG/HR: 10000 INJECTION, SOLUTION INTRAVENOUS at 02:27

## 2023-04-01 RX ADMIN — LISINOPRIL 5 MG: 5 TABLET ORAL at 09:14

## 2023-04-01 RX ADMIN — Medication 5 MG: at 21:37

## 2023-04-01 RX ADMIN — POTASSIUM CHLORIDE 40 MEQ: 10 CAPSULE, COATED, EXTENDED RELEASE ORAL at 09:13

## 2023-04-01 RX ADMIN — TAZOBACTAM SODIUM AND PIPERACILLIN SODIUM 3.38 G: 375; 3 INJECTION, SOLUTION INTRAVENOUS at 00:15

## 2023-04-01 RX ADMIN — CYCLOBENZAPRINE 5 MG: 10 TABLET, FILM COATED ORAL at 05:36

## 2023-04-01 RX ADMIN — OXYCODONE HYDROCHLORIDE AND ACETAMINOPHEN 1000 MG: 500 TABLET ORAL at 09:14

## 2023-04-01 NOTE — PLAN OF CARE
Goal Outcome Evaluation:  A&Ox4, VSS, O2 95% on RA, denies pain/discomfort. No significant changes overnight. Resting comfortably in bed. Bed in lowest position, alarms on and audible, assistive devices within reach, hourly rounding preformed. Will continue to monitor.

## 2023-04-01 NOTE — PROGRESS NOTES
[facility] Medicine Services  PROGRESS NOTE    Patient Name: Timmy Guajardo  : 1950  MRN: [id number]    Date of Admission: 3/24/2023  Primary Care Physician: Arsen Seals APRN    Subjective   Subjective     CC:  Follow-up for weakness    HPI:  Feels weak in general.  No coughing.  Denies fever    ROS:  Gen: no fever  Pulm: no cough  CV: no chest pain    Objective   Objective     Vital Signs:   Temp:  [96.5 °F (35.8 °C)-98.4 °F (36.9 °C)] 98.2 °F (36.8 °C)  Heart Rate:  [70] 70  Resp:  [16] 16  BP: ()/(59-82) 147/81     Physical Exam:  Constitutional - no acute distress but appears fatigued, thin male, in bed  HEENT-NCAT, mucous membranes moist  CV-RRR  Resp-diminished at bases  Abd-soft, nontender, nondistended, normoactive bowel sounds  Ext-No lower extremity cyanosis, clubbing or edema bilaterally  Neuro-alert, speech clear, moves all extremities   Psych-normal affect   Skin- No rash on exposed UE or LE bilaterally      Results Reviewed:  LAB RESULTS:      Lab 23  0346 23  0031 23  1817 23  0328 23  2346 23  1137 23  0444 23  2037 23  1357 23  0842 23  0649 23  2254 23  1344 23  0708   WBC 9.38  --   --  10.37  --   --   --  9.99  --   --   --  10.01  --   --  8.60   HEMOGLOBIN 11.4*  --   --  12.0*  --   --   --  12.1*  --   --   --  11.2*  --   --  10.9*   HEMATOCRIT 36.0*  --   --  38.5  --   --   --  38.3  --   --   --  35.8*  --   --  34.9*   PLATELETS 158  --   --  157  --   --   --  132*  --   --   --  193  --   --  155   NEUTROS ABS 6.77  --   --  7.49*  --   --   --  7.51*  --   --   --  7.84*  --   --  6.67   IMMATURE GRANS (ABS) 0.06*  --   --  0.12*  --   --   --  0.09*  --   --   --  0.09*  --   --  0.07*   LYMPHS ABS 1.51  --   --  1.71  --   --   --  1.48  --   --   --  1.23  --   --  1.08   MONOS ABS 0.81  --   --  0.84  --   --   --  0.69  --   --   --   0.70  --   --  0.62   EOS ABS 0.19  --   --  0.14  --   --   --  0.15  --   --   --  0.09  --   --  0.11   MCV 93.8  --   --  96.5  --   --   --  96.0  --   --   --  96.0  --   --  95.1   LDH  --   --   --   --   --   --   --   --   --   --   --   --   --   --  217   PROTIME  --   --   --   --   --   --   --   --   --   --   --   --   --   --  16.9*   APTT  --   --   --   --   --   --   --   --   --  46.3* 132.6*  --  42.4* 40.7* 88.1   HEPARIN ANTI-XA  --  0.38 0.36  --  0.35 0.50 0.27* 0.35   < >  --   --   --   --   --   --     < > = values in this interval not displayed.         Lab 04/01/23  0346 03/31/23  0328 03/29/23  0444 03/28/23  0649 03/27/23  1945 03/27/23  0708 03/26/23  0436   SODIUM 145 145 142 140  --  140 140   POTASSIUM 3.4* 3.9 4.2 4.3 4.8 3.4* 3.6   CHLORIDE 110* 111* 108* 108*  --  105 105   CO2 22.0 19.0* 21.0* 23.0  --  22.0 24.0   ANION GAP 13.0 15.0 13.0 9.0  --  13.0 11.0   BUN 10 9 10 10  --  9 12   CREATININE 0.73* 0.58* 0.62* 0.65*  --  0.61* 0.59*   EGFR 97.9 104.9 102.8 101.4  --  103.3 104.4   GLUCOSE 105* 71 72 87  --  77 68   CALCIUM 8.3* 8.4* 8.3* 8.2*  --  8.5* 8.1*   MAGNESIUM  --  2.2 2.0 2.2  --  1.8 1.8   PHOSPHORUS  --  4.0 3.3 3.0  --  3.8 4.5         Lab 03/31/23  0328 03/28/23  0649 03/27/23  0708 03/26/23  0436   TOTAL PROTEIN 5.7* 6.0 6.0 5.3*   ALBUMIN 2.7* 2.5* 2.5* 2.6*   GLOBULIN 3.0 3.5 3.5 2.7   ALT (SGPT) 8 11 14 15   AST (SGOT) 17 22 15 18   BILIRUBIN 0.3 0.4 0.4 0.4   ALK PHOS 76 77 76 76         Lab 03/27/23  0708   PROTIME 16.9*   INR 1.38*             Lab 03/25/23  0954   VITAMIN B 12 260         Brief Urine Lab Results  (Last result in the past 365 days)      Color   Clarity   Blood   Leuk Est   Nitrite   Protein   CREAT   Urine HCG        03/24/23 2329 Yellow   Turbid   Small (1+)   Moderate (2+)   Negative   30 mg/dL (1+)                 Microbiology Results Abnormal     Procedure Component Value - Date/Time    Body Fluid Culture - Body Fluid, Pleural  Cavity [[id number]] Collected: 03/27/23 1532    Lab Status: Final result Specimen: Body Fluid from Pleural Cavity Updated: 03/31/23 0952     Body Fluid Culture No growth at 3 days     Gram Stain Rare (1+) WBCs per low power field      No organisms seen    MRSA Screen, PCR (Inpatient) - Swab, Nares [[id number]]  (Normal) Collected: 03/27/23 0857    Lab Status: Final result Specimen: Swab from Nares Updated: 03/27/23 1107     MRSA PCR Negative    Narrative:      The negative predictive value of this diagnostic test is high and should only be used to consider de-escalating anti-MRSA therapy. A positive result may indicate colonization with MRSA and must be correlated clinically.  MRSA Negative    COVID PRE-OP / PRE-PROCEDURE SCREENING ORDER (NO ISOLATION) - Swab, Nasopharynx [[id number]]  (Normal) Collected: 03/24/23 2256    Lab Status: Final result Specimen: Swab from Nasopharynx Updated: 03/24/23 2356    Narrative:      The following orders were created for panel order COVID PRE-OP / PRE-PROCEDURE SCREENING ORDER (NO ISOLATION) - Swab, Nasopharynx.  Procedure                               Abnormality         Status                     ---------                               -----------         ------                     Respiratory Panel PCR w/...[[id number]]  Normal              Final result                 Please view results for these tests on the individual orders.    Respiratory Panel PCR w/COVID-19(SARS-CoV-2) SULEIMAN/TAWANNA/BLAISE/PAD/COR/MAD/ANTHONY In-House, NP Swab in UTM/VTM, 3-4 HR TAT - Swab, Nasopharynx [[id number]]  (Normal) Collected: 03/24/23 2256    Lab Status: Final result Specimen: Swab from Nasopharynx Updated: 03/24/23 2356     ADENOVIRUS, PCR Not Detected     Coronavirus 229E Not Detected     Coronavirus HKU1 Not Detected     Coronavirus NL63 Not Detected     Coronavirus OC43 Not Detected     COVID19 Not Detected     Human Metapneumovirus Not Detected     Human Rhinovirus/Enterovirus Not Detected     Influenza A  PCR Not Detected     Influenza B PCR Not Detected     Parainfluenza Virus 1 Not Detected     Parainfluenza Virus 2 Not Detected     Parainfluenza Virus 3 Not Detected     Parainfluenza Virus 4 Not Detected     RSV, PCR Not Detected     Bordetella pertussis pcr Not Detected     Bordetella parapertussis PCR Not Detected     Chlamydophila pneumoniae PCR Not Detected     Mycoplasma pneumo by PCR Not Detected    Narrative:      In the setting of a positive respiratory panel with a viral infection PLUS a negative procalcitonin without other underlying concern for bacterial infection, consider observing off antibiotics or discontinuation of antibiotics and continue supportive care. If the respiratory panel is positive for atypical bacterial infection (Bordetella pertussis, Chlamydophila pneumoniae, or Mycoplasma pneumoniae), consider antibiotic de-escalation to target atypical bacterial infection.          CT Chest Without Contrast Diagnostic    Result Date: 3/31/2023  CT CHEST WO CONTRAST DIAGNOSTIC Date of Exam: 3/31/2023 1:39 PM EDT Indication: Pleural effusion, malignancy suspected. Comparison: Chest CT scan 3/30/2023 Technique: Axial CT images were obtained of the chest without contrast administration.  Reconstructed coronal and sagittal images were also obtained. Automated exposure control and iterative construction methods were used. Findings: Previous contrast-enhanced CT scan by report showed moderate right pleural effusion containing gas bubbles possibly related to chest tube removal, versus empyema or bronchopleural fistula. Areas of right lung atelectasis, patchy right upper lobe inflammation. Today's study shows somewhat reduced detail as a result of lack of IV contrast, but there is a persistent, partially loculated pleural fluid collection, with evident pleural rind. Bubbles of air within the collection appears stable, and are not increasing, suggesting residual air from chest tube placement rather than  bronchopleural fistula. No large air/fluid level is seen. There is no left effusion or pericardial effusion. Scarlike opacity in the anterior right upper lobe, and other small subpleural interstitial changes are stable. There is no evidence of any new or increasing pulmonary parenchymal disease. The airways appear normally patent. Included images of the upper abdomen show no significant abnormalities of the visualized portions of the liver, gallbladder, spleen, pancreatic tail, adrenal glands, or left upper renal pole. Bony structures appear to be intact.     Impression: Impression: 1. Stable appearance of patient's apparently loculated right pleural effusion, which contains a stable amount of scattered air bubbles, since yesterday's study. Empyema is not excluded, but this makes bronchopleural fistula less likely, and findings could all reflect residual air from chest tube placement removal. 2. No new chest disease is seen. Electronically Signed: Trey Allen  3/31/2023 3:27 PM EDT  Workstation ID: [id number]    CT Chest With Contrast Diagnostic    Result Date: 3/30/2023  CT CHEST W CONTRAST DIAGNOSTIC Date of Exam: 3/30/2023 12:50 PM EDT Indication: Pleural effusions, status post right chest tube removal, pulmonary embolus, follow-up. Comparison: 3/24/2023. Technique: Axial CT images were obtained of the chest after the uneventful intravenous administration of 85 cc of Isovue-300 contrast.  Reconstructed coronal and sagittal images were also obtained. Automated exposure control and iterative construction methods were used. Findings: There continues to be a moderate-sized right pleural effusion with most of the pleural fluid subpulmonic in location. There is reactive pleural enhancement. There are gas bubbles within the pleural fluid presumably secondary to recent chest tube placement. I cannot exclude an empyema or bronchopleural fistula. There is compressive atelectasis and consolidation of the right lower lobe  similar to the previous exam. The right lower lobe is partially collapsed. There is a mild compressive atelectasis on the right upper lobe. There is some patchy inflammatory changes which have developed in the posterior segment of the right upper lobe. There is an irregular shaped density in the anterior segment of the right upper lobe felt to represent pulmonary scarring. It measures 1.4 cm in diameter. I would recommend a follow-up CT of the chest in 6 months to confirm stability. The left lung and pleural space are clear. There is interval decrease in size of the embolus located at the segmental branch point of the posterior segment of the left lower lobe. There are no additional filling defects seen to indicate emboli. The heart size is normal. There is a trace pericardial effusion. There is a left-sided transvenous pacemaker in place. There are atherosclerotic vascular calcifications which includes involvement of the coronary arteries. There are calcified mediastinal lymph nodes consistent with old healed granulomatous disease. There is a benign cyst extending off the visualized left kidney. There are no acute findings beneath the hemidiaphragms. There are no suspicious osteolytic or sclerotic lesions within the bony thorax.     Impression: Impression: 1. Moderate size right pleural effusion. There is some reactive pleural enhancement and several gas bubbles which may be related to previous chest tube placement and removal. I cannot exclude an empyema or bronchopleural fistula. 2. Unchanged compressive atelectasis and consolidation of the right lower lobe. There is also mild compressive atelectasis on the right upper lobe. 3. New patchy inflammatory changes in the posterior segment of the right upper lobe. 4. There is a 1.4 cm irregular shaped density the anterior segment of the right upper lobe felt to represent pulmonary scarring. I would recommend a follow-up CT of the chest in 6 months to confirm stability.  5. Interval decrease in size in the embolus located in the segmental branch of the posterior segment of the left lower lobe. 6. Additional findings as noted above. Electronically Signed: Wil Mady  3/30/2023 1:47 PM EDT  Workstation ID: [id number]    XR Chest 1 View    Result Date: 3/31/2023  XR CHEST 1 VW Date of Exam: 3/31/2023 5:44 AM EDT Indication: Pleural effusion. Comparison: 3/30/2023. Findings: Stable left-sided ICD. There is persistent volume loss in the right lung with right basilar airspace disease/atelectasis and likely small left pleural effusion. The left lung remains clear. Heart size is normal. Pulmonary vasculature is within normal limits. No acute osseous or normalities.     Impression: Impression: Stable volume loss in the right lung with right basilar airspace disease/atelectasis and small left pleural effusion. Electronically Signed: Aiden Harrison  3/31/2023 8:13 AM EDT  Workstation ID: [id number]    XR Chest 1 View    Result Date: 3/30/2023  XR CHEST 1 VW Date of Exam: 3/30/2023 10:04 AM EDT Indication: s/p chest tube removal. Comparison: 4:14 AM exam of same date Findings: Current exam is timed 10:27 AM. Small bore right pleural drain has been removed. There is stable mild residual right basilar atelectasis and effusion. No new pulmonary parenchymal disease or pneumothorax is seen. Heart and vasculature appear normal in size.     Impression: Impression: Interval right chest drain removal. No evidence of pneumothorax or other new chest disease. Electronically Signed: Trey Allen  3/30/2023 12:36 PM EDT  Workstation ID: [id number]      Results for orders placed during the hospital encounter of 10/16/22    Adult Transthoracic Echo Complete W/ Cont if Necessary Per Protocol    Interpretation Summary  •  Estimated left ventricular EF = 30%.  There is global hypokinesis.  The basal-mid posterior wall appears akinetic.  •  Normal right ventricular cavity size, wall thickness, systolic function and septal  motion noted. Electronic lead present in the ventricle  •  Septal wall motion is abnormal, consistent with right ventricular pacing  •  No hemodynamically significant valvular stenosis or regurgitation noted.      Current medications:  Scheduled Meds:acetaminophen, 650 mg, Oral, Q8H  vitamin C, 1,000 mg, Oral, Daily  aspirin, 81 mg, Oral, Daily  atorvastatin, 80 mg, Oral, Nightly  carvedilol, 50 mg, Oral, BID With Meals  docusate sodium, 100 mg, Oral, BID  famotidine, 20 mg, Oral, Daily  insulin lispro, 0-7 Units, Subcutaneous, Q6H  lisinopril, 5 mg, Oral, Q24H  megestrol, 400 mg, Oral, Daily  mirtazapine, 15 mg, Oral, Nightly  pantoprazole, 40 mg, Oral, Nightly  piperacillin-tazobactam, 3.375 g, Intravenous, Q8H  polyethylene glycol, 17 g, Oral, Daily  potassium chloride, 40 mEq, Oral, Q4H  senna-docusate sodium, 2 tablet, Oral, BID  sodium chloride, 10 mL, Intravenous, Q12H  terazosin, 1 mg, Oral, Nightly      Continuous Infusions:heparin, 20 Units/kg/hr, Last Rate: 20 Units/kg/hr (04/01/23 0227)  lactated ringers, 9 mL/hr, Last Rate: 9 mL/hr (03/29/23 0838)  lactated ringers, 9 mL/hr, Last Rate: 9 mL/hr (03/31/23 0832)  Pharmacy to Dose Heparin,       PRN Meds:.•  acetaminophen **OR** acetaminophen **OR** acetaminophen  •  senna-docusate sodium **AND** polyethylene glycol **AND** bisacodyl **AND** bisacodyl  •  Calcium Replacement - Follow Nurse / BPA Driven Protocol  •  cyclobenzaprine  •  dextrose  •  dextrose  •  glucagon (human recombinant)  •  Magnesium Standard Dose Replacement - Follow Nurse / BPA Driven Protocol  •  melatonin  •  ondansetron  •  Pharmacy to Dose Heparin  •  Phosphorus Replacement - Follow Nurse / BPA Driven Protocol  •  Potassium Replacement - Follow Nurse / BPA Driven Protocol  •  Sodium Chloride (PF)  •  sodium chloride  •  sodium chloride    Assessment & Plan   Assessment & Plan     Active Hospital Problems    Diagnosis  POA   • **Bilateral pulmonary embolism [I26.99]  Unknown   •  Pleural effusion [J90]  Yes   • Unintentional weight loss [R63.4]  Unknown   • Hypoalbuminemia [E88.09]  Unknown   • Severe malnutrition [E43]  Unknown   • History of CVA (cerebrovascular accident) [Z86.73]  Not Applicable   • Debility [R53.81]  Unknown   • Presence of cardiac pacemaker [Z95.0]  Yes   • HTN (hypertension) [I10]  Yes   • Leukocytosis [D72.829]  Yes      Resolved Hospital Problems   No resolved problems to display.        Brief Hospital Course to date:  Timmy Guajardo is a 72 y.o. 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, systolic heart failure with ejection fraction of 30%, coronary artery disease, GERD, history of DVT who presented to the hospital with weakness and functional decline, unintentional weight loss and severe constipation    Assessment and plan:  Bilateral pulmonary embolism  History of DVT  · Likely secondary to being on an adequate dose of Eliquis of 2.5 mg twice daily and medication noncompliance  · Continue heparin drip while hospitalized since he might need further procedures  · Plan to transition to full dose Eliquis (loading and maintenance)     Moderate exudative right-sided loculated pleural effusion  Possible organized right pleural effusion  · Bedside point-of-care ultrasound revealed complex pleural effusion  · Cardiothoracic surgery consulted.  Recommended chest tube placement by IR.    · Underwent right-sided chest tube placement by IR on 3/27/2023.  Chest tube removed 3/30/2023   · Repeat CT scan chest after removal of chest tube showed persistent moderate right pleural effusion, likely organized   · Analysis of the fluid is consistent with exudate, ?  Parapneumonic  · Culture from pleural fluid is negative to date  · Cytology showed benign mesothelial cells  · Continue IV Zosyn.  Vancomycin discontinued after MRSA swab came back negative  · Awaiting input from cardiothoracic surgery service  (observation versus large bore chest tube re-placement)    Severe malnutrition  Hypoalbuminemia  Significant unintentional weight loss  Generalized debility  · Patient reports 52lbs weight loss since October 2022.  · No EGD or colonoscopy since 2008  · CT chest and CT abdomen with no convincing evidence of solid malignancy or lymphadenopathy.  Oncology service evaluated the patient recommended age-appropriate screening  · GI evaluated the patient for EGD and colonoscopy.  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 Continue Megace and mirtazapine 15 mg nightly for poor appetite  · Nutrition team following     UTI with Enterococcus faecalis  · POA  · On Zosyn    Leukocytosis, improving  · Likely reactive versus infectious  · Normal procalcitonin and negative cultures to date  · Continue current metabolic therapy with Zosyn.     CAD  Cardiomyopathy with EF 30 %  Essential HTN  Old CVA with right residual weakness  Dyslipidemia  · Continue aspirin  · Continue coreg, lisinopril  · Continue statins    Constipation  · Having multiple bowel movements with enema  · Continue bowel regimen     GERD   · Continue PPI      Type 2 diabetes  · A1C 6.3%   · Continue insulin sliding scale     Acute debility  · PT recommended acute rehab      Expected Discharge Location and Transportation: Acute rehab  Expected Discharge   Expected Discharge Date and Time     Expected Discharge Date Expected Discharge Time    Apr 4, 2023            DVT prophylaxis:  Medical and mechanical DVT prophylaxis orders are present.     AM-PAC 6 Clicks Score (PT): 17 (03/31/23 2055)    CODE STATUS:   Code Status and Medical Interventions:   Ordered at: 03/24/23 9631     Code Status (Patient has no pulse and is not  breathing):    CPR (Attempt to Resuscitate)     Medical Interventions (Patient has pulse or is breathing):    Full Support        Bryce Ponce MD  04/01/23

## 2023-04-01 NOTE — PLAN OF CARE
Goal Outcome Evaluation:      Pt VSS, RA, A&Ox4, AV paced on telemetry. Pt c/o thrush in mouth, MD notified Nystatin swish and swallow. Call light in reach, bed in low position.

## 2023-04-01 NOTE — PROGRESS NOTES
CTS Progress Note       LOS: 8 days   Patient Care Team:  Arsen Seals APRN as PCP - General (Family Medicine)    Chief Complaint: Bilateral pulmonary embolism    Vital Signs:  Temp:  [96.5 °F (35.8 °C)-98.4 °F (36.9 °C)] 98.2 °F (36.8 °C)  Heart Rate:  [70] 70  Resp:  [16] 16  BP: ()/(59-90) 147/81    Physical Exam: No distress       Results:   Results from last 7 days   Lab Units 04/01/23  0346   WBC 10*3/mm3 9.38   HEMOGLOBIN g/dL 11.4*   HEMATOCRIT % 36.0*   PLATELETS 10*3/mm3 158     Results from last 7 days   Lab Units 04/01/23  0346   SODIUM mmol/L 145   POTASSIUM mmol/L 3.4*   CHLORIDE mmol/L 110*   CO2 mmol/L 22.0   BUN mg/dL 10   CREATININE mg/dL 0.73*   GLUCOSE mg/dL 105*   CALCIUM mg/dL 8.3*           Imaging Results (Last 24 Hours)     Procedure Component Value Units Date/Time    XR Chest 1 View [[id number]] Resulted: 04/01/23 0404     Updated: 04/01/23 0538    CT Chest Without Contrast Diagnostic [[id number]] Collected: 03/31/23 1517     Updated: 03/31/23 1530    Narrative:      CT CHEST WO CONTRAST DIAGNOSTIC    Date of Exam: 3/31/2023 1:39 PM EDT    Indication: Pleural effusion, malignancy suspected.    Comparison: Chest CT scan 3/30/2023    Technique: Axial CT images were obtained of the chest without contrast administration.  Reconstructed coronal and sagittal images were also obtained. Automated exposure control and iterative construction methods were used.    Findings:  Previous contrast-enhanced CT scan by report showed moderate right pleural effusion containing gas bubbles possibly related to chest tube removal, versus empyema or bronchopleural fistula. Areas of right lung atelectasis, patchy right upper lobe   inflammation.    Today's study shows somewhat reduced detail as a result of lack of IV contrast, but there is a persistent, partially loculated pleural fluid collection, with evident pleural rind. Bubbles of air within the collection appears stable, and are not   increasing,  suggesting residual air from chest tube placement rather than bronchopleural fistula. No large air/fluid level is seen.     There is no left effusion or pericardial effusion. Scarlike opacity in the anterior right upper lobe, and other small subpleural interstitial changes are stable. There is no evidence of any new or increasing pulmonary parenchymal disease. The airways   appear normally patent.     Included images of the upper abdomen show no significant abnormalities of the visualized portions of the liver, gallbladder, spleen, pancreatic tail, adrenal glands, or left upper renal pole. Bony structures appear to be intact.      Impression:      Impression:  1. Stable appearance of patient's apparently loculated right pleural effusion, which contains a stable amount of scattered air bubbles, since yesterday's study. Empyema is not excluded, but this makes bronchopleural fistula less likely, and findings   could all reflect residual air from chest tube placement removal.    2. No new chest disease is seen.    Electronically Signed: Trey Allen    3/31/2023 3:27 PM EDT    Workstation ID: [id number]    XR Chest 1 View [[id number]] Collected: 03/31/23 0813     Updated: 03/31/23 0816    Narrative:      XR CHEST 1 VW    Date of Exam: 3/31/2023 5:44 AM EDT    Indication: Pleural effusion.    Comparison: 3/30/2023.    Findings:  Stable left-sided ICD. There is persistent volume loss in the right lung with right basilar airspace disease/atelectasis and likely small left pleural effusion. The left lung remains clear. Heart size is normal. Pulmonary vasculature is within normal   limits. No acute osseous or normalities.      Impression:      Impression:  Stable volume loss in the right lung with right basilar airspace disease/atelectasis and small left pleural effusion.    Electronically Signed: Aiden Harrison    3/31/2023 8:13 AM EDT    Workstation ID: [id number]          Assessment      Bilateral pulmonary embolism     Leukocytosis    HTN (hypertension)    Presence of cardiac pacemaker    Pleural effusion    Unintentional weight loss    Hypoalbuminemia    Severe malnutrition    History of CVA (cerebrovascular accident)    Debility    I reviewed the CT scan obtained in the last 2 days and although I do not know this patient well this effusion is loculated and fairly significant and I think patient would probably benefit from a decortication we will discuss with Dr. Nolasco when he returns Monday morning    Plan   Chest x-ray in the a.m.    Please note that portions of this note were completed with a voice recognition program. Efforts were made to edit the dictations, but occasionally words are mistranscribed.    García Miller MD  04/01/23  07:17 EDT

## 2023-04-02 ENCOUNTER — APPOINTMENT (OUTPATIENT)
Dept: GENERAL RADIOLOGY | Facility: HOSPITAL | Age: 73
DRG: 163 | End: 2023-04-02
Payer: MEDICARE

## 2023-04-02 LAB
GLUCOSE BLDC GLUCOMTR-MCNC: 104 MG/DL (ref 70–130)
GLUCOSE BLDC GLUCOMTR-MCNC: 132 MG/DL (ref 70–130)
GLUCOSE BLDC GLUCOMTR-MCNC: 148 MG/DL (ref 70–130)
UFH PPP CHRO-ACNC: 0.43 IU/ML (ref 0.3–0.7)

## 2023-04-02 PROCEDURE — 25010000002 HEPARIN (PORCINE) 25000-0.45 UT/250ML-% SOLUTION: Performed by: INTERNAL MEDICINE

## 2023-04-02 PROCEDURE — 99231 SBSQ HOSP IP/OBS SF/LOW 25: CPT | Performed by: INTERNAL MEDICINE

## 2023-04-02 PROCEDURE — 25010000002 PIPERACILLIN SOD-TAZOBACTAM PER 1 G: Performed by: INTERNAL MEDICINE

## 2023-04-02 PROCEDURE — 99231 SBSQ HOSP IP/OBS SF/LOW 25: CPT | Performed by: PHYSICIAN ASSISTANT

## 2023-04-02 PROCEDURE — 85520 HEPARIN ASSAY: CPT

## 2023-04-02 PROCEDURE — 82962 GLUCOSE BLOOD TEST: CPT

## 2023-04-02 PROCEDURE — 71045 X-RAY EXAM CHEST 1 VIEW: CPT

## 2023-04-02 RX ADMIN — OXYCODONE HYDROCHLORIDE AND ACETAMINOPHEN 1000 MG: 500 TABLET ORAL at 09:32

## 2023-04-02 RX ADMIN — NYSTATIN 500000 UNITS: 100000 SUSPENSION ORAL at 17:59

## 2023-04-02 RX ADMIN — MIRTAZAPINE 15 MG: 15 TABLET, FILM COATED ORAL at 21:44

## 2023-04-02 RX ADMIN — NYSTATIN 500000 UNITS: 100000 SUSPENSION ORAL at 21:44

## 2023-04-02 RX ADMIN — LISINOPRIL 5 MG: 5 TABLET ORAL at 09:33

## 2023-04-02 RX ADMIN — MEGESTROL ACETATE 400 MG: 40 SUSPENSION ORAL at 09:27

## 2023-04-02 RX ADMIN — NYSTATIN 500000 UNITS: 100000 SUSPENSION ORAL at 09:33

## 2023-04-02 RX ADMIN — CARVEDILOL 50 MG: 12.5 TABLET, FILM COATED ORAL at 05:20

## 2023-04-02 RX ADMIN — TERAZOSIN HYDROCHLORIDE 1 MG: 1 CAPSULE ORAL at 21:44

## 2023-04-02 RX ADMIN — Medication 10 ML: at 09:34

## 2023-04-02 RX ADMIN — CYCLOBENZAPRINE 5 MG: 10 TABLET, FILM COATED ORAL at 21:45

## 2023-04-02 RX ADMIN — HEPARIN SODIUM 20 UNITS/KG/HR: 10000 INJECTION, SOLUTION INTRAVENOUS at 00:05

## 2023-04-02 RX ADMIN — ACETAMINOPHEN 325MG 650 MG: 325 TABLET ORAL at 21:44

## 2023-04-02 RX ADMIN — NYSTATIN 500000 UNITS: 100000 SUSPENSION ORAL at 13:00

## 2023-04-02 RX ADMIN — ACETAMINOPHEN 325MG 650 MG: 325 TABLET ORAL at 13:00

## 2023-04-02 RX ADMIN — Medication 10 ML: at 21:45

## 2023-04-02 RX ADMIN — DOCUSATE SODIUM 100 MG: 100 CAPSULE, LIQUID FILLED ORAL at 21:45

## 2023-04-02 RX ADMIN — SENNOSIDES AND DOCUSATE SODIUM 2 TABLET: 8.6; 5 TABLET ORAL at 21:44

## 2023-04-02 RX ADMIN — CARVEDILOL 50 MG: 12.5 TABLET, FILM COATED ORAL at 18:00

## 2023-04-02 RX ADMIN — PANTOPRAZOLE SODIUM 40 MG: 40 TABLET, DELAYED RELEASE ORAL at 21:45

## 2023-04-02 RX ADMIN — Medication 5 MG: at 21:44

## 2023-04-02 RX ADMIN — TAZOBACTAM SODIUM AND PIPERACILLIN SODIUM 3.38 G: 375; 3 INJECTION, SOLUTION INTRAVENOUS at 17:59

## 2023-04-02 RX ADMIN — TAZOBACTAM SODIUM AND PIPERACILLIN SODIUM 3.38 G: 375; 3 INJECTION, SOLUTION INTRAVENOUS at 09:29

## 2023-04-02 RX ADMIN — ASPIRIN 81 MG CHEWABLE TABLET 81 MG: 81 TABLET CHEWABLE at 09:33

## 2023-04-02 RX ADMIN — FAMOTIDINE 20 MG: 20 TABLET ORAL at 09:33

## 2023-04-02 RX ADMIN — ATORVASTATIN CALCIUM 80 MG: 40 TABLET, FILM COATED ORAL at 21:44

## 2023-04-02 RX ADMIN — ACETAMINOPHEN 325MG 650 MG: 325 TABLET ORAL at 05:20

## 2023-04-02 NOTE — PLAN OF CARE
Goal Outcome Evaluation:  Plan of Care Reviewed With: patient         Outcome Evaluation: Pt a/o, VSS, RA, Sat. 94%, no c/o of pain/ discomfort at this point. Continue monitoring.

## 2023-04-02 NOTE — PLAN OF CARE
Goal Outcome Evaluation:      Pt is A@O. Pt is mostly continent. Small incontinent stool, but asked for bedpan, just not aware had accidentally gone some already. BP increased from start of shift, at every assessment. Contacted Yamini Waldrop to see if there was a PRN she wanted to add. Awaiting call back. No further needs reported at this time.

## 2023-04-02 NOTE — PROGRESS NOTES
[facility] Medicine Services  PROGRESS NOTE    Patient Name: Timmy Guajardo  : 1950  MRN: [id number]    Date of Admission: 3/24/2023  Primary Care Physician: Arsen Seals APRN    Subjective   Subjective     CC:  Follow-up for weakness    HPI:  No significant cough.  Feels somewhat weak in general    ROS:  Gen: no fevers  Pulm: no cough, perhaps mild dyspnea with exertion  GI: no nausea      Objective   Objective     Vital Signs:   Temp:  [97.7 °F (36.5 °C)-98.3 °F (36.8 °C)] 97.9 °F (36.6 °C)  Heart Rate:  [70-77] 77  Resp:  [16-18] 16  BP: (107-170)/() 107/66     Physical Exam:  Constitutional - no acute distress, frail, in bed  HEENT-NCAT  CV-RRR  Resp-diminished, particularly at the bases  Abd-soft, nontender, nondistended, normoactive bowel sounds  Ext-No lower extremity cyanosis, clubbing or edema bilaterally  Neuro-alert, speech clear, moves all extremities   Psych-normal affect   Skin- No rash on exposed UE or LE bilaterally    Results Reviewed:  LAB RESULTS:      Lab 23  0543 23  1129 23  0346 23  0031 23  1817 23  0328 23  2346 23  1137 23  0444 23  2037 23  1357 23  0842 23  0649 23  2254 23  1344 23  0708   WBC  --   --  9.38  --   --  10.37  --   --  9.99  --   --   --  10.01  --   --  8.60   HEMOGLOBIN  --   --  11.4*  --   --  12.0*  --   --  12.1*  --   --   --  11.2*  --   --  10.9*   HEMATOCRIT  --   --  36.0*  --   --  38.5  --   --  38.3  --   --   --  35.8*  --   --  34.9*   PLATELETS  --   --  158  --   --  157  --   --  132*  --   --   --  193  --   --  155   NEUTROS ABS  --   --  6.77  --   --  7.49*  --   --  7.51*  --   --   --  7.84*  --   --  6.67   IMMATURE GRANS (ABS)  --   --  0.06*  --   --  0.12*  --   --  0.09*  --   --   --  0.09*  --   --  0.07*   LYMPHS ABS  --   --  1.51  --   --  1.71  --   --  1.48  --   --   --  1.23  --   --  1.08    MONOS ABS  --   --  0.81  --   --  0.84  --   --  0.69  --   --   --  0.70  --   --  0.62   EOS ABS  --   --  0.19  --   --  0.14  --   --  0.15  --   --   --  0.09  --   --  0.11   MCV  --   --  93.8  --   --  96.5  --   --  96.0  --   --   --  96.0  --   --  95.1   LDH  --   --   --   --   --   --   --   --   --   --   --   --   --   --   --  217   PROTIME  --   --   --   --   --   --   --   --   --   --   --   --   --   --   --  16.9*   APTT  --   --   --   --   --   --   --   --   --   --  46.3* 132.6*  --  42.4* 40.7* 88.1   HEPARIN ANTI-XA 0.43 0.49  --  0.38 0.36  --  0.35   < > 0.35   < >  --   --   --   --   --   --     < > = values in this interval not displayed.         Lab 04/01/23  1129 04/01/23  0346 03/31/23  0328 03/29/23  0444 03/28/23  0649 03/27/23  1945 03/27/23  0708   SODIUM  --  145 145 142 140  --  140   POTASSIUM 3.6 3.4* 3.9 4.2 4.3   < > 3.4*   CHLORIDE  --  110* 111* 108* 108*  --  105   CO2  --  22.0 19.0* 21.0* 23.0  --  22.0   ANION GAP  --  13.0 15.0 13.0 9.0  --  13.0   BUN  --  10 9 10 10  --  9   CREATININE  --  0.73* 0.58* 0.62* 0.65*  --  0.61*   EGFR  --  97.9 104.9 102.8 101.4  --  103.3   GLUCOSE  --  105* 71 72 87  --  77   CALCIUM  --  8.3* 8.4* 8.3* 8.2*  --  8.5*   MAGNESIUM  --   --  2.2 2.0 2.2  --  1.8   PHOSPHORUS  --   --  4.0 3.3 3.0  --  3.8    < > = values in this interval not displayed.         Lab 03/31/23  0328 03/28/23  0649 03/27/23  0708   TOTAL PROTEIN 5.7* 6.0 6.0   ALBUMIN 2.7* 2.5* 2.5*   GLOBULIN 3.0 3.5 3.5   ALT (SGPT) 8 11 14   AST (SGOT) 17 22 15   BILIRUBIN 0.3 0.4 0.4   ALK PHOS 76 77 76         Lab 03/27/23  0708   PROTIME 16.9*   INR 1.38*                 Brief Urine Lab Results  (Last result in the past 365 days)      Color   Clarity   Blood   Leuk Est   Nitrite   Protein   CREAT   Urine HCG        03/24/23 2982 Yellow   Turbid   Small (1+)   Moderate (2+)   Negative   30 mg/dL (1+)                 Microbiology Results Abnormal     Procedure  Component Value - Date/Time    Body Fluid Culture - Body Fluid, Pleural Cavity [[id number]] Collected: 03/27/23 1532    Lab Status: Final result Specimen: Body Fluid from Pleural Cavity Updated: 03/31/23 0952     Body Fluid Culture No growth at 3 days     Gram Stain Rare (1+) WBCs per low power field      No organisms seen    MRSA Screen, PCR (Inpatient) - Swab, Nares [[id number]]  (Normal) Collected: 03/27/23 0857    Lab Status: Final result Specimen: Swab from Nares Updated: 03/27/23 1107     MRSA PCR Negative    Narrative:      The negative predictive value of this diagnostic test is high and should only be used to consider de-escalating anti-MRSA therapy. A positive result may indicate colonization with MRSA and must be correlated clinically.  MRSA Negative    COVID PRE-OP / PRE-PROCEDURE SCREENING ORDER (NO ISOLATION) - Swab, Nasopharynx [[id number]]  (Normal) Collected: 03/24/23 2256    Lab Status: Final result Specimen: Swab from Nasopharynx Updated: 03/24/23 2356    Narrative:      The following orders were created for panel order COVID PRE-OP / PRE-PROCEDURE SCREENING ORDER (NO ISOLATION) - Swab, Nasopharynx.  Procedure                               Abnormality         Status                     ---------                               -----------         ------                     Respiratory Panel PCR w/...[[id number]]  Normal              Final result                 Please view results for these tests on the individual orders.    Respiratory Panel PCR w/COVID-19(SARS-CoV-2) SULEIMAN/TAWANNA/BLAISE/PAD/COR/MAD/ANTHONY In-House, NP Swab in UTM/VTM, 3-4 HR TAT - Swab, Nasopharynx [[id number]]  (Normal) Collected: 03/24/23 2256    Lab Status: Final result Specimen: Swab from Nasopharynx Updated: 03/24/23 2356     ADENOVIRUS, PCR Not Detected     Coronavirus 229E Not Detected     Coronavirus HKU1 Not Detected     Coronavirus NL63 Not Detected     Coronavirus OC43 Not Detected     COVID19 Not Detected     Human Metapneumovirus Not  Detected     Human Rhinovirus/Enterovirus Not Detected     Influenza A PCR Not Detected     Influenza B PCR Not Detected     Parainfluenza Virus 1 Not Detected     Parainfluenza Virus 2 Not Detected     Parainfluenza Virus 3 Not Detected     Parainfluenza Virus 4 Not Detected     RSV, PCR Not Detected     Bordetella pertussis pcr Not Detected     Bordetella parapertussis PCR Not Detected     Chlamydophila pneumoniae PCR Not Detected     Mycoplasma pneumo by PCR Not Detected    Narrative:      In the setting of a positive respiratory panel with a viral infection PLUS a negative procalcitonin without other underlying concern for bacterial infection, consider observing off antibiotics or discontinuation of antibiotics and continue supportive care. If the respiratory panel is positive for atypical bacterial infection (Bordetella pertussis, Chlamydophila pneumoniae, or Mycoplasma pneumoniae), consider antibiotic de-escalation to target atypical bacterial infection.          CT Chest Without Contrast Diagnostic    Result Date: 3/31/2023  CT CHEST WO CONTRAST DIAGNOSTIC Date of Exam: 3/31/2023 1:39 PM EDT Indication: Pleural effusion, malignancy suspected. Comparison: Chest CT scan 3/30/2023 Technique: Axial CT images were obtained of the chest without contrast administration.  Reconstructed coronal and sagittal images were also obtained. Automated exposure control and iterative construction methods were used. Findings: Previous contrast-enhanced CT scan by report showed moderate right pleural effusion containing gas bubbles possibly related to chest tube removal, versus empyema or bronchopleural fistula. Areas of right lung atelectasis, patchy right upper lobe inflammation. Today's study shows somewhat reduced detail as a result of lack of IV contrast, but there is a persistent, partially loculated pleural fluid collection, with evident pleural rind. Bubbles of air within the collection appears stable, and are not  increasing, suggesting residual air from chest tube placement rather than bronchopleural fistula. No large air/fluid level is seen. There is no left effusion or pericardial effusion. Scarlike opacity in the anterior right upper lobe, and other small subpleural interstitial changes are stable. There is no evidence of any new or increasing pulmonary parenchymal disease. The airways appear normally patent. Included images of the upper abdomen show no significant abnormalities of the visualized portions of the liver, gallbladder, spleen, pancreatic tail, adrenal glands, or left upper renal pole. Bony structures appear to be intact.     Impression: Impression: 1. Stable appearance of patient's apparently loculated right pleural effusion, which contains a stable amount of scattered air bubbles, since yesterday's study. Empyema is not excluded, but this makes bronchopleural fistula less likely, and findings could all reflect residual air from chest tube placement removal. 2. No new chest disease is seen. Electronically Signed: Trey Allen  3/31/2023 3:27 PM EDT  Workstation ID: [id number]    XR Chest 1 View    Result Date: 4/2/2023  XR CHEST 1 VW Date of Exam: 4/2/2023 5:40 AM EDT Indication: Pleural effusion. Comparison: 4/1/2023 Findings: The heart size is stable and within normal limits. Left-sided pacing device is unchanged. There is a right-sided pleural effusion with right basilar atelectasis.     Impression: Impression: No significant change Electronically Signed: Tim Arreaga  4/2/2023 9:03 AM EDT  Workstation ID: [id number]    XR Chest 1 View    Result Date: 4/1/2023  XR CHEST 1 VW Date of Exam: 4/1/2023 4:53 AM EDT Indication: Pleural effusion. Comparison: One day prior Findings: Left chest wall ICD projects unchanged. Persistent small-to-moderate right pleural effusion with adjacent atelectasis. The left lung remains clear. There is no distinct pneumothorax. Unchanged heart and mediastinal contours.     Impression:  Impression: Left chest wall ICD projects unchanged. Persistent small-to-moderate right pleural effusion with adjacent atelectasis. The left lung remains clear. There is no distinct pneumothorax. Unchanged heart and mediastinal contours. Electronically Signed: Nicolas Siddiqi  4/1/2023 8:38 AM EDT  Workstation ID: [id number]      Results for orders placed during the hospital encounter of 10/16/22    Adult Transthoracic Echo Complete W/ Cont if Necessary Per Protocol    Interpretation Summary  •  Estimated left ventricular EF = 30%.  There is global hypokinesis.  The basal-mid posterior wall appears akinetic.  •  Normal right ventricular cavity size, wall thickness, systolic function and septal motion noted. Electronic lead present in the ventricle  •  Septal wall motion is abnormal, consistent with right ventricular pacing  •  No hemodynamically significant valvular stenosis or regurgitation noted.      Current medications:  Scheduled Meds:acetaminophen, 650 mg, Oral, Q8H  vitamin C, 1,000 mg, Oral, Daily  aspirin, 81 mg, Oral, Daily  atorvastatin, 80 mg, Oral, Nightly  carvedilol, 50 mg, Oral, BID With Meals  docusate sodium, 100 mg, Oral, BID  famotidine, 20 mg, Oral, Daily  insulin lispro, 0-7 Units, Subcutaneous, Q6H  lisinopril, 5 mg, Oral, Q24H  megestrol, 400 mg, Oral, Daily  mirtazapine, 15 mg, Oral, Nightly  nystatin, 5 mL, Oral, 4x Daily  pantoprazole, 40 mg, Oral, Nightly  piperacillin-tazobactam, 3.375 g, Intravenous, Q8H  polyethylene glycol, 17 g, Oral, Daily  senna-docusate sodium, 2 tablet, Oral, BID  sodium chloride, 10 mL, Intravenous, Q12H  terazosin, 1 mg, Oral, Nightly      Continuous Infusions:heparin, 20 Units/kg/hr, Last Rate: 20 Units/kg/hr (04/02/23 0005)  lactated ringers, 9 mL/hr, Last Rate: 9 mL/hr (03/29/23 0838)  lactated ringers, 9 mL/hr, Last Rate: 9 mL/hr (03/31/23 0832)  Pharmacy to Dose Heparin,       PRN Meds:.•  acetaminophen **OR** acetaminophen **OR** acetaminophen  •   senna-docusate sodium **AND** polyethylene glycol **AND** bisacodyl **AND** bisacodyl  •  Calcium Replacement - Follow Nurse / BPA Driven Protocol  •  cyclobenzaprine  •  dextrose  •  dextrose  •  glucagon (human recombinant)  •  Magnesium Standard Dose Replacement - Follow Nurse / BPA Driven Protocol  •  melatonin  •  ondansetron  •  Pharmacy to Dose Heparin  •  Phosphorus Replacement - Follow Nurse / BPA Driven Protocol  •  Potassium Replacement - Follow Nurse / BPA Driven Protocol  •  Sodium Chloride (PF)  •  sodium chloride  •  sodium chloride    Assessment & Plan   Assessment & Plan     Active Hospital Problems    Diagnosis  POA   • **Bilateral pulmonary embolism [I26.99]  Unknown   • Pleural effusion [J90]  Yes   • Unintentional weight loss [R63.4]  Unknown   • Hypoalbuminemia [E88.09]  Unknown   • Severe malnutrition [E43]  Unknown   • History of CVA (cerebrovascular accident) [Z86.73]  Not Applicable   • Debility [R53.81]  Unknown   • Presence of cardiac pacemaker [Z95.0]  Yes   • HTN (hypertension) [I10]  Yes   • Leukocytosis [D72.829]  Yes      Resolved Hospital Problems   No resolved problems to display.        Brief Hospital Course to date:  Timmy Guajardo is a 72 y.o. 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, systolic heart failure with ejection fraction of 30%, coronary artery disease, GERD, history of DVT who presented to the hospital with weakness and functional decline, unintentional weight loss and severe constipation    Assessment and plan:  Bilateral pulmonary embolism  History of DVT  · Likely secondary to being on an adequate dose of Eliquis of 2.5 mg twice daily and medication noncompliance  · Continue heparin drip while hospitalized since he might need further procedures  · Plan to transition to full dose Eliquis (loading and maintenance)     Moderate exudative right-sided loculated pleural effusion  Possible  organized right pleural effusion  · Bedside point-of-care ultrasound revealed complex pleural effusion  · Cardiothoracic surgery consulted.  Recommended chest tube placement by IR.    · Underwent right-sided chest tube placement by IR on 3/27/2023.  Chest tube removed 3/30/2023   · Repeat CT scan chest after removal of chest tube showed persistent moderate right pleural effusion, likely organized   · Analysis of the fluid is consistent with exudate, ?  Parapneumonic  · Culture from pleural fluid is negative to date  · Cytology showed benign mesothelial cells  · Continue IV Zosyn.  Vancomycin discontinued after MRSA swab came back negative  · Awaiting input from cardiothoracic surgery service (observation versus large bore chest tube re-placement)    Severe malnutrition  Hypoalbuminemia  Significant unintentional weight loss  Generalized debility  · Patient reports 52lbs weight loss since October 2022.  · No EGD or colonoscopy since 2008  · CT chest and CT abdomen with no convincing evidence of solid malignancy or lymphadenopathy.  Oncology service evaluated the patient recommended age-appropriate screening  · GI evaluated the patient for EGD and colonoscopy.  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 Continue Megace and mirtazapine 15 mg nightly for poor appetite  · Nutrition team following     UTI with Enterococcus faecalis  · POA  · On Zosyn    Leukocytosis, improving  · Likely reactive versus infectious  · Normal procalcitonin and negative cultures to date  · Continue current metabolic therapy with Zosyn.     CAD  Cardiomyopathy with EF 30 %  Essential HTN  Old CVA with right residual weakness  Dyslipidemia  · Continue aspirin  · Continue coreg, lisinopril  · Continue  statins    Constipation  · Having multiple bowel movements with enema  · Continue bowel regimen     GERD   · Continue PPI      Type 2 diabetes  · A1C 6.3%   · Continue insulin sliding scale     Acute debility  · PT recommended acute rehab      Expected Discharge Location and Transportation: Acute rehab  Expected Discharge   Expected Discharge Date and Time     Expected Discharge Date Expected Discharge Time    Apr 4, 2023            DVT prophylaxis:  Medical and mechanical DVT prophylaxis orders are present.     AM-PAC 6 Clicks Score (PT): 17 (04/01/23 2000)    CODE STATUS:   Code Status and Medical Interventions:   Ordered at: 03/24/23 0156     Code Status (Patient has no pulse and is not breathing):    CPR (Attempt to Resuscitate)     Medical Interventions (Patient has pulse or is breathing):    Full Support        Bryce Ponce MD  04/02/23

## 2023-04-02 NOTE — PROGRESS NOTES
HEPARIN INFUSION  Timmy Guajardo is a  72 y.o. male receiving heparin infusion.     Therapy for (VTE/Cardiac):  VTE  Patient Weight: 56.7 kg  Initial Bolus (Y/N):  Y  Any Bolus (Y/N): Y        VTE (PE/DVT)   Initial Bolus: 80 units/kg (Max 10,000 units)  Initial rate: 18 units/kg/hr (Max 1,500 units/hr)    Anti Xa Rebolus Infusion Hold time Change infusion Dose (Units/kg/hr) Next Anti Xa Level Due   < 0.11 50 Units/kg  (4000 Units Max) None Increase by  4 Units/kg/hr 6 hours   0.11 - 0.19 25 Units/kg  (2000 Units Max) None Increase by  3 Units/kg/hr 6 hours   0.2 - 0.29 0 None Increase by  2 Units/kg/hr 6 hours   0.3 - 0.7 0 None No Change 6 hours (after 2 consecutive levels in range check qAM)   0.71 - 0.8 0 None Decrease by  1 Units/kg/hr 6 hours   0.81 - 0.9 0 None Decrease by  2 Units/kg/hr 6 hours   0.91 - 1 0 60 Minutes Decrease by  3 Units/kg/hr 6 hours   >1 0 Hold  After Anti Xa less than 0.7 decrease previous rate by  4 Units/kg/hr  Every 2 hours until Anti Xa is less than 0.7 then when infusion restarts in 6 hours     Results from last 7 days   Lab Units 03/31/23  0328 03/29/23  0444 03/28/23  0649 03/27/23  0708 03/26/23  0436 03/25/23  0120   INR   --   --   --  1.38*  --  3.62*   HEMOGLOBIN g/dL 12.0* 12.1* 11.2* 10.9*   < >  --    HEMATOCRIT % 38.5 38.3 35.8* 34.9*   < >  --    PLATELETS 10*3/mm3 157 132* 193 155   < >  --     < > = values in this interval not displayed.       Date   Time   Anti-Xa Current Rate (Unit/kg/hr) Bolus   (Units) Rate Change   (Unit/kg/hr) New Rate (Unit/kg/hr) Next anti-Xa Comments  Pump Check Daily   3/25 0025 pending 0 4540 +18 18 0700 New start   3/25 0722 >1.1 18 -- -18 0 0930 Musella 324   3/25 0954 >1.1 0 -- -- 0 1300 Musella 3249   3/25  aPTT 0        3/25 0954 63 0  +16 16 2100 Musella 3249   3/25 2141 71.4 16 -- -- 16 0400 Alexandro 3184 -b   3/26 0436 77.1 16 -- -- 16 1200 Alexandro 3184 -b   3/26 1136 45.5  (heparin was held for 2.5hrs prior to draw) 16 --  16 2000  Ros 3250   3/26 2035 57.5 16 -- +1 17 0600 Nadia 3249 -acb   3/27 0708 88.1 17 -- -- 17 1300 Marisa 3250   3/27 1630 -- off since 0730 for CT plcmt -- resume at prev rate 17 2300 Heparin resumed s/p R CT placement   3/27 2254 42.4 17 2000 +2 19 0800 Nadia 3252 -acb   3/28 0842 132.6 19 -- hold -- 1200 Dw RN   3/28 1357 46.3 -- -- +15 15 2100 DW RN   3/28 2037 0.15 15 1400 +3 18 0400 D/w WADE Zuniga   3/29 0444 0.35 18 -- -- 18 1200 Nadia 3252 -acb   3/29 1137 0.27 18 -- +2 20 2000 Marisa 3250   3/29 2001 0.50 20 -- -- 20 off at 0000 D/w WADE Arauz   3/30 1800 -- off since 0000 for planned colonoscopy no bolus until after colon study complete resume at prev rate 20 0000 D/w RN and Dr. Cooper - pt did not have scope today. Restart heparin in meantime w/o bolus and turn off at 0600 for planned scope in AM.     3/31 0115  20 --- -- Held 0600 -- D/w RN   3/21 1030 -- 0  restart+20 +20 1600    3/21 1817 0.36 20 -- -- 20 0100 D/w RN   4/1 0100 0.38 20 -- -- 20 1200 D/w RN   4/1 1129 0.49 20 -- -- 20 0600 D/w WADE Herrera, pump verified   4/2 0600 0.43 20 -- -- 20 0600 4/3 D/w RN  Pump checked ES         Date   Time   Anti-Xa Current Rate (Unit/kg/hr) Bolus   (Units) Rate Change   (Unit/kg/hr) New Rate (Unit/kg/hr) Next   Anti-Xa Comments  Pump Check Daily                                                                                                                                                                                                                                            Prabhakar Bruno, PharmD  4/2/2023  13:40 EDT

## 2023-04-02 NOTE — PROGRESS NOTES
2 Days Post-Op       LOS: 9 days   Patient Care Team:  Arsen Seals APRN as PCP - General (Family Medicine)    Chief complaint: New complaint    Subjective   Denies chest pain, denies shortness of breath    Objective    Vital Signs  Temp:  [97.7 °F (36.5 °C)-98.3 °F (36.8 °C)] 98.3 °F (36.8 °C)  Heart Rate:  [70-80] 72  Resp:  [16-18] 16  BP: (134-170)/() 168/83    Physical Exam:   General Appearance: alert, appears stated age and cooperative   Lungs: clear bilaterally   Heart: Regular rate and rhythm        Results   Results from last 7 days   Lab Units 04/01/23  0346   WBC 10*3/mm3 9.38   HEMOGLOBIN g/dL 11.4*   HEMATOCRIT % 36.0*   PLATELETS 10*3/mm3 158     Results from last 7 days   Lab Units 04/01/23  1129 04/01/23  0346   SODIUM mmol/L  --  145   POTASSIUM mmol/L 3.6 3.4*   CHLORIDE mmol/L  --  110*   CO2 mmol/L  --  22.0   BUN mg/dL  --  10   CREATININE mg/dL  --  0.73*   GLUCOSE mg/dL  --  105*   CALCIUM mg/dL  --  8.3*               Assessment      Bilateral pulmonary embolism    Leukocytosis    HTN (hypertension)    Presence of cardiac pacemaker    Pleural effusion    Unintentional weight loss    Hypoalbuminemia    Severe malnutrition    History of CVA (cerebrovascular accident)    Debility        Plan   Dr. Nolasco back in calliejass Bustos PA-C  04/02/23  08:52 EDT

## 2023-04-03 ENCOUNTER — APPOINTMENT (OUTPATIENT)
Dept: GENERAL RADIOLOGY | Facility: HOSPITAL | Age: 73
DRG: 163 | End: 2023-04-03
Payer: MEDICARE

## 2023-04-03 LAB
ANION GAP SERPL CALCULATED.3IONS-SCNC: 13 MMOL/L (ref 5–15)
BASOPHILS # BLD AUTO: 0.05 10*3/MM3 (ref 0–0.2)
BASOPHILS NFR BLD AUTO: 0.5 % (ref 0–1.5)
BUN SERPL-MCNC: 10 MG/DL (ref 8–23)
BUN/CREAT SERPL: 16.7 (ref 7–25)
CALCIUM SPEC-SCNC: 8.5 MG/DL (ref 8.6–10.5)
CHLORIDE SERPL-SCNC: 109 MMOL/L (ref 98–107)
CO2 SERPL-SCNC: 21 MMOL/L (ref 22–29)
CREAT SERPL-MCNC: 0.6 MG/DL (ref 0.76–1.27)
DEPRECATED RDW RBC AUTO: 56.2 FL (ref 37–54)
EGFRCR SERPLBLD CKD-EPI 2021: 103.8 ML/MIN/1.73
EOSINOPHIL # BLD AUTO: 0.39 10*3/MM3 (ref 0–0.4)
EOSINOPHIL NFR BLD AUTO: 4.2 % (ref 0.3–6.2)
ERYTHROCYTE [DISTWIDTH] IN BLOOD BY AUTOMATED COUNT: 15.9 % (ref 12.3–15.4)
GLUCOSE BLDC GLUCOMTR-MCNC: 116 MG/DL (ref 70–130)
GLUCOSE BLDC GLUCOMTR-MCNC: 152 MG/DL (ref 70–130)
GLUCOSE BLDC GLUCOMTR-MCNC: 75 MG/DL (ref 70–130)
GLUCOSE BLDC GLUCOMTR-MCNC: 81 MG/DL (ref 70–130)
GLUCOSE BLDC GLUCOMTR-MCNC: 94 MG/DL (ref 70–130)
GLUCOSE SERPL-MCNC: 81 MG/DL (ref 65–99)
HCT VFR BLD AUTO: 37.9 % (ref 37.5–51)
HGB BLD-MCNC: 11.7 G/DL (ref 13–17.7)
IMM GRANULOCYTES # BLD AUTO: 0.05 10*3/MM3 (ref 0–0.05)
IMM GRANULOCYTES NFR BLD AUTO: 0.5 % (ref 0–0.5)
LYMPHOCYTES # BLD AUTO: 1.71 10*3/MM3 (ref 0.7–3.1)
LYMPHOCYTES NFR BLD AUTO: 18.4 % (ref 19.6–45.3)
MCH RBC QN AUTO: 30 PG (ref 26.6–33)
MCHC RBC AUTO-ENTMCNC: 30.9 G/DL (ref 31.5–35.7)
MCV RBC AUTO: 97.2 FL (ref 79–97)
MONOCYTES # BLD AUTO: 0.75 10*3/MM3 (ref 0.1–0.9)
MONOCYTES NFR BLD AUTO: 8.1 % (ref 5–12)
NEUTROPHILS NFR BLD AUTO: 6.35 10*3/MM3 (ref 1.7–7)
NEUTROPHILS NFR BLD AUTO: 68.3 % (ref 42.7–76)
NRBC BLD AUTO-RTO: 0 /100 WBC (ref 0–0.2)
PLATELET # BLD AUTO: 137 10*3/MM3 (ref 140–450)
PMV BLD AUTO: 10.8 FL (ref 6–12)
POTASSIUM SERPL-SCNC: 3.9 MMOL/L (ref 3.5–5.2)
RBC # BLD AUTO: 3.9 10*6/MM3 (ref 4.14–5.8)
SODIUM SERPL-SCNC: 143 MMOL/L (ref 136–145)
UFH PPP CHRO-ACNC: 0.39 IU/ML (ref 0.3–0.7)
WBC NRBC COR # BLD: 9.3 10*3/MM3 (ref 3.4–10.8)

## 2023-04-03 PROCEDURE — 25010000002 PIPERACILLIN SOD-TAZOBACTAM PER 1 G: Performed by: INTERNAL MEDICINE

## 2023-04-03 PROCEDURE — 97110 THERAPEUTIC EXERCISES: CPT

## 2023-04-03 PROCEDURE — 97530 THERAPEUTIC ACTIVITIES: CPT

## 2023-04-03 PROCEDURE — 85025 COMPLETE CBC W/AUTO DIFF WBC: CPT | Performed by: INTERNAL MEDICINE

## 2023-04-03 PROCEDURE — 71045 X-RAY EXAM CHEST 1 VIEW: CPT

## 2023-04-03 PROCEDURE — 25010000002 HEPARIN (PORCINE) 25000-0.45 UT/250ML-% SOLUTION: Performed by: INTERNAL MEDICINE

## 2023-04-03 PROCEDURE — 99232 SBSQ HOSP IP/OBS MODERATE 35: CPT | Performed by: INTERNAL MEDICINE

## 2023-04-03 PROCEDURE — 80048 BASIC METABOLIC PNL TOTAL CA: CPT | Performed by: INTERNAL MEDICINE

## 2023-04-03 PROCEDURE — 63710000001 INSULIN LISPRO (HUMAN) PER 5 UNITS: Performed by: INTERNAL MEDICINE

## 2023-04-03 PROCEDURE — 85520 HEPARIN ASSAY: CPT

## 2023-04-03 PROCEDURE — 82962 GLUCOSE BLOOD TEST: CPT

## 2023-04-03 RX ADMIN — TAZOBACTAM SODIUM AND PIPERACILLIN SODIUM 3.38 G: 375; 3 INJECTION, SOLUTION INTRAVENOUS at 08:54

## 2023-04-03 RX ADMIN — ATORVASTATIN CALCIUM 80 MG: 40 TABLET, FILM COATED ORAL at 21:44

## 2023-04-03 RX ADMIN — NYSTATIN 500000 UNITS: 100000 SUSPENSION ORAL at 13:14

## 2023-04-03 RX ADMIN — TAZOBACTAM SODIUM AND PIPERACILLIN SODIUM 3.38 G: 375; 3 INJECTION, SOLUTION INTRAVENOUS at 00:51

## 2023-04-03 RX ADMIN — CARVEDILOL 50 MG: 12.5 TABLET, FILM COATED ORAL at 18:18

## 2023-04-03 RX ADMIN — PANTOPRAZOLE SODIUM 40 MG: 40 TABLET, DELAYED RELEASE ORAL at 21:45

## 2023-04-03 RX ADMIN — FAMOTIDINE 20 MG: 20 TABLET ORAL at 08:56

## 2023-04-03 RX ADMIN — ACETAMINOPHEN 325MG 650 MG: 325 TABLET ORAL at 06:33

## 2023-04-03 RX ADMIN — DOCUSATE SODIUM 100 MG: 100 CAPSULE, LIQUID FILLED ORAL at 21:44

## 2023-04-03 RX ADMIN — NYSTATIN 500000 UNITS: 100000 SUSPENSION ORAL at 21:44

## 2023-04-03 RX ADMIN — Medication 5 MG: at 21:44

## 2023-04-03 RX ADMIN — SENNOSIDES AND DOCUSATE SODIUM 2 TABLET: 8.6; 5 TABLET ORAL at 21:44

## 2023-04-03 RX ADMIN — LISINOPRIL 5 MG: 5 TABLET ORAL at 08:56

## 2023-04-03 RX ADMIN — Medication 10 ML: at 08:56

## 2023-04-03 RX ADMIN — INSULIN LISPRO 2 UNITS: 100 INJECTION, SOLUTION INTRAVENOUS; SUBCUTANEOUS at 13:14

## 2023-04-03 RX ADMIN — OXYCODONE HYDROCHLORIDE AND ACETAMINOPHEN 1000 MG: 500 TABLET ORAL at 08:56

## 2023-04-03 RX ADMIN — MEGESTROL ACETATE 400 MG: 40 SUSPENSION ORAL at 08:55

## 2023-04-03 RX ADMIN — NYSTATIN 500000 UNITS: 100000 SUSPENSION ORAL at 18:18

## 2023-04-03 RX ADMIN — ACETAMINOPHEN 325MG 650 MG: 325 TABLET ORAL at 21:44

## 2023-04-03 RX ADMIN — Medication 10 ML: at 21:45

## 2023-04-03 RX ADMIN — TERAZOSIN HYDROCHLORIDE 1 MG: 1 CAPSULE ORAL at 21:45

## 2023-04-03 RX ADMIN — TAZOBACTAM SODIUM AND PIPERACILLIN SODIUM 3.38 G: 375; 3 INJECTION, SOLUTION INTRAVENOUS at 18:18

## 2023-04-03 RX ADMIN — HEPARIN SODIUM 20 UNITS/KG/HR: 10000 INJECTION, SOLUTION INTRAVENOUS at 23:15

## 2023-04-03 RX ADMIN — MIRTAZAPINE 15 MG: 15 TABLET, FILM COATED ORAL at 21:44

## 2023-04-03 RX ADMIN — HEPARIN SODIUM 20 UNITS/KG/HR: 10000 INJECTION, SOLUTION INTRAVENOUS at 00:51

## 2023-04-03 RX ADMIN — NYSTATIN 500000 UNITS: 100000 SUSPENSION ORAL at 08:55

## 2023-04-03 RX ADMIN — ACETAMINOPHEN 325MG 650 MG: 325 TABLET ORAL at 13:27

## 2023-04-03 RX ADMIN — CYCLOBENZAPRINE 5 MG: 10 TABLET, FILM COATED ORAL at 21:45

## 2023-04-03 RX ADMIN — ASPIRIN 81 MG CHEWABLE TABLET 81 MG: 81 TABLET CHEWABLE at 08:56

## 2023-04-03 RX ADMIN — CARVEDILOL 50 MG: 12.5 TABLET, FILM COATED ORAL at 08:55

## 2023-04-03 NOTE — PLAN OF CARE
Goal Outcome Evaluation:  Plan of Care Reviewed With: patient           Outcome Evaluation: Pt a/o, VSS, RA, Sat. 94%, c/o of pain/ discomfort addressed with scheduled meds. Continue monitoring.

## 2023-04-03 NOTE — PROGRESS NOTES
CTS Progress Note      Chief Complaint: Pleural effusion      Subjective  Frustrated still being in hospital. Denies shortness of breath or chest pain. On room air saturations 95%.       Objective    Physical Exam:   Vital Signs   Temp:  [96.1 °F (35.6 °C)-98 °F (36.7 °C)] 98 °F (36.7 °C)  Heart Rate:  [74-77] 74  Resp:  [16] 16  BP: (107-143)/(66-77) 138/77   GEN: NAD   CV: Regular rate and rhythm   RESP: Decreased bilateral bases but otherwise  clear   EXT: Warm without significant edema   Chest tube removal site examined, DuoDERM patch in place.  No surrounding erythema or drainage or swelling   Results     Results from last 7 days   Lab Units 04/03/23  0444   WBC 10*3/mm3 9.30   HEMOGLOBIN g/dL 11.7*   HEMATOCRIT % 37.9   PLATELETS 10*3/mm3 137*     Results from last 7 days   Lab Units 04/03/23  0444   SODIUM mmol/L 143   POTASSIUM mmol/L 3.9   CHLORIDE mmol/L 109*   CO2 mmol/L 21.0*   BUN mg/dL 10   CREATININE mg/dL 0.60*   GLUCOSE mg/dL 81   CALCIUM mg/dL 8.5*     Results from last 7 days   Lab Units 03/28/23  1357   APTT seconds 46.3*     Assessment & Plan   Bilateral pulmonary embolus with moderate right pleural effusion status post IR placed chest tube discontinued 3/30/2023  Continued moderate size right-sided pleural effusion    Plan   Dr. Archuleta to place large bore chest tube at bedside today  CT chest in       Alexandra Azul, YUE  04/03/23  07:15 EDT

## 2023-04-03 NOTE — PROGRESS NOTES
[facility] Medicine Services  PROGRESS NOTE    Patient Name: Timmy Guajardo  : 1950  MRN: [id number]    Date of Admission: 3/24/2023  Primary Care Physician: Arsen Seals APRN    Subjective   Subjective     CC:  Follow-up for weakness    HPI:  Feels ok, no significant cough.  Denies fever    ROS:  Gen: non fever  Pulm: denies dysnea  CV: no chest pain      Objective   Objective     Vital Signs:   Temp:  [96.1 °F (35.6 °C)-98 °F (36.7 °C)] 97.8 °F (36.6 °C)  Heart Rate:  [74-84] 75  Resp:  [16-18] 18  BP: (103-146)/(54-78) 103/54     Physical Exam:  Constitutional - no acute distress, however frail, in bed  HEENT-NCAT, mucous membranes moist  CV-RRR  Resp-diminished bilaterally, particularly at bases  Abd-soft, nontender, nondistended, normoactive bowel sounds  Ext-No lower extremity cyanosis, clubbing or edema bilaterally  Neuro-alert, speech clear, moves all extremities   Psych-normal affect   Skin- No rash on exposed UE or LE bilaterally      Results Reviewed:  LAB RESULTS:      Lab 23  0541 23  0444 23  0543 23  1129 23  0346 23  0031 23  1817 23  0328 23  1137 23  0444 23  2037 23  1357 23  0842 23  0649 23  2254 23  1344   WBC  --  9.30  --   --  9.38  --   --  10.37  --  9.99  --   --   --  10.01  --   --    HEMOGLOBIN  --  11.7*  --   --  11.4*  --   --  12.0*  --  12.1*  --   --   --  11.2*  --   --    HEMATOCRIT  --  37.9  --   --  36.0*  --   --  38.5  --  38.3  --   --   --  35.8*  --   --    PLATELETS  --  137*  --   --  158  --   --  157  --  132*  --   --   --  193  --   --    NEUTROS ABS  --  6.35  --   --  6.77  --   --  7.49*  --  7.51*  --   --   --  7.84*  --   --    IMMATURE GRANS (ABS)  --  0.05  --   --  0.06*  --   --  0.12*  --  0.09*  --   --   --  0.09*  --   --    LYMPHS ABS  --  1.71  --   --  1.51  --   --  1.71  --  1.48  --   --   --  1.23  --   --     MONOS ABS  --  0.75  --   --  0.81  --   --  0.84  --  0.69  --   --   --  0.70  --   --    EOS ABS  --  0.39  --   --  0.19  --   --  0.14  --  0.15  --   --   --  0.09  --   --    MCV  --  97.2*  --   --  93.8  --   --  96.5  --  96.0  --   --   --  96.0  --   --    APTT  --   --   --   --   --   --   --   --   --   --   --  46.3* 132.6*  --  42.4* 40.7*   HEPARIN ANTI-XA 0.39  --  0.43 0.49  --  0.38 0.36  --    < > 0.35   < >  --   --   --   --   --     < > = values in this interval not displayed.         Lab 04/03/23 0444 04/01/23  1129 04/01/23 0346 03/31/23 0328 03/29/23 0444 03/28/23  0649   SODIUM 143  --  145 145 142 140   POTASSIUM 3.9 3.6 3.4* 3.9 4.2 4.3   CHLORIDE 109*  --  110* 111* 108* 108*   CO2 21.0*  --  22.0 19.0* 21.0* 23.0   ANION GAP 13.0  --  13.0 15.0 13.0 9.0   BUN 10  --  10 9 10 10   CREATININE 0.60*  --  0.73* 0.58* 0.62* 0.65*   EGFR 103.8  --  97.9 104.9 102.8 101.4   GLUCOSE 81  --  105* 71 72 87   CALCIUM 8.5*  --  8.3* 8.4* 8.3* 8.2*   MAGNESIUM  --   --   --  2.2 2.0 2.2   PHOSPHORUS  --   --   --  4.0 3.3 3.0         Lab 03/31/23 0328 03/28/23  0649   TOTAL PROTEIN 5.7* 6.0   ALBUMIN 2.7* 2.5*   GLOBULIN 3.0 3.5   ALT (SGPT) 8 11   AST (SGOT) 17 22   BILIRUBIN 0.3 0.4   ALK PHOS 76 77                     Brief Urine Lab Results  (Last result in the past 365 days)      Color   Clarity   Blood   Leuk Est   Nitrite   Protein   CREAT   Urine HCG        03/24/23 2329 Yellow   Turbid   Small (1+)   Moderate (2+)   Negative   30 mg/dL (1+)                 Microbiology Results Abnormal     Procedure Component Value - Date/Time    Body Fluid Culture - Body Fluid, Pleural Cavity [[id number]] Collected: 03/27/23 1532    Lab Status: Final result Specimen: Body Fluid from Pleural Cavity Updated: 03/31/23 0952     Body Fluid Culture No growth at 3 days     Gram Stain Rare (1+) WBCs per low power field      No organisms seen    MRSA Screen, PCR (Inpatient) - Swab, Nares [[id number]]   (Normal) Collected: 03/27/23 0857    Lab Status: Final result Specimen: Swab from Nares Updated: 03/27/23 1107     MRSA PCR Negative    Narrative:      The negative predictive value of this diagnostic test is high and should only be used to consider de-escalating anti-MRSA therapy. A positive result may indicate colonization with MRSA and must be correlated clinically.  MRSA Negative    COVID PRE-OP / PRE-PROCEDURE SCREENING ORDER (NO ISOLATION) - Swab, Nasopharynx [[id number]]  (Normal) Collected: 03/24/23 2256    Lab Status: Final result Specimen: Swab from Nasopharynx Updated: 03/24/23 2356    Narrative:      The following orders were created for panel order COVID PRE-OP / PRE-PROCEDURE SCREENING ORDER (NO ISOLATION) - Swab, Nasopharynx.  Procedure                               Abnormality         Status                     ---------                               -----------         ------                     Respiratory Panel PCR w/...[[id number]]  Normal              Final result                 Please view results for these tests on the individual orders.    Respiratory Panel PCR w/COVID-19(SARS-CoV-2) SULEIMAN/TAWANNA/BLAISE/PAD/COR/MAD/ANTHONY In-House, NP Swab in UTM/VTM, 3-4 HR TAT - Swab, Nasopharynx [[id number]]  (Normal) Collected: 03/24/23 2256    Lab Status: Final result Specimen: Swab from Nasopharynx Updated: 03/24/23 2356     ADENOVIRUS, PCR Not Detected     Coronavirus 229E Not Detected     Coronavirus HKU1 Not Detected     Coronavirus NL63 Not Detected     Coronavirus OC43 Not Detected     COVID19 Not Detected     Human Metapneumovirus Not Detected     Human Rhinovirus/Enterovirus Not Detected     Influenza A PCR Not Detected     Influenza B PCR Not Detected     Parainfluenza Virus 1 Not Detected     Parainfluenza Virus 2 Not Detected     Parainfluenza Virus 3 Not Detected     Parainfluenza Virus 4 Not Detected     RSV, PCR Not Detected     Bordetella pertussis pcr Not Detected     Bordetella parapertussis PCR Not  Detected     Chlamydophila pneumoniae PCR Not Detected     Mycoplasma pneumo by PCR Not Detected    Narrative:      In the setting of a positive respiratory panel with a viral infection PLUS a negative procalcitonin without other underlying concern for bacterial infection, consider observing off antibiotics or discontinuation of antibiotics and continue supportive care. If the respiratory panel is positive for atypical bacterial infection (Bordetella pertussis, Chlamydophila pneumoniae, or Mycoplasma pneumoniae), consider antibiotic de-escalation to target atypical bacterial infection.          XR Chest 1 View    Result Date: 4/3/2023  XR CHEST 1 VW Date of Exam: 4/3/2023 6:59 AM EDT Indication: Pleural effusion. Comparison: One day prior Findings: Left chest wall ICD projects unchanged. Right-sided pleural effusion, volume loss and basilar opacity is unchanged from prior. There is no distinct pneumothorax or new focal consolidation. The left lung remains clear. Unchanged heart and mediastinal contours.     Impression: Impression: Left chest wall ICD projects unchanged. Right-sided pleural effusion, volume loss and basilar opacity is unchanged from prior. There is no distinct pneumothorax or new focal consolidation. The left lung remains clear. Unchanged heart and mediastinal contours. Electronically Signed: Nicolas Siddiqi  4/3/2023 8:50 AM EDT  Workstation ID: [id number]    XR Chest 1 View    Result Date: 4/2/2023  XR CHEST 1 VW Date of Exam: 4/2/2023 5:40 AM EDT Indication: Pleural effusion. Comparison: 4/1/2023 Findings: The heart size is stable and within normal limits. Left-sided pacing device is unchanged. There is a right-sided pleural effusion with right basilar atelectasis.     Impression: Impression: No significant change Electronically Signed: Tim Arreaga  4/2/2023 9:03 AM EDT  Workstation ID: [id number]      Results for orders placed during the hospital encounter of 10/16/22    Adult Transthoracic Echo  Complete W/ Cont if Necessary Per Protocol    Interpretation Summary  •  Estimated left ventricular EF = 30%.  There is global hypokinesis.  The basal-mid posterior wall appears akinetic.  •  Normal right ventricular cavity size, wall thickness, systolic function and septal motion noted. Electronic lead present in the ventricle  •  Septal wall motion is abnormal, consistent with right ventricular pacing  •  No hemodynamically significant valvular stenosis or regurgitation noted.      Current medications:  Scheduled Meds:acetaminophen, 650 mg, Oral, Q8H  vitamin C, 1,000 mg, Oral, Daily  aspirin, 81 mg, Oral, Daily  atorvastatin, 80 mg, Oral, Nightly  carvedilol, 50 mg, Oral, BID With Meals  docusate sodium, 100 mg, Oral, BID  famotidine, 20 mg, Oral, Daily  insulin lispro, 0-7 Units, Subcutaneous, Q6H  lisinopril, 5 mg, Oral, Q24H  megestrol, 400 mg, Oral, Daily  mirtazapine, 15 mg, Oral, Nightly  nystatin, 5 mL, Oral, 4x Daily  pantoprazole, 40 mg, Oral, Nightly  piperacillin-tazobactam, 3.375 g, Intravenous, Q8H  polyethylene glycol, 17 g, Oral, Daily  senna-docusate sodium, 2 tablet, Oral, BID  sodium chloride, 10 mL, Intravenous, Q12H  terazosin, 1 mg, Oral, Nightly      Continuous Infusions:heparin, 20 Units/kg/hr, Last Rate: 20 Units/kg/hr (04/03/23 0051)  lactated ringers, 9 mL/hr, Last Rate: 9 mL/hr (03/29/23 0838)  lactated ringers, 9 mL/hr, Last Rate: 9 mL/hr (03/31/23 0832)  Pharmacy to Dose Heparin,       PRN Meds:.•  acetaminophen **OR** acetaminophen **OR** acetaminophen  •  senna-docusate sodium **AND** polyethylene glycol **AND** bisacodyl **AND** bisacodyl  •  Calcium Replacement - Follow Nurse / BPA Driven Protocol  •  cyclobenzaprine  •  dextrose  •  dextrose  •  glucagon (human recombinant)  •  Magnesium Standard Dose Replacement - Follow Nurse / BPA Driven Protocol  •  melatonin  •  ondansetron  •  Pharmacy to Dose Heparin  •  Phosphorus Replacement - Follow Nurse / BPA Driven Protocol  •   Potassium Replacement - Follow Nurse / BPA Driven Protocol  •  Sodium Chloride (PF)  •  sodium chloride  •  sodium chloride    Assessment & Plan   Assessment & Plan     Active Hospital Problems    Diagnosis  POA   • **Bilateral pulmonary embolism [I26.99]  Unknown   • Pleural effusion [J90]  Yes   • Unintentional weight loss [R63.4]  Unknown   • Hypoalbuminemia [E88.09]  Unknown   • Severe malnutrition [E43]  Unknown   • History of CVA (cerebrovascular accident) [Z86.73]  Not Applicable   • Debility [R53.81]  Unknown   • Presence of cardiac pacemaker [Z95.0]  Yes   • HTN (hypertension) [I10]  Yes   • Leukocytosis [D72.829]  Yes      Resolved Hospital Problems   No resolved problems to display.        Brief Hospital Course to date:  Timmy Guajardo is a 72 y.o. 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, systolic heart failure with ejection fraction of 30%, coronary artery disease, GERD, history of DVT who presented to the hospital with weakness and functional decline, unintentional weight loss and severe constipation    Assessment and plan:  Bilateral pulmonary embolism  History of DVT  · Likely secondary to being on an adequate dose of Eliquis of 2.5 mg twice daily and medication noncompliance  · Continue heparin drip while hospitalized since he might need further procedures  · Plan to transition to full dose Eliquis (loading and maintenance)     Moderate exudative right-sided loculated pleural effusion  Possible organized right pleural effusion  · Bedside point-of-care ultrasound revealed complex pleural effusion  · Cardiothoracic surgery consulted.  Recommended chest tube placement by IR.    · Underwent right-sided chest tube placement by IR on 3/27/2023.  Chest tube removed 3/30/2023   · Repeat CT scan chest after removal of chest tube showed persistent moderate right pleural effusion, likely organized   · Analysis of the fluid is  consistent with exudate, ?  Parapneumonic  · Culture from pleural fluid is negative to date  · Cytology showed benign mesothelial cells  · Continue IV Zosyn.  Vancomycin discontinued after MRSA swab came back negative    Lung nodule  - 1.4 cm  - needs repeat CT imaging in 6 months    Severe malnutrition  Hypoalbuminemia  Significant unintentional weight loss  Generalized debility  · Patient reports 52lbs weight loss since October 2022.  · No EGD or colonoscopy since 2008  · CT chest and CT abdomen with no convincing evidence of solid malignancy or lymphadenopathy.  Oncology service evaluated the patient recommended age-appropriate screening  · GI evaluated the patient for EGD and colonoscopy.  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 Continue Megace and mirtazapine 15 mg nightly for poor appetite  · Nutrition team following     UTI with Enterococcus faecalis  · POA  · On Zosyn    Leukocytosis, improving  · Likely reactive versus infectious  · Normal procalcitonin and negative cultures to date  · Continue current metabolic therapy with Zosyn.     CAD  Cardiomyopathy with EF 30 %  Essential HTN  Old CVA with right residual weakness  Dyslipidemia  · Continue aspirin  · Continue coreg, lisinopril  · Continue statins    Constipation  · Having multiple bowel movements with enema  · Continue bowel regimen     GERD   · Continue PPI      Type 2 diabetes  · A1C 6.3%   · Continue insulin sliding scale     Acute debility  · PT recommended acute rehab      Expected Discharge Location and Transportation: Acute rehab  Expected Discharge   Expected Discharge Date and Time     Expected Discharge Date Expected Discharge Time    Apr 7, 2023            DVT prophylaxis:  Medical and mechanical DVT  prophylaxis orders are present.     AM-PAC 6 Clicks Score (PT): 12 (04/03/23 1782)    CODE STATUS:   Code Status and Medical Interventions:   Ordered at: 03/24/23 1830     Code Status (Patient has no pulse and is not breathing):    CPR (Attempt to Resuscitate)     Medical Interventions (Patient has pulse or is breathing):    Full Support        Bryce Pocne MD  04/03/23

## 2023-04-03 NOTE — PROGRESS NOTES
HEPARIN INFUSION  Timmy Guajardo is a  72 y.o. male receiving heparin infusion.     Therapy for (VTE/Cardiac):  VTE  Patient Weight: 56.7 kg  Initial Bolus (Y/N):  Y  Any Bolus (Y/N): Y        VTE (PE/DVT)   Initial Bolus: 80 units/kg (Max 10,000 units)  Initial rate: 18 units/kg/hr (Max 1,500 units/hr)    Anti Xa Rebolus Infusion Hold time Change infusion Dose (Units/kg/hr) Next Anti Xa Level Due   < 0.11 50 Units/kg  (4000 Units Max) None Increase by  4 Units/kg/hr 6 hours   0.11 - 0.19 25 Units/kg  (2000 Units Max) None Increase by  3 Units/kg/hr 6 hours   0.2 - 0.29 0 None Increase by  2 Units/kg/hr 6 hours   0.3 - 0.7 0 None No Change 6 hours (after 2 consecutive levels in range check qAM)   0.71 - 0.8 0 None Decrease by  1 Units/kg/hr 6 hours   0.81 - 0.9 0 None Decrease by  2 Units/kg/hr 6 hours   0.91 - 1 0 60 Minutes Decrease by  3 Units/kg/hr 6 hours   >1 0 Hold  After Anti Xa less than 0.7 decrease previous rate by  4 Units/kg/hr  Every 2 hours until Anti Xa is less than 0.7 then when infusion restarts in 6 hours     Results from last 7 days   Lab Units 04/03/23  0444 04/01/23  0346 03/31/23  0328   HEMOGLOBIN g/dL 11.7* 11.4* 12.0*   HEMATOCRIT % 37.9 36.0* 38.5   PLATELETS 10*3/mm3 137* 158 157       Date   Time   Anti-Xa Current Rate (Unit/kg/hr) Bolus   (Units) Rate Change   (Unit/kg/hr) New Rate (Unit/kg/hr) Next anti-Xa Comments  Pump Check Daily   3/25 0025 pending 0 4540 +18 18 0700 New start   3/25 0722 >1.1 18 -- -18 0 0930 Barre 324   3/25 0954 >1.1 0 -- -- 0 1300 Barre 324   3/25  aPTT 0        3/25 0954 63 0  +16 16 2100 Barre 324   3/25 2141 71.4 16 -- -- 16 0400 Alexandro 3184 -acb   3/26 0436 77.1 16 -- -- 16 1200 Alexandro 3184 -acb   3/26 1136 45.5  (heparin was held for 2.5hrs prior to draw) 16 --  16 2000 Ros 3250   3/26 2035 57.5 16 -- +1 17 0600 Nadia 3249 -acb   3/27 0708 88.1 17 -- -- 17 1300 Marisa 3250   3/27 1630 -- off since 0730 for CT plcmt -- resume at prev rate 17 2300  Heparin resumed s/p R CT placement   3/27 2254 42.4 17 2000 +2 19 0800 Nadia 3252 -acb   3/28 0842 132.6 19 -- hold -- 1200 Dw RN   3/28 1357 46.3 -- -- +15 15 2100 DW RN   3/28 2037 0.15 15 1400 +3 18 0400 D/w WADE Zuniga   3/29 0444 0.35 18 -- -- 18 1200 Nadia 3252 -acb   3/29 1137 0.27 18 -- +2 20 2000 Marisa 3250   3/29 2001 0.50 20 -- -- 20 off at 0000 D/w WADE Arauz   3/30 1800 -- off since 0000 for planned colonoscopy no bolus until after colon study complete resume at prev rate 20 0000 D/w RN and Dr. Cooper - pt did not have scope today. Restart heparin in meantime w/o bolus and turn off at 0600 for planned scope in AM.     3/31 0115  20 --- -- Held 0600 -- D/w RN   3/21 1030 -- 0  restart+20 +20 1600    3/21 1817 0.36 20 -- -- 20 0100 D/w RN   4/1 0100 0.38 20 -- -- 20 1200 D/w RN   4/1 1129 0.49 20 -- -- 20 0600 D/w WADE Herrera, pump verified   4/2 0600 0.43 20 -- -- 20 0600 4/3 D/w RN  Pump checked ES         Date   Time   Anti-Xa Current Rate (Unit/kg/hr) Bolus   (Units) Rate Change   (Unit/kg/hr) New Rate (Unit/kg/hr) Next   Anti-Xa Comments  Pump Check Daily   4/3 0541 0.39 20 -- -- 20 0600 Nisha 3239                                                                                                                                                                                                                                 Johan Joshua [facility]  4/3/2023  07:30 EDT

## 2023-04-03 NOTE — PLAN OF CARE
Goal Outcome Evaluation:  Plan of Care Reviewed With: patient        Progress: no change  Outcome Evaluation: Patient required increased assistance today compared to last session for transfers and taking a few side steps. He demonstrated R knee buckling with attempts at weight shifting to progress towards HOB. Continue skilled IP PT services to address weakness, balance deficits, and decreased activity tolerance. Recommend SNF at d/c.

## 2023-04-03 NOTE — PLAN OF CARE
Goal Outcome Evaluation:      Pt is A&O. Resting in bed. On hep gtt at 20u/kg/hr. Therapeutic. VSS. Will continue to monitor.

## 2023-04-03 NOTE — THERAPY TREATMENT NOTE
Patient Name: Timmy Guajardo  : 1950    MRN: [id number]                              Today's Date: 4/3/2023       Admit Date: 3/24/2023    Visit Dx:     ICD-10-CM ICD-9-CM   1. Weight loss  R63.4 783.21   2. Unintentional weight loss  R63.4 783.21   3. 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  K22.70 530.85     Patient Active Problem List   Diagnosis   • Acute right-sided weakness   • Suspected cerebrovascular accident (CVA)   • Leukocytosis   • CHF (congestive heart failure)   • Thrombocytopenia   • HTN (hypertension)   • Presence of cardiac pacemaker   • Hyperglycemia   • Gangrene of toe of left foot   • Closed displaced intertrochanteric fracture of right femur, initial encounter   • Pleural effusion   • Unintentional weight loss   • Hypoalbuminemia   • Severe malnutrition   • Bilateral pulmonary embolism   • History of CVA (cerebrovascular accident)   • Debility     Past Medical History:   Diagnosis Date   • Cardiomyopathy    • CHF (congestive heart failure)    • Coronary artery disease    • Diabetes mellitus    • GERD (gastroesophageal reflux disease)    • HTN (hypertension)    • Stroke     Right sided weakness   • Stroke      Past Surgical History:   Procedure Laterality Date   • AMPUTATION DIGIT Left 2021    Procedure: AMPUTATION DIGIT - GREAT TOE AMPUTATION LEFT;  Surgeon: Dario Marmolejo MD;  Location: [facility] OR;  Service: General;  Laterality: Left;   • AORTAGRAM N/A 2021    Procedure: AORTAGRAM WITH RUNOFFS, LEFT SFA BALLOON ANGIOPLASTY;  Surgeon: Tim Mahan MD;  Location: [facility] HYBRID SHELIA;  Service: Vascular;  Laterality: N/A;  FL TIME 6 MIN 54 SECS  82 MGY  CONTRAST VISIPAQUE 100ML     • CARDIAC CATHETERIZATION     • CARDIAC PACEMAKER PLACEMENT      pacemaker/defibrillator, [employer]   • ENDOSCOPY N/A 3/29/2023    Procedure: ESOPHAGOGASTRODUODENOSCOPY;  Surgeon: Brunner, Mark I, MD;  Location: [facility] ENDOSCOPY;  Service: Gastroenterology;  Laterality: N/A;   • HERNIA REPAIR  Bilateral     Inguinal hernia   • HIP TROCHANTERIC NAILING WITH INTRAMEDULLARY HIP SCREW Right 10/18/2022    Procedure: HIP TROCHANTERIC NAILING WITH INTRAMEDULLARY HIP SCREW RIGHT;  Surgeon: Bj Steinberg MD;  Location: [facility];  Service: Orthopedics;  Laterality: Right;      General Information     Row Name 04/03/23 0836          Physical Therapy Time and Intention    Document Type therapy note (daily note)  -NS     Mode of Treatment physical therapy  -NS     Row Name 04/03/23 0836          General Information    Patient Profile Reviewed yes  -NS     Existing Precautions/Restrictions fall  R sided weakness from prior CVA  -NS     Row Name 04/03/23 0836          Cognition    Orientation Status (Cognition) oriented x 3  -NS     Row Name 04/03/23 0836          Safety Issues, Functional Mobility    Safety Issues Affecting Function (Mobility) insight into deficits/self-awareness;judgment;problem-solving;safety precaution awareness;safety precautions follow-through/compliance;sequencing abilities  -NS     Impairments Affecting Function (Mobility) balance;coordination;endurance/activity tolerance;motor planning;strength;postural/trunk control;range of motion (ROM)  -NS     Comment, Safety Issues/Impairments (Mobility) Education required regarding importance of OOB activity.  -NS           User Key  (r) = Recorded By, (t) = Taken By, (c) = Cosigned By    Initials Name Provider Type    Juliet Franks PT Physical Therapist               Mobility     Row Name 04/03/23 0836          Bed Mobility    Bed Mobility scooting/bridging  -NS     Scooting/Bridging [place] (Bed Mobility) dependent (less than 25% patient effort);2 person assist  -NS     Supine-Sit [place] (Bed Mobility) minimum assist (75% patient effort);verbal cues  -NS     Sit-Supine [place] (Bed Mobility) minimum assist (75% patient effort);verbal cues  -NS     Assistive Device (Bed Mobility) bed rails;head of bed elevated  -NS     Comment,  (Bed Mobility) VCs for sequencing. Assist at trunk to come to sitting and assist at BLEs to return to supine.  -NS     Row Name 04/03/23 0836          Transfers    Comment, (Transfers) Appropriate hand placement. Pt required increased assistance.  -NS     Row Name 04/03/23 0836          Sit-Stand Transfer    Sit-Stand Alexander (Transfers) moderate assist (50% patient effort)  -NS     Assistive Device (Sit-Stand Transfers) walker, front-wheeled  -NS     Row Name 04/03/23 08          Gait/Stairs (Locomotion)    Alexander Level (Gait) moderate assist (50% patient effort);verbal cues  -NS     Assistive Device (Gait) walker, front-wheeled  -NS     Distance in Feet (Gait) 2  -NS     Deviations/Abnormal Patterns (Gait) base of support, narrow  -NS     Bilateral Gait Deviations forward flexed posture  -NS     Right Sided Gait Deviations weight shift ability decreased;knee buckling, right side;foot drop/toe drag  -NS     Comment, (Gait/Stairs) Pt took 2 side steps towards HOB, demonstrating difficulty weight shifting, decreased foot clearance bilaterally (R>L), and R knee buckling.  -NS           User Key  (r) = Recorded By, (t) = Taken By, (c) = Cosigned By    Initials Name Provider Type    Juliet Franks PT Physical Therapist               Obj/Interventions     [facility] Name 04/03/23 0836          Motor Skills    Therapeutic Exercise hip;knee;ankle  -[facility] Name 04/03/23 0836          Hip (Therapeutic Exercise)    Hip (Therapeutic Exercise) strengthening exercise  -NS     Hip Strengthening (Therapeutic Exercise) bilateral;aBduction;aDduction;external rotation;internal rotation;marching while seated;10 repetitions  -[facility] Name 04/03/23 0836          Knee (Therapeutic Exercise)    Knee (Therapeutic Exercise) strengthening exercise  -NS     Knee Strengthening (Therapeutic Exercise) bilateral;SLR (straight leg raise);LAQ (long arc quad);10 repetitions  -[facility] Name 04/03/23 0836          Ankle (Therapeutic  Exercise)    Ankle (Therapeutic Exercise) AROM (active range of motion)  -NS     Ankle AROM (Therapeutic Exercise) bilateral;dorsiflexion;plantarflexion;10 repetitions  -NS     Row Name 04/03/23 0836          Balance    Balance Assessment sitting static balance;sitting dynamic balance;standing static balance;standing dynamic balance  -NS     Static Sitting Balance standby assist  -NS     Dynamic Sitting Balance minimal assist  -NS     Position, Sitting Balance unsupported;sitting edge of bed  -NS     Static Standing Balance moderate assist  -NS     Dynamic Standing Balance moderate assist  -NS     Position/Device Used, Standing Balance supported;walker, front-wheeled  -NS           User Key  (r) = Recorded By, (t) = Taken By, (c) = Cosigned By    Initials Name Provider Type    Juliet Franks, PT Physical Therapist               Goals/Plan    No documentation.                Clinical Impression     Row Name 04/03/23 0836          Pain    Pretreatment Pain Rating 0/10 - no pain  -NS     Posttreatment Pain Rating 0/10 - no pain  -NS     Row Name 04/03/23 0836          Plan of Care Review    Plan of Care Reviewed With patient  -NS     Progress no change  -NS     Outcome Evaluation Patient required increased assistance today compared to last session for transfers and taking a few side steps. He demonstrated R knee buckling with attempts at weight shifting to progress towards HOB. Continue skilled IP PT services to address weakness, balance deficits, and decreased activity tolerance. Recommend SNF at d/c.  -NS     Row Name 04/03/23 0836          Vital Signs    Pre Systolic BP Rehab --  VSS- RN cleared for PT treatment  -NS     Pre Patient Position Supine  -NS     Intra Patient Position Standing  -NS     Post Patient Position Sitting  -NS     Row Name 04/03/23 0836          Positioning and Restraints    Pre-Treatment Position in bed  -NS     Post Treatment Position bed  -NS     In Bed notified lexus;arun;call light  within reach;encouraged to call for assist;exit alarm on;side rails up x2;with nsg;RUE elevated;LUE elevated  -NS           User Key  (r) = Recorded By, (t) = Taken By, (c) = Cosigned By    Initials Name Provider Type    Juliet Franks PT Physical Therapist               Outcome Measures     Row Name 04/03/23 0836          How much help from another person do you currently need...    Turning from your back to your side while in flat bed without using bedrails? 3  -NS     Moving from lying on back to sitting on the side of a flat bed without bedrails? 2  -NS     Moving to and from a bed to a chair (including a wheelchair)? 2  -NS     Standing up from a chair using your arms (e.g., wheelchair, bedside chair)? 2  -NS     Climbing 3-5 steps with a railing? 1  -NS     To walk in hospital room? 2  -NS     AM-PAC 6 Clicks Score (PT) 12  -NS     Highest level of mobility 4 --> Transferred to chair/commode  -NS     Row Name 04/03/23 0836          Functional Assessment    Outcome Measure Options AM-PAC 6 Clicks Basic Mobility (PT)  -NS           User Key  (r) = Recorded By, (t) = Taken By, (c) = Cosigned By    Initials Name Provider Type    Juliet Franks PT Physical Therapist                             Physical Therapy Education     Title: PT OT SLP Therapies (In Progress)     Topic: Physical Therapy (Done)     Point: Mobility training (Done)     Learning Progress Summary           Patient Acceptance, E, VU,NR by NS at 4/3/2023 0926    Acceptance, E, VU,NR by ML at 3/31/2023 1604    Acceptance, E, VU,NR by ML at 3/28/2023 1323    Acceptance, E,TB, VU,NR by KL at 3/26/2023 1536   Family Acceptance, E,TB, VU,NR by KL at 3/26/2023 1536                   Point: Home exercise program (Done)     Learning Progress Summary           Patient Acceptance, E, VU,NR by NS at 4/3/2023 0926    Acceptance, E, VU,NR by ML at 3/31/2023 1604    Acceptance, E, VU,NR by ML at 3/28/2023 1323    Acceptance, E,TB, VU,NR by KL at 3/26/2023  1536   Family Acceptance, E,TB, VU,NR by  at 3/26/2023 1536                   Point: Body mechanics (Done)     Learning Progress Summary           Patient Acceptance, E, VU,NR by NS at 4/3/2023 0926    Acceptance, E,TB, VU,NR by  at 3/26/2023 1536   Family Acceptance, E,TB, VU,NR by  at 3/26/2023 1536                   Point: Precautions (Done)     Learning Progress Summary           Patient Acceptance, E, VU,NR by NS at 4/3/2023 0926    Acceptance, E, VU,NR by  at 3/31/2023 1604    Acceptance, E, VU,NR by  at 3/28/2023 1323    Acceptance, E,TB, VU,NR by  at 3/26/2023 1536   Family Acceptance, E,TB, VU,NR by  at 3/26/2023 1536                               User Key     Initials Effective Dates Name Provider Type Discipline     11/02/22 -  Gabino Tirado PT Physical Therapist PT    NS 06/16/21 -  Juliet Kuhn, PT Physical Therapist PT     04/22/21 -  Sharon Heller Physical Therapist PT              PT Recommendation and Plan     Plan of Care Reviewed With: patient  Progress: no change  Outcome Evaluation: Patient required increased assistance today compared to last session for transfers and taking a few side steps. He demonstrated R knee buckling with attempts at weight shifting to progress towards HOB. Continue skilled IP PT services to address weakness, balance deficits, and decreased activity tolerance. Recommend SNF at d/c.     Time Calculation:    PT Charges     Row Name 04/03/23 0836             Time Calculation    Start Time 0836  -NS      PT Received On 04/03/23  -NS      PT Goal Re-Cert Due Date 04/05/23  -NS         Timed Charges    67303 - PT Therapeutic Exercise Minutes 18  -NS      83779 - PT Therapeutic Activity Minutes 10  -NS         Total Minutes    Timed Charges Total Minutes 28  -NS       Total Minutes 28  -NS            User Key  (r) = Recorded By, (t) = Taken By, (c) = Cosigned By    Initials Name Provider Type    Juliet Franks, PT Physical Therapist              Therapy  Charges for Today     Code Description Service Date Service Provider Modifiers Qty    36254004095 HC PT THERAPEUTIC ACT EA 15 MIN 4/3/2023 Juliet Khun, PT GP 1    71606291978 HC PT THER PROC EA 15 MIN 4/3/2023 Juliet Kuhn, PT GP 1          PT G-Codes  Outcome Measure Options: AM-PAC 6 Clicks Basic Mobility (PT)  AM-PAC 6 Clicks Score (PT): 12  AM-PAC 6 Clicks Score (OT): 14  PT Discharge Summary  Anticipated Discharge Disposition (PT): skilled nursing facility    Juliet Kuhn PT  4/3/2023

## 2023-04-03 NOTE — CASE MANAGEMENT/SOCIAL WORK
Continued Stay Note  [facility]     Patient Name: Timmy Guajardo  MRN: [id number]  Today's Date: 4/3/2023    Admit Date: 3/24/2023    Plan: discharge plan   Discharge Plan     Row Name 04/03/23 1649       Plan    Plan discharge plan    Plan Comments Per discussion in MDR,  there is mention of possible chest tube placement.  Panda Citation cont to follow.  CM will cont to follow    Final Discharge Disposition Code 03 - skilled nursing facility (SNF)               Discharge Codes    No documentation.               Expected Discharge Date and Time     Expected Discharge Date Expected Discharge Time    Apr 7, 2023             Violeta Miranda RN

## 2023-04-04 ENCOUNTER — APPOINTMENT (OUTPATIENT)
Dept: GENERAL RADIOLOGY | Facility: HOSPITAL | Age: 73
DRG: 163 | End: 2023-04-04
Payer: MEDICARE

## 2023-04-04 ENCOUNTER — APPOINTMENT (OUTPATIENT)
Dept: CT IMAGING | Facility: HOSPITAL | Age: 73
DRG: 163 | End: 2023-04-04
Payer: MEDICARE

## 2023-04-04 LAB
GLUCOSE BLDC GLUCOMTR-MCNC: 109 MG/DL (ref 70–130)
GLUCOSE BLDC GLUCOMTR-MCNC: 123 MG/DL (ref 70–130)
GLUCOSE BLDC GLUCOMTR-MCNC: 140 MG/DL (ref 70–130)
GLUCOSE BLDC GLUCOMTR-MCNC: 161 MG/DL (ref 70–130)
UFH PPP CHRO-ACNC: 0.19 IU/ML (ref 0.3–0.7)
UFH PPP CHRO-ACNC: 0.31 IU/ML (ref 0.3–0.7)

## 2023-04-04 PROCEDURE — 82962 GLUCOSE BLOOD TEST: CPT

## 2023-04-04 PROCEDURE — 25010000002 HEPARIN (PORCINE) 25000-0.45 UT/250ML-% SOLUTION: Performed by: STUDENT IN AN ORGANIZED HEALTH CARE EDUCATION/TRAINING PROGRAM

## 2023-04-04 PROCEDURE — 32551 INSERTION OF CHEST TUBE: CPT

## 2023-04-04 PROCEDURE — 0W9930Z DRAINAGE OF RIGHT PLEURAL CAVITY WITH DRAINAGE DEVICE, PERCUTANEOUS APPROACH: ICD-10-PCS | Performed by: STUDENT IN AN ORGANIZED HEALTH CARE EDUCATION/TRAINING PROGRAM

## 2023-04-04 PROCEDURE — 85520 HEPARIN ASSAY: CPT

## 2023-04-04 PROCEDURE — 25010000002 PIPERACILLIN SOD-TAZOBACTAM PER 1 G: Performed by: INTERNAL MEDICINE

## 2023-04-04 PROCEDURE — 71250 CT THORAX DX C-: CPT

## 2023-04-04 PROCEDURE — 63710000001 INSULIN LISPRO (HUMAN) PER 5 UNITS: Performed by: INTERNAL MEDICINE

## 2023-04-04 PROCEDURE — 32551 INSERTION OF CHEST TUBE: CPT | Performed by: STUDENT IN AN ORGANIZED HEALTH CARE EDUCATION/TRAINING PROGRAM

## 2023-04-04 PROCEDURE — 97535 SELF CARE MNGMENT TRAINING: CPT

## 2023-04-04 PROCEDURE — 97110 THERAPEUTIC EXERCISES: CPT

## 2023-04-04 PROCEDURE — 71045 X-RAY EXAM CHEST 1 VIEW: CPT

## 2023-04-04 PROCEDURE — 97530 THERAPEUTIC ACTIVITIES: CPT

## 2023-04-04 PROCEDURE — 99231 SBSQ HOSP IP/OBS SF/LOW 25: CPT | Performed by: INTERNAL MEDICINE

## 2023-04-04 PROCEDURE — 25010000002 HYDROMORPHONE 1 MG/ML SOLUTION: Performed by: STUDENT IN AN ORGANIZED HEALTH CARE EDUCATION/TRAINING PROGRAM

## 2023-04-04 RX ORDER — HEPARIN SODIUM 10000 [USP'U]/100ML
23 INJECTION, SOLUTION INTRAVENOUS
Status: DISCONTINUED | OUTPATIENT
Start: 2023-04-04 | End: 2023-04-05

## 2023-04-04 RX ADMIN — TERAZOSIN HYDROCHLORIDE 1 MG: 1 CAPSULE ORAL at 22:01

## 2023-04-04 RX ADMIN — ACETAMINOPHEN 325MG 650 MG: 325 TABLET ORAL at 14:27

## 2023-04-04 RX ADMIN — TAZOBACTAM SODIUM AND PIPERACILLIN SODIUM 3.38 G: 375; 3 INJECTION, SOLUTION INTRAVENOUS at 08:24

## 2023-04-04 RX ADMIN — LISINOPRIL 5 MG: 5 TABLET ORAL at 08:23

## 2023-04-04 RX ADMIN — CARVEDILOL 50 MG: 12.5 TABLET, FILM COATED ORAL at 08:23

## 2023-04-04 RX ADMIN — SENNOSIDES AND DOCUSATE SODIUM 2 TABLET: 8.6; 5 TABLET ORAL at 22:01

## 2023-04-04 RX ADMIN — NYSTATIN 500000 UNITS: 100000 SUSPENSION ORAL at 22:01

## 2023-04-04 RX ADMIN — ACETAMINOPHEN 325MG 650 MG: 325 TABLET ORAL at 05:07

## 2023-04-04 RX ADMIN — INSULIN LISPRO 2 UNITS: 100 INJECTION, SOLUTION INTRAVENOUS; SUBCUTANEOUS at 12:28

## 2023-04-04 RX ADMIN — CYCLOBENZAPRINE 5 MG: 10 TABLET, FILM COATED ORAL at 22:01

## 2023-04-04 RX ADMIN — HEPARIN SODIUM 23 UNITS/KG/HR: 10000 INJECTION, SOLUTION INTRAVENOUS at 22:04

## 2023-04-04 RX ADMIN — OXYCODONE HYDROCHLORIDE AND ACETAMINOPHEN 1000 MG: 500 TABLET ORAL at 08:22

## 2023-04-04 RX ADMIN — CARVEDILOL 50 MG: 12.5 TABLET, FILM COATED ORAL at 17:18

## 2023-04-04 RX ADMIN — NYSTATIN 500000 UNITS: 100000 SUSPENSION ORAL at 08:21

## 2023-04-04 RX ADMIN — DOCUSATE SODIUM 100 MG: 100 CAPSULE, LIQUID FILLED ORAL at 22:01

## 2023-04-04 RX ADMIN — ACETAMINOPHEN 325MG 650 MG: 325 TABLET ORAL at 22:01

## 2023-04-04 RX ADMIN — TAZOBACTAM SODIUM AND PIPERACILLIN SODIUM 3.38 G: 375; 3 INJECTION, SOLUTION INTRAVENOUS at 17:18

## 2023-04-04 RX ADMIN — MEGESTROL ACETATE 400 MG: 40 SUSPENSION ORAL at 09:17

## 2023-04-04 RX ADMIN — FAMOTIDINE 20 MG: 20 TABLET ORAL at 08:23

## 2023-04-04 RX ADMIN — ASPIRIN 81 MG CHEWABLE TABLET 81 MG: 81 TABLET CHEWABLE at 08:23

## 2023-04-04 RX ADMIN — DOCUSATE SODIUM 100 MG: 100 CAPSULE, LIQUID FILLED ORAL at 08:23

## 2023-04-04 RX ADMIN — PANTOPRAZOLE SODIUM 40 MG: 40 TABLET, DELAYED RELEASE ORAL at 22:01

## 2023-04-04 RX ADMIN — Medication 10 ML: at 08:24

## 2023-04-04 RX ADMIN — TAZOBACTAM SODIUM AND PIPERACILLIN SODIUM 3.38 G: 375; 3 INJECTION, SOLUTION INTRAVENOUS at 00:30

## 2023-04-04 RX ADMIN — SENNOSIDES AND DOCUSATE SODIUM 2 TABLET: 8.6; 5 TABLET ORAL at 08:23

## 2023-04-04 RX ADMIN — NYSTATIN 500000 UNITS: 100000 SUSPENSION ORAL at 12:29

## 2023-04-04 RX ADMIN — HYDROMORPHONE HYDROCHLORIDE 1 MG: 1 INJECTION, SOLUTION INTRAMUSCULAR; INTRAVENOUS; SUBCUTANEOUS at 18:56

## 2023-04-04 RX ADMIN — MIRTAZAPINE 15 MG: 15 TABLET, FILM COATED ORAL at 22:01

## 2023-04-04 RX ADMIN — Medication 10 ML: at 22:02

## 2023-04-04 RX ADMIN — Medication 5 MG: at 22:01

## 2023-04-04 RX ADMIN — NYSTATIN 500000 UNITS: 100000 SUSPENSION ORAL at 17:18

## 2023-04-04 RX ADMIN — ATORVASTATIN CALCIUM 80 MG: 40 TABLET, FILM COATED ORAL at 22:01

## 2023-04-04 NOTE — PLAN OF CARE
Goal Outcome Evaluation:  Plan of Care Reviewed With: patient        Progress: improving  Outcome Evaluation: Pt. with some improvement with bed mobility and standing endurance and balance with support statically.  Some improvement with LBD using AE, but will need several reviews.  Pt. still wanting to use bedpan and unable to convince pt. to progress to BSC use.  Continue OT POC to address below baseline ADL independence level.  Continue to recommend IRF at discharge.  Possible chest tube placement today.

## 2023-04-04 NOTE — PROGRESS NOTES
HEPARIN INFUSION  Timmy Guajardo is a  72 y.o. male receiving heparin infusion.     Therapy for (VTE/Cardiac):  VTE  Patient Weight: 56.7 kg  Initial Bolus (Y/N):  Y  Any Bolus (Y/N): Y        VTE (PE/DVT)   Initial Bolus: 80 units/kg (Max 10,000 units)  Initial rate: 18 units/kg/hr (Max 1,500 units/hr)    Anti Xa Rebolus Infusion Hold time Change infusion Dose (Units/kg/hr) Next Anti Xa Level Due   < 0.11 50 Units/kg  (4000 Units Max) None Increase by  4 Units/kg/hr 6 hours   0.11 - 0.19 25 Units/kg  (2000 Units Max) None Increase by  3 Units/kg/hr 6 hours   0.2 - 0.29 0 None Increase by  2 Units/kg/hr 6 hours   0.3 - 0.7 0 None No Change 6 hours (after 2 consecutive levels in range check qAM)   0.71 - 0.8 0 None Decrease by  1 Units/kg/hr 6 hours   0.81 - 0.9 0 None Decrease by  2 Units/kg/hr 6 hours   0.91 - 1 0 60 Minutes Decrease by  3 Units/kg/hr 6 hours   >1 0 Hold  After Anti Xa less than 0.7 decrease previous rate by  4 Units/kg/hr  Every 2 hours until Anti Xa is less than 0.7 then when infusion restarts in 6 hours     Results from last 7 days   Lab Units 04/03/23  0444 04/01/23  0346 03/31/23  0328   HEMOGLOBIN g/dL 11.7* 11.4* 12.0*   HEMATOCRIT % 37.9 36.0* 38.5   PLATELETS 10*3/mm3 137* 158 157       Date   Time   Anti-Xa Current Rate (Unit/kg/hr) Bolus   (Units) Rate Change   (Unit/kg/hr) New Rate (Unit/kg/hr) Next anti-Xa Comments  Pump Check Daily   3/25 0025 pending 0 4540 +18 18 0700 New start   3/25 0722 >1.1 18 -- -18 0 0930 [place] 324   3/25 0954 >1.1 0 -- -- 0 1300 [place] 324   3/25  aPTT 0        3/25 0954 63 0  +16 16 2100 [place] 324   3/25 2141 71.4 16 -- -- 16 0400 [place] 3184 -acb   3/26 0436 77.1 16 -- -- 16 1200 [place] 3184 -acb   3/26 1136 45.5  (heparin was held for 2.5hrs prior to draw) 16 --  16 2000 [place] 3250   3/26 2035 57.5 16 -- +1 17 0600 Nadia 3249 -acb   3/27 0708 88.1 17 -- -- 17 1300 [place] 3250   3/27 1630 -- off since 0730 for CT plcmt -- resume at prev rate 17 2300  Heparin resumed s/p R CT placement   3/27 2254 42.4 17 2000 +2 19 0800 Nadia 3252 -acb   3/28 0842 132.6 19 -- hold -- 1200 Dw RN   3/28 1357 46.3 -- -- +15 15 2100 DW RN   3/28 2037 0.15 15 1400 +3 18 0400 D/w WADE Zuniga   3/29 0444 0.35 18 -- -- 18 1200 Nadia 3252 -acb   3/29 1137 0.27 18 -- +2 20 2000 [place] 3250   3/29 2001 0.50 20 -- -- 20 off at 0000 D/w WADE Arauz   3/30 1800 -- off since 0000 for planned colonoscopy no bolus until after colon study complete resume at prev rate 20 0000 D/w RN and Dr. Cooper - pt did not have scope today. Restart heparin in meantime w/o bolus and turn off at 0600 for planned scope in AM.     3/31 0115  20 --- -- Held 0600 -- D/w RN   3/21 1030 -- 0  restart+20 +20 1600    3/21 1817 0.36 20 -- -- 20 0100 D/w RN   4/1 0100 0.38 20 -- -- 20 1200 D/w RN   4/1 1129 0.49 20 -- -- 20 0600 D/w WADE Herrera, pump verified   4/2 0600 0.43 20 -- -- 20 0600 4/3 D/w RN  Pump checked ES         Date   Time   Anti-Xa Current Rate (Unit/kg/hr) Bolus   (Units) Rate Change   (Unit/kg/hr) New Rate (Unit/kg/hr) Next   Anti-Xa Comments  Pump Check Daily   4/3 0541 0.39 20 -- -- 20 0600 [place]isha 3239   4/4 0630 0.19 20 -- +3 23 1200 D/w RN  Pump checked - ES   4/4 1410 0.31 23 -- -- 23 2100 D/w WADE Blood, [facility]  4/4/2023  15:41 EDT

## 2023-04-04 NOTE — PLAN OF CARE
Goal Outcome Evaluation:      Pt is A&O. Resting in bed with minimal pain. Pt received a full bath and linen change. Voids in urinal. Hep gtt still @ 20u/kg/hr. Also getting IV antibiotics. VSS room air. Will continue to  monitor - no further needs at this time.

## 2023-04-04 NOTE — CONSULTS
Clinical Nutrition     Nutrition Support Assessment  Reason for Visit: Follow-up protocol    Patient Name: Timmy Guajardo  YOB: 1950  MRN: [id number]  Date of Encounter: 04/04/23 10:02 EDT  Admission date: 3/24/2023    Pt is doing significantly better eating, reports both improvement thrush symptoms and increased appetite on appetite stimulants helping improve intakes.     Documented intakes not reflective of amount pt is eating as he is ordering outside food most dinner meals. Ate 2 pot pies from [facility] last night.     He reports only barrier now is food preferences and not being allowed certain things- Diet liberalized to encourage PO. BG have been mostly WNL t/o admission, will monitor.     A measured weight has not been documented t/o admission, ordered. Appears pt's bed unable to weigh and he is still too weak to stand on his own. He tells me the 125 lb documented was at [facility] shortly PTA.     Nutrition Assessment   Admission Diagnosis:  Pleural effusion [J90]      Problem List:    Bilateral pulmonary embolism    Leukocytosis    HTN (hypertension)    Presence of cardiac pacemaker    Pleural effusion    Unintentional weight loss    Hypoalbuminemia    Severe malnutrition    History of CVA (cerebrovascular accident)    Debility  Severe malnutrition      PMH:   He  has a past medical history of Cardiomyopathy, CHF (congestive heart failure), Coronary artery disease, Diabetes mellitus, GERD (gastroesophageal reflux disease), HTN (hypertension), Stroke, and Stroke.    PSH:  He  has a past surgical history that includes Cardiac pacemaker placement; Hernia repair (Bilateral); Cardiac catheterization; Aortagram (N/A, 5/11/2021); Finger amputation (Left, 5/12/2021); Hip Trochanteric Nailing (Right, 10/18/2022); Esophagogastroduodenoscopy (N/A, 3/29/2023); and Colonoscopy (N/A, 3/31/2023).      Applicable Nutrition Concerns:   Skin:  Oral: edentulous  GI:       Applicable Interval History:          Reported/Observed/Food/Nutrition Related History:     4/4) Pt sitting up in bed eating breakfast at time visit. Tells me he does not like his breakfast or vanilla boost, wants chocolate. Denied offer for new breakfast but agreed to karolina boost, ordered. He is happy to report that despite this meal, overall he is eating significantly better. He tells me the nystatin has helped his thrush greatly, upset because he thought taste changes were r/t COVID and feels this could have been addressed sooner. In addition, the 2 appetite stimulants he has been started on (remeron and megace) have been helping. He tells me only barrier left is not liking some of the food in hospital and he is concerned this will be worse at rehab. He tells me he has been ordering out most dinner meals. He states has been told he cannot order certain things and would like diet liberalized to assist with this. Specifically fritz is the only breakfast protein avail that he likes so would like this. RD encouraged continuing to prioritize nutrition as needed, especially with limited food acceptance in hospital/rehab. He is still drinking boost but not when vanilla, order updated. He does endorse feelings of fullness with boost now that he is eating more food at meals, encouraged/educated on HP ONS 1x/d. Pt very pleasant and appreciative, receptive to diet education/counseling. He denied further needs or concerns at this time.     3/25) Pt lying in bed - able to provide weight/nutrition hx. Endorsed anorexia for ~4 months following COVID infection with change in taste. Reports difficulty with PO intake began in November. Denies difficulty chewing/swallowing. Of note, pt had teeth pulled with plans for implants in sept/oct but unable to complete procedure 2/2 hip fx with repair followed by rehab. Endorsed increased time to eat a meal. Drinks ONS 2-3d daily to supplement poor intake. Continued functional decline noted. Pt reports significant weight  "loss of 58# in 5 months however weight hx unable to verify. Endorsed severe constipation prior to admission - received enema and produced BM x3. [facility]     Labs    Labs Reviewed: Yes     Results from last 7 days   Lab Units 04/03/23  0444 04/01/23  1129 04/01/23  0346 03/31/23  0328 03/29/23  0444   GLUCOSE mg/dL 81  --  105* 71 72   BUN mg/dL 10  --  10 9 10   CREATININE mg/dL 0.60*  --  0.73* 0.58* 0.62*   SODIUM mmol/L 143  --  145 145 142   CHLORIDE mmol/L 109*  --  110* 111* 108*   POTASSIUM mmol/L 3.9 3.6 3.4* 3.9 4.2   PHOSPHORUS mg/dL  --   --   --  4.0 3.3   MAGNESIUM mg/dL  --   --   --  2.2 2.0   ALT (SGPT) U/L  --   --   --  8  --        Results from last 7 days   Lab Units 03/31/23  0328   ALBUMIN g/dL 2.7*       Results from last 7 days   Lab Units 04/04/23  0524 04/04/23  0025 04/03/23  1651 04/03/23  1138 04/03/23  0742 04/03/23  0534   GLUCOSE mg/dL 109 140* 116 152* 75 81     Lab Results   Lab Value Date/Time    HGBA1C 6.30 (H) 03/25/2023 0722    HGBA1C 6.50 (H) 10/17/2022 0422                 Medications    Medications Reviewed: Yes  Pertinent: Vit C, colace, pepcid, insulin, protonix, nystatin, Abx, miralax, megace, remron, protonix, pericolace  Infusion: LR @ 9mL/hr, heparin  PRN:     Intake/Ouptut 24 hrs (0701 - 0700)   I&O's Reviewed: Yes       Anthropometrics     Height: Height: 177.8 cm (70\")  Last Filed Weight: Weight: 56.7 kg (125 lb) (03/31/23 0753)  Method: ?  BMI: BMI (Calculated): 17.9  BMI classification: Underweight:<18.5kg/m2  IBW:  160lbs    UBW:     Last 15 Recorded Weights  View Complete Flowsheet  Weight Weight (kg) Weight (lbs) Weight Method VISIT REPORT   3/25/2023 56.7 kg 125 lb - -   3/24/2023 51.71 kg 114 lb Stated -   11/23/2022 72.576 kg 160 lb Stated Report   10/24/2022 82.146 kg 181 lb 1.6 oz - -   10/17/2022 83.008 kg 183 lb Stated -   10/17/2022 83.008 kg 183 lb - -   10/17/2022 83.144 kg 183 lb 4.8 oz - -   10/16/2022 77.565 kg 171 lb Stated -   4/25/2022 75.479 kg 166 " lb 6.4 oz - -   8/4/2021 73.483 kg 162 lb - Report   6/30/2021 77.565 kg 171 lb - Report   5/26/2021 77.565 kg 171 lb - Report     Weight change:   Noted 58# (31.6%) weight loss in 5 months    Nutrition Focused Physical Exam     Date: 3/25    Patient meets criteria for malnutrition diagnosis, see MSA note.    Current Nutrition Prescription     PO: Diet: Diabetic Diets; Consistent Carbohydrate; Texture: Regular Texture (IDDSI 7); Fluid Consistency: Thin (IDDSI 0)  Oral Nutrition Supplement:   Intake: 35% X last 10 meals documented   *not reflective of actual intakes as pt having outside food brought in    Nutrition Diagnosis   Date: 3/25 Updated: 4/4  Problem Malnutrition chronic, severe   Etiology Dysgeusia, anorexia   Signs/Symptoms severe muscle wasting and subcutaneous fat loss, <75% of EEN for >1 month, & a significant wt loss of 31.6% weight loss in 5 months   Status: ongoing, PO intakes improving    Date:  3/25 Updated:  Problem Predicted suboptimal energy intake   Etiology Continued dysgeusia   Signs/Symptoms NPO x1   Status: resolving, has diet and eating better with improvements thrush    Goal:   General: Nutrition to support treatment  PO: Advace diet as medically feasible/appropriate  EN/PN: N/A    Nutrition Intervention      Follow treatment progress, Care plan reviewed, Advise alternate selection, Advised available snacks, Interview for preferences, Menu provided, Adjusted supplement, Encourage intake, Education provided for malnutrition/increasing energy/protein intakes (written and verbal)    Continue Boost GC B/D- flavor adjusted to pt's preference (karolina)  Change L Boost to Ensure HP  Diet further liberalized to support pt prioritizing adequate nutrition at this time, healthy choice encouraged    Monitoring/Evaluation:   Per protocol, I&O, Pertinent labs, Weight, Symptoms, POC/GOC, Swallow function      Xi De La Garza RD  Time Spent: 30min

## 2023-04-04 NOTE — PROGRESS NOTES
[facility] Medicine Services  PROGRESS NOTE    Patient Name: Timmy Guajardo  : 1950  MRN: [id number]    Date of Admission: 3/24/2023  Primary Care Physician: Arsen Seals APRN    Subjective   Subjective     CC:  Follow-up for weakness    HPI:  No fever, still some ongoing generalized weakness. Right hip sore, says he broke it last fall.    ROS:  Gen: no fever  Pulm: no cough      Objective   Objective     Vital Signs:   Temp:  [97.9 °F (36.6 °C)-98.4 °F (36.9 °C)] 98.3 °F (36.8 °C)  Heart Rate:  [72-76] 76  Resp:  [16-18] 16  BP: (119-160)/(65-80) 139/77  Flow (L/min):  [2] 2     Physical Exam:  Constitutional - no acute distress, frail in bed  HEENT-NCAT, mucous membranes moist  CV-RRR, S1 S2 normal, no m/r/g  Resp-diminished bilaterally  Abd-soft, nontender, nondistended, normoactive bowel sounds  Ext-No lower extremity cyanosis, clubbing or edema bilaterally  Neuro-alert, speech clear, moves all extremities   Psych-normal affect   Skin- No rash on exposed UE or LE bilaterally      Results Reviewed:  LAB RESULTS:      Lab 23  1410 23  0402 23  0541 23  0444 23  0543 23  1129 23  0346 23  1817 23  0328 23  1137 23  0444   WBC  --   --   --  9.30  --   --  9.38  --  10.37  --  9.99   HEMOGLOBIN  --   --   --  11.7*  --   --  11.4*  --  12.0*  --  12.1*   HEMATOCRIT  --   --   --  37.9  --   --  36.0*  --  38.5  --  38.3   PLATELETS  --   --   --  137*  --   --  158  --  157  --  132*   NEUTROS ABS  --   --   --  6.35  --   --  6.77  --  7.49*  --  7.51*   IMMATURE GRANS (ABS)  --   --   --  0.05  --   --  0.06*  --  0.12*  --  0.09*   LYMPHS ABS  --   --   --  1.71  --   --  1.51  --  1.71  --  1.48   MONOS ABS  --   --   --  0.75  --   --  0.81  --  0.84  --  0.69   EOS ABS  --   --   --  0.39  --   --  0.19  --  0.14  --  0.15   MCV  --   --   --  97.2*  --   --  93.8  --  96.5  --  96.0   HEPARIN ANTI-XA  0.31 0.19* 0.39  --  0.43 0.49  --    < >  --    < > 0.35    < > = values in this interval not displayed.         Lab 04/03/23  0444 04/01/23  1129 04/01/23  0346 03/31/23  0328 03/29/23 0444   SODIUM 143  --  145 145 142   POTASSIUM 3.9 3.6 3.4* 3.9 4.2   CHLORIDE 109*  --  110* 111* 108*   CO2 21.0*  --  22.0 19.0* 21.0*   ANION GAP 13.0  --  13.0 15.0 13.0   BUN 10  --  10 9 10   CREATININE 0.60*  --  0.73* 0.58* 0.62*   EGFR 103.8  --  97.9 104.9 102.8   GLUCOSE 81  --  105* 71 72   CALCIUM 8.5*  --  8.3* 8.4* 8.3*   MAGNESIUM  --   --   --  2.2 2.0   PHOSPHORUS  --   --   --  4.0 3.3         Lab 03/31/23 0328   TOTAL PROTEIN 5.7*   ALBUMIN 2.7*   GLOBULIN 3.0   ALT (SGPT) 8   AST (SGOT) 17   BILIRUBIN 0.3   ALK PHOS 76                     Brief Urine Lab Results  (Last result in the past 365 days)      Color   Clarity   Blood   Leuk Est   Nitrite   Protein   CREAT   Urine HCG        03/24/23 2329 Yellow   Turbid   Small (1+)   Moderate (2+)   Negative   30 mg/dL (1+)                 Microbiology Results Abnormal     Procedure Component Value - Date/Time    Body Fluid Culture - Body Fluid, Pleural Cavity [[id number]] Collected: 03/27/23 1532    Lab Status: Final result Specimen: Body Fluid from Pleural Cavity Updated: 03/31/23 0952     Body Fluid Culture No growth at 3 days     Gram Stain Rare (1+) WBCs per low power field      No organisms seen    MRSA Screen, PCR (Inpatient) - Swab, Nares [[id number]]  (Normal) Collected: 03/27/23 0857    Lab Status: Final result Specimen: Swab from Nares Updated: 03/27/23 1107     MRSA PCR Negative    Narrative:      The negative predictive value of this diagnostic test is high and should only be used to consider de-escalating anti-MRSA therapy. A positive result may indicate colonization with MRSA and must be correlated clinically.  MRSA Negative    COVID PRE-OP / PRE-PROCEDURE SCREENING ORDER (NO ISOLATION) - Swab, Nasopharynx [[id number]]  (Normal) Collected: 03/24/23 6383     Lab Status: Final result Specimen: Swab from Nasopharynx Updated: 03/24/23 2356    Narrative:      The following orders were created for panel order COVID PRE-OP / PRE-PROCEDURE SCREENING ORDER (NO ISOLATION) - Swab, Nasopharynx.  Procedure                               Abnormality         Status                     ---------                               -----------         ------                     Respiratory Panel PCR w/...[[id number]]  Normal              Final result                 Please view results for these tests on the individual orders.    Respiratory Panel PCR w/COVID-19(SARS-CoV-2) SULEIMAN/TAWANNA/BLAISE/PAD/COR/MAD/ANTHONY In-House, NP Swab in UTM/VTM, 3-4 HR TAT - Swab, Nasopharynx [[id number]]  (Normal) Collected: 03/24/23 2256    Lab Status: Final result Specimen: Swab from Nasopharynx Updated: 03/24/23 2356     ADENOVIRUS, PCR Not Detected     Coronavirus 229E Not Detected     Coronavirus HKU1 Not Detected     Coronavirus NL63 Not Detected     Coronavirus OC43 Not Detected     COVID19 Not Detected     Human Metapneumovirus Not Detected     Human Rhinovirus/Enterovirus Not Detected     Influenza A PCR Not Detected     Influenza B PCR Not Detected     Parainfluenza Virus 1 Not Detected     Parainfluenza Virus 2 Not Detected     Parainfluenza Virus 3 Not Detected     Parainfluenza Virus 4 Not Detected     RSV, PCR Not Detected     Bordetella pertussis pcr Not Detected     Bordetella parapertussis PCR Not Detected     Chlamydophila pneumoniae PCR Not Detected     Mycoplasma pneumo by PCR Not Detected    Narrative:      In the setting of a positive respiratory panel with a viral infection PLUS a negative procalcitonin without other underlying concern for bacterial infection, consider observing off antibiotics or discontinuation of antibiotics and continue supportive care. If the respiratory panel is positive for atypical bacterial infection (Bordetella pertussis, Chlamydophila pneumoniae, or Mycoplasma  "pneumoniae), consider antibiotic de-escalation to target atypical bacterial infection.          CT Chest Without Contrast Diagnostic    Result Date: 4/4/2023  CT CHEST WO CONTRAST DIAGNOSTIC Date of Exam: 4/4/2023 11:48 AM EDT Indication: Pleural effusion, known or suspected (Ped 0-17y). Comparison: Chest CT scan 3/31/2023. Portable chest radiograph of 4/4/2023 Technique: Axial CT images were obtained of the chest without contrast administration.  Reconstructed coronal and sagittal images were also obtained. Automated exposure control and iterative construction methods were used. Findings: Patient history indicates previous bilateral pulmonary embolic disease, moderate right pleural effusion, with chest tube placement and persistent effusion. The initial 3/24/2023 chest CT scan report noted small pulmonary emboli, moderate right pleural effusion and associated subsegmental atelectasis of the right lower lobe. The more recent previous scan from 3/31/2023 noted a moderate right pleural effusion with some reactive pleural enhancement and a few gas bubbles in the pleural fluid collection. Today's exam shows no significant interval improvement in the pleural fluid collection, which has irregular and lobular margins suggesting it may be partially loculated. There are now several small hypodense pleural-based \"masses or nodules which, is reviewed on an initial study would be concerning for pleural-based metastases. Having arisen, however, and only 5 days, these presumably represent proteinaceous debris, perhaps trace hemorrhage. The air within the pleural fluid collection is still present, but diminishing. Associated right lower lobe atelectasis is stable in extent and appearance. No new pulmonary parenchymal disease is seen elsewhere. Right upper lobe linear scarring is stable. Mild to moderate changes of centrilobular emphysema. Stable. There is no left pleural effusion. Mediastinal images show only trace pericardial " "fluid or pleural thickening, and stable shotty calcified and noncalcified mediastinal lymph nodes. Extensive coronary artery calcium is again noted. Images the upper abdomen show subtle noncalcified gallstones or gallbladder \"sludge\", with no visible inflammation. Right and left liver lobes appear grossly normal. Included portions of the spleen, pancreatic tail, adrenal glands, and upper renal poles appear within normal limits. Bony structures appear to be intact.     Impression: Impression: 1. Stable, relatively large, loculated appearing right pleural fluid collection. 2. Persistent, but gradually decreasing air within the pleural fluid collection. Several new hyperdense nodular pleural-based areas within the pleural fluid resemble soft tissue nodules, but are likely either concretions of proteinaceous debris, or small  amounts of hemorrhage/clot. 3. Stable appearance of the chest elsewhere. 4. Incidentally noted gallbladder \"sludge\" or noncalcified gallstones. Electronically Signed: Trey Allen  4/4/2023 1:00 PM EDT  Workstation ID: [id number]    XR Chest 1 View    Result Date: 4/4/2023  XR CHEST 1 VW Date of Exam: 4/4/2023 3:35 AM EDT Indication: Pleural effusion. Comparison: 4/3/2023 Findings: Left-sided triple lead ICD is noted. The heart is normal in size. Vasculature is mildly cephalized. Left lung remains essentially clear. Patchy right basilar atelectasis and effusion is stable. There is no evidence of pneumothorax.     Impression: Impression: Stable, relatively mild right basilar atelectasis and effusion. Electronically Signed: Trey Allen  4/4/2023 8:09 AM EDT  Workstation ID: [id number]    XR Chest 1 View    Result Date: 4/3/2023  XR CHEST 1 VW Date of Exam: 4/3/2023 6:59 AM EDT Indication: Pleural effusion. Comparison: One day prior Findings: Left chest wall ICD projects unchanged. Right-sided pleural effusion, volume loss and basilar opacity is unchanged from prior. There is no distinct pneumothorax or new " focal consolidation. The left lung remains clear. Unchanged heart and mediastinal contours.     Impression: Impression: Left chest wall ICD projects unchanged. Right-sided pleural effusion, volume loss and basilar opacity is unchanged from prior. There is no distinct pneumothorax or new focal consolidation. The left lung remains clear. Unchanged heart and mediastinal contours. Electronically Signed: Nicolas Siddiqi  4/3/2023 8:50 AM EDT  Workstation ID: [id number]      Results for orders placed during the hospital encounter of 10/16/22    Adult Transthoracic Echo Complete W/ Cont if Necessary Per Protocol    Interpretation Summary  •  Estimated left ventricular EF = 30%.  There is global hypokinesis.  The basal-mid posterior wall appears akinetic.  •  Normal right ventricular cavity size, wall thickness, systolic function and septal motion noted. Electronic lead present in the ventricle  •  Septal wall motion is abnormal, consistent with right ventricular pacing  •  No hemodynamically significant valvular stenosis or regurgitation noted.      Current medications:  Scheduled Meds:acetaminophen, 650 mg, Oral, Q8H  vitamin C, 1,000 mg, Oral, Daily  aspirin, 81 mg, Oral, Daily  atorvastatin, 80 mg, Oral, Nightly  carvedilol, 50 mg, Oral, BID With Meals  docusate sodium, 100 mg, Oral, BID  famotidine, 20 mg, Oral, Daily  HYDROmorphone, 1 mg, Intravenous, Once  insulin lispro, 0-7 Units, Subcutaneous, Q6H  lisinopril, 5 mg, Oral, Q24H  megestrol, 400 mg, Oral, Daily  mirtazapine, 15 mg, Oral, Nightly  nystatin, 5 mL, Oral, 4x Daily  pantoprazole, 40 mg, Oral, Nightly  piperacillin-tazobactam, 3.375 g, Intravenous, Q8H  polyethylene glycol, 17 g, Oral, Daily  senna-docusate sodium, 2 tablet, Oral, BID  sodium chloride, 10 mL, Intravenous, Q12H  terazosin, 1 mg, Oral, Nightly      Continuous Infusions:heparin, 23 Units/kg/hr, Last Rate: 23 Units/kg/hr (04/04/23 0652)  lactated ringers, 9 mL/hr, Last Rate: 9 mL/hr (03/29/23  0838)  lactated ringers, 9 mL/hr, Last Rate: 9 mL/hr (03/31/23 0832)  Pharmacy to Dose Heparin,       PRN Meds:.•  acetaminophen **OR** acetaminophen **OR** acetaminophen  •  senna-docusate sodium **AND** polyethylene glycol **AND** bisacodyl **AND** bisacodyl  •  Calcium Replacement - Follow Nurse / BPA Driven Protocol  •  cyclobenzaprine  •  dextrose  •  dextrose  •  glucagon (human recombinant)  •  Magnesium Standard Dose Replacement - Follow Nurse / BPA Driven Protocol  •  melatonin  •  ondansetron  •  Pharmacy to Dose Heparin  •  Phosphorus Replacement - Follow Nurse / BPA Driven Protocol  •  Potassium Replacement - Follow Nurse / BPA Driven Protocol  •  Sodium Chloride (PF)  •  sodium chloride  •  sodium chloride    Assessment & Plan   Assessment & Plan     Active Hospital Problems    Diagnosis  POA   • **Bilateral pulmonary embolism [I26.99]  Unknown   • Pleural effusion [J90]  Yes   • Unintentional weight loss [R63.4]  Unknown   • Hypoalbuminemia [E88.09]  Unknown   • Severe malnutrition [E43]  Unknown   • History of CVA (cerebrovascular accident) [Z86.73]  Not Applicable   • Debility [R53.81]  Unknown   • Presence of cardiac pacemaker [Z95.0]  Yes   • HTN (hypertension) [I10]  Yes   • Leukocytosis [D72.829]  Yes      Resolved Hospital Problems   No resolved problems to display.        Brief Hospital Course to date:  Timmy Guajardo is a 72 y.o. 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, systolic heart failure with ejection fraction of 30%, coronary artery disease, GERD, history of DVT who presented to the hospital with weakness and functional decline, unintentional weight loss and severe constipation    Assessment and plan:  Bilateral pulmonary embolism  History of DVT  · Likely secondary to being on an adequate dose of Eliquis of 2.5 mg twice daily and medication noncompliance  · Continue heparin drip while hospitalized since he  might need further procedures  · Plan to transition to full dose Eliquis (loading and maintenance)     Moderate exudative right-sided loculated pleural effusion  Possible organized right pleural effusion  · Bedside point-of-care ultrasound revealed complex pleural effusion  · Cardiothoracic surgery consulted.  Recommended chest tube placement by IR.    · Underwent right-sided chest tube placement by IR on 3/27/2023.  Chest tube removed 3/30/2023   · Repeat CT scan chest after removal of chest tube showed persistent moderate right pleural effusion, likely organized   · Analysis of the fluid is consistent with exudate, ?  Parapneumonic  · Culture from pleural fluid is negative to date  · Cytology showed benign mesothelial cells  · Continue IV Zosyn.  Vancomycin discontinued after MRSA swab came back negative    Lung nodule  - 1.4 cm  - needs repeat CT imaging in 6 months    Severe malnutrition  Hypoalbuminemia  Significant unintentional weight loss  Generalized debility  · Patient reports 52lbs weight loss since October 2022.  · No EGD or colonoscopy since 2008  · CT chest and CT abdomen with no convincing evidence of solid malignancy or lymphadenopathy.  Oncology service evaluated the patient recommended age-appropriate screening  · GI evaluated the patient for EGD and colonoscopy.  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 Continue Megace and mirtazapine 15 mg nightly for poor appetite  · Nutrition team following     UTI with Enterococcus faecalis  · POA  · On Zosyn    Leukocytosis, improving  · Likely reactive versus infectious  · Normal procalcitonin and negative cultures to date  · Continue current antibiotic therapy with Zosyn.     CAD  Cardiomyopathy with EF 30 %  Essential  HTN  Old CVA with right residual weakness  Dyslipidemia  · Continue aspirin  · Continue coreg, lisinopril  · Continue statins    Constipation  · Having multiple bowel movements with enema  · Continue bowel regimen     GERD   · Continue PPI      Type 2 diabetes  · A1C 6.3%   · Continue insulin sliding scale     Acute debility  · PT recommended acute rehab      Expected Discharge Location and Transportation: Acute rehab  Expected Discharge   Expected Discharge Date and Time     Expected Discharge Date Expected Discharge Time    Apr 8, 2023            DVT prophylaxis:  Medical and mechanical DVT prophylaxis orders are present.     AM-PAC 6 Clicks Score (PT): 14 (04/03/23 2000)    CODE STATUS:   Code Status and Medical Interventions:   Ordered at: 03/24/23 0562     Code Status (Patient has no pulse and is not breathing):    CPR (Attempt to Resuscitate)     Medical Interventions (Patient has pulse or is breathing):    Full Support        Bryce Ponce MD  04/04/23

## 2023-04-04 NOTE — PROCEDURES
I discussed with the patient the risks, benefits, and alternatives to the planned procedure including the risk of bleeding infection heart attack stroke permanent disability and death.  The patient demonstrated good understanding and signed written consent.  The patient was positioned in the left lateral decubitus position and given Dilaudid IV.  The right flank was prepped and draped in the standard fashion.  The skin and subcutaneous tissue was infiltrated with 1% lidocaine, and this was used to infiltrate the soft tissue down to the level of the periosteum of approximately the sixth rib in the anterior axillary line.  An 11 blade was used to create a 2 cm incision, and this was bluntly carried down to the level of the rib.  After deep exhalation, the thoracic cavity was entered over the sixth rib using a Sabrina clamp, and the tip of my index finger was inserted into the wound and confirmed intrathoracic placement.  I do not appreciate any dense fibrotic or scar material, and a 32 Maori chest tube was advanced through the incision to approximately the 10 cm giuliana.  I was unable to fully insert the chest tube, possibly due to scarring and complex intrathoracic fibrosis, despite multiple repositioning attempts.  The chest tube was sewn to the skin and connected to atrium -20 suction, although there was a scant amount of serous fluid that was drained.  The patient tolerated the procedure very well and there were no immediate complications.    Jarrett Archuleta M.D., R.P.V.I.  Cardiothoracic and Vascular Surgeon  [facility]

## 2023-04-04 NOTE — PLAN OF CARE
Goal Outcome Evaluation:              Outcome Evaluation: Patient has gaine 8lbs since admission.  Currently weighing 133lbs.  Expecting chest tubes to be placed this afternoon.  Will continue to monitor.

## 2023-04-04 NOTE — PROGRESS NOTES
CTS Progress Note      Chief Complaint: Pleural effusion      Subjective  Vitals stable on room air, saturations 95%. No acute events overnight.     Objective    Physical Exam:   Vital Signs   Temp:  [97.9 °F (36.6 °C)-98.2 °F (36.8 °C)] 98.1 °F (36.7 °C)  Heart Rate:  [71-75] 72  Resp:  [18] 18  BP: (103-160)/(54-80) 160/80   GEN: NAD   CV: Regular rate and rhythm   RESP: Decreased bilateral bases but otherwise  clear   EXT: Warm without significant edema   Chest tube removal site examined, DuoDERM patch in place.  No surrounding erythema or drainage or swelling   Results     Results from last 7 days   Lab Units 04/03/23  0444   WBC 10*3/mm3 9.30   HEMOGLOBIN g/dL 11.7*   HEMATOCRIT % 37.9   PLATELETS 10*3/mm3 137*     Results from last 7 days   Lab Units 04/03/23  0444   SODIUM mmol/L 143   POTASSIUM mmol/L 3.9   CHLORIDE mmol/L 109*   CO2 mmol/L 21.0*   BUN mg/dL 10   CREATININE mg/dL 0.60*   GLUCOSE mg/dL 81   CALCIUM mg/dL 8.5*     Results from last 7 days   Lab Units 03/28/23  1357   APTT seconds 46.3*     Assessment & Plan   Bilateral pulmonary embolus with moderate right pleural effusion status post IR placed chest tube discontinued 3/30/2023  Continued moderate size right-sided pleural effusion    Plan   Dr. Archuleta to place large bore chest tube at bedside today  CT chest in       YUE Damian  04/04/23  10:01 EDT

## 2023-04-04 NOTE — THERAPY TREATMENT NOTE
Patient Name: Timmy Guajardo  : 1950    MRN: [id number]                              Today's Date: 2023       Admit Date: 3/24/2023    Visit Dx:     ICD-10-CM ICD-9-CM   1. Weight loss  R63.4 783.21   2. Unintentional weight loss  R63.4 783.21   3. 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  K22.70 530.85     Patient Active Problem List   Diagnosis   • Acute right-sided weakness   • Suspected cerebrovascular accident (CVA)   • Leukocytosis   • CHF (congestive heart failure)   • Thrombocytopenia   • HTN (hypertension)   • Presence of cardiac pacemaker   • Hyperglycemia   • Gangrene of toe of left foot   • Closed displaced intertrochanteric fracture of right femur, initial encounter   • Pleural effusion   • Unintentional weight loss   • Hypoalbuminemia   • Severe malnutrition   • Bilateral pulmonary embolism   • History of CVA (cerebrovascular accident)   • Debility     Past Medical History:   Diagnosis Date   • Cardiomyopathy    • CHF (congestive heart failure)    • Coronary artery disease    • Diabetes mellitus    • GERD (gastroesophageal reflux disease)    • HTN (hypertension)    • Stroke     Right sided weakness   • Stroke      Past Surgical History:   Procedure Laterality Date   • AMPUTATION DIGIT Left 2021    Procedure: AMPUTATION DIGIT - GREAT TOE AMPUTATION LEFT;  Surgeon: Dario Marmolejo MD;  Location: [facility] OR;  Service: General;  Laterality: Left;   • AORTAGRAM N/A 2021    Procedure: AORTAGRAM WITH RUNOFFS, LEFT SFA BALLOON ANGIOPLASTY;  Surgeon: Tim Mahan MD;  Location: [facility] HYBRID SHELIA;  Service: Vascular;  Laterality: N/A;  FL TIME 6 MIN 54 SECS  82 MGY  CONTRAST VISIPAQUE 100ML     • CARDIAC CATHETERIZATION     • CARDIAC PACEMAKER PLACEMENT      pacemaker/defibrillator, [employer]   • COLONOSCOPY N/A 3/31/2023    Procedure: COLONOSCOPY;  Surgeon: Brunner, Mark I, MD;  Location: [facility] ENDOSCOPY;  Service: Gastroenterology;  Laterality: N/A;   • ENDOSCOPY N/A 3/29/2023     Procedure: ESOPHAGOGASTRODUODENOSCOPY;  Surgeon: Brunner, Mark I, MD;  Location: [facility] ENDOSCOPY;  Service: Gastroenterology;  Laterality: N/A;   • HERNIA REPAIR Bilateral     Inguinal hernia   • HIP TROCHANTERIC NAILING WITH INTRAMEDULLARY HIP SCREW Right 10/18/2022    Procedure: HIP TROCHANTERIC NAILING WITH INTRAMEDULLARY HIP SCREW RIGHT;  Surgeon: Bj Steinberg MD;  Location: [facility] OR;  Service: Orthopedics;  Laterality: Right;      General Information     Row Name 04/04/23 [facility]          OT Time and Intention    Document Type therapy note (daily note)  -BAILEY     Mode of Treatment individual therapy;occupational therapy  -     Row Name 04/04/23 1351          General Information    Patient Profile Reviewed yes  -BAILEY     Existing Precautions/Restrictions fall;other (see comments)  R sided weakness with prior CVA  -BAILEY     Barriers to Rehab medically complex;previous functional deficit  -BAILEY     Row Name 04/04/23 1351          Cognition    Orientation Status (Cognition) oriented x 3  -     Row Name 04/04/23 [facility]          Safety Issues, Functional Mobility    Safety Issues Affecting Function (Mobility) insight into deficits/self-awareness;awareness of need for assistance;judgment;problem-solving;safety precaution awareness;safety precautions follow-through/compliance;sequencing abilities  -BAILEY     Impairments Affecting Function (Mobility) balance;coordination;endurance/activity tolerance;motor planning;motor control;range of motion (ROM);strength  -BAILEY           User Key  (r) = Recorded By, (t) = Taken By, (c) = Cosigned By    Initials Name Provider Type    BAILEY Mary Nath, OT Occupational Therapist                 Mobility/ADL's     Row Name 04/04/23 6798          Bed Mobility    Supine-Sit [place] (Bed Mobility) standby assist;verbal cues  -BAILEY     Sit-Supine [place] (Bed Mobility) standby assist  -BAILEY     Bed Mobility, Safety Issues decreased use of arms for pushing/pulling;decreased use of legs for  bridging/pushing;impaired trunk control for bed mobility  -BAILEY     Assistive Device (Bed Mobility) bed rails;head of bed elevated  -BAILEY     Comment, (Bed Mobility) cues to fully scoot to EOB  -BAILEY     Row Name 04/04/23 1352          Transfers    Transfers sit-stand transfer;stand-sit transfer;bed-chair transfer  -BAILEY     Comment, (Transfers) pt. to EOB and  noted some stool on bed and gown, pt. to recliner for bed clean up and then back to bed, pt. declining to stay up in walker, pt. stating will need bed pan soon, pt. despite encouragement declined BSC use, cues for hand placement  -BAILEY     Row Name 04/04/23 1352          Bed-Chair Transfer    Bed-Chair [place] (Transfers) moderate assist (50% patient effort)  -BAILEY     Assistive Device (Bed-Chair Transfers) walker, front-wheeled  -BAILEY     Comment, (Bed-Chair Transfer) pt. needed mod A for balance and some walker mvmt assist  -BAILEY     Row Name 04/04/23 1352          Sit-Stand Transfer    Sit-Stand [place] (Transfers) moderate assist (50% patient effort);verbal cues  -BAILEY     Assistive Device (Sit-Stand Transfers) walker, front-wheeled  -BAILEY     Row Name 04/04/23 1352          Stand-Sit Transfer    Stand-Sit [place] (Transfers) moderate assist (50% patient effort);verbal cues  -BAILEY     Assistive Device (Stand-Sit Transfers) walker, front-wheeled  -BAILEY     Row Name 04/04/23 1352          Activities of Daily Living    BADL Assessment/Intervention lower body dressing;upper body dressing;toileting  -BAILEY     Row Name 04/04/23 1352          Lower Body Dressing Assessment/Training    [place] Level (Lower Body Dressing) doff;socks;moderate assist (50% patient effort);verbal cues;nonverbal cues (demo/gesture)  -BAILEY     Assistive Devices (Lower Body Dressing) long-handled shoe horn;reacher  -BAILEY     Position (Lower Body Dressing) edge of bed sitting  -BAILEY     Comment, (Lower Body Dressing) pt. able to reach LLE bed level for LBD, but unable to reach RLE, pt. was able to  doff sock with shoe horn RLE mod A for hand over hand guidance with first attempt, pt. picked up sock with reacher, pt. declined sockaid use, to attempt in recliner and if unable to perform then pt. stated he may attempt using sock aid  -     Row Name 04/04/23 6572          Upper Body Dressing Assessment/Training    [place] Level (Upper Body Dressing) doff;don;moderate assist (50% patient effort)  -BAILEY     Position (Upper Body Dressing) edge of bed sitting  -BAILEY     Comment, (Upper Body Dressing) soiled gown changed out, limited by lines  -     Row Name 04/04/23 9592          Toileting Assessment/Training    [place] Level (Toileting) perform perineal hygiene;dependent (less than 25% patient effort)  -BAILEY     Position (Toileting) supported standing  -BAILEY     Comment, (Toileting) pt. stood with bed rail beside recliner using RUE and LUE on chair arm, pt. able to balance with CGA holding to bed rail and walker for grossly one minute while cleaned for bowel incontinence  -           User Key  (r) = Recorded By, (t) = Taken By, (c) = Cosigned By    Initials Name Provider Type    Mary Orr, OT Occupational Therapist               Obj/Interventions     Row Name 04/04/23 1226          Shoulder (Therapeutic Exercise)    Shoulder AROM (Therapeutic Exercise) bilateral;flexion;extension;aBduction;aDduction;horizontal aBduction/aDduction;15 repititions;supine  LUE assist RUE with shoulder flexion  -     Row Name 04/04/23 8744          Elbow/Forearm (Therapeutic Exercise)    Elbow/Forearm Strengthening (Therapeutic Exercise) bilateral;flexion;extension;15 repititions  mod resistance given, yellow tband found at end of session and left with pt. in reach to work on this evening  -     Row Name 04/04/23 7315          Motor Skills    Therapeutic Exercise shoulder;elbow/forearm  pt. also complete gluteal set with knee press, hip IR to assist with ADL related transfers  -     Row Name 04/04/23 5309           Balance    Static Sitting Balance independent  -BAILEY     Dynamic Sitting Balance standby assist  -BAILEY     Position, Sitting Balance unsupported;sitting edge of bed  -BAILEY     Static Standing Balance minimal assist  -BAILEY     Dynamic Standing Balance moderate assist  -BAILEY     Position/Device Used, Standing Balance supported;walker, rolling  bed rail  -BAILEY     Balance Interventions sit to stand;occupation based/functional task  -BAILEY     Comment, Balance pt. was able to stand statically while cleaned post BM incontinence using bed rail for safety  -BAILEY           User Key  (r) = Recorded By, (t) = Taken By, (c) = Cosigned By    Initials Name Provider Type    Mary Orr OT Occupational Therapist               Goals/Plan     Row Name 04/04/23 1406          Transfer Goal 1 (OT)    Progress/Outcome (Transfer Goal 1, OT) continuing progress toward goal  -BAILEY     Row Name 04/04/23 1406          Dressing Goal 1 (OT)    Progress/Outcome (Dressing Goal 1, OT) continuing progress toward goal  -BAILEY     Row Name 04/04/23 1406          Strength Goal 1 (OT)    Strength Goal 1 (OT) Pt. will complete UE strengthening TE with progressive reps and resistance to support return to prior ADL independence and related transfers.  -BAILEY     Time Frame (Strength Goal 1, OT) long term goal (LTG);10 days  -BAILEY     Progress/Outcome (Strength Goal 1, OT) goal met  -BAILEY     Row Name 04/04/23 1406          Problem Specific Goal 1 (OT)    Progress/Outcome (Problem Specific Goal 1, OT) good progress toward goal  -BAILEY           User Key  (r) = Recorded By, (t) = Taken By, (c) = Cosigned By    Initials Name Provider Type    Mary Orr OT Occupational Therapist               Clinical Impression     Row Name 04/04/23 1401          Pain Assessment    Pretreatment Pain Rating 0/10 - no pain  -BAILEY     Posttreatment Pain Rating 0/10 - no pain  -BAILEY     Row Name 04/04/23 1401          Plan of Care Review    Plan of Care Reviewed With patient  -BAILEY     Progress  improving  -BAILEY     Outcome Evaluation Pt. with some improvement with bed mobility and standing endurance and balance with support statically.  Some improvement with LBD using AE, but will need several reviews.  Pt. still wanting to use bedpan and unable to convince pt. to progress to BSC use.  Continue OT POC to address below baseline ADL independence level.  Continue to recommend IRF at discharge.  Possible chest tube placement today.  -BAILEY     Row Name 04/04/23 1401          Therapy Plan Review/Discharge Plan (OT)    Anticipated Discharge Disposition (OT) inpatient rehabilitation facility  -BAILEY     Row Name 04/04/23 1401          Vital Signs    Pre Systolic BP Rehab 119  -BAILEY     Pre Treatment Diastolic BP 65  -BAILEY     Post Systolic BP Rehab 140  -BAILEY     Post Treatment Diastolic BP 79  -BAILEY     Pretreatment Heart Rate (beats/min) 74  -BAILEY     Posttreatment Heart Rate (beats/min) 73  -BAILEY     O2 Delivery Pre Treatment room air  -BAILEY     O2 Delivery Intra Treatment room air  -BAILEY     Post SpO2 (%) 99  -BAILEY     O2 Delivery Post Treatment room air  -BAILEY     Pre Patient Position Supine  -BAILEY     Intra Patient Position Standing  -BAILEY     Post Patient Position Supine  -BAILEY     Row Name 04/04/23 1401          Positioning and Restraints    Pre-Treatment Position in bed  -BAILEY     Post Treatment Position bed  -BAILEY     In Bed notified nsg;supine;call light within reach;encouraged to call for assist;exit alarm on;side rails up x3;heels elevated  nurse aid in and aware pt. back in bed  -BAILEY           User Key  (r) = Recorded By, (t) = Taken By, (c) = Cosigned By    Initials Name Provider Type    Mary Orr, OT Occupational Therapist               Outcome Measures     Row Name 04/04/23 1402          How much help from another is currently needed...    Putting on and taking off regular lower body clothing? 2  -BAILEY     Bathing (including washing, rinsing, and drying) 2  -BAILEY     Toileting (which includes using toilet bed pan or urinal) 1   -BAILEY     Putting on and taking off regular upper body clothing 3  -BAILEY     Taking care of personal grooming (such as brushing teeth) 3  -BAILEY     Eating meals 4  -BAILEY     AM-PAC 6 Clicks Score (OT) 15  -BAILEY     Row Name 04/04/23 1407          Functional Assessment    Outcome Measure Options AM-PAC 6 Clicks Daily Activity (OT)  -BAILEY           User Key  (r) = Recorded By, (t) = Taken By, (c) = Cosigned By    Initials Name Provider Type    Mary Orr, OT Occupational Therapist                Occupational Therapy Education     Title: PT OT SLP Therapies (In Progress)     Topic: Occupational Therapy (In Progress)     Point: ADL training (In Progress)     Description:   Instruct learner(s) on proper safety adaptation and remediation techniques during self care or transfers.   Instruct in proper use of assistive devices.              Learning Progress Summary           Patient Acceptance, E,D, NR by BAILEY at 4/4/2023 1408    Comment: AE use for LBD, UE TE, transfer safety,                   Point: Home exercise program (In Progress)     Description:   Instruct learner(s) on appropriate technique for monitoring, assisting and/or progressing therapeutic exercises/activities.              Learning Progress Summary           Patient Acceptance, E,D, NR by BAILEY at 4/4/2023 1408    Comment: AE use for LBD, UE TE, transfer safety,    Acceptance, E,D, NR by BAILEY at 3/30/2023 1438    Comment: UE and LE TE to support BADL and related transfer independence    Acceptance, E,D, VU,NR by BAILEY at 3/27/2023 1201    Comment: reason for consult, noted deficits, UE TE, bed mobility and transfer sequencing, benefit of mvmt/activity for health and not loosing strength and endurance                   Point: Precautions (In Progress)     Description:   Instruct learner(s) on prescribed precautions during self-care and functional transfers.              Learning Progress Summary           Patient Acceptance, E,D, NR by BAILEY at 4/4/2023 1408    Comment: AE  use for LBD, UE TE, transfer safety,    Acceptance, E,D, VU,NR by  at 3/27/2023 1201    Comment: reason for consult, noted deficits, UE TE, bed mobility and transfer sequencing, benefit of mvmt/activity for health and not loosing strength and endurance                   Point: Body mechanics (Done)     Description:   Instruct learner(s) on proper positioning and spine alignment during self-care, functional mobility activities and/or exercises.              Learning Progress Summary           Patient Acceptance, E,D, VU,NR by  at 3/27/2023 1201    Comment: reason for consult, noted deficits, UE TE, bed mobility and transfer sequencing, benefit of mvmt/activity for health and not loosing strength and endurance                               User Key     Initials Effective Dates Name Provider Type Discipline     02/03/23 -  Mary Nath, OT Occupational Therapist OT              OT Recommendation and Plan  Planned Therapy Interventions (OT): activity tolerance training, adaptive equipment training, BADL retraining, functional balance retraining, occupation/activity based interventions, patient/caregiver education/training, ROM/therapeutic exercise, strengthening exercise, transfer/mobility retraining  Therapy Frequency (OT): daily  Plan of Care Review  Plan of Care Reviewed With: patient  Progress: improving  Outcome Evaluation: Pt. with some improvement with bed mobility and standing endurance and balance with support statically.  Some improvement with LBD using AE, but will need several reviews.  Pt. still wanting to use bedpan and unable to convince pt. to progress to BSC use.  Continue OT POC to address below baseline ADL independence level.  Continue to recommend IRF at discharge.  Possible chest tube placement today.     Time Calculation:    Time Calculation- OT     Row Name 04/04/23 1409             Time Calculation- OT    OT Start Time 1312  -BAILEY      OT Received On 04/04/23  -      OT Goal Re-Cert Due  Date 04/06/23  -BAILEY         Timed Charges    82706 - OT Therapeutic Exercise Minutes 10  -BAILEY      52692 - OT Therapeutic Activity Minutes 8  -BAILEY      25873 - OT Self Care/Mgmt Minutes 20  -BAILEY         Total Minutes    Timed Charges Total Minutes 38  -BAILEY       Total Minutes 38  -BAILEY            User Key  (r) = Recorded By, (t) = Taken By, (c) = Cosigned By    Initials Name Provider Type    Mary Orr, OT Occupational Therapist              Therapy Charges for Today     Code Description Service Date Service Provider Modifiers Qty    25452500808 HC OT THER PROC EA 15 MIN 4/4/2023 Mary Nath, OT GO 1    25288368547 HC OT THERAPEUTIC ACT EA 15 MIN 4/4/2023 Mary Nath OT GO 1    34305546400 HC OT SELF CARE/MGMT/TRAIN EA 15 MIN 4/4/2023 Mary Nath OT GO 1               Mary Nath OT  4/4/2023

## 2023-04-05 ENCOUNTER — APPOINTMENT (OUTPATIENT)
Dept: GENERAL RADIOLOGY | Facility: HOSPITAL | Age: 73
DRG: 163 | End: 2023-04-05
Payer: MEDICARE

## 2023-04-05 LAB
ANION GAP SERPL CALCULATED.3IONS-SCNC: 10 MMOL/L (ref 5–15)
BUN SERPL-MCNC: 15 MG/DL (ref 8–23)
BUN/CREAT SERPL: 23.4 (ref 7–25)
CALCIUM SPEC-SCNC: 8.3 MG/DL (ref 8.6–10.5)
CHLORIDE SERPL-SCNC: 107 MMOL/L (ref 98–107)
CO2 SERPL-SCNC: 23 MMOL/L (ref 22–29)
CREAT SERPL-MCNC: 0.64 MG/DL (ref 0.76–1.27)
DEPRECATED RDW RBC AUTO: 57.3 FL (ref 37–54)
EGFRCR SERPLBLD CKD-EPI 2021: 101.8 ML/MIN/1.73
ERYTHROCYTE [DISTWIDTH] IN BLOOD BY AUTOMATED COUNT: 15.9 % (ref 12.3–15.4)
GLUCOSE BLDC GLUCOMTR-MCNC: 112 MG/DL (ref 70–130)
GLUCOSE BLDC GLUCOMTR-MCNC: 125 MG/DL (ref 70–130)
GLUCOSE BLDC GLUCOMTR-MCNC: 148 MG/DL (ref 70–130)
GLUCOSE BLDC GLUCOMTR-MCNC: 156 MG/DL (ref 70–130)
GLUCOSE BLDC GLUCOMTR-MCNC: 89 MG/DL (ref 70–130)
GLUCOSE SERPL-MCNC: 96 MG/DL (ref 65–99)
HCT VFR BLD AUTO: 36.8 % (ref 37.5–51)
HGB BLD-MCNC: 11.4 G/DL (ref 13–17.7)
MCH RBC QN AUTO: 30.2 PG (ref 26.6–33)
MCHC RBC AUTO-ENTMCNC: 31 G/DL (ref 31.5–35.7)
MCV RBC AUTO: 97.6 FL (ref 79–97)
PLATELET # BLD AUTO: 159 10*3/MM3 (ref 140–450)
PMV BLD AUTO: 10.4 FL (ref 6–12)
POTASSIUM SERPL-SCNC: 4 MMOL/L (ref 3.5–5.2)
RBC # BLD AUTO: 3.77 10*6/MM3 (ref 4.14–5.8)
SODIUM SERPL-SCNC: 140 MMOL/L (ref 136–145)
UFH PPP CHRO-ACNC: 0.28 IU/ML (ref 0.3–0.7)
UFH PPP CHRO-ACNC: 0.33 IU/ML (ref 0.3–0.7)
UFH PPP CHRO-ACNC: 0.35 IU/ML (ref 0.3–0.7)
WBC NRBC COR # BLD: 9.29 10*3/MM3 (ref 3.4–10.8)

## 2023-04-05 PROCEDURE — 25010000002 HEPARIN (PORCINE) 25000-0.45 UT/250ML-% SOLUTION

## 2023-04-05 PROCEDURE — 80048 BASIC METABOLIC PNL TOTAL CA: CPT | Performed by: INTERNAL MEDICINE

## 2023-04-05 PROCEDURE — 85027 COMPLETE CBC AUTOMATED: CPT | Performed by: INTERNAL MEDICINE

## 2023-04-05 PROCEDURE — 63710000001 INSULIN LISPRO (HUMAN) PER 5 UNITS: Performed by: INTERNAL MEDICINE

## 2023-04-05 PROCEDURE — 85520 HEPARIN ASSAY: CPT

## 2023-04-05 PROCEDURE — 25010000002 HYDRALAZINE PER 20 MG: Performed by: PHYSICIAN ASSISTANT

## 2023-04-05 PROCEDURE — 99231 SBSQ HOSP IP/OBS SF/LOW 25: CPT

## 2023-04-05 PROCEDURE — 71045 X-RAY EXAM CHEST 1 VIEW: CPT

## 2023-04-05 PROCEDURE — 97110 THERAPEUTIC EXERCISES: CPT

## 2023-04-05 PROCEDURE — 82962 GLUCOSE BLOOD TEST: CPT

## 2023-04-05 PROCEDURE — 99232 SBSQ HOSP IP/OBS MODERATE 35: CPT | Performed by: INTERNAL MEDICINE

## 2023-04-05 PROCEDURE — 25010000002 PIPERACILLIN SOD-TAZOBACTAM PER 1 G: Performed by: INTERNAL MEDICINE

## 2023-04-05 PROCEDURE — 97530 THERAPEUTIC ACTIVITIES: CPT

## 2023-04-05 RX ORDER — HEPARIN SODIUM 10000 [USP'U]/100ML
24 INJECTION, SOLUTION INTRAVENOUS
Status: DISCONTINUED | OUTPATIENT
Start: 2023-04-05 | End: 2023-04-06

## 2023-04-05 RX ORDER — HYDRALAZINE HYDROCHLORIDE 20 MG/ML
10 INJECTION INTRAMUSCULAR; INTRAVENOUS ONCE
Status: COMPLETED | OUTPATIENT
Start: 2023-04-05 | End: 2023-04-05

## 2023-04-05 RX ORDER — INSULIN LISPRO 100 [IU]/ML
0-7 INJECTION, SOLUTION INTRAVENOUS; SUBCUTANEOUS
Status: DISCONTINUED | OUTPATIENT
Start: 2023-04-05 | End: 2023-04-15

## 2023-04-05 RX ADMIN — POLYETHYLENE GLYCOL 3350 17 G: 17 POWDER, FOR SOLUTION ORAL at 09:24

## 2023-04-05 RX ADMIN — Medication 10 ML: at 09:24

## 2023-04-05 RX ADMIN — NYSTATIN 500000 UNITS: 100000 SUSPENSION ORAL at 18:24

## 2023-04-05 RX ADMIN — ACETAMINOPHEN 325MG 650 MG: 325 TABLET ORAL at 06:04

## 2023-04-05 RX ADMIN — INSULIN LISPRO 2 UNITS: 100 INJECTION, SOLUTION INTRAVENOUS; SUBCUTANEOUS at 12:50

## 2023-04-05 RX ADMIN — ASPIRIN 81 MG CHEWABLE TABLET 81 MG: 81 TABLET CHEWABLE at 09:23

## 2023-04-05 RX ADMIN — FAMOTIDINE 20 MG: 20 TABLET ORAL at 09:24

## 2023-04-05 RX ADMIN — NYSTATIN 500000 UNITS: 100000 SUSPENSION ORAL at 12:50

## 2023-04-05 RX ADMIN — HYDRALAZINE HYDROCHLORIDE 10 MG: 20 INJECTION INTRAMUSCULAR; INTRAVENOUS at 06:04

## 2023-04-05 RX ADMIN — Medication 5 MG: at 21:47

## 2023-04-05 RX ADMIN — ACETAMINOPHEN 325MG 650 MG: 325 TABLET ORAL at 21:47

## 2023-04-05 RX ADMIN — CARVEDILOL 50 MG: 12.5 TABLET, FILM COATED ORAL at 09:23

## 2023-04-05 RX ADMIN — ATORVASTATIN CALCIUM 80 MG: 40 TABLET, FILM COATED ORAL at 21:47

## 2023-04-05 RX ADMIN — HEPARIN SODIUM 24 UNITS/KG/HR: 10000 INJECTION, SOLUTION INTRAVENOUS at 16:43

## 2023-04-05 RX ADMIN — NYSTATIN 500000 UNITS: 100000 SUSPENSION ORAL at 21:47

## 2023-04-05 RX ADMIN — DOCUSATE SODIUM 100 MG: 100 CAPSULE, LIQUID FILLED ORAL at 21:47

## 2023-04-05 RX ADMIN — MIRTAZAPINE 15 MG: 15 TABLET, FILM COATED ORAL at 21:47

## 2023-04-05 RX ADMIN — TERAZOSIN HYDROCHLORIDE 1 MG: 1 CAPSULE ORAL at 21:48

## 2023-04-05 RX ADMIN — DOCUSATE SODIUM 100 MG: 100 CAPSULE, LIQUID FILLED ORAL at 09:24

## 2023-04-05 RX ADMIN — NYSTATIN 500000 UNITS: 100000 SUSPENSION ORAL at 09:23

## 2023-04-05 RX ADMIN — LISINOPRIL 5 MG: 5 TABLET ORAL at 09:24

## 2023-04-05 RX ADMIN — Medication 10 ML: at 21:48

## 2023-04-05 RX ADMIN — TAZOBACTAM SODIUM AND PIPERACILLIN SODIUM 3.38 G: 375; 3 INJECTION, SOLUTION INTRAVENOUS at 09:24

## 2023-04-05 RX ADMIN — ACETAMINOPHEN 325MG 650 MG: 325 TABLET ORAL at 13:03

## 2023-04-05 RX ADMIN — OXYCODONE HYDROCHLORIDE AND ACETAMINOPHEN 1000 MG: 500 TABLET ORAL at 09:23

## 2023-04-05 RX ADMIN — PANTOPRAZOLE SODIUM 40 MG: 40 TABLET, DELAYED RELEASE ORAL at 21:47

## 2023-04-05 RX ADMIN — MEGESTROL ACETATE 400 MG: 40 SUSPENSION ORAL at 09:24

## 2023-04-05 RX ADMIN — CARVEDILOL 50 MG: 12.5 TABLET, FILM COATED ORAL at 18:25

## 2023-04-05 RX ADMIN — TAZOBACTAM SODIUM AND PIPERACILLIN SODIUM 3.38 G: 375; 3 INJECTION, SOLUTION INTRAVENOUS at 02:08

## 2023-04-05 RX ADMIN — SENNOSIDES AND DOCUSATE SODIUM 2 TABLET: 8.6; 5 TABLET ORAL at 09:23

## 2023-04-05 NOTE — PROGRESS NOTES
CTS Progress Note      Chief Complaint: Pleural effusion      Subjective  No acute events overnight. VSS on room air saturations 100%.     Objective    Physical Exam:   Vital Signs   Temp:  [98.1 °F (36.7 °C)-98.7 °F (37.1 °C)] 98.7 °F (37.1 °C)  Heart Rate:  [72-76] 76  Resp:  [16-18] 18  BP: (119-180)/(65-94) 180/86   GEN: NAD   CV: Regular rate and rhythm   RESP: Decreased bilateral bases but otherwise  clear   EXT: Warm without significant edema   Chest tube: 20 cc serosanguineous drainage, no airleak with cough but poor cough effort noted   Results     Results from last 7 days   Lab Units 04/05/23  0208   WBC 10*3/mm3 9.29   HEMOGLOBIN g/dL 11.4*   HEMATOCRIT % 36.8*   PLATELETS 10*3/mm3 159     Results from last 7 days   Lab Units 04/05/23  0208   SODIUM mmol/L 140   POTASSIUM mmol/L 4.0   CHLORIDE mmol/L 107   CO2 mmol/L 23.0   BUN mg/dL 15   CREATININE mg/dL 0.64*   GLUCOSE mg/dL 96   CALCIUM mg/dL 8.3*         Assessment & Plan   Bilateral pulmonary embolus with moderate right pleural effusion status post IR placed chest tube discontinued 3/30/2023  Continued moderate size right-sided pleural effusion    Plan   Continue chest tube to -20 suction  Non-contrast CT chest today  Daily chest x-ray      Alexandra Azul, YUE  04/05/23  07:48 EDT

## 2023-04-05 NOTE — CASE MANAGEMENT/SOCIAL WORK
Continued Stay Note  [facility]     Patient Name: Timmy Guajardo  MRN: [id number]  Today's Date: 4/5/2023    Admit Date: 3/24/2023    Plan: discharge plan   Discharge Plan     Row Name 04/05/23 1512       Plan    Plan discharge plan    Plan Comments I spoke with Lucinda (liason for [facility]) and they are still following. Per discussion in MDR, pt has a chest tube.  CM will cont to follow    Final Discharge Disposition Code 03 - skilled nursing facility (SNF)               Discharge Codes    No documentation.               Expected Discharge Date and Time     Expected Discharge Date Expected Discharge Time    Apr 8, 2023             Violeta Miranda RN

## 2023-04-05 NOTE — THERAPY PROGRESS REPORT/RE-CERT
Patient Name: Timmy Guajardo  : 1950    MRN: [id number]                              Today's Date: 2023       Admit Date: 3/24/2023    Visit Dx:     ICD-10-CM ICD-9-CM   1. Weight loss  R63.4 783.21   2. Unintentional weight loss  R63.4 783.21   3. 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  K22.70 530.85     Patient Active Problem List   Diagnosis   • Acute right-sided weakness   • Suspected cerebrovascular accident (CVA)   • Leukocytosis   • CHF (congestive heart failure)   • Thrombocytopenia   • HTN (hypertension)   • Presence of cardiac pacemaker   • Hyperglycemia   • Gangrene of toe of left foot   • Closed displaced intertrochanteric fracture of right femur, initial encounter   • Pleural effusion   • Unintentional weight loss   • Hypoalbuminemia   • Severe malnutrition   • Bilateral pulmonary embolism   • History of CVA (cerebrovascular accident)   • Debility     Past Medical History:   Diagnosis Date   • Cardiomyopathy    • CHF (congestive heart failure)    • Coronary artery disease    • Diabetes mellitus    • GERD (gastroesophageal reflux disease)    • HTN (hypertension)    • Stroke     Right sided weakness   • Stroke      Past Surgical History:   Procedure Laterality Date   • AMPUTATION DIGIT Left 2021    Procedure: AMPUTATION DIGIT - GREAT TOE AMPUTATION LEFT;  Surgeon: Dario Marmolejo MD;  Location: [facility] OR;  Service: General;  Laterality: Left;   • AORTAGRAM N/A 2021    Procedure: AORTAGRAM WITH RUNOFFS, LEFT SFA BALLOON ANGIOPLASTY;  Surgeon: Tim Mahan MD;  Location: [facility] HYBRID SHELIA;  Service: Vascular;  Laterality: N/A;  FL TIME 6 MIN 54 SECS  82 MGY  CONTRAST VISIPAQUE 100ML     • CARDIAC CATHETERIZATION     • CARDIAC PACEMAKER PLACEMENT      pacemaker/defibrillator, [employer]   • COLONOSCOPY N/A 3/31/2023    Procedure: COLONOSCOPY;  Surgeon: Brunner, Mark I, MD;  Location: [facility] ENDOSCOPY;  Service: Gastroenterology;  Laterality: N/A;   • ENDOSCOPY N/A 3/29/2023     Procedure: ESOPHAGOGASTRODUODENOSCOPY;  Surgeon: Brunner, Mark I, MD;  Location: [facility] ENDOSCOPY;  Service: Gastroenterology;  Laterality: N/A;   • HERNIA REPAIR Bilateral     Inguinal hernia   • HIP TROCHANTERIC NAILING WITH INTRAMEDULLARY HIP SCREW Right 10/18/2022    Procedure: HIP TROCHANTERIC NAILING WITH INTRAMEDULLARY HIP SCREW RIGHT;  Surgeon: Bj Steinberg MD;  Location: [facility] OR;  Service: Orthopedics;  Laterality: Right;      General Information     Row Name 04/05/23 1542          Physical Therapy Time and Intention    Document Type progress note/recertification  -     Mode of Treatment physical therapy;individual therapy  -     Row Name 04/05/23 1542          General Information    Existing Precautions/Restrictions fall;other (see comments)  R chest tube, R sided weakness d/t remote CVA  -     Barriers to Rehab medically complex;previous functional deficit  -     Row Name 04/05/23 1542          Cognition    Orientation Status (Cognition) oriented x 3  -     Row Name 04/05/23 1542          Safety Issues, Functional Mobility    Safety Issues Affecting Function (Mobility) awareness of need for assistance;insight into deficits/self-awareness;safety precaution awareness;safety precautions follow-through/compliance;sequencing abilities  -     Impairments Affecting Function (Mobility) balance;coordination;endurance/activity tolerance;motor planning;motor control;range of motion (ROM);strength;pain  -           User Key  (r) = Recorded By, (t) = Taken By, (c) = Cosigned By    Initials Name Provider Type     Ernst Reid, PT Physical Therapist               Mobility     Row Name 04/05/23 1543          Bed Mobility    Bed Mobility supine-sit;sit-supine  -     Supine-Sit [place] (Bed Mobility) minimum assist (75% patient effort);1 person assist;verbal cues;nonverbal cues (demo/gesture)  [facility]     Sit-Supine [place] (Bed Mobility) minimum assist (75% patient effort);1 person  assist;verbal cues;nonverbal cues (demo/gesture)  -     Assistive Device (Bed Mobility) head of bed elevated;bed rails  -     Comment, (Bed Mobility) Pt requied physical assist to achieve upright sitting d/t complaints of pain at chest tube site.  -     Row Name 04/05/23 1543          Sit-Stand Transfer    Sit-Stand [place] (Transfers) minimum assist (75% patient effort);1 person assist;verbal cues  -     Assistive Device (Sit-Stand Transfers) walker, front-wheeled  -     Comment, (Sit-Stand Transfer) Pt performed STS x2 from EOB requiring cues for proper hand placement and to reach back for surface prior to return to sitting. Pt with poor R hamstring and poor R gastroc flexibility limiting pt's ability to terminally extend the R knee and contact the R heel to the floor. Pt remained standing for approximately 45 seconds prior to requiring seated rest break.  -[facility] Name 04/05/23 1543          Gait/Stairs (Locomotion)    [place] Level (Gait) not tested  -     Comment, (Gait/Stairs) Deferred d/t fatigue and pain at chest tube site.  -           User Key  (r) = Recorded By, (t) = Taken By, (c) = Cosigned By    Initials Name Provider Type     Ernst Reid, PT Physical Therapist               Obj/Interventions     Row Name 04/05/23 1546          Range of Motion Comprehensive    Comment, General Range of Motion Pt lacking approximately 15 degrees R knee extension, unable to achieve neutral R DF.  -[facility] Name 04/05/23 1546          Hip (Therapeutic Exercise)    Hip AROM (Therapeutic Exercise) bilateral;flexion;extension;supine;10 repetitions;external rotation;internal rotation  -     Hip Isometrics (Therapeutic Exercise) bilateral;gluteal sets;supine;10 repetitions  -[facility] Name 04/05/23 1546          Knee (Therapeutic Exercise)    Knee AROM (Therapeutic Exercise) bilateral;SLR (straight leg raise);heel slides;supine;LAQ (long arc quad);sitting;10 repetitions  -     Knee  Isometrics (Therapeutic Exercise) bilateral;quad sets;supine;10 repetitions  -[facility] Name 04/05/23 1546          Ankle (Therapeutic Exercise)    Ankle (Therapeutic Exercise) AROM (active range of motion)  -     Ankle AROM (Therapeutic Exercise) bilateral;dorsiflexion;plantarflexion;sitting;10 repetitions  -[facility] Name 04/05/23 1546          Balance    Balance Assessment sitting static balance;sitting dynamic balance;standing static balance;standing dynamic balance  -     Static Sitting Balance supervision  -     Dynamic Sitting Balance supervision  -     Position, Sitting Balance unsupported;sitting edge of bed  -     Static Standing Balance contact guard  -     Dynamic Standing Balance minimal assist  -     Position/Device Used, Standing Balance supported;walker, front-wheeled  -     Balance Interventions standing;minimal challenge;moderate challenge;weight shifting activity  -     Comment, Balance Pt initially with mild posterior lean upon standing requiring minimal cues to correct although occasionally with posterior sway and mild LOB. Pt performed standing lateral weight shifts as pre-gait activity although pt with difficulty accepting full RLE weight bearing d/t weakness and decreased flexibility.  -           User Key  (r) = Recorded By, (t) = Taken By, (c) = Cosigned By    Initials Name Provider Type     Ernst Reid, PT Physical Therapist               Goals/Plan     [place] [facility] Name 04/05/23 1810          Bed Mobility Goal 1 (PT)    Activity/Assistive Device (Bed Mobility Goal 1, PT) sit to supine/supine to sit  -     [place] Level/Cues Needed (Bed Mobility Goal 1, PT) modified independence  -     Time Frame (Bed Mobility Goal 1, PT) long term goal (LTG);10 days  -     Progress/Outcomes (Bed Mobility Goal 1, PT) continuing progress toward goal;goal ongoing  -[facility] Name 04/05/23 9573          Transfer Goal 1 (PT)    Activity/Assistive Device (Transfer Goal 1, PT)  sit-to-stand/stand-to-sit;wheelchair transfer  -     [place] Level/Cues Needed (Transfer Goal 1, PT) minimum assist (75% or more patient effort)  -     Time Frame (Transfer Goal 1, PT) long term goal (LTG);10 days  -     Progress/Outcome (Transfer Goal 1, PT) continuing progress toward goal;goal ongoing;goal partially met  -     Row Name 04/05/23 2070          Gait Training Goal 1 (PT)    Activity/Assistive Device (Gait Training Goal 1, PT) gait (walking locomotion);assistive device use;walker, rolling  -     [place] Level (Gait Training Goal 1, PT) minimum assist (75% or more patient effort)  -     Distance (Gait Training Goal 1, PT) 5  -     Time Frame (Gait Training Goal 1, PT) long term goal (LTG);10 days  -     Progress/Outcome (Gait Training Goal 1, PT) new goal  -[facility] Name 04/05/23 4253          Therapy Assessment/Plan (PT)    Planned Therapy Interventions (PT) balance training;bed mobility training;gait training;home exercise program;neuromuscular re-education;patient/family education;postural re-education;ROM (range of motion);strengthening;transfer training;wheelchair management/propulsion training  -           User Key  (r) = Recorded By, (t) = Taken By, (c) = Cosigned By    Initials Name Provider Type     Ernst Reid, PT Physical Therapist               Clinical Impression     [facility] Name 04/05/23 2665          Pain    Pretreatment Pain Rating 3/10  -     Posttreatment Pain Rating 5/10  -     Pain Location - Side/Orientation Right  -     Pain Location incisional  -     Pain Location - calf  -     Pre/Posttreatment Pain Comment At chest tube site.  -     Pain Intervention(s) Repositioned;Ambulation/increased activity  -     Row Name 04/05/23 6336          Plan of Care Review    Plan of Care Reviewed With patient  -     Progress no change  -     Outcome Evaluation PT re-cert complete. Pt continuing to be below his baseline level of function with  generalized weakness (R>L), poor functional activity tolerance, balance impairments, and RLE ROM deficits warranting further skilled acute PT services. Pt performed STS x2 from EOB with RW and MinAx1. PT reviewed and updated PT goals to reflect pt's current level of function. PT continuing to recommend SNF at [place].  -     Row Name 04/05/23 1556          Therapy Assessment/Plan (PT)    Rehab Potential (PT) good, to achieve stated therapy goals  -     Criteria for Skilled Interventions Met (PT) yes;meets criteria;skilled treatment is necessary  [facility]     Therapy Frequency (PT) daily  -     Row Name 04/05/23 8700          Vital Signs    Pre Systolic BP Rehab 130  -     Pre Treatment Diastolic BP 81  -     Pre SpO2 (%) 96  -     O2 Delivery Pre Treatment room air  -     O2 Delivery Intra Treatment room air  -     O2 Delivery Post Treatment room air  -     Pre Patient Position Supine  -     Intra Patient Position Standing  -     Post Patient Position Supine  -[facility] Name 04/05/23 5076          Positioning and Restraints    Pre-Treatment Position in bed  -     Post Treatment Position bed  -     In Bed fowlers;call light within reach;encouraged to call for assist;exit alarm on;LUE elevated;R heel elevated  -           User Key  (r) = Recorded By, (t) = Taken By, (c) = Cosigned By    Initials Name Provider Type     Ernst Reid, PT Physical Therapist               Outcome Measures     Row Name 04/05/23 0614          How much help from another person do you currently need...    Turning from your back to your side while in flat bed without using bedrails? 3  -LH     Moving from lying on back to sitting on the side of a flat bed without bedrails? 3  -LH     Moving to and from a bed to a chair (including a wheelchair)? 2  -LH     Standing up from a chair using your arms (e.g., wheelchair, bedside chair)? 3  -LH     Climbing 3-5 steps with a railing? 1  -LH     To walk in hospital room? 1  -LH      AM-PAC 6 Clicks Score (PT) 13  -     Highest level of mobility 4 --> Transferred to chair/commode  -     Row Name 04/05/23 1554          Functional Assessment    Outcome Measure Options AM-PAC 6 Clicks Basic Mobility (PT)  -           User Key  (r) = Recorded By, (t) = Taken By, (c) = Cosigned By    Initials Name Provider Type     Ernst Reid, PT Physical Therapist                             Physical Therapy Education     Title: PT OT SLP Therapies (In Progress)     Topic: Physical Therapy (Done)     Point: Mobility training (Done)     Learning Progress Summary           Patient Acceptance, E, VU by  at 4/5/2023 1554    Acceptance, E, VU,NR by NS at 4/3/2023 0926    Acceptance, E, VU,NR by  at 3/31/2023 1604    Acceptance, E, VU,NR by  at 3/28/2023 1323    Acceptance, E,TB, VU,NR by  at 3/26/2023 1536   Family Acceptance, E,TB, VU,NR by  at 3/26/2023 1536                   Point: Home exercise program (Done)     Learning Progress Summary           Patient Acceptance, E, VU by  at 4/5/2023 1554    Acceptance, E, VU,NR by NS at 4/3/2023 0926    Acceptance, E, VU,NR by  at 3/31/2023 1604    Acceptance, E, VU,NR by  at 3/28/2023 1323    Acceptance, E,TB, VU,NR by  at 3/26/2023 1536   Family Acceptance, E,TB, VU,NR by  at 3/26/2023 1536                   Point: Body mechanics (Done)     Learning Progress Summary           Patient Acceptance, E, VU by  at 4/5/2023 1554    Acceptance, E, VU,NR by NS at 4/3/2023 0926    Acceptance, E,TB, VU,NR by  at 3/26/2023 1536   Family Acceptance, E,TB, VU,NR by  at 3/26/2023 1536                   Point: Precautions (Done)     Learning Progress Summary           Patient Acceptance, E, VU by  at 4/5/2023 1554    Acceptance, E, VU,NR by NS at 4/3/2023 0926    Acceptance, E, VU,NR by  at 3/31/2023 1604    Acceptance, E, VU,NR by ML at 3/28/2023 1323    Acceptance, E,TB, VU,NR by KL at 3/26/2023 1536   Family Acceptance, E,TB, VU,NR by KL at  3/26/2023 1536                               User Key     Initials Effective Dates Name Provider Type Discipline    KL 11/02/22 -  Gabino Tirado, PT Physical Therapist PT    NS 06/16/21 -  Juliet Kuhn, PT Physical Therapist PT     04/22/21 -  Sharon Heller Physical Therapist PT     09/21/21 -  Ernst Reid, PT Physical Therapist PT              PT Recommendation and Plan  Planned Therapy Interventions (PT): balance training, bed mobility training, gait training, home exercise program, neuromuscular re-education, patient/family education, postural re-education, ROM (range of motion), strengthening, transfer training, wheelchair management/propulsion training  Plan of Care Reviewed With: patient  Progress: no change  Outcome Evaluation: PT re-cert complete. Pt continuing to be below his baseline level of function with generalized weakness (R>L), poor functional activity tolerance, balance impairments, and RLE ROM deficits warranting further skilled acute PT services. Pt performed STS x2 from EOB with RW and MinAx1. PT reviewed and updated PT goals to reflect pt's current level of function. PT continuing to recommend SNF at [place].     Time Calculation:    PT Charges     Row Name 04/05/23 1555             Time Calculation    Start Time 1453  -      PT Received On 04/05/23  -      PT Goal Re-Cert Due Date 04/15/23  -         Timed Charges    38205 - PT Therapeutic Exercise Minutes 12  -      93258 - PT Therapeutic Activity Minutes 27  -         Total Minutes    Timed Charges Total Minutes 39  -       Total Minutes 39  -            User Key  (r) = Recorded By, (t) = Taken By, (c) = Cosigned By    Initials Name Provider Type     Ernst Reid, PT Physical Therapist              Therapy Charges for Today     Code Description Service Date Service Provider Modifiers Qty    68296608847 HC PT THER PROC EA 15 MIN 4/5/2023 Ernst Reid, PT GP 1    27425757943 HC PT THERAPEUTIC ACT EA 15 MIN 4/5/2023  Ernst Reid, PT GP 2          PT G-Codes  Outcome Measure Options: AM-PAC 6 Clicks Basic Mobility (PT)  AM-PAC 6 Clicks Score (PT): 13  AM-PAC 6 Clicks Score (OT): 15  PT Discharge Summary  Anticipated Discharge Disposition (PT): skilled nursing facility    Ernst Reid, PT  4/5/2023

## 2023-04-05 NOTE — PLAN OF CARE
Goal Outcome Evaluation:            Pt is A&O. Chest tube draining small amount of bloody drainage. VSS. BP became elevated in AM. Contacted Aelx Esposito, 1X doge of hydralazine administered. Will continue to monitor.

## 2023-04-05 NOTE — PROGRESS NOTES
HEPARIN INFUSION  Timmy Guajardo is a  72 y.o. male receiving heparin infusion.     Therapy for (VTE/Cardiac): VTE  Patient Weight: 56.7 kg  Initial Bolus (Y/N): Yes  Any Bolus (Y/N): Yes        VTE (PE/DVT)   Initial Bolus: 80 units/kg (Max 10,000 units)  Initial rate: 18 units/kg/hr (Max 1,500 units/hr)    Anti Xa Rebolus Infusion Hold time Change infusion Dose (Units/kg/hr) Next Anti Xa Level Due   < 0.11 50 Units/kg  (4000 Units Max) None Increase by  4 Units/kg/hr 6 hours   0.11 - 0.19 25 Units/kg  (2000 Units Max) None Increase by  3 Units/kg/hr 6 hours   0.2 - 0.29 0 None Increase by  2 Units/kg/hr 6 hours   0.3 - 0.7 0 None No Change 6 hours (after 2 consecutive levels in range check qAM)   0.71 - 0.8 0 None Decrease by  1 Units/kg/hr 6 hours   0.81 - 0.9 0 None Decrease by  2 Units/kg/hr 6 hours   0.91 - 1 0 60 Minutes Decrease by  3 Units/kg/hr 6 hours   >1 0 Hold  After Anti Xa less than 0.7 decrease previous rate by  4 Units/kg/hr  Every 2 hours until Anti Xa is less than 0.7 then when infusion restarts in 6 hours     Results from last 7 days   Lab Units 04/05/23  0208 04/03/23  0444 04/01/23  0346   HEMOGLOBIN g/dL 11.4* 11.7* 11.4*   HEMATOCRIT % 36.8* 37.9 36.0*   PLATELETS 10*3/mm3 159 137* 158       Date   Time   Anti-Xa Current Rate (Unit/kg/hr) Bolus   (Units) Rate Change   (Unit/kg/hr) New Rate (Unit/kg/hr) Next anti-Xa Comments  Pump Check Daily   3/25 0025 pending 0 4540 +18 18 0700 New start   3/25 0722 >1.1 18 -- -18 0 0930 [place] [address]   3/25 0954 >1.1 0 -- -- 0 1300 [place] [address]   3/25  aPTT 0        3/25 0954 63 0  +16 16 2100 [place] [address]   3/25 2141 71.4 16 -- -- 16 0400 [place] 3184 -acb   3/26 0436 77.1 16 -- -- 16 1200 [place] 3184 -acb   3/26 1136 45.5  (heparin was held for 2.5hrs prior to draw) 16 --  16 2000 [place] 3250   3/26 2035 57.5 16 -- +1 17 0600 [place]ia 3249 -acb   3/27 0708 88.1 17 -- -- 17 1300 [address] 3250   3/27 1630 -- off since 0730 for CT plcmt -- resume at prev rate 17 2300  Heparin resumed s/p R CT placement   3/27 2254 42.4 17 2000 +2 19 0800 [place] 3252 -acb   3/28 0842 132.6 19 -- hold -- 1200 Dw RN   3/28 1357 46.3 -- -- +15 15 2100 DW RN   3/28 2037 0.15 15 1400 +3 18 0400 D/w WADE Zuniga   3/29 0444 0.35 18 -- -- 18 1200 [address] 3252 -acb   3/29 1137 0.27 18 -- +2 20 2000 [address] 3250   3/29 2001 0.50 20 -- -- 20 off at 0000 D/w WADE Arauz   3/30 1800 -- off since 0000 for planned colonoscopy no bolus until after colon study complete resume at prev rate 20 0000 D/w RN and Dr. Cooper - pt did not have scope today. Restart heparin in meantime w/o bolus and turn off at 0600 for planned scope in AM.     3/31 0115  20 --- -- Held 0600 -- D/w RN   3/21 1030 -- 0  restart+20 +20 1600    3/21 1817 0.36 20 -- -- 20 0100 D/w RN   4/1 0100 0.38 20 -- -- 20 1200 D/w RN   4/1 1129 0.49 20 -- -- 20 0600 D/w WADE Herrera, pump verified   4/2 0600 0.43 20 -- -- 20 0600 4/3 D/w RN  Pump checked ES       Date   Time   Anti-Xa Current Rate (Unit/kg/hr) Bolus   (Units) Rate Change   (Unit/kg/hr) New Rate (Unit/kg/hr) Next   Anti-Xa Comments  Pump Check Daily   4/3 0541 0.39 20 -- -- 20 0600 [place]isha 3239   4/4 0630 0.19 20 -- +3 23 1200 D/w RN  Pump checked - ES   4/4 1410 0.31 23 -- -- 23 2100 D/w WADE Blood    4/4 2000 -- OFF -- -- +23 0200 Off for an unknown amount of time prior to CT insertion-restart at 2000 per Dr. Archuleta; D/w WADE Bloom   4/5 0300 0.28 23 -- +1 24 0900 D/w RN   4/5 0935 0.33 24 -- -- 24 1500 D/W RN                                                                                                                                                                          She Stevens, PharmD, BCPS  4/5/2023  11:06 EDT

## 2023-04-05 NOTE — PLAN OF CARE
Goal Outcome Evaluation:  Plan of Care Reviewed With: patient        Progress: no change  Outcome Evaluation: PT re-cert complete. Pt continuing to be below his baseline level of function with generalized weakness (R>L), poor functional activity tolerance, balance impairments, and RLE ROM deficits warranting further skilled acute PT services. Pt performed STS x2 from EOB with RW and MinAx1. PT reviewed and updated PT goals to reflect pt's current level of function. PT continuing to recommend SNF at [place].

## 2023-04-06 ENCOUNTER — APPOINTMENT (OUTPATIENT)
Dept: GENERAL RADIOLOGY | Facility: HOSPITAL | Age: 73
DRG: 163 | End: 2023-04-06
Payer: MEDICARE

## 2023-04-06 ENCOUNTER — APPOINTMENT (OUTPATIENT)
Dept: CT IMAGING | Facility: HOSPITAL | Age: 73
DRG: 163 | End: 2023-04-06
Payer: MEDICARE

## 2023-04-06 LAB
ABO GROUP BLD: NORMAL
APTT PPP: 110.8 SECONDS (ref 60–90)
BASOPHILS # BLD AUTO: 0.04 10*3/MM3 (ref 0–0.2)
BASOPHILS # BLD AUTO: 0.05 10*3/MM3 (ref 0–0.2)
BASOPHILS # BLD AUTO: 0.06 10*3/MM3 (ref 0–0.2)
BASOPHILS NFR BLD AUTO: 0.4 % (ref 0–1.5)
BASOPHILS NFR BLD AUTO: 0.5 % (ref 0–1.5)
BASOPHILS NFR BLD AUTO: 0.6 % (ref 0–1.5)
BLD GP AB SCN SERPL QL: NEGATIVE
DEPRECATED RDW RBC AUTO: 56.1 FL (ref 37–54)
DEPRECATED RDW RBC AUTO: 56.6 FL (ref 37–54)
DEPRECATED RDW RBC AUTO: 59.1 FL (ref 37–54)
EOSINOPHIL # BLD AUTO: 0.12 10*3/MM3 (ref 0–0.4)
EOSINOPHIL # BLD AUTO: 0.18 10*3/MM3 (ref 0–0.4)
EOSINOPHIL # BLD AUTO: 0.19 10*3/MM3 (ref 0–0.4)
EOSINOPHIL NFR BLD AUTO: 1.1 % (ref 0.3–6.2)
EOSINOPHIL NFR BLD AUTO: 1.7 % (ref 0.3–6.2)
EOSINOPHIL NFR BLD AUTO: 1.8 % (ref 0.3–6.2)
ERYTHROCYTE [DISTWIDTH] IN BLOOD BY AUTOMATED COUNT: 15.9 % (ref 12.3–15.4)
ERYTHROCYTE [DISTWIDTH] IN BLOOD BY AUTOMATED COUNT: 16 % (ref 12.3–15.4)
ERYTHROCYTE [DISTWIDTH] IN BLOOD BY AUTOMATED COUNT: 16.4 % (ref 12.3–15.4)
GLUCOSE BLDC GLUCOMTR-MCNC: 128 MG/DL (ref 70–130)
GLUCOSE BLDC GLUCOMTR-MCNC: 138 MG/DL (ref 70–130)
GLUCOSE BLDC GLUCOMTR-MCNC: 230 MG/DL (ref 70–130)
GLUCOSE BLDC GLUCOMTR-MCNC: 231 MG/DL (ref 70–130)
HCT VFR BLD AUTO: 31.5 % (ref 37.5–51)
HCT VFR BLD AUTO: 32.5 % (ref 37.5–51)
HCT VFR BLD AUTO: 32.8 % (ref 37.5–51)
HCT VFR BLD AUTO: 33 % (ref 37.5–51)
HCT VFR BLD AUTO: 33.2 % (ref 37.5–51)
HGB BLD-MCNC: 10.1 G/DL (ref 13–17.7)
HGB BLD-MCNC: 10.3 G/DL (ref 13–17.7)
HGB BLD-MCNC: 10.3 G/DL (ref 13–17.7)
HGB BLD-MCNC: 10.4 G/DL (ref 13–17.7)
HGB BLD-MCNC: 9.6 G/DL (ref 13–17.7)
IMM GRANULOCYTES # BLD AUTO: 0.07 10*3/MM3 (ref 0–0.05)
IMM GRANULOCYTES # BLD AUTO: 0.08 10*3/MM3 (ref 0–0.05)
IMM GRANULOCYTES # BLD AUTO: 0.11 10*3/MM3 (ref 0–0.05)
IMM GRANULOCYTES NFR BLD AUTO: 0.7 % (ref 0–0.5)
IMM GRANULOCYTES NFR BLD AUTO: 0.8 % (ref 0–0.5)
IMM GRANULOCYTES NFR BLD AUTO: 1 % (ref 0–0.5)
LYMPHOCYTES # BLD AUTO: 1.32 10*3/MM3 (ref 0.7–3.1)
LYMPHOCYTES # BLD AUTO: 1.32 10*3/MM3 (ref 0.7–3.1)
LYMPHOCYTES # BLD AUTO: 1.36 10*3/MM3 (ref 0.7–3.1)
LYMPHOCYTES NFR BLD AUTO: 12.5 % (ref 19.6–45.3)
LYMPHOCYTES NFR BLD AUTO: 12.7 % (ref 19.6–45.3)
LYMPHOCYTES NFR BLD AUTO: 13 % (ref 19.6–45.3)
MCH RBC QN AUTO: 30.2 PG (ref 26.6–33)
MCH RBC QN AUTO: 30.4 PG (ref 26.6–33)
MCH RBC QN AUTO: 30.9 PG (ref 26.6–33)
MCHC RBC AUTO-ENTMCNC: 31 G/DL (ref 31.5–35.7)
MCHC RBC AUTO-ENTMCNC: 31.1 G/DL (ref 31.5–35.7)
MCHC RBC AUTO-ENTMCNC: 31.7 G/DL (ref 31.5–35.7)
MCV RBC AUTO: 95.3 FL (ref 79–97)
MCV RBC AUTO: 97.9 FL (ref 79–97)
MCV RBC AUTO: 99.7 FL (ref 79–97)
MONOCYTES # BLD AUTO: 0.76 10*3/MM3 (ref 0.1–0.9)
MONOCYTES # BLD AUTO: 0.85 10*3/MM3 (ref 0.1–0.9)
MONOCYTES # BLD AUTO: 0.85 10*3/MM3 (ref 0.1–0.9)
MONOCYTES NFR BLD AUTO: 7.2 % (ref 5–12)
MONOCYTES NFR BLD AUTO: 8.1 % (ref 5–12)
MONOCYTES NFR BLD AUTO: 8.2 % (ref 5–12)
NEUTROPHILS NFR BLD AUTO: 7.88 10*3/MM3 (ref 1.7–7)
NEUTROPHILS NFR BLD AUTO: 7.97 10*3/MM3 (ref 1.7–7)
NEUTROPHILS NFR BLD AUTO: 76 % (ref 42.7–76)
NEUTROPHILS NFR BLD AUTO: 76.1 % (ref 42.7–76)
NEUTROPHILS NFR BLD AUTO: 77.6 % (ref 42.7–76)
NEUTROPHILS NFR BLD AUTO: 8.2 10*3/MM3 (ref 1.7–7)
NRBC BLD AUTO-RTO: 0 /100 WBC (ref 0–0.2)
PLATELET # BLD AUTO: 156 10*3/MM3 (ref 140–450)
PLATELET # BLD AUTO: 170 10*3/MM3 (ref 140–450)
PLATELET # BLD AUTO: 185 10*3/MM3 (ref 140–450)
PMV BLD AUTO: 10 FL (ref 6–12)
PMV BLD AUTO: 11.2 FL (ref 6–12)
PMV BLD AUTO: 12.2 FL (ref 6–12)
RBC # BLD AUTO: 3.32 10*6/MM3 (ref 4.14–5.8)
RBC # BLD AUTO: 3.33 10*6/MM3 (ref 4.14–5.8)
RBC # BLD AUTO: 3.44 10*6/MM3 (ref 4.14–5.8)
RH BLD: POSITIVE
T&S EXPIRATION DATE: NORMAL
UFH PPP CHRO-ACNC: 0.1 IU/ML (ref 0.3–0.7)
UFH PPP CHRO-ACNC: 0.46 IU/ML (ref 0.3–0.7)
WBC NRBC COR # BLD: 10.37 10*3/MM3 (ref 3.4–10.8)
WBC NRBC COR # BLD: 10.47 10*3/MM3 (ref 3.4–10.8)
WBC NRBC COR # BLD: 10.57 10*3/MM3 (ref 3.4–10.8)

## 2023-04-06 PROCEDURE — 71250 CT THORAX DX C-: CPT

## 2023-04-06 PROCEDURE — 85025 COMPLETE CBC W/AUTO DIFF WBC: CPT | Performed by: STUDENT IN AN ORGANIZED HEALTH CARE EDUCATION/TRAINING PROGRAM

## 2023-04-06 PROCEDURE — 71045 X-RAY EXAM CHEST 1 VIEW: CPT

## 2023-04-06 PROCEDURE — 86901 BLOOD TYPING SEROLOGIC RH(D): CPT | Performed by: STUDENT IN AN ORGANIZED HEALTH CARE EDUCATION/TRAINING PROGRAM

## 2023-04-06 PROCEDURE — 63710000001 INSULIN LISPRO (HUMAN) PER 5 UNITS: Performed by: INTERNAL MEDICINE

## 2023-04-06 PROCEDURE — 99231 SBSQ HOSP IP/OBS SF/LOW 25: CPT | Performed by: STUDENT IN AN ORGANIZED HEALTH CARE EDUCATION/TRAINING PROGRAM

## 2023-04-06 PROCEDURE — 85014 HEMATOCRIT: CPT | Performed by: PHYSICIAN ASSISTANT

## 2023-04-06 PROCEDURE — 85025 COMPLETE CBC W/AUTO DIFF WBC: CPT | Performed by: INTERNAL MEDICINE

## 2023-04-06 PROCEDURE — 25010000002 HEPARIN (PORCINE) 25000-0.45 UT/250ML-% SOLUTION

## 2023-04-06 PROCEDURE — 85018 HEMOGLOBIN: CPT | Performed by: PHYSICIAN ASSISTANT

## 2023-04-06 PROCEDURE — 86900 BLOOD TYPING SEROLOGIC ABO: CPT | Performed by: STUDENT IN AN ORGANIZED HEALTH CARE EDUCATION/TRAINING PROGRAM

## 2023-04-06 PROCEDURE — 85730 THROMBOPLASTIN TIME PARTIAL: CPT | Performed by: INTERNAL MEDICINE

## 2023-04-06 PROCEDURE — 85520 HEPARIN ASSAY: CPT

## 2023-04-06 PROCEDURE — 99233 SBSQ HOSP IP/OBS HIGH 50: CPT | Performed by: NURSE PRACTITIONER

## 2023-04-06 PROCEDURE — 82962 GLUCOSE BLOOD TEST: CPT

## 2023-04-06 PROCEDURE — 86850 RBC ANTIBODY SCREEN: CPT | Performed by: STUDENT IN AN ORGANIZED HEALTH CARE EDUCATION/TRAINING PROGRAM

## 2023-04-06 RX ORDER — LIDOCAINE HYDROCHLORIDE AND EPINEPHRINE 10; 10 MG/ML; UG/ML
5 INJECTION, SOLUTION INFILTRATION; PERINEURAL ONCE
Status: COMPLETED | OUTPATIENT
Start: 2023-04-06 | End: 2023-04-06

## 2023-04-06 RX ADMIN — MIRTAZAPINE 15 MG: 15 TABLET, FILM COATED ORAL at 21:59

## 2023-04-06 RX ADMIN — Medication 10 ML: at 21:59

## 2023-04-06 RX ADMIN — ACETAMINOPHEN 325MG 650 MG: 325 TABLET ORAL at 21:57

## 2023-04-06 RX ADMIN — DOCUSATE SODIUM 100 MG: 100 CAPSULE, LIQUID FILLED ORAL at 10:14

## 2023-04-06 RX ADMIN — MEGESTROL ACETATE 400 MG: 40 SUSPENSION ORAL at 10:13

## 2023-04-06 RX ADMIN — ACETAMINOPHEN 325MG 650 MG: 325 TABLET ORAL at 06:23

## 2023-04-06 RX ADMIN — NYSTATIN 500000 UNITS: 100000 SUSPENSION ORAL at 10:13

## 2023-04-06 RX ADMIN — PANTOPRAZOLE SODIUM 40 MG: 40 TABLET, DELAYED RELEASE ORAL at 21:58

## 2023-04-06 RX ADMIN — LIDOCAINE HYDROCHLORIDE,EPINEPHRINE BITARTRATE 5 ML: 10; .01 INJECTION, SOLUTION INFILTRATION; PERINEURAL at 12:00

## 2023-04-06 RX ADMIN — OXYCODONE HYDROCHLORIDE AND ACETAMINOPHEN 1000 MG: 500 TABLET ORAL at 10:14

## 2023-04-06 RX ADMIN — Medication 10 ML: at 10:14

## 2023-04-06 RX ADMIN — ATORVASTATIN CALCIUM 80 MG: 40 TABLET, FILM COATED ORAL at 21:58

## 2023-04-06 RX ADMIN — HEPARIN SODIUM 24 UNITS/KG/HR: 10000 INJECTION, SOLUTION INTRAVENOUS at 10:26

## 2023-04-06 RX ADMIN — TERAZOSIN HYDROCHLORIDE 1 MG: 1 CAPSULE ORAL at 21:52

## 2023-04-06 RX ADMIN — SENNOSIDES AND DOCUSATE SODIUM 2 TABLET: 8.6; 5 TABLET ORAL at 10:14

## 2023-04-06 RX ADMIN — INSULIN LISPRO 3 UNITS: 100 INJECTION, SOLUTION INTRAVENOUS; SUBCUTANEOUS at 11:45

## 2023-04-06 RX ADMIN — POLYETHYLENE GLYCOL 3350 17 G: 17 POWDER, FOR SOLUTION ORAL at 10:13

## 2023-04-06 RX ADMIN — CARVEDILOL 50 MG: 12.5 TABLET, FILM COATED ORAL at 10:14

## 2023-04-06 RX ADMIN — FAMOTIDINE 20 MG: 20 TABLET ORAL at 10:14

## 2023-04-06 RX ADMIN — ASPIRIN 81 MG CHEWABLE TABLET 81 MG: 81 TABLET CHEWABLE at 10:14

## 2023-04-06 RX ADMIN — CYCLOBENZAPRINE 5 MG: 10 TABLET, FILM COATED ORAL at 22:03

## 2023-04-06 RX ADMIN — DOCUSATE SODIUM 100 MG: 100 CAPSULE, LIQUID FILLED ORAL at 21:56

## 2023-04-06 RX ADMIN — ACETAMINOPHEN 325MG 650 MG: 325 TABLET ORAL at 15:11

## 2023-04-06 RX ADMIN — LISINOPRIL 5 MG: 5 TABLET ORAL at 10:14

## 2023-04-06 RX ADMIN — Medication 5 MG: at 22:03

## 2023-04-06 NOTE — PROGRESS NOTES
[facility] Medicine Services  PROGRESS NOTE    Patient Name: Timmy Guajardo  : 1950  MRN: [id number]    Date of Admission: 3/24/2023  Primary Care Physician: Arsen Seals APRN    Subjective   Subjective     CC:  Follow-up for weakness    HPI:  Patient was leaning on his left side as nurses were trying to clean him up after he had a large amount of blood coming out around his chest tube site.  His Pleurx had a large amount of blood was draining and then clotted off.  Patient denied any increased pain, dizziness, shortness of air, cough and stated he felt fine. CT surgery was called and asked to reevaluate the patient.    ROS:  Gen- No fevers, chills  CV- No chest pain, palpitations  Chest- CT clotted off and large amount of blood in bed  Resp- No cough, dyspnea  GI- No N/V/D, abd pain  All other systems have been reviewed and the pertinent positives and negatives are listed above in the HPI or ROS          Objective   Objective     Vital Signs:   Temp:  [97.6 °F (36.4 °C)-98.6 °F (37 °C)] 98.6 °F (37 °C)  Heart Rate:  [84] 84  Resp:  [18] 18  BP: (146-167)/(81-90) 146/81     Physical Exam:  Constitutional: Alert, older frail male lying on his side in NAD  Eyes: EOMI, sclerae anicteric, no conjunctival injection  Head: NCAT  ENT: [place], moist mucous membranes   Respiratory: Nonlabored, CTAB bilaterally but decreased on right, CT draining michelet blood that clotted off tube, 96% RA  Cardiovascular: RRR, HR 86,no M/R/G, +DP pulses bilaterally  Gastrointestinal: Soft, NT, ND +BS  Musculoskeletal: PARISH; no LE edema bilaterally  Neurologic: Oriented x4, strength symmetric in all extremities, follows all commands, speech clear  Skin: No rashes on exposed skin  Psychiatric: Pleasant and cooperative; normal affect    Results Reviewed:  LAB RESULTS:      Lab 23  1119 23  0800 23  1540 23  0935 23  0208 23  1410 23  0541 23  0444  04/01/23  1129 04/01/23 0346 03/31/23  1817 03/31/23 0328   WBC 10.37 10.47  --   --  9.29  --   --  9.30  --  9.38  --  10.37   HEMOGLOBIN 10.1* 10.4*  --   --  11.4*  --   --  11.7*  --  11.4*  --  12.0*   HEMATOCRIT 32.5* 32.8*  --   --  36.8*  --   --  37.9  --  36.0*  --  38.5   PLATELETS 185 156  --   --  159  --   --  137*  --  158  --  157   NEUTROS ABS 7.88* 7.97*  --   --   --   --   --  6.35  --  6.77  --  7.49*   IMMATURE GRANS (ABS) 0.08* 0.07*  --   --   --   --   --  0.05  --  0.06*  --  0.12*   LYMPHS ABS 1.32 1.36  --   --   --   --   --  1.71  --  1.51  --  1.71   MONOS ABS 0.85 0.85  --   --   --   --   --  0.75  --  0.81  --  0.84   EOS ABS 0.19 0.18  --   --   --   --   --  0.39  --  0.19  --  0.14   MCV 97.9* 95.3  --   --  97.6*  --   --  97.2*  --  93.8  --  96.5   APTT  --  110.8*  --   --   --   --   --   --   --   --   --   --    HEPARIN ANTI-XA  --  0.46 0.35 0.33 0.28* 0.31   < >  --    < >  --    < >  --     < > = values in this interval not displayed.         Lab 04/05/23  0208 04/03/23 0444 04/01/23 1129 04/01/23 0346 03/31/23 0328   SODIUM 140 143  --  145 145   POTASSIUM 4.0 3.9 3.6 3.4* 3.9   CHLORIDE 107 109*  --  110* 111*   CO2 23.0 21.0*  --  22.0 19.0*   ANION GAP 10.0 13.0  --  13.0 15.0   BUN 15 10  --  10 9   CREATININE 0.64* 0.60*  --  0.73* 0.58*   EGFR 101.8 103.8  --  97.9 104.9   GLUCOSE 96 81  --  105* 71   CALCIUM 8.3* 8.5*  --  8.3* 8.4*   MAGNESIUM  --   --   --   --  2.2   PHOSPHORUS  --   --   --   --  4.0         Lab 03/31/23  0328   TOTAL PROTEIN 5.7*   ALBUMIN 2.7*   GLOBULIN 3.0   ALT (SGPT) 8   AST (SGOT) 17   BILIRUBIN 0.3   ALK PHOS 76                     Brief Urine Lab Results  (Last result in the past 365 days)      Color   Clarity   Blood   Leuk Est   Nitrite   Protein   CREAT   Urine HCG        03/24/23 2329 Yellow   Turbid   Small (1+)   Moderate (2+)   Negative   30 mg/dL (1+)                 Microbiology Results Abnormal     Procedure  Component Value - Date/Time    Body Fluid Culture - Body Fluid, Pleural Cavity [[id number]] Collected: 03/27/23 1532    Lab Status: Final result Specimen: Body Fluid from Pleural Cavity Updated: 03/31/23 0952     Body Fluid Culture No growth at 3 days     Gram Stain Rare (1+) WBCs per low power field      No organisms seen    MRSA Screen, PCR (Inpatient) - Swab, Nares [[id number]]  (Normal) Collected: 03/27/23 0857    Lab Status: Final result Specimen: Swab from Nares Updated: 03/27/23 1107     MRSA PCR Negative    Narrative:      The negative predictive value of this diagnostic test is high and should only be used to consider de-escalating anti-MRSA therapy. A positive result may indicate colonization with MRSA and must be correlated clinically.  MRSA Negative    COVID PRE-OP / PRE-PROCEDURE SCREENING ORDER (NO ISOLATION) - Swab, Nasopharynx [[id number]]  (Normal) Collected: 03/24/23 2256    Lab Status: Final result Specimen: Swab from Nasopharynx Updated: 03/24/23 2356    Narrative:      The following orders were created for panel order COVID PRE-OP / PRE-PROCEDURE SCREENING ORDER (NO ISOLATION) - Swab, Nasopharynx.  Procedure                               Abnormality         Status                     ---------                               -----------         ------                     Respiratory Panel PCR w/...[[id number]]  Normal              Final result                 Please view results for these tests on the individual orders.    Respiratory Panel PCR w/COVID-19(SARS-CoV-2) SULEIMAN/TAWANNA/BLAISE/PAD/COR/MAD/ANTHONY In-House, NP Swab in UTM/VTM, 3-4 HR TAT - Swab, Nasopharynx [[id number]]  (Normal) Collected: 03/24/23 2256    Lab Status: Final result Specimen: Swab from Nasopharynx Updated: 03/24/23 2356     ADENOVIRUS, PCR Not Detected     Coronavirus 229E Not Detected     Coronavirus HKU1 Not Detected     Coronavirus NL63 Not Detected     Coronavirus OC43 Not Detected     COVID19 Not Detected     Human Metapneumovirus Not  Detected     Human Rhinovirus/Enterovirus Not Detected     Influenza A PCR Not Detected     Influenza B PCR Not Detected     Parainfluenza Virus 1 Not Detected     Parainfluenza Virus 2 Not Detected     Parainfluenza Virus 3 Not Detected     Parainfluenza Virus 4 Not Detected     RSV, PCR Not Detected     Bordetella pertussis pcr Not Detected     Bordetella parapertussis PCR Not Detected     Chlamydophila pneumoniae PCR Not Detected     Mycoplasma pneumo by PCR Not Detected    Narrative:      In the setting of a positive respiratory panel with a viral infection PLUS a negative procalcitonin without other underlying concern for bacterial infection, consider observing off antibiotics or discontinuation of antibiotics and continue supportive care. If the respiratory panel is positive for atypical bacterial infection (Bordetella pertussis, Chlamydophila pneumoniae, or Mycoplasma pneumoniae), consider antibiotic de-escalation to target atypical bacterial infection.          XR Chest 1 View    Result Date: 4/6/2023  XR CHEST 1 VW Date of Exam: 4/6/2023 11:09 AM EDT Indication: michelet blood leaking from CT. Comparison: Same day Findings: Right chest tube remains in place. No significant change in right pleural effusion and right basilar atelectasis. No pneumothorax. Left lung clear. Heart size and pulmonary vessels stable     Impression: Impression: Stable chest. Stable right pleural effusion and right basilar atelectasis with chest tube in place. No pneumothorax Electronically Signed: David Cardoso  4/6/2023 11:39 AM EDT  Workstation ID: [id number]    XR Chest 1 View    Result Date: 4/6/2023  XR CHEST 1 VW Date of Exam: 4/6/2023 5:35 AM EDT Indication: Pleural effusion. Comparison: 4/5/2023 Findings: Left-sided triple lead ICD is noted. Heart and vasculature are normal in size. Left lung remains well-expanded and clear. Right basilar atelectasis is a little increased. Right thoracotomy tube remains in place. There is no  evidence of pneumothorax, allowing for the apparent skinfold shadows superimposed over the lateral left chest.     Impression: Impression: Mildly increased right basilar atelectasis. No pneumothorax or other new chest disease is seen. Electronically Signed: Trey Allen  4/6/2023 7:25 AM EDT  Workstation ID: [id number]    XR Chest 1 View    Result Date: 4/5/2023  XR CHEST 1 VW Date of Exam: 4/5/2023 5:50 AM EDT Indication: Pleural effusion. Comparison: One day prior Findings: Right-sided pleural drain projects unchanged. Possibly loculated effusion at the right lung base is also stable. There is no distinct pneumothorax. The left lung remains clear. Unchanged heart and mediastinal contours, with left ICD noted.     Impression: Impression: Right-sided pleural drain projects unchanged. Possibly loculated effusion at the right lung base is also stable. There is no distinct pneumothorax. The left lung remains clear. Unchanged heart and mediastinal contours, with left ICD noted. Electronically Signed: Nicolas Siddiqi  4/5/2023 9:18 AM EDT  Workstation ID: [id number]    XR Chest 1 View    Result Date: 4/4/2023  XR CHEST 1 VW Date of Exam: 4/4/2023 7:22 PM EDT Indication: chest tube. Comparison: CT chest April 4, 2023 Findings: There is a right thoracotomy tube. The remains density in the right lower chest which could reflect residual loculated effusion and atelectasis. A pneumothorax is not definitely confirmed on this exam the left lung seems clear. A cardiac pacemaker device  is present.     Impression: Impression: 1.Right thoracotomy tube 2.Residual parenchymal pleural changes right lower chest. Electronically Signed: Fei Tolbert  4/4/2023 8:37 PM EDT  Workstation ID: [id number]      Results for orders placed during the hospital encounter of 10/16/22    Adult Transthoracic Echo Complete W/ Cont if Necessary Per Protocol    Interpretation Summary  •  Estimated left ventricular EF = 30%.  There is global hypokinesis.  The basal-mid  posterior wall appears akinetic.  •  Normal right ventricular cavity size, wall thickness, systolic function and septal motion noted. Electronic lead present in the ventricle  •  Septal wall motion is abnormal, consistent with right ventricular pacing  •  No hemodynamically significant valvular stenosis or regurgitation noted.      Current medications:  Scheduled Meds:acetaminophen, 650 mg, Oral, Q8H  vitamin C, 1,000 mg, Oral, Daily  aspirin, 81 mg, Oral, Daily  atorvastatin, 80 mg, Oral, Nightly  carvedilol, 50 mg, Oral, BID With Meals  docusate sodium, 100 mg, Oral, BID  famotidine, 20 mg, Oral, Daily  insulin lispro, 0-7 Units, Subcutaneous, TID With Meals  lidocaine 1% - EPINEPHrine 1:[id number], 5 mL, Infiltration, Once  lisinopril, 5 mg, Oral, Q24H  megestrol, 400 mg, Oral, Daily  mirtazapine, 15 mg, Oral, Nightly  pantoprazole, 40 mg, Oral, Nightly  polyethylene glycol, 17 g, Oral, Daily  senna-docusate sodium, 2 tablet, Oral, BID  sodium chloride, 10 mL, Intravenous, Q12H  terazosin, 1 mg, Oral, Nightly      Continuous Infusions:heparin, 24 Units/kg/hr, Last Rate: Stopped (04/06/23 1056)  lactated ringers, 9 mL/hr, Last Rate: 9 mL/hr (03/29/23 0838)  Pharmacy to Dose Heparin,       PRN Meds:.•  acetaminophen **OR** acetaminophen **OR** acetaminophen  •  senna-docusate sodium **AND** polyethylene glycol **AND** bisacodyl **AND** bisacodyl  •  Calcium Replacement - Follow Nurse / BPA Driven Protocol  •  cyclobenzaprine  •  dextrose  •  dextrose  •  glucagon (human recombinant)  •  Magnesium Standard Dose Replacement - Follow Nurse / BPA Driven Protocol  •  melatonin  •  ondansetron  •  Pharmacy to Dose Heparin  •  Phosphorus Replacement - Follow Nurse / BPA Driven Protocol  •  Potassium Replacement - Follow Nurse / BPA Driven Protocol  •  sodium chloride  •  sodium chloride    Assessment & Plan   Assessment & Plan     Active Hospital Problems    Diagnosis  POA   • **Bilateral pulmonary embolism [I26.99]  Unknown    • Pleural effusion [J90]  Yes   • Unintentional weight loss [R63.4]  Unknown   • Hypoalbuminemia [E88.09]  Unknown   • Severe malnutrition [E43]  Unknown   • History of CVA (cerebrovascular accident) [Z86.73]  Not Applicable   • Debility [R53.81]  Unknown   • Presence of cardiac pacemaker [Z95.0]  Yes   • HTN (hypertension) [I10]  Yes   • Leukocytosis [D72.829]  Yes      Resolved Hospital Problems   No resolved problems to display.        Brief Hospital Course to date:  Timmy Guajardo is a 72 y.o. 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    Bilateral pulmonary embolism  History of DVT  · Likely secondary to being on an inadequate dose of Eliquis of 2.5 mg twice daily and medication noncompliance  · Continue heparin drip while hospitalized since he might need further procedures  · Plan to transition to full dose Eliquis (loading and maintenance)     Moderate exudative right-sided loculated pleural effusion  Possible organized right pleural effusion  · Bedside point-of-care ultrasound revealed complex pleural effusion  · Cardiothoracic surgery consulted.  Recommended chest tube placement by IR.    · Underwent right-sided chest tube placement by IR on 3/27/2023.  Chest tube removed 3/30/2023   · Repeat CT scan chest after removal of chest tube showed persistent moderate right pleural effusion, likely organized   · Analysis of the fluid is consistent with exudate, ?  Parapneumonic  · Culture from pleural fluid is negative to date  · Cytology showed benign mesothelial  cells  · S/p 12 days zosyn  · Repeat chest tube placed 4/4/23  · CT started draining michelet blood and eventually clotted off tube  · CBC similar to previously drawn  · .8.  Heparin anti-Xa 0.46  · CT chest pending read  · AM labs    Lung nodule  - 1.4 cm  - needs repeat CT imaging in 6 months    Severe malnutrition  Hypoalbuminemia  Significant unintentional weight loss  Generalized debility  · Patient reports 52lbs weight loss since October 2022.  · No EGD or colonoscopy since 2008  · CT chest and CT abdomen with no convincing evidence of solid malignancy or lymphadenopathy.  Oncology service evaluated the patient recommended age-appropriate screening  · GI evaluated the patient for EGD and colonoscopy.  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 Continue Megace and mirtazapine 15 mg nightly for poor appetite  · Nutrition team following     UTI with Enterococcus faecalis  · POA  · Zosyn    CAD  Cardiomyopathy with EF 30 %  Essential HTN  Old CVA with right residual weakness  Dyslipidemia  · Continue aspirin  · Continue coreg, lisinopril  · Continue statin    Constipation     GERD   · Continue PPI      Type 2 diabetes w/A1c 6.3%   · 24H glucose   · Continue insulin sliding scale     Acute debility  · PT recommended acute rehab      Expected Discharge Location and Transportation: Acute rehab  Expected Discharge   Expected Discharge Date and Time     Expected Discharge Date Expected Discharge Time    Apr 8, 2023            DVT prophylaxis:  Medical and mechanical DVT prophylaxis orders are present.     AM-PAC 6 Clicks Score (PT): 15 (04/05/23 2000)    CODE STATUS:   Code Status and Medical Interventions:   Ordered at: 03/24/23 6591     Code Status (Patient has no pulse  and is not breathing):    CPR (Attempt to Resuscitate)     Medical Interventions (Patient has pulse or is breathing):    Full Support      More than 50% of time spent counseling on current illness and plan of care. Case discussed with: Charge nurse, RN, NA, Dr. Ponce  Total time spent face to face with the patient was 35 minutes.  Total time of the encounter was 60 minutes.      Ariane Mattson, YUE  04/06/23

## 2023-04-06 NOTE — PROGRESS NOTES
HEPARIN INFUSION  Timmy Guajardo is a  72 y.o. male receiving heparin infusion.     Therapy for (VTE/Cardiac): VTE  Patient Weight: 56.7 kg  Initial Bolus (Y/N): Yes  Any Bolus (Y/N): Yes        VTE (PE/DVT)   Initial Bolus: 80 units/kg (Max 10,000 units)  Initial rate: 18 units/kg/hr (Max 1,500 units/hr)    Anti Xa Rebolus Infusion Hold time Change infusion Dose (Units/kg/hr) Next Anti Xa Level Due   < 0.11 50 Units/kg  (4000 Units Max) None Increase by  4 Units/kg/hr 6 hours   0.11 - 0.19 25 Units/kg  (2000 Units Max) None Increase by  3 Units/kg/hr 6 hours   0.2 - 0.29 0 None Increase by  2 Units/kg/hr 6 hours   0.3 - 0.7 0 None No Change 6 hours (after 2 consecutive levels in range check qAM)   0.71 - 0.8 0 None Decrease by  1 Units/kg/hr 6 hours   0.81 - 0.9 0 None Decrease by  2 Units/kg/hr 6 hours   0.91 - 1 0 60 Minutes Decrease by  3 Units/kg/hr 6 hours   >1 0 Hold  After Anti Xa less than 0.7 decrease previous rate by  4 Units/kg/hr  Every 2 hours until Anti Xa is less than 0.7 then when infusion restarts in 6 hours     Results from last 7 days   Lab Units 04/05/23  0208 04/03/23  0444 04/01/23  0346   HEMOGLOBIN g/dL 11.4* 11.7* 11.4*   HEMATOCRIT % 36.8* 37.9 36.0*   PLATELETS 10*3/mm3 159 137* 158       Date   Time   Anti-Xa Current Rate (Unit/kg/hr) Bolus   (Units) Rate Change   (Unit/kg/hr) New Rate (Unit/kg/hr) Next anti-Xa Comments  Pump Check Daily   3/25 0025 pending 0 4540 +18 18 0700 New start   3/25 0722 >1.1 18 -- -18 0 0930 [place] [address]   3/25 0954 >1.1 0 -- -- 0 1300 [address]   3/25  aPTT 0        3/25 0954 63 0  +16 16 2100 [address] [address]   3/25 2141 71.4 16 -- -- 16 0400 [address] 3184 -acb   3/26 0436 77.1 16 -- -- 16 1200 [address] 3184 -acb   3/26 1136 45.5  (heparin was held for 2.5hrs prior to draw) 16 --  16 2000 Ros 3250   3/26 2035 57.5 16 -- +1 17 0600 Nadia 3249 -acb   3/27 0708 88.1 17 -- -- 17 1300 Marisa 3250   3/27 1630 -- off since 0730 for CT plcmt -- resume at prev rate 17 2300  Heparin resumed s/p R CT placement   3/27 2254 42.4 17 2000 +2 19 0800 Nadia 3252 -acb   3/28 0842 132.6 19 -- hold -- 1200 Dw RN   3/28 1357 46.3 -- -- +15 15 2100 DW RN   3/28 2037 0.15 15 1400 +3 18 0400 D/w WADE Zuniga   3/29 0444 0.35 18 -- -- 18 1200 Nadia 3252 -acb   3/29 1137 0.27 18 -- +2 20 2000 Marisa 3250   3/29 2001 0.50 20 -- -- 20 off at 0000 D/w WADE Arauz   3/30 1800 -- off since 0000 for planned colonoscopy no bolus until after colon study complete resume at prev rate 20 0000 D/w RN and Dr. Cooper - pt did not have scope today. Restart heparin in meantime w/o bolus and turn off at 0600 for planned scope in AM.     3/31 0115  20 --- -- Held 0600 -- D/w RN   3/21 1030 -- 0  restart+20 +20 1600    3/21 1817 0.36 20 -- -- 20 0100 D/w RN   4/1 0100 0.38 20 -- -- 20 1200 D/w RN   4/1 1129 0.49 20 -- -- 20 0600 D/w WADE Herrera, pump verified   4/2 0600 0.43 20 -- -- 20 0600 4/3 D/w RN  Pump checked ES       Date   Time   Anti-Xa Current Rate (Unit/kg/hr) Bolus   (Units) Rate Change   (Unit/kg/hr) New Rate (Unit/kg/hr) Next   Anti-Xa Comments  Pump Check Daily   4/3 0541 0.39 20 -- -- 20 0600 Nisha 3239   4/4 0630 0.19 20 -- +3 23 1200 D/w RN  Pump checked - ES   4/4 1410 0.31 23 -- -- 23 2100 D/w WADE Blood    4/4 2000 -- OFF -- -- +23 0200 Off for an unknown amount of time prior to CT insertion-restart at 2000 per Dr. Archuleta; D/w WADE Bloom   4/5 0300 0.28 23 -- +1 24 0900 D/w RN   4/5 0935 0.33 24 -- -- 24 1500 D/W RN   4/5 1540 0.35 24 -- -- 24 0600 D/w WADE Su   4/6 0800 0.46 24 -- -- -- 1400 D/W michelet [place] blood out of chest tube, order per Dr. Ponce to HOLD heparin drip. Will follow up                                                                                                                                                    She Stevens, PharmD, BCPS  4/6/2023  11:37 EDT

## 2023-04-06 NOTE — PROGRESS NOTES
[facility] Medicine Services  PROGRESS NOTE    Patient Name: Timmy Guajardo  : 1950  MRN: [id number]    Date of Admission: 3/24/2023  Primary Care Physician: Arsen Seals APRN    Subjective   Subjective     CC:  Follow-up for weakness    HPI:  Chest a little sore after chest tube insertion    ROS:  gen - no fever  Pulm - no cough      Objective   Objective     Vital Signs:   Temp:  [97.6 °F (36.4 °C)-98.8 °F (37.1 °C)] 97.6 °F (36.4 °C)  Heart Rate:  [73-84] 73  Resp:  [18] 18  BP: (130-180)/(81-90) 167/90     Physical Exam:  Constitutional - frail, in bed  HEENT-NCAT, mucous membranes moist  CV-RRR  Resp-diminished bilaterally  Abd-soft, nontender, nondistended, normoactive bowel sounds  Ext-No lower extremity cyanosis, clubbing or edema bilaterally  Neuro-alert, speech clear, moves all extremities   Psych-normal affect   Skin- No rash on exposed UE or LE bilaterally        Results Reviewed:  LAB RESULTS:      Lab 23  1540 23  0935 23  0208 23  1410 23  0402 23  0541 23  0444 23  1129 23  0346 23  1817 23  0328   WBC  --   --  9.29  --   --   --  9.30  --  9.38  --  10.37   HEMOGLOBIN  --   --  11.4*  --   --   --  11.7*  --  11.4*  --  12.0*   HEMATOCRIT  --   --  36.8*  --   --   --  37.9  --  36.0*  --  38.5   PLATELETS  --   --  159  --   --   --  137*  --  158  --  157   NEUTROS ABS  --   --   --   --   --   --  6.35  --  6.77  --  7.49*   IMMATURE GRANS (ABS)  --   --   --   --   --   --  0.05  --  0.06*  --  0.12*   LYMPHS ABS  --   --   --   --   --   --  1.71  --  1.51  --  1.71   MONOS ABS  --   --   --   --   --   --  0.75  --  0.81  --  0.84   EOS ABS  --   --   --   --   --   --  0.39  --  0.19  --  0.14   MCV  --   --  97.6*  --   --   --  97.2*  --  93.8  --  96.5   HEPARIN ANTI-XA 0.35 0.33 0.28* 0.31 0.19*   < >  --    < >  --    < >  --     < > = values in this interval not displayed.          Lab 04/05/23  0208 04/03/23  0444 04/01/23  1129 04/01/23  0346 03/31/23  0328   SODIUM 140 143  --  145 145   POTASSIUM 4.0 3.9 3.6 3.4* 3.9   CHLORIDE 107 109*  --  110* 111*   CO2 23.0 21.0*  --  22.0 19.0*   ANION GAP 10.0 13.0  --  13.0 15.0   BUN 15 10  --  10 9   CREATININE 0.64* 0.60*  --  0.73* 0.58*   EGFR 101.8 103.8  --  97.9 104.9   GLUCOSE 96 81  --  105* 71   CALCIUM 8.3* 8.5*  --  8.3* 8.4*   MAGNESIUM  --   --   --   --  2.2   PHOSPHORUS  --   --   --   --  4.0         Lab 03/31/23  0328   TOTAL PROTEIN 5.7*   ALBUMIN 2.7*   GLOBULIN 3.0   ALT (SGPT) 8   AST (SGOT) 17   BILIRUBIN 0.3   ALK PHOS 76                     Brief Urine Lab Results  (Last result in the past 365 days)      Color   Clarity   Blood   Leuk Est   Nitrite   Protein   CREAT   Urine HCG        03/24/23 2329 Yellow   Turbid   Small (1+)   Moderate (2+)   Negative   30 mg/dL (1+)                 Microbiology Results Abnormal     Procedure Component Value - Date/Time    Body Fluid Culture - Body Fluid, Pleural Cavity [[id number]] Collected: 03/27/23 1532    Lab Status: Final result Specimen: Body Fluid from Pleural Cavity Updated: 03/31/23 0952     Body Fluid Culture No growth at 3 days     Gram Stain Rare (1+) WBCs per low power field      No organisms seen    MRSA Screen, PCR (Inpatient) - Swab, Nares [[id number]]  (Normal) Collected: 03/27/23 0857    Lab Status: Final result Specimen: Swab from Nares Updated: 03/27/23 1107     MRSA PCR Negative    Narrative:      The negative predictive value of this diagnostic test is high and should only be used to consider de-escalating anti-MRSA therapy. A positive result may indicate colonization with MRSA and must be correlated clinically.  MRSA Negative    COVID PRE-OP / PRE-PROCEDURE SCREENING ORDER (NO ISOLATION) - Swab, Nasopharynx [[id number]]  (Normal) Collected: 03/24/23 2256    Lab Status: Final result Specimen: Swab from Nasopharynx Updated: 03/24/23 1963    Narrative:      The  following orders were created for panel order COVID PRE-OP / PRE-PROCEDURE SCREENING ORDER (NO ISOLATION) - Swab, Nasopharynx.  Procedure                               Abnormality         Status                     ---------                               -----------         ------                     Respiratory Panel PCR w/...[[id number]]  Normal              Final result                 Please view results for these tests on the individual orders.    Respiratory Panel PCR w/COVID-19(SARS-CoV-2) SULEIMAN/TAWANNA/BLAISE/PAD/COR/MAD/ANTHONY In-House, NP Swab in UTM/VTM, 3-4 HR TAT - Swab, Nasopharynx [[id number]]  (Normal) Collected: 03/24/23 5858    Lab Status: Final result Specimen: Swab from Nasopharynx Updated: 03/24/23 7694     ADENOVIRUS, PCR Not Detected     Coronavirus 229E Not Detected     Coronavirus HKU1 Not Detected     Coronavirus NL63 Not Detected     Coronavirus OC43 Not Detected     COVID19 Not Detected     Human Metapneumovirus Not Detected     Human Rhinovirus/Enterovirus Not Detected     Influenza A PCR Not Detected     Influenza B PCR Not Detected     Parainfluenza Virus 1 Not Detected     Parainfluenza Virus 2 Not Detected     Parainfluenza Virus 3 Not Detected     Parainfluenza Virus 4 Not Detected     RSV, PCR Not Detected     Bordetella pertussis pcr Not Detected     Bordetella parapertussis PCR Not Detected     Chlamydophila pneumoniae PCR Not Detected     Mycoplasma pneumo by PCR Not Detected    Narrative:      In the setting of a positive respiratory panel with a viral infection PLUS a negative procalcitonin without other underlying concern for bacterial infection, consider observing off antibiotics or discontinuation of antibiotics and continue supportive care. If the respiratory panel is positive for atypical bacterial infection (Bordetella pertussis, Chlamydophila pneumoniae, or Mycoplasma pneumoniae), consider antibiotic de-escalation to target atypical bacterial infection.          CT Chest Without  "Contrast Diagnostic    Result Date: 4/4/2023  CT CHEST WO CONTRAST DIAGNOSTIC Date of Exam: 4/4/2023 11:48 AM EDT Indication: Pleural effusion, known or suspected (Ped 0-17y). Comparison: Chest CT scan 3/31/2023. Portable chest radiograph of 4/4/2023 Technique: Axial CT images were obtained of the chest without contrast administration.  Reconstructed coronal and sagittal images were also obtained. Automated exposure control and iterative construction methods were used. Findings: Patient history indicates previous bilateral pulmonary embolic disease, moderate right pleural effusion, with chest tube placement and persistent effusion. The initial 3/24/2023 chest CT scan report noted small pulmonary emboli, moderate right pleural effusion and associated subsegmental atelectasis of the right lower lobe. The more recent previous scan from 3/31/2023 noted a moderate right pleural effusion with some reactive pleural enhancement and a few gas bubbles in the pleural fluid collection. Today's exam shows no significant interval improvement in the pleural fluid collection, which has irregular and lobular margins suggesting it may be partially loculated. There are now several small hypodense pleural-based \"masses or nodules which, is reviewed on an initial study would be concerning for pleural-based metastases. Having arisen, however, and only 5 days, these presumably represent proteinaceous debris, perhaps trace hemorrhage. The air within the pleural fluid collection is still present, but diminishing. Associated right lower lobe atelectasis is stable in extent and appearance. No new pulmonary parenchymal disease is seen elsewhere. Right upper lobe linear scarring is stable. Mild to moderate changes of centrilobular emphysema. Stable. There is no left pleural effusion. Mediastinal images show only trace pericardial fluid or pleural thickening, and stable shotty calcified and noncalcified mediastinal lymph nodes. Extensive " "coronary artery calcium is again noted. Images the upper abdomen show subtle noncalcified gallstones or gallbladder \"sludge\", with no visible inflammation. Right and left liver lobes appear grossly normal. Included portions of the spleen, pancreatic tail, adrenal glands, and upper renal poles appear within normal limits. Bony structures appear to be intact.     Impression: Impression: 1. Stable, relatively large, loculated appearing right pleural fluid collection. 2. Persistent, but gradually decreasing air within the pleural fluid collection. Several new hyperdense nodular pleural-based areas within the pleural fluid resemble soft tissue nodules, but are likely either concretions of proteinaceous debris, or small  amounts of hemorrhage/clot. 3. Stable appearance of the chest elsewhere. 4. Incidentally noted gallbladder \"sludge\" or noncalcified gallstones. Electronically Signed: Trey Allen  4/4/2023 1:00 PM EDT  Workstation ID: [id number]    XR Chest 1 View    Result Date: 4/5/2023  XR CHEST 1 VW Date of Exam: 4/5/2023 5:50 AM EDT Indication: Pleural effusion. Comparison: One day prior Findings: Right-sided pleural drain projects unchanged. Possibly loculated effusion at the right lung base is also stable. There is no distinct pneumothorax. The left lung remains clear. Unchanged heart and mediastinal contours, with left ICD noted.     Impression: Impression: Right-sided pleural drain projects unchanged. Possibly loculated effusion at the right lung base is also stable. There is no distinct pneumothorax. The left lung remains clear. Unchanged heart and mediastinal contours, with left ICD noted. Electronically Signed: Nicolas Siddiqi  4/5/2023 9:18 AM EDT  Workstation ID: [id number]    XR Chest 1 View    Result Date: 4/4/2023  XR CHEST 1 VW Date of Exam: 4/4/2023 7:22 PM EDT Indication: chest tube. Comparison: CT chest April 4, 2023 Findings: There is a right thoracotomy tube. The remains density in the right lower chest " which could reflect residual loculated effusion and atelectasis. A pneumothorax is not definitely confirmed on this exam the left lung seems clear. A cardiac pacemaker device  is present.     Impression: Impression: 1.Right thoracotomy tube 2.Residual parenchymal pleural changes right lower chest. Electronically Signed: Fei Tolbert  4/4/2023 8:37 PM EDT  Workstation ID: [id number]    XR Chest 1 View    Result Date: 4/4/2023  XR CHEST 1 VW Date of Exam: 4/4/2023 3:35 AM EDT Indication: Pleural effusion. Comparison: 4/3/2023 Findings: Left-sided triple lead ICD is noted. The heart is normal in size. Vasculature is mildly cephalized. Left lung remains essentially clear. Patchy right basilar atelectasis and effusion is stable. There is no evidence of pneumothorax.     Impression: Impression: Stable, relatively mild right basilar atelectasis and effusion. Electronically Signed: Trey Allen  4/4/2023 8:09 AM EDT  Workstation ID: [id number]      Results for orders placed during the hospital encounter of 10/16/22    Adult Transthoracic Echo Complete W/ Cont if Necessary Per Protocol    Interpretation Summary  •  Estimated left ventricular EF = 30%.  There is global hypokinesis.  The basal-mid posterior wall appears akinetic.  •  Normal right ventricular cavity size, wall thickness, systolic function and septal motion noted. Electronic lead present in the ventricle  •  Septal wall motion is abnormal, consistent with right ventricular pacing  •  No hemodynamically significant valvular stenosis or regurgitation noted.      Current medications:  Scheduled Meds:acetaminophen, 650 mg, Oral, Q8H  vitamin C, 1,000 mg, Oral, Daily  aspirin, 81 mg, Oral, Daily  atorvastatin, 80 mg, Oral, Nightly  carvedilol, 50 mg, Oral, BID With Meals  docusate sodium, 100 mg, Oral, BID  famotidine, 20 mg, Oral, Daily  insulin lispro, 0-7 Units, Subcutaneous, TID With Meals  lisinopril, 5 mg, Oral, Q24H  megestrol, 400 mg, Oral, Daily  mirtazapine, 15 mg,  Oral, Nightly  nystatin, 5 mL, Oral, 4x Daily  pantoprazole, 40 mg, Oral, Nightly  polyethylene glycol, 17 g, Oral, Daily  senna-docusate sodium, 2 tablet, Oral, BID  sodium chloride, 10 mL, Intravenous, Q12H  terazosin, 1 mg, Oral, Nightly      Continuous Infusions:heparin, 24 Units/kg/hr, Last Rate: 24 Units/kg/hr (04/05/23 1643)  lactated ringers, 9 mL/hr, Last Rate: 9 mL/hr (03/29/23 0838)  lactated ringers, 9 mL/hr, Last Rate: 9 mL/hr (03/31/23 0832)  Pharmacy to Dose Heparin,       PRN Meds:.•  acetaminophen **OR** acetaminophen **OR** acetaminophen  •  senna-docusate sodium **AND** polyethylene glycol **AND** bisacodyl **AND** bisacodyl  •  Calcium Replacement - Follow Nurse / BPA Driven Protocol  •  cyclobenzaprine  •  dextrose  •  dextrose  •  glucagon (human recombinant)  •  Magnesium Standard Dose Replacement - Follow Nurse / BPA Driven Protocol  •  melatonin  •  ondansetron  •  Pharmacy to Dose Heparin  •  Phosphorus Replacement - Follow Nurse / BPA Driven Protocol  •  Potassium Replacement - Follow Nurse / BPA Driven Protocol  •  Sodium Chloride (PF)  •  sodium chloride  •  sodium chloride    Assessment & Plan   Assessment & Plan     Active Hospital Problems    Diagnosis  POA   • **Bilateral pulmonary embolism [I26.99]  Unknown   • Pleural effusion [J90]  Yes   • Unintentional weight loss [R63.4]  Unknown   • Hypoalbuminemia [E88.09]  Unknown   • Severe malnutrition [E43]  Unknown   • History of CVA (cerebrovascular accident) [Z86.73]  Not Applicable   • Debility [R53.81]  Unknown   • Presence of cardiac pacemaker [Z95.0]  Yes   • HTN (hypertension) [I10]  Yes   • Leukocytosis [D72.829]  Yes      Resolved Hospital Problems   No resolved problems to display.        Brief Hospital Course to date:  Timmy Guajardo is a 72 y.o. 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, systolic heart failure with ejection fraction of 30%,  coronary artery disease, GERD, history of DVT who presented to the hospital with weakness and functional decline, unintentional weight loss and severe constipation    Assessment and plan:  Bilateral pulmonary embolism  History of DVT  · Likely secondary to being on an inadequate dose of Eliquis of 2.5 mg twice daily and medication noncompliance  · Continue heparin drip while hospitalized since he might need further procedures  · Plan to transition to full dose Eliquis (loading and maintenance)     Moderate exudative right-sided loculated pleural effusion  Possible organized right pleural effusion  · Bedside point-of-care ultrasound revealed complex pleural effusion  · Cardiothoracic surgery consulted.  Recommended chest tube placement by IR.    · Underwent right-sided chest tube placement by IR on 3/27/2023.  Chest tube removed 3/30/2023   · Repeat CT scan chest after removal of chest tube showed persistent moderate right pleural effusion, likely organized   · Analysis of the fluid is consistent with exudate, ?  Parapneumonic  · Culture from pleural fluid is negative to date  · Cytology showed benign mesothelial cells  · S/p 12 days zosyn  · Repeat chest tube placed 4/4/23    Lung nodule  - 1.4 cm  - needs repeat CT imaging in 6 months    Severe malnutrition  Hypoalbuminemia  Significant unintentional weight loss  Generalized debility  · Patient reports 52lbs weight loss since October 2022.  · No EGD or colonoscopy since 2008  · CT chest and CT abdomen with no convincing evidence of solid malignancy or lymphadenopathy.  Oncology service evaluated the patient recommended age-appropriate screening  · GI evaluated the patient for EGD and colonoscopy.  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 Continue Megace and mirtazapine 15 mg nightly for poor appetite  · Nutrition team following     UTI with Enterococcus faecalis  · POA  · Zosyn    CAD  Cardiomyopathy with EF 30 %  Essential HTN  Old CVA with right residual weakness  Dyslipidemia  · Continue aspirin  · Continue coreg, lisinopril  · Continue statin    Constipation     GERD   · Continue PPI      Type 2 diabetes  · A1C 6.3%   · Continue insulin sliding scale     Acute debility  · PT recommended acute rehab      Expected Discharge Location and Transportation: Acute rehab  Expected Discharge   Expected Discharge Date and Time     Expected Discharge Date Expected Discharge Time    Apr 8, 2023            DVT prophylaxis:  Medical and mechanical DVT prophylaxis orders are present.     AM-PAC 6 Clicks Score (PT): 13 (04/05/23 4740)    CODE STATUS:   Code Status and Medical Interventions:   Ordered at: 03/24/23 1209     Code Status (Patient has no pulse and is not breathing):    CPR (Attempt to Resuscitate)     Medical Interventions (Patient has pulse or is breathing):    Full Support        Bryce Ponce MD  04/05/23

## 2023-04-06 NOTE — PLAN OF CARE
Goal Outcome Evaluation:         Pt is A&O. Somewhat withdrawn this shift. Very drowsy but never quite sound asleep. Pt is 1-2 assist. Bedrest currently w/ chest tube. 70mls total output this shift. No further needs at this time. Will continue to monitor

## 2023-04-06 NOTE — PROGRESS NOTES
"  CTS Progress Note      Chief Complaint: Pleural effusion      Subjective  No acute events overnight.  No complaints  On room air    Objective    Physical Exam:   Vital Signs   Temp:  [97.6 °F (36.4 °C)-98.8 °F (37.1 °C)] 98.4 °F (36.9 °C)  Heart Rate:  [73] 73  Resp:  [18] 18  BP: (130-167)/(81-90) 154/87   GEN: NAD   CV: Regular rate and rhythm   RESP: Decreased bilateral bases but otherwise  clear   EXT: Warm without significant edema   Chest tube: 700 cc serosanguineous drainage in 24h   Results     Results from last 7 days   Lab Units 04/05/23  0208   WBC 10*3/mm3 9.29   HEMOGLOBIN g/dL 11.4*   HEMATOCRIT % 36.8*   PLATELETS 10*3/mm3 159     Results from last 7 days   Lab Units 04/05/23  0208   SODIUM mmol/L 140   POTASSIUM mmol/L 4.0   CHLORIDE mmol/L 107   CO2 mmol/L 23.0   BUN mg/dL 15   CREATININE mg/dL 0.64*   GLUCOSE mg/dL 96   CALCIUM mg/dL 8.3*     Imaging:  Chest CT without contrast 4/4:  Findings:  Patient history indicates previous bilateral pulmonary embolic disease, moderate right pleural effusion, with chest tube placement and persistent effusion. The initial 3/24/2023 chest CT scan report noted small pulmonary emboli, moderate right pleural   effusion and associated subsegmental atelectasis of the right lower lobe. The more recent previous scan from 3/31/2023 noted a moderate right pleural effusion with some reactive pleural enhancement and a few gas bubbles in the pleural fluid collection.     Today's exam shows no significant interval improvement in the pleural fluid collection, which has irregular and lobular margins suggesting it may be partially loculated. There are now several small hypodense pleural-based \"masses or nodules which, is   reviewed on an initial study would be concerning for pleural-based metastases. Having arisen, however, and only 5 days, these presumably represent proteinaceous debris, perhaps trace hemorrhage. The air within the pleural fluid collection is still " "  present, but diminishing. Associated right lower lobe atelectasis is stable in extent and appearance.     No new pulmonary parenchymal disease is seen elsewhere. Right upper lobe linear scarring is stable. Mild to moderate changes of centrilobular emphysema. Stable. There is no left pleural effusion.     Mediastinal images show only trace pericardial fluid or pleural thickening, and stable shotty calcified and noncalcified mediastinal lymph nodes. Extensive coronary artery calcium is again noted.     Images the upper abdomen show subtle noncalcified gallstones or gallbladder \"sludge\", with no visible inflammation. Right and left liver lobes appear grossly normal. Included portions of the spleen, pancreatic tail, adrenal glands, and upper renal poles   appear within normal limits. Bony structures appear to be intact.     IMPRESSION:  Impression:     1. Stable, relatively large, loculated appearing right pleural fluid collection.     2. Persistent, but gradually decreasing air within the pleural fluid collection. Several new hyperdense nodular pleural-based areas within the pleural fluid resemble soft tissue nodules, but are likely either concretions of proteinaceous debris, or small   amounts of hemorrhage/clot.     3. Stable appearance of the chest elsewhere.     4. Incidentally noted gallbladder \"sludge\" or noncalcified gallstones.             Assessment & Plan   Bilateral pulmonary embolus with moderate right pleural effusion status post IR placed chest tube discontinued 3/30/2023  Continued moderate size right-sided pleural effusion    Plan   Continue chest tube to -20 suction  Daily chest x-ray  Repeat chest CT pending to evaluate effusion after large bore chest tube placed, awaiting results      Keyanna Turk PA-C  04/06/23  09:14 EDT                  "

## 2023-04-06 NOTE — PROGRESS NOTES
[facility] Cardiothoracic Surgery In-Patient Progress Note      Was notified by primary RN about large amount of blood coming from chest tube site and concerns for hemorrhaging.  Vital signs were stable and patient was satting 99% on room air.  A stat chest x-ray was ordered and was negative for any acute findings.  H&H has remained stable.    Upon assessment, chest tube appeared clogged with a large clot located anteriorly. Clot was expelled into atrium by stripping the tubing. Continued oozing was identified proximally, silver nitrate sticks were applied to site with improvement. Another stitch was placed around the chest tube to ensure securement and to help with continued oozing. Pressure dressing was applied to chest tube site. No continued bleeding or oozing was appreciated after interventions.     Plan   -STAT CTA chest  -Hold heparin drip for now  -Continue pressure dressing to chest tube site for 4 hours     Alexandra Azul, YUE  04/06/23  13:14 EDT

## 2023-04-06 NOTE — PLAN OF CARE
Goal Outcome Evaluation:   Pt VSS, on RA, AV paced rhythm on telemetry, A&Ox4. At beginning of shift pt had michelet blood leaking from around chest tube site and chest tube clotted off completely. PA and APRN on call for CT surgery stat paged, see orders. Chest tube currently draining bright red blood, 240 mls output 7a-7p. Dr. Archuleta notified of pt's condition, see orders.

## 2023-04-07 ENCOUNTER — APPOINTMENT (OUTPATIENT)
Dept: GENERAL RADIOLOGY | Facility: HOSPITAL | Age: 73
DRG: 163 | End: 2023-04-07
Payer: MEDICARE

## 2023-04-07 LAB
ANION GAP SERPL CALCULATED.3IONS-SCNC: 11 MMOL/L (ref 5–15)
BASOPHILS # BLD AUTO: 0.04 10*3/MM3 (ref 0–0.2)
BASOPHILS NFR BLD AUTO: 0.4 % (ref 0–1.5)
BUN SERPL-MCNC: 21 MG/DL (ref 8–23)
BUN/CREAT SERPL: 42 (ref 7–25)
CALCIUM SPEC-SCNC: 8.7 MG/DL (ref 8.6–10.5)
CHLORIDE SERPL-SCNC: 112 MMOL/L (ref 98–107)
CO2 SERPL-SCNC: 21 MMOL/L (ref 22–29)
CREAT SERPL-MCNC: 0.5 MG/DL (ref 0.76–1.27)
DEPRECATED RDW RBC AUTO: 57.8 FL (ref 37–54)
EGFRCR SERPLBLD CKD-EPI 2021: 109.7 ML/MIN/1.73
EOSINOPHIL # BLD AUTO: 0.15 10*3/MM3 (ref 0–0.4)
EOSINOPHIL NFR BLD AUTO: 1.7 % (ref 0.3–6.2)
ERYTHROCYTE [DISTWIDTH] IN BLOOD BY AUTOMATED COUNT: 16.3 % (ref 12.3–15.4)
GLUCOSE BLDC GLUCOMTR-MCNC: 147 MG/DL (ref 70–130)
GLUCOSE BLDC GLUCOMTR-MCNC: 170 MG/DL (ref 70–130)
GLUCOSE BLDC GLUCOMTR-MCNC: 184 MG/DL (ref 70–130)
GLUCOSE BLDC GLUCOMTR-MCNC: 197 MG/DL (ref 70–130)
GLUCOSE SERPL-MCNC: 127 MG/DL (ref 65–99)
HCT VFR BLD AUTO: 28.5 % (ref 37.5–51)
HCT VFR BLD AUTO: 28.6 % (ref 37.5–51)
HCT VFR BLD AUTO: 29 % (ref 37.5–51)
HCT VFR BLD AUTO: 31.3 % (ref 37.5–51)
HGB BLD-MCNC: 9 G/DL (ref 13–17.7)
HGB BLD-MCNC: 9.1 G/DL (ref 13–17.7)
HGB BLD-MCNC: 9.2 G/DL (ref 13–17.7)
HGB BLD-MCNC: 9.6 G/DL (ref 13–17.7)
IMM GRANULOCYTES # BLD AUTO: 0.07 10*3/MM3 (ref 0–0.05)
IMM GRANULOCYTES NFR BLD AUTO: 0.8 % (ref 0–0.5)
LYMPHOCYTES # BLD AUTO: 1.47 10*3/MM3 (ref 0.7–3.1)
LYMPHOCYTES NFR BLD AUTO: 16.4 % (ref 19.6–45.3)
MCH RBC QN AUTO: 31.2 PG (ref 26.6–33)
MCHC RBC AUTO-ENTMCNC: 31.8 G/DL (ref 31.5–35.7)
MCV RBC AUTO: 97.9 FL (ref 79–97)
MONOCYTES # BLD AUTO: 0.67 10*3/MM3 (ref 0.1–0.9)
MONOCYTES NFR BLD AUTO: 7.5 % (ref 5–12)
NEUTROPHILS NFR BLD AUTO: 6.57 10*3/MM3 (ref 1.7–7)
NEUTROPHILS NFR BLD AUTO: 73.2 % (ref 42.7–76)
NRBC BLD AUTO-RTO: 0 /100 WBC (ref 0–0.2)
PLATELET # BLD AUTO: 186 10*3/MM3 (ref 140–450)
PMV BLD AUTO: 9.7 FL (ref 6–12)
POTASSIUM SERPL-SCNC: 4.2 MMOL/L (ref 3.5–5.2)
RBC # BLD AUTO: 2.92 10*6/MM3 (ref 4.14–5.8)
SODIUM SERPL-SCNC: 144 MMOL/L (ref 136–145)
UFH PPP CHRO-ACNC: 0.3 IU/ML (ref 0.3–0.7)
WBC NRBC COR # BLD: 8.97 10*3/MM3 (ref 3.4–10.8)

## 2023-04-07 PROCEDURE — 97530 THERAPEUTIC ACTIVITIES: CPT

## 2023-04-07 PROCEDURE — 85014 HEMATOCRIT: CPT | Performed by: PHYSICIAN ASSISTANT

## 2023-04-07 PROCEDURE — 82962 GLUCOSE BLOOD TEST: CPT

## 2023-04-07 PROCEDURE — 85520 HEPARIN ASSAY: CPT

## 2023-04-07 PROCEDURE — 85025 COMPLETE CBC W/AUTO DIFF WBC: CPT | Performed by: INTERNAL MEDICINE

## 2023-04-07 PROCEDURE — 80048 BASIC METABOLIC PNL TOTAL CA: CPT | Performed by: INTERNAL MEDICINE

## 2023-04-07 PROCEDURE — 71045 X-RAY EXAM CHEST 1 VIEW: CPT

## 2023-04-07 PROCEDURE — 85018 HEMOGLOBIN: CPT | Performed by: PHYSICIAN ASSISTANT

## 2023-04-07 PROCEDURE — 99232 SBSQ HOSP IP/OBS MODERATE 35: CPT | Performed by: INTERNAL MEDICINE

## 2023-04-07 PROCEDURE — 25010000002 HEPARIN (PORCINE) 25000-0.45 UT/250ML-% SOLUTION: Performed by: INTERNAL MEDICINE

## 2023-04-07 PROCEDURE — 99231 SBSQ HOSP IP/OBS SF/LOW 25: CPT

## 2023-04-07 PROCEDURE — 63710000001 INSULIN LISPRO (HUMAN) PER 5 UNITS: Performed by: INTERNAL MEDICINE

## 2023-04-07 RX ORDER — HEPARIN SODIUM 10000 [USP'U]/100ML
24 INJECTION, SOLUTION INTRAVENOUS
Status: DISCONTINUED | OUTPATIENT
Start: 2023-04-07 | End: 2023-04-13

## 2023-04-07 RX ADMIN — INSULIN LISPRO 2 UNITS: 100 INJECTION, SOLUTION INTRAVENOUS; SUBCUTANEOUS at 12:13

## 2023-04-07 RX ADMIN — ACETAMINOPHEN 325MG 650 MG: 325 TABLET ORAL at 20:59

## 2023-04-07 RX ADMIN — HEPARIN SODIUM 24 UNITS/KG/HR: 10000 INJECTION, SOLUTION INTRAVENOUS at 10:35

## 2023-04-07 RX ADMIN — LISINOPRIL 5 MG: 5 TABLET ORAL at 08:55

## 2023-04-07 RX ADMIN — Medication 10 ML: at 21:00

## 2023-04-07 RX ADMIN — DOCUSATE SODIUM 100 MG: 100 CAPSULE, LIQUID FILLED ORAL at 20:59

## 2023-04-07 RX ADMIN — CARVEDILOL 50 MG: 12.5 TABLET, FILM COATED ORAL at 18:10

## 2023-04-07 RX ADMIN — ACETAMINOPHEN 325MG 650 MG: 325 TABLET ORAL at 14:49

## 2023-04-07 RX ADMIN — Medication 10 ML: at 08:56

## 2023-04-07 RX ADMIN — ATORVASTATIN CALCIUM 80 MG: 40 TABLET, FILM COATED ORAL at 20:59

## 2023-04-07 RX ADMIN — TERAZOSIN HYDROCHLORIDE 1 MG: 1 CAPSULE ORAL at 20:59

## 2023-04-07 RX ADMIN — OXYCODONE HYDROCHLORIDE AND ACETAMINOPHEN 1000 MG: 500 TABLET ORAL at 08:55

## 2023-04-07 RX ADMIN — MEGESTROL ACETATE 400 MG: 40 SUSPENSION ORAL at 09:05

## 2023-04-07 RX ADMIN — FAMOTIDINE 20 MG: 20 TABLET ORAL at 08:55

## 2023-04-07 RX ADMIN — Medication 5 MG: at 20:59

## 2023-04-07 RX ADMIN — POLYETHYLENE GLYCOL 3350 17 G: 17 POWDER, FOR SOLUTION ORAL at 08:55

## 2023-04-07 RX ADMIN — ASPIRIN 81 MG CHEWABLE TABLET 81 MG: 81 TABLET CHEWABLE at 08:56

## 2023-04-07 RX ADMIN — CARVEDILOL 50 MG: 12.5 TABLET, FILM COATED ORAL at 08:55

## 2023-04-07 RX ADMIN — PANTOPRAZOLE SODIUM 40 MG: 40 TABLET, DELAYED RELEASE ORAL at 20:59

## 2023-04-07 RX ADMIN — MIRTAZAPINE 15 MG: 15 TABLET, FILM COATED ORAL at 20:59

## 2023-04-07 RX ADMIN — ACETAMINOPHEN 325MG 650 MG: 325 TABLET ORAL at 05:45

## 2023-04-07 RX ADMIN — SENNOSIDES AND DOCUSATE SODIUM 2 TABLET: 8.6; 5 TABLET ORAL at 20:59

## 2023-04-07 RX ADMIN — DOCUSATE SODIUM 100 MG: 100 CAPSULE, LIQUID FILLED ORAL at 08:55

## 2023-04-07 RX ADMIN — INSULIN LISPRO 2 UNITS: 100 INJECTION, SOLUTION INTRAVENOUS; SUBCUTANEOUS at 18:10

## 2023-04-07 NOTE — CASE MANAGEMENT/SOCIAL WORK
Continued Stay Note  [facility]     Patient Name: Timmy Guajardo  MRN: [id number]  Today's Date: 4/7/2023    Admit Date: 3/24/2023    Plan: discharge plan   Discharge Plan     Row Name 04/07/23 1627       Plan    Plan discharge plan    Plan Comments Per discussion in MDR, pt still has a chest tube.  I met with pt at bedside and he is still interested in going to [facility] when chest tube out.  He is aware that [facility] is depending on bed availability and insurance approval. CM will follow up Monday.    Final Discharge Disposition Code 03 - skilled nursing facility (SNF)               Discharge Codes    No documentation.               Expected Discharge Date and Time     Expected Discharge Date Expected Discharge Time    Apr 8, 2023             Violeta Miranda RN

## 2023-04-07 NOTE — THERAPY TREATMENT NOTE
Patient Name: Timmy Guajardo  : 1950    MRN: [id number]                              Today's Date: 2023       Admit Date: 3/24/2023    Visit Dx:     ICD-10-CM ICD-9-CM   1. Weight loss  R63.4 783.21   2. Unintentional weight loss  R63.4 783.21   3. 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  K22.70 530.85     Patient Active Problem List   Diagnosis   • Acute right-sided weakness   • Suspected cerebrovascular accident (CVA)   • Leukocytosis   • CHF (congestive heart failure)   • Thrombocytopenia   • HTN (hypertension)   • Presence of cardiac pacemaker   • Hyperglycemia   • Gangrene of toe of left foot   • Closed displaced intertrochanteric fracture of right femur, initial encounter   • Pleural effusion   • Unintentional weight loss   • Hypoalbuminemia   • Severe malnutrition   • Bilateral pulmonary embolism   • History of CVA (cerebrovascular accident)   • Debility     Past Medical History:   Diagnosis Date   • Cardiomyopathy    • CHF (congestive heart failure)    • Coronary artery disease    • Diabetes mellitus    • GERD (gastroesophageal reflux disease)    • HTN (hypertension)    • Stroke     Right sided weakness   • Stroke      Past Surgical History:   Procedure Laterality Date   • AMPUTATION DIGIT Left 2021    Procedure: AMPUTATION DIGIT - GREAT TOE AMPUTATION LEFT;  Surgeon: Dario Marmolejo MD;  Location: [facility] OR;  Service: General;  Laterality: Left;   • AORTAGRAM N/A 2021    Procedure: AORTAGRAM WITH RUNOFFS, LEFT SFA BALLOON ANGIOPLASTY;  Surgeon: Tim Mahan MD;  Location: [facility] HYBRID SHELIA;  Service: Vascular;  Laterality: N/A;  FL TIME 6 MIN 54 SECS  82 MGY  CONTRAST VISIPAQUE 100ML     • CARDIAC CATHETERIZATION     • CARDIAC PACEMAKER PLACEMENT      pacemaker/defibrillator, [employer]   • COLONOSCOPY N/A 3/31/2023    Procedure: COLONOSCOPY;  Surgeon: Brunner, Mark I, MD;  Location: [facility] ENDOSCOPY;  Service: Gastroenterology;  Laterality: N/A;   • ENDOSCOPY N/A 3/29/2023     Procedure: ESOPHAGOGASTRODUODENOSCOPY;  Surgeon: Brunner, Mark I, MD;  Location: [facility] ENDOSCOPY;  Service: Gastroenterology;  Laterality: N/A;   • HERNIA REPAIR Bilateral     Inguinal hernia   • HIP TROCHANTERIC NAILING WITH INTRAMEDULLARY HIP SCREW Right 10/18/2022    Procedure: HIP TROCHANTERIC NAILING WITH INTRAMEDULLARY HIP SCREW RIGHT;  Surgeon: Bj Steinberg MD;  Location: [facility] OR;  Service: Orthopedics;  Laterality: Right;      General Information     Row Name 04/07/23 1143          Physical Therapy Time and Intention    Document Type therapy note (daily note)  -ML     Mode of Treatment physical therapy  -     Row Name 04/07/23 1143          General Information    Patient Profile Reviewed yes  -ML     Existing Precautions/Restrictions fall;other (see comments)  R chest tube to suction, R sided weakness d/t remote CVA  -ML     Barriers to Rehab medically complex;previous functional deficit  -ML     Row Name 04/07/23 1143          Cognition    Orientation Status (Cognition) oriented x 3  -ML     Row Name 04/07/23 1143          Safety Issues, Functional Mobility    Safety Issues Affecting Function (Mobility) awareness of need for assistance;insight into deficits/self-awareness;safety precaution awareness;safety precautions follow-through/compliance;sequencing abilities  -ML     Impairments Affecting Function (Mobility) balance;coordination;endurance/activity tolerance;motor planning;motor control;range of motion (ROM);strength;pain  -ML           User Key  (r) = Recorded By, (t) = Taken By, (c) = Cosigned By    Initials Name Provider Type     Sharon Heller Physical Therapist               Mobility     Row Name 04/07/23 1144          Bed Mobility    Bed Mobility supine-sit  -ML     Supine-Sit [place] (Bed Mobility) minimum assist (75% patient effort);1 person assist;verbal cues;nonverbal cues (demo/gesture)  -ML     Assistive Device (Bed Mobility) head of bed elevated;bed rails  -     Row Name  04/07/23 1144          Bed-Chair Transfer    Bed-Chair [place] (Transfers) verbal cues;nonverbal cues (demo/gesture);moderate assist (50% patient effort);1 person assist  -ML     Assistive Device (Bed-Chair Transfers) walker, front-wheeled  -ML     Comment, (Bed-Chair Transfer) Patient able to take 2 side steps to transfer from EOB to chair with RW  -ML     Row Name 04/07/23 1144          Sit-Stand Transfer    Sit-Stand [place] (Transfers) minimum assist (75% patient effort);1 person assist;verbal cues  -ML     Assistive Device (Sit-Stand Transfers) walker, front-wheeled  -ML     Comment, (Sit-Stand Transfer) Patient completed 1 sit to stand transfer, verbal cues for hand placement and forward weight shift.  -ML           User Key  (r) = Recorded By, (t) = Taken By, (c) = Cosigned By    Initials Name Provider Type     Sharon Heller Physical Therapist               Obj/Interventions     Row Name 04/07/23 1146          Motor Skills    Therapeutic Exercise hip;knee  -ML     Row Name 04/07/23 1146          Hip (Therapeutic Exercise)    Hip (Therapeutic Exercise) isometric exercises;strengthening exercise  -ML     Hip Isometrics (Therapeutic Exercise) bilateral;gluteal sets;10 repetitions;5 second hold  -ML     Hip Strengthening (Therapeutic Exercise) bilateral;marching while standing;20 repititions  -ML     Row Name 04/07/23 1146          Knee (Therapeutic Exercise)    Knee (Therapeutic Exercise) isometric exercises;strengthening exercise  -ML     Knee Isometrics (Therapeutic Exercise) bilateral;quad sets;10 repetitions;5 second hold  -ML     Knee Strengthening (Therapeutic Exercise) bilateral;LAQ (long arc quad);10 repetitions  -ML     Row Name 04/07/23 1146          Balance    Balance Assessment sitting static balance;sitting dynamic balance;sit to stand dynamic balance;standing static balance;standing dynamic balance  -ML     Static Sitting Balance standby assist  -ML     Dynamic Sitting Balance  supervision  -ML     Position, Sitting Balance unsupported;sitting edge of bed;supported;sitting in chair  -ML     Sit to Stand Dynamic Balance verbal cues;minimal assist;1-person assist  -ML     Static Standing Balance contact guard  -ML     Dynamic Standing Balance verbal cues;non-verbal cues (demo/gesture);minimal assist;1-person assist  -ML     Position/Device Used, Standing Balance supported;walker, rolling  -ML     Balance Interventions sitting;standing;sit to stand;supported;static;dynamic;weight shifting activity  -ML           User Key  (r) = Recorded By, (t) = Taken By, (c) = Cosigned By    Initials Name Provider Type    ML Sharon Heller Physical Therapist               Goals/Plan    No documentation.                Clinical Impression     Row Name 04/07/23 1148          Pain    Pretreatment Pain Rating 4/10  -ML     Posttreatment Pain Rating 4/10  -ML     Pain Location - Side/Orientation Left  -ML     Pain Location incisional  -ML     Pain Location - chest  -ML     Pain Intervention(s) Repositioned;Ambulation/increased activity;Rest  -ML     Row Name 04/07/23 1148          Plan of Care Review    Plan of Care Reviewed With patient  -ML     Progress improving  -ML     Outcome Evaluation Physical therapy treatment complete. The patient required modAx1 with RW to complete transfer from EOB to chair. Patient educated on importance of continued mobility and progressing with mobility. The patient continues to present below baseline for mobility and at increased risk for falls. Continue current PT POC. Continue to recommend SNF.  -ML     Row Name 04/07/23 1148          Vital Signs    Pre Patient Position Supine  -ML     Intra Patient Position Standing  -ML     Post Patient Position Sitting  -ML     Row Name 04/07/23 1148          Positioning and Restraints    Pre-Treatment Position in bed  -ML     Post Treatment Position chair  -ML     In Chair notified nsg;reclined;call light within reach;encouraged to call for  assist;exit alarm on;waffle cushion;legs elevated  -ML           User Key  (r) = Recorded By, (t) = Taken By, (c) = Cosigned By    Initials Name Provider Type    Sharon Alvarez Physical Therapist               Outcome Measures     Row Name 04/07/23 1151          How much help from another person do you currently need...    Turning from your back to your side while in flat bed without using bedrails? 3  -ML     Moving from lying on back to sitting on the side of a flat bed without bedrails? 3  -ML     Moving to and from a bed to a chair (including a wheelchair)? 3  -ML     Standing up from a chair using your arms (e.g., wheelchair, bedside chair)? 3  -ML     Climbing 3-5 steps with a railing? 2  -ML     To walk in hospital room? 2  -ML     AM-PAC 6 Clicks Score (PT) 16  -ML     Highest level of mobility 5 --> Static standing  -ML     Row Name 04/07/23 1151          Functional Assessment    Outcome Measure Options AM-PAC 6 Clicks Basic Mobility (PT)  -ML           User Key  (r) = Recorded By, (t) = Taken By, (c) = Cosigned By    Initials Name Provider Type    Sharon Alvarez Physical Therapist                             Physical Therapy Education     Title: PT OT SLP Therapies (In Progress)     Topic: Physical Therapy (Done)     Point: Mobility training (Done)     Learning Progress Summary           Patient Acceptance, E, VU,NR by  at 4/7/2023 1151    Acceptance, E, VU by  at 4/5/2023 1554    Acceptance, E, VU,NR by NS at 4/3/2023 0926    Acceptance, E, VU,NR by  at 3/31/2023 1604    Acceptance, E, VU,NR by  at 3/28/2023 1323    Acceptance, E,TB, VU,NR by  at 3/26/2023 1536   Family Acceptance, E,TB, VU,NR by  at 3/26/2023 1536                   Point: Home exercise program (Done)     Learning Progress Summary           Patient Acceptance, E, VU,NR by  at 4/7/2023 1151    Acceptance, E, VU by  at 4/5/2023 1554    Acceptance, E, VU,NR by NS at 4/3/2023 0926    Acceptance, E, VU,NR by  at 3/31/2023  1604    Acceptance, E, VU,NR by ML at 3/28/2023 1323    Acceptance, E,TB, VU,NR by  at 3/26/2023 1536   Family Acceptance, E,TB, VU,NR by  at 3/26/2023 1536                   Point: Body mechanics (Done)     Learning Progress Summary           Patient Acceptance, E, VU,NR by ML at 4/7/2023 1151    Acceptance, E, VU by  at 4/5/2023 1554    Acceptance, E, VU,NR by NS at 4/3/2023 0926    Acceptance, E,TB, VU,NR by  at 3/26/2023 1536   Family Acceptance, E,TB, VU,NR by KL at 3/26/2023 1536                   Point: Precautions (Done)     Learning Progress Summary           Patient Acceptance, E, VU,NR by ML at 4/7/2023 1151    Acceptance, E, VU by  at 4/5/2023 1554    Acceptance, E, VU,NR by NS at 4/3/2023 0926    Acceptance, E, VU,NR by ML at 3/31/2023 1604    Acceptance, E, VU,NR by ML at 3/28/2023 1323    Acceptance, E,TB, VU,NR by  at 3/26/2023 1536   Family Acceptance, E,TB, VU,NR by  at 3/26/2023 1536                               User Key     Initials Effective Dates Name Provider Type Discipline     11/02/22 -  Gabino Tirado, PT Physical Therapist PT    NS 06/16/21 -  Juliet Kuhn, PT Physical Therapist PT     04/22/21 -  Sharon Heller Physical Therapist PT     09/21/21 -  Ernst Reid, PT Physical Therapist PT              PT Recommendation and Plan     Plan of Care Reviewed With: patient  Progress: improving  Outcome Evaluation: Physical therapy treatment complete. The patient required modAx1 with RW to complete transfer from EOB to chair. Patient educated on importance of continued mobility and progressing with mobility. The patient continues to present below baseline for mobility and at increased risk for falls. Continue current PT POC. Continue to recommend SNF.     Time Calculation:    PT Charges     Row Name 04/07/23 1153             Time Calculation    Start Time 1102  -ML      PT Received On 04/07/23  -ML         Timed Charges    84342 - PT Therapeutic Activity Minutes 33  -ML          Total Minutes    Timed Charges Total Minutes 33  -ML       Total Minutes 33  -ML            User Key  (r) = Recorded By, (t) = Taken By, (c) = Cosigned By    Initials Name Provider Type    Sharon Alvarez Physical Therapist              Therapy Charges for Today     Code Description Service Date Service Provider Modifiers Qty    [id number]  PT THERAPEUTIC ACT EA 15 MIN 4/7/2023 Sharon Heller GP 2          PT G-Codes  Outcome Measure Options: AM-PAC 6 Clicks Basic Mobility (PT)  AM-PAC 6 Clicks Score (PT): 16  AM-PAC 6 Clicks Score (OT): 15       Sharon Heller  4/7/2023

## 2023-04-07 NOTE — PROGRESS NOTES
HEPARIN INFUSION  Timmy Guajardo is a  72 y.o. male receiving heparin infusion.     Therapy for (VTE/Cardiac): VTE  Patient Weight: 56.7 kg  Initial Bolus (Y/N): Yes  Any Bolus (Y/N): Yes        Bleeding: Noted michelet blood from chest tube 4/6, heparin held and stitch placed    VTE (PE/DVT)   Initial Bolus: 80 units/kg (Max 10,000 units)  Initial rate: 18 units/kg/hr (Max 1,500 units/hr)    Anti Xa Rebolus Infusion Hold time Change infusion Dose (Units/kg/hr) Next Anti Xa Level Due   < 0.11 50 Units/kg  (4000 Units Max) None Increase by  4 Units/kg/hr 6 hours   0.11 - 0.19 25 Units/kg  (2000 Units Max) None Increase by  3 Units/kg/hr 6 hours   0.2 - 0.29 0 None Increase by  2 Units/kg/hr 6 hours   0.3 - 0.7 0 None No Change 6 hours (after 2 consecutive levels in range check qAM)   0.71 - 0.8 0 None Decrease by  1 Units/kg/hr 6 hours   0.81 - 0.9 0 None Decrease by  2 Units/kg/hr 6 hours   0.91 - 1 0 60 Minutes Decrease by  3 Units/kg/hr 6 hours   >1 0 Hold  After Anti Xa less than 0.7 decrease previous rate by  4 Units/kg/hr  Every 2 hours until Anti Xa is less than 0.7 then when infusion restarts in 6 hours     Results from last 7 days   Lab Units 04/05/23  0208 04/03/23  0444 04/01/23  0346   HEMOGLOBIN g/dL 11.4* 11.7* 11.4*   HEMATOCRIT % 36.8* 37.9 36.0*   PLATELETS 10*3/mm3 159 137* 158       Date   Time   Anti-Xa Current Rate (Unit/kg/hr) Bolus   (Units) Rate Change   (Unit/kg/hr) New Rate (Unit/kg/hr) Next anti-Xa Comments  Pump Check Daily   3/25 0025 pending 0 4540 +18 18 0700 New start   3/25 0722 >1.1 18 -- -18 0 09[address] [place] [address]   3/25 0954 >1.1 0 -- -- 0 [address] [place] [address]   3/25  aPTT 0        3/25 0954 63 0  +16 16 2100 [place] [address]   3/25 2141 71.4 16 -- -- 16 0400 [address]184 -[facility]   3/26 0436 77.1 16 -- -- 16 1200 [address] 3184 -[facility]   3/26 1136 45.5  (heparin was held for 2.5hrs prior to draw) 16 --  16 2000 [address] 3250   3/26 2035 57.5 16 -- +1 17 0600 Nadia 3249 -[facility]   3/27 0708 88.1 17 -- -- 17 1300  [address] 3250   3/27 1630 -- off since 0730 for CT plcmt -- resume at prev rate 17 2300 Heparin resumed s/p R CT placement   3/27 2254 42.4 17 2000 +2 19 0800 Nadia 3252 -acb   3/28 0842 132.6 19 -- hold -- 1200 Dw RN   3/28 1357 46.3 -- -- +15 15 2100 DW RN   3/28 2037 0.15 15 1400 +3 18 0400 D/w WADE Zuniga   3/29 0444 0.35 18 -- -- 18 1200 Nadia 3252 -acb   3/29 1137 0.27 18 -- +2 20 2000 [address] 3250   3/29 2001 0.50 20 -- -- 20 off at 0000 D/w WADE Arauz   3/30 1800 -- off since 0000 for planned colonoscopy no bolus until after colon study complete resume at prev rate 20 0000 D/w RN and Dr. Cooper - pt did not have scope today. Restart heparin in meantime w/o bolus and turn off at 0600 for planned scope in AM.     3/31 0115  20 --- -- Held 0600 -- D/w RN   3/21 1030 -- 0  restart+20 +20 1600    3/21 1817 0.36 20 -- -- 20 0100 D/w RN   4/1 0100 0.38 20 -- -- 20 1200 D/w RN   4/1 1129 0.49 20 -- -- 20 0600 D/w WADE Herrera, pump verified   4/2 0600 0.43 20 -- -- 20 0600 4/3 D/w RN  Pump checked ES       Date   Time   Anti-Xa Current Rate (Unit/kg/hr) Bolus   (Units) Rate Change   (Unit/kg/hr) New Rate (Unit/kg/hr) Next   Anti-Xa Comments  Pump Check Daily   4/3 0541 0.39 20 -- -- 20 0600 [address] 3239   4/4 0630 0.19 20 -- +3 23 1200 D/w RN  Pump checked - ES   4/4 1410 0.31 23 -- -- 23 2100 D/w WADE Blood    4/4 2000 -- OFF -- -- +23 0200 Off for an unknown amount of time prior to CT insertion-restart at 2000 per Dr. Archuleta; D/w Merle, WADE   4/5 0300 0.28 23 -- +1 24 0900 D/w RN   4/5 0935 0.33 24 -- -- 24 1500 D/W RN   4/5 1540 0.35 24 -- -- 24 0600 D/w WADE Su   4/6 0800 0.46 24 -- -- -- 1400 D/W michelet [place] blood out of chest tube, order per Dr. Ponce to HOLD heparin drip. Will follow up   4/7 1026 -- -- -- +24 24 1700 Okay to restart heparin drip with no bolus per Dr. Ponce, CTS                                                                                                                                          She Stevens, PharmD, BCPS  4/7/2023  10:27 EDT

## 2023-04-07 NOTE — PLAN OF CARE
Goal Outcome Evaluation:      Pt VSS, on RA, A&Ox4, paced rhythm on telemetry. Pt chest tube had 0 mls output this shift. Heparin gtt restarted today. Pt resting comfortably, call light in reach, bed in low position.

## 2023-04-07 NOTE — PLAN OF CARE
Goal Outcome Evaluation:  Plan of Care Reviewed With: patient        Progress: improving  Outcome Evaluation: Physical therapy treatment complete. The patient required modAx1 with RW to complete transfer from EOB to chair. Patient educated on importance of continued mobility and progressing with mobility. The patient continues to present below baseline for mobility and at increased risk for falls. Continue current PT POC. Continue to recommend SNF.

## 2023-04-07 NOTE — PROGRESS NOTES
[facility] Medicine Services  PROGRESS NOTE    Patient Name: Timmy Guajardo  : 1950  MRN: [id number]    Date of Admission: 3/24/2023  Primary Care Physician: Arsen Seals APRN    Subjective   Subjective     CC:  Follow-up for weakness    HPI:  No further bleeding around chest tube, however Mr Guajardo says the insertion site is sore    ROS:  Gen: no fever  Pulm: no cough  GI: no nausea          Objective   Objective     Vital Signs:   Temp:  [97.6 °F (36.4 °C)-98.3 °F (36.8 °C)] 97.6 °F (36.4 °C)  Heart Rate:  [79-93] 93  Resp:  [18] 18  BP: (102-147)/(58-76) 134/75     Physical Exam:  Constitutional - frail, in bed  HEENT-NCAT, mucous membranes moist  CV-RRR  Resp-diminished bilaterally, right chest tube  Abd-soft, nontender, nondistended, normoactive bowel sounds  Ext-No lower extremity cyanosis, clubbing or edema bilaterally  Neuro-alert, speech clear, moves all extremities   Psych-normal affect   Skin- No rash on exposed UE or LE bilaterally      Results Reviewed:  LAB RESULTS:      Lab 23  1155 23  0823 23  0341 23  0043 23  1939 23  1531 23  1337 23  1119 23  0800 23  1540 23  0935 23  0208 23  0541 23  0444   WBC  --   --  8.97  --  10.57  --   --  10.37 10.47  --   --  9.29  --  9.30   HEMOGLOBIN 9.0* 9.6* 9.1* 9.2* 10.3*  10.3*   < >  --  10.1* 10.4*  --   --  11.4*  --  11.7*   HEMATOCRIT 29.0* 31.3* 28.6* 28.5* 33.2*  33.0*   < >  --  32.5* 32.8*  --   --  36.8*  --  37.9   PLATELETS  --   --  186  --  170  --   --  185 156  --   --  159  --  137*   NEUTROS ABS  --   --  6.57  --  8.20*  --   --  7.88* 7.97*  --   --   --   --  6.35   IMMATURE GRANS (ABS)  --   --  0.07*  --  0.11*  --   --  0.08* 0.07*  --   --   --   --  0.05   LYMPHS ABS  --   --  1.47  --  1.32  --   --  1.32 1.36  --   --   --   --  1.71   MONOS ABS  --   --  0.67  --  0.76  --   --  0.85 0.85  --   --   --    --  0.75   EOS ABS  --   --  0.15  --  0.12  --   --  0.19 0.18  --   --   --   --  0.39   MCV  --   --  97.9*  --  99.7*  --   --  97.9* 95.3  --   --  97.6*  --  97.2*   APTT  --   --   --   --   --   --   --   --  110.8*  --   --   --   --   --    HEPARIN ANTI-XA  --   --   --   --   --   --  0.10*  --  0.46 0.35 0.33 0.28*   < >  --     < > = values in this interval not displayed.         Lab 04/07/23  0341 04/05/23  0208 04/03/23  0444 04/01/23  1129 04/01/23  0346   SODIUM 144 140 143  --  145   POTASSIUM 4.2 4.0 3.9 3.6 3.4*   CHLORIDE 112* 107 109*  --  110*   CO2 21.0* 23.0 21.0*  --  22.0   ANION GAP 11.0 10.0 13.0  --  13.0   BUN 21 15 10  --  10   CREATININE 0.50* 0.64* 0.60*  --  0.73*   EGFR 109.7 101.8 103.8  --  97.9   GLUCOSE 127* 96 81  --  105*   CALCIUM 8.7 8.3* 8.5*  --  8.3*                     Lab 04/06/23  1939   ABO TYPING A   RH TYPING Positive   ANTIBODY SCREEN Negative         Brief Urine Lab Results  (Last result in the past 365 days)      Color   Clarity   Blood   Leuk Est   Nitrite   Protein   CREAT   Urine HCG        03/24/23 2329 Yellow   Turbid   Small (1+)   Moderate (2+)   Negative   30 mg/dL (1+)                 Microbiology Results Abnormal     Procedure Component Value - Date/Time    Body Fluid Culture - Body Fluid, Pleural Cavity [[id number]] Collected: 03/27/23 1532    Lab Status: Final result Specimen: Body Fluid from Pleural Cavity Updated: 03/31/23 3073     Body Fluid Culture No growth at 3 days     Gram Stain Rare (1+) WBCs per low power field      No organisms seen    MRSA Screen, PCR (Inpatient) - Swab, Nares [[id number]]  (Normal) Collected: 03/27/23 0857    Lab Status: Final result Specimen: Swab from Nares Updated: 03/27/23 1106     MRSA PCR Negative    Narrative:      The negative predictive value of this diagnostic test is high and should only be used to consider de-escalating anti-MRSA therapy. A positive result may indicate colonization with MRSA and must be  correlated clinically.  MRSA Negative    COVID PRE-OP / PRE-PROCEDURE SCREENING ORDER (NO ISOLATION) - Swab, Nasopharynx [[id number]]  (Normal) Collected: 03/24/23 2256    Lab Status: Final result Specimen: Swab from Nasopharynx Updated: 03/24/23 2356    Narrative:      The following orders were created for panel order COVID PRE-OP / PRE-PROCEDURE SCREENING ORDER (NO ISOLATION) - Swab, Nasopharynx.  Procedure                               Abnormality         Status                     ---------                               -----------         ------                     Respiratory Panel PCR w/...[[id number]]  Normal              Final result                 Please view results for these tests on the individual orders.    Respiratory Panel PCR w/COVID-19(SARS-CoV-2) SULEIMAN/TAWANNA/BLAISE/PAD/COR/MAD/ANTHONY In-House, NP Swab in UTM/VTM, 3-4 HR TAT - Swab, Nasopharynx [[id number]]  (Normal) Collected: 03/24/23 2256    Lab Status: Final result Specimen: Swab from Nasopharynx Updated: 03/24/23 2356     ADENOVIRUS, PCR Not Detected     Coronavirus 229E Not Detected     Coronavirus HKU1 Not Detected     Coronavirus NL63 Not Detected     Coronavirus OC43 Not Detected     COVID19 Not Detected     Human Metapneumovirus Not Detected     Human Rhinovirus/Enterovirus Not Detected     Influenza A PCR Not Detected     Influenza B PCR Not Detected     Parainfluenza Virus 1 Not Detected     Parainfluenza Virus 2 Not Detected     Parainfluenza Virus 3 Not Detected     Parainfluenza Virus 4 Not Detected     RSV, PCR Not Detected     Bordetella pertussis pcr Not Detected     Bordetella parapertussis PCR Not Detected     Chlamydophila pneumoniae PCR Not Detected     Mycoplasma pneumo by PCR Not Detected    Narrative:      In the setting of a positive respiratory panel with a viral infection PLUS a negative procalcitonin without other underlying concern for bacterial infection, consider observing off antibiotics or discontinuation of antibiotics and  continue supportive care. If the respiratory panel is positive for atypical bacterial infection (Bordetella pertussis, Chlamydophila pneumoniae, or Mycoplasma pneumoniae), consider antibiotic de-escalation to target atypical bacterial infection.          CT Chest Without Contrast Diagnostic    Result Date: 4/6/2023  CT CHEST WO CONTRAST DIAGNOSTIC Date of Exam: 4/6/2023 1:21 PM EDT Indication: Pleural effusion, known or suspected (Ped 0-17y). Comparison: Concurrent portable chest . Chest CT scan 4/4/2023 Technique: Axial CT images were obtained of the chest without contrast administration.  Reconstructed coronal and sagittal images were also obtained. Automated exposure control and iterative construction methods were used. Findings: Since the prior study, large bore right chest drain has been placed, with drainage of some of the effusion previously present, but significant fluid appears to remain. Thoracotomy tube, at least with the patient supine for this study, appears to pass within the anterior margin of the effusion, and up into the cephalad extent of effusion. It appears associated with a small area of debris or clot as seen on axial image 48 series 2. Previously noted small dense areas within the pleural fluid are increased, average Hounsfield unit measurements of approximately 50, and suggesting some hemorrhage mixed with pleural fluid. Pleural fluid collection appears loculated although not thick-walled. There is a small hemorrhage or debris linear a few millimeters in thickness now seen in the dependent portion of the pleural fluid collection in the posterior costophrenic angle. There is no left pleural effusion or pericardial effusion. No mediastinal adenopathy is seen. Patient's pacemaker and pacemaker wires are noted. There are scattered normal-sized mediastinal lymph nodes. Images of the lung parenchyma show a stable small scarlike opacity in the right apex, stable right lower lobe atelectasis, and no  "evidence of new disease elsewhere. Rounded appearance of the right lower lobe atelectasis and associated \"comet tail\" appearance of lung parenchyma suggests possible chronic atelectasis and associated pleural scarring. Atelectasis and  effusion are present here to similar degree all the way back to 5/27/2015 exam. Included images of the upper abdomen show no significant abnormalities of the visualized portions of the liver, gallbladder, spleen, pancreas, or adrenal glands. There appears to be a small nonobstructing right upper pole renal calculus 5 mm in diameter and an exophytic water density cyst of the left kidney. Bony structures appear to be intact.     Impression: Impression: 1. Interval right thoracotomy tube placement since 4/4/2023 exam, with relatively little drainage of the patient's complex effusion, despite what appears to be adequate position of the thoracotomy tube.. 2. Effusion appears to be partially loculated, and there is a suggestion of some increased hemorrhage/clot within the effusion, although not extensive. 3. Persistent medial right lower lobe atelectasis, present to some degree back to 2015. Effusion was present at that time as well, suggesting a component of chronic disease. 4. Probably incidental mildly nodular right apical lung scarring. Electronically Signed: Trey Allen  4/6/2023 2:01 PM EDT  Workstation ID: [id number]    XR Chest 1 View    Result Date: 4/7/2023  XR CHEST 1 VW Date of Exam: 4/7/2023 5:10 AM EDT Indication: Pleural effusion. Comparison: CT 1 day prior Findings: The left lung remains clear. Right chest tube noted in place with persistent adjacent effusion and basilar opacities. No distinct pneumothorax. Unchanged heart and mediastinal contours.     Impression: Impression: No significant interval change. Electronically Signed: Nicolas Siddiqi  4/7/2023 9:00 AM EDT  Workstation ID: [id number]    XR Chest 1 View    Result Date: 4/6/2023  XR CHEST 1 VW Date of Exam: 4/6/2023 11:09 " AM EDT Indication: michelet blood leaking from CT. Comparison: Same day Findings: Right chest tube remains in place. No significant change in right pleural effusion and right basilar atelectasis. No pneumothorax. Left lung clear. Heart size and pulmonary vessels stable     Impression: Impression: Stable chest. Stable right pleural effusion and right basilar atelectasis with chest tube in place. No pneumothorax Electronically Signed: David Cardoso  4/6/2023 11:39 AM EDT  Workstation ID: [id number]    XR Chest 1 View    Result Date: 4/6/2023  XR CHEST 1 VW Date of Exam: 4/6/2023 5:35 AM EDT Indication: Pleural effusion. Comparison: 4/5/2023 Findings: Left-sided triple lead ICD is noted. Heart and vasculature are normal in size. Left lung remains well-expanded and clear. Right basilar atelectasis is a little increased. Right thoracotomy tube remains in place. There is no evidence of pneumothorax, allowing for the apparent skinfold shadows superimposed over the lateral left chest.     Impression: Impression: Mildly increased right basilar atelectasis. No pneumothorax or other new chest disease is seen. Electronically Signed: Trey Allen  4/6/2023 7:25 AM EDT  Workstation ID: [id number]      Results for orders placed during the hospital encounter of 10/16/22    Adult Transthoracic Echo Complete W/ Cont if Necessary Per Protocol    Interpretation Summary  •  Estimated left ventricular EF = 30%.  There is global hypokinesis.  The basal-mid posterior wall appears akinetic.  •  Normal right ventricular cavity size, wall thickness, systolic function and septal motion noted. Electronic lead present in the ventricle  •  Septal wall motion is abnormal, consistent with right ventricular pacing  •  No hemodynamically significant valvular stenosis or regurgitation noted.      Current medications:  Scheduled Meds:acetaminophen, 650 mg, Oral, Q8H  vitamin C, 1,000 mg, Oral, Daily  aspirin, 81 mg, Oral, Daily  atorvastatin, 80 mg, Oral,  Nightly  carvedilol, 50 mg, Oral, BID With Meals  docusate sodium, 100 mg, Oral, BID  famotidine, 20 mg, Oral, Daily  insulin lispro, 0-7 Units, Subcutaneous, TID With Meals  lisinopril, 5 mg, Oral, Q24H  megestrol, 400 mg, Oral, Daily  mirtazapine, 15 mg, Oral, Nightly  pantoprazole, 40 mg, Oral, Nightly  polyethylene glycol, 17 g, Oral, Daily  senna-docusate sodium, 2 tablet, Oral, BID  sodium chloride, 10 mL, Intravenous, Q12H  terazosin, 1 mg, Oral, Nightly      Continuous Infusions:heparin, 24 Units/kg/hr, Last Rate: 24 Units/kg/hr (04/07/23 1035)  lactated ringers, 9 mL/hr, Last Rate: 9 mL/hr (03/29/23 0838)  Pharmacy to Dose Heparin,       PRN Meds:.•  acetaminophen **OR** acetaminophen **OR** acetaminophen  •  senna-docusate sodium **AND** polyethylene glycol **AND** bisacodyl **AND** bisacodyl  •  Calcium Replacement - Follow Nurse / BPA Driven Protocol  •  cyclobenzaprine  •  dextrose  •  dextrose  •  glucagon (human recombinant)  •  Magnesium Standard Dose Replacement - Follow Nurse / BPA Driven Protocol  •  melatonin  •  ondansetron  •  Pharmacy to Dose Heparin  •  Phosphorus Replacement - Follow Nurse / BPA Driven Protocol  •  Potassium Replacement - Follow Nurse / BPA Driven Protocol  •  sodium chloride  •  sodium chloride    Assessment & Plan   Assessment & Plan     Active Hospital Problems    Diagnosis  POA   • **Bilateral pulmonary embolism [I26.99]  Unknown   • Pleural effusion [J90]  Yes   • Unintentional weight loss [R63.4]  Unknown   • Hypoalbuminemia [E88.09]  Unknown   • Severe malnutrition [E43]  Unknown   • History of CVA (cerebrovascular accident) [Z86.73]  Not Applicable   • Debility [R53.81]  Unknown   • Presence of cardiac pacemaker [Z95.0]  Yes   • HTN (hypertension) [I10]  Yes   • Leukocytosis [D72.829]  Yes      Resolved Hospital Problems   No resolved problems to display.        Brief Hospital Course to date:  Timmy Guajardo is a 72 y.o. 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    Bilateral pulmonary embolism  History of DVT  · Likely secondary to being on an inadequate dose of Eliquis of 2.5 mg twice daily and medication noncompliance  · Continue heparin drip while hospitalized since he might need further procedures  · Plan to transition to full dose Eliquis (loading and maintenance)     Moderate exudative right-sided loculated pleural effusion  Possible organized right pleural effusion  · Bedside point-of-care ultrasound revealed complex pleural effusion  · Cardiothoracic surgery consulted.  Recommended chest tube placement by IR.    · Underwent right-sided chest tube placement by IR on 3/27/2023.  Chest tube removed 3/30/2023   · Repeat CT scan chest after removal of chest tube showed persistent moderate right pleural effusion, likely organized   · Analysis of the fluid is consistent with exudate, ?  Parapneumonic  · Culture from pleural fluid is negative to date  · Cytology showed benign mesothelial cells  · S/p 12 days zosyn  · Repeat chest tube placed 4/4/23  · Chest tube started draining michelet blood and eventually clotted off tube 4/6/23 - now abated  · CT chest 4/6 showed only a small amount of blood in the pleural cavity    Lung nodule  - 1.4 cm  - needs repeat CT imaging in 6 months    Severe malnutrition  Hypoalbuminemia  Significant unintentional weight loss  Generalized debility  · Patient reports 52lbs weight loss since October 2022.  · No EGD or colonoscopy since 2008  · CT chest and CT abdomen with no convincing evidence of solid  malignancy or lymphadenopathy.  Oncology service evaluated the patient recommended age-appropriate screening  · GI evaluated the patient for EGD and colonoscopy.  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 Continue Megace and mirtazapine 15 mg nightly for poor appetite  · Nutrition team following     UTI with Enterococcus faecalis  · POA  · Zosyn    CAD  Cardiomyopathy with EF 30 %  Essential HTN  Old CVA with right residual weakness  Dyslipidemia  · Continue aspirin  · Continue coreg, lisinopril  · Continue statin    Constipation     GERD   · Continue PPI      Type 2 diabetes w/A1c 6.3%   · Continue insulin sliding scale     Acute debility  · PT recommended acute rehab    Anemia  - cbc am      Expected Discharge Location and Transportation: Acute rehab  Expected Discharge   Expected Discharge Date and Time     Expected Discharge Date Expected Discharge Time    Apr 8, 2023            DVT prophylaxis:  Medical and mechanical DVT prophylaxis orders are present.     AM-PAC 6 Clicks Score (PT): 16 (04/07/23 1151)    CODE STATUS:   Code Status and Medical Interventions:   Ordered at: 03/24/23 9313     Code Status (Patient has no pulse and is not breathing):    CPR (Attempt to Resuscitate)     Medical Interventions (Patient has pulse or is breathing):    Full Support      More than 50% of time spent counseling on current illness and plan of care. Case discussed with: Charge nurse, RN, NA, Dr. Ponce  Total time spent face to face with the patient was 35 minutes.  Total time of the encounter was 60 minutes.      Bryce Ponce MD  04/07/23

## 2023-04-07 NOTE — PROGRESS NOTES
CTS Progress Note      Chief Complaint: Pleural effusion      Subjective  No complaints.     Objective    Physical Exam:   Vital Signs   Temp:  [98 °F (36.7 °C)-98.6 °F (37 °C)] 98 °F (36.7 °C)  Heart Rate:  [79-95] 83  Resp:  [18] 18  BP: (102-147)/(58-82) 147/69   GEN: NAD   CV: Regular rate and rhythm   RESP: Decreased bilateral bases but otherwise  clear   EXT: Warm without significant edema   Chest tube: 290 cc serosanguineous drainage in  24h   Results     Results from last 7 days   Lab Units 04/07/23  0341   WBC 10*3/mm3 8.97   HEMOGLOBIN g/dL 9.1*   HEMATOCRIT % 28.6*   PLATELETS 10*3/mm3 186     Results from last 7 days   Lab Units 04/07/23  0341   SODIUM mmol/L 144   POTASSIUM mmol/L 4.2   CHLORIDE mmol/L 112*   CO2 mmol/L 21.0*   BUN mg/dL 21   CREATININE mg/dL 0.50*   GLUCOSE mg/dL 127*   CALCIUM mg/dL 8.7     Imaging:   Findings:  Since the prior study, large bore right chest drain has been placed, with drainage of some of the effusion previously present, but significant fluid appears to remain. Thoracotomy tube, at least with the patient supine for this study, appears to pass   within the anterior margin of the effusion, and up into the cephalad extent of effusion. It appears associated with a small area of debris or clot as seen on axial image 48 series 2. Previously noted small dense areas within the pleural fluid are   increased, average Hounsfield unit measurements of approximately 50, and suggesting some hemorrhage mixed with pleural fluid. Pleural fluid collection appears loculated although not thick-walled. There is a small hemorrhage or debris linear a few   millimeters in thickness now seen in the dependent portion of the pleural fluid collection in the posterior costophrenic angle. There is no left pleural effusion or pericardial effusion.     No mediastinal adenopathy is seen. Patient's pacemaker and pacemaker wires are noted. There are scattered normal-sized mediastinal lymph nodes.  "Images of the lung parenchyma show a stable small scarlike opacity in the right apex, stable right lower lobe   atelectasis, and no evidence of new disease elsewhere. Rounded appearance of the right lower lobe atelectasis and associated \"comet tail\" appearance of lung parenchyma suggests possible chronic atelectasis and associated pleural scarring. Atelectasis and   effusion are present here to similar degree all the way back to 5/27/2015 exam.     Included images of the upper abdomen show no significant abnormalities of the visualized portions of the liver, gallbladder, spleen, pancreas, or adrenal glands. There appears to be a small nonobstructing right upper pole renal calculus 5 mm in diameter   and an exophytic water density cyst of the left kidney. Bony structures appear to be intact.     IMPRESSION:  Impression:     1. Interval right thoracotomy tube placement since 4/4/2023 exam, with relatively little drainage of the patient's complex effusion, despite what appears to be adequate position of the thoracotomy tube..     2. Effusion appears to be partially loculated, and there is a suggestion of some increased hemorrhage/clot within the effusion, although not extensive.     3. Persistent medial right lower lobe atelectasis, present to some degree back to 2015. Effusion was present at that time as well, suggesting a component of chronic disease.     4. Probably incidental mildly nodular right apical lung scarring.     Electronically Signed: Trey Allen    4/6/2023 2:01 PM EDT    Workstation ID: [id number]      Results from last 7 days   Lab Units 04/06/23  0800   APTT seconds 110.8*     Assessment & Plan   Bilateral pulmonary embolus with moderate right pleural effusion status post IR placed chest tube discontinued 3/30/2023  Continued moderate size right-sided pleural effusion    Plan   Continue chest tube to -20 suction  Daily CXR  Send pleural fluid for cytology  Ok to restart heparin gtt with no " boluses      Alexandra Azul, APRN  04/07/23  07:49 EDT

## 2023-04-08 ENCOUNTER — APPOINTMENT (OUTPATIENT)
Dept: GENERAL RADIOLOGY | Facility: HOSPITAL | Age: 73
DRG: 163 | End: 2023-04-08
Payer: MEDICARE

## 2023-04-08 LAB
ANION GAP SERPL CALCULATED.3IONS-SCNC: 12 MMOL/L (ref 5–15)
BUN SERPL-MCNC: 16 MG/DL (ref 8–23)
BUN/CREAT SERPL: 25.4 (ref 7–25)
CALCIUM SPEC-SCNC: 8.6 MG/DL (ref 8.6–10.5)
CHLORIDE SERPL-SCNC: 111 MMOL/L (ref 98–107)
CO2 SERPL-SCNC: 21 MMOL/L (ref 22–29)
CREAT SERPL-MCNC: 0.63 MG/DL (ref 0.76–1.27)
DEPRECATED RDW RBC AUTO: 59.6 FL (ref 37–54)
EGFRCR SERPLBLD CKD-EPI 2021: 102.3 ML/MIN/1.73
ERYTHROCYTE [DISTWIDTH] IN BLOOD BY AUTOMATED COUNT: 17 % (ref 12.3–15.4)
GLUCOSE BLDC GLUCOMTR-MCNC: 122 MG/DL (ref 70–130)
GLUCOSE BLDC GLUCOMTR-MCNC: 167 MG/DL (ref 70–130)
GLUCOSE BLDC GLUCOMTR-MCNC: 195 MG/DL (ref 70–130)
GLUCOSE BLDC GLUCOMTR-MCNC: 214 MG/DL (ref 70–130)
GLUCOSE SERPL-MCNC: 128 MG/DL (ref 65–99)
HCT VFR BLD AUTO: 26 % (ref 37.5–51)
HGB BLD-MCNC: 8.3 G/DL (ref 13–17.7)
MCH RBC QN AUTO: 31.1 PG (ref 26.6–33)
MCHC RBC AUTO-ENTMCNC: 31.9 G/DL (ref 31.5–35.7)
MCV RBC AUTO: 97.4 FL (ref 79–97)
PLATELET # BLD AUTO: 189 10*3/MM3 (ref 140–450)
PMV BLD AUTO: 10.4 FL (ref 6–12)
POTASSIUM SERPL-SCNC: 3.8 MMOL/L (ref 3.5–5.2)
RBC # BLD AUTO: 2.67 10*6/MM3 (ref 4.14–5.8)
SODIUM SERPL-SCNC: 144 MMOL/L (ref 136–145)
UFH PPP CHRO-ACNC: 0.37 IU/ML (ref 0.3–0.7)
UFH PPP CHRO-ACNC: 0.41 IU/ML (ref 0.3–0.7)
UFH PPP CHRO-ACNC: 0.49 IU/ML (ref 0.3–0.7)
WBC NRBC COR # BLD: 9.12 10*3/MM3 (ref 3.4–10.8)

## 2023-04-08 PROCEDURE — 99233 SBSQ HOSP IP/OBS HIGH 50: CPT | Performed by: HOSPITALIST

## 2023-04-08 PROCEDURE — 63710000001 INSULIN LISPRO (HUMAN) PER 5 UNITS: Performed by: INTERNAL MEDICINE

## 2023-04-08 PROCEDURE — 82962 GLUCOSE BLOOD TEST: CPT

## 2023-04-08 PROCEDURE — 85520 HEPARIN ASSAY: CPT

## 2023-04-08 PROCEDURE — 99231 SBSQ HOSP IP/OBS SF/LOW 25: CPT | Performed by: STUDENT IN AN ORGANIZED HEALTH CARE EDUCATION/TRAINING PROGRAM

## 2023-04-08 PROCEDURE — 85027 COMPLETE CBC AUTOMATED: CPT | Performed by: INTERNAL MEDICINE

## 2023-04-08 PROCEDURE — 71045 X-RAY EXAM CHEST 1 VIEW: CPT

## 2023-04-08 PROCEDURE — 80048 BASIC METABOLIC PNL TOTAL CA: CPT | Performed by: INTERNAL MEDICINE

## 2023-04-08 PROCEDURE — 25010000002 HEPARIN (PORCINE) 25000-0.45 UT/250ML-% SOLUTION: Performed by: INTERNAL MEDICINE

## 2023-04-08 RX ADMIN — HEPARIN SODIUM 24 UNITS/KG/HR: 10000 INJECTION, SOLUTION INTRAVENOUS at 02:47

## 2023-04-08 RX ADMIN — TERAZOSIN HYDROCHLORIDE 1 MG: 1 CAPSULE ORAL at 20:36

## 2023-04-08 RX ADMIN — LISINOPRIL 5 MG: 5 TABLET ORAL at 08:44

## 2023-04-08 RX ADMIN — PANTOPRAZOLE SODIUM 40 MG: 40 TABLET, DELAYED RELEASE ORAL at 20:36

## 2023-04-08 RX ADMIN — INSULIN LISPRO 2 UNITS: 100 INJECTION, SOLUTION INTRAVENOUS; SUBCUTANEOUS at 11:57

## 2023-04-08 RX ADMIN — POLYETHYLENE GLYCOL 3350 17 G: 17 POWDER, FOR SOLUTION ORAL at 08:44

## 2023-04-08 RX ADMIN — CYCLOBENZAPRINE 5 MG: 10 TABLET, FILM COATED ORAL at 21:43

## 2023-04-08 RX ADMIN — MEGESTROL ACETATE 400 MG: 40 SUSPENSION ORAL at 08:44

## 2023-04-08 RX ADMIN — SENNOSIDES AND DOCUSATE SODIUM 2 TABLET: 8.6; 5 TABLET ORAL at 20:36

## 2023-04-08 RX ADMIN — DOCUSATE SODIUM 100 MG: 100 CAPSULE, LIQUID FILLED ORAL at 08:44

## 2023-04-08 RX ADMIN — CARVEDILOL 50 MG: 12.5 TABLET, FILM COATED ORAL at 17:02

## 2023-04-08 RX ADMIN — ASPIRIN 81 MG CHEWABLE TABLET 81 MG: 81 TABLET CHEWABLE at 08:44

## 2023-04-08 RX ADMIN — INSULIN LISPRO 2 UNITS: 100 INJECTION, SOLUTION INTRAVENOUS; SUBCUTANEOUS at 17:02

## 2023-04-08 RX ADMIN — CARVEDILOL 50 MG: 12.5 TABLET, FILM COATED ORAL at 08:44

## 2023-04-08 RX ADMIN — HEPARIN SODIUM 24 UNITS/KG/HR: 10000 INJECTION, SOLUTION INTRAVENOUS at 21:44

## 2023-04-08 RX ADMIN — Medication 5 MG: at 21:43

## 2023-04-08 RX ADMIN — ACETAMINOPHEN 325MG 650 MG: 325 TABLET ORAL at 14:26

## 2023-04-08 RX ADMIN — ACETAMINOPHEN 325MG 650 MG: 325 TABLET ORAL at 21:43

## 2023-04-08 RX ADMIN — DOCUSATE SODIUM 100 MG: 100 CAPSULE, LIQUID FILLED ORAL at 20:36

## 2023-04-08 RX ADMIN — OXYCODONE HYDROCHLORIDE AND ACETAMINOPHEN 1000 MG: 500 TABLET ORAL at 08:44

## 2023-04-08 RX ADMIN — ATORVASTATIN CALCIUM 80 MG: 40 TABLET, FILM COATED ORAL at 20:36

## 2023-04-08 RX ADMIN — FAMOTIDINE 20 MG: 20 TABLET ORAL at 08:44

## 2023-04-08 RX ADMIN — Medication 10 ML: at 08:48

## 2023-04-08 RX ADMIN — ACETAMINOPHEN 325MG 650 MG: 325 TABLET ORAL at 05:37

## 2023-04-08 RX ADMIN — MIRTAZAPINE 15 MG: 15 TABLET, FILM COATED ORAL at 20:36

## 2023-04-08 RX ADMIN — SENNOSIDES AND DOCUSATE SODIUM 2 TABLET: 8.6; 5 TABLET ORAL at 08:44

## 2023-04-08 NOTE — PROGRESS NOTES
CTS Progress Note      Chief Complaint: Pleural effusion      Subjective  No acute events overnight, no complaints this morning    Objective    Physical Exam:   Vital Signs   Temp:  [97.9 °F (36.6 °C)-98.7 °F (37.1 °C)] 97.9 °F (36.6 °C)  Heart Rate:  [82-91] 87  Resp:  [16-18] 18  BP: (102-149)/(65-86) 128/65   GEN: NAD   CV: Regular rate and rhythm   RESP: Decreased bilateral bases but otherwise  clear   EXT: Warm without significant edema   Chest tube: 0 cc serosanguineous drainage in  24h   Results     Results from last 7 days   Lab Units 04/08/23  0711   WBC 10*3/mm3 9.12   HEMOGLOBIN g/dL 8.3*   HEMATOCRIT % 26.0*   PLATELETS 10*3/mm3 189     Results from last 7 days   Lab Units 04/08/23  0711   SODIUM mmol/L 144   POTASSIUM mmol/L 3.8   CHLORIDE mmol/L 111*   CO2 mmol/L 21.0*   BUN mg/dL 16   CREATININE mg/dL 0.63*   GLUCOSE mg/dL 128*   CALCIUM mg/dL 8.6             Results from last 7 days   Lab Units 04/06/23  0800   APTT seconds 110.8*     Assessment & Plan   Bilateral pulmonary embolus with moderate right pleural effusion status post IR placed chest tube discontinued 3/30/2023  Continued moderate size right-sided pleural effusion    Plan   Continue chest tube to -20 suction  Dr. Mahan to evaluate for possible decortication   Daily CXR  Send pleural fluid for cytology  Ok to restart heparin gtt with no boluses      Keyanna Truk PA-C  04/08/23  09:42 EDT

## 2023-04-08 NOTE — PROGRESS NOTES
[facility] Medicine Services  PROGRESS NOTE    Patient Name: Timmy Guajardo  : 1950  MRN: [id number]    Date of Admission: 3/24/2023  Primary Care Physician: Arsen Seals APRN    Subjective   Subjective     CC:  Follow-up for weakness    HPI:  Patient resting in bed. Chest tube in place, stable, sore around insertion site per patient.    ROS:  Gen- No fevers, chills  CV- No chest pain, palpitations  Resp- No cough, dyspnea  GI- No N/V/D, abd pain    Objective   Objective     Vital Signs:   Temp:  [97.9 °F (36.6 °C)-98.7 °F (37.1 °C)] 97.9 °F (36.6 °C)  Heart Rate:  [82-91] 87  Resp:  [16-18] 18  BP: (102-149)/(65-86) 128/65     Physical Exam:  Constitutional: No acute distress, awake, alert  HENT: NCAT, mucous membranes moist  Respiratory: decreased to auscultation bilaterally, respiratory effort arti on RA, chest tube in place  Cardiovascular: RRR, no murmurs, rubs, or gallops  Gastrointestinal: Positive bowel sounds, soft, nontender, nondistended  Musculoskeletal: No bilateral ankle edema  Psychiatric: Appropriate affect, cooperative  Neurologic: Oriented x 3, strength symmetric in all extremities, Cranial Nerves grossly intact to confrontation, speech clear  Skin: No rashes    Results Reviewed:  LAB RESULTS:      Lab 23  0711 23  0023 23  1736 23  1155 23  0823 23  0341 23  0043 23  1939 23  1531 23  1337 23  1119 23  0800 23  0541 23  0444   WBC 9.12  --   --   --   --  8.97  --  10.57  --   --  10.37 10.47   < > 9.30   HEMOGLOBIN 8.3*  --   --  9.0* 9.6* 9.1* 9.2* 10.3*  10.3*   < >  --  10.1* 10.4*   < > 11.7*   HEMATOCRIT 26.0*  --   --  29.0* 31.3* 28.6* 28.5* 33.2*  33.0*   < >  --  32.5* 32.8*   < > 37.9   PLATELETS 189  --   --   --   --  186  --  170  --   --  185 156   < > 137*   NEUTROS ABS  --   --   --   --   --  6.57  --  8.20*  --   --  7.88* 7.97*  --  6.35   IMMATURE  GRANS (ABS)  --   --   --   --   --  0.07*  --  0.11*  --   --  0.08* 0.07*  --  0.05   LYMPHS ABS  --   --   --   --   --  1.47  --  1.32  --   --  1.32 1.36  --  1.71   MONOS ABS  --   --   --   --   --  0.67  --  0.76  --   --  0.85 0.85  --  0.75   EOS ABS  --   --   --   --   --  0.15  --  0.12  --   --  0.19 0.18  --  0.39   MCV 97.4*  --   --   --   --  97.9*  --  99.7*  --   --  97.9* 95.3   < > 97.2*   APTT  --   --   --   --   --   --   --   --   --   --   --  110.8*  --   --    HEPARIN ANTI-XA 0.49 0.37 0.30  --   --   --   --   --   --  0.10*  --  0.46   < >  --     < > = values in this interval not displayed.         Lab 04/08/23  0711 04/07/23  0341 04/05/23  0208 04/03/23  0444 04/01/23  1129   SODIUM 144 144 140 143  --    POTASSIUM 3.8 4.2 4.0 3.9 3.6   CHLORIDE 111* 112* 107 109*  --    CO2 21.0* 21.0* 23.0 21.0*  --    ANION GAP 12.0 11.0 10.0 13.0  --    BUN 16 21 15 10  --    CREATININE 0.63* 0.50* 0.64* 0.60*  --    EGFR 102.3 109.7 101.8 103.8  --    GLUCOSE 128* 127* 96 81  --    CALCIUM 8.6 8.7 8.3* 8.5*  --                      Lab 04/06/23  1939   ABO TYPING A   RH TYPING Positive   ANTIBODY SCREEN Negative         Brief Urine Lab Results  (Last result in the past 365 days)      Color   Clarity   Blood   Leuk Est   Nitrite   Protein   CREAT   Urine HCG        03/24/23 2329 Yellow   Turbid   Small (1+)   Moderate (2+)   Negative   30 mg/dL (1+)                 Microbiology Results Abnormal     Procedure Component Value - Date/Time    Body Fluid Culture - Body Fluid, Pleural Cavity [[id number]] Collected: 03/27/23 1532    Lab Status: Final result Specimen: Body Fluid from Pleural Cavity Updated: 03/31/23 0952     Body Fluid Culture No growth at 3 days     Gram Stain Rare (1+) WBCs per low power field      No organisms seen    MRSA Screen, PCR (Inpatient) - Swab, Nares [[id number]]  (Normal) Collected: 03/27/23 0857    Lab Status: Final result Specimen: Swab from Nares Updated: 03/27/23 1107      MRSA PCR Negative    Narrative:      The negative predictive value of this diagnostic test is high and should only be used to consider de-escalating anti-MRSA therapy. A positive result may indicate colonization with MRSA and must be correlated clinically.  MRSA Negative    COVID PRE-OP / PRE-PROCEDURE SCREENING ORDER (NO ISOLATION) - Swab, Nasopharynx [[id number]]  (Normal) Collected: 03/24/23 2256    Lab Status: Final result Specimen: Swab from Nasopharynx Updated: 03/24/23 2356    Narrative:      The following orders were created for panel order COVID PRE-OP / PRE-PROCEDURE SCREENING ORDER (NO ISOLATION) - Swab, Nasopharynx.  Procedure                               Abnormality         Status                     ---------                               -----------         ------                     Respiratory Panel PCR w/...[[id number]]  Normal              Final result                 Please view results for these tests on the individual orders.    Respiratory Panel PCR w/COVID-19(SARS-CoV-2) SULEIMAN/TAWANNA/BLAISE/PAD/COR/MAD/ANTHONY In-House, NP Swab in UTM/VTM, 3-4 HR TAT - Swab, Nasopharynx [[id number]]  (Normal) Collected: 03/24/23 2256    Lab Status: Final result Specimen: Swab from Nasopharynx Updated: 03/24/23 2356     ADENOVIRUS, PCR Not Detected     Coronavirus 229E Not Detected     Coronavirus HKU1 Not Detected     Coronavirus NL63 Not Detected     Coronavirus OC43 Not Detected     COVID19 Not Detected     Human Metapneumovirus Not Detected     Human Rhinovirus/Enterovirus Not Detected     Influenza A PCR Not Detected     Influenza B PCR Not Detected     Parainfluenza Virus 1 Not Detected     Parainfluenza Virus 2 Not Detected     Parainfluenza Virus 3 Not Detected     Parainfluenza Virus 4 Not Detected     RSV, PCR Not Detected     Bordetella pertussis pcr Not Detected     Bordetella parapertussis PCR Not Detected     Chlamydophila pneumoniae PCR Not Detected     Mycoplasma pneumo by PCR Not Detected    Narrative:       In the setting of a positive respiratory panel with a viral infection PLUS a negative procalcitonin without other underlying concern for bacterial infection, consider observing off antibiotics or discontinuation of antibiotics and continue supportive care. If the respiratory panel is positive for atypical bacterial infection (Bordetella pertussis, Chlamydophila pneumoniae, or Mycoplasma pneumoniae), consider antibiotic de-escalation to target atypical bacterial infection.          CT Chest Without Contrast Diagnostic    Result Date: 4/6/2023  CT CHEST WO CONTRAST DIAGNOSTIC Date of Exam: 4/6/2023 1:21 PM EDT Indication: Pleural effusion, known or suspected (Ped 0-17y). Comparison: Concurrent portable chest . Chest CT scan 4/4/2023 Technique: Axial CT images were obtained of the chest without contrast administration.  Reconstructed coronal and sagittal images were also obtained. Automated exposure control and iterative construction methods were used. Findings: Since the prior study, large bore right chest drain has been placed, with drainage of some of the effusion previously present, but significant fluid appears to remain. Thoracotomy tube, at least with the patient supine for this study, appears to pass within the anterior margin of the effusion, and up into the cephalad extent of effusion. It appears associated with a small area of debris or clot as seen on axial image 48 series 2. Previously noted small dense areas within the pleural fluid are increased, average Hounsfield unit measurements of approximately 50, and suggesting some hemorrhage mixed with pleural fluid. Pleural fluid collection appears loculated although not thick-walled. There is a small hemorrhage or debris linear a few millimeters in thickness now seen in the dependent portion of the pleural fluid collection in the posterior costophrenic angle. There is no left pleural effusion or pericardial effusion. No mediastinal adenopathy is seen.  "Patient's pacemaker and pacemaker wires are noted. There are scattered normal-sized mediastinal lymph nodes. Images of the lung parenchyma show a stable small scarlike opacity in the right apex, stable right lower lobe atelectasis, and no evidence of new disease elsewhere. Rounded appearance of the right lower lobe atelectasis and associated \"comet tail\" appearance of lung parenchyma suggests possible chronic atelectasis and associated pleural scarring. Atelectasis and  effusion are present here to similar degree all the way back to 5/27/2015 exam. Included images of the upper abdomen show no significant abnormalities of the visualized portions of the liver, gallbladder, spleen, pancreas, or adrenal glands. There appears to be a small nonobstructing right upper pole renal calculus 5 mm in diameter and an exophytic water density cyst of the left kidney. Bony structures appear to be intact.     Impression: Impression: 1. Interval right thoracotomy tube placement since 4/4/2023 exam, with relatively little drainage of the patient's complex effusion, despite what appears to be adequate position of the thoracotomy tube.. 2. Effusion appears to be partially loculated, and there is a suggestion of some increased hemorrhage/clot within the effusion, although not extensive. 3. Persistent medial right lower lobe atelectasis, present to some degree back to 2015. Effusion was present at that time as well, suggesting a component of chronic disease. 4. Probably incidental mildly nodular right apical lung scarring. Electronically Signed: Trey Allen  4/6/2023 2:01 PM EDT  Workstation ID: [id number]    XR Chest 1 View    Result Date: 4/8/2023  XR CHEST 1 VW Date of Exam: 4/8/2023 4:45 AM EDT Indication: Pleural effusion. Comparison: 4/7/2023 Findings: Left-sided AICD again noted. Heart size normal. The left lung is clear. There is a loculated small right pleural effusion which is unchanged with right-sided chest tube in stable " position. Right basilar airspace disease likely atelectasis. No pneumothorax.     Impression: Impression: 1. Stable chest tube overlying the right lung base with loculated small right pleural effusion and mild right basilar atelectasis. 2. No pneumothorax. 3. Clear left lung. Electronically Signed: Jesse Brar  4/8/2023 8:48 AM EDT  Workstation ID: [id number]    XR Chest 1 View    Result Date: 4/7/2023  XR CHEST 1 VW Date of Exam: 4/7/2023 5:10 AM EDT Indication: Pleural effusion. Comparison: CT 1 day prior Findings: The left lung remains clear. Right chest tube noted in place with persistent adjacent effusion and basilar opacities. No distinct pneumothorax. Unchanged heart and mediastinal contours.     Impression: Impression: No significant interval change. Electronically Signed: Nicolas Siddiqi  4/7/2023 9:00 AM EDT  Workstation ID: [id number]    XR Chest 1 View    Result Date: 4/6/2023  XR CHEST 1 VW Date of Exam: 4/6/2023 11:09 AM EDT Indication: michelet blood leaking from CT. Comparison: Same day Findings: Right chest tube remains in place. No significant change in right pleural effusion and right basilar atelectasis. No pneumothorax. Left lung clear. Heart size and pulmonary vessels stable     Impression: Impression: Stable chest. Stable right pleural effusion and right basilar atelectasis with chest tube in place. No pneumothorax Electronically Signed: David Cardoso  4/6/2023 11:39 AM EDT  Workstation ID: [id number]      Results for orders placed during the hospital encounter of 10/16/22    Adult Transthoracic Echo Complete W/ Cont if Necessary Per Protocol    Interpretation Summary  •  Estimated left ventricular EF = 30%.  There is global hypokinesis.  The basal-mid posterior wall appears akinetic.  •  Normal right ventricular cavity size, wall thickness, systolic function and septal motion noted. Electronic lead present in the ventricle  •  Septal wall motion is abnormal, consistent with right ventricular pacing  •   No hemodynamically significant valvular stenosis or regurgitation noted.      Current medications:  Scheduled Meds:acetaminophen, 650 mg, Oral, Q8H  vitamin C, 1,000 mg, Oral, Daily  aspirin, 81 mg, Oral, Daily  atorvastatin, 80 mg, Oral, Nightly  carvedilol, 50 mg, Oral, BID With Meals  docusate sodium, 100 mg, Oral, BID  famotidine, 20 mg, Oral, Daily  insulin lispro, 0-7 Units, Subcutaneous, TID With Meals  lisinopril, 5 mg, Oral, Q24H  megestrol, 400 mg, Oral, Daily  mirtazapine, 15 mg, Oral, Nightly  pantoprazole, 40 mg, Oral, Nightly  polyethylene glycol, 17 g, Oral, Daily  senna-docusate sodium, 2 tablet, Oral, BID  sodium chloride, 10 mL, Intravenous, Q12H  terazosin, 1 mg, Oral, Nightly      Continuous Infusions:heparin, 24 Units/kg/hr, Last Rate: 24 Units/kg/hr (04/08/23 0247)  lactated ringers, 9 mL/hr, Last Rate: 9 mL/hr (03/29/23 0838)  Pharmacy to Dose Heparin,       PRN Meds:.•  acetaminophen **OR** acetaminophen **OR** acetaminophen  •  senna-docusate sodium **AND** polyethylene glycol **AND** bisacodyl **AND** bisacodyl  •  Calcium Replacement - Follow Nurse / BPA Driven Protocol  •  cyclobenzaprine  •  dextrose  •  dextrose  •  glucagon (human recombinant)  •  Magnesium Standard Dose Replacement - Follow Nurse / BPA Driven Protocol  •  melatonin  •  ondansetron  •  Pharmacy to Dose Heparin  •  Phosphorus Replacement - Follow Nurse / BPA Driven Protocol  •  Potassium Replacement - Follow Nurse / BPA Driven Protocol  •  sodium chloride  •  sodium chloride    Assessment & Plan   Assessment & Plan     Active Hospital Problems    Diagnosis  POA   • **Bilateral pulmonary embolism [I26.99]  Unknown   • Pleural effusion [J90]  Yes   • Unintentional weight loss [R63.4]  Unknown   • Hypoalbuminemia [E88.09]  Unknown   • Severe malnutrition [E43]  Unknown   • History of CVA (cerebrovascular accident) [Z86.73]  Not Applicable   • Debility [R53.81]  Unknown   • Presence of cardiac pacemaker [Z95.0]  Yes   • HTN  (hypertension) [I10]  Yes   • Leukocytosis [D72.829]  Yes      Resolved Hospital Problems   No resolved problems to display.        Brief Hospital Course to date:  Timmy Guajardo is a 72 y.o. 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    This patient's problems and plans were partially entered by my partner and updated as appropriate by me 04/08/23.    All problems are new to me today.    Bilateral pulmonary embolism  History of DVT  · Likely secondary to being on an inadequate dose of Eliquis of 2.5 mg twice daily and medication noncompliance  · Continue heparin drip while hospitalized since he might need further procedures  · Plan to transition to full dose Eliquis (loading and maintenance)     Moderate exudative right-sided loculated pleural effusion  Possible organized right pleural effusion  · Bedside point-of-care ultrasound revealed complex pleural effusion  · Cardiothoracic surgery consulted.  Recommended chest tube placement by IR.    · Underwent right-sided chest tube placement by IR on 3/27/2023.  Chest tube removed 3/30/2023   · Repeat CT scan chest after removal of chest tube showed persistent moderate right pleural effusion, likely organized   · Analysis of the fluid is consistent with exudate, ?  Parapneumonic  · Culture from pleural fluid is negative to date  · Cytology showed benign mesothelial cells  · S/p 12 days zosyn  · Repeat chest tube placed 4/4/23  · Chest tube started draining michelet blood and eventually clotted off tube 4/6/23  · CT chest 4/6 showed only a small amount of blood in the pleural cavity  · Continue chest tube to -20 suction per CTS  · Dr. Mahan to evaluate for possible  decortication    Lung nodule  - 1.4 cm  - needs repeat CT imaging in 6 months    Severe malnutrition  Hypoalbuminemia  Significant unintentional weight loss  Generalized debility  · Patient reports 52lbs weight loss since October 2022.  · No EGD or colonoscopy since 2008  · CT chest and CT abdomen with no convincing evidence of solid malignancy or lymphadenopathy.  Oncology service evaluated the patient recommended age-appropriate screening  · GI evaluated the patient for EGD and colonoscopy.  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 Continue Megace and mirtazapine 15 mg nightly for poor appetite  · Nutrition team following     UTI with Enterococcus faecalis  · POA  · Zosyn    CAD  Cardiomyopathy with EF 30 %  Essential HTN  Old CVA with right residual weakness  Dyslipidemia  · Continue aspirin  · Continue coreg, lisinopril  · Continue statin    Constipation     GERD   · Continue PPI      Type 2 diabetes w/A1c 6.3%   · Continue insulin sliding scale     Acute debility  · PT recommended acute rehab    Anemia  - cbc stable    Expected Discharge Location and Transportation: Acute rehab  Expected Discharge   Expected Discharge Date and Time     Expected Discharge Date Expected Discharge Time    Apr 14, 2023            DVT prophylaxis:  Medical and mechanical DVT prophylaxis orders are present.     AM-PAC 6 Clicks Score (PT): 16 (04/07/23 1821)    CODE STATUS:   Code Status and Medical Interventions:   Ordered at: 03/24/23 2704     Code Status (Patient has no pulse and is not breathing):    CPR (Attempt to Resuscitate)     Medical Interventions (Patient has pulse or is breathing):    Full Support      More than 50% of time spent counseling on current illness and plan of care. Case  discussed with: Charge nurse, RN, NA, Dr. Ponce  Total time spent face to face with the patient was 35 minutes.  Total time of the encounter was 60 minutes.      Alysia Wheeler DO  04/08/23

## 2023-04-08 NOTE — PROGRESS NOTES
HEPARIN INFUSION  Timmy Guajardo is a  72 y.o. male receiving heparin infusion.     Therapy for (VTE/Cardiac): VTE  Patient Weight: 56.7 kg  Initial Bolus (Y/N): Yes  Any Bolus (Y/N): Yes        Bleeding: Noted michelet blood from chest tube 4/6, heparin held and stitch placed    VTE (PE/DVT)   Initial Bolus: 80 units/kg (Max 10,000 units)  Initial rate: 18 units/kg/hr (Max 1,500 units/hr)    Anti Xa Rebolus Infusion Hold time Change infusion Dose (Units/kg/hr) Next Anti Xa Level Due   < 0.11 50 Units/kg  (4000 Units Max) None Increase by  4 Units/kg/hr 6 hours   0.11 - 0.19 25 Units/kg  (2000 Units Max) None Increase by  3 Units/kg/hr 6 hours   0.2 - 0.29 0 None Increase by  2 Units/kg/hr 6 hours   0.3 - 0.7 0 None No Change 6 hours (after 2 consecutive levels in range check qAM)   0.71 - 0.8 0 None Decrease by  1 Units/kg/hr 6 hours   0.81 - 0.9 0 None Decrease by  2 Units/kg/hr 6 hours   0.91 - 1 0 60 Minutes Decrease by  3 Units/kg/hr 6 hours   >1 0 Hold  After Anti Xa less than 0.7 decrease previous rate by  4 Units/kg/hr  Every 2 hours until Anti Xa is less than 0.7 then when infusion restarts in 6 hours     Results from last 7 days   Lab Units 04/08/23  0711 04/07/23  1155 04/07/23  0823 04/07/23  0341 04/07/23  0043 04/06/23  1939   HEMOGLOBIN g/dL 8.3* 9.0* 9.6* 9.1*   < > 10.3*  10.3*   HEMATOCRIT % 26.0* 29.0* 31.3* 28.6*   < > 33.2*  33.0*   PLATELETS 10*3/mm3 189  --   --  186  --  170    < > = values in this interval not displayed.       Date   Time   Anti-Xa Current Rate (Unit/kg/hr) Bolus   (Units) Rate Change   (Unit/kg/hr) New Rate (Unit/kg/hr) Next anti-Xa Comments  Pump Check Daily   3/25 0025 pending 0 4540 +18 18 0700 New start   3/25 0722 >1.1 18 -- -18 0 0930 [place] 324   3/25 0954 >1.1 0 -- -- 0 1300 [place] 3249   3/25  aPTT 0        3/25 0954 63 0  +16 16 2100 [place] [address]   3/25 2141 71.4 16 -- -- 16 0400 Alexandro [facility]   3/26 0436 77.1 16 -- -- 16 1200 Alexandro [address] [place] [address]   3/26 1136  45.5  (heparin was held for 2.5hrs prior to draw) 16 --  16 2000 Ros 3250   3/26 2035 57.5 16 -- +1 17 0600 Nadia 3249 -acb   3/27 0708 88.1 17 -- -- 17 1300 Marisa 3250   3/27 1630 -- off since 0730 for CT plcmt -- resume at prev rate 17 2300 Heparin resumed s/p R CT placement   3/27 2254 42.4 17 2000 +2 19 0800 Nadia 3252 -acb   3/28 0842 132.6 19 -- hold -- 1200 Dw RN   3/28 1357 46.3 -- -- +15 15 2100 DW RN   3/28 2037 0.15 15 1400 +3 18 0400 D/w WADE Zuniga   3/29 0444 0.35 18 -- -- 18 1200 Nadia 3252 -acb   3/29 1137 0.27 18 -- +2 20 2000 Marisa 3250   3/29 2001 0.50 20 -- -- 20 off at 0000 D/w WADE Arauz   3/30 1800 -- off since 0000 for planned colonoscopy no bolus until after colon study complete resume at prev rate 20 0000 D/w RN and Dr. Cooper - pt did not have scope today. Restart heparin in meantime w/o bolus and turn off at 0600 for planned scope in AM.     3/31 0115  20 --- -- Held 0600 -- D/w RN   3/21 1030 -- 0  restart+20 +20 1600    3/21 1817 0.36 20 -- -- 20 0100 D/w RN   4/1 0100 0.38 20 -- -- 20 1200 D/w RN   4/1 1129 0.49 20 -- -- 20 0600 D/w WADE Herrera, pump verified   4/2 0600 0.43 20 -- -- 20 0600 4/3 D/w RN  Pump checked ES       Date   Time   Anti-Xa Current Rate (Unit/kg/hr) Bolus   (Units) Rate Change   (Unit/kg/hr) New Rate (Unit/kg/hr) Next   Anti-Xa Comments  Pump Check Daily   4/3 0541 0.39 20 -- -- 20 0600 Nisha 3239   4/4 0630 0.19 20 -- +3 23 1200 D/w RN  Pump checked - ES   4/4 1410 0.31 23 -- -- 23 2100 D/w WADE Blood    4/4 2000 -- OFF -- -- +23 0200 Off for an unknown amount of time prior to CT insertion-restart at 2000 per Dr. Archuleta; D/w Merle, WADE   4/5 0300 0.28 23 -- +1 24 0900 D/w RN   4/5 0935 0.33 24 -- -- 24 1500 D/W RN   4/5 1540 0.35 24 -- -- 24 0600 D/w WADE Su   4/6 0800 0.46 24 -- -- -- 1400 D/W michelet [place] blood out of chest tube, order per Dr. Ponce to HOLD heparin drip. Will follow up   4/7 1026 -- -- -- +24 24 1700 Okay to restart heparin drip with no  bolus per Dr. Ponce, CTS   4/7 1736 0.3 24 -- -- 24 0000 D/w RN, notes blood from chest tube but no change from baseline   4/8 0023 0.37 24 -- -- 24 0600 [place] 3239 -acb   4/8 0748 0.49 24 -- -- 24 1400 D/W RN, Sharon Caicedo, [facility]  4/8/2023  09:05 EDT

## 2023-04-09 ENCOUNTER — APPOINTMENT (OUTPATIENT)
Dept: GENERAL RADIOLOGY | Facility: HOSPITAL | Age: 73
DRG: 163 | End: 2023-04-09
Payer: MEDICARE

## 2023-04-09 LAB
ALBUMIN SERPL-MCNC: 2.8 G/DL (ref 3.5–5.2)
ALBUMIN/GLOB SERPL: 1 G/DL
ALP SERPL-CCNC: 72 U/L (ref 39–117)
ALT SERPL W P-5'-P-CCNC: 13 U/L (ref 1–41)
ANION GAP SERPL CALCULATED.3IONS-SCNC: 11 MMOL/L (ref 5–15)
AST SERPL-CCNC: 19 U/L (ref 1–40)
BILIRUB SERPL-MCNC: 0.2 MG/DL (ref 0–1.2)
BUN SERPL-MCNC: 14 MG/DL (ref 8–23)
BUN/CREAT SERPL: 20.6 (ref 7–25)
CALCIUM SPEC-SCNC: 8.7 MG/DL (ref 8.6–10.5)
CHLORIDE SERPL-SCNC: 109 MMOL/L (ref 98–107)
CO2 SERPL-SCNC: 21 MMOL/L (ref 22–29)
CREAT SERPL-MCNC: 0.68 MG/DL (ref 0.76–1.27)
DEPRECATED RDW RBC AUTO: 62.6 FL (ref 37–54)
EGFRCR SERPLBLD CKD-EPI 2021: 100 ML/MIN/1.73
ERYTHROCYTE [DISTWIDTH] IN BLOOD BY AUTOMATED COUNT: 17.8 % (ref 12.3–15.4)
GLOBULIN UR ELPH-MCNC: 2.8 GM/DL
GLUCOSE BLDC GLUCOMTR-MCNC: 150 MG/DL (ref 70–130)
GLUCOSE BLDC GLUCOMTR-MCNC: 185 MG/DL (ref 70–130)
GLUCOSE BLDC GLUCOMTR-MCNC: 195 MG/DL (ref 70–130)
GLUCOSE BLDC GLUCOMTR-MCNC: 212 MG/DL (ref 70–130)
GLUCOSE SERPL-MCNC: 163 MG/DL (ref 65–99)
HCT VFR BLD AUTO: 26.4 % (ref 37.5–51)
HGB BLD-MCNC: 8.4 G/DL (ref 13–17.7)
MCH RBC QN AUTO: 31.6 PG (ref 26.6–33)
MCHC RBC AUTO-ENTMCNC: 31.8 G/DL (ref 31.5–35.7)
MCV RBC AUTO: 99.2 FL (ref 79–97)
PLATELET # BLD AUTO: 192 10*3/MM3 (ref 140–450)
PMV BLD AUTO: 10.6 FL (ref 6–12)
POTASSIUM SERPL-SCNC: 4 MMOL/L (ref 3.5–5.2)
PROT SERPL-MCNC: 5.6 G/DL (ref 6–8.5)
RBC # BLD AUTO: 2.66 10*6/MM3 (ref 4.14–5.8)
SODIUM SERPL-SCNC: 141 MMOL/L (ref 136–145)
UFH PPP CHRO-ACNC: 0.37 IU/ML (ref 0.3–0.7)
WBC NRBC COR # BLD: 8.27 10*3/MM3 (ref 3.4–10.8)

## 2023-04-09 PROCEDURE — 99231 SBSQ HOSP IP/OBS SF/LOW 25: CPT | Performed by: STUDENT IN AN ORGANIZED HEALTH CARE EDUCATION/TRAINING PROGRAM

## 2023-04-09 PROCEDURE — 80053 COMPREHEN METABOLIC PANEL: CPT | Performed by: HOSPITALIST

## 2023-04-09 PROCEDURE — 63710000001 INSULIN LISPRO (HUMAN) PER 5 UNITS: Performed by: INTERNAL MEDICINE

## 2023-04-09 PROCEDURE — 71045 X-RAY EXAM CHEST 1 VIEW: CPT

## 2023-04-09 PROCEDURE — 85520 HEPARIN ASSAY: CPT

## 2023-04-09 PROCEDURE — 25010000002 HEPARIN (PORCINE) 25000-0.45 UT/250ML-% SOLUTION: Performed by: INTERNAL MEDICINE

## 2023-04-09 PROCEDURE — 82962 GLUCOSE BLOOD TEST: CPT

## 2023-04-09 PROCEDURE — 99232 SBSQ HOSP IP/OBS MODERATE 35: CPT | Performed by: HOSPITALIST

## 2023-04-09 PROCEDURE — 85027 COMPLETE CBC AUTOMATED: CPT | Performed by: HOSPITALIST

## 2023-04-09 RX ADMIN — PANTOPRAZOLE SODIUM 40 MG: 40 TABLET, DELAYED RELEASE ORAL at 21:00

## 2023-04-09 RX ADMIN — MIRTAZAPINE 15 MG: 15 TABLET, FILM COATED ORAL at 20:59

## 2023-04-09 RX ADMIN — INSULIN LISPRO 2 UNITS: 100 INJECTION, SOLUTION INTRAVENOUS; SUBCUTANEOUS at 17:00

## 2023-04-09 RX ADMIN — CYCLOBENZAPRINE 5 MG: 10 TABLET, FILM COATED ORAL at 21:04

## 2023-04-09 RX ADMIN — CARVEDILOL 50 MG: 12.5 TABLET, FILM COATED ORAL at 08:00

## 2023-04-09 RX ADMIN — HEPARIN SODIUM 24 UNITS/KG/HR: 10000 INJECTION, SOLUTION INTRAVENOUS at 14:19

## 2023-04-09 RX ADMIN — ACETAMINOPHEN 325MG 650 MG: 325 TABLET ORAL at 14:18

## 2023-04-09 RX ADMIN — LISINOPRIL 5 MG: 5 TABLET ORAL at 08:00

## 2023-04-09 RX ADMIN — CARVEDILOL 50 MG: 12.5 TABLET, FILM COATED ORAL at 17:00

## 2023-04-09 RX ADMIN — SENNOSIDES AND DOCUSATE SODIUM 2 TABLET: 8.6; 5 TABLET ORAL at 21:00

## 2023-04-09 RX ADMIN — ASPIRIN 81 MG CHEWABLE TABLET 81 MG: 81 TABLET CHEWABLE at 08:00

## 2023-04-09 RX ADMIN — ACETAMINOPHEN 325MG 650 MG: 325 TABLET ORAL at 08:00

## 2023-04-09 RX ADMIN — Medication 10 ML: at 08:01

## 2023-04-09 RX ADMIN — INSULIN LISPRO 2 UNITS: 100 INJECTION, SOLUTION INTRAVENOUS; SUBCUTANEOUS at 08:01

## 2023-04-09 RX ADMIN — ACETAMINOPHEN 325MG 650 MG: 325 TABLET ORAL at 21:02

## 2023-04-09 RX ADMIN — FAMOTIDINE 20 MG: 20 TABLET ORAL at 08:02

## 2023-04-09 RX ADMIN — ATORVASTATIN CALCIUM 80 MG: 40 TABLET, FILM COATED ORAL at 20:58

## 2023-04-09 RX ADMIN — DOCUSATE SODIUM 100 MG: 100 CAPSULE, LIQUID FILLED ORAL at 21:00

## 2023-04-09 RX ADMIN — TERAZOSIN HYDROCHLORIDE 1 MG: 1 CAPSULE ORAL at 20:59

## 2023-04-09 RX ADMIN — OXYCODONE HYDROCHLORIDE AND ACETAMINOPHEN 1000 MG: 500 TABLET ORAL at 08:00

## 2023-04-09 RX ADMIN — Medication 5 MG: at 20:58

## 2023-04-09 RX ADMIN — MEGESTROL ACETATE 400 MG: 40 SUSPENSION ORAL at 08:00

## 2023-04-09 RX ADMIN — INSULIN LISPRO 2 UNITS: 100 INJECTION, SOLUTION INTRAVENOUS; SUBCUTANEOUS at 11:33

## 2023-04-09 NOTE — PLAN OF CARE
Goal Outcome Evaluation:  A&Ox4, VSS, O2 95% on RA, denies pain/discomfort. No output from chest tube-sight remains clean and dry. No significant changes over night. Bed in lowest position, assistive devices within reach, alarms on and audible, hourly rounding prefomed. Will continue to monitor.

## 2023-04-09 NOTE — PROGRESS NOTES
[facility] Medicine Services  PROGRESS NOTE    Patient Name: Timmy Guajardo  : 1950  MRN: [id number]    Date of Admission: 3/24/2023  Primary Care Physician: Arsen Seals APRN    Subjective   Subjective     CC:  Follow-up for weakness    HPI:  Patient resting in bed. Chest tube in place, stable with no acute complaints this am. States he ate his breakfast well.    ROS:  Gen- No fevers, chills  CV- No chest pain, palpitations  Resp- No cough, dyspnea  GI- No N/V/D, abd pain    Objective   Objective     Vital Signs:   Temp:  [97.9 °F (36.6 °C)-98.5 °F (36.9 °C)] 98.5 °F (36.9 °C)  Heart Rate:  [79-83] 79  Resp:  [18] 18  BP: (128-138)/(67-78) 133/68     Physical Exam:  Constitutional: No acute distress, awake, alert  HENT: NCAT, mucous membranes moist  Respiratory: decreased to auscultation bilaterally, respiratory effort normal on RA, chest tube in place  Cardiovascular: RRR, no murmurs, rubs, or gallops  Gastrointestinal: Positive bowel sounds, soft, nontender, nondistended  Musculoskeletal: No bilateral ankle edema  Psychiatric: Appropriate affect, cooperative  Neurologic: Oriented x 3, strength symmetric in all extremities, Cranial Nerves grossly intact to confrontation, speech clear  Skin: No rashes    Results Reviewed:  LAB RESULTS:      Lab 23  0554 23  1548 23  0711 23  0023 23  1736 23  1155 23  0823 23  0341 23  0043 23  1939 23  1337 23  1119 23  0800 23  0541 23  0444   WBC 8.27  --  9.12  --   --   --   --  8.97  --  10.57  --  10.37 10.47   < > 9.30   HEMOGLOBIN 8.4*  --  8.3*  --   --  9.0* 9.6* 9.1*   < > 10.3*  10.3*   < > 10.1* 10.4*   < > 11.7*   HEMATOCRIT 26.4*  --  26.0*  --   --  29.0* 31.3* 28.6*   < > 33.2*  33.0*   < > 32.5* 32.8*   < > 37.9   PLATELETS 192  --  189  --   --   --   --  186  --  170  --  185 156   < > 137*   NEUTROS ABS  --   --   --   --   --    --   --  6.57  --  8.20*  --  7.88* 7.97*  --  6.35   IMMATURE GRANS (ABS)  --   --   --   --   --   --   --  0.07*  --  0.11*  --  0.08* 0.07*  --  0.05   LYMPHS ABS  --   --   --   --   --   --   --  1.47  --  1.32  --  1.32 1.36  --  1.71   MONOS ABS  --   --   --   --   --   --   --  0.67  --  0.76  --  0.85 0.85  --  0.75   EOS ABS  --   --   --   --   --   --   --  0.15  --  0.12  --  0.19 0.18  --  0.39   MCV 99.2*  --  97.4*  --   --   --   --  97.9*  --  99.7*  --  97.9* 95.3   < > 97.2*   APTT  --   --   --   --   --   --   --   --   --   --   --   --  110.8*  --   --    HEPARIN ANTI-XA 0.37 0.41 0.49 0.37 0.30  --   --   --   --   --    < >  --  0.46   < >  --     < > = values in this interval not displayed.         Lab 04/09/23  0554 04/08/23  0711 04/07/23  0341 04/05/23  0208 04/03/23  0444   SODIUM 141 144 144 140 143   POTASSIUM 4.0 3.8 4.2 4.0 3.9   CHLORIDE 109* 111* 112* 107 109*   CO2 21.0* 21.0* 21.0* 23.0 21.0*   ANION GAP 11.0 12.0 11.0 10.0 13.0   BUN 14 16 21 15 10   CREATININE 0.68* 0.63* 0.50* 0.64* 0.60*   EGFR 100.0 102.3 109.7 101.8 103.8   GLUCOSE 163* 128* 127* 96 81   CALCIUM 8.7 8.6 8.7 8.3* 8.5*         Lab 04/09/23  0554   TOTAL PROTEIN 5.6*   ALBUMIN 2.8*   GLOBULIN 2.8   ALT (SGPT) 13   AST (SGOT) 19   BILIRUBIN 0.2   ALK PHOS 72                 Lab 04/06/23  1939   ABO TYPING A   RH TYPING Positive   ANTIBODY SCREEN Negative         Brief Urine Lab Results  (Last result in the past 365 days)      Color   Clarity   Blood   Leuk Est   Nitrite   Protein   CREAT   Urine HCG        03/24/23 2329 Yellow   Turbid   Small (1+)   Moderate (2+)   Negative   30 mg/dL (1+)                 Microbiology Results Abnormal     Procedure Component Value - Date/Time    Body Fluid Culture - Body Fluid, Pleural Cavity [[id number]] Collected: 03/27/23 1532    Lab Status: Final result Specimen: Body Fluid from Pleural Cavity Updated: 03/31/23 0978     Body Fluid Culture No growth at 3 days     Gram  Stain Rare (1+) WBCs per low power field      No organisms seen    MRSA Screen, PCR (Inpatient) - Swab, Nares [[id number]]  (Normal) Collected: 03/27/23 0857    Lab Status: Final result Specimen: Swab from Nares Updated: 03/27/23 1107     MRSA PCR Negative    Narrative:      The negative predictive value of this diagnostic test is high and should only be used to consider de-escalating anti-MRSA therapy. A positive result may indicate colonization with MRSA and must be correlated clinically.  MRSA Negative    COVID PRE-OP / PRE-PROCEDURE SCREENING ORDER (NO ISOLATION) - Swab, Nasopharynx [[id number]]  (Normal) Collected: 03/24/23 2256    Lab Status: Final result Specimen: Swab from Nasopharynx Updated: 03/24/23 2356    Narrative:      The following orders were created for panel order COVID PRE-OP / PRE-PROCEDURE SCREENING ORDER (NO ISOLATION) - Swab, Nasopharynx.  Procedure                               Abnormality         Status                     ---------                               -----------         ------                     Respiratory Panel PCR w/...[[id number]]  Normal              Final result                 Please view results for these tests on the individual orders.    Respiratory Panel PCR w/COVID-19(SARS-CoV-2) SULEIMAN/TAWANNA/BLAISE/PAD/COR/MAD/ANTHONY In-House, NP Swab in UTM/VTM, 3-4 HR TAT - Swab, Nasopharynx [[id number]]  (Normal) Collected: 03/24/23 2256    Lab Status: Final result Specimen: Swab from Nasopharynx Updated: 03/24/23 2356     ADENOVIRUS, PCR Not Detected     Coronavirus 229E Not Detected     Coronavirus HKU1 Not Detected     Coronavirus NL63 Not Detected     Coronavirus OC43 Not Detected     COVID19 Not Detected     Human Metapneumovirus Not Detected     Human Rhinovirus/Enterovirus Not Detected     Influenza A PCR Not Detected     Influenza B PCR Not Detected     Parainfluenza Virus 1 Not Detected     Parainfluenza Virus 2 Not Detected     Parainfluenza Virus 3 Not Detected     Parainfluenza  Virus 4 Not Detected     RSV, PCR Not Detected     Bordetella pertussis pcr Not Detected     Bordetella parapertussis PCR Not Detected     Chlamydophila pneumoniae PCR Not Detected     Mycoplasma pneumo by PCR Not Detected    Narrative:      In the setting of a positive respiratory panel with a viral infection PLUS a negative procalcitonin without other underlying concern for bacterial infection, consider observing off antibiotics or discontinuation of antibiotics and continue supportive care. If the respiratory panel is positive for atypical bacterial infection (Bordetella pertussis, Chlamydophila pneumoniae, or Mycoplasma pneumoniae), consider antibiotic de-escalation to target atypical bacterial infection.          XR Chest 1 View    Result Date: 4/9/2023  XR CHEST 1 VW Date of Exam: 4/9/2023 3:50 AM EDT Indication: Pleural effusion. Comparison: 4/8/2023 Findings: Right chest tube remains in place. No significant change in small right pleural effusion. No pneumothorax. Airspace disease in the lower right lung, likely atelectasis. Left lung clear. Heart size stable     Impression: Impression: Stable right pleural effusion and atelectasis in the lower right lung. No pneumothorax Electronically Signed: David Cardoso  4/9/2023 8:39 AM EDT  Workstation ID: [id number]    XR Chest 1 View    Result Date: 4/8/2023  XR CHEST 1 VW Date of Exam: 4/8/2023 4:45 AM EDT Indication: Pleural effusion. Comparison: 4/7/2023 Findings: Left-sided AICD again noted. Heart size normal. The left lung is clear. There is a loculated small right pleural effusion which is unchanged with right-sided chest tube in stable position. Right basilar airspace disease likely atelectasis. No pneumothorax.     Impression: Impression: 1. Stable chest tube overlying the right lung base with loculated small right pleural effusion and mild right basilar atelectasis. 2. No pneumothorax. 3. Clear left lung. Electronically Signed: Jesse Brar  4/8/2023 8:48 AM  EDT  Workstation ID: [id number]      Results for orders placed during the hospital encounter of 10/16/22    Adult Transthoracic Echo Complete W/ Cont if Necessary Per Protocol    Interpretation Summary  •  Estimated left ventricular EF = 30%.  There is global hypokinesis.  The basal-mid posterior wall appears akinetic.  •  Normal right ventricular cavity size, wall thickness, systolic function and septal motion noted. Electronic lead present in the ventricle  •  Septal wall motion is abnormal, consistent with right ventricular pacing  •  No hemodynamically significant valvular stenosis or regurgitation noted.      Current medications:  Scheduled Meds:acetaminophen, 650 mg, Oral, Q8H  vitamin C, 1,000 mg, Oral, Daily  aspirin, 81 mg, Oral, Daily  atorvastatin, 80 mg, Oral, Nightly  carvedilol, 50 mg, Oral, BID With Meals  docusate sodium, 100 mg, Oral, BID  famotidine, 20 mg, Oral, Daily  insulin lispro, 0-7 Units, Subcutaneous, TID With Meals  lisinopril, 5 mg, Oral, Q24H  megestrol, 400 mg, Oral, Daily  mirtazapine, 15 mg, Oral, Nightly  pantoprazole, 40 mg, Oral, Nightly  polyethylene glycol, 17 g, Oral, Daily  senna-docusate sodium, 2 tablet, Oral, BID  sodium chloride, 10 mL, Intravenous, Q12H  terazosin, 1 mg, Oral, Nightly      Continuous Infusions:heparin, 24 Units/kg/hr, Last Rate: 24 Units/kg/hr (04/08/23 1968)  lactated ringers, 9 mL/hr, Last Rate: 9 mL/hr (03/29/23 0838)  Pharmacy to Dose Heparin,       PRN Meds:.•  acetaminophen **OR** acetaminophen **OR** acetaminophen  •  senna-docusate sodium **AND** polyethylene glycol **AND** bisacodyl **AND** bisacodyl  •  Calcium Replacement - Follow Nurse / BPA Driven Protocol  •  cyclobenzaprine  •  dextrose  •  dextrose  •  glucagon (human recombinant)  •  Magnesium Standard Dose Replacement - Follow Nurse / BPA Driven Protocol  •  melatonin  •  ondansetron  •  Pharmacy to Dose Heparin  •  Phosphorus Replacement - Follow Nurse / BPA Driven Protocol  •  Potassium  Replacement - Follow Nurse / BPA Driven Protocol  •  sodium chloride  •  sodium chloride    Assessment & Plan   Assessment & Plan     Active Hospital Problems    Diagnosis  POA   • **Bilateral pulmonary embolism [I26.99]  Unknown   • Pleural effusion [J90]  Yes   • Unintentional weight loss [R63.4]  Unknown   • Hypoalbuminemia [E88.09]  Unknown   • Severe malnutrition [E43]  Unknown   • History of CVA (cerebrovascular accident) [Z86.73]  Not Applicable   • Debility [R53.81]  Unknown   • Presence of cardiac pacemaker [Z95.0]  Yes   • HTN (hypertension) [I10]  Yes   • Leukocytosis [D72.829]  Yes      Resolved Hospital Problems   No resolved problems to display.        Brief Hospital Course to date:  Timmy Guajardo is a 72 y.o. 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    This patient's problems and plans were partially entered by my partner and updated as appropriate by me 04/09/23.    Bilateral pulmonary embolism  History of DVT  · Likely secondary to being on an inadequate dose of Eliquis of 2.5 mg twice daily and medication noncompliance  · Continue heparin drip while hospitalized since he might need further procedures  · Plan to transition to full dose Eliquis (loading and maintenance)     Moderate exudative right-sided loculated pleural effusion  Possible organized right pleural effusion  · Bedside point-of-care ultrasound revealed complex pleural effusion  · Cardiothoracic surgery consulted.  Recommended chest tube placement by IR.    · Underwent right-sided chest tube placement by IR on 3/27/2023.  Chest tube removed 3/30/2023   · Repeat CT scan chest after removal of chest tube showed persistent moderate right  pleural effusion, likely organized   · Analysis of the fluid is consistent with exudate, ?  Parapneumonic  · Culture from pleural fluid is negative to date  · Cytology showed benign mesothelial cells  · S/p 12 days zosyn  · Repeat chest tube placed 4/4/23  · Chest tube started draining michelet blood and eventually clotted off tube 4/6/23  · CT chest 4/6 showed only a small amount of blood in the pleural cavity  · Continue chest tube to -20 suction per CTS  · Dr. Mahan to evaluate for possible decortication    Lung nodule  - 1.4 cm  - needs repeat CT imaging in 6 months    Severe malnutrition  Hypoalbuminemia  Significant unintentional weight loss  Generalized debility  · Patient reports 52lbs weight loss since October 2022.  · No EGD or colonoscopy since 2008  · CT chest and CT abdomen with no convincing evidence of solid malignancy or lymphadenopathy.  Oncology service evaluated the patient recommended age-appropriate screening  · GI evaluated the patient for EGD and colonoscopy.  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 Continue Megace and mirtazapine 15 mg nightly for poor appetite  · Nutrition team following     UTI with Enterococcus faecalis  · POA  · Zosyn    CAD  Cardiomyopathy with EF 30 %  Essential HTN  Old CVA with right residual weakness  Dyslipidemia  · Continue aspirin  · Continue coreg, lisinopril  · Continue statin    Constipation     GERD   · Continue PPI      Type 2 diabetes w/A1c 6.3%   · Continue insulin sliding scale     Acute debility  · PT recommended acute rehab    Anemia  - cbc stable    Expected Discharge Location and Transportation: Acute rehab  Expected Discharge   Expected Discharge Date and Time     Expected Discharge Date Expected Discharge Time     Apr 14, 2023            DVT prophylaxis:  Medical and mechanical DVT prophylaxis orders are present.     AM-PAC 6 Clicks Score (PT): 16 (04/08/23 2055)    CODE STATUS:   Code Status and Medical Interventions:   Ordered at: 03/24/23 1421     Code Status (Patient has no pulse and is not breathing):    CPR (Attempt to Resuscitate)     Medical Interventions (Patient has pulse or is breathing):    Full Support      More than 50% of time spent counseling on current illness and plan of care. Case discussed with: Charge nurse, RN, NA, Dr. Ponce  Total time spent face to face with the patient was 35 minutes.  Total time of the encounter was 60 minutes.      lAysia Wheeler DO  04/09/23

## 2023-04-09 NOTE — PROGRESS NOTES
CTS Progress Note      Chief Complaint: Pleural effusion      Subjective  No acute events overnight, no complaints this morning    Objective    Physical Exam:   Vital Signs   Temp:  [97.9 °F (36.6 °C)-98 °F (36.7 °C)] 98 °F (36.7 °C)  Heart Rate:  [79-88] 81  Resp:  [18] 18  BP: (102-138)/(65-78) 138/67   GEN: NAD   CV: Regular rate and rhythm   RESP: Decreased bilateral bases but otherwise  clear   EXT: Warm without significant edema   Chest tube: 0 cc serosanguineous drainage in  24h   Results     Results from last 7 days   Lab Units 04/08/23  0711   WBC 10*3/mm3 9.12   HEMOGLOBIN g/dL 8.3*   HEMATOCRIT % 26.0*   PLATELETS 10*3/mm3 189     Results from last 7 days   Lab Units 04/08/23  0711   SODIUM mmol/L 144   POTASSIUM mmol/L 3.8   CHLORIDE mmol/L 111*   CO2 mmol/L 21.0*   BUN mg/dL 16   CREATININE mg/dL 0.63*   GLUCOSE mg/dL 128*   CALCIUM mg/dL 8.6             Results from last 7 days   Lab Units 04/06/23  0800   APTT seconds 110.8*     Assessment & Plan   Bilateral pulmonary embolus with moderate right pleural effusion status post IR placed chest tube discontinued 3/30/2023  Continued moderate size right-sided pleural effusion    Plan   Continue chest tube to -20 suction  Dr. Mahan to evaluate for possible decortication   Daily CXR  Send pleural fluid for cytology  Ok for heparin gtt with no boluses    Jarrett Archuleta MD  04/09/23  06:28 EDT

## 2023-04-09 NOTE — PLAN OF CARE
Goal Outcome Evaluation:  Plan of Care Reviewed With: patient        Progress: no change  Outcome Evaluation: Patient A+Ox4, V-Paced via the the monitor.  Has denied SOA on room air.  Chest tube intact with scant amount of bright red drainage noted.  Patient refused to get OOB into the bedside chair, states he is comfortable in bed.  WIll continue with current POC.

## 2023-04-10 ENCOUNTER — APPOINTMENT (OUTPATIENT)
Dept: GENERAL RADIOLOGY | Facility: HOSPITAL | Age: 73
DRG: 163 | End: 2023-04-10
Payer: MEDICARE

## 2023-04-10 LAB
ALBUMIN SERPL-MCNC: 3 G/DL (ref 3.5–5.2)
ALBUMIN/GLOB SERPL: 1.1 G/DL
ALP SERPL-CCNC: 68 U/L (ref 39–117)
ALT SERPL W P-5'-P-CCNC: 11 U/L (ref 1–41)
ANION GAP SERPL CALCULATED.3IONS-SCNC: 12 MMOL/L (ref 5–15)
AST SERPL-CCNC: 15 U/L (ref 1–40)
BILIRUB SERPL-MCNC: 0.2 MG/DL (ref 0–1.2)
BUN SERPL-MCNC: 16 MG/DL (ref 8–23)
BUN/CREAT SERPL: 24.2 (ref 7–25)
CALCIUM SPEC-SCNC: 8.6 MG/DL (ref 8.6–10.5)
CHLORIDE SERPL-SCNC: 110 MMOL/L (ref 98–107)
CO2 SERPL-SCNC: 22 MMOL/L (ref 22–29)
CREAT SERPL-MCNC: 0.66 MG/DL (ref 0.76–1.27)
DEPRECATED RDW RBC AUTO: 65.2 FL (ref 37–54)
EGFRCR SERPLBLD CKD-EPI 2021: 100.9 ML/MIN/1.73
ERYTHROCYTE [DISTWIDTH] IN BLOOD BY AUTOMATED COUNT: 18.3 % (ref 12.3–15.4)
GLOBULIN UR ELPH-MCNC: 2.7 GM/DL
GLUCOSE BLDC GLUCOMTR-MCNC: 140 MG/DL (ref 70–130)
GLUCOSE BLDC GLUCOMTR-MCNC: 152 MG/DL (ref 70–130)
GLUCOSE BLDC GLUCOMTR-MCNC: 216 MG/DL (ref 70–130)
GLUCOSE BLDC GLUCOMTR-MCNC: 243 MG/DL (ref 70–130)
GLUCOSE SERPL-MCNC: 206 MG/DL (ref 65–99)
HCT VFR BLD AUTO: 27.9 % (ref 37.5–51)
HGB BLD-MCNC: 8.7 G/DL (ref 13–17.7)
MCH RBC QN AUTO: 31.5 PG (ref 26.6–33)
MCHC RBC AUTO-ENTMCNC: 31.2 G/DL (ref 31.5–35.7)
MCV RBC AUTO: 101.1 FL (ref 79–97)
PLATELET # BLD AUTO: 220 10*3/MM3 (ref 140–450)
PMV BLD AUTO: 10.5 FL (ref 6–12)
POTASSIUM SERPL-SCNC: 4.4 MMOL/L (ref 3.5–5.2)
PROT SERPL-MCNC: 5.7 G/DL (ref 6–8.5)
RBC # BLD AUTO: 2.76 10*6/MM3 (ref 4.14–5.8)
SODIUM SERPL-SCNC: 144 MMOL/L (ref 136–145)
UFH PPP CHRO-ACNC: 0.48 IU/ML (ref 0.3–0.7)
WBC NRBC COR # BLD: 8.39 10*3/MM3 (ref 3.4–10.8)

## 2023-04-10 PROCEDURE — 97530 THERAPEUTIC ACTIVITIES: CPT

## 2023-04-10 PROCEDURE — 97110 THERAPEUTIC EXERCISES: CPT

## 2023-04-10 PROCEDURE — 85027 COMPLETE CBC AUTOMATED: CPT | Performed by: HOSPITALIST

## 2023-04-10 PROCEDURE — 25010000002 HEPARIN (PORCINE) 25000-0.45 UT/250ML-% SOLUTION: Performed by: INTERNAL MEDICINE

## 2023-04-10 PROCEDURE — 85520 HEPARIN ASSAY: CPT

## 2023-04-10 PROCEDURE — 80053 COMPREHEN METABOLIC PANEL: CPT | Performed by: HOSPITALIST

## 2023-04-10 PROCEDURE — 82962 GLUCOSE BLOOD TEST: CPT

## 2023-04-10 PROCEDURE — 99232 SBSQ HOSP IP/OBS MODERATE 35: CPT | Performed by: HOSPITALIST

## 2023-04-10 PROCEDURE — 63710000001 INSULIN LISPRO (HUMAN) PER 5 UNITS: Performed by: INTERNAL MEDICINE

## 2023-04-10 PROCEDURE — 71045 X-RAY EXAM CHEST 1 VIEW: CPT

## 2023-04-10 RX ADMIN — PANTOPRAZOLE SODIUM 40 MG: 40 TABLET, DELAYED RELEASE ORAL at 20:19

## 2023-04-10 RX ADMIN — Medication 5 MG: at 20:19

## 2023-04-10 RX ADMIN — ACETAMINOPHEN 325MG 650 MG: 325 TABLET ORAL at 20:19

## 2023-04-10 RX ADMIN — Medication 10 ML: at 08:17

## 2023-04-10 RX ADMIN — INSULIN LISPRO 3 UNITS: 100 INJECTION, SOLUTION INTRAVENOUS; SUBCUTANEOUS at 17:25

## 2023-04-10 RX ADMIN — ACETAMINOPHEN 325MG 650 MG: 325 TABLET ORAL at 05:19

## 2023-04-10 RX ADMIN — INSULIN LISPRO 3 UNITS: 100 INJECTION, SOLUTION INTRAVENOUS; SUBCUTANEOUS at 12:25

## 2023-04-10 RX ADMIN — HEPARIN SODIUM 24 UNITS/KG/HR: 10000 INJECTION, SOLUTION INTRAVENOUS at 09:02

## 2023-04-10 RX ADMIN — SENNOSIDES AND DOCUSATE SODIUM 2 TABLET: 8.6; 5 TABLET ORAL at 08:13

## 2023-04-10 RX ADMIN — ASPIRIN 81 MG CHEWABLE TABLET 81 MG: 81 TABLET CHEWABLE at 08:13

## 2023-04-10 RX ADMIN — DOCUSATE SODIUM 100 MG: 100 CAPSULE, LIQUID FILLED ORAL at 20:19

## 2023-04-10 RX ADMIN — LISINOPRIL 5 MG: 5 TABLET ORAL at 08:13

## 2023-04-10 RX ADMIN — ACETAMINOPHEN 325MG 650 MG: 325 TABLET ORAL at 14:40

## 2023-04-10 RX ADMIN — MIRTAZAPINE 15 MG: 15 TABLET, FILM COATED ORAL at 20:19

## 2023-04-10 RX ADMIN — CARVEDILOL 50 MG: 12.5 TABLET, FILM COATED ORAL at 08:13

## 2023-04-10 RX ADMIN — OXYCODONE HYDROCHLORIDE AND ACETAMINOPHEN 1000 MG: 500 TABLET ORAL at 08:13

## 2023-04-10 RX ADMIN — MEGESTROL ACETATE 400 MG: 40 SUSPENSION ORAL at 08:13

## 2023-04-10 RX ADMIN — DOCUSATE SODIUM 100 MG: 100 CAPSULE, LIQUID FILLED ORAL at 08:13

## 2023-04-10 RX ADMIN — INSULIN LISPRO 2 UNITS: 100 INJECTION, SOLUTION INTRAVENOUS; SUBCUTANEOUS at 08:13

## 2023-04-10 RX ADMIN — ATORVASTATIN CALCIUM 80 MG: 40 TABLET, FILM COATED ORAL at 20:19

## 2023-04-10 RX ADMIN — Medication 10 ML: at 20:19

## 2023-04-10 RX ADMIN — CARVEDILOL 50 MG: 12.5 TABLET, FILM COATED ORAL at 17:25

## 2023-04-10 RX ADMIN — FAMOTIDINE 20 MG: 20 TABLET ORAL at 08:13

## 2023-04-10 RX ADMIN — TERAZOSIN HYDROCHLORIDE 1 MG: 1 CAPSULE ORAL at 20:19

## 2023-04-10 NOTE — CONSULTS
Clinical Nutrition     Nutrition Support Assessment  Reason for Visit: Follow-up protocol    Patient Name: Timmy Guajardo  YOB: 1950  MRN: [id number]  Date of Encounter: 04/10/23 19:03 EDT  Admission date: 3/24/2023    With liberalized diet, pt rpts now doing well eating. (Note no recent documentation.) Pt allows uses suppl, gives menu choices and cont ordering out for food he chooses.   Obs had taken all of suppl at time of visit and was waiting for food order to arrive.      Nutrition Assessment   Admission Diagnosis:  Pleural effusion [J90]      Problem List:    Bilateral pulmonary embolism    Leukocytosis    HTN (hypertension)    Presence of cardiac pacemaker    Pleural effusion    Unintentional weight loss    Hypoalbuminemia    Severe malnutrition    History of CVA (cerebrovascular accident)    Debility  Severe malnutrition      PMH:   He  has a past medical history of Cardiomyopathy, CHF (congestive heart failure), Coronary artery disease, Diabetes mellitus, GERD (gastroesophageal reflux disease), HTN (hypertension), Stroke, and Stroke.    PSH:  He  has a past surgical history that includes Cardiac pacemaker placement; Hernia repair (Bilateral); Cardiac catheterization; Aortagram (N/A, 5/11/2021); Finger amputation (Left, 5/12/2021); Hip Trochanteric Nailing (Right, 10/18/2022); Esophagogastroduodenoscopy (N/A, 3/29/2023); and Colonoscopy (N/A, 3/31/2023).      Applicable Nutrition Concerns:   Skin:  Oral: edentulous  GI:       Applicable Interval History:         Reported/Observed/Food/Nutrition Related History:     4/10) Pt rpts now doing well eating. Allows uses suppl, gives menu choices and cont ordering out for food he chooses.   Obs had taken all of suppl at time of visit and was waiting for food order to arrive.    4/4) Pt sitting up in bed eating breakfast at time visit. Tells me he does not like his breakfast or vanilla boost, wants chocolate. Denied offer for new  breakfast but agreed to karolina boost, ordered. He is happy to report that despite this meal, overall he is eating significantly better. He tells me the nystatin has helped his thrush greatly, upset because he thought taste changes were r/t COVID and feels this could have been addressed sooner. In addition, the 2 appetite stimulants he has been started on (remeron and megace) have been helping. He tells me only barrier left is not liking some of the food in hospital and he is concerned this will be worse at rehab. He tells me he has been ordering out most dinner meals. He states has been told he cannot order certain things and would like diet liberalized to assist with this. Specifically fritz is the only breakfast protein avail that he likes so would like this. RD encouraged continuing to prioritize nutrition as needed, especially with limited food acceptance in hospital/rehab. He is still drinking boost but not when vanilla, order updated. He does endorse feelings of fullness with boost now that he is eating more food at meals, encouraged/educated on HP ONS 1x/d. Pt very pleasant and appreciative, receptive to diet education/counseling. He denied further needs or concerns at this time.     3/25) Pt lying in bed - able to provide weight/nutrition hx. Endorsed anorexia for ~4 months following COVID infection with change in taste. Reports difficulty with PO intake began in November. Denies difficulty chewing/swallowing. Of note, pt had teeth pulled with plans for implants in sept/oct but unable to complete procedure 2/2 hip fx with repair followed by rehab. Endorsed increased time to eat a meal. Drinks ONS 2-3d daily to supplement poor intake. Continued functional decline noted. Pt reports significant weight loss of 58# in 5 months however weight hx unable to verify. Endorsed severe constipation prior to admission - received enema and produced BM x3. NKFA     Labs    Labs Reviewed: Yes     Results from last 7 days   Lab  "Units 04/10/23  0506 04/09/23  0554 04/08/23  0711   GLUCOSE mg/dL 206* 163* 128*   BUN mg/dL 16 14 16   CREATININE mg/dL 0.66* 0.68* 0.63*   SODIUM mmol/L 144 141 144   CHLORIDE mmol/L 110* 109* 111*   POTASSIUM mmol/L 4.4 4.0 3.8   ALT (SGPT) U/L 11 13  --        Results from last 7 days   Lab Units 04/10/23  0506 04/09/23  0554   ALBUMIN g/dL 3.0* 2.8*       Results from last 7 days   Lab Units 04/10/23  1632 04/10/23  1220 04/10/23  0737 04/09/23 2001 04/09/23  1635 04/09/23  1110   GLUCOSE mg/dL 243* 216* 152* 212* 195* 185*     Lab Results   Lab Value Date/Time    HGBA1C 6.30 (H) 03/25/2023 0722    HGBA1C 6.50 (H) 10/17/2022 0422                 Medications    Medications Reviewed: Yes  Pertinent: Vit C, colace, pepcid, insulin, megace, remeron, miralax, pericolace  Infusion:   PRN:     Intake/Ouptut 24 hrs (0701 - 0700)   I&O's Reviewed: Yes   4/9 1 BM     Anthropometrics     Height: Height: 177.8 cm (70\")  Last Filed Weight: Weight: 60.3 kg (133 lb) (04/04/23 1555)  Method: ?  BMI: BMI (Calculated): 19.1  BMI classification: Underweight:<18.5kg/m2  IBW:  160lbs    UBW:     Last 15 Recorded Weights  View Complete Flowsheet  Weight Weight (kg) Weight (lbs) Weight Method VISIT REPORT   3/25/2023 56.7 kg 125 lb - -   3/24/2023 51.71 kg 114 lb Stated -   11/23/2022 72.576 kg 160 lb Stated Report   10/24/2022 82.146 kg 181 lb 1.6 oz - -   10/17/2022 83.008 kg 183 lb Stated -   10/17/2022 83.008 kg 183 lb - -   10/17/2022 83.144 kg 183 lb 4.8 oz - -   10/16/2022 77.565 kg 171 lb Stated -   4/25/2022 75.479 kg 166 lb 6.4 oz - -   8/4/2021 73.483 kg 162 lb - Report   6/30/2021 77.565 kg 171 lb - Report   5/26/2021 77.565 kg 171 lb - Report     Weight change:   Noted 58# (31.6%) weight loss in 5 months    Nutrition Focused Physical Exam     Date: 3/25    Patient meets criteria for malnutrition diagnosis, see MSA note.    Current Nutrition Prescription     PO: Diet: Regular/House Diet; Texture: Regular Texture (IDDSI " 7); Fluid Consistency: Thin (IDDSI 0)  Oral Nutrition Supplement: Ensure HP or Premier Protein daily, Boost Glucose Control 2x/da    Intake: insuffic data apparent good intake per rpt    Nutrition Diagnosis   Date: 3/25 Updated: 4/4, 4/10  Problem Malnutrition chronic, severe   Etiology Dysgeusia, anorexia   Signs/Symptoms severe muscle wasting and subcutaneous fat loss, <75% of EEN for >1 month, & a significant wt loss of 31.6% weight loss in 5 months   Status: ongoing, PO intakes improved    Date:  3/25 Updated:4/10  Problem Predicted suboptimal energy intake   Etiology Continued dysgeusia   Signs/Symptoms NPO x1   Status: resolved On regular diet w suppl - apparent good intake    Goal:   General: Nutrition to support treatment  PO: Maintain intake per report   EN/PN: N/A    Nutrition Intervention      Follow treatment progress, Care plan reviewed, Advise alternate selection, Menu provided, Encourage intake      Monitoring/Evaluation:   Per protocol, I&O, PO intake, Supplement intake, Pertinent labs, Weight, Symptoms, POC/GOC      Zahraa Huynh RD  Time Spent: 25min

## 2023-04-10 NOTE — THERAPY PROGRESS REPORT/RE-CERT
Patient Name: Timmy Guajardo  : 1950    MRN: [id number]                              Today's Date: 4/10/2023       Admit Date: 3/24/2023    Visit Dx:     ICD-10-CM ICD-9-CM   1. Weight loss  R63.4 783.21   2. Unintentional weight loss  R63.4 783.21   3. 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  K22.70 530.85     Patient Active Problem List   Diagnosis   • Acute right-sided weakness   • Suspected cerebrovascular accident (CVA)   • Leukocytosis   • CHF (congestive heart failure)   • Thrombocytopenia   • HTN (hypertension)   • Presence of cardiac pacemaker   • Hyperglycemia   • Gangrene of toe of left foot   • Closed displaced intertrochanteric fracture of right femur, initial encounter   • Pleural effusion   • Unintentional weight loss   • Hypoalbuminemia   • Severe malnutrition   • Bilateral pulmonary embolism   • History of CVA (cerebrovascular accident)   • Debility     Past Medical History:   Diagnosis Date   • Cardiomyopathy    • CHF (congestive heart failure)    • Coronary artery disease    • Diabetes mellitus    • GERD (gastroesophageal reflux disease)    • HTN (hypertension)    • Stroke     Right sided weakness   • Stroke      Past Surgical History:   Procedure Laterality Date   • AMPUTATION DIGIT Left 2021    Procedure: AMPUTATION DIGIT - GREAT TOE AMPUTATION LEFT;  Surgeon: Dario Marmolejo MD;  Location: [facility] OR;  Service: General;  Laterality: Left;   • AORTAGRAM N/A 2021    Procedure: AORTAGRAM WITH RUNOFFS, LEFT SFA BALLOON ANGIOPLASTY;  Surgeon: Tim Mahan MD;  Location: [facility] HYBRID SHELIA;  Service: Vascular;  Laterality: N/A;  FL TIME 6 MIN 54 SECS  82 MGY  CONTRAST VISIPAQUE 100ML     • CARDIAC CATHETERIZATION     • CARDIAC PACEMAKER PLACEMENT      pacemaker/defibrillator, [employer]   • COLONOSCOPY N/A 3/31/2023    Procedure: COLONOSCOPY;  Surgeon: Brunner, Mark I, MD;  Location: [facility] ENDOSCOPY;  Service: Gastroenterology;  Laterality: N/A;   • ENDOSCOPY N/A 3/29/2023     Procedure: ESOPHAGOGASTRODUODENOSCOPY;  Surgeon: Brunner, Mark I, MD;  [facility]: [facility];  Service: Gastroenterology;  Laterality: N/A;   • HERNIA REPAIR Bilateral     Inguinal hernia   • HIP TROCHANTERIC NAILING WITH INTRAMEDULLARY HIP SCREW Right 10/18/2022    Procedure: HIP TROCHANTERIC NAILING WITH INTRAMEDULLARY HIP SCREW RIGHT;  Surgeon: Bj Steinberg MD;  Location: [facility] OR;  Service: Orthopedics;  Laterality: Right;      General Information     Row Name 04/10/23 1528          OT Time and Intention    Document Type progress note/recertification  -AN     Mode of Treatment occupational therapy  -AN     Row Name 04/10/23 1528          General Information    Patient Profile Reviewed yes  -AN     Existing Precautions/Restrictions fall;other (see comments)  CT, residual R sided weakness  -AN     Barriers to Rehab medically complex;previous functional deficit  -AN     Row Name 04/10/23 1528          Cognition    Orientation Status (Cognition) oriented x 4  -AN     Row Name 04/10/23 1528          Safety Issues, Functional Mobility    Safety Issues Affecting Function (Mobility) safety precautions follow-through/compliance;safety precaution awareness;judgment;insight into deficits/self-awareness;awareness of need for assistance  -AN     Impairments Affecting Function (Mobility) balance;coordination;endurance/activity tolerance;motor planning;motor control;range of motion (ROM);strength;pain  -AN           User Key  (r) = Recorded By, (t) = Taken By, (c) = Cosigned By    Initials Name Provider Type    AN Lorena Tomlinson OT Occupational Therapist                 Mobility/ADL's     Row Name 04/10/23 1529          Bed Mobility    Bed Mobility supine-sit-supine  -AN     Supine-Sit-Supine [place] (Bed Mobility) minimum assist (75% patient effort);1 person assist;verbal cues  -AN     Bed Mobility, Safety Issues decreased use of arms for pushing/pulling;decreased use of legs for  bridging/pushing;impaired trunk control for bed mobility  -AN     Assistive Device (Bed Mobility) head of bed elevated;bed rails  -AN     Comment, (Bed Mobility) increased time and assist provided at trunk due to weakness  -AN     Row Name 04/10/23 1529          Transfers    Transfers sit-stand transfer;stand-sit transfer  -AN     Comment, (Transfers) declined transferring to chair this session, however agreeable to perform STS and side steps at the EOB using the RW  -AN     Row Name 04/10/23 1529          Sit-Stand Transfer    Sit-Stand [place] (Transfers) moderate assist (50% patient effort);1 person assist;verbal cues  -AN     Assistive Device (Sit-Stand Transfers) walker, front-wheeled  -AN     Row Name 04/10/23 1529          Stand-Sit Transfer    Stand-Sit [place] (Transfers) verbal cues;moderate assist (50% patient effort);1 person assist  -AN     Assistive Device (Stand-Sit Transfers) walker, front-wheeled  -AN     Row Name 04/10/23 1529          Functional Mobility    Functional Mobility- Ind. Level moderate assist (50% patient effort);1 person;verbal cues required  -AN     Functional Mobility-Distance (Feet) --  side steps x 3  -AN     Functional Mobility- Comment pt performed side steps to the R x 3 using the RW with posterior lean and inability to straighten RLE  -AN     Row Name 04/10/23 1529          Activities of Daily Living    BADL Assessment/Intervention lower body dressing  -AN           User Key  (r) = Recorded By, (t) = Taken By, (c) = Cosigned By    Initials Name Provider Type    AN Lorena Tomlinson OT Occupational Therapist               Obj/Interventions     Row Name 04/10/23 1543          Shoulder (Therapeutic Exercise)    Shoulder (Therapeutic Exercise) AROM (active range of motion)  -AN     Shoulder AROM (Therapeutic Exercise) bilateral;flexion;horizontal aBduction/aDduction;sitting;10 repetitions  -AN     Row Name 04/10/23 1543          Elbow/Forearm (Therapeutic Exercise)     Elbow/Forearm (Therapeutic Exercise) AROM (active range of motion)  -AN     Elbow/Forearm AROM (Therapeutic Exercise) bilateral;flexion;extension;sitting;10 repetitions  -AN     Row Name 04/10/23 1543          Motor Skills    Therapeutic Exercise shoulder;elbow/forearm  -AN     Row Name 04/10/23 1543          Balance    Balance Assessment sitting static balance;sitting dynamic balance;sit to stand dynamic balance;standing static balance;standing dynamic balance  -AN     Static Sitting Balance standby assist  -AN     Dynamic Sitting Balance standby assist  -AN     Position, Sitting Balance sitting edge of bed  -AN     Sit to Stand Dynamic Balance verbal cues;moderate assist;1-person assist  -AN     Static Standing Balance minimal assist;1-person assist;verbal cues  -AN     Dynamic Standing Balance verbal cues;moderate assist;1-person assist  -AN     Position/Device Used, Standing Balance supported;walker, rolling  -AN     Balance Interventions standing;sit to stand;supported;static;minimal challenge;dynamic;moderate challenge;occupation based/functional task  -AN     Comment, Balance posterior lean in standing  -AN     Row Name 04/10/23 1543          Hip (Therapeutic Exercise)    Hip (Therapeutic Exercise) AROM (active range of motion)  -AN     Hip AROM (Therapeutic Exercise) bilateral;aBduction;aDduction;sitting;10 repetitions  -AN     Row Name 04/10/23 1543          Knee (Therapeutic Exercise)    Knee (Therapeutic Exercise) AROM (active range of motion)  -AN     Knee AROM (Therapeutic Exercise) bilateral;flexion;extension;10 repetitions  -AN     Row Name 04/10/23 1543          Ankle (Therapeutic Exercise)    Ankle (Therapeutic Exercise) AROM (active range of motion)  -AN     Ankle AROM (Therapeutic Exercise) bilateral;dorsiflexion;plantarflexion;sitting;10 repetitions  -AN           User Key  (r) = Recorded By, (t) = Taken By, (c) = Cosigned By    Initials Name Provider Type    AN Lorena Tomlinson OT Occupational  Therapist               Goals/Plan     Row Name 04/10/23 1549          Transfer Goal 1 (OT)    Activity/Assistive Device (Transfer Goal 1, OT) sit-to-stand/stand-to-sit;commode, bedside without drop arms;walker, rolling  -AN     [place] Level/Cues Needed (Transfer Goal 1, OT) minimum assist (75% or more patient effort)  -AN     Time Frame (Transfer Goal 1, OT) long term goal (LTG);10 days  -AN     Progress/Outcome (Transfer Goal 1, OT) goal revised this date  -AN     Row Name 04/10/23 1549          Dressing Goal 1 (OT)    Activity/Device (Dressing Goal 1, OT) lower body dressing  -AN     [place]/Cues Needed (Dressing Goal 1, OT) minimum assist (75% or more patient effort)  -AN     Time Frame (Dressing Goal 1, OT) long term goal (LTG);10 days  -AN     Progress/Outcome (Dressing Goal 1, OT) goal ongoing  -AN     Row Name 04/10/23 1549          Toileting Goal 1 (OT)    Activity/Device (Toileting Goal 1, OT) adjust/manage clothing;perform perineal hygiene;commode, bedside without drop arms  -AN     [place] Level/Cues Needed (Toileting Goal 1, OT) minimum assist (75% or more patient effort)  -AN     Time Frame (Toileting Goal 1, OT) long term goal (LTG);10 days  -AN     Progress/Outcome (Toileting Goal 1, OT) new goal  -AN     Row Name 04/10/23 1549          Strength Goal 1 (OT)    Strength Goal 1 (OT) Pt. will complete UE strengthening TE with progressive reps and resistance to support return to prior ADL independence and related transfers.  -AN     Time Frame (Strength Goal 1, OT) long term goal (LTG);10 days  -AN     Progress/Outcome (Strength Goal 1, OT) goal met  -AN     Row Name 04/10/23 1549          Problem Specific Goal 1 (OT)    Problem Specific Goal 1 (OT) Pt. will standing in walker for one minute with min A to support caregiver care with BADL tasks  -AN     Time Frame (Problem Specific Goal 1, OT) long term goal (LTG);10 days  -AN     Progress/Outcome (Problem Specific Goal 1, OT) goal ongoing   -AN     Row Name 04/10/23 1549          Therapy Assessment/Plan (OT)    Planned Therapy Interventions (OT) activity tolerance training;adaptive equipment training;BADL retraining;functional balance retraining;occupation/activity based interventions;patient/caregiver education/training;transfer/mobility retraining;strengthening exercise  -AN           User Key  (r) = Recorded By, (t) = Taken By, (c) = Cosigned By    Initials Name Provider Type    AN Lorena Tomlinson OT Occupational Therapist               Clinical Impression     Row Name 04/10/23 1546          Pain Assessment    Pretreatment Pain Rating 0/10 - no pain  -AN     Posttreatment Pain Rating 0/10 - no pain  -AN     Pre/Posttreatment Pain Comment asymptomatic  -AN     Pain Intervention(s) Repositioned;Ambulation/increased activity  -AN     Row Name 04/10/23 1546          Plan of Care Review    Plan of Care Reviewed With patient  -AN     Progress no change  -AN     Outcome Evaluation Pt continues to be limited by generalized weakness, impaired balance, and decreased activity tolerance warranting skilled OT services. Pt tolerated seated UE and LE ther-ex and provided education on the benefits of OOB activity. Pt continues to require Mod A for stand from bed level and use of RW. Cont POC.  -AN     Row Name 04/10/23 1546          Therapy Plan Review/Discharge Plan (OT)    Anticipated Discharge Disposition (OT) inpatient rehabilitation facility  -AN     [facility] Name 04/10/23 1546          Vital Signs    Pre Systolic BP Rehab --  VSS  -AN     O2 Delivery Pre Treatment room air  -AN     O2 Delivery Intra Treatment room air  -AN     O2 Delivery Post Treatment room air  -AN     Pre Patient Position Supine  -AN     Intra Patient Position Standing  -AN     Post Patient Position Supine  -AN     Row Name 04/10/23 1546          Positioning and Restraints    Pre-Treatment Position in bed  -AN     Post Treatment Position bed  -AN     In Bed notified nsg;supine;exit alarm  on;encouraged to call for assist;call light within reach;side rails up x2  -AN           User Key  (r) = Recorded By, (t) = Taken By, (c) = Cosigned By    Initials Name Provider Type    Lorena Soto OT Occupational Therapist               Outcome Measures     Row Name 04/10/23 1548          How much help from another is currently needed...    Putting on and taking off regular lower body clothing? 2  -AN     Bathing (including washing, rinsing, and drying) 2  -AN     Toileting (which includes using toilet bed pan or urinal) 2  -AN     Putting on and taking off regular upper body clothing 3  -AN     Taking care of personal grooming (such as brushing teeth) 3  -AN     Eating meals 4  -AN     AM-PAC 6 Clicks Score (OT) 16  -AN     Row Name 04/10/23 1548          Functional Assessment    Outcome Measure Options AM-PAC 6 Clicks Daily Activity (OT)  -AN           User Key  (r) = Recorded By, (t) = Taken By, (c) = Cosigned By    Initials Name Provider Type    Lorena Soto OT Occupational Therapist                Occupational Therapy Education     Title: PT OT SLP Therapies (In Progress)     Topic: Occupational Therapy (Done)     Point: ADL training (Done)     Description:   Instruct learner(s) on proper safety adaptation and remediation techniques during self care or transfers.   Instruct in proper use of assistive devices.              Learning Progress Summary           Patient Acceptance, E, VU by JUAQUIN at 4/10/2023 1549    Acceptance, E,D, NR by BAILEY at 4/4/2023 1408    Comment: AE use for LBD, UE TE, transfer safety,                   Point: Home exercise program (Done)     Description:   Instruct learner(s) on appropriate technique for monitoring, assisting and/or progressing therapeutic exercises/activities.              Learning Progress Summary           Patient Acceptance, E, VU by JUAQUIN at 4/10/2023 1549    Acceptance, E,D, NR by BAILEY at 4/4/2023 1408    Comment: AE use for LBD, UE TE, transfer safety,     Acceptance, E,D, NR by BAILEY at 3/30/2023 1438    Comment: UE and LE TE to support BADL and related transfer independence    Acceptance, E,D, VU,NR by BAILEY at 3/27/2023 1201    Comment: reason for consult, noted deficits, UE TE, bed mobility and transfer sequencing, benefit of mvmt/activity for health and not loosing strength and endurance                   Point: Precautions (Done)     Description:   Instruct learner(s) on prescribed precautions during self-care and functional transfers.              Learning Progress Summary           Patient Acceptance, E, VU by AN at 4/10/2023 1549    Acceptance, E,D, NR by BAILEY at 4/4/2023 1408    Comment: AE use for LBD, UE TE, transfer safety,    Acceptance, E,D, VU,NR by BAILEY at 3/27/2023 1201    Comment: reason for consult, noted deficits, UE TE, bed mobility and transfer sequencing, benefit of mvmt/activity for health and not loosing strength and endurance                   Point: Body mechanics (Done)     Description:   Instruct learner(s) on proper positioning and spine alignment during self-care, functional mobility activities and/or exercises.              Learning Progress Summary           Patient Acceptance, E, VU by AN at 4/10/2023 1549    Acceptance, E,D, VU,NR by BAILEY at 3/27/2023 1201    Comment: reason for consult, noted deficits, UE TE, bed mobility and transfer sequencing, benefit of mvmt/activity for health and not loosing strength and endurance                               User Key     Initials Effective Dates Name Provider Type Discipline     02/03/23 -  Mary Nath, OT Occupational Therapist OT     09/21/21 -  Lorena Tomlinson OT Occupational Therapist OT              OT Recommendation and Plan  Planned Therapy Interventions (OT): activity tolerance training, adaptive equipment training, BADL retraining, functional balance retraining, occupation/activity based interventions, patient/caregiver education/training, transfer/mobility retraining, strengthening  exercise  Plan of Care Review  Plan of Care Reviewed With: patient  Progress: no change  Outcome Evaluation: Pt continues to be limited by generalized weakness, impaired balance, and decreased activity tolerance warranting skilled OT services. Pt tolerated seated UE and LE ther-ex and provided education on the benefits of OOB activity. Pt continues to require Mod A for stand from bed level and use of RW. Cont POC.     Time Calculation:    Time Calculation- OT     Row Name 04/10/23 1550             Time Calculation- OT    OT Start Time 1400  -AN      OT Received On 04/10/23  -AN      OT Goal Re-Cert Due Date 04/20/23  -AN         Timed Charges    62769 - OT Therapeutic Exercise Minutes 15  -AN      97359 - OT Therapeutic Activity Minutes 14  -AN         Total Minutes    Timed Charges Total Minutes 29  -AN       Total Minutes 29  -AN            User Key  (r) = Recorded By, (t) = Taken By, (c) = Cosigned By    Initials Name Provider Type    AN Lorena Tomlinson OT Occupational Therapist              Therapy Charges for Today     Code Description Service Date Service Provider Modifiers Qty    [id number]  OT THER PROC EA 15 MIN 4/10/2023 Lorena Tomlinson, OT GO 1    [id number]  OT THERAPEUTIC ACT EA 15 MIN 4/10/2023 Bushra Lorena, OT GO 1               Lorena Bushra, OT  4/10/2023

## 2023-04-10 NOTE — PLAN OF CARE
Goal Outcome Evaluation:  Plan of Care Reviewed With: patient        Progress: no change  Outcome Evaluation: Pt continues to be limited by generalized weakness, impaired balance, and decreased activity tolerance warranting skilled OT services. Pt tolerated seated UE and LE ther-ex and provided education on the benefits of OOB activity. Pt continues to require Mod A for stand from bed level and use of RW. Cont POC.

## 2023-04-10 NOTE — PROGRESS NOTES
[facility] Medicine Services  PROGRESS NOTE    Patient Name: Timmy Guajardo  : 1950  MRN: [id number]    Date of Admission: 3/24/2023  Primary Care Physician: Arsen Seals APRN    Subjective   Subjective     CC:  Follow-up for weakness    HPI:  Patient resting in bed. Chest tube in place, stable with no acute complaints this am.    ROS:  Gen- No fevers, chills  CV- No chest pain, palpitations  Resp- No cough, dyspnea  GI- No N/V/D, abd pain    Objective   Objective     Vital Signs:   Temp:  [98 °F (36.7 °C)-98.2 °F (36.8 °C)] 98 °F (36.7 °C)  Heart Rate:  [80-87] 80  Resp:  [18] 18  BP: (117-162)/(64-74) 162/73     Physical Exam:  Constitutional: No acute distress, awake, alert  HENT: NCAT, mucous membranes moist  Respiratory: decreased to auscultation bilaterally, respiratory effort normal on RA, chest tube in place  Cardiovascular: RRR, no murmurs, rubs, or gallops  Gastrointestinal: Positive bowel sounds, soft, nontender, nondistended  Musculoskeletal: No bilateral ankle edema  Psychiatric: Appropriate affect, cooperative  Neurologic: Oriented x 3, strength symmetric in all extremities, Cranial Nerves grossly intact to confrontation, speech clear  Skin: No rashes    Results Reviewed:  LAB RESULTS:      Lab 04/10/23  0810 04/10/23  0506 23  0554 23  1548 23  0711 23  0023 23  1736 23  1155 23  0823 23  0341 23  0043 23  1939 23  1337 23  1119 23  0800   WBC  --  8.39 8.27  --  9.12  --   --   --   --  8.97  --  10.57  --  10.37 10.47   HEMOGLOBIN  --  8.7* 8.4*  --  8.3*  --   --  9.0* 9.6* 9.1*   < > 10.3*  10.3*   < > 10.1* 10.4*   HEMATOCRIT  --  27.9* 26.4*  --  26.0*  --   --  29.0* 31.3* 28.6*   < > 33.2*  33.0*   < > 32.5* 32.8*   PLATELETS  --  220 192  --  189  --   --   --   --  186  --  170  --  185 156   NEUTROS ABS  --   --   --   --   --   --   --   --   --  6.57  --  8.20*  --   7.88* 7.97*   IMMATURE GRANS (ABS)  --   --   --   --   --   --   --   --   --  0.07*  --  0.11*  --  0.08* 0.07*   LYMPHS ABS  --   --   --   --   --   --   --   --   --  1.47  --  1.32  --  1.32 1.36   MONOS ABS  --   --   --   --   --   --   --   --   --  0.67  --  0.76  --  0.85 0.85   EOS ABS  --   --   --   --   --   --   --   --   --  0.15  --  0.12  --  0.19 0.18   MCV  --  101.1* 99.2*  --  97.4*  --   --   --   --  97.9*  --  99.7*  --  97.9* 95.3   APTT  --   --   --   --   --   --   --   --   --   --   --   --   --   --  110.8*   HEPARIN ANTI-XA 0.48  --  0.37 0.41 0.49 0.37   < >  --   --   --   --   --    < >  --  0.46    < > = values in this interval not displayed.         Lab 04/10/23  0506 04/09/23  0554 04/08/23  0711 04/07/23  0341 04/05/23  0208   SODIUM 144 141 144 144 140   POTASSIUM 4.4 4.0 3.8 4.2 4.0   CHLORIDE 110* 109* 111* 112* 107   CO2 22.0 21.0* 21.0* 21.0* 23.0   ANION GAP 12.0 11.0 12.0 11.0 10.0   BUN 16 14 16 21 15   CREATININE 0.66* 0.68* 0.63* 0.50* 0.64*   EGFR 100.9 100.0 102.3 109.7 101.8   GLUCOSE 206* 163* 128* 127* 96   CALCIUM 8.6 8.7 8.6 8.7 8.3*         Lab 04/10/23  0506 04/09/23  0554   TOTAL PROTEIN 5.7* 5.6*   ALBUMIN 3.0* 2.8*   GLOBULIN 2.7 2.8   ALT (SGPT) 11 13   AST (SGOT) 15 19   BILIRUBIN 0.2 0.2   ALK PHOS 68 72                 Lab 04/06/23  1939   ABO TYPING A   RH TYPING Positive   ANTIBODY SCREEN Negative         Brief Urine Lab Results  (Last result in the past 365 days)      Color   Clarity   Blood   Leuk Est   Nitrite   Protein   CREAT   Urine HCG        03/24/23 2329 Yellow   Turbid   Small (1+)   Moderate (2+)   Negative   30 mg/dL (1+)                 Microbiology Results Abnormal     Procedure Component Value - Date/Time    Body Fluid Culture - Body Fluid, Pleural Cavity [[id number]] Collected: 03/27/23 1532    Lab Status: Final result Specimen: Body Fluid from Pleural Cavity Updated: 03/31/23 0934     Body Fluid Culture No growth at 3 days     Gram  Stain Rare (1+) WBCs per low power field      No organisms seen    MRSA Screen, PCR (Inpatient) - Swab, Nares [[id number]]  (Normal) Collected: 03/27/23 0857    Lab Status: Final result Specimen: Swab from Nares Updated: 03/27/23 1107     MRSA PCR Negative    Narrative:      The negative predictive value of this diagnostic test is high and should only be used to consider de-escalating anti-MRSA therapy. A positive result may indicate colonization with MRSA and must be correlated clinically.  MRSA Negative    COVID PRE-OP / PRE-PROCEDURE SCREENING ORDER (NO ISOLATION) - Swab, Nasopharynx [[id number]]  (Normal) Collected: 03/24/23 2256    Lab Status: Final result Specimen: Swab from Nasopharynx Updated: 03/24/23 2356    Narrative:      The following orders were created for panel order COVID PRE-OP / PRE-PROCEDURE SCREENING ORDER (NO ISOLATION) - Swab, Nasopharynx.  Procedure                               Abnormality         Status                     ---------                               -----------         ------                     Respiratory Panel PCR w/...[[id number]]  Normal              Final result                 Please view results for these tests on the individual orders.    Respiratory Panel PCR w/COVID-19(SARS-CoV-2) SULEIMAN/TAWANNA/BLAISE/PAD/COR/MAD/ANTHONY In-House, NP Swab in UTM/VTM, 3-4 HR TAT - Swab, Nasopharynx [[id number]]  (Normal) Collected: 03/24/23 2256    Lab Status: Final result Specimen: Swab from Nasopharynx Updated: 03/24/23 2356     ADENOVIRUS, PCR Not Detected     Coronavirus 229E Not Detected     Coronavirus HKU1 Not Detected     Coronavirus NL63 Not Detected     Coronavirus OC43 Not Detected     COVID19 Not Detected     Human Metapneumovirus Not Detected     Human Rhinovirus/Enterovirus Not Detected     Influenza A PCR Not Detected     Influenza B PCR Not Detected     Parainfluenza Virus 1 Not Detected     Parainfluenza Virus 2 Not Detected     Parainfluenza Virus 3 Not Detected     Parainfluenza  Virus 4 Not Detected     RSV, PCR Not Detected     Bordetella pertussis pcr Not Detected     Bordetella parapertussis PCR Not Detected     Chlamydophila pneumoniae PCR Not Detected     Mycoplasma pneumo by PCR Not Detected    Narrative:      In the setting of a positive respiratory panel with a viral infection PLUS a negative procalcitonin without other underlying concern for bacterial infection, consider observing off antibiotics or discontinuation of antibiotics and continue supportive care. If the respiratory panel is positive for atypical bacterial infection (Bordetella pertussis, Chlamydophila pneumoniae, or Mycoplasma pneumoniae), consider antibiotic de-escalation to target atypical bacterial infection.          XR Chest 1 View    Result Date: 4/10/2023  XR CHEST 1 VW Date of Exam: 4/10/2023 4:30 AM EDT Indication: Pleural effusion. Comparison: 4/9/2023, 4/8/2023 Findings: There is a left subclavian AICD/pacemaker device. The heart appears normal in size. No pneumothorax. Patchy airspace disease seen within the right lung base with a right basilar chest tube. No pneumothorax. Small right-sided pleural effusion present. Left lung is clear. No acute osseous abnormality.     Impression: Impression: No significant changes compared to the previous study. Right basilar atelectasis and right sided pleural effusion. Electronically Signed: Huyen Aldana  4/10/2023 5:57 AM EDT  Workstation ID: [id number]    XR Chest 1 View    Result Date: 4/9/2023  XR CHEST 1 VW Date of Exam: 4/9/2023 3:50 AM EDT Indication: Pleural effusion. Comparison: 4/8/2023 Findings: Right chest tube remains in place. No significant change in small right pleural effusion. No pneumothorax. Airspace disease in the lower right lung, likely atelectasis. Left lung clear. Heart size stable     Impression: Impression: Stable right pleural effusion and atelectasis in the lower right lung. No pneumothorax Electronically Signed: David Cardoso  4/9/2023 8:39 AM  EDT  Workstation ID: [id number]      Results for orders placed during the hospital encounter of 10/16/22    Adult Transthoracic Echo Complete W/ Cont if Necessary Per Protocol    Interpretation Summary  •  Estimated left ventricular EF = 30%.  There is global hypokinesis.  The basal-mid posterior wall appears akinetic.  •  Normal right ventricular cavity size, wall thickness, systolic function and septal motion noted. Electronic lead present in the ventricle  •  Septal wall motion is abnormal, consistent with right ventricular pacing  •  No hemodynamically significant valvular stenosis or regurgitation noted.      Current medications:  Scheduled Meds:acetaminophen, 650 mg, Oral, Q8H  vitamin C, 1,000 mg, Oral, Daily  aspirin, 81 mg, Oral, Daily  atorvastatin, 80 mg, Oral, Nightly  carvedilol, 50 mg, Oral, BID With Meals  docusate sodium, 100 mg, Oral, BID  famotidine, 20 mg, Oral, Daily  insulin lispro, 0-7 Units, Subcutaneous, TID With Meals  lisinopril, 5 mg, Oral, Q24H  megestrol, 400 mg, Oral, Daily  mirtazapine, 15 mg, Oral, Nightly  pantoprazole, 40 mg, Oral, Nightly  polyethylene glycol, 17 g, Oral, Daily  senna-docusate sodium, 2 tablet, Oral, BID  sodium chloride, 10 mL, Intravenous, Q12H  terazosin, 1 mg, Oral, Nightly      Continuous Infusions:heparin, 24 Units/kg/hr, Last Rate: 24 Units/kg/hr (04/10/23 0902)  lactated ringers, 9 mL/hr, Last Rate: 9 mL/hr (03/29/23 0838)  Pharmacy to Dose Heparin,       PRN Meds:.•  acetaminophen **OR** acetaminophen **OR** acetaminophen  •  senna-docusate sodium **AND** polyethylene glycol **AND** bisacodyl **AND** bisacodyl  •  Calcium Replacement - Follow Nurse / BPA Driven Protocol  •  cyclobenzaprine  •  dextrose  •  dextrose  •  glucagon (human recombinant)  •  Magnesium Standard Dose Replacement - Follow Nurse / BPA Driven Protocol  •  melatonin  •  ondansetron  •  Pharmacy to Dose Heparin  •  Phosphorus Replacement - Follow Nurse / BPA Driven Protocol  •  Potassium  Replacement - Follow Nurse / BPA Driven Protocol  •  sodium chloride  •  sodium chloride    Assessment & Plan   Assessment & Plan     Active Hospital Problems    Diagnosis  POA   • **Bilateral pulmonary embolism [I26.99]  Unknown   • Pleural effusion [J90]  Yes   • Unintentional weight loss [R63.4]  Unknown   • Hypoalbuminemia [E88.09]  Unknown   • Severe malnutrition [E43]  Unknown   • History of CVA (cerebrovascular accident) [Z86.73]  Not Applicable   • Debility [R53.81]  Unknown   • Presence of cardiac pacemaker [Z95.0]  Yes   • HTN (hypertension) [I10]  Yes   • Leukocytosis [D72.829]  Yes      Resolved Hospital Problems   No resolved problems to display.        Brief Hospital Course to date:  Timmy Guajardo is a 72 y.o. 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    This patient's problems and plans were partially entered by my partner and updated as appropriate by me 04/10/23.    Bilateral pulmonary embolism  History of DVT  · Likely secondary to being on an inadequate dose of Eliquis of 2.5 mg twice daily and medication noncompliance  · Continue heparin drip while hospitalized since he might need further procedures  · Plan to transition to full dose Eliquis (loading and maintenance)     Moderate exudative right-sided loculated pleural effusion  Possible organized right pleural effusion  · Bedside point-of-care ultrasound revealed complex pleural effusion  · Cardiothoracic surgery consulted.  Recommended chest tube placement by IR.    · Underwent right-sided chest tube placement by IR on 3/27/2023.  Chest tube removed 3/30/2023   · Repeat CT scan chest after removal of chest tube showed persistent moderate right  pleural effusion, likely organized   · Analysis of the fluid is consistent with exudate, ?  Parapneumonic  · Culture from pleural fluid is negative to date  · Cytology showed benign mesothelial cells  · S/p 12 days zosyn  · Repeat chest tube placed 4/4/23  · Chest tube started draining michelet blood and eventually clotted off tube 4/6/23  · CT chest 4/6 showed only a small amount of blood in the pleural cavity  · Continue chest tube to -20 suction per CTS  · Dr. Mahan to evaluate for possible decortication    Lung nodule  - 1.4 cm  - needs repeat CT imaging in 6 months    Severe malnutrition  Hypoalbuminemia  Significant unintentional weight loss  Generalized debility  · Patient reports 52lbs weight loss since October 2022.  · No EGD or colonoscopy since 2008  · CT chest and CT abdomen with no convincing evidence of solid malignancy or lymphadenopathy.  Oncology service evaluated the patient recommended age-appropriate screening  · GI evaluated the patient for EGD and colonoscopy.  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 Continue Megace and mirtazapine 15 mg nightly for poor appetite  · Nutrition team following     UTI with Enterococcus faecalis  · POA  · Zosyn    CAD  Cardiomyopathy with EF 30 %  Essential HTN  Old CVA with right residual weakness  Dyslipidemia  · Continue aspirin  · Continue coreg, lisinopril  · Continue statin    Constipation     GERD   · Continue PPI      Type 2 diabetes w/A1c 6.3%   · Continue insulin sliding scale     Acute debility  · PT recommended acute rehab    Anemia  - cbc stable    Expected Discharge Location and Transportation: Acute rehab  Expected Discharge   Expected Discharge Date and Time     Expected Discharge Date Expected Discharge Time     Apr 15, 2023            DVT prophylaxis:  Medical and mechanical DVT prophylaxis orders are present.     AM-PAC 6 Clicks Score (PT): 16 (04/08/23 2055)    CODE STATUS:   Code Status and Medical Interventions:   Ordered at: 03/24/23 2973     Code Status (Patient has no pulse and is not breathing):    CPR (Attempt to Resuscitate)     Medical Interventions (Patient has pulse or is breathing):    Full Support      More than 50% of time spent counseling on current illness and plan of care. Case discussed with: Charge nurse, RN, NA, Dr. Ponce  Total time spent face to face with the patient was 35 minutes.  Total time of the encounter was 60 minutes.      Alysia Wheeler DO  04/10/23

## 2023-04-10 NOTE — PROGRESS NOTES
CTS Progress Note      Chief Complaint: Pleural effusion      Subjective  No complaints.  VSS on room air saturations 97%.    Objective    Physical Exam:   Vital Signs   Temp:  [98.2 °F (36.8 °C)-98.4 °F (36.9 °C)] 98.2 °F (36.8 °C)  Heart Rate:  [79-87] 87  Resp:  [18] 18  BP: (117-149)/(64-74) 149/74   GEN: NAD   CV: Regular rate and rhythm   RESP: Decreased bilateral bases but otherwise  clear   EXT: Warm without significant edema   Chest tube: 15 cc serosanguineous drainage in  24, no airleak  Results     Results from last 7 days   Lab Units 04/10/23  0506   WBC 10*3/mm3 8.39   HEMOGLOBIN g/dL 8.7*   HEMATOCRIT % 27.9*   PLATELETS 10*3/mm3 220     Results from last 7 days   Lab Units 04/10/23  0506   SODIUM mmol/L 144   POTASSIUM mmol/L 4.4   CHLORIDE mmol/L 110*   CO2 mmol/L 22.0   BUN mg/dL 16   CREATININE mg/dL 0.66*   GLUCOSE mg/dL 206*   CALCIUM mg/dL 8.6             Results from last 7 days   Lab Units 04/06/23  0800   APTT seconds 110.8*     Assessment & Plan   Bilateral pulmonary embolus with moderate right pleural effusion status post IR placed chest tube discontinued 3/30/2023  Continued moderate size right-sided pleural effusion    Plan   Continue chest tube to -20 suction, may ultimately need decortication-will discuss with Dr. Mahan  Daily CXR    Alexandra Azul, APRN  04/10/23  07:12 EDT

## 2023-04-10 NOTE — PROGRESS NOTES
HEPARIN INFUSION  Timmy Guajardo is a  72 y.o. male receiving heparin infusion.     Therapy for (VTE/Cardiac): VTE  Patient Weight: 56.7 kg  Initial Bolus (Y/N): No  Any Bolus (Y/N): No (per CT surgery note)       Bleeding: monitor H/H    VTE (PE/DVT)  Initial rate: 18 units/kg/hr (Max 1,500 units/hr)    Anti Xa Rebolus Infusion Hold time Change infusion Dose (Units/kg/hr) Next Anti Xa Level Due   < 0.11 None Increase by  4 Units/kg/hr 6 hours   0.11 - 0.19 None Increase by  3 Units/kg/hr 6 hours   0.2 - 0.29 0 None Increase by  2 Units/kg/hr 6 hours   0.3 - 0.7 0 None No Change 6 hours (after 2 consecutive levels in range check qAM)   0.71 - 0.8 0 None Decrease by  1 Units/kg/hr 6 hours   0.81 - 0.9 0 None Decrease by  2 Units/kg/hr 6 hours   0.91 - 1 0 60 Minutes Decrease by  3 Units/kg/hr 6 hours   >1 0 Hold  After Anti Xa less than 0.7 decrease previous rate by  4 Units/kg/hr  Every 2 hours until Anti Xa is less than 0.7 then when infusion restarts in 6 hours     Results from last 7 days   Lab Units 04/10/23  0506 04/09/23  0554 04/08/23  0711   HEMOGLOBIN g/dL 8.7* 8.4* 8.3*   HEMATOCRIT % 27.9* 26.4* 26.0*   PLATELETS 10*3/mm3 220 192 189       Date   Time   Anti-Xa Current Rate (Unit/kg/hr) Bolus   (Units) Rate Change   (Unit/kg/hr) New Rate (Unit/kg/hr) Next anti-Xa Comments  Pump Check Daily   3/25 0025 pending 0 4540 +18 18 0700 New start   3/25 0722 >1.1 18 -- -18 0 0930 Eldena 324   3/25 0954 >1.1 0 -- -- 0 1300 Eldena 3249   3/25  aPTT 0        3/25 0954 63 0  +16 16 2100 Eldena 324   3/25 2141 71.4 16 -- -- 16 0400 Alexandro 3184 -b   3/26 0436 77.1 16 -- -- 16 1200 Alexandro 3184 -acb   3/26 1136 45.5  (heparin was held for 2.5hrs prior to draw) 16 --  16 2000 Ros 3250   3/26 2035 57.5 16 -- +1 17 0600 Nadia 3249 -acb   3/27 0708 88.1 17 -- -- 17 1300 Marisa 3250   3/27 1630 -- off since 0730 for CT plcmt -- resume at prev rate 17 2300 Heparin resumed s/p R CT placement   3/27 2254 42.4 17 2000 +2 19  0800 Nadia 3252 -acb   3/28 0842 132.6 19 -- hold -- 1200 Dw RN   3/28 1357 46.3 -- -- +15 15 2100 DW RN   3/28 2037 0.15 15 1400 +3 18 0400 D/w WADE Zuniga   3/29 0444 0.35 18 -- -- 18 1200 Nadia 3252 -acb   3/29 1137 0.27 18 -- +2 20 2000 Marisa 3250   3/29 2001 0.50 20 -- -- 20 off at 0000 D/w WADE Arauz   3/30 1800 -- off since 0000 for planned colonoscopy no bolus until after colon study complete resume at prev rate 20 0000 D/w RN and Dr. Cooper - pt did not have scope today. Restart heparin in meantime w/o bolus and turn off at 0600 for planned scope in AM.     3/31 0115  20 --- -- Held 0600 -- D/w RN   3/21 1030 -- 0  restart+20 +20 1600    3/21 1817 0.36 20 -- -- 20 0100 D/w RN   4/1 0100 0.38 20 -- -- 20 1200 D/w RN   4/1 1129 0.49 20 -- -- 20 0600 D/w WADE Herrera, pump verified   4/2 0600 0.43 20 -- -- 20 0600 4/3 D/w RN  Pump checked ES       Date   Time   Anti-Xa Current Rate (Unit/kg/hr) Bolus   (Units) Rate Change   (Unit/kg/hr) New Rate (Unit/kg/hr) Next   Anti-Xa Comments  Pump Check Daily   4/3 0541 0.39 20 -- -- 20 0600 Nisha 3239   4/4 0630 0.19 20 -- +3 23 1200 D/w RN  Pump checked - ES   4/4 1410 0.31 23 -- -- 23 2100 D/w WADE Blood    4/4 2000 -- OFF -- -- +23 0200 Off for an unknown amount of time prior to CT insertion-restart at 2000 per Dr. Archuleta; D/w WADE Bloom   4/5 0300 0.28 23 -- +1 24 0900 D/w RN   4/5 0935 0.33 24 -- -- 24 1500 D/W WADE   4/5 1540 0.35 24 -- -- 24 0600 D/w WADE Su   4/6 0800 0.46 24 -- -- -- 1400 D/W WADE Herrera, michelet blood out of chest tube, order per Dr. Ponce to HOLD heparin drip. Will follow up   4/7 1026 -- -- -- +24 24 1700 Okay to restart heparin drip with no bolus per Dr. Ponce, CTS   4/7 1736 0.3 24 -- -- 24 0000 D/w RN, notes blood from chest tube but no change from baseline   4/8 0023 0.37 24 -- -- 24 0600 Gerri [address] -[facility]   4/8 0748 0.49 24 -- -- 24 1400 D/W Sharon OLVERA    4/8 1548 0.41 24 -- -- 24 0600 D/w RN   4/9 0554 0.37 24 --  -- 24 0600 D/W Fei OLVERA   4/10  0810 0.48 24 -- -- 24 0600 Gerri 9048                                                                     Johan Joshua, [facility]  4/10/2023  11:26 EDT

## 2023-04-10 NOTE — CASE MANAGEMENT/SOCIAL WORK
Continued Stay Note   [place]     Patient Name: Timmy Guajardo  MRN: [id number]  Today's Date: 4/10/2023    Admit Date: 3/24/2023    Plan: discharge plan   Discharge Plan     Row Name 04/10/23 1251       Plan    Plan discharge plan    Plan Comments Per discussion in MDR, pt still have a chest tube,  Pt still wants to go to [facility] for short term rehab at discharge. I spoke with Lucinda(liason for [facility]) and they will cont to follow. Pt will need to have chest tube out before going to a skilled nursing faciity. CM will cont to follow    Final Discharge Disposition Code 03 - skilled nursing facility (SNF)               Discharge Codes    No documentation.               Expected Discharge Date and Time     Expected Discharge Date Expected Discharge Time    Apr 15, 2023             Violeta Miranda RN

## 2023-04-11 ENCOUNTER — ANESTHESIA EVENT (OUTPATIENT)
Dept: PERIOP | Facility: HOSPITAL | Age: 73
DRG: 163 | End: 2023-04-11
Payer: MEDICARE

## 2023-04-11 ENCOUNTER — APPOINTMENT (OUTPATIENT)
Dept: GENERAL RADIOLOGY | Facility: HOSPITAL | Age: 73
DRG: 163 | End: 2023-04-11
Payer: MEDICARE

## 2023-04-11 LAB
ABO GROUP BLD: NORMAL
ALBUMIN SERPL-MCNC: 3.1 G/DL (ref 3.5–5.2)
ALBUMIN/GLOB SERPL: 0.9 G/DL
ALP SERPL-CCNC: 64 U/L (ref 39–117)
ALT SERPL W P-5'-P-CCNC: 8 U/L (ref 1–41)
ANION GAP SERPL CALCULATED.3IONS-SCNC: 12 MMOL/L (ref 5–15)
ANTI-A1: NORMAL
AST SERPL-CCNC: 17 U/L (ref 1–40)
BILIRUB SERPL-MCNC: 0.2 MG/DL (ref 0–1.2)
BLD GP AB SCN SERPL QL: NEGATIVE
BUN SERPL-MCNC: 14 MG/DL (ref 8–23)
BUN/CREAT SERPL: 19.7 (ref 7–25)
CALCIUM SPEC-SCNC: 9.2 MG/DL (ref 8.6–10.5)
CHLORIDE SERPL-SCNC: 110 MMOL/L (ref 98–107)
CO2 SERPL-SCNC: 22 MMOL/L (ref 22–29)
CREAT SERPL-MCNC: 0.71 MG/DL (ref 0.76–1.27)
DEPRECATED RDW RBC AUTO: 69.3 FL (ref 37–54)
EGFRCR SERPLBLD CKD-EPI 2021: 98.7 ML/MIN/1.73
ERYTHROCYTE [DISTWIDTH] IN BLOOD BY AUTOMATED COUNT: 18.7 % (ref 12.3–15.4)
GLOBULIN UR ELPH-MCNC: 3.6 GM/DL
GLUCOSE BLDC GLUCOMTR-MCNC: 131 MG/DL (ref 70–130)
GLUCOSE BLDC GLUCOMTR-MCNC: 207 MG/DL (ref 70–130)
GLUCOSE BLDC GLUCOMTR-MCNC: 231 MG/DL (ref 70–130)
GLUCOSE BLDC GLUCOMTR-MCNC: 242 MG/DL (ref 70–130)
GLUCOSE SERPL-MCNC: 161 MG/DL (ref 65–99)
HCT VFR BLD AUTO: 28.8 % (ref 37.5–51)
HGB BLD-MCNC: 8.9 G/DL (ref 13–17.7)
MCH RBC QN AUTO: 31.6 PG (ref 26.6–33)
MCHC RBC AUTO-ENTMCNC: 30.9 G/DL (ref 31.5–35.7)
MCV RBC AUTO: 102.1 FL (ref 79–97)
PLATELET # BLD AUTO: 236 10*3/MM3 (ref 140–450)
PMV BLD AUTO: 11.6 FL (ref 6–12)
POTASSIUM SERPL-SCNC: 4.4 MMOL/L (ref 3.5–5.2)
PROT SERPL-MCNC: 6.7 G/DL (ref 6–8.5)
RBC # BLD AUTO: 2.82 10*6/MM3 (ref 4.14–5.8)
RH BLD: POSITIVE
SODIUM SERPL-SCNC: 144 MMOL/L (ref 136–145)
T&S EXPIRATION DATE: NORMAL
UFH PPP CHRO-ACNC: 0.3 IU/ML (ref 0.3–0.7)
WBC NRBC COR # BLD: 8.91 10*3/MM3 (ref 3.4–10.8)

## 2023-04-11 PROCEDURE — 86850 RBC ANTIBODY SCREEN: CPT | Performed by: PHYSICIAN ASSISTANT

## 2023-04-11 PROCEDURE — 97110 THERAPEUTIC EXERCISES: CPT

## 2023-04-11 PROCEDURE — 86920 COMPATIBILITY TEST SPIN: CPT

## 2023-04-11 PROCEDURE — 71045 X-RAY EXAM CHEST 1 VIEW: CPT

## 2023-04-11 PROCEDURE — 86901 BLOOD TYPING SEROLOGIC RH(D): CPT | Performed by: PHYSICIAN ASSISTANT

## 2023-04-11 PROCEDURE — 86900 BLOOD TYPING SEROLOGIC ABO: CPT | Performed by: PHYSICIAN ASSISTANT

## 2023-04-11 PROCEDURE — 63710000001 INSULIN LISPRO (HUMAN) PER 5 UNITS: Performed by: INTERNAL MEDICINE

## 2023-04-11 PROCEDURE — 86870 RBC ANTIBODY IDENTIFICATION: CPT | Performed by: PHYSICIAN ASSISTANT

## 2023-04-11 PROCEDURE — 25010000002 HEPARIN (PORCINE) 25000-0.45 UT/250ML-% SOLUTION: Performed by: INTERNAL MEDICINE

## 2023-04-11 PROCEDURE — 86922 COMPATIBILITY TEST ANTIGLOB: CPT

## 2023-04-11 PROCEDURE — 99231 SBSQ HOSP IP/OBS SF/LOW 25: CPT | Performed by: HOSPITALIST

## 2023-04-11 PROCEDURE — 80053 COMPREHEN METABOLIC PANEL: CPT | Performed by: HOSPITALIST

## 2023-04-11 PROCEDURE — 82962 GLUCOSE BLOOD TEST: CPT

## 2023-04-11 PROCEDURE — 97530 THERAPEUTIC ACTIVITIES: CPT

## 2023-04-11 PROCEDURE — 85027 COMPLETE CBC AUTOMATED: CPT | Performed by: HOSPITALIST

## 2023-04-11 PROCEDURE — 85520 HEPARIN ASSAY: CPT

## 2023-04-11 RX ORDER — FAMOTIDINE 20 MG/1
20 TABLET, FILM COATED ORAL ONCE
Status: CANCELLED | OUTPATIENT
Start: 2023-04-11 | End: 2023-04-11

## 2023-04-11 RX ORDER — FAMOTIDINE 10 MG/ML
20 INJECTION, SOLUTION INTRAVENOUS ONCE
Status: CANCELLED | OUTPATIENT
Start: 2023-04-11 | End: 2023-04-11

## 2023-04-11 RX ADMIN — TERAZOSIN HYDROCHLORIDE 1 MG: 1 CAPSULE ORAL at 21:26

## 2023-04-11 RX ADMIN — ACETAMINOPHEN 325MG 650 MG: 325 TABLET ORAL at 21:26

## 2023-04-11 RX ADMIN — MEGESTROL ACETATE 400 MG: 40 SUSPENSION ORAL at 08:14

## 2023-04-11 RX ADMIN — INSULIN LISPRO 3 UNITS: 100 INJECTION, SOLUTION INTRAVENOUS; SUBCUTANEOUS at 18:03

## 2023-04-11 RX ADMIN — CARVEDILOL 50 MG: 12.5 TABLET, FILM COATED ORAL at 18:03

## 2023-04-11 RX ADMIN — Medication 5 MG: at 21:26

## 2023-04-11 RX ADMIN — CARVEDILOL 50 MG: 12.5 TABLET, FILM COATED ORAL at 08:13

## 2023-04-11 RX ADMIN — OXYCODONE HYDROCHLORIDE AND ACETAMINOPHEN 1000 MG: 500 TABLET ORAL at 08:13

## 2023-04-11 RX ADMIN — ATORVASTATIN CALCIUM 80 MG: 40 TABLET, FILM COATED ORAL at 21:26

## 2023-04-11 RX ADMIN — HEPARIN SODIUM 24 UNITS/KG/HR: 10000 INJECTION, SOLUTION INTRAVENOUS at 21:37

## 2023-04-11 RX ADMIN — ACETAMINOPHEN 325MG 650 MG: 325 TABLET ORAL at 05:11

## 2023-04-11 RX ADMIN — ACETAMINOPHEN 325MG 650 MG: 325 TABLET ORAL at 15:03

## 2023-04-11 RX ADMIN — HEPARIN SODIUM 24 UNITS/KG/HR: 10000 INJECTION, SOLUTION INTRAVENOUS at 03:29

## 2023-04-11 RX ADMIN — Medication 10 ML: at 21:26

## 2023-04-11 RX ADMIN — ASPIRIN 81 MG CHEWABLE TABLET 81 MG: 81 TABLET CHEWABLE at 08:13

## 2023-04-11 RX ADMIN — SENNOSIDES AND DOCUSATE SODIUM 2 TABLET: 8.6; 5 TABLET ORAL at 21:26

## 2023-04-11 RX ADMIN — PANTOPRAZOLE SODIUM 40 MG: 40 TABLET, DELAYED RELEASE ORAL at 21:26

## 2023-04-11 RX ADMIN — MIRTAZAPINE 15 MG: 15 TABLET, FILM COATED ORAL at 21:26

## 2023-04-11 RX ADMIN — DOCUSATE SODIUM 100 MG: 100 CAPSULE, LIQUID FILLED ORAL at 21:26

## 2023-04-11 RX ADMIN — LISINOPRIL 5 MG: 5 TABLET ORAL at 08:24

## 2023-04-11 RX ADMIN — FAMOTIDINE 20 MG: 20 TABLET ORAL at 08:13

## 2023-04-11 RX ADMIN — INSULIN LISPRO 3 UNITS: 100 INJECTION, SOLUTION INTRAVENOUS; SUBCUTANEOUS at 11:51

## 2023-04-11 NOTE — PLAN OF CARE
Goal Outcome Evaluation:  Plan of Care Reviewed With: patient        Progress: improving   Pt VSS today during shift. Patient worked with PT today. Consent signed for procedure on 4/12.

## 2023-04-11 NOTE — THERAPY TREATMENT NOTE
Patient Name: Timmy Guajardo  : 1950    MRN: [id number]                              Today's Date: 2023       Admit Date: 3/24/2023    Visit Dx:     ICD-10-CM ICD-9-CM   1. Weight loss  R63.4 783.21   2. Unintentional weight loss  R63.4 783.21   3. 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 Pleural effusion  J90 511.9   5. Shortness of breath  R06.02 786.05     Patient Active Problem List   Diagnosis   • Acute right-sided weakness   • Suspected cerebrovascular accident (CVA)   • Leukocytosis   • CHF (congestive heart failure)   • Thrombocytopenia   • HTN (hypertension)   • Presence of cardiac pacemaker   • Hyperglycemia   • Gangrene of toe of left foot   • Closed displaced intertrochanteric fracture of right femur, initial encounter   • Pleural effusion   • Unintentional weight loss   • Hypoalbuminemia   • Severe malnutrition   • Bilateral pulmonary embolism   • History of CVA (cerebrovascular accident)   • Debility     Past Medical History:   Diagnosis Date   • Cardiomyopathy    • CHF (congestive heart failure)    • Coronary artery disease    • Diabetes mellitus    • GERD (gastroesophageal reflux disease)    • HTN (hypertension)    • Stroke     Right sided weakness   • Stroke      Past Surgical History:   Procedure Laterality Date   • AMPUTATION DIGIT Left 2021    Procedure: AMPUTATION DIGIT - GREAT TOE AMPUTATION LEFT;  Surgeon: Dario Marmolejo MD;  Location: [facility] OR;  Service: General;  Laterality: Left;   • AORTAGRAM N/A 2021    Procedure: AORTAGRAM WITH RUNOFFS, LEFT SFA BALLOON ANGIOPLASTY;  Surgeon: Tim Mahan MD;  Location: [facility] HYBRID SHELIA;  Service: Vascular;  Laterality: N/A;  FL TIME 6 MIN 54 SECS  82 MGY  CONTRAST VISIPAQUE 100ML     • CARDIAC CATHETERIZATION     • CARDIAC PACEMAKER PLACEMENT      pacemaker/defibrillator, [employer]   • COLONOSCOPY N/A 3/31/2023    Procedure: COLONOSCOPY;  Surgeon: Brunner, Mark I, MD;  Location: [facility] ENDOSCOPY;   Service: Gastroenterology;  Laterality: N/A;   • ENDOSCOPY N/A 3/29/2023    Procedure: ESOPHAGOGASTRODUODENOSCOPY;  Surgeon: Brunner, Mark I, MD;  Location: [facility] ENDOSCOPY;  Service: Gastroenterology;  Laterality: N/A;   • HERNIA REPAIR Bilateral     Inguinal hernia   • HIP TROCHANTERIC NAILING WITH INTRAMEDULLARY HIP SCREW Right 10/18/2022    Procedure: HIP TROCHANTERIC NAILING WITH INTRAMEDULLARY HIP SCREW RIGHT;  Surgeon: Bj Steinberg MD;  Location: [facility] OR;  Service: Orthopedics;  Laterality: Right;      General Information     Row Name 04/11/23 [facility]3          Physical Therapy Time and Intention    Document Type therapy note (daily note)  -NS     Mode of Treatment individual therapy;physical therapy  -NS     Row Name 04/11/23 [facility]3          General Information    Patient Profile Reviewed yes  -NS     Existing Precautions/Restrictions fall;other (see comments)  R chest tube; residual R sided weakness  -NS     Row Name 04/11/23 1433          Cognition    Orientation Status (Cognition) oriented x 4  -NS     Row Name 04/11/23 [facility]          Safety Issues, Functional Mobility    Safety Issues Affecting Function (Mobility) safety precaution awareness;safety precautions follow-through/compliance;sequencing abilities;insight into deficits/self-awareness;judgment  -NS     Impairments Affecting Function (Mobility) balance;coordination;endurance/activity tolerance;motor planning;strength;motor control;range of motion (ROM);pain  -NS           User Key  (r) = Recorded By, (t) = Taken By, (c) = Cosigned By    Initials Name Provider Type    NS Juliet Kuhn PT Physical Therapist               Mobility     Row Name 04/11/23 1433          Bed Mobility    Bed Mobility supine-sit;sit-supine  -NS     Scooting/Bridging [place] (Bed Mobility) dependent (less than 25% patient effort);2 person assist  -NS     Supine-Sit [place] (Bed Mobility) contact guard;verbal cues  -NS     Sit-Supine [place] (Bed Mobility)  minimum assist (75% patient effort);verbal cues  -NS     Assistive Device (Bed Mobility) head of bed elevated;bed rails  -NS     Comment, (Bed Mobility) Assist at BLEs to return to supine. Dep x2 to scoot up in bed.  -NS     Row Name 04/11/23 1433          Transfers    Comment, (Transfers) VCs for hand and foot placement. Pt demonstrates flexed R knee and benefits from B feet blocked during transition to standing. Pt tends to stand upright then transition to crouched posture. 1 rep with BUE support and 2 reps with RW.  -NS     Row Name 04/11/23 1433          Sit-Stand Transfer    Sit-Stand [place] (Transfers) minimum assist (75% patient effort);2 person assist  -NS     Assistive Device (Sit-Stand Transfers) walker, front-wheeled  -NS           User Key  (r) = Recorded By, (t) = Taken By, (c) = Cosigned By    Initials Name Provider Type    Juliet Franks PT Physical Therapist               Obj/Interventions     Row Name 04/11/23 1433          Motor Skills    Therapeutic Exercise hip;knee;ankle  -[facility] Name 04/11/23 [facility]          Hip (Therapeutic Exercise)    Hip (Therapeutic Exercise) strengthening exercise;isometric exercises  -NS     Hip Isometrics (Therapeutic Exercise) bilateral;gluteal sets;10 repetitions  -NS     Hip Strengthening (Therapeutic Exercise) bilateral;aBduction;aDduction;external rotation;internal rotation;10 repetitions  -[facility] Name 04/11/23 [facility]          Knee (Therapeutic Exercise)    Knee (Therapeutic Exercise) strengthening exercise;isometric exercises  -NS     Knee Isometrics (Therapeutic Exercise) bilateral;quad sets;10 repetitions  -NS     Knee Strengthening (Therapeutic Exercise) bilateral;SLR (straight leg raise);10 repetitions;LAQ (long arc quad);20 repititions  -NS     Row Name 04/11/23 1433          Ankle (Therapeutic Exercise)    Ankle (Therapeutic Exercise) AROM (active range of motion)  -NS     Ankle AROM (Therapeutic Exercise)  bilateral;dorsiflexion;plantarflexion;10 repetitions  -NS     Row Name 04/11/23 1433          Balance    Balance Assessment sitting static balance;sitting dynamic balance;standing static balance;standing dynamic balance  -NS     Static Sitting Balance standby assist  -NS     Dynamic Sitting Balance standby assist  -NS     Position, Sitting Balance unsupported;sitting edge of bed  -NS     Static Standing Balance minimal assist;2-person assist  -NS     Dynamic Standing Balance minimal assist;2-person assist  -NS     Position/Device Used, Standing Balance walker, azul;walker, front-wheeled  -NS     Comment, Balance Patient practiced lateral weight shifting to facilitate strengthening and WBing through BLEs.  -NS           User Key  (r) = Recorded By, (t) = Taken By, (c) = Cosigned By    Initials Name Provider Type    Juliet Franks PT Physical Therapist               Goals/Plan    No documentation.                Clinical Impression     Row Name 04/11/23 1433          Pain    Pretreatment Pain Rating 1/10  -NS     Posttreatment Pain Rating 1/10  -NS     Pain Location - Side/Orientation Right  -NS     Pain Location generalized  -NS     Pain Location - knee  -NS     Pain Intervention(s) Repositioned;Ambulation/increased activity  -NS     Row Name 04/11/23 1433          Plan of Care Review    Plan of Care Reviewed With patient  -NS     Progress improving  -NS     Outcome Evaluation Patient continues to progress with PT but is limited by weakness, poor activity tolerance, and balance impairments. He required Min A x2 to stand and gave good effort towards weight shifting. Continue skilled IP PT to support return to independence. Recommend SNF at d/c.  -NS     Row Name 04/11/23 1433          Vital Signs    Pre Systolic BP Rehab --  VSS- RN cleared for PT treatment  -NS     Pre Patient Position Supine  -NS     Intra Patient Position Standing  -NS     Post Patient Position Supine  -NS     Row Name 04/11/23 1433           Positioning and Restraints    Pre-Treatment Position in bed  -NS     Post Treatment Position bed  -NS     In Bed notified nsg;supine;call light within reach;encouraged to call for assist;exit alarm on;side rails up x3  -NS           User Key  (r) = Recorded By, (t) = Taken By, (c) = Cosigned By    Initials Name Provider Type    Juliet Franks, ANNE MARIE Physical Therapist               Outcome Measures     Row Name 04/11/23 1433          How much help from another person do you currently need...    Turning from your back to your side while in flat bed without using bedrails? 3  -NS     Moving from lying on back to sitting on the side of a flat bed without bedrails? 3  -NS     Moving to and from a bed to a chair (including a wheelchair)? 2  -NS     Standing up from a chair using your arms (e.g., wheelchair, bedside chair)? 2  -NS     Climbing 3-5 steps with a railing? 1  -NS     To walk in hospital room? 2  -NS     AM-PAC 6 Clicks Score (PT) 13  -NS     Highest level of mobility 4 --> Transferred to chair/commode  -NS     Row Name 04/11/23 1433          Functional Assessment    Outcome Measure Options AM-PAC 6 Clicks Basic Mobility (PT)  -NS           User Key  (r) = Recorded By, (t) = Taken By, (c) = Cosigned By    Initials Name Provider Type    Juliet Franks, ANNE MARIE Physical Therapist                             Physical Therapy Education     Title: PT OT SLP Therapies (In Progress)     Topic: Physical Therapy (Done)     Point: Mobility training (Done)     Learning Progress Summary           Patient Acceptance, E, VU,NR by NS at 4/11/2023 1611    Comment: benefits of OOB activity, ther ex, purpose of IP PT    Acceptance, E, VU,NR by ML at 4/7/2023 1151    Acceptance, E, VU by  at 4/5/2023 1554    Acceptance, E, VU,NR by NS at 4/3/2023 0926    Acceptance, E, VU,NR by ML at 3/31/2023 1604    Acceptance, E, VU,NR by ML at 3/28/2023 1323    Acceptance, E,TB, VU,NR by  at 3/26/2023 1536   Family Acceptance, E,TB, VU,NR by   at 3/26/2023 1536                   Point: Home exercise program (Done)     Learning Progress Summary           Patient Acceptance, E, VU,NR by NS at 4/11/2023 1611    Comment: benefits of OOB activity, ther ex, purpose of IP PT    Acceptance, E, VU,NR by ML at 4/7/2023 1151    Acceptance, E, VU by LH at 4/5/2023 1554    Acceptance, E, VU,NR by NS at 4/3/2023 0926    Acceptance, E, VU,NR by  at 3/31/2023 1604    Acceptance, E, VU,NR by  at 3/28/2023 1323    Acceptance, E,TB, VU,NR by  at 3/26/2023 1536   Family Acceptance, E,TB, VU,NR by  at 3/26/2023 1536                   Point: Body mechanics (Done)     Learning Progress Summary           Patient Acceptance, E, VU,NR by NS at 4/11/2023 1611    Comment: benefits of OOB activity, ther ex, purpose of IP PT    Acceptance, E, VU,NR by ML at 4/7/2023 1151    Acceptance, E, VU by  at 4/5/2023 1554    Acceptance, E, VU,NR by NS at 4/3/2023 0926    Acceptance, E,TB, VU,NR by  at 3/26/2023 1536   Family Acceptance, E,TB, VU,NR by  at 3/26/2023 1536                   Point: Precautions (Done)     Learning Progress Summary           Patient Acceptance, E, VU,NR by NS at 4/11/2023 1611    Comment: benefits of OOB activity, ther ex, purpose of IP PT    Acceptance, E, VU,NR by  at 4/7/2023 1151    Acceptance, E, VU by  at 4/5/2023 1554    Acceptance, E, VU,NR by NS at 4/3/2023 0926    Acceptance, E, VU,NR by  at 3/31/2023 1604    Acceptance, E, VU,NR by  at 3/28/2023 1323    Acceptance, E,TB, VU,NR by  at 3/26/2023 1536   Family Acceptance, E,TB, VU,NR by  at 3/26/2023 1536                               User Key     Initials Effective Dates Name Provider Type Discipline     11/02/22 -  Gabino Tirado, PT Physical Therapist PT    NS 06/16/21 -  Juliet Kuhn, PT Physical Therapist PT    ML 04/22/21 -  Sharon Heller Physical Therapist PT    LH 09/21/21 -  Ernst Reid, PT Physical Therapist PT              PT Recommendation and Plan     Plan of  Care Reviewed With: patient  Progress: improving  Outcome Evaluation: Patient continues to progress with PT but is limited by weakness, poor activity tolerance, and balance impairments. He required Min A x2 to stand and gave good effort towards weight shifting. Continue skilled IP PT to support return to independence. Recommend SNF at d/c.     Time Calculation:    PT Charges     Row Name 04/11/23 1433             Time Calculation    Start Time 1433  -NS      PT Received On 04/11/23  -NS      PT Goal Re-Cert Due Date 04/15/23  -NS         Timed Charges    60497 - PT Therapeutic Exercise Minutes 11  -NS      91720 - PT Therapeutic Activity Minutes 18  -NS         Total Minutes    Timed Charges Total Minutes 29  -NS       Total Minutes 29  -NS            User Key  (r) = Recorded By, (t) = Taken By, (c) = Cosigned By    Initials Name Provider Type    Juliet Franks PT Physical Therapist              Therapy Charges for Today     Code Description Service Date Service Provider Modifiers Qty    28919713441 HC PT THERAPEUTIC ACT EA 15 MIN 4/11/2023 Juliet Kuhn, PT GP 1    40927699791 HC PT THER PROC EA 15 MIN 4/11/2023 Juliet Kuhn, PT GP 1    30903954012 HC PT THER SUPP EA 15 MIN 4/11/2023 Juliet Kuhn, PT GP 2          PT G-Codes  Outcome Measure Options: AM-PAC 6 Clicks Basic Mobility (PT)  AM-PAC 6 Clicks Score (PT): 13  AM-PAC 6 Clicks Score (OT): 16  PT Discharge Summary  Anticipated Discharge Disposition (PT): skilled nursing facility    Juliet Kuhn PT  4/11/2023

## 2023-04-11 NOTE — PROGRESS NOTES
[facility] Medicine Services  PROGRESS NOTE    Patient Name: Timmy Guajardo  : 1950  MRN: [id number]    Date of Admission: 3/24/2023  Primary Care Physician: Arsen Seals APRN    Subjective   Subjective     CC:  Follow-up for weakness    HPI:  Patient resting in bed. Chest tube in place, stable. States he is mad he has to have surgery because he was hopeful to be at rehab by now.    ROS:  Gen- No fevers, chills  CV- No chest pain, palpitations  Resp- No cough, dyspnea  GI- No N/V/D, abd pain    Objective   Objective     Vital Signs:   Temp:  [97.8 °F (36.6 °C)-98.3 °F (36.8 °C)] 98.2 °F (36.8 °C)  Heart Rate:  [82-84] 82  Resp:  [18-20] 18  BP: (137-164)/(66-90) 137/85     Physical Exam:  Constitutional: No acute distress, awake, alert  HENT: NCAT, mucous membranes moist  Respiratory: decreased to auscultation bilaterally, respiratory effort normal on RA, chest tube in place  Cardiovascular: RRR, no murmurs, rubs, or gallops  Gastrointestinal: Positive bowel sounds, soft, nontender, nondistended  Musculoskeletal: No bilateral ankle edema  Psychiatric: Appropriate affect, cooperative  Neurologic: Oriented x 3, strength symmetric in all extremities, Cranial Nerves grossly intact to confrontation, speech clear  Skin: No rashes    Results Reviewed:  LAB RESULTS:      Lab 23  0613 23  0504 04/10/23  0810 04/10/23  0506 23  0554 23  1548 23  0711 23  1736 23  1155 23  0823 23  0341 23  0043 23  1939 23  1337 23  1119 23  0800   WBC  --  8.91  --  8.39 8.27  --  9.12  --   --   --  8.97  --  10.57  --  10.37 10.47   HEMOGLOBIN  --  8.9*  --  8.7* 8.4*  --  8.3*  --  9.0*   < > 9.1*   < > 10.3*  10.3*   < > 10.1* 10.4*   HEMATOCRIT  --  28.8*  --  27.9* 26.4*  --  26.0*  --  29.0*   < > 28.6*   < > 33.2*  33.0*   < > 32.5* 32.8*   PLATELETS  --  236  --  220 192  --  189  --   --   --  186  --   170  --  185 156   NEUTROS ABS  --   --   --   --   --   --   --   --   --   --  6.57  --  8.20*  --  7.88* 7.97*   IMMATURE GRANS (ABS)  --   --   --   --   --   --   --   --   --   --  0.07*  --  0.11*  --  0.08* 0.07*   LYMPHS ABS  --   --   --   --   --   --   --   --   --   --  1.47  --  1.32  --  1.32 1.36   MONOS ABS  --   --   --   --   --   --   --   --   --   --  0.67  --  0.76  --  0.85 0.85   EOS ABS  --   --   --   --   --   --   --   --   --   --  0.15  --  0.12  --  0.19 0.18   MCV  --  102.1*  --  101.1* 99.2*  --  97.4*  --   --   --  97.9*  --  99.7*  --  97.9* 95.3   APTT  --   --   --   --   --   --   --   --   --   --   --   --   --   --   --  110.8*   HEPARIN ANTI-XA 0.30  --  0.48  --  0.37 0.41 0.49   < >  --   --   --   --   --    < >  --  0.46    < > = values in this interval not displayed.         Lab 04/11/23  0504 04/10/23  0506 04/09/23  0554 04/08/23  0711 04/07/23  0341   SODIUM 144 144 141 144 144   POTASSIUM 4.4 4.4 4.0 3.8 4.2   CHLORIDE 110* 110* 109* 111* 112*   CO2 22.0 22.0 21.0* 21.0* 21.0*   ANION GAP 12.0 12.0 11.0 12.0 11.0   BUN 14 16 14 16 21   CREATININE 0.71* 0.66* 0.68* 0.63* 0.50*   EGFR 98.7 100.9 100.0 102.3 109.7   GLUCOSE 161* 206* 163* 128* 127*   CALCIUM 9.2 8.6 8.7 8.6 8.7         Lab 04/11/23  0504 04/10/23  0506 04/09/23  0554   TOTAL PROTEIN 6.7 5.7* 5.6*   ALBUMIN 3.1* 3.0* 2.8*   GLOBULIN 3.6 2.7 2.8   ALT (SGPT) 8 11 13   AST (SGOT) 17 15 19   BILIRUBIN 0.2 0.2 0.2   ALK PHOS 64 68 72                 Lab 04/06/23  1939   ABO TYPING A   RH TYPING Positive   ANTIBODY SCREEN Negative         Brief Urine Lab Results  (Last result in the past 365 days)      Color   Clarity   Blood   Leuk Est   Nitrite   Protein   CREAT   Urine HCG        03/24/23 2329 Yellow   Turbid   Small (1+)   Moderate (2+)   Negative   30 mg/dL (1+)                 Microbiology Results Abnormal     Procedure Component Value - Date/Time    Body Fluid Culture - Body Fluid, Pleural Cavity  [[id number]] Collected: 03/27/23 1532    Lab Status: Final result Specimen: Body Fluid from Pleural Cavity Updated: 03/31/23 0952     Body Fluid Culture No growth at 3 days     Gram Stain Rare (1+) WBCs per low power field      No organisms seen    MRSA Screen, PCR (Inpatient) - Swab, Nares [[id number]]  (Normal) Collected: 03/27/23 0857    Lab Status: Final result Specimen: Swab from Nares Updated: 03/27/23 1107     MRSA PCR Negative    Narrative:      The negative predictive value of this diagnostic test is high and should only be used to consider de-escalating anti-MRSA therapy. A positive result may indicate colonization with MRSA and must be correlated clinically.  MRSA Negative    COVID PRE-OP / PRE-PROCEDURE SCREENING ORDER (NO ISOLATION) - Swab, Nasopharynx [[id number]]  (Normal) Collected: 03/24/23 2256    Lab Status: Final result Specimen: Swab from Nasopharynx Updated: 03/24/23 2356    Narrative:      The following orders were created for panel order COVID PRE-OP / PRE-PROCEDURE SCREENING ORDER (NO ISOLATION) - Swab, Nasopharynx.  Procedure                               Abnormality         Status                     ---------                               -----------         ------                     Respiratory Panel PCR w/...[[id number]]  Normal              Final result                 Please view results for these tests on the individual orders.    Respiratory Panel PCR w/COVID-19(SARS-CoV-2) SULEIMAN/TAWANNA/BLAISE/PAD/COR/MAD/ANTHONY In-House, NP Swab in UTM/VTM, 3-4 HR TAT - Swab, Nasopharynx [[id number]]  (Normal) Collected: 03/24/23 2256    Lab Status: Final result Specimen: Swab from Nasopharynx Updated: 03/24/23 2356     ADENOVIRUS, PCR Not Detected     Coronavirus 229E Not Detected     Coronavirus HKU1 Not Detected     Coronavirus NL63 Not Detected     Coronavirus OC43 Not Detected     COVID19 Not Detected     Human Metapneumovirus Not Detected     Human Rhinovirus/Enterovirus Not Detected     Influenza A PCR Not  Detected     Influenza B PCR Not Detected     Parainfluenza Virus 1 Not Detected     Parainfluenza Virus 2 Not Detected     Parainfluenza Virus 3 Not Detected     Parainfluenza Virus 4 Not Detected     RSV, PCR Not Detected     Bordetella pertussis pcr Not Detected     Bordetella parapertussis PCR Not Detected     Chlamydophila pneumoniae PCR Not Detected     Mycoplasma pneumo by PCR Not Detected    Narrative:      In the setting of a positive respiratory panel with a viral infection PLUS a negative procalcitonin without other underlying concern for bacterial infection, consider observing off antibiotics or discontinuation of antibiotics and continue supportive care. If the respiratory panel is positive for atypical bacterial infection (Bordetella pertussis, Chlamydophila pneumoniae, or Mycoplasma pneumoniae), consider antibiotic de-escalation to target atypical bacterial infection.          XR Chest 1 View    Result Date: 4/11/2023  XR CHEST 1 VW Date of Exam: 4/11/2023 4:50 AM EDT Indication: Pleural effusion. Comparison: 4/10/2023 Findings: Left-sided AICD noted. Heart size normal. There is a stable small right pleural effusion with right basilar airspace disease. No pneumothorax. The left lung is clear. No pleural effusion on the left. Osseous structures are intact. Stable chest tube overlying the right lung base.     Impression: Impression: 1. Stable exam with chest tube overlying the right lung base and persistent small right pleural effusion with mild right basilar atelectasis. 2. No pneumothorax. Electronically Signed: Jesse Brar  4/11/2023 8:46 AM EDT  Workstation ID: [id number]    XR Chest 1 View    Result Date: 4/10/2023  XR CHEST 1 VW Date of Exam: 4/10/2023 4:30 AM EDT Indication: Pleural effusion. Comparison: 4/9/2023, 4/8/2023 Findings: There is a left subclavian AICD/pacemaker device. The heart appears normal in size. No pneumothorax. Patchy airspace disease seen within the right lung base with a  right basilar chest tube. No pneumothorax. Small right-sided pleural effusion present. Left lung is clear. No acute osseous abnormality.     Impression: Impression: No significant changes compared to the previous study. Right basilar atelectasis and right sided pleural effusion. Electronically Signed: Huyen Jovan  4/10/2023 5:57 AM EDT  Workstation ID: [id number]      Results for orders placed during the hospital encounter of 10/16/22    Adult Transthoracic Echo Complete W/ Cont if Necessary Per Protocol    Interpretation Summary  •  Estimated left ventricular EF = 30%.  There is global hypokinesis.  The basal-mid posterior wall appears akinetic.  •  Normal right ventricular cavity size, wall thickness, systolic function and septal motion noted. Electronic lead present in the ventricle  •  Septal wall motion is abnormal, consistent with right ventricular pacing  •  No hemodynamically significant valvular stenosis or regurgitation noted.      Current medications:  Scheduled Meds:acetaminophen, 650 mg, Oral, Q8H  vitamin C, 1,000 mg, Oral, Daily  aspirin, 81 mg, Oral, Daily  atorvastatin, 80 mg, Oral, Nightly  carvedilol, 50 mg, Oral, BID With Meals  docusate sodium, 100 mg, Oral, BID  famotidine, 20 mg, Oral, Daily  insulin lispro, 0-7 Units, Subcutaneous, TID With Meals  lisinopril, 5 mg, Oral, Q24H  megestrol, 400 mg, Oral, Daily  mirtazapine, 15 mg, Oral, Nightly  pantoprazole, 40 mg, Oral, Nightly  polyethylene glycol, 17 g, Oral, Daily  senna-docusate sodium, 2 tablet, Oral, BID  sodium chloride, 10 mL, Intravenous, Q12H  terazosin, 1 mg, Oral, Nightly      Continuous Infusions:heparin, 24 Units/kg/hr, Last Rate: 24 Units/kg/hr (04/11/23 0329)  lactated ringers, 9 mL/hr, Last Rate: 9 mL/hr (03/29/23 6152)  Pharmacy to Dose Heparin,       PRN Meds:.•  acetaminophen **OR** acetaminophen **OR** acetaminophen  •  senna-docusate sodium **AND** polyethylene glycol **AND** bisacodyl **AND** bisacodyl  •  Calcium  Replacement - Follow Nurse / BPA Driven Protocol  •  cyclobenzaprine  •  dextrose  •  dextrose  •  glucagon (human recombinant)  •  Magnesium Standard Dose Replacement - Follow Nurse / BPA Driven Protocol  •  melatonin  •  ondansetron  •  Pharmacy to Dose Heparin  •  Phosphorus Replacement - Follow Nurse / BPA Driven Protocol  •  Potassium Replacement - Follow Nurse / BPA Driven Protocol  •  sodium chloride  •  sodium chloride    Assessment & Plan   Assessment & Plan     Active Hospital Problems    Diagnosis  POA   • **Bilateral pulmonary embolism [I26.99]  Unknown   • Pleural effusion [J90]  Yes   • Unintentional weight loss [R63.4]  Unknown   • Hypoalbuminemia [E88.09]  Unknown   • Severe malnutrition [E43]  Unknown   • History of CVA (cerebrovascular accident) [Z86.73]  Not Applicable   • Debility [R53.81]  Unknown   • Presence of cardiac pacemaker [Z95.0]  Yes   • HTN (hypertension) [I10]  Yes   • Leukocytosis [D72.829]  Yes      Resolved Hospital Problems   No resolved problems to display.        Brief Hospital Course to date:  Timmy Guajardo is a 72 y.o. 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    This patient's problems and plans were partially entered by my partner and updated as appropriate by me 04/11/23.    Bilateral pulmonary embolism  History of DVT  · Likely secondary to being on an inadequate dose of Eliquis of 2.5 mg twice daily and medication noncompliance  · Continue heparin drip while hospitalized since he might need further procedures  · Plan to transition to full dose Eliquis (loading and maintenance)     Moderate exudative right-sided loculated pleural effusion  Possible organized right pleural  effusion  · Bedside point-of-care ultrasound revealed complex pleural effusion  · Cardiothoracic surgery consulted.  Recommended chest tube placement by IR.    · Underwent right-sided chest tube placement by IR on 3/27/2023.  Chest tube removed 3/30/2023   · Repeat CT scan chest after removal of chest tube showed persistent moderate right pleural effusion, likely organized   · Analysis of the fluid is consistent with exudate, ?  Parapneumonic  · Culture from pleural fluid is negative to date  · Cytology showed benign mesothelial cells  · S/p 12 days zosyn  · Repeat chest tube placed 4/4/23  · Chest tube started draining michelet blood and eventually clotted off tube 4/6/23  · CT chest 4/6 showed only a small amount of blood in the pleural cavity  · Continue chest tube to -20 suction per CTS- very little output since placement on 4/4  · Dr. Mahan recommending decortication- time TBD    Lung nodule  - 1.4 cm  - needs repeat CT imaging in 6 months    Severe malnutrition  Hypoalbuminemia  Significant unintentional weight loss  Generalized debility  · Patient reports 52lbs weight loss since October 2022.  · No EGD or colonoscopy since 2008  · CT chest and CT abdomen with no convincing evidence of solid malignancy or lymphadenopathy.  Oncology service evaluated the patient recommended age-appropriate screening  · GI evaluated the patient for EGD and colonoscopy.  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 Continue Megace and mirtazapine 15 mg nightly for poor appetite  · Nutrition team following     UTI with Enterococcus faecalis  · POA  · Zosyn    CAD  Cardiomyopathy with EF 30 %  Essential HTN  Old CVA with right residual weakness  Dyslipidemia  · Continue aspirin  · Continue coreg,  lisinopril  · Continue statin    Constipation     GERD   · Continue PPI      Type 2 diabetes w/A1c 6.3%   · Continue insulin sliding scale     Acute debility  · PT recommended acute rehab    Anemia  - cbc stable    Expected Discharge Location and Transportation: Acute rehab  Expected Discharge   Expected Discharge Date and Time     Expected Discharge Date Expected Discharge Time    Apr 18, 2023            DVT prophylaxis:  Medical and mechanical DVT prophylaxis orders are present.     AM-PAC 6 Clicks Score (PT): 16 (04/08/23 2055)    CODE STATUS:   Code Status and Medical Interventions:   Ordered at: 03/24/23 4241     Code Status (Patient has no pulse and is not breathing):    CPR (Attempt to Resuscitate)     Medical Interventions (Patient has pulse or is breathing):    Full Support          Alysia Wheeler DO  04/11/23

## 2023-04-11 NOTE — PLAN OF CARE
Goal Outcome Evaluation:  Plan of Care Reviewed With: patient        Progress: improving  Outcome Evaluation: Patient continues to progress with PT but is limited by weakness, poor activity tolerance, and balance impairments. He required Min A x2 to stand and gave good effort towards weight shifting. Continue skilled IP PT to support return to independence. Recommend SNF at d/c.   KULDIP Costello/Name of Chaperone

## 2023-04-11 NOTE — PLAN OF CARE
Goal Outcome Evaluation:         VSS throughout shift. Patient a/o. Chest tube in place with no output this shift. Patient on room air, AV paced on monitor.

## 2023-04-11 NOTE — PROGRESS NOTES
HEPARIN INFUSION  Timmy Guajardo is a  72 y.o. male receiving heparin infusion.     Therapy for (VTE/Cardiac): VTE  Patient Weight: 56.7 kg  Initial Bolus (Y/N): No  Any Bolus (Y/N): No (per CT surgery note)       Bleeding: monitor H/H    VTE (PE/DVT)  Initial rate: 18 units/kg/hr (Max 1,500 units/hr)    Anti Xa Rebolus Infusion Hold time Change infusion Dose (Units/kg/hr) Next Anti Xa Level Due   < 0.11 None Increase by  4 Units/kg/hr 6 hours   0.11 - 0.19 None Increase by  3 Units/kg/hr 6 hours   0.2 - 0.29 0 None Increase by  2 Units/kg/hr 6 hours   0.3 - 0.7 0 None No Change 6 hours (after 2 consecutive levels in range check qAM)   0.71 - 0.8 0 None Decrease by  1 Units/kg/hr 6 hours   0.81 - 0.9 0 None Decrease by  2 Units/kg/hr 6 hours   0.91 - 1 0 60 Minutes Decrease by  3 Units/kg/hr 6 hours   >1 0 Hold  After Anti Xa less than 0.7 decrease previous rate by  4 Units/kg/hr  Every 2 hours until Anti Xa is less than 0.7 then when infusion restarts in 6 hours     Results from last 7 days   Lab Units 04/11/23  0504 04/10/23  0506 04/09/23  0554   HEMOGLOBIN g/dL 8.9* 8.7* 8.4*   HEMATOCRIT % 28.8* 27.9* 26.4*   PLATELETS 10*3/mm3 236 220 192       Date   Time   Anti-Xa Current Rate (Unit/kg/hr) Bolus   (Units) Rate Change   (Unit/kg/hr) New Rate (Unit/kg/hr) Next anti-Xa Comments  Pump Check Daily   3/25 0025 pending 0 4540 +18 18 0700 New start   3/25 0722 >1.1 18 -- -18 0 0930 [place] 324   3/25 0954 >1.1 0 -- -- 0 1300 [place] 3249   3/25  aPTT 0        3/25 0954 63 0  +16 16 2100 [place] 324   3/25 2141 71.4 16 -- -- 16 0400 [place] 3184 -b   3/26 0436 77.1 16 -- -- 16 1200 [place] 3184 -acb   3/26 1136 45.5  (heparin was held for 2.5hrs prior to draw) 16 --  16 2000 [address] 3250   3/26 2035 57.5 16 -- +1 17 0600 Nadia 3249 -acb   3/27 0708 88.1 17 -- -- 17 1300 [address] 3250   3/27 1630 -- off since 0730 for CT plcmt -- resume at prev rate 17 2300 Heparin resumed s/p R CT placement   3/27 2254 42.4 17 2000 +2 19  0800 Nadia 3252 -acb   3/28 0842 132.6 19 -- hold -- 1200 Dw RN   3/28 1357 46.3 -- -- +15 15 2100 DW RN   3/28 2037 0.15 15 1400 +3 18 0400 D/w WADE Zuniga   3/29 0444 0.35 18 -- -- 18 1200 Nadia 3252 -acb   3/29 1137 0.27 18 -- +2 20 2000 [address]isa 3250   3/29 2001 0.50 20 -- -- 20 off at 0000 D/w WADE Arauz   3/30 1800 -- off since 0000 for planned colonoscopy no bolus until after colon study complete resume at prev rate 20 0000 D/w RN and Dr. Cooper - pt did not have scope today. Restart heparin in meantime w/o bolus and turn off at 0600 for planned scope in AM.     3/31 0115  20 --- -- Held 0600 -- D/w RN   3/21 1030 -- 0  restart+20 +20 1600    3/21 1817 0.36 20 -- -- 20 0100 D/w RN   4/1 0100 0.38 20 -- -- 20 1200 D/w RN   4/1 1129 0.49 20 -- -- 20 0600 D/w WADE Herrera, pump verified   4/2 0600 0.43 20 -- -- 20 0600 4/3 D/w RN  Pump checked ES       Date   Time   Anti-Xa Current Rate (Unit/kg/hr) Bolus   (Units) Rate Change   (Unit/kg/hr) New Rate (Unit/kg/hr) Next   Anti-Xa Comments  Pump Check Daily   4/3 0541 0.39 20 -- -- 20 0600 Nisha 3239   4/4 0630 0.19 20 -- +3 23 1200 D/w RN  Pump checked - ES   4/4 1410 0.31 23 -- -- 23 2100 D/w WADE Blood    4/4 2000 -- OFF -- -- +23 0200 Off for an unknown amount of time prior to CT insertion-restart at 2000 per Dr. Archuleta; D/w WADE Bloom   4/5 0300 0.28 23 -- +1 24 0900 D/w RN   4/5 0935 0.33 24 -- -- 24 1500 D/W WADE   4/5 1540 0.35 24 -- -- 24 0600 D/w WADE Su   4/6 0800 0.46 24 -- -- -- 1400 D/W WADE Herrrea, michelet blood out of chest tube, order per Dr. Ponce to HOLD heparin drip. Will follow up   4/7 1026 -- -- -- +24 24 1700 Okay to restart heparin drip with no bolus per Dr. Ponce, CTS   4/7 1736 0.3 24 -- -- 24 0000 D/w RN, notes blood from chest tube but no change from baseline   4/8 0023 0.37 24 -- -- 24 0600 Gerri [address] -[facility]   4/8 0748 0.49 24 -- -- 24 1400 D/W Sharon OLVERA    4/8 1548 0.41 24 -- -- 24 0600 D/w RN   4/9 0554 0.37 24 --  -- 24 0600 D/W Fei OLVERA   4/10  0810 0.48 24 -- -- 24 0600 Gerri 4340   4/11 0613 0.3 24 -- -- 24 0600 Gerri 7009                                                          Johan Joshua, [facility]  4/11/2023  06:57 EDT

## 2023-04-11 NOTE — PROGRESS NOTES
CTS Progress Note      Chief Complaint: Pleural effusion      Subjective  No complaints. No acute events overnight.     Objective    Physical Exam:   Vital Signs   Temp:  [97.8 °F (36.6 °C)-98.3 °F (36.8 °C)] 98 °F (36.7 °C)  Heart Rate:  [83-84] 84  Resp:  [18-20] 18  BP: (143-164)/(66-90) 164/79   GEN: NAD   CV: Regular rate and rhythm   RESP: Decreased bilateral bases but otherwise  clear   EXT: Warm without significant edema   Chest tube: 10 cc serosanguineous drainage in  24, no airleak    Results     Results from last 7 days   Lab Units 04/11/23  0504   WBC 10*3/mm3 8.91   HEMOGLOBIN g/dL 8.9*   HEMATOCRIT % 28.8*   PLATELETS 10*3/mm3 236     Results from last 7 days   Lab Units 04/11/23  0504   SODIUM mmol/L 144   POTASSIUM mmol/L 4.4   CHLORIDE mmol/L 110*   CO2 mmol/L 22.0   BUN mg/dL 14   CREATININE mg/dL 0.71*   GLUCOSE mg/dL 161*   CALCIUM mg/dL 9.2       Results from last 7 days   Lab Units 04/06/23  0800   APTT seconds 110.8*     Assessment & Plan   Bilateral pulmonary embolus with moderate right pleural effusion status post IR placed chest tube discontinued 3/30/2023  Continued moderate size right-sided pleural effusion    Spoke with patient about possible decortication given continued effusion seen on most recent CT chest and minimal output for the past 3 days from chest tube. He is agreeable.     Plan   Continue chest tube to -20 suction  Daily CXR      Alexandra Azul, APRN  04/11/23  07:13 EDT

## 2023-04-12 ENCOUNTER — APPOINTMENT (OUTPATIENT)
Dept: GENERAL RADIOLOGY | Facility: HOSPITAL | Age: 73
DRG: 163 | End: 2023-04-12
Payer: MEDICARE

## 2023-04-12 ENCOUNTER — ANESTHESIA (OUTPATIENT)
Dept: PERIOP | Facility: HOSPITAL | Age: 73
DRG: 163 | End: 2023-04-12
Payer: MEDICARE

## 2023-04-12 PROBLEM — Z86.73 HISTORY OF CVA (CEREBROVASCULAR ACCIDENT): Chronic | Status: ACTIVE | Noted: 2023-03-24

## 2023-04-12 PROBLEM — I50.20 HFREF (HEART FAILURE WITH REDUCED EJECTION FRACTION): Chronic | Status: ACTIVE | Noted: 2021-05-04

## 2023-04-12 PROBLEM — I50.20 HFREF (HEART FAILURE WITH REDUCED EJECTION FRACTION): Status: ACTIVE | Noted: 2021-05-04

## 2023-04-12 PROBLEM — I10 HTN (HYPERTENSION): Chronic | Status: ACTIVE | Noted: 2021-05-04

## 2023-04-12 LAB
ALBUMIN SERPL-MCNC: 2.9 G/DL (ref 3.5–5.2)
ALBUMIN/GLOB SERPL: 0.8 G/DL
ALP SERPL-CCNC: 67 U/L (ref 39–117)
ALT SERPL W P-5'-P-CCNC: 11 U/L (ref 1–41)
ANION GAP SERPL CALCULATED.3IONS-SCNC: 12 MMOL/L (ref 5–15)
AST SERPL-CCNC: 16 U/L (ref 1–40)
BILIRUB SERPL-MCNC: 0.2 MG/DL (ref 0–1.2)
BUN SERPL-MCNC: 17 MG/DL (ref 8–23)
BUN/CREAT SERPL: 23.6 (ref 7–25)
CALCIUM SPEC-SCNC: 9.1 MG/DL (ref 8.6–10.5)
CHLORIDE SERPL-SCNC: 106 MMOL/L (ref 98–107)
CO2 SERPL-SCNC: 22 MMOL/L (ref 22–29)
CREAT SERPL-MCNC: 0.72 MG/DL (ref 0.76–1.27)
DEPRECATED RDW RBC AUTO: 71.5 FL (ref 37–54)
EGFRCR SERPLBLD CKD-EPI 2021: 98.3 ML/MIN/1.73
ERYTHROCYTE [DISTWIDTH] IN BLOOD BY AUTOMATED COUNT: 19.4 % (ref 12.3–15.4)
GLOBULIN UR ELPH-MCNC: 3.5 GM/DL
GLUCOSE BLDC GLUCOMTR-MCNC: 118 MG/DL (ref 70–130)
GLUCOSE BLDC GLUCOMTR-MCNC: 122 MG/DL (ref 70–130)
GLUCOSE BLDC GLUCOMTR-MCNC: 173 MG/DL (ref 70–130)
GLUCOSE BLDC GLUCOMTR-MCNC: 190 MG/DL (ref 70–130)
GLUCOSE SERPL-MCNC: 242 MG/DL (ref 65–99)
HCT VFR BLD AUTO: 27.9 % (ref 37.5–51)
HGB BLD-MCNC: 8.7 G/DL (ref 13–17.7)
INR PPP: 1.16 (ref 0.84–1.13)
MCH RBC QN AUTO: 32.1 PG (ref 26.6–33)
MCHC RBC AUTO-ENTMCNC: 31.2 G/DL (ref 31.5–35.7)
MCV RBC AUTO: 103 FL (ref 79–97)
PLATELET # BLD AUTO: 266 10*3/MM3 (ref 140–450)
PMV BLD AUTO: 11.7 FL (ref 6–12)
POTASSIUM SERPL-SCNC: 4.4 MMOL/L (ref 3.5–5.2)
PROT SERPL-MCNC: 6.4 G/DL (ref 6–8.5)
PROTHROMBIN TIME: 14.7 SECONDS (ref 11.4–14.4)
RBC # BLD AUTO: 2.71 10*6/MM3 (ref 4.14–5.8)
SODIUM SERPL-SCNC: 140 MMOL/L (ref 136–145)
UFH PPP CHRO-ACNC: 0.1 IU/ML (ref 0.3–0.7)
WBC NRBC COR # BLD: 8.42 10*3/MM3 (ref 3.4–10.8)

## 2023-04-12 PROCEDURE — 85610 PROTHROMBIN TIME: CPT | Performed by: ANESTHESIOLOGY

## 2023-04-12 PROCEDURE — 71045 X-RAY EXAM CHEST 1 VIEW: CPT

## 2023-04-12 PROCEDURE — 63710000001 INSULIN LISPRO (HUMAN) PER 5 UNITS: Performed by: STUDENT IN AN ORGANIZED HEALTH CARE EDUCATION/TRAINING PROGRAM

## 2023-04-12 PROCEDURE — 87070 CULTURE OTHR SPECIMN AEROBIC: CPT | Performed by: INTERNAL MEDICINE

## 2023-04-12 PROCEDURE — 25010000002 ROPIVACAINE PER 1 MG: Performed by: THORACIC SURGERY (CARDIOTHORACIC VASCULAR SURGERY)

## 2023-04-12 PROCEDURE — 25010000002 DEXAMETHASONE PER 1 MG: Performed by: NURSE ANESTHETIST, CERTIFIED REGISTERED

## 2023-04-12 PROCEDURE — 32652 THORACOSCOPY REM TOTL CORTEX: CPT | Performed by: THORACIC SURGERY (CARDIOTHORACIC VASCULAR SURGERY)

## 2023-04-12 PROCEDURE — 99232 SBSQ HOSP IP/OBS MODERATE 35: CPT | Performed by: INTERNAL MEDICINE

## 2023-04-12 PROCEDURE — 80053 COMPREHEN METABOLIC PANEL: CPT | Performed by: HOSPITALIST

## 2023-04-12 PROCEDURE — 25010000002 ONDANSETRON PER 1 MG: Performed by: NURSE ANESTHETIST, CERTIFIED REGISTERED

## 2023-04-12 PROCEDURE — 31622 DX BRONCHOSCOPE/WASH: CPT | Performed by: THORACIC SURGERY (CARDIOTHORACIC VASCULAR SURGERY)

## 2023-04-12 PROCEDURE — 0BJ08ZZ INSPECTION OF TRACHEOBRONCHIAL TREE, VIA NATURAL OR ARTIFICIAL OPENING ENDOSCOPIC: ICD-10-PCS | Performed by: THORACIC SURGERY (CARDIOTHORACIC VASCULAR SURGERY)

## 2023-04-12 PROCEDURE — 0B5N4ZZ DESTRUCTION OF RIGHT PLEURA, PERCUTANEOUS ENDOSCOPIC APPROACH: ICD-10-PCS | Performed by: THORACIC SURGERY (CARDIOTHORACIC VASCULAR SURGERY)

## 2023-04-12 PROCEDURE — 87070 CULTURE OTHR SPECIMN AEROBIC: CPT | Performed by: STUDENT IN AN ORGANIZED HEALTH CARE EDUCATION/TRAINING PROGRAM

## 2023-04-12 PROCEDURE — 32650 THORACOSCOPY W/PLEURODESIS: CPT | Performed by: THORACIC SURGERY (CARDIOTHORACIC VASCULAR SURGERY)

## 2023-04-12 PROCEDURE — 87176 TISSUE HOMOGENIZATION CULTR: CPT | Performed by: STUDENT IN AN ORGANIZED HEALTH CARE EDUCATION/TRAINING PROGRAM

## 2023-04-12 PROCEDURE — 85520 HEPARIN ASSAY: CPT

## 2023-04-12 PROCEDURE — 87075 CULTR BACTERIA EXCEPT BLOOD: CPT | Performed by: STUDENT IN AN ORGANIZED HEALTH CARE EDUCATION/TRAINING PROGRAM

## 2023-04-12 PROCEDURE — 82962 GLUCOSE BLOOD TEST: CPT

## 2023-04-12 PROCEDURE — 25010000002 FENTANYL CITRATE (PF) 50 MCG/ML SOLUTION

## 2023-04-12 PROCEDURE — 63710000001 INSULIN LISPRO (HUMAN) PER 5 UNITS: Performed by: INTERNAL MEDICINE

## 2023-04-12 PROCEDURE — 32652 THORACOSCOPY REM TOTL CORTEX: CPT | Performed by: STUDENT IN AN ORGANIZED HEALTH CARE EDUCATION/TRAINING PROGRAM

## 2023-04-12 PROCEDURE — 87205 SMEAR GRAM STAIN: CPT | Performed by: INTERNAL MEDICINE

## 2023-04-12 PROCEDURE — 25010000002 CEFAZOLIN IN DEXTROSE 2-4 GM/100ML-% SOLUTION: Performed by: THORACIC SURGERY (CARDIOTHORACIC VASCULAR SURGERY)

## 2023-04-12 PROCEDURE — 87205 SMEAR GRAM STAIN: CPT | Performed by: STUDENT IN AN ORGANIZED HEALTH CARE EDUCATION/TRAINING PROGRAM

## 2023-04-12 PROCEDURE — 32650 THORACOSCOPY W/PLEURODESIS: CPT | Performed by: STUDENT IN AN ORGANIZED HEALTH CARE EDUCATION/TRAINING PROGRAM

## 2023-04-12 PROCEDURE — 87015 SPECIMEN INFECT AGNT CONCNTJ: CPT | Performed by: STUDENT IN AN ORGANIZED HEALTH CARE EDUCATION/TRAINING PROGRAM

## 2023-04-12 PROCEDURE — 87102 FUNGUS ISOLATION CULTURE: CPT | Performed by: STUDENT IN AN ORGANIZED HEALTH CARE EDUCATION/TRAINING PROGRAM

## 2023-04-12 PROCEDURE — 88305 TISSUE EXAM BY PATHOLOGIST: CPT | Performed by: THORACIC SURGERY (CARDIOTHORACIC VASCULAR SURGERY)

## 2023-04-12 PROCEDURE — 87116 MYCOBACTERIA CULTURE: CPT | Performed by: STUDENT IN AN ORGANIZED HEALTH CARE EDUCATION/TRAINING PROGRAM

## 2023-04-12 PROCEDURE — 87206 SMEAR FLUORESCENT/ACID STAI: CPT | Performed by: STUDENT IN AN ORGANIZED HEALTH CARE EDUCATION/TRAINING PROGRAM

## 2023-04-12 PROCEDURE — 25010000002 PROPOFOL 10 MG/ML EMULSION: Performed by: NURSE ANESTHETIST, CERTIFIED REGISTERED

## 2023-04-12 PROCEDURE — 85027 COMPLETE CBC AUTOMATED: CPT | Performed by: HOSPITALIST

## 2023-04-12 PROCEDURE — 99233 SBSQ HOSP IP/OBS HIGH 50: CPT | Performed by: INTERNAL MEDICINE

## 2023-04-12 PROCEDURE — 25010000002 FENTANYL CITRATE (PF) 100 MCG/2ML SOLUTION: Performed by: NURSE ANESTHETIST, CERTIFIED REGISTERED

## 2023-04-12 RX ORDER — ONDANSETRON 2 MG/ML
4 INJECTION INTRAMUSCULAR; INTRAVENOUS ONCE AS NEEDED
Status: DISCONTINUED | OUTPATIENT
Start: 2023-04-12 | End: 2023-04-12

## 2023-04-12 RX ORDER — MAGNESIUM HYDROXIDE 1200 MG/15ML
LIQUID ORAL AS NEEDED
Status: DISCONTINUED | OUTPATIENT
Start: 2023-04-12 | End: 2023-04-12 | Stop reason: HOSPADM

## 2023-04-12 RX ORDER — FENTANYL CITRATE 50 UG/ML
50 INJECTION, SOLUTION INTRAMUSCULAR; INTRAVENOUS
Status: DISCONTINUED | OUTPATIENT
Start: 2023-04-12 | End: 2023-04-12 | Stop reason: HOSPADM

## 2023-04-12 RX ORDER — SODIUM CHLORIDE 9 MG/ML
40 INJECTION, SOLUTION INTRAVENOUS AS NEEDED
Status: DISCONTINUED | OUTPATIENT
Start: 2023-04-12 | End: 2023-04-12

## 2023-04-12 RX ORDER — SODIUM CHLORIDE 9 MG/ML
INJECTION, SOLUTION INTRAVENOUS CONTINUOUS PRN
Status: DISCONTINUED | OUTPATIENT
Start: 2023-04-12 | End: 2023-04-12 | Stop reason: SURG

## 2023-04-12 RX ORDER — SODIUM CHLORIDE 9 MG/ML
INJECTION, SOLUTION INTRAVENOUS AS NEEDED
Status: DISCONTINUED | OUTPATIENT
Start: 2023-04-12 | End: 2023-04-12 | Stop reason: HOSPADM

## 2023-04-12 RX ORDER — CEFAZOLIN SODIUM 2 G/100ML
2 INJECTION, SOLUTION INTRAVENOUS EVERY 8 HOURS
Status: COMPLETED | OUTPATIENT
Start: 2023-04-13 | End: 2023-04-13

## 2023-04-12 RX ORDER — ACETAMINOPHEN 500 MG
1000 TABLET ORAL EVERY 8 HOURS SCHEDULED
Status: DISCONTINUED | OUTPATIENT
Start: 2023-04-12 | End: 2023-04-20 | Stop reason: HOSPADM

## 2023-04-12 RX ORDER — SODIUM CHLORIDE, SODIUM LACTATE, POTASSIUM CHLORIDE, CALCIUM CHLORIDE 600; 310; 30; 20 MG/100ML; MG/100ML; MG/100ML; MG/100ML
9 INJECTION, SOLUTION INTRAVENOUS CONTINUOUS
Status: DISCONTINUED | OUTPATIENT
Start: 2023-04-12 | End: 2023-04-13

## 2023-04-12 RX ORDER — ROCURONIUM BROMIDE 10 MG/ML
INJECTION, SOLUTION INTRAVENOUS AS NEEDED
Status: DISCONTINUED | OUTPATIENT
Start: 2023-04-12 | End: 2023-04-12 | Stop reason: SURG

## 2023-04-12 RX ORDER — ETOMIDATE 2 MG/ML
INJECTION INTRAVENOUS AS NEEDED
Status: DISCONTINUED | OUTPATIENT
Start: 2023-04-12 | End: 2023-04-12 | Stop reason: SURG

## 2023-04-12 RX ORDER — PROPOFOL 10 MG/ML
VIAL (ML) INTRAVENOUS AS NEEDED
Status: DISCONTINUED | OUTPATIENT
Start: 2023-04-12 | End: 2023-04-12 | Stop reason: SURG

## 2023-04-12 RX ORDER — FENTANYL CITRATE 50 UG/ML
50 INJECTION, SOLUTION INTRAMUSCULAR; INTRAVENOUS
Status: DISCONTINUED | OUTPATIENT
Start: 2023-04-12 | End: 2023-04-12

## 2023-04-12 RX ORDER — ONDANSETRON 2 MG/ML
4 INJECTION INTRAMUSCULAR; INTRAVENOUS EVERY 6 HOURS PRN
Status: DISCONTINUED | OUTPATIENT
Start: 2023-04-12 | End: 2023-04-20 | Stop reason: HOSPADM

## 2023-04-12 RX ORDER — MIDAZOLAM HYDROCHLORIDE 1 MG/ML
0.5 INJECTION INTRAMUSCULAR; INTRAVENOUS
Status: DISCONTINUED | OUTPATIENT
Start: 2023-04-12 | End: 2023-04-12

## 2023-04-12 RX ORDER — ONDANSETRON 2 MG/ML
INJECTION INTRAMUSCULAR; INTRAVENOUS AS NEEDED
Status: DISCONTINUED | OUTPATIENT
Start: 2023-04-12 | End: 2023-04-12 | Stop reason: SURG

## 2023-04-12 RX ORDER — SODIUM CHLORIDE 0.9 % (FLUSH) 0.9 %
10 SYRINGE (ML) INJECTION EVERY 12 HOURS SCHEDULED
Status: DISCONTINUED | OUTPATIENT
Start: 2023-04-12 | End: 2023-04-12

## 2023-04-12 RX ORDER — SODIUM CHLORIDE 0.9 % (FLUSH) 0.9 %
10 SYRINGE (ML) INJECTION AS NEEDED
Status: DISCONTINUED | OUTPATIENT
Start: 2023-04-12 | End: 2023-04-12

## 2023-04-12 RX ORDER — FENTANYL CITRATE 50 UG/ML
INJECTION, SOLUTION INTRAMUSCULAR; INTRAVENOUS
Status: COMPLETED
Start: 2023-04-12 | End: 2023-04-12

## 2023-04-12 RX ORDER — GABAPENTIN 100 MG/1
100 CAPSULE ORAL 3 TIMES DAILY
Status: DISCONTINUED | OUTPATIENT
Start: 2023-04-12 | End: 2023-04-20 | Stop reason: HOSPADM

## 2023-04-12 RX ORDER — LIDOCAINE HYDROCHLORIDE 10 MG/ML
INJECTION, SOLUTION EPIDURAL; INFILTRATION; INTRACAUDAL; PERINEURAL AS NEEDED
Status: DISCONTINUED | OUTPATIENT
Start: 2023-04-12 | End: 2023-04-12 | Stop reason: SURG

## 2023-04-12 RX ORDER — CEFAZOLIN SODIUM 2 G/100ML
2 INJECTION, SOLUTION INTRAVENOUS ONCE
Status: COMPLETED | OUTPATIENT
Start: 2023-04-12 | End: 2023-04-12

## 2023-04-12 RX ORDER — LIDOCAINE HYDROCHLORIDE 10 MG/ML
0.5 INJECTION, SOLUTION EPIDURAL; INFILTRATION; INTRACAUDAL; PERINEURAL ONCE AS NEEDED
Status: DISCONTINUED | OUTPATIENT
Start: 2023-04-12 | End: 2023-04-12

## 2023-04-12 RX ORDER — PHENYLEPHRINE HCL IN 0.9% NACL 1 MG/10 ML
SYRINGE (ML) INTRAVENOUS AS NEEDED
Status: DISCONTINUED | OUTPATIENT
Start: 2023-04-12 | End: 2023-04-12 | Stop reason: SURG

## 2023-04-12 RX ORDER — HYDROMORPHONE HCL 110MG/55ML
0.5 PATIENT CONTROLLED ANALGESIA SYRINGE INTRAVENOUS
Status: DISPENSED | OUTPATIENT
Start: 2023-04-12 | End: 2023-04-19

## 2023-04-12 RX ORDER — FENTANYL CITRATE 50 UG/ML
INJECTION, SOLUTION INTRAMUSCULAR; INTRAVENOUS AS NEEDED
Status: DISCONTINUED | OUTPATIENT
Start: 2023-04-12 | End: 2023-04-12 | Stop reason: SURG

## 2023-04-12 RX ORDER — OXYCODONE HYDROCHLORIDE 5 MG/1
5 TABLET ORAL EVERY 4 HOURS PRN
Status: DISPENSED | OUTPATIENT
Start: 2023-04-12 | End: 2023-04-19

## 2023-04-12 RX ORDER — SODIUM CHLORIDE 9 MG/ML
30 INJECTION, SOLUTION INTRAVENOUS CONTINUOUS
Status: DISCONTINUED | OUTPATIENT
Start: 2023-04-12 | End: 2023-04-13

## 2023-04-12 RX ORDER — KETOROLAC TROMETHAMINE 30 MG/ML
15 INJECTION, SOLUTION INTRAMUSCULAR; INTRAVENOUS EVERY 6 HOURS PRN
Status: DISPENSED | OUTPATIENT
Start: 2023-04-12 | End: 2023-04-14

## 2023-04-12 RX ORDER — DEXAMETHASONE SODIUM PHOSPHATE 4 MG/ML
INJECTION, SOLUTION INTRA-ARTICULAR; INTRALESIONAL; INTRAMUSCULAR; INTRAVENOUS; SOFT TISSUE AS NEEDED
Status: DISCONTINUED | OUTPATIENT
Start: 2023-04-12 | End: 2023-04-12 | Stop reason: SURG

## 2023-04-12 RX ORDER — ROPIVACAINE HYDROCHLORIDE 5 MG/ML
INJECTION, SOLUTION EPIDURAL; INFILTRATION; PERINEURAL AS NEEDED
Status: DISCONTINUED | OUTPATIENT
Start: 2023-04-12 | End: 2023-04-12 | Stop reason: HOSPADM

## 2023-04-12 RX ORDER — ONDANSETRON 4 MG/1
4 TABLET, FILM COATED ORAL EVERY 6 HOURS PRN
Status: DISCONTINUED | OUTPATIENT
Start: 2023-04-12 | End: 2023-04-20 | Stop reason: HOSPADM

## 2023-04-12 RX ADMIN — MEGESTROL ACETATE 400 MG: 40 SUSPENSION ORAL at 09:30

## 2023-04-12 RX ADMIN — ROCURONIUM BROMIDE 20 MG: 10 SOLUTION INTRAVENOUS at 18:05

## 2023-04-12 RX ADMIN — LISINOPRIL 5 MG: 5 TABLET ORAL at 09:27

## 2023-04-12 RX ADMIN — DOCUSATE SODIUM 100 MG: 100 CAPSULE, LIQUID FILLED ORAL at 09:27

## 2023-04-12 RX ADMIN — LIDOCAINE HYDROCHLORIDE 50 MG: 10 INJECTION, SOLUTION EPIDURAL; INFILTRATION; INTRACAUDAL; PERINEURAL at 16:56

## 2023-04-12 RX ADMIN — CEFAZOLIN SODIUM 2 G: 2 INJECTION, SOLUTION INTRAVENOUS at 16:56

## 2023-04-12 RX ADMIN — MIRTAZAPINE 15 MG: 15 TABLET, FILM COATED ORAL at 21:09

## 2023-04-12 RX ADMIN — ATORVASTATIN CALCIUM 80 MG: 40 TABLET, FILM COATED ORAL at 21:08

## 2023-04-12 RX ADMIN — PROPOFOL 30 MG: 10 INJECTION, EMULSION INTRAVENOUS at 16:56

## 2023-04-12 RX ADMIN — FAMOTIDINE 20 MG: 20 TABLET ORAL at 09:27

## 2023-04-12 RX ADMIN — FENTANYL CITRATE 50 MCG: 50 INJECTION, SOLUTION INTRAMUSCULAR; INTRAVENOUS at 19:43

## 2023-04-12 RX ADMIN — SENNOSIDES AND DOCUSATE SODIUM 2 TABLET: 8.6; 5 TABLET ORAL at 21:09

## 2023-04-12 RX ADMIN — ETOMIDATE 16 MG: 20 INJECTION, SOLUTION INTRAVENOUS at 16:56

## 2023-04-12 RX ADMIN — INSULIN LISPRO 2 UNITS: 100 INJECTION, SOLUTION INTRAVENOUS; SUBCUTANEOUS at 22:05

## 2023-04-12 RX ADMIN — CARVEDILOL 50 MG: 12.5 TABLET, FILM COATED ORAL at 21:08

## 2023-04-12 RX ADMIN — SODIUM CHLORIDE 30 ML/HR: 9 INJECTION, SOLUTION INTRAVENOUS at 21:10

## 2023-04-12 RX ADMIN — SUGAMMADEX 200 MG: 100 INJECTION, SOLUTION INTRAVENOUS at 19:03

## 2023-04-12 RX ADMIN — INSULIN LISPRO 2 UNITS: 100 INJECTION, SOLUTION INTRAVENOUS; SUBCUTANEOUS at 09:27

## 2023-04-12 RX ADMIN — ROCURONIUM BROMIDE 50 MG: 10 SOLUTION INTRAVENOUS at 16:56

## 2023-04-12 RX ADMIN — FENTANYL CITRATE 100 MCG: 50 INJECTION, SOLUTION INTRAMUSCULAR; INTRAVENOUS at 16:56

## 2023-04-12 RX ADMIN — ACETAMINOPHEN 1000 MG: 500 TABLET ORAL at 21:09

## 2023-04-12 RX ADMIN — OXYCODONE HYDROCHLORIDE AND ACETAMINOPHEN 1000 MG: 500 TABLET ORAL at 09:27

## 2023-04-12 RX ADMIN — Medication 10 ML: at 09:28

## 2023-04-12 RX ADMIN — CARVEDILOL 50 MG: 12.5 TABLET, FILM COATED ORAL at 09:27

## 2023-04-12 RX ADMIN — DEXAMETHASONE SODIUM PHOSPHATE 8 MG: 4 INJECTION, SOLUTION INTRAMUSCULAR; INTRAVENOUS at 17:24

## 2023-04-12 RX ADMIN — SODIUM CHLORIDE: 9 INJECTION, SOLUTION INTRAVENOUS at 18:31

## 2023-04-12 RX ADMIN — Medication 100 MCG: at 17:14

## 2023-04-12 RX ADMIN — PANTOPRAZOLE SODIUM 40 MG: 40 TABLET, DELAYED RELEASE ORAL at 21:09

## 2023-04-12 RX ADMIN — ONDANSETRON 4 MG: 2 INJECTION INTRAMUSCULAR; INTRAVENOUS at 17:24

## 2023-04-12 RX ADMIN — Medication 10 ML: at 21:10

## 2023-04-12 RX ADMIN — GABAPENTIN 100 MG: 100 CAPSULE ORAL at 21:07

## 2023-04-12 RX ADMIN — TERAZOSIN HYDROCHLORIDE 1 MG: 1 CAPSULE ORAL at 21:09

## 2023-04-12 RX ADMIN — ASPIRIN 81 MG CHEWABLE TABLET 81 MG: 81 TABLET CHEWABLE at 09:27

## 2023-04-12 RX ADMIN — DOCUSATE SODIUM 100 MG: 100 CAPSULE, LIQUID FILLED ORAL at 21:09

## 2023-04-12 RX ADMIN — SODIUM CHLORIDE, POTASSIUM CHLORIDE, SODIUM LACTATE AND CALCIUM CHLORIDE: 600; 310; 30; 20 INJECTION, SOLUTION INTRAVENOUS at 16:56

## 2023-04-12 NOTE — OP NOTE
DATE OF PROCEDURE: 4/12/2023     PREOPERATIVE DIAGNOSES:  1. Persistent right pleural effusion     POSTOPERATIVE DIAGNOSES:    1. Persistent right pleural effusion     PROCEDURES PERFORMED:    1. Bronchoscopy  2. Right video assisted thoracoscopic surgery  3. Decortication  4. Mechanical pleurodesis  5. Intercostal nerve blocks with local    SURGEON: Tim Mahan MD       ASSISTANTS:    1. Keyanna Turk PA-C was responsible for performing the following activities: Retraction, Closing, Placing Dressing and Held/Positioned Camera and their skilled assistance was necessary for the success of this case.    Circulator: Barbara Gordon RN  Scrub Person: Bj Lan Megan  Nursing Assistant: Angie Carbajal    ANESTHESIA: General endotracheal anesthesia with Dr. Angel Chavez MD     ESTIMATED BLOOD LOSS: Less than 200 mL       INDICATIONS:  72 year old  male with a history of hypertension, diabetes mellitus, DVT, coronary artery disease, congestive heart failure and stroke who presented with nausea, anorexia and weight loss.  He was found to have a right pleural effusion and despite two chest tubes, this fluid persisted on imaging studies.  The patient was felt to be a reasonable candidate for bronchoscopy, right VATS, decortication and pleurodesis. The risks and benefits of surgery were discussed with the patient including pain, bleeding, infection, air leak, myocardial infarction and death. The patient understood these risks and wished to proceed with surgery.       DESCRIPTION OF PROCEDURE:  The patient was taken to the operating room and placed under general endotracheal anesthesia.  A double-lumen endotracheal tube was placed and position verified with the pediatric bronchoscope.  Examination of the bilateral bronchial tree down to the level of the subsegmental bronchi did not reveal any evidence of endobronchial mass or blood.  There were thick white secretions present  in the bilateral bronchi that were suctioned free.  The patient was placed in the left lateral decubitus position and all 4 extremities were padded and secured to prevent neurologic injury.  The previous chest tube was removed.  He was prepped and draped in the usual sterile fashion and a timeout was performed including the patient's name, procedure and antibiotic administration.  An incision was made at the seventh intercostal space in the midaxillary line.  Blunt fingertip dissection was utilized to free the lung from chest wall attachments.  This allowed for insertion of the camera.  Adhesions were present worst in the lower chest with a thick pleural rind.  A mixture of serosanguinous fluid, old clot and serous collections were evacuated from the right chest.  The pleural fluid was sent for cultures and cytology.  Blunt dissection was performed to free the lung from the chest wall using the suction tip.  Additional incisions were eventually made anteriorly and posteriorly.  The lung was freed from all attachments to the chest wall.  The thick pleural peel was removed and sent for permanent pathology and culture.  The chest wall was then abraded with the bovie scratch pad to remove additional loose material and provide a good mechanical pleurodesis.  The lung was then inflated under direct vision and the upper and middle lobes inflated well.  The lower lobe was suboptimal with inflation.  A 10 blade was utilized to score the rind on the lateral surface of the lobe and additional peel was removed.  This allowed for improved expansion of the lung.  Around 500 mL of fluid was removed from the chest mostly from the loculated pleural effusion.  Blood loss was felt to be low.  The chest was irrigated with warm saline and no active bleeding was appreciated.  Three 28 Kyrgyz channel drains were placed anterior and posterior to the hilum and above the diaphragm.  These were secured using 0 silk suture.  A 0 Ethibond  suture was placed in a mattress fashion for later thoracostomy site closure.  The lung was then inflated under direct vision and found to satisfactorily occupy the chest.  Gauze and tape were applied to the chest tube sites.  The previous chest tube site was probed and a small amount of clot removed.  The incision was reapproximated with a single interrupted silk suture.  Posterior intercostal nerve blocks were performed with local and the incisions were also infiltrated with local.  The patient was returned to the supine position, extubated in the operating room and transported to recovery in stable condition.

## 2023-04-12 NOTE — ANESTHESIA PREPROCEDURE EVALUATION
Anesthesia Evaluation     Patient summary reviewed and Nursing notes reviewed   NPO Solid Status: > 8 hours  NPO Liquid Status: > 8 hours           Airway   Mallampati: I  TM distance: >3 FB  Neck ROM: full  No difficulty expected  Dental      Pulmonary    (+) pleural effusion, decreased breath sounds,   Cardiovascular     ECG reviewed  Rhythm: regular  Rate: normal    (+) pacemaker pacemaker, hypertension, CAD, CHF Systolic <55%, hyperlipidemia,       Neuro/Psych  (+) CVA,    GI/Hepatic/Renal/Endo    (+)  GERD,      Musculoskeletal     Abdominal    Substance History      OB/GYN          Other        ROS/Med Hx Other:  Estimated left ventricular EF = 30%.  There is global hypokinesis.  The basal-mid posterior wall appears akinetic.  •  Normal right ventricular cavity size, wall thickness, systolic function and septal motion noted. Electronic lead present in the ventricle  •  Septal wall motion is abnormal, consistent with right ventricular pacing  •  No hemodynamically significant valvular stenosis or regurgitation noted.                     Anesthesia Plan    ASA 3     general and Vilma     intravenous induction     Anesthetic plan, risks, benefits, and alternatives have been provided, discussed and informed consent has been obtained with: patient.    Plan discussed with CRNA.        CODE STATUS:    Code Status (Patient has no pulse and is not breathing): CPR (Attempt to Resuscitate)  Medical Interventions (Patient has pulse or is breathing): Full Support

## 2023-04-12 NOTE — ANESTHESIA PROCEDURE NOTES
Airway  Urgency: elective    Date/Time: 4/12/2023 4:58 PM  Airway not difficult    General Information and Staff    Patient location during procedure: OR  CRNA/CAA: Osei Dukes, CRNA    Indications and Patient Condition  Indications for airway management: airway protection    Preoxygenated: yes  MILS not maintained throughout  Mask difficulty assessment: 2 - vent by mask + OA or adjuvant +/- NMBA    Final Airway Details  Final airway type: endotracheal airway      Successful airway: ETT and EBT - double lumen left  Cuffed: yes   Successful intubation technique: direct laryngoscopy  Facilitating devices/methods: intubating stylet  Endotracheal tube insertion site: oral  Blade: Vane  Blade size: 3  EBT DL size (fr): 39  Cormack-Lehane Classification: grade I - full view of glottis  Placement verified by: chest auscultation, bronchoscopy, capnometry and single lung ventilation   Number of attempts at approach: 1  Assessment: lips, teeth, and gum same as pre-op and atraumatic intubation    Additional Comments  Negative epigastric sounds, Breath sound equal bilaterally with symmetric chest rise and fall

## 2023-04-12 NOTE — CASE MANAGEMENT/SOCIAL WORK
Continued Stay Note  [facility]     Patient Name: Timmy Guajardo  MRN: [id number]  Today's Date: 4/12/2023    Admit Date: 3/24/2023    Plan: discharge plan   Discharge Plan     Row Name 04/12/23 1357       Plan    Plan discharge plan    Plan Comments Per discussion in MDR, pt possibly having a decortication.  I met with pt at bedside and states he is suppose to have a procedure today and still wants to go to [facility] when ready for discharge. I updated Carolina with the [facility] and she will cont to follow    Final Discharge Disposition Code 03 - skilled nursing facility (SNF)               Discharge Codes    No documentation.               Expected Discharge Date and Time     Expected Discharge Date Expected Discharge Time    Apr 18, 2023             Violeta Miranda RN

## 2023-04-12 NOTE — PROGRESS NOTES
"    [facility]  PROGRESS NOTE    Patient Name: Timmy Guajardo  : 1950  MRN: [id number]    Date of Admission: 3/24/2023  Primary Care Physician: Arsen Seals APRN    Subjective   Subjective     CC:  Follow-up for weakness    HPI:  \"I'm ok.\"  States that breathing ok.      ROS:  Gen- No fevers, chills  CV- No chest pain, palpitations  Resp- No cough, dyspnea  GI- No N/V/D, abd pain    Objective   Objective     Vital Signs:   Temp:  [97.7 °F (36.5 °C)-99.2 °F (37.3 °C)] 98.4 °F (36.9 °C)  Heart Rate:  [79-94] 82  Resp:  [18-20] 18  BP: (128-167)/(65-79) 132/65     Physical Exam:  Constitutional: No acute distress, awake, alert  HENT: NCAT, mucous membranes moist  Respiratory: decreased to auscultation bilaterally, respiratory effort normal on RA, chest tube in place  Cardiovascular: RRR, no murmurs, rubs, or gallops  Gastrointestinal: Positive bowel sounds, soft, nontender, nondistended  Musculoskeletal: No bilateral ankle edema  Psychiatric: Appropriate affect, cooperative  Neurologic: Oriented x 3, strength symmetric in all extremities, Cranial Nerves grossly intact to confrontation, speech clear  Skin: No rashes    Results Reviewed:  LAB RESULTS:      Lab 23  0345 23  0613 23  0504 04/10/23  0810 04/10/23  0506 23  0554 23  1548 23  0711 23  0823 23  0341 23  0043 23  1939 23  1337 23  1119 23  0800   WBC 8.42  --  8.91  --  8.39 8.27  --  9.12  --  8.97  --  10.57  --  10.37 10.47   HEMOGLOBIN 8.7*  --  8.9*  --  8.7* 8.4*  --  8.3*   < > 9.1*   < > 10.3*  10.3*   < > 10.1* 10.4*   HEMATOCRIT 27.9*  --  28.8*  --  27.9* 26.4*  --  26.0*   < > 28.6*   < > 33.2*  33.0*   < > 32.5* 32.8*   PLATELETS 266  --  236  --  220 192  --  189  --  186  --  170  --  185 156   NEUTROS ABS  --   --   --   --   --   --   --   --   --  6.57  --  8.20*  --  7.88* 7.97*   IMMATURE GRANS (ABS)  --   " --   --   --   --   --   --   --   --  0.07*  --  0.11*  --  0.08* 0.07*   LYMPHS ABS  --   --   --   --   --   --   --   --   --  1.47  --  1.32  --  1.32 1.36   MONOS ABS  --   --   --   --   --   --   --   --   --  0.67  --  0.76  --  0.85 0.85   EOS ABS  --   --   --   --   --   --   --   --   --  0.15  --  0.12  --  0.19 0.18   .0*  --  102.1*  --  101.1* 99.2*  --  97.4*  --  97.9*  --  99.7*  --  97.9* 95.3   APTT  --   --   --   --   --   --   --   --   --   --   --   --   --   --  110.8*   HEPARIN ANTI-XA 0.10* 0.30  --  0.48  --  0.37 0.41 0.49   < >  --   --   --    < >  --  0.46    < > = values in this interval not displayed.         Lab 04/12/23  0345 04/11/23  0504 04/10/23  0506 04/09/23  0554 04/08/23  0711   SODIUM 140 144 144 141 144   POTASSIUM 4.4 4.4 4.4 4.0 3.8   CHLORIDE 106 110* 110* 109* 111*   CO2 22.0 22.0 22.0 21.0* 21.0*   ANION GAP 12.0 12.0 12.0 11.0 12.0   BUN 17 14 16 14 16   CREATININE 0.72* 0.71* 0.66* 0.68* 0.63*   EGFR 98.3 98.7 100.9 100.0 102.3   GLUCOSE 242* 161* 206* 163* 128*   CALCIUM 9.1 9.2 8.6 8.7 8.6         Lab 04/12/23  0345 04/11/23  0504 04/10/23  0506 04/09/23  0554   TOTAL PROTEIN 6.4 6.7 5.7* 5.6*   ALBUMIN 2.9* 3.1* 3.0* 2.8*   GLOBULIN 3.5 3.6 2.7 2.8   ALT (SGPT) 11 8 11 13   AST (SGOT) 16 17 15 19   BILIRUBIN 0.2 0.2 0.2 0.2   ALK PHOS 67 64 68 72                 Lab 04/11/23  1138 04/06/23  1939   ABO TYPING A A   RH TYPING Positive Positive   ANTIBODY SCREEN Negative Negative         Brief Urine Lab Results  (Last result in the past 365 days)      Color   Clarity   Blood   Leuk Est   Nitrite   Protein   CREAT   Urine HCG        03/24/23 2329 Yellow   Turbid   Small (1+)   Moderate (2+)   Negative   30 mg/dL (1+)                 Microbiology Results Abnormal     Procedure Component Value - Date/Time    Body Fluid Culture - Body Fluid, Pleural Cavity [[id number]] Collected: 03/27/23 1532    Lab Status: Final result Specimen: Body Fluid from Pleural  Cavity Updated: 03/31/23 0952     Body Fluid Culture No growth at 3 days     Gram Stain Rare (1+) WBCs per low power field      No organisms seen    MRSA Screen, PCR (Inpatient) - Swab, Nares [[id number]]  (Normal) Collected: 03/27/23 0857    Lab Status: Final result Specimen: Swab from Nares Updated: 03/27/23 1107     MRSA PCR Negative    Narrative:      The negative predictive value of this diagnostic test is high and should only be used to consider de-escalating anti-MRSA therapy. A positive result may indicate colonization with MRSA and must be correlated clinically.  MRSA Negative    COVID PRE-OP / PRE-PROCEDURE SCREENING ORDER (NO ISOLATION) - Swab, Nasopharynx [[id number]]  (Normal) Collected: 03/24/23 2256    Lab Status: Final result Specimen: Swab from Nasopharynx Updated: 03/24/23 2356    Narrative:      The following orders were created for panel order COVID PRE-OP / PRE-PROCEDURE SCREENING ORDER (NO ISOLATION) - Swab, Nasopharynx.  Procedure                               Abnormality         Status                     ---------                               -----------         ------                     Respiratory Panel PCR w/...[[id number]]  Normal              Final result                 Please view results for these tests on the individual orders.    Respiratory Panel PCR w/COVID-19(SARS-CoV-2) [facility] In-House, NP Swab in [facility] 3-4 HR TAT - Swab, Nasopharynx [[id number]]  (Normal) Collected: 03/24/23 2256    Lab Status: Final result Specimen: Swab from Nasopharynx Updated: 03/24/23 2356     ADENOVIRUS, PCR Not Detected     Coronavirus 229E Not Detected     Coronavirus HKU1 Not Detected     Coronavirus NL63 Not Detected     Coronavirus OC43 Not Detected     COVID19 Not Detected     Human Metapneumovirus Not Detected     Human Rhinovirus/Enterovirus Not Detected     Influenza A PCR Not Detected     Influenza B PCR Not Detected     Parainfluenza Virus 1 Not Detected     Parainfluenza  Virus 2 Not Detected     Parainfluenza Virus 3 Not Detected     Parainfluenza Virus 4 Not Detected     RSV, PCR Not Detected     Bordetella pertussis pcr Not Detected     Bordetella parapertussis PCR Not Detected     Chlamydophila pneumoniae PCR Not Detected     Mycoplasma pneumo by PCR Not Detected    Narrative:      In the setting of a positive respiratory panel with a viral infection PLUS a negative procalcitonin without other underlying concern for bacterial infection, consider observing off antibiotics or discontinuation of antibiotics and continue supportive care. If the respiratory panel is positive for atypical bacterial infection (Bordetella pertussis, Chlamydophila pneumoniae, or Mycoplasma pneumoniae), consider antibiotic de-escalation to target atypical bacterial infection.          XR Chest 1 View    Result Date: 4/12/2023  XR CHEST 1 VW Date of Exam: 4/12/2023 5:40 AM EDT Indication: Pleural effusion. Comparison: 4/11/2023 Findings: 3-lead AICD pacemaker. Right-sided chest tube is unchanged. No evidence of right-sided pneumothorax. There is persistent right effusion and right base opacity, unchanged. Left lung is clear. Cardiac mediastinal contours are within normal limits. Regional  skeleton is unremarkable.     Impression: Impression: 1. Stable exam with right base chest tube, small effusion and basilar atelectasis. Electronically Signed: Deon Sen  4/12/2023 9:03 AM EDT  Workstation ID: [id number]    XR Chest 1 View    Result Date: 4/11/2023  XR CHEST 1 VW Date of Exam: 4/11/2023 4:50 AM EDT Indication: Pleural effusion. Comparison: 4/10/2023 Findings: Left-sided AICD noted. Heart size normal. There is a stable small right pleural effusion with right basilar airspace disease. No pneumothorax. The left lung is clear. No pleural effusion on the left. Osseous structures are intact. Stable chest tube overlying the right lung base.     Impression: Impression: 1. Stable exam with chest tube overlying the  right lung base and persistent small right pleural effusion with mild right basilar atelectasis. 2. No pneumothorax. Electronically Signed: Jesse Brar  4/11/2023 8:46 AM EDT  Workstation ID: [id number]      Results for orders placed during the hospital encounter of 10/16/22    Adult Transthoracic Echo Complete W/ Cont if Necessary Per Protocol    Interpretation Summary  •  Estimated left ventricular EF = 30%.  There is global hypokinesis.  The basal-mid posterior wall appears akinetic.  •  Normal right ventricular cavity size, wall thickness, systolic function and septal motion noted. Electronic lead present in the ventricle  •  Septal wall motion is abnormal, consistent with right ventricular pacing  •  No hemodynamically significant valvular stenosis or regurgitation noted.      Current medications:  Scheduled Meds:acetaminophen, 650 mg, Oral, Q8H  vitamin C, 1,000 mg, Oral, Daily  aspirin, 81 mg, Oral, Daily  atorvastatin, 80 mg, Oral, Nightly  carvedilol, 50 mg, Oral, BID With Meals  docusate sodium, 100 mg, Oral, BID  famotidine, 20 mg, Oral, Daily  insulin lispro, 0-7 Units, Subcutaneous, TID With Meals  lisinopril, 5 mg, Oral, Q24H  megestrol, 400 mg, Oral, Daily  mirtazapine, 15 mg, Oral, Nightly  pantoprazole, 40 mg, Oral, Nightly  polyethylene glycol, 17 g, Oral, Daily  senna-docusate sodium, 2 tablet, Oral, BID  sodium chloride, 10 mL, Intravenous, Q12H  terazosin, 1 mg, Oral, Nightly      Continuous Infusions:heparin, 24 Units/kg/hr, Last Rate: Stopped (04/12/23 0003)  lactated ringers, 9 mL/hr, Last Rate: 9 mL/hr (03/29/23 0838)  Pharmacy to Dose Heparin,       PRN Meds:.•  acetaminophen **OR** acetaminophen **OR** acetaminophen  •  senna-docusate sodium **AND** polyethylene glycol **AND** bisacodyl **AND** bisacodyl  •  Calcium Replacement - Follow Nurse / BPA Driven Protocol  •  cyclobenzaprine  •  dextrose  •  dextrose  •  glucagon (human recombinant)  •  Magnesium Standard Dose Replacement - Follow  Nurse / BPA Driven Protocol  •  melatonin  •  ondansetron  •  Pharmacy to Dose Heparin  •  Phosphorus Replacement - Follow Nurse / BPA Driven Protocol  •  Potassium Replacement - Follow Nurse / BPA Driven Protocol  •  sodium chloride  •  sodium chloride    Assessment & Plan   Assessment & Plan     Active Hospital Problems    Diagnosis  POA   • **Bilateral pulmonary embolism [I26.99]  Unknown   • Pleural effusion [J90]  Yes   • Unintentional weight loss [R63.4]  Unknown   • Hypoalbuminemia [E88.09]  Unknown   • Severe malnutrition [E43]  Unknown   • History of CVA (cerebrovascular accident) [Z86.73]  Not Applicable   • Debility [R53.81]  Unknown   • Presence of cardiac pacemaker [Z95.0]  Yes   • HTN (hypertension) [I10]  Yes   • Leukocytosis [D72.829]  Yes      Resolved Hospital Problems   No resolved problems to display.        Brief Hospital Course to date:  Timmy Guajardo is a 72 y.o. 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    This patient's problems and plans were partially entered by my partner and updated as appropriate by me 04/12/23.    Bilateral pulmonary embolism  History of DVT  · Likely secondary to being on an inadequate dose of Eliquis of 2.5 mg twice daily and medication noncompliance  · Continue heparin drip while hospitalized since he might need further procedures  · Plan to transition to full dose Eliquis (loading and maintenance)     Moderate exudative right-sided loculated pleural effusion  Possible organized right pleural effusion  · Bedside point-of-care ultrasound revealed complex pleural effusion  · Cardiothoracic surgery consulted.  Recommended chest tube placement by IR.    · Underwent  right-sided chest tube placement by IR on 3/27/2023.  Chest tube removed 3/30/2023   · Repeat CT scan chest after removal of chest tube showed persistent moderate right pleural effusion, likely organized   · Analysis of the fluid is consistent with exudate, ?  Parapneumonic  · Culture from pleural fluid is negative to date  · Cytology showed benign mesothelial cells  · S/p 12 days zosyn  · Repeat chest tube placed 4/4/23  · Chest tube started draining michelet blood and eventually clotted off tube 4/6/23  · CT chest 4/6 showed only a small amount of blood in the pleural cavity  · Continue chest tube to -20 suction per CTS- very little output since placement on 4/4  · Dr. Mahan recommending decortication- ?this afternoon?    Lung nodule  - 1.4 cm  - needs repeat CT imaging in 6 months    Severe malnutrition  Hypoalbuminemia  Significant unintentional weight loss  Generalized debility  · Patient reports 52lbs weight loss since October 2022.  · No EGD or colonoscopy since 2008  · CT chest and CT abdomen with no convincing evidence of solid malignancy or lymphadenopathy.  Oncology service evaluated the patient recommended age-appropriate screening  · GI evaluated the patient for EGD and colonoscopy.  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 Continue Megace and mirtazapine 15 mg nightly for poor appetite  · Nutrition team following     UTI with Enterococcus faecalis  · POA  · Zosyn    CAD  Cardiomyopathy with EF 30 %  Essential HTN  Old CVA with right residual weakness  Dyslipidemia  · Continue aspirin  · Continue coreg, lisinopril  · Continue statin    Constipation     GERD   · Continue PPI      Type 2 diabetes w/A1c 6.3%   · Continue insulin sliding scale     Acute debility  · PT  recommended acute rehab    Anemia  - cbc stable    Expected Discharge Location and Transportation: Acute rehab  Expected Discharge   Expected Discharge Date and Time     Expected Discharge Date Expected Discharge Time    Apr 18, 2023            DVT prophylaxis:  Medical and mechanical DVT prophylaxis orders are present.     AM-PAC 6 Clicks Score (PT): 13 (04/11/23 1433)    CODE STATUS:   Code Status and Medical Interventions:   Ordered at: 03/24/23 7845     Code Status (Patient has no pulse and is not breathing):    CPR (Attempt to Resuscitate)     Medical Interventions (Patient has pulse or is breathing):    Full Support          Varinder Chakraborty MD  04/12/23

## 2023-04-12 NOTE — ANESTHESIA POSTPROCEDURE EVALUATION
Patient: Timmy Guajardo    Procedure Summary     Date: 04/12/23 [facility] / [facility]    Anesthesia Start: 1652 Anesthesia Stop: 1917    Procedure: BRONCHOSCOPY, THORACOSCOPY VIDEO ASSISTED WITH DECORTICATION, MECHANICAL PLEURODESIS (Right: Chest) Diagnosis:       Pleural effusion      (Pleural effusion [J90])    Surgeons: Tim Mahan MD Provider: Angel Chavez MD    Anesthesia Type: generalVilma ASA Status: 3          Anesthesia Type: general, Vilma    Vitals  Vitals Value Taken Time   BP     Temp     Pulse 71 04/12/23 1916   Resp     SpO2 97 % 04/12/23 1916   Vitals shown include unvalidated device data.        Post Anesthesia Care and Evaluation    Patient location during evaluation: PACU  Patient participation: complete - patient participated  Level of consciousness: awake and alert  Pain management: adequate    Airway patency: patent  Anesthetic complications: No anesthetic complications  PONV Status: none  Cardiovascular status: hemodynamically stable and acceptable  Respiratory status: nonlabored ventilation, acceptable and nasal cannula  Hydration status: acceptable

## 2023-04-13 ENCOUNTER — APPOINTMENT (OUTPATIENT)
Dept: GENERAL RADIOLOGY | Facility: HOSPITAL | Age: 73
DRG: 163 | End: 2023-04-13
Payer: MEDICARE

## 2023-04-13 LAB
ANION GAP SERPL CALCULATED.3IONS-SCNC: 13 MMOL/L (ref 5–15)
ANISOCYTOSIS BLD QL: NORMAL
BASOPHILS # BLD AUTO: 0.03 10*3/MM3 (ref 0–0.2)
BASOPHILS NFR BLD AUTO: 0.2 % (ref 0–1.5)
BUN SERPL-MCNC: 23 MG/DL (ref 8–23)
BUN/CREAT SERPL: 30.7 (ref 7–25)
CALCIUM SPEC-SCNC: 9 MG/DL (ref 8.6–10.5)
CHLORIDE SERPL-SCNC: 106 MMOL/L (ref 98–107)
CO2 SERPL-SCNC: 20 MMOL/L (ref 22–29)
CREAT SERPL-MCNC: 0.75 MG/DL (ref 0.76–1.27)
DEPRECATED RDW RBC AUTO: 71 FL (ref 37–54)
EGFRCR SERPLBLD CKD-EPI 2021: 97.1 ML/MIN/1.73
EOSINOPHIL # BLD AUTO: 0.01 10*3/MM3 (ref 0–0.4)
EOSINOPHIL NFR BLD AUTO: 0.1 % (ref 0.3–6.2)
ERYTHROCYTE [DISTWIDTH] IN BLOOD BY AUTOMATED COUNT: 19.3 % (ref 12.3–15.4)
GLUCOSE BLDC GLUCOMTR-MCNC: 202 MG/DL (ref 70–130)
GLUCOSE BLDC GLUCOMTR-MCNC: 239 MG/DL (ref 70–130)
GLUCOSE BLDC GLUCOMTR-MCNC: 257 MG/DL (ref 70–130)
GLUCOSE BLDC GLUCOMTR-MCNC: 310 MG/DL (ref 70–130)
GLUCOSE SERPL-MCNC: 245 MG/DL (ref 65–99)
HCT VFR BLD AUTO: 26.5 % (ref 37.5–51)
HGB BLD-MCNC: 8.2 G/DL (ref 13–17.7)
IMM GRANULOCYTES # BLD AUTO: 0.25 10*3/MM3 (ref 0–0.05)
IMM GRANULOCYTES NFR BLD AUTO: 2 % (ref 0–0.5)
LYMPHOCYTES # BLD AUTO: 0.87 10*3/MM3 (ref 0.7–3.1)
LYMPHOCYTES NFR BLD AUTO: 7 % (ref 19.6–45.3)
MCH RBC QN AUTO: 31.3 PG (ref 26.6–33)
MCHC RBC AUTO-ENTMCNC: 30.9 G/DL (ref 31.5–35.7)
MCV RBC AUTO: 101.1 FL (ref 79–97)
MONOCYTES # BLD AUTO: 0.83 10*3/MM3 (ref 0.1–0.9)
MONOCYTES NFR BLD AUTO: 6.6 % (ref 5–12)
NEUTROPHILS NFR BLD AUTO: 10.5 10*3/MM3 (ref 1.7–7)
NEUTROPHILS NFR BLD AUTO: 84.1 % (ref 42.7–76)
NRBC BLD AUTO-RTO: 0 /100 WBC (ref 0–0.2)
PLAT MORPH BLD: NORMAL
PLATELET # BLD AUTO: 284 10*3/MM3 (ref 140–450)
PMV BLD AUTO: 9.7 FL (ref 6–12)
POTASSIUM SERPL-SCNC: 5.1 MMOL/L (ref 3.5–5.2)
RBC # BLD AUTO: 2.62 10*6/MM3 (ref 4.14–5.8)
SODIUM SERPL-SCNC: 139 MMOL/L (ref 136–145)
WBC MORPH BLD: NORMAL
WBC NRBC COR # BLD: 12.49 10*3/MM3 (ref 3.4–10.8)

## 2023-04-13 PROCEDURE — 25010000002 HEPARIN (PORCINE) 25000-0.45 UT/250ML-% SOLUTION: Performed by: STUDENT IN AN ORGANIZED HEALTH CARE EDUCATION/TRAINING PROGRAM

## 2023-04-13 PROCEDURE — 63710000001 INSULIN DETEMIR PER 5 UNITS: Performed by: INTERNAL MEDICINE

## 2023-04-13 PROCEDURE — 97530 THERAPEUTIC ACTIVITIES: CPT

## 2023-04-13 PROCEDURE — 99232 SBSQ HOSP IP/OBS MODERATE 35: CPT | Performed by: INTERNAL MEDICINE

## 2023-04-13 PROCEDURE — 85025 COMPLETE CBC W/AUTO DIFF WBC: CPT | Performed by: STUDENT IN AN ORGANIZED HEALTH CARE EDUCATION/TRAINING PROGRAM

## 2023-04-13 PROCEDURE — 80048 BASIC METABOLIC PNL TOTAL CA: CPT | Performed by: STUDENT IN AN ORGANIZED HEALTH CARE EDUCATION/TRAINING PROGRAM

## 2023-04-13 PROCEDURE — 97168 OT RE-EVAL EST PLAN CARE: CPT

## 2023-04-13 PROCEDURE — 82962 GLUCOSE BLOOD TEST: CPT

## 2023-04-13 PROCEDURE — 97535 SELF CARE MNGMENT TRAINING: CPT

## 2023-04-13 PROCEDURE — 97164 PT RE-EVAL EST PLAN CARE: CPT

## 2023-04-13 PROCEDURE — 71045 X-RAY EXAM CHEST 1 VIEW: CPT

## 2023-04-13 PROCEDURE — 25010000002 KETOROLAC TROMETHAMINE PER 15 MG: Performed by: THORACIC SURGERY (CARDIOTHORACIC VASCULAR SURGERY)

## 2023-04-13 PROCEDURE — 85007 BL SMEAR W/DIFF WBC COUNT: CPT | Performed by: STUDENT IN AN ORGANIZED HEALTH CARE EDUCATION/TRAINING PROGRAM

## 2023-04-13 PROCEDURE — 63710000001 INSULIN LISPRO (HUMAN) PER 5 UNITS: Performed by: STUDENT IN AN ORGANIZED HEALTH CARE EDUCATION/TRAINING PROGRAM

## 2023-04-13 RX ADMIN — FAMOTIDINE 20 MG: 20 TABLET ORAL at 08:01

## 2023-04-13 RX ADMIN — POLYETHYLENE GLYCOL 3350 17 G: 17 POWDER, FOR SOLUTION ORAL at 08:02

## 2023-04-13 RX ADMIN — HEPARIN SODIUM 24 UNITS/KG/HR: 10000 INJECTION, SOLUTION INTRAVENOUS at 01:59

## 2023-04-13 RX ADMIN — CYCLOBENZAPRINE 5 MG: 10 TABLET, FILM COATED ORAL at 20:12

## 2023-04-13 RX ADMIN — ACETAMINOPHEN 1000 MG: 500 TABLET ORAL at 13:25

## 2023-04-13 RX ADMIN — ASPIRIN 81 MG CHEWABLE TABLET 81 MG: 81 TABLET CHEWABLE at 08:02

## 2023-04-13 RX ADMIN — Medication 2 G: at 08:02

## 2023-04-13 RX ADMIN — Medication 2 G: at 01:59

## 2023-04-13 RX ADMIN — DOCUSATE SODIUM 100 MG: 100 CAPSULE, LIQUID FILLED ORAL at 08:02

## 2023-04-13 RX ADMIN — MEGESTROL ACETATE 400 MG: 40 SUSPENSION ORAL at 08:01

## 2023-04-13 RX ADMIN — SENNOSIDES AND DOCUSATE SODIUM 2 TABLET: 8.6; 5 TABLET ORAL at 08:02

## 2023-04-13 RX ADMIN — GABAPENTIN 100 MG: 100 CAPSULE ORAL at 16:09

## 2023-04-13 RX ADMIN — MIRTAZAPINE 15 MG: 15 TABLET, FILM COATED ORAL at 20:13

## 2023-04-13 RX ADMIN — OXYCODONE HYDROCHLORIDE 5 MG: 5 TABLET ORAL at 21:54

## 2023-04-13 RX ADMIN — PANTOPRAZOLE SODIUM 40 MG: 40 TABLET, DELAYED RELEASE ORAL at 20:12

## 2023-04-13 RX ADMIN — KETOROLAC TROMETHAMINE 15 MG: 30 INJECTION, SOLUTION INTRAMUSCULAR; INTRAVENOUS at 13:25

## 2023-04-13 RX ADMIN — TERAZOSIN HYDROCHLORIDE 1 MG: 1 CAPSULE ORAL at 20:12

## 2023-04-13 RX ADMIN — INSULIN LISPRO 4 UNITS: 100 INJECTION, SOLUTION INTRAVENOUS; SUBCUTANEOUS at 08:01

## 2023-04-13 RX ADMIN — INSULIN DETEMIR 10 UNITS: 100 INJECTION, SOLUTION SUBCUTANEOUS at 09:49

## 2023-04-13 RX ADMIN — KETOROLAC TROMETHAMINE 15 MG: 30 INJECTION, SOLUTION INTRAMUSCULAR; INTRAVENOUS at 20:13

## 2023-04-13 RX ADMIN — OXYCODONE HYDROCHLORIDE AND ACETAMINOPHEN 1000 MG: 500 TABLET ORAL at 08:02

## 2023-04-13 RX ADMIN — SENNOSIDES AND DOCUSATE SODIUM 2 TABLET: 8.6; 5 TABLET ORAL at 20:12

## 2023-04-13 RX ADMIN — Medication 10 ML: at 20:22

## 2023-04-13 RX ADMIN — GABAPENTIN 100 MG: 100 CAPSULE ORAL at 08:01

## 2023-04-13 RX ADMIN — ATORVASTATIN CALCIUM 80 MG: 40 TABLET, FILM COATED ORAL at 20:12

## 2023-04-13 RX ADMIN — INSULIN LISPRO 5 UNITS: 100 INJECTION, SOLUTION INTRAVENOUS; SUBCUTANEOUS at 12:00

## 2023-04-13 RX ADMIN — KETOROLAC TROMETHAMINE 15 MG: 30 INJECTION, SOLUTION INTRAMUSCULAR; INTRAVENOUS at 05:42

## 2023-04-13 RX ADMIN — ACETAMINOPHEN 1000 MG: 500 TABLET ORAL at 05:42

## 2023-04-13 RX ADMIN — GABAPENTIN 100 MG: 100 CAPSULE ORAL at 20:12

## 2023-04-13 RX ADMIN — CARVEDILOL 50 MG: 12.5 TABLET, FILM COATED ORAL at 17:49

## 2023-04-13 RX ADMIN — DOCUSATE SODIUM 100 MG: 100 CAPSULE, LIQUID FILLED ORAL at 20:12

## 2023-04-13 RX ADMIN — Medication 10 ML: at 08:01

## 2023-04-13 RX ADMIN — CARVEDILOL 50 MG: 12.5 TABLET, FILM COATED ORAL at 08:02

## 2023-04-13 RX ADMIN — ACETAMINOPHEN 1000 MG: 500 TABLET ORAL at 21:54

## 2023-04-13 NOTE — PROGRESS NOTES
Intensive Care Follow-up     Hospital:  LOS: 20 days   Mr. Timmy Guajardo, 72 y.o. male is followed for:   Bilateral pulmonary embolism            History of present illness:   Timmy Guajardo is a 72 y.o. male with PMH DVT / Bilateral PE on Eliquis, HTN, HLP, HFrEF, CVA with right sided weakness residual, T2DM and GERD who was admitted 3/24/23 to the Hospitalist service for progressive weakness, functional decline and unintentional weight loss.  CTA c/a/p revealed bilateral pulmonary emboli, partially loculated moderate right pleural effusion, emphysema, moderate colonic stool burden with mild inspissated rectal stool burden.  Eliquis was held and he was placed on a heparin drip.  He had a right-sided chest tube placed in IR 3/27/23.  Pleural fluid studies c/w exudative effusion. Small bore CT was discontinued 3/30/23.  Rt large bore chest tube was placed by CTS 4/4/23.  Follow up CT chest revealed persistent right loculated pleural effusion with some increased hemorrhage/clot and right apical nodule vs scarring.  He had some bleeding from and around chest tube site and heparin was held 4/6/23. Large clot was extracted from the tubing by stripping the tubes, silver nitrate sticks were applied and stitch was placed around chest tube with improvement.  Heparin was restarted.  He was scheduled for Rt VATS and decortication today.  He was transferred to the ICU post operatively.       In October 2022, he had a fall with fracture and was sent to [facility] for rehab.  His Elquis was decreased to 2.5 mg BID at that time for unknown reason.  He also contracted COVID while there.  Since then, he has had decreased appetite, worsening weakness and 52 lbs unintentional weight loss.  On this admission, CT a/p were negative for malignancy.  Oncology recommended age-appropriate screening.  He was evaluated by GI and underwent upper and lower endoscopy.  EGD revealed Myeers's esophagus with mild gastritis.  Colonoscopy was  unremarkable.  Weight loss felt to be due to anorexia.  He was placed on Megace and Mirtazapine.        Subjective   Interval History:  Patient is doing well this morning, pain overall well controlled, was on a heparin drip overnight, this is being held post decortication.  Still waiting on biopsy and culture results.             The patient's past medical, surgical and social history were reviewed and updated in Epic as appropriate.       Objective     Infusions:  Pharmacy to Dose Heparin,       Medications:  acetaminophen, 1,000 mg, Oral, Q8H  vitamin C, 1,000 mg, Oral, Daily  aspirin, 81 mg, Oral, Daily  atorvastatin, 80 mg, Oral, Nightly  carvedilol, 50 mg, Oral, BID With Meals  docusate sodium, 100 mg, Oral, BID  famotidine, 20 mg, Oral, Daily  gabapentin, 100 mg, Oral, TID  insulin detemir, 10 Units, Subcutaneous, Daily  insulin lispro, 0-7 Units, Subcutaneous, TID With Meals  lisinopril, 5 mg, Oral, Q24H  megestrol, 400 mg, Oral, Daily  mirtazapine, 15 mg, Oral, Nightly  pantoprazole, 40 mg, Oral, Nightly  polyethylene glycol, 17 g, Oral, Daily  senna-docusate sodium, 2 tablet, Oral, BID  sodium chloride, 10 mL, Intravenous, Q12H  terazosin, 1 mg, Oral, Nightly      I reviewed the patient's medications.    Vital Sign Min/Max for last 24 hours  Temp  Min: 97.1 °F (36.2 °C)  Max: 98.6 °F (37 °C)   BP  Min: 89/58  Max: 174/85   Pulse  Min: 70  Max: 86   Resp  Min: 12  Max: 20   SpO2  Min: 93 %  Max: 100 %   Flow (L/min)  Min: 1  Max: 2       Input/Output for last 24 hour shift  04/12 0701 - 04/13 0700  In: 1304 [P.O.:480; I.V.:724]  Out: 1780 [Urine:1400]   S RR:  [8-10] 10  GENERAL : NAD, conversant  RESPIRATORY/THORAX : normal respiratory effort and no intercostal retractions, Coarse crackles bilaterally  CARDIOVASCULAR : Normal S1/S2, RRR. 1+ lower ext edema.  GASTROINTESTINAL : Soft, NT/ND. BS x 4 normoactive. No hepatosplenomegaly.  MUSCULOSKELETAL : No cyanosis, clubbing, or ischemia  NEUROLOGICAL: alert  and oriented to person, place and time  PSYCHOLOGICAL : Appropriate affect      Results from last 7 days   Lab Units 04/13/23  0410 04/12/23  0345 04/11/23  0504   WBC 10*3/mm3 12.49* 8.42 8.91   HEMOGLOBIN g/dL 8.2* 8.7* 8.9*   PLATELETS 10*3/mm3 284 266 236     Results from last 7 days   Lab Units 04/13/23  0410 04/12/23  0345 04/11/23  0504   SODIUM mmol/L 139 140 144   POTASSIUM mmol/L 5.1 4.4 4.4   CO2 mmol/L 20.0* 22.0 22.0   BUN mg/dL 23 17 14   CREATININE mg/dL 0.75* 0.72* 0.71*   GLUCOSE mg/dL 245* 242* 161*     Estimated Creatinine Clearance: 78.2 mL/min (A) (by C-G formula based on SCr of 0.75 mg/dL (L)).          I reviewed the patient's new clinical results.  I reviewed the patient's new imaging results/reports including actual images and agree with reports.       Imaging Results (Last 24 Hours)     Procedure Component Value Units Date/Time    XR Chest 1 View [[id number]] Collected: 04/13/23 0712     Updated: 04/13/23 0717    Narrative:      XR CHEST 1 VW    Date of Exam: 4/13/2023 12:53 AM EDT    Indication: Pleural effusion.    Comparison: Chest x-ray 4/12/2023    Findings:  Stable medical support devices. Stable cardiomediastinal silhouette within normal limits. Redemonstration of small right pneumothorax with right apical and right basilar components. Right thoracostomy tubes are unchanged. There are mild streaky opacities   in the right lung base, unchanged, which may reflect some associated atelectasis. The left lung remains grossly clear. There is a tiny amount of right chest wall subcutaneous emphysema.      Impression:      Impression:  No significant interval change. Stable small right pneumothorax with right thoracostomy tubes in place.    Electronically Signed: Fabián Mondragon    4/13/2023 7:14 AM EDT    Workstation ID: [id number]    XR Chest 1 View [[id number]] Collected: 04/12/23 2022     Updated: 04/12/23 2028    Narrative:      XR CHEST 1 VW    Date of Exam: 4/12/2023 7:58 PM  EDT    Indication: postop.    Comparison: Chest x-ray 4/12/2023 0537 hours    Findings:  There are overlying devices limiting evaluation. There is pneumothorax on the right seen inferiorly and laterally measuring about 4.1 cm. There is partial collapse of the right lower lobe. Right-sided chest tube is suspected. There is change in position   of the chest tube with probable kinking at its distal aspect. The left lung is clear. Transvenous pacemaker device.      Impression:      Impression:  Right pneumothorax inferiorly with partial atelectasis right lower lobe. A chest tube inferior right thorax obtained at its distal end. Correlate with function.    Electronically Signed: Micaela Horn    4/12/2023 8:25 PM EDT    Workstation ID: [id number]          Assessment & Plan   Impression        Bilateral pulmonary embolism    HFrEF    HTN    Presence of cardiac pacemaker    Right exudative pleural effusion status post decortication 4/12/2023    Unintentional weight loss    Hypoalbuminemia    Severe malnutrition    History of CVA (cerebrovascular accident)    Debility    GERD (gastroesophageal reflux disease)    Hyperlipidemia    Rt Apical Lung Nodule vs Scarring (needs follow up)       Plan        Timmy Guajardo is a 72 y.o. male with PMH DVT / Bilateral PE on Eliquis, HTN, HLP, HFrEF, CVA with right sided weakness residual, T2DM and GERD who was admitted 3/24/23 to the Hospitalist service for progressive weakness, functional decline and unintentional weight loss.  CTA c/a/p revealed bilateral pulmonary emboli, partially loculated moderate right pleural effusion, emphysema, moderate colonic stool burden with mild inspissated rectal stool burden. He had a right-sided chest tube placed in IR 3/27/23.  Pleural fluid studies c/w exudative effusion. Small bore CT was discontinued 3/30/23.  Rt large bore chest tube was placed by CTS 4/4/23.  Follow up CT chest revealed persistent right loculated pleural effusion with some increased  hemorrhage/clot and right apical nodule vs scarring.  He was admitted to the ICU on 4/12/2023 status post right VATS and decortication by Dr. Mahan.    • Postop orders per surgery  • Chest tube per CT surgery  • Follow-up final pathology  • Hold heparin, will restart based on CT surgery recommendations  • Adjust insulin for blood sugar control, goal blood glucose less than 180  • Hold lisinopril, patient slightly hypotensive per surgery  • Hold parameters placed on Coreg for heart rate less than 60 or systolic blood pressure less than 100  • Continue statin for hyperlipidemia  • Ensure adequate pain control  • Mobilize patient per protocol  • Aggressive pulmonary toilet wean oxygen to saturation greater than 88%  • Bowel regimen  • SCDs for DVT prophylaxis until chest tube is removed    I conducted multidisciplinary rounds in the plan of care was discussed with the multidisciplinary team at that time. In attendance at multidisciplinary rounds was clinical pharmacist, dietitian, nursing staff, and case management.    I discussed the patient's findings and my recommendations with patient and nursing staff       Loyd Garcia, DO  Pulmonary, Critical care and Sleep Medicine

## 2023-04-13 NOTE — PLAN OF CARE
Goal Outcome Evaluation:  Plan of Care Reviewed With: patient        Progress: improving  Outcome Evaluation: VSS, on 2L NC. Afebrile. Minimal complaints of pain. Rested well throughout the night. CT 1 45ml out. CT 2 285ml out. UOP adequate. No BM.

## 2023-04-13 NOTE — PROGRESS NOTES
Cardiothoracic Surgery Progress Note      POD # 1 s/p Right VATS decortication     LOS: 20 days      Subjective:  No complaints today    Objective:  Vital Signs  Temp:  [97.1 °F (36.2 °C)-98.6 °F (37 °C)] 98.6 °F (37 °C)  Heart Rate:  [70-82] 78  Resp:  [12-19] 16  BP: ()/(55-85) 118/70    Physical Exam:   General Appearance: alert, appears stated age and cooperative   Lungs: clear to auscultation, respirations regular, respirations even and respirations unlabored   Heart: regular rhythm & normal rate, normal S1, S2 and no murmur, no gallop, no rub   Skin: Incision c/d/i   CT with air leak  Results:  Results from last 7 days   Lab Units 04/13/23  0410   WBC 10*3/mm3 12.49*   HEMOGLOBIN g/dL 8.2*   HEMATOCRIT % 26.5*   PLATELETS 10*3/mm3 284     Results from last 7 days   Lab Units 04/13/23  0410   SODIUM mmol/L 139   POTASSIUM mmol/L 5.1   CHLORIDE mmol/L 106   CO2 mmol/L 20.0*   BUN mg/dL 23   CREATININE mg/dL 0.75*   GLUCOSE mg/dL 245*   CALCIUM mg/dL 9.0       Assessment:  POD # 1 s/p Right VATS decortication    Plan:  D/C heparin drip, possibly resume tomorrow - premature after a decortication  Continue chest tubes to suction  PT/OT  Pulmonary toilet  Await pathology and cultures    Tim Mahan MD  04/13/23  07:42 EDT

## 2023-04-13 NOTE — PLAN OF CARE
Goal Outcome Evaluation:  Plan of Care Reviewed With: patient        Progress: no change  Outcome Evaluation: PT re-eval completed. Pt limited by decreased functional endurance, generalized weakness (residual R weakness; LE>UE), balance deficit, and incisional pain compared to baseline. Pt required min A to stand and mod Ax2 to pivot to chair. Rec continued skilled PT to increase indep with mobility. d/c rec for SNF.

## 2023-04-13 NOTE — THERAPY RE-EVALUATION
Patient Name: Timmy Guajardo  : 1950    MRN: [id number]                              Today's Date: 2023       Admit Date: 3/24/2023    Visit Dx:     ICD-10-CM ICD-9-CM   1. Weight loss  R63.4 783.21   2. Unintentional weight loss  R63.4 783.21   3. 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 Pleural effusion  J90 511.9   5. Shortness of breath  R06.02 786.05     Patient Active Problem List   Diagnosis   • Acute right-sided weakness   • Suspected cerebrovascular accident (CVA)   • Leukocytosis   • HFrEF   • Thrombocytopenia   • HTN   • Presence of cardiac pacemaker   • Hyperglycemia   • Gangrene of toe of left foot   • Closed displaced intertrochanteric fracture of right femur, initial encounter   • Right exudative pleural effusion status post decortication 2023   • Unintentional weight loss   • Hypoalbuminemia   • Severe malnutrition   • Bilateral pulmonary embolism   • History of CVA (cerebrovascular accident)   • Debility   • GERD (gastroesophageal reflux disease)   • Hyperlipidemia   • Rt Apical Lung Nodule vs Scarring (needs follow up)     Past Medical History:   Diagnosis Date   • Cardiomyopathy    • CHF (congestive heart failure)    • Coronary artery disease    • Diabetes mellitus    • GERD (gastroesophageal reflux disease)    • HTN (hypertension)    • Stroke     Right sided weakness   • Stroke      Past Surgical History:   Procedure Laterality Date   • AMPUTATION DIGIT Left 2021    Procedure: AMPUTATION DIGIT - GREAT TOE AMPUTATION LEFT;  Surgeon: Dario Marmolejo MD;  Location: [facility];  Service: General;  Laterality: Left;   • AORTAGRAM N/A 2021    Procedure: AORTAGRAM WITH RUNOFFS, LEFT SFA BALLOON ANGIOPLASTY;  Surgeon: Tim Mahan MD;  Location: [facility];  Service: Vascular;  Laterality: N/A;  FL TIME 6 MIN 54 SECS  82 MGY  CONTRAST VISIPAQUE 100ML     • CARDIAC CATHETERIZATION     • CARDIAC PACEMAKER PLACEMENT      pacemaker/defibrillator, [employer]   • COLONOSCOPY N/A 3/31/2023    Procedure: COLONOSCOPY;  Surgeon: Brunner, Mark I, MD;  Location:  [facility];  Service: Gastroenterology;  Laterality: N/A;   • ENDOSCOPY N/A 3/29/2023    Procedure: ESOPHAGOGASTRODUODENOSCOPY;  Surgeon: Brunner, Mark I, MD;  Location:  [facility];  Service: Gastroenterology;  Laterality: N/A;   • HERNIA REPAIR Bilateral     Inguinal hernia   • HIP TROCHANTERIC NAILING WITH INTRAMEDULLARY HIP SCREW Right 10/18/2022    Procedure: HIP TROCHANTERIC NAILING WITH INTRAMEDULLARY HIP SCREW RIGHT;  Surgeon: Bj Steinberg MD;  Location:  [facility];  Service: Orthopedics;  Laterality: Right;   • THORACOSCOPY VIDEO ASSISTED WITH LOBECTOMY Right 4/12/2023    Procedure: BRONCHOSCOPY, THORACOSCOPY VIDEO ASSISTED WITH DECORTICATION, MECHANICAL PLEURODESIS;  Surgeon: Tim Mahan MD;  Location:  [facility];  Service: Cardiothoracic;  Laterality: Right;      General Information     Row Name 04/13/23 1058          OT Time and Intention    Document Type re-evaluation  -     Mode of Treatment occupational therapy  -     Row Name 04/13/23 1050          General Information    Patient Profile Reviewed yes  -     Prior Level of Function independent:;bed mobility;transfer;w/c or scooter;mod assist:;max assist:;ADL's;dependent:;cooking;cleaning;driving;shopping;home management  Pt reports he uses a manual w/c for mobility at baseline, pt reports he also has a RW, I w/ transfers and w/c propulsion. Pt reports PTA he moved in w/ family  -     Existing Precautions/Restrictions fall;other (see comments)  R chest tube x2, hx of CVA w/ residual R side weakness  -     Row Name 04/13/23 1058          Living Environment    People in Home sibling(s)  -     Row Name 04/13/23 1058          Cognition    Orientation Status (Cognition) oriented x 3  -     Row Name 04/13/23 1051          Safety Issues, Functional Mobility    Safety Issues Affecting Function (Mobility) awareness of need  for assistance;insight into deficits/self-awareness;safety precaution awareness;safety precautions follow-through/compliance;sequencing abilities  -     Impairments Affecting Function (Mobility) balance;coordination;endurance/activity tolerance;motor planning;strength;motor control;range of motion (ROM);pain  -           User Key  (r) = Recorded By, (t) = Taken By, (c) = Cosigned By    Initials Name Provider Type     Greta Lawrence OT Occupational Therapist                 Mobility/ADL's     Row Name 04/13/23 1104          Bed Mobility    Bed Mobility supine-sit  -     Supine-Sit [place] (Bed Mobility) minimum assist (75% patient effort);verbal cues  -     Bed Mobility, Safety Issues decreased use of arms for pushing/pulling;decreased use of legs for bridging/pushing;impaired trunk control for bed mobility  -     Assistive Device (Bed Mobility) bed rails;draw sheet;head of bed elevated  -     Comment, (Bed Mobility) Pt req min A to bring trunk upright and scoot hips to EOB. Extended time and effort required. Pt c/o pain at chest tube site w/ any movement. Pt denied dizziness sitting EOB, BP checked and stable.  -     Row Name 04/13/23 1104          Transfers    Transfers sit-stand transfer;stand-sit transfer  -     Comment, (Transfers) Extended time and effort required. VC's for maintaining fully upright standing position. Pt limited d/t c/o pain w/ any movement, educated on importance of activity.  -     Row Name 04/13/23 1104          Sit-Stand Transfer    Sit-Stand [place] (Transfers) minimum assist (75% patient effort);2 person assist  -     Assistive Device (Sit-Stand Transfers) other (see comments)  BUE support  -     Row Name 04/13/23 1104          Stand-Sit Transfer    Stand-Sit [place] (Transfers) minimum assist (75% patient effort);2 person assist;verbal cues  -     Assistive Device (Stand-Sit Transfers) other (see comments)  -[facility] Name 04/13/23 1104           Activities of Daily Living    BADL Assessment/Intervention feeding  -     Row Name 04/13/23 1104          Self-Feeding Assessment/Training    [place] Level (Feeding) liquids to mouth;scoop food and bring to mouth;independent  -     Position (Self-Feeding) sitting up in bed  -           User Key  (r) = Recorded By, (t) = Taken By, (c) = Cosigned By    Initials Name Provider Type     Greta Lawrence OT Occupational Therapist               Obj/Interventions     Row Name 04/13/23 1106          Sensory Assessment (Somatosensory)    Sensory Assessment (Somatosensory) UE sensation intact  -[facility] Name 04/13/23 1106          Vision Assessment/Intervention    Visual Impairment/Limitations WFL  -[facility] Name 04/13/23 1106          Range of Motion Comprehensive    General Range of Motion bilateral upper extremity ROM WFL  -[facility] Name 04/13/23 1106          Strength Comprehensive (MMT)    General Manual Muscle Testing (MMT) Assessment upper extremity strength deficits identified  -     Comment, General Manual Muscle Testing (MMT) Assessment RUE grossly 4-/5, LUE grossly 4/5  -[facility] Name 04/13/23 1106          Balance    Balance Assessment sitting static balance;sitting dynamic balance;sit to stand dynamic balance;standing static balance  -     Static Sitting Balance standby assist  -     Dynamic Sitting Balance minimal assist;verbal cues  -     Position, Sitting Balance sitting edge of bed;supported;unsupported  -     Sit to Stand Dynamic Balance minimal assist;2-person assist;verbal cues  -     Static Standing Balance minimal assist;2-person assist;verbal cues  -     Position/Device Used, Standing Balance supported  -     Balance Interventions sitting;sit to stand;occupation based/functional task  -           User Key  (r) = Recorded By, (t) = Taken By, (c) = Cosigned By    Initials Name Provider Type     Greta Lawrence OT Occupational Therapist                Goals/Plan     Row Name 04/13/23 1112          Transfer Goal 1 (OT)    Activity/Assistive Device (Transfer Goal 1, OT) sit-to-stand/stand-to-sit;commode, bedside without drop arms;walker, rolling  -     [place] Level/Cues Needed (Transfer Goal 1, OT) minimum assist (75% or more patient effort)  -     Time Frame (Transfer Goal 1, OT) long term goal (LTG);10 days  -     Progress/Outcome (Transfer Goal 1, OT) goal ongoing  -     Row Name 04/13/23 1112          Dressing Goal 1 (OT)    Activity/Device (Dressing Goal 1, OT) lower body dressing  -     [place]/Cues Needed (Dressing Goal 1, OT) minimum assist (75% or more patient effort)  -     Time Frame (Dressing Goal 1, OT) long term goal (LTG);10 days  -     Strategies/Barriers (Dressing Goal 1, OT) socks and undergarment on up to knees and off, with AE prn,  -     Progress/Outcome (Dressing Goal 1, OT) goal ongoing  -     Row Name 04/13/23 1112          Toileting Goal 1 (OT)    Activity/Device (Toileting Goal 1, OT) adjust/manage clothing;perform perineal hygiene;commode, bedside without drop arms  -     [place] Level/Cues Needed (Toileting Goal 1, OT) minimum assist (75% or more patient effort)  -     Time Frame (Toileting Goal 1, OT) long term goal (LTG);10 days  -     Progress/Outcome (Toileting Goal 1, OT) goal ongoing  -     Row Name 04/13/23 1112          Strength Goal 1 (OT)    Progress/Outcome (Strength Goal 1, OT) goal met  -     Row Name 04/13/23 1112          Problem Specific Goal 1 (OT)    Problem Specific Goal 1 (OT) Pt. will standing in walker for one minute with min A to support caregiver care with BADL tasks  -     Time Frame (Problem Specific Goal 1, OT) long term goal (LTG);10 days  -     Progress/Outcome (Problem Specific Goal 1, OT) goal ongoing  -           User Key  (r) = Recorded By, (t) = Taken By, (c) = Cosigned By    Initials Name Provider Type    Greta Acevedo, OT Occupational Therapist                Clinical Impression     Row Name 04/13/23 1109          Pain Assessment    Pain Intervention(s) Repositioned;Ambulation/increased activity  -     Additional Documentation Pain Scale: FACES Pre/Post-Treatment (Group)  -MC     Row Name 04/13/23 1109          Pain Scale: FACES Pre/Post-Treatment    Pain: FACES Scale, Pretreatment 2-->hurts little bit  -     Posttreatment Pain Rating 6-->hurts even more  -     Pain Location - Side/Orientation Right  -     Pain Location incisional  -     Pain Location - flank  -     Pre/Posttreatment Pain Comment R chest tube site  -MC     Row Name 04/13/23 1109          Plan of Care Review    Plan of Care Reviewed With patient  -     Progress no change  -     Outcome Evaluation Pt presents w/ increased pain, decreased functional endurance, generalized weakness (R>L), and balance deficits. Pt would benefit from continued skilled IPOT services to address current functional deficits and facilitate return to PLOF. Rec SNF at d/c.  -[facility] 04/13/23 1109          Therapy Assessment/Plan (OT)    Rehab Potential (OT) fair, will monitor progress closely  -     Criteria for Skilled Therapeutic Interventions Met (OT) yes;meets criteria;skilled treatment is necessary  -     Therapy Frequency (OT) daily  -[facility] 04/13/23 1109          Therapy Plan Review/Discharge Plan (OT)    Anticipated Discharge Disposition (OT) skilled nursing facility  -[facility] 04/13/23 1109          Vital Signs    Pre Systolic BP Rehab 123  -MC     Pre Treatment Diastolic BP 60  -MC     Intra Systolic BP Rehab 135  -MC     Intra Treatment Diastolic BP 74  -MC     Post Systolic BP Rehab 134  -MC     Post Treatment Diastolic BP 67  -MC     Pretreatment Heart Rate (beats/min) 81  -MC     Posttreatment Heart Rate (beats/min) 78  -     Pre SpO2 (%) 96  -MC     O2 Delivery Pre Treatment room air  -     O2 Delivery Intra Treatment room air  -     Post SpO2 (%) 94  -MC      O2 Delivery Post Treatment room air  -     Pre Patient Position Supine  -     Intra Patient Position Standing  -     Post Patient Position Sitting  -     Row Name 04/13/23 1109          Positioning and Restraints    Pre-Treatment Position in bed  -     Post Treatment Position bed  -     In Bed sitting EOB;with PT  -           User Key  (r) = Recorded By, (t) = Taken By, (c) = Cosigned By    Initials Name Provider Type    Greta Acevedo OT Occupational Therapist               Outcome Measures     Row Name 04/13/23 1113          How much help from another is currently needed...    Putting on and taking off regular lower body clothing? 2  -MC     Bathing (including washing, rinsing, and drying) 2  -MC     Toileting (which includes using toilet bed pan or urinal) 2  -MC     Putting on and taking off regular upper body clothing 3  -MC     Taking care of personal grooming (such as brushing teeth) 3  -MC     Eating meals 4  -     AM-PAC 6 Clicks Score (OT) 16  -     Row Name 04/13/23 1113          Functional Assessment    Outcome Measure Options AM-PAC 6 Clicks Daily Activity (OT)  -           User Key  (r) = Recorded By, (t) = Taken By, (c) = Cosigned By    Initials Name Provider Type    Greta Acevedo OT Occupational Therapist                Occupational Therapy Education     Title: PT OT SLP Therapies (In Progress)     Topic: Occupational Therapy (In Progress)     Point: ADL training (In Progress)     Description:   Instruct learner(s) on proper safety adaptation and remediation techniques during self care or transfers.   Instruct in proper use of assistive devices.              Learning Progress Summary           Patient Acceptance, E, NR by  at 4/13/2023 1113    Acceptance, E, VU by JUAQUIN at 4/10/2023 1549    Acceptance, E,D, NR by BAILEY at 4/4/2023 1408    Comment: AE use for LBD, UE TE, transfer safety,                   Point: Home exercise program (Done)     Description:    Instruct learner(s) on appropriate technique for monitoring, assisting and/or progressing therapeutic exercises/activities.              Learning Progress Summary           Patient Acceptance, E, VU by  at 4/10/2023 1549    Acceptance, E,D, NR by BAILEY at 4/4/2023 1408    Comment: AE use for LBD, UE TE, transfer safety,    Acceptance, E,D, NR by BAILEY at 3/30/2023 1438    Comment: UE and LE TE to support BADL and related transfer independence    Acceptance, E,D, VU,NR by BAILEY at 3/27/2023 1201    Comment: reason for consult, noted deficits, UE TE, bed mobility and transfer sequencing, benefit of mvmt/activity for health and not loosing strength and endurance                   Point: Precautions (In Progress)     Description:   Instruct learner(s) on prescribed precautions during self-care and functional transfers.              Learning Progress Summary           Patient Acceptance, E, NR by  at 4/13/2023 1113    Acceptance, E, VU by AN at 4/10/2023 1549    Acceptance, E,D, NR by BAILEY at 4/4/2023 1408    Comment: AE use for LBD, UE TE, transfer safety,    Acceptance, E,D, VU,NR by BAILEY at 3/27/2023 1201    Comment: reason for consult, noted deficits, UE TE, bed mobility and transfer sequencing, benefit of mvmt/activity for health and not loosing strength and endurance                   Point: Body mechanics (In Progress)     Description:   Instruct learner(s) on proper positioning and spine alignment during self-care, functional mobility activities and/or exercises.              Learning Progress Summary           Patient Acceptance, E, NR by  at 4/13/2023 1113    Acceptance, E, VU by  at 4/10/2023 1549    Acceptance, E,D, VU,NR by BAILEY at 3/27/2023 1201    Comment: reason for consult, noted deficits, UE TE, bed mobility and transfer sequencing, benefit of mvmt/activity for health and not loosing strength and endurance                               User Key     Initials Effective Dates Name Provider Type Discipline    BAILEY  02/03/23 -  Mary Nath OT Occupational Therapist OT     10/14/22 -  Greta Lawrence OT Occupational Therapist OT     09/21/21 -  Lorena Tomlinson OT Occupational Therapist OT              OT Recommendation and Plan  Therapy Frequency (OT): daily  Plan of Care Review  Plan of Care Reviewed With: patient  Progress: no change  Outcome Evaluation: Pt presents w/ increased pain, decreased functional endurance, generalized weakness (R>L), and balance deficits. Pt would benefit from continued skilled IPOT services to address current functional deficits and facilitate return to OF. Rec SNF at d/c.     Time Calculation:    Time Calculation- OT     Row Name 04/13/23 1113             Time Calculation- OT    OT Start Time 0940  -      OT Received On 04/13/23  -      OT Goal Re-Cert Due Date 04/23/23  -         Timed Charges    85516 - OT Therapeutic Activity Minutes 17  -MC      79539 - OT Self Care/Mgmt Minutes 6  -MC         Untimed Charges    OT Eval/Re-eval Minutes 23  -MC         Total Minutes    Timed Charges Total Minutes 23  -MC      Untimed Charges Total Minutes 23  -MC       Total Minutes 46  -MC            User Key  (r) = Recorded By, (t) = Taken By, (c) = Cosigned By    Initials Name Provider Type     Greta Lawrence, RICARDO Occupational Therapist              Therapy Charges for Today     Code Description Service Date Service Provider Modifiers Qty    55337725388 HC OT THERAPEUTIC ACT EA 15 MIN 4/13/2023 Greta Lawrence OT GO 1    58887593929 HC OT SELF CARE/MGMT/TRAIN EA 15 MIN 4/13/2023 Greta Lawrence OT GO 1    76126234493 HC OT RE-EVAL 2 4/13/2023 Greta Lawernce OT GO 1               Greta Lawrence OT  4/13/2023

## 2023-04-13 NOTE — PROGRESS NOTES
Intensive Care Follow-up     Hospital:  LOS: 19 days   Mr. Timym Guajardo, 72 y.o. male is followed for:   Bilateral pulmonary embolism        Subjective     Timmy Guajardo is a 72 y.o. male with PMH DVT / Bilateral PE on Eliquis, HTN, HLP, HFrEF, CVA with right sided weakness residual, T2DM and GERD who was admitted 3/24/23 to the Hospitalist service for progressive weakness, functional decline and unintentional weight loss.  CTA c/a/p revealed bilateral pulmonary emboli, partially loculated moderate right pleural effusion, emphysema, moderate colonic stool burden with mild inspissated rectal stool burden.  Eliquis was held and he was placed on a heparin drip.  He had a right-sided chest tube placed in IR 3/27/23.  Pleural fluid studies c/w exudative effusion. Small bore CT was discontinued 3/30/23.  Rt large bore chest tube was placed by CTS 4/4/23.  Follow up CT chest revealed persistent right loculated pleural effusion with some increased hemorrhage/clot and right apical nodule vs scarring.  He had some bleeding from and around chest tube site and heparin was held 4/6/23. Large clot was extracted from the tubing by stripping the tubes, silver nitrate sticks were applied and stitch was placed around chest tube with improvement.  Heparin was restarted.  He was scheduled for Rt VATS and decortication today.  He was transferred to the ICU post operatively.      In October 2022, he had a fall with fracture and was sent to [facility] for rehab.  His Elquis was decreased to 2.5 mg BID at that time for unknown reason.  He also contracted COVID while there.  Since then, he has had decreased appetite, worsening weakness and 52 lbs unintentional weight loss.  On this admission, CT a/p were negative for malignancy.  Oncology recommended age-appropriate screening.  He was evaluated by GI and underwent upper and lower endoscopy.  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  Colonoscopy was unremarkable.  Weight  loss felt to be due to anorexia.  He was placed on Megace and Mirtazapine.    Interval History:  The patient underwent right-sided VATS with decortication and mechanical pleurodesis today for his right-sided loculated effusion.  He has been brought to the intensive care unit in an extubated state for postoperative management.  He does complain of pain at his incision sites and some difficulty breathing but for the most part this has been under control for him.  I have reviewed his chest x-ray which does show persistent volume loss in the right side with pneumothorax primarily in the base.  He is not currently having an air leak.    The patient's past medical, surgical and social history were reviewed and updated in Epic as appropriate.        Objective     Infusions:  heparin, 24 Units/kg/hr, Last Rate: Stopped (04/12/23 0003)  lactated ringers, 9 mL/hr, Last Rate: 9 mL/hr (03/29/23 0838)  lactated ringers, 9 mL/hr, Last Rate: Stopped (04/12/23 1831)  Pharmacy to Dose Heparin,   sodium chloride, 30 mL/hr, Last Rate: 30 mL/hr (04/12/23 2110)      Medications:  acetaminophen, 1,000 mg, Oral, Q8H  vitamin C, 1,000 mg, Oral, Daily  aspirin, 81 mg, Oral, Daily  atorvastatin, 80 mg, Oral, Nightly  carvedilol, 50 mg, Oral, BID With Meals  [START ON 4/13/2023] ceFAZolin, 2 g, Intravenous, Q8H  docusate sodium, 100 mg, Oral, BID  famotidine, 20 mg, Oral, Daily  gabapentin, 100 mg, Oral, TID  insulin lispro, 0-7 Units, Subcutaneous, TID With Meals  lisinopril, 5 mg, Oral, Q24H  megestrol, 400 mg, Oral, Daily  mirtazapine, 15 mg, Oral, Nightly  pantoprazole, 40 mg, Oral, Nightly  polyethylene glycol, 17 g, Oral, Daily  senna-docusate sodium, 2 tablet, Oral, BID  sodium chloride, 10 mL, Intravenous, Q12H  terazosin, 1 mg, Oral, Nightly        Vital Sign Min/Max for last 24 hours  Temp  Min: 97.2 °F (36.2 °C)  Max: 99.2 °F (37.3 °C)   BP  Min: 119/84  Max: 152/79   Pulse  Min: 70  Max: 92   Resp  Min: 12  Max: 20   SpO2  Min: 93  "%  Max: 97 %   Flow (L/min)  Min: 2  Max: 2       Input/Output for last 24 hour shift  04/11 0701 - 04/12 0700  In: -   Out: 1400 [Urine:1400]   S RR:  [8-10] 10  Objective:  General Appearance:  Uncomfortable and in no acute distress (Thin).    Vital signs: (most recent): Blood pressure 124/69, pulse 72, temperature 97.9 °F (36.6 °C), temperature source Temporal, resp. rate 13, height 177.8 cm (70\"), weight 60.3 kg (133 lb), SpO2 95 %.    HEENT: Normal HEENT exam.    Lungs:  (Coarse primarily right-sided breath sounds.  No subcutaneous crepitus felt.  Tenderness around the incisions and trocar points.  Chest tubes in place without air leak currently.)  Heart: Normal rate.  Regular rhythm.  S1 normal.  No murmur.   Chest: Symmetric chest wall expansion.   Abdomen: Abdomen is soft and non-distended.    Extremities: Normal range of motion.    Neurological: Patient is alert and oriented to person, place and time.    Pupils:  Pupils are equal, round, and reactive to light.  Pupils are equal.   Skin:  Warm.  No rash.             Results from last 7 days   Lab Units 04/12/23  0345 04/11/23  0504 04/10/23  0506   WBC 10*3/mm3 8.42 8.91 8.39   HEMOGLOBIN g/dL 8.7* 8.9* 8.7*   PLATELETS 10*3/mm3 266 236 220     Results from last 7 days   Lab Units 04/12/23  0345 04/11/23  0504 04/10/23  0506   SODIUM mmol/L 140 144 144   POTASSIUM mmol/L 4.4 4.4 4.4   CO2 mmol/L 22.0 22.0 22.0   BUN mg/dL 17 14 16   CREATININE mg/dL 0.72* 0.71* 0.66*   GLUCOSE mg/dL 242* 161* 206*     Estimated Creatinine Clearance: 81.4 mL/min (A) (by C-G formula based on SCr of 0.72 mg/dL (L)).          I reviewed the patient's results and images.     Assessment & Plan   Impression        Bilateral pulmonary embolism    Right exudative pleural effusion status post decortication 4/12/2023    HFrEF    HTN    Presence of cardiac pacemaker    Unintentional weight loss    Hypoalbuminemia    Severe malnutrition    History of CVA (cerebrovascular accident)    " Debility    GERD (gastroesophageal reflux disease)    Hyperlipidemia    Rt Apical Lung Nodule vs Scarring (needs follow up)       Plan        Patient be monitored closely in the intensive care unit.  Incentive spirometry and good pulmonary hygiene been encouraged.  Maintain chest tubes to suction for now.  Repeat chest x-ray tomorrow morning.  Pain control for good pulmonary hygiene.  Nutrition has been encouraged.  Continue with anticoagulation when surgically appropriate.        Jarrett Buenrostro MD, [facility]  Pulmonology and Critical Care Medicine

## 2023-04-13 NOTE — PLAN OF CARE
"Goal Outcome Evaluation:  Plan of Care Reviewed With: patient   Patient afebrile. CT cont on suction with serosanguinous output. VSS. UOP low 200 ml  post cath removal, encourage PO fluid. Not drinking fluid, PT states \"I just dont feel like I need to drink right not.\" Patient very particular with food preferences and time that he prefers to eat. OOB to chair with PT/OT, for 2 hours. Refuses to get OOB anymore today, states \"I think that I have don't enough for today.\" Levimir started for BG control. Patient preoccupied with using he phone verses when nursing in room. Encouraged to TCDB and use IS, poor effort with IS usage, instructed for proper technique. Cont to monitor in ICU today.      Progress: no change     "

## 2023-04-13 NOTE — THERAPY RE-EVALUATION
Patient Name: Timmy Guajardo  : 1950    MRN: [id number]                              Today's Date: 2023       Admit Date: 3/24/2023    Visit Dx:     ICD-10-CM ICD-9-CM   1. Weight loss  R63.4 783.21   2. Unintentional weight loss  R63.4 783.21   3. 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 Pleural effusion  J90 511.9   5. Shortness of breath  R06.02 786.05     Patient Active Problem List   Diagnosis   • Acute right-sided weakness   • Suspected cerebrovascular accident (CVA)   • Leukocytosis   • HFrEF   • Thrombocytopenia   • HTN   • Presence of cardiac pacemaker   • Hyperglycemia   • Gangrene of toe of left foot   • Closed displaced intertrochanteric fracture of right femur, initial encounter   • Right exudative pleural effusion status post decortication 2023   • Unintentional weight loss   • Hypoalbuminemia   • Severe malnutrition   • Bilateral pulmonary embolism   • History of CVA (cerebrovascular accident)   • Debility   • GERD (gastroesophageal reflux disease)   • Hyperlipidemia   • Rt Apical Lung Nodule vs Scarring (needs follow up)     Past Medical History:   Diagnosis Date   • Cardiomyopathy    • CHF (congestive heart failure)    • Coronary artery disease    • Diabetes mellitus    • GERD (gastroesophageal reflux disease)    • HTN (hypertension)    • Stroke     Right sided weakness   • Stroke      Past Surgical History:   Procedure Laterality Date   • AMPUTATION DIGIT Left 2021    Procedure: AMPUTATION DIGIT - GREAT TOE AMPUTATION LEFT;  Surgeon: Dario Marmolejo MD;  Location: [facility];  Service: General;  Laterality: Left;   • AORTAGRAM N/A 2021    Procedure: AORTAGRAM WITH RUNOFFS, LEFT SFA BALLOON ANGIOPLASTY;  Surgeon: iTm Mahan MD;  Location: [facility];  Service: Vascular;  Laterality: N/A;  FL TIME 6 MIN 54 SECS  82 MGY  CONTRAST VISIPAQUE 100ML     • CARDIAC CATHETERIZATION     • CARDIAC PACEMAKER PLACEMENT      pacemaker/defibrillator, [place]  [employer]   • COLONOSCOPY N/A 3/31/2023    Procedure: COLONOSCOPY;  Surgeon: Brunner, Mark I, MD;  Location:  [facility];  Service: Gastroenterology;  Laterality: N/A;   • ENDOSCOPY N/A 3/29/2023    Procedure: ESOPHAGOGASTRODUODENOSCOPY;  Surgeon: Brunner, Mark I, MD;  Location:  [facility];  Service: Gastroenterology;  Laterality: N/A;   • HERNIA REPAIR Bilateral     Inguinal hernia   • HIP TROCHANTERIC NAILING WITH INTRAMEDULLARY HIP SCREW Right 10/18/2022    Procedure: HIP TROCHANTERIC NAILING WITH INTRAMEDULLARY HIP SCREW RIGHT;  Surgeon: Bj Steinberg MD;  Location:  [facility];  Service: Orthopedics;  Laterality: Right;   • THORACOSCOPY VIDEO ASSISTED WITH LOBECTOMY Right 4/12/2023    Procedure: BRONCHOSCOPY, THORACOSCOPY VIDEO ASSISTED WITH DECORTICATION, MECHANICAL PLEURODESIS;  Surgeon: Tim Mahan MD;  Location:  [facility];  Service: Cardiothoracic;  Laterality: Right;      General Information     Row Name 04/13/23 1147          Physical Therapy Time and Intention    Document Type re-evaluation  -AY     Mode of Treatment physical therapy  -AY     Row Name 04/13/23 1147          General Information    Patient Profile Reviewed yes  -AY     Prior Level of Function independent:;community mobility;transfer;w/c or scooter  Pt reports he uses a manual w/c for mobility at baseline, pt reports he also has a RW, I w/ transfers and w/c propulsion. Pt reports PTA he moved in w/ family  -AY     Existing Precautions/Restrictions fall;other (see comments)  residual R weakness s/p CVA; CT x2  -AY     Barriers to Rehab medically complex;previous functional deficit  -AY     Row Name 04/13/23 1147          Living Environment    People in Home sibling(s)  -AY     Row Name 04/13/23 1147          Home Main Entrance    Number of Stairs, Main Entrance none  -AY     Row Name 04/13/23 1147          Cognition    Orientation Status (Cognition) oriented x 3  -AY     Row Name 04/13/23 1147          Safety Issues,  Functional Mobility    Impairments Affecting Function (Mobility) balance;coordination;endurance/activity tolerance;motor planning;strength;motor control;range of motion (ROM);pain  -AY           User Key  (r) = Recorded By, (t) = Taken By, (c) = Cosigned By    Initials Name Provider Type    Alie Lopez PT Physical Therapist               Mobility     Row Name 04/13/23 1148          Bed-Chair Transfer    Bed-Chair [place] (Transfers) moderate assist (50% patient effort);2 person assist;verbal cues  -AY     Assistive Device (Bed-Chair Transfers) other (see comments)  BUE support  -AY     Row Name 04/13/23 1148          Sit-Stand Transfer    Sit-Stand [place] (Transfers) moderate assist (50% patient effort);2 person assist;verbal cues  -AY     Assistive Device (Sit-Stand Transfers) other (see comments)  BUE support  -AY     Comment, (Sit-Stand Transfer) performed from EOB. Progressed to min A with RWx to stand from recliner.  -AY     Row Name 04/13/23 1148          Gait/Stairs (Locomotion)    [place] Level (Gait) not tested  -AY     Comment, (Gait/Stairs) pt refused to attempt d/t pain.  -AY           User Key  (r) = Recorded By, (t) = Taken By, (c) = Cosigned By    Initials Name Provider Type    Alie Lopez PT Physical Therapist               Obj/Interventions     Row Name 04/13/23 1149          Range of Motion Comprehensive    General Range of Motion bilateral lower extremity ROM WFL  -AY     Row Name 04/13/23 1149          Strength Comprehensive (MMT)    General Manual Muscle Testing (MMT) Assessment lower extremity strength deficits identified  -AY     Comment, General Manual Muscle Testing (MMT) Assessment LLE grossly 4/5. RLE grossly 3-/5 except DF 2/5  -AY     Row Name 04/13/23 1149          Balance    Balance Assessment sitting static balance;sitting dynamic balance;sit to stand dynamic balance;standing static balance;standing dynamic balance  -AY     Static Sitting Balance standby  assist  -AY     Dynamic Sitting Balance contact guard  -AY     Position, Sitting Balance unsupported;sitting edge of bed  -AY     Sit to Stand Dynamic Balance minimal assist  -AY     Static Standing Balance minimal assist  -AY     Dynamic Standing Balance moderate assist  -AY     Position/Device Used, Standing Balance supported  -AY     Row Name 04/13/23 1149          Sensory Assessment (Somatosensory)    Sensory Assessment (Somatosensory) LE sensation intact  -AY           User Key  (r) = Recorded By, (t) = Taken By, (c) = Cosigned By    Initials Name Provider Type    AY Alie Trujillo, PT Physical Therapist               Goals/Plan     Row Name 04/13/23 1153          Bed Mobility Goal 1 (PT)    Activity/Assistive Device (Bed Mobility Goal 1, PT) sit to supine/supine to sit  -AY     [place] Level/Cues Needed (Bed Mobility Goal 1, PT) supervision required  -AY     Time Frame (Bed Mobility Goal 1, PT) long term goal (LTG);10 days  -AY     Progress/Outcomes (Bed Mobility Goal 1, PT) goal revised this date;medical status inhibiting progress  -AY     Row Name 04/13/23 1153          Transfer Goal 1 (PT)    Activity/Assistive Device (Transfer Goal 1, PT) sit-to-stand/stand-to-sit;walker, rolling  -AY     [place] Level/Cues Needed (Transfer Goal 1, PT) contact guard required  -AY     Time Frame (Transfer Goal 1, PT) long term goal (LTG);10 days  -AY     Progress/Outcome (Transfer Goal 1, PT) goal revised this date;medical status inhibiting progress  -AY     Row Name 04/13/23 1153          Gait Training Goal 1 (PT)    Activity/Assistive Device (Gait Training Goal 1, PT) gait (walking locomotion);assistive device use;walker, rolling  -AY     [place] Level (Gait Training Goal 1, PT) minimum assist (75% or more patient effort)  -AY     Distance (Gait Training Goal 1, PT) 15  -AY     Time Frame (Gait Training Goal 1, PT) long term goal (LTG);10 days  -AY     Progress/Outcome (Gait Training Goal 1, PT) goal revised  this date;medical status inhibiting progress  -AY     Row Name 04/13/23 1153          Therapy Assessment/Plan (PT)    Planned Therapy Interventions (PT) balance training;bed mobility training;gait training;home exercise program;postural re-education;transfer training;patient/family education;strengthening  -AY           User Key  (r) = Recorded By, (t) = Taken By, (c) = Cosigned By    Initials Name Provider Type    AY Alie Trujillo, PT Physical Therapist               Clinical Impression     Row Name 04/13/23 1150          Pain    Pain Intervention(s) Ambulation/increased activity;Repositioned  -AY     Additional Documentation Pain Scale: FACES Pre/Post-Treatment (Group)  -AY     Row Name 04/13/23 1150          Pain Scale: FACES Pre/Post-Treatment    Pain: FACES Scale, Pretreatment 2-->hurts little bit  -AY     Posttreatment Pain Rating 2-->hurts little bit  -AY     Row Name 04/13/23 1150          Plan of Care Review    Plan of Care Reviewed With patient  -AY     Progress no change  -AY     Outcome Evaluation PT re-eval completed. Pt limited by decreased functional endurance, generalized weakness (residual R weakness; LE>UE), balance deficit, and incisional pain compared to baseline. Pt required min A to stand and mod Ax2 to pivot to chair. Rec continued skilled PT to increase indep with mobility. d/c rec for SNF.  -AY     Row Name 04/13/23 1150          Therapy Assessment/Plan (PT)    Rehab Potential (PT) good, to achieve stated therapy goals  -AY     Criteria for Skilled Interventions Met (PT) yes;meets criteria;skilled treatment is necessary  -AY     Therapy Frequency (PT) daily  -AY     Row Name 04/13/23 1150          Vital Signs    Pre Systolic BP Rehab 94  -AY     Pre Treatment Diastolic BP 47  -AY     Intra Systolic BP Rehab 135  sitting EOB  -AY     Intra Treatment Diastolic BP 74  -AY     Post Systolic BP Rehab 134  -AY     Post Treatment Diastolic BP 67  -AY     Pretreatment Heart Rate (beats/min) 81  -AY      Posttreatment Heart Rate (beats/min) 82  -AY     Pre SpO2 (%) 94  -AY     O2 Delivery Pre Treatment room air  -AY     O2 Delivery Intra Treatment room air  -AY     Post SpO2 (%) 96  -AY     O2 Delivery Post Treatment room air  -AY     Pre Patient Position Sitting  -AY     Intra Patient Position Standing  -AY     Post Patient Position Sitting  -AY     Row Name 04/13/23 1150          Positioning and Restraints    Pre-Treatment Position in bed  -AY     Post Treatment Position chair  -AY     In Chair notified nsg;reclined;sitting;call light within reach;encouraged to call for assist;exit alarm on;legs elevated;LUE elevated;RUE elevated;on mechanical lift sling;waffle cushion  -AY           User Key  (r) = Recorded By, (t) = Taken By, (c) = Cosigned By    Initials Name Provider Type    Alie Lopez PT Physical Therapist               Outcome Measures     Row Name 04/13/23 1153          How much help from another person do you currently need...    Turning from your back to your side while in flat bed without using bedrails? 3  -AY     Moving from lying on back to sitting on the side of a flat bed without bedrails? 3  -AY     Moving to and from a bed to a chair (including a wheelchair)? 2  -AY     Standing up from a chair using your arms (e.g., wheelchair, bedside chair)? 3  -AY     Climbing 3-5 steps with a railing? 1  -AY     To walk in hospital room? 2  -AY     AM-PAC 6 Clicks Score (PT) 14  -AY     Highest level of mobility 4 --> Transferred to chair/commode  -AY     Row Name 04/13/23 1153 04/13/23 1113       Functional Assessment    Outcome Measure Options AM-PAC 6 Clicks Basic Mobility (PT)  -AY AM-PAC 6 Clicks Daily Activity (OT)  -          User Key  (r) = Recorded By, (t) = Taken By, (c) = Cosigned By    Initials Name Provider Type    Alie Lopez PT Physical Therapist    Greta Acevedo OT Occupational Therapist                             Physical Therapy Education     Title: PT OT SLP  Therapies (In Progress)     Topic: Physical Therapy (Done)     Point: Mobility training (Done)     Learning Progress Summary           Patient Acceptance, E,TB, VU,NR by AY at 4/13/2023 1154    Acceptance, E, VU,NR by NS at 4/11/2023 1611    Comment: benefits of OOB activity, ther ex, purpose of IP PT    Acceptance, E, VU,NR by ML at 4/7/2023 1151    Acceptance, E, VU by LH at 4/5/2023 1554    Acceptance, E, VU,NR by NS at 4/3/2023 0926    Acceptance, E, VU,NR by ML at 3/31/2023 1604    Acceptance, E, VU,NR by ML at 3/28/2023 1323    Acceptance, E,TB, VU,NR by KL at 3/26/2023 1536   Family Acceptance, E,TB, VU,NR by KL at 3/26/2023 1536                   Point: Home exercise program (Done)     Learning Progress Summary           Patient Acceptance, E, VU,NR by NS at 4/11/2023 1611    Comment: benefits of OOB activity, ther ex, purpose of IP PT    Acceptance, E, VU,NR by ML at 4/7/2023 1151    Acceptance, E, VU by LH at 4/5/2023 1554    Acceptance, E, VU,NR by NS at 4/3/2023 0926    Acceptance, E, VU,NR by ML at 3/31/2023 1604    Acceptance, E, VU,NR by ML at 3/28/2023 1323    Acceptance, E,TB, VU,NR by KL at 3/26/2023 1536   Family Acceptance, E,TB, VU,NR by  at 3/26/2023 1536                   Point: Body mechanics (Done)     Learning Progress Summary           Patient Acceptance, E,TB, VU,NR by AY at 4/13/2023 1154    Acceptance, E, VU,NR by NS at 4/11/2023 1611    Comment: benefits of OOB activity, ther ex, purpose of IP PT    Acceptance, E, VU,NR by ML at 4/7/2023 1151    Acceptance, E, VU by LH at 4/5/2023 1554    Acceptance, E, VU,NR by NS at 4/3/2023 0926    Acceptance, E,TB, VU,NR by SELVIN at 3/26/2023 1536   Family Acceptance, E,TB, VU,NR by SELVNI at 3/26/2023 1536                   Point: Precautions (Done)     Learning Progress Summary           Patient Acceptance, E,TB, VU,NR by SHELDON at 4/13/2023 1154    Acceptance, E, VU,NR by NS at 4/11/2023 1611    Comment: benefits of OOB activity, ther ex, purpose of IP  PT    Acceptance, E, VU,NR by ML at 4/7/2023 1151    Acceptance, E, VU by LH at 4/5/2023 1554    Acceptance, E, VU,NR by NS at 4/3/2023 0926    Acceptance, E, VU,NR by ML at 3/31/2023 1604    Acceptance, E, VU,NR by ML at 3/28/2023 1323    Acceptance, E,TB, VU,NR by  at 3/26/2023 1536   Family Acceptance, E,TB, VU,NR by  at 3/26/2023 1536                               User Key     Initials Effective Dates Name Provider Type Discipline    KL 11/02/22 -  Gabino Tirado, PT Physical Therapist PT    NS 06/16/21 -  Juliet Kuhn, PT Physical Therapist PT    AY 11/10/20 -  Alie Trujillo, PT Physical Therapist PT     04/22/21 -  Sharon Heller Physical Therapist PT     09/21/21 -  Ernst Reid PT Physical Therapist PT              PT Recommendation and Plan  Planned Therapy Interventions (PT): balance training, bed mobility training, gait training, home exercise program, postural re-education, transfer training, patient/family education, strengthening  Plan of Care Reviewed With: patient  Progress: no change  Outcome Evaluation: PT re-eval completed. Pt limited by decreased functional endurance, generalized weakness (residual R weakness; LE>UE), balance deficit, and incisional pain compared to baseline. Pt required min A to stand and mod Ax2 to pivot to chair. Rec continued skilled PT to increase indep with mobility. d/c rec for SNF.     Time Calculation:    PT Charges     Row Name 04/13/23 1154             Time Calculation    Start Time 1004  -AY      PT Received On 04/13/23  -AY      PT Goal Re-Cert Due Date 04/23/23  -AY         Timed Charges    56276 - PT Therapeutic Activity Minutes 24  -AY         Total Minutes    Timed Charges Total Minutes 24  -AY       Total Minutes 24  -AY            User Key  (r) = Recorded By, (t) = Taken By, (c) = Cosigned By    Initials Name Provider Type    AY Alie Trujillo, PT Physical Therapist              Therapy Charges for Today     Code Description Service Date Service  Provider Modifiers Qty    95723465311 HC PT THERAPEUTIC ACT EA 15 MIN 4/13/2023 Alie Trujillo, PT GP 2    11098916561 HC PT RE-EVAL ESTABLISHED PLAN 2 4/13/2023 Alie Trujillo, PT GP 1          PT G-Codes  Outcome Measure Options: AM-PAC 6 Clicks Basic Mobility (PT)  AM-PAC 6 Clicks Score (PT): 14  AM-PAC 6 Clicks Score (OT): 16  PT Discharge Summary  Anticipated Discharge Disposition (PT): skilled nursing facility    Alie Trujillo PT  4/13/2023

## 2023-04-13 NOTE — PLAN OF CARE
Goal Outcome Evaluation:  Plan of Care Reviewed With: patient        Progress: no change  Outcome Evaluation: Pt presents w/ increased pain, decreased functional endurance, generalized weakness (R>L), and balance deficits. Pt would benefit from continued skilled IPOT services to address current functional deficits and facilitate return to PLOF. Rec SNF at d/c.

## 2023-04-14 ENCOUNTER — ANCILLARY PROCEDURE (OUTPATIENT)
Dept: SPEECH THERAPY | Facility: HOSPITAL | Age: 73
DRG: 163 | End: 2023-04-14
Payer: MEDICARE

## 2023-04-14 ENCOUNTER — APPOINTMENT (OUTPATIENT)
Dept: GENERAL RADIOLOGY | Facility: HOSPITAL | Age: 73
DRG: 163 | End: 2023-04-14
Payer: MEDICARE

## 2023-04-14 LAB
ANION GAP SERPL CALCULATED.3IONS-SCNC: 10 MMOL/L (ref 5–15)
BASOPHILS # BLD AUTO: 0.03 10*3/MM3 (ref 0–0.2)
BASOPHILS NFR BLD AUTO: 0.3 % (ref 0–1.5)
BUN SERPL-MCNC: 28 MG/DL (ref 8–23)
BUN/CREAT SERPL: 39.4 (ref 7–25)
CALCIUM SPEC-SCNC: 8.3 MG/DL (ref 8.6–10.5)
CHLORIDE SERPL-SCNC: 110 MMOL/L (ref 98–107)
CO2 SERPL-SCNC: 21 MMOL/L (ref 22–29)
CREAT SERPL-MCNC: 0.71 MG/DL (ref 0.76–1.27)
CYTO UR: NORMAL
DEPRECATED RDW RBC AUTO: 72.2 FL (ref 37–54)
EGFRCR SERPLBLD CKD-EPI 2021: 98.7 ML/MIN/1.73
EOSINOPHIL # BLD AUTO: 0.07 10*3/MM3 (ref 0–0.4)
EOSINOPHIL NFR BLD AUTO: 0.7 % (ref 0.3–6.2)
ERYTHROCYTE [DISTWIDTH] IN BLOOD BY AUTOMATED COUNT: 19.3 % (ref 12.3–15.4)
GLUCOSE BLDC GLUCOMTR-MCNC: 110 MG/DL (ref 70–130)
GLUCOSE BLDC GLUCOMTR-MCNC: 183 MG/DL (ref 70–130)
GLUCOSE BLDC GLUCOMTR-MCNC: 186 MG/DL (ref 70–130)
GLUCOSE BLDC GLUCOMTR-MCNC: 209 MG/DL (ref 70–130)
GLUCOSE SERPL-MCNC: 137 MG/DL (ref 65–99)
HCT VFR BLD AUTO: 23.9 % (ref 37.5–51)
HGB BLD-MCNC: 7.4 G/DL (ref 13–17.7)
IMM GRANULOCYTES # BLD AUTO: 0.15 10*3/MM3 (ref 0–0.05)
IMM GRANULOCYTES NFR BLD AUTO: 1.5 % (ref 0–0.5)
LAB AP CASE REPORT: NORMAL
LAB AP CLINICAL INFORMATION: NORMAL
LYMPHOCYTES # BLD AUTO: 0.81 10*3/MM3 (ref 0.7–3.1)
LYMPHOCYTES NFR BLD AUTO: 8.3 % (ref 19.6–45.3)
MAGNESIUM SERPL-MCNC: 1.5 MG/DL (ref 1.6–2.4)
MCH RBC QN AUTO: 31.6 PG (ref 26.6–33)
MCHC RBC AUTO-ENTMCNC: 31 G/DL (ref 31.5–35.7)
MCV RBC AUTO: 102.1 FL (ref 79–97)
MONOCYTES # BLD AUTO: 0.96 10*3/MM3 (ref 0.1–0.9)
MONOCYTES NFR BLD AUTO: 9.8 % (ref 5–12)
NEUTROPHILS NFR BLD AUTO: 7.73 10*3/MM3 (ref 1.7–7)
NEUTROPHILS NFR BLD AUTO: 79.4 % (ref 42.7–76)
NRBC BLD AUTO-RTO: 0 /100 WBC (ref 0–0.2)
PATH REPORT.FINAL DX SPEC: NORMAL
PATH REPORT.GROSS SPEC: NORMAL
PHOSPHATE SERPL-MCNC: 4.5 MG/DL (ref 2.5–4.5)
PLATELET # BLD AUTO: 226 10*3/MM3 (ref 140–450)
PMV BLD AUTO: 9.5 FL (ref 6–12)
POTASSIUM SERPL-SCNC: 4.1 MMOL/L (ref 3.5–5.2)
RBC # BLD AUTO: 2.34 10*6/MM3 (ref 4.14–5.8)
REF LAB TEST METHOD: NORMAL
SODIUM SERPL-SCNC: 141 MMOL/L (ref 136–145)
WBC NRBC COR # BLD: 9.75 10*3/MM3 (ref 3.4–10.8)

## 2023-04-14 PROCEDURE — 82962 GLUCOSE BLOOD TEST: CPT

## 2023-04-14 PROCEDURE — 71045 X-RAY EXAM CHEST 1 VIEW: CPT

## 2023-04-14 PROCEDURE — 92610 EVALUATE SWALLOWING FUNCTION: CPT

## 2023-04-14 PROCEDURE — 83735 ASSAY OF MAGNESIUM: CPT | Performed by: NURSE PRACTITIONER

## 2023-04-14 PROCEDURE — 99232 SBSQ HOSP IP/OBS MODERATE 35: CPT | Performed by: INTERNAL MEDICINE

## 2023-04-14 PROCEDURE — 84100 ASSAY OF PHOSPHORUS: CPT | Performed by: NURSE PRACTITIONER

## 2023-04-14 PROCEDURE — 63710000001 INSULIN DETEMIR PER 5 UNITS: Performed by: INTERNAL MEDICINE

## 2023-04-14 PROCEDURE — 25010000002 MAGNESIUM SULFATE 2 GM/50ML SOLUTION: Performed by: NURSE PRACTITIONER

## 2023-04-14 PROCEDURE — 63710000001 INSULIN LISPRO (HUMAN) PER 5 UNITS: Performed by: STUDENT IN AN ORGANIZED HEALTH CARE EDUCATION/TRAINING PROGRAM

## 2023-04-14 PROCEDURE — 99024 POSTOP FOLLOW-UP VISIT: CPT

## 2023-04-14 PROCEDURE — 92612 ENDOSCOPY SWALLOW (FEES) VID: CPT

## 2023-04-14 PROCEDURE — 80048 BASIC METABOLIC PNL TOTAL CA: CPT | Performed by: NURSE PRACTITIONER

## 2023-04-14 PROCEDURE — 25010000002 KETOROLAC TROMETHAMINE PER 15 MG: Performed by: THORACIC SURGERY (CARDIOTHORACIC VASCULAR SURGERY)

## 2023-04-14 PROCEDURE — 85025 COMPLETE CBC W/AUTO DIFF WBC: CPT | Performed by: NURSE PRACTITIONER

## 2023-04-14 RX ORDER — MAGNESIUM SULFATE HEPTAHYDRATE 40 MG/ML
2 INJECTION, SOLUTION INTRAVENOUS
Status: COMPLETED | OUTPATIENT
Start: 2023-04-14 | End: 2023-04-14

## 2023-04-14 RX ADMIN — OXYCODONE HYDROCHLORIDE 5 MG: 5 TABLET ORAL at 10:34

## 2023-04-14 RX ADMIN — ACETAMINOPHEN 1000 MG: 500 TABLET ORAL at 15:36

## 2023-04-14 RX ADMIN — CARVEDILOL 50 MG: 12.5 TABLET, FILM COATED ORAL at 17:41

## 2023-04-14 RX ADMIN — MIRTAZAPINE 15 MG: 15 TABLET, FILM COATED ORAL at 20:14

## 2023-04-14 RX ADMIN — INSULIN LISPRO 2 UNITS: 100 INJECTION, SOLUTION INTRAVENOUS; SUBCUTANEOUS at 17:41

## 2023-04-14 RX ADMIN — KETOROLAC TROMETHAMINE 15 MG: 30 INJECTION, SOLUTION INTRAMUSCULAR; INTRAVENOUS at 01:51

## 2023-04-14 RX ADMIN — Medication 10 ML: at 08:58

## 2023-04-14 RX ADMIN — ATORVASTATIN CALCIUM 80 MG: 40 TABLET, FILM COATED ORAL at 20:14

## 2023-04-14 RX ADMIN — ASPIRIN 81 MG CHEWABLE TABLET 81 MG: 81 TABLET CHEWABLE at 08:56

## 2023-04-14 RX ADMIN — ACETAMINOPHEN 1000 MG: 500 TABLET ORAL at 05:20

## 2023-04-14 RX ADMIN — MAGNESIUM SULFATE HEPTAHYDRATE 2 G: 2 INJECTION, SOLUTION INTRAVENOUS at 10:37

## 2023-04-14 RX ADMIN — OXYCODONE HYDROCHLORIDE AND ACETAMINOPHEN 1000 MG: 500 TABLET ORAL at 08:56

## 2023-04-14 RX ADMIN — INSULIN DETEMIR 10 UNITS: 100 INJECTION, SOLUTION SUBCUTANEOUS at 08:58

## 2023-04-14 RX ADMIN — GABAPENTIN 100 MG: 100 CAPSULE ORAL at 08:56

## 2023-04-14 RX ADMIN — OXYCODONE HYDROCHLORIDE 5 MG: 5 TABLET ORAL at 15:36

## 2023-04-14 RX ADMIN — SENNOSIDES AND DOCUSATE SODIUM 2 TABLET: 8.6; 5 TABLET ORAL at 08:57

## 2023-04-14 RX ADMIN — MAGNESIUM SULFATE HEPTAHYDRATE 2 G: 2 INJECTION, SOLUTION INTRAVENOUS at 08:55

## 2023-04-14 RX ADMIN — GABAPENTIN 100 MG: 100 CAPSULE ORAL at 17:41

## 2023-04-14 RX ADMIN — TERAZOSIN HYDROCHLORIDE 1 MG: 1 CAPSULE ORAL at 20:14

## 2023-04-14 RX ADMIN — GABAPENTIN 100 MG: 100 CAPSULE ORAL at 20:14

## 2023-04-14 RX ADMIN — PANTOPRAZOLE SODIUM 40 MG: 40 TABLET, DELAYED RELEASE ORAL at 20:14

## 2023-04-14 RX ADMIN — CARVEDILOL 50 MG: 12.5 TABLET, FILM COATED ORAL at 08:56

## 2023-04-14 RX ADMIN — DOCUSATE SODIUM 100 MG: 100 CAPSULE, LIQUID FILLED ORAL at 20:14

## 2023-04-14 RX ADMIN — MAGNESIUM SULFATE HEPTAHYDRATE 2 G: 2 INJECTION, SOLUTION INTRAVENOUS at 05:20

## 2023-04-14 RX ADMIN — OXYCODONE HYDROCHLORIDE 5 MG: 5 TABLET ORAL at 01:50

## 2023-04-14 RX ADMIN — MEGESTROL ACETATE 400 MG: 40 SUSPENSION ORAL at 09:00

## 2023-04-14 RX ADMIN — FAMOTIDINE 20 MG: 20 TABLET ORAL at 08:57

## 2023-04-14 NOTE — PROGRESS NOTES
Intensive Care Follow-up     Hospital:  LOS: 21 days   Mr. Timmy Guajardo, 72 y.o. male is followed for:   Bilateral pulmonary embolism            History of present illness:   Timmy Guajardo is a 72 y.o. male with PMH DVT / Bilateral PE on Eliquis, HTN, HLP, HFrEF, CVA with right sided weakness residual, T2DM and GERD who was admitted 3/24/23 to the Hospitalist service for progressive weakness, functional decline and unintentional weight loss.  CTA c/a/p revealed bilateral pulmonary emboli, partially loculated moderate right pleural effusion, emphysema, moderate colonic stool burden with mild inspissated rectal stool burden.  Eliquis was held and he was placed on a heparin drip.  He had a right-sided chest tube placed in IR 3/27/23.  Pleural fluid studies c/w exudative effusion. Small bore CT was discontinued 3/30/23.  Rt large bore chest tube was placed by CTS 4/4/23.  Follow up CT chest revealed persistent right loculated pleural effusion with some increased hemorrhage/clot and right apical nodule vs scarring.  He had some bleeding from and around chest tube site and heparin was held 4/6/23. Large clot was extracted from the tubing by stripping the tubes, silver nitrate sticks were applied and stitch was placed around chest tube with improvement.  Heparin was restarted.  He was scheduled for Rt VATS and decortication today.  He was transferred to the ICU post operatively.       In October 2022, he had a fall with fracture and was sent to [facility] for rehab.  His Elquis was decreased to 2.5 mg BID at that time for unknown reason.  He also contracted COVID while there.  Since then, he has had decreased appetite, worsening weakness and 52 lbs unintentional weight loss.  On this admission, CT a/p were negative for malignancy.  Oncology recommended age-appropriate screening.  He was evaluated by GI and underwent upper and lower endoscopy.  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  Colonoscopy was  unremarkable.  Weight loss felt to be due to anorexia.  He was placed on Megace and Mirtazapine.        Subjective   Interval History:  Patient doing well this morning, denies any chest pain, nausea, fever, or chills.  No other acute complaints.             The patient's past medical, surgical and social history were reviewed and updated in Epic as appropriate.       Objective     Infusions:     Medications:  acetaminophen, 1,000 mg, Oral, Q8H  vitamin C, 1,000 mg, Oral, Daily  aspirin, 81 mg, Oral, Daily  atorvastatin, 80 mg, Oral, Nightly  carvedilol, 50 mg, Oral, BID With Meals  docusate sodium, 100 mg, Oral, BID  famotidine, 20 mg, Oral, Daily  gabapentin, 100 mg, Oral, TID  insulin detemir, 10 Units, Subcutaneous, Daily  insulin lispro, 0-7 Units, Subcutaneous, TID With Meals  magnesium sulfate, 2 g, Intravenous, Q2H  megestrol, 400 mg, Oral, Daily  mirtazapine, 15 mg, Oral, Nightly  pantoprazole, 40 mg, Oral, Nightly  polyethylene glycol, 17 g, Oral, Daily  senna-docusate sodium, 2 tablet, Oral, BID  sodium chloride, 10 mL, Intravenous, Q12H  terazosin, 1 mg, Oral, Nightly      I reviewed the patient's medications.    Vital Sign Min/Max for last 24 hours  Temp  Min: 98 °F (36.7 °C)  Max: 99.2 °F (37.3 °C)   BP  Min: 92/52  Max: 162/77   Pulse  Min: 78  Max: 103   Resp  Min: 16  Max: 22   SpO2  Min: 88 %  Max: 96 %   Flow (L/min)  Min: 1  Max: 1       Input/Output for last 24 hour shift  04/13 0701 - 04/14 0700  In: 1760 [P.O.:1250; I.V.:510]  Out: 445 [Urine:200]      GENERAL : NAD, conversant  RESPIRATORY/THORAX : normal respiratory effort and no intercostal retractions, Coarse crackles bilaterally  CARDIOVASCULAR : Normal S1/S2, RRR. 1+ lower ext edema.  GASTROINTESTINAL : Soft, NT/ND. BS x 4 normoactive. No hepatosplenomegaly.  MUSCULOSKELETAL : No cyanosis, clubbing, or ischemia  NEUROLOGICAL: alert and oriented to person, place and time  PSYCHOLOGICAL : Appropriate affect    Results from last 7 days   Lab  Units 04/14/23  0423 04/13/23  0410 04/12/23  0345   WBC 10*3/mm3 9.75 12.49* 8.42   HEMOGLOBIN g/dL 7.4* 8.2* 8.7*   PLATELETS 10*3/mm3 226 284 266     Results from last 7 days   Lab Units 04/14/23  0423 04/13/23  0410 04/12/23  0345   SODIUM mmol/L 141 139 140   POTASSIUM mmol/L 4.1 5.1 4.4   CO2 mmol/L 21.0* 20.0* 22.0   BUN mg/dL 28* 23 17   CREATININE mg/dL 0.71* 0.75* 0.72*   MAGNESIUM mg/dL 1.5*  --   --    PHOSPHORUS mg/dL 4.5  --   --    GLUCOSE mg/dL 137* 245* 242*     Estimated Creatinine Clearance: 82.6 mL/min (A) (by C-G formula based on SCr of 0.71 mg/dL (L)).          I reviewed the patient's new clinical results.  I reviewed the patient's new imaging results/reports including actual images and agree with reports.       Imaging Results (Last 24 Hours)     Procedure Component Value Units Date/Time    XR Chest 1 View [[id number]] Collected: 04/14/23 0709     Updated: 04/14/23 0713    Narrative:      XR CHEST 1 VW    Date of Exam: 4/14/2023 3:18 AM EDT    Indication: Pleural effusion.    Comparison: AP portable chest 4/13/2023.    Findings:  2 right chest tubes appear unchanged in position. Tiny right apical pneumothorax measures about 3 mm thickness, and has improved since the prior study. There is some patchy alveolar disease in the right mid to lower lung zone which appears slightly   increased. Left lung remains clear. The heart size is normal. Pacemaker and leads appear unchanged.      Impression:      Impression:    1. Small right apical pneumothorax appears improved since the prior study.  2. Increased right mid and lower lung zone opacities favored to represent increased atelectasis. Pneumonia not excluded.    Electronically Signed: Florence Bowen    4/14/2023 7:10 AM EDT    Workstation ID: [id number]          Assessment & Plan   Impression        Bilateral pulmonary embolism    HFrEF    HTN    Presence of cardiac pacemaker    Right exudative pleural effusion status post decortication 4/12/2023     Unintentional weight loss    Hypoalbuminemia    Severe malnutrition    History of CVA (cerebrovascular accident)    Debility    GERD (gastroesophageal reflux disease)    Hyperlipidemia    Rt Apical Lung Nodule vs Scarring (needs follow up)       Plan        Timmy Guajardo is a 72 y.o. male with PMH DVT / Bilateral PE on Eliquis, HTN, HLP, HFrEF, CVA with right sided weakness residual, T2DM and GERD who was admitted 3/24/23 to the Hospitalist service for progressive weakness, functional decline and unintentional weight loss.  CTA c/a/p revealed bilateral pulmonary emboli, partially loculated moderate right pleural effusion, emphysema, moderate colonic stool burden with mild inspissated rectal stool burden. He had a right-sided chest tube placed in IR 3/27/23.  Pleural fluid studies c/w exudative effusion. Small bore CT was discontinued 3/30/23.  Rt large bore chest tube was placed by CTS 4/4/23.  Follow up CT chest revealed persistent right loculated pleural effusion with some increased hemorrhage/clot and right apical nodule vs scarring.  He was admitted to the ICU on 4/12/2023 status post right VATS and decortication by Dr. Mahan.     • Postop orders per surgery  • Chest tube per CT surgery  • Follow-up final pathology  • Holding heparin, will restart based on CT surgery recommendations  • Trend hemoglobin  • Adjust insulin for blood sugar control, goal blood glucose less than 180  • Holdin lisinopril, patient slightly hypotensive per surgery  • Hold parameters placed on Coreg for heart rate less than 60 or systolic blood pressure less than 100  • Continue statin for hyperlipidemia  • Ensure adequate pain control  • Mobilize patient per protocol  • Aggressive pulmonary toilet wean oxygen to saturation greater than 88%  • Bowel regimen  • SCDs for DVT prophylaxis until chest tube is removed       I conducted multidisciplinary rounds in the plan of care was discussed with the multidisciplinary team at that time. In  attendance at multidisciplinary rounds was clinical pharmacist, dietitian, nursing staff, and case management.    I discussed the patient's findings and my recommendations with patient and nursing staff       Loyd Garcia,   Pulmonary, Critical care and Sleep Medicine

## 2023-04-14 NOTE — THERAPY EVALUATION
Acute Care - Speech Language Pathology   Swallow Initial Evaluation [facility]   Clinical Swallow Evaluation     Patient Name: Timmy Guajardo  : 1950  MRN: [id number]  Today's Date: 2023               Admit Date: 3/24/2023    Visit Dx:     ICD-10-CM ICD-9-CM   1. Weight loss  R63.4 783.21   2. Unintentional weight loss  R63.4 783.21   3. 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 Pleural effusion  J90 511.9   5. Shortness of breath  R06.02 786.05     Patient Active Problem List   Diagnosis   • Acute right-sided weakness   • Suspected cerebrovascular accident (CVA)   • Leukocytosis   • HFrEF   • Thrombocytopenia   • HTN   • Presence of cardiac pacemaker   • Hyperglycemia   • Gangrene of toe of left foot   • Closed displaced intertrochanteric fracture of right femur, initial encounter   • Right exudative pleural effusion status post decortication 2023   • Unintentional weight loss   • Hypoalbuminemia   • Severe malnutrition   • Bilateral pulmonary embolism   • History of CVA (cerebrovascular accident)   • Debility   • GERD (gastroesophageal reflux disease)   • Hyperlipidemia   • Rt Apical Lung Nodule vs Scarring (needs follow up)     Past Medical History:   Diagnosis Date   • Cardiomyopathy    • CHF (congestive heart failure)    • Coronary artery disease    • Diabetes mellitus    • GERD (gastroesophageal reflux disease)    • HTN (hypertension)    • Stroke     Right sided weakness   • Stroke      Past Surgical History:   Procedure Laterality Date   • AMPUTATION DIGIT Left 2021    Procedure: AMPUTATION DIGIT - GREAT TOE AMPUTATION LEFT;  Surgeon: Dario Marmolejo MD;  [facility];  Service: General;  Laterality: Left;   • AORTAGRAM N/A 2021    Procedure: AORTAGRAM WITH RUNOFFS, LEFT SFA BALLOON ANGIOPLASTY;  Surgeon: Tim Mahan MD;  Location: [facility];  Service: Vascular;  Laterality: N/A;  FL TIME 6 MIN 54 SECS  82 MGY  CONTRAST VISIPAQUE 100ML     •  CARDIAC CATHETERIZATION     • CARDIAC PACEMAKER PLACEMENT      pacemaker/defibrillator, [employer]   • COLONOSCOPY N/A 3/31/2023    Procedure: COLONOSCOPY;  Surgeon: Brunner, Mark I, MD;  Location:  [facility];  Service: Gastroenterology;  Laterality: N/A;   • ENDOSCOPY N/A 3/29/2023    Procedure: ESOPHAGOGASTRODUODENOSCOPY;  Surgeon: Brunner, Mark I, MD;  Location:  [facility];  Service: Gastroenterology;  Laterality: N/A;   • HERNIA REPAIR Bilateral     Inguinal hernia   • HIP TROCHANTERIC NAILING WITH INTRAMEDULLARY HIP SCREW Right 10/18/2022    Procedure: HIP TROCHANTERIC NAILING WITH INTRAMEDULLARY HIP SCREW RIGHT;  Surgeon: Bj Steinberg MD;  Location:  [facility];  Service: Orthopedics;  Laterality: Right;   • THORACOSCOPY VIDEO ASSISTED WITH LOBECTOMY Right 4/12/2023    Procedure: BRONCHOSCOPY, THORACOSCOPY VIDEO ASSISTED WITH DECORTICATION, MECHANICAL PLEURODESIS;  Surgeon: Tim Mahan MD;  Location:  [facility];  Service: Cardiothoracic;  Laterality: Right;       SLP Recommendation and Plan  SLP Swallowing Diagnosis: suspected pharyngeal dysphagia (04/14/23 1100)  SLP Diet Recommendation: other (see comments) (continue regular diet as ordered. Pt declined any modification prior to FEES) (04/14/23 1100)  Recommended Precautions and Strategies: general aspiration precautions (04/14/23 1100)  SLP Rec. for Method of Medication Administration: as tolerated (04/14/23 1100)        Recommended Diagnostics: FEES, other (see comments) (to follow this PM) (04/14/23 1100)                Plan of Care Reviewed With: patient      SWALLOW EVALUATION (last 72 hours)     SLP Adult Swallow Evaluation     Row Name 04/14/23 1100          Document Type evaluation  -SM    Subjective Information no complaints  -SM    Patient Observations alert;cooperative  -SM          Patient Profile Reviewed yes  -SM    Pertinent History Of Current Problem Adm with nausea, decreased appetite, no BM x8 days, weight loss.  Functional decline since October, Covid. Hx CVA with R azul. EGD 3/29 with Meyers's esophagus, PPI indefinitiely. Colonoscopy with diverticulosis. B PE. R VATS decortication 4/13. Consulted as coughing with intake.  -    Current Method of Nutrition regular textures;thin liquids  -    Plans/Goals Discussed with patient;agreed upon  -    Barriers to Rehab none identified  -    Patient's Goals for Discharge return home  -          Pain: FACES Scale, Pretreatment 2-->hurts little bit  -SM    Posttreatment Pain Rating 2-->hurts little bit  -SM    Pain Location - Side/Orientation Right  -    Pain Location incisional  -    Pre/Posttreatment Pain Comment R cheest tube site  -          Oral Motor General Assessment WFL  -SM          Oral Prep Phase WFL  -SM    Oral Transit WFL  -SM    Oral Residue WFL  -SM    Pharyngeal Phase suspected pharyngeal impairment  -          Pharyngeal Phase Concerns cough  -    Cough thin  -    Pharyngeal Phase Concerns, Comment Wet cough baseline, pt reports takes him awhile to clear though eventually does. Increased cough with liquids though pt denies correlation and dysphagia. With further discussion and education, pt agreeable to FEES to further assess. Declines any diet adjustment prior to FEES.  -          SLP Swallowing Diagnosis suspected pharyngeal dysphagia  -    Functional Impact risk of aspiration/pneumonia  -          SLP Diet Recommendation other (see comments)  continue regular diet as ordered. Pt declined any modification prior to FEES  -    Recommended Diagnostics FEES;other (see comments)  to follow this PM  -    Recommended Precautions and Strategies general aspiration precautions  -    Oral Care Recommendations Oral Care BID/PRN;Toothbrush  -    SLP Rec. for Method of Medication Administration as tolerated  -          User Key  (r) = Recorded By, (t) = Taken By, (c) = Cosigned By    Initials Name Effective Dates     Dionne Trevino  MS NISSA CCC-SLP 02/03/23 -                 EDUCATION  The patient has been educated in the following areas:   Dysphagia (Swallowing Impairment) Oral Care/Hydration Modified Diet Instruction.              Time Calculation:    Time Calculation- SLP     Row Name 04/14/23 1136             Time Calculation- SLP    SLP Start Time 1100  -SM      SLP Received On 04/14/23  -         Untimed Charges    32674-BP Eval Oral Pharyng Swallow Minutes 40  -SM         Total Minutes    Untimed Charges Total Minutes 40  -SM       Total Minutes 40  -SM            User Key  (r) = Recorded By, (t) = Taken By, (c) = Cosigned By    Initials Name Provider Type    Dionne Pimentel MS CCC-SLP Speech and Language Pathologist                Therapy Charges for Today     Code Description Service Date Service Provider Modifiers Qty    [id number]  ST EVAL ORAL PHARYNG SWALLOW 3 4/14/2023 Dionne Trevino MS CCC-SLP GN 1               Dionne Trevino MS CCC-SLP  4/14/2023

## 2023-04-14 NOTE — PLAN OF CARE
Pt VSS on RA. A/O x3  - Pt's perception of situation is altered, reoriented by staff. Pt attempts to refuse activities that promote improvement in health status. Reeducated by staff. Up w/ 2 assist & walker to chair, in chair most of shift. Coughing/ deep breathing, IS use education and pt able to demonstrate understanding. Scheduled and PRN pain meds given. UOP - 650 + 1 occ. BM - none. Mag replaced. Niece and brother updated at bedside.         Goal Outcome Evaluation:  Plan of Care Reviewed With: patient, family        Progress: improving

## 2023-04-14 NOTE — PROGRESS NOTES
Cardiothoracic Surgery Progress Note      POD # 2 s/p Right VATS decortication     LOS: 21 days      Subjective:  No complaints, eating breakfast. On room air saturations 93%.     Objective:  Vital Signs  Temp:  [98 °F (36.7 °C)-99 °F (37.2 °C)] 98.5 °F (36.9 °C)  Heart Rate:  [75-94] 82  Resp:  [16-22] 16  BP: ()/(47-85) 118/57    Physical Exam:   General Appearance: alert, appears stated age and cooperative   Lungs: clear to auscultation, respirations regular, respirations even and respirations unlabored   Heart: regular rhythm & normal rate, normal S1, S2 and no murmur, no gallop, no rub   Skin: Incision c/d/i   CT: No air leak, 255 cc/24 hrs  Results:    Results from last 7 days   Lab Units 04/14/23  0423   WBC 10*3/mm3 9.75   HEMOGLOBIN g/dL 7.4*   HEMATOCRIT % 23.9*   PLATELETS 10*3/mm3 226     Results from last 7 days   Lab Units 04/14/23  0423   SODIUM mmol/L 141   POTASSIUM mmol/L 4.1   CHLORIDE mmol/L 110*   CO2 mmol/L 21.0*   BUN mg/dL 28*   CREATININE mg/dL 0.71*   GLUCOSE mg/dL 137*   CALCIUM mg/dL 8.3*       Assessment:  POD # 2 s/p Right VATS decortication    Plan:  Serial H&H's-would recommend to hold on heparin gtt  Continue chest tubes to suction  Daily CXR  PT/OT  Pulmonary toilet  Await pathology and cultures    Alexandra Azul, APRN  04/14/23  07:15 EDT

## 2023-04-14 NOTE — PLAN OF CARE
Goal Outcome Evaluation:   Pt c/o pain that is relieved with PRNs. CTs remain in place with minimal drainage. VSS on room air. Pt refusing to do IS, educated on importance and use. Pt only had 1 unmeasured urine, 1 large BM. Mg being replaced this AM.

## 2023-04-14 NOTE — PLAN OF CARE
Goal Outcome Evaluation:  Plan of Care Reviewed With: patient            SLP dysphagia evaluation completed. Reluctantly agreeable to FEES though conveyed he will not consider diet modification. Please see note for further details and recommendations.

## 2023-04-14 NOTE — MBS/VFSS/FEES
Acute Care - Speech Language Pathology   Swallow Initial Evaluation  [facility]   Fiberoptic Endoscopic Evaluation of Swallowing (FEES)     Patient Name: Timmy Guajardo  : 1950  MRN: [id number]  Today's Date: 2023               Admit Date: 3/24/2023    Visit Dx:     ICD-10-CM ICD-9-CM   1. Weight loss  R63.4 783.21   2. Unintentional weight loss  R63.4 783.21   3. 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 Pleural effusion  J90 511.9   5. Shortness of breath  R06.02 786.05     Patient Active Problem List   Diagnosis   • Acute right-sided weakness   • Suspected cerebrovascular accident (CVA)   • Leukocytosis   • HFrEF   • Thrombocytopenia   • HTN   • Presence of cardiac pacemaker   • Hyperglycemia   • Gangrene of toe of left foot   • Closed displaced intertrochanteric fracture of right femur, initial encounter   • Right exudative pleural effusion status post decortication 2023   • Unintentional weight loss   • Hypoalbuminemia   • Severe malnutrition   • Bilateral pulmonary embolism   • History of CVA (cerebrovascular accident)   • Debility   • GERD (gastroesophageal reflux disease)   • Hyperlipidemia   • Rt Apical Lung Nodule vs Scarring (needs follow up)     Past Medical History:   Diagnosis Date   • Cardiomyopathy    • CHF (congestive heart failure)    • Coronary artery disease    • Diabetes mellitus    • GERD (gastroesophageal reflux disease)    • HTN (hypertension)    • Stroke     Right sided weakness   • Stroke      Past Surgical History:   Procedure Laterality Date   • AMPUTATION DIGIT Left 2021    Procedure: AMPUTATION DIGIT - GREAT TOE AMPUTATION LEFT;  Surgeon: Dario Marmolejo MD;  Location: [facility] OR;  Service: General;  Laterality: Left;   • AORTAGRAM N/A 2021    Procedure: AORTAGRAM WITH RUNOFFS, LEFT SFA BALLOON ANGIOPLASTY;  Surgeon: Tim Mahan MD;  Location: [facility];  Service: Vascular;  Laterality: N/A;  FL TIME 6 MIN 54 SECS  82  MGY  CONTRAST VISIPAQUE 100ML     • CARDIAC CATHETERIZATION     • CARDIAC PACEMAKER PLACEMENT      pacemaker/defibrillator, [employer]   • COLONOSCOPY N/A 3/31/2023    Procedure: COLONOSCOPY;  Surgeon: Brunner, Mark I, MD;  Location:  [facility];  Service: Gastroenterology;  Laterality: N/A;   • ENDOSCOPY N/A 3/29/2023    Procedure: ESOPHAGOGASTRODUODENOSCOPY;  Surgeon: Brunner, Mark I, MD;  Location:  [facility];  Service: Gastroenterology;  Laterality: N/A;   • HERNIA REPAIR Bilateral     Inguinal hernia   • HIP TROCHANTERIC NAILING WITH INTRAMEDULLARY HIP SCREW Right 10/18/2022    Procedure: HIP TROCHANTERIC NAILING WITH INTRAMEDULLARY HIP SCREW RIGHT;  Surgeon: Bj Steinberg MD;  Location:  [facility];  Service: Orthopedics;  Laterality: Right;   • THORACOSCOPY VIDEO ASSISTED WITH LOBECTOMY Right 4/12/2023    Procedure: BRONCHOSCOPY, THORACOSCOPY VIDEO ASSISTED WITH DECORTICATION, MECHANICAL PLEURODESIS;  Surgeon: Tim Mahan MD;  Location:  [facility];  Service: Cardiothoracic;  Laterality: Right;       SLP Recommendation and Plan  SLP Swallowing Diagnosis: functional oral phase, functional pharyngeal phase (04/14/23 1400)  SLP Diet Recommendation: regular textures, thin liquids (04/14/23 1400)  Recommended Precautions and Strategies: general aspiration precautions, fatigue precautions, other (see comments) (until returns to baseline strength) (04/14/23 1400)  SLP Rec. for Method of Medication Administration: as tolerated (04/14/23 1400)        Recommended Diagnostics: FEES, other (see comments) (to follow this PM) (04/14/23 1100)  Swallow Criteria for Skilled Therapeutic Interventions Met: no problems identified which require skilled intervention (04/14/23 1400)        Therapy Frequency (Swallow): evaluation only (04/14/23 1400)                                           Plan of Care Reviewed With: patient, other (see comments) (niece)      SWALLOW EVALUATION (last 72 hours)     SLP Adult  Swallow Evaluation     Row Name 04/14/23 1400 04/14/23 1100                Rehab Evaluation    Document Type evaluation  - evaluation  -       Subjective Information -- no complaints  -       Patient Observations alert;cooperative  - alert;cooperative  -          General Information    Patient Profile Reviewed -- yes  -SM       Pertinent History Of Current Problem -- Adm with nausea, decreased appetite, no BM x8 days, weight loss. Functional decline since October, Covid. Hx CVA with R azul. EGD 3/29 with Meyers's esophagus, PPI indefinitiely. Colonoscopy with diverticulosis. B PE. R VATS decortication 4/13. Consulted as coughing with intake.  -       Current Method of Nutrition regular textures;thin liquids  though RN held lunch tray, pending FEES results  - regular textures;thin liquids  -       Plans/Goals Discussed with patient;agreed upon  -SM patient;agreed upon  -       Barriers to Rehab none identified  -SM none identified  -       Patient's Goals for Discharge return home  - return home  -          Pain Scale: FACES Pre/Post-Treatment    Pain: FACES Scale, Pretreatment 2-->hurts little bit  -SM 2-->hurts little bit  -SM       Posttreatment Pain Rating 2-->hurts little bit  -SM 2-->hurts little bit  -SM       Pain Location - Side/Orientation Right  - Right  -       Pain Location incisional  - incisional  -       Pre/Posttreatment Pain Comment R chest tube site  -SM R cheest tube site  -SM          Oral Musculature and Cranial Nerve Assessment    Oral Motor General Assessment -- WFL  -SM          Clinical Swallow Eval    Oral Prep Phase -- WFL  -SM       Oral Transit -- WFL  -SM       Oral Residue -- WFL  -SM       Pharyngeal Phase -- suspected pharyngeal impairment  -          Pharyngeal Phase Concerns    Pharyngeal Phase Concerns -- cough  -SM       Cough -- thin  -SM       Pharyngeal Phase Concerns, Comment -- Wet cough baseline, pt reports takes him awhile to clear though  eventually does. Increased cough with liquids though pt denies correlation and dysphagia. With further discussion and education, pt agreeable to FEES to further assess. Declines any diet adjustment prior to FEES.  -SM          Fiberoptic Endoscopic Evaluation of Swallowing (FEES)    Risks/Benefits Reviewed risks/benefits explained;patient;family;agreed to eval;other (see comments)  pt's niece present  -SM --       Nasal Entry left:  -SM --       Scope serial number/identification [id number]  - --          Anatomy and Physiology    Velopharyngeal WFL  -SM --       Epiglottis WFL  -SM --       Laryngeal Function Breathing symmetrical  -SM --       Laryngeal Function Phonation symmetrical  -SM --       Laryngeal Function to Breath Hold TVF/FVF/Arytenoid;sustained closure  -SM --       Secretion Rating Scale (Marbin et al. 1996) 0- normal, no visible secretions  -SM --       Secretion Description thin;clear  -SM --       Ice Chips DNA  -SM --       Spontaneous Swallow frequency adequate  -SM --       Sensory sensed scope  -SM --       Utensils Used Spoon;Cup;Straw  -SM --       Consistencies Trialed thin liquids;pudding/puree;regular textures  -SM --          FEES Interpretation    Oral Phase WFL;other (see comments)  partially edentulous, prolonged manipulation  -SM --          Initiation of Pharyngeal Swallow    Pharyngeal Phase functional pharyngeal phase of swallow  - --       Pharyngeal Phase, Comment No penetration or aspiration with any consistency. Mild, insignificant pharyngeal residue with liquids and solids, which pt spontaneously cleared with subsequent swallow. Given overall increased weakness, would benefit from general aspiration precaution until returns to baseline strength.  -SM --          SLP Evaluation Clinical Impression    SLP Swallowing Diagnosis functional oral phase;functional pharyngeal phase  - suspected pharyngeal dysphagia  -       Functional Impact -- risk of aspiration/pneumonia  -        Swallow Criteria for Skilled Therapeutic Interventions Met no problems identified which require skilled intervention  - --          Recommendations    Therapy Frequency (Swallow) evaluation only  - --       SLP Diet Recommendation regular textures;thin liquids  - other (see comments)  continue regular diet as ordered. Pt declined any modification prior to FEES  -       Recommended Diagnostics -- FEES;other (see comments)  to follow this PM  -       Recommended Precautions and Strategies general aspiration precautions;fatigue precautions;other (see comments)  until returns to baseline strength  - general aspiration precautions  -       Oral Care Recommendations Oral Care BID/PRN;Toothbrush  - Oral Care BID/PRN;Toothbrush  -SM       SLP Rec. for Method of Medication Administration as tolerated  - as tolerated  -             User Key  (r) = Recorded By, (t) = Taken By, (c) = Cosigned By    Initials Name Effective Dates    Dionne Pimentel MS CCC-SLP 02/03/23 -                 EDUCATION  The patient has been educated in the following areas:   Dysphagia (Swallowing Impairment) Modified Diet Instruction.              Time Calculation:    Time Calculation- SLP     Row Name 04/14/23 1504 04/14/23 1136          Time Calculation- SLP    SLP Start Time 1400  -SM 1100  -SM     SLP Received On 04/14/23  -SM 04/14/23  -        Untimed Charges    49572-GI Eval Oral Pharyng Swallow Minutes -- 40  -SM     64430-RI Fiberoptic Endo Eval Swallow Minutes 72  -SM --        Total Minutes    Untimed Charges Total Minutes 72  -SM 40  -SM      Total Minutes 72  -SM 40  -SM           User Key  (r) = Recorded By, (t) = Taken By, (c) = Cosigned By    Initials Name Provider Type    Dionne Pimentel MS CCC-SLP Speech and Language Pathologist                Therapy Charges for Today     Code Description Service Date Service Provider Modifiers Qty    [id number] HC ST EVAL ORAL PHARYNG SWALLOW 3 4/14/2023  Dionne Trevino, MS CCC-SLP GN 1    [id number]  ST FIBEROPTIC ENDO EVAL SWALL 5 4/14/2023 Dionne Trevino, MS [facility]-SLP GN 1               Dionne Trevino, MS CCC-SLP  4/14/2023

## 2023-04-14 NOTE — CASE MANAGEMENT/SOCIAL WORK
Continued Stay Note  [facility]     Patient Name: Timmy Guajardo  MRN: [id number]  Today's Date: 4/14/2023    Admit Date: 3/24/2023    Plan: [facility]   Discharge Plan     Row Name 04/14/23 0959       Plan    Plan [facility]    Patient/Family in Agreement with Plan yes    Plan Comments Discussed in MDR. Plan is [facility]. [place] is following. PT/OT will be ordered today. CM will continue to follow.    Final Discharge Disposition Code 03 - skilled nursing facility (SNF)               Discharge Codes    No documentation.               Expected Discharge Date and Time     Expected Discharge Date Expected Discharge Time    Apr 18, 2023             Bandar Forrest RN

## 2023-04-14 NOTE — PLAN OF CARE
Goal Outcome Evaluation:  Plan of Care Reviewed With: patient, other (see comments) (rhonda)            SLP dysphagia evaluation with FEES completed. No aspiration. Safe with regular diet. General aspiration precautions until returns to baseline strength. Please see note for further details and recommendations. SLP signing off. Please reconsult if any change or further need. Thanks.

## 2023-04-15 ENCOUNTER — APPOINTMENT (OUTPATIENT)
Dept: GENERAL RADIOLOGY | Facility: HOSPITAL | Age: 73
DRG: 163 | End: 2023-04-15
Payer: MEDICARE

## 2023-04-15 LAB
ANION GAP SERPL CALCULATED.3IONS-SCNC: 11 MMOL/L (ref 5–15)
ANISOCYTOSIS BLD QL: NORMAL
BACTERIA FLD CULT: NORMAL
BACTERIA SPEC AEROBE CULT: NORMAL
BASOPHILS # BLD AUTO: 0.03 10*3/MM3 (ref 0–0.2)
BASOPHILS NFR BLD AUTO: 0.3 % (ref 0–1.5)
BH BB BLOOD EXPIRATION DATE: NORMAL
BH BB BLOOD EXPIRATION DATE: NORMAL
BH BB BLOOD TYPE BARCODE: 5100
BH BB BLOOD TYPE BARCODE: 5100
BH BB DISPENSE STATUS: NORMAL
BH BB DISPENSE STATUS: NORMAL
BH BB PRODUCT CODE: NORMAL
BH BB PRODUCT CODE: NORMAL
BH BB UNIT NUMBER: NORMAL
BH BB UNIT NUMBER: NORMAL
BUN SERPL-MCNC: 25 MG/DL (ref 8–23)
BUN/CREAT SERPL: 33.8 (ref 7–25)
CALCIUM SPEC-SCNC: 8.5 MG/DL (ref 8.6–10.5)
CHLORIDE SERPL-SCNC: 107 MMOL/L (ref 98–107)
CO2 SERPL-SCNC: 19 MMOL/L (ref 22–29)
CREAT SERPL-MCNC: 0.74 MG/DL (ref 0.76–1.27)
CROSSMATCH INTERPRETATION: NORMAL
CROSSMATCH INTERPRETATION: NORMAL
DEPRECATED RDW RBC AUTO: 72.4 FL (ref 37–54)
EGFRCR SERPLBLD CKD-EPI 2021: 97.5 ML/MIN/1.73
EOSINOPHIL # BLD AUTO: 0.16 10*3/MM3 (ref 0–0.4)
EOSINOPHIL NFR BLD AUTO: 1.7 % (ref 0.3–6.2)
ERYTHROCYTE [DISTWIDTH] IN BLOOD BY AUTOMATED COUNT: 18.8 % (ref 12.3–15.4)
GLUCOSE BLDC GLUCOMTR-MCNC: 110 MG/DL (ref 70–130)
GLUCOSE BLDC GLUCOMTR-MCNC: 209 MG/DL (ref 70–130)
GLUCOSE BLDC GLUCOMTR-MCNC: 217 MG/DL (ref 70–130)
GLUCOSE BLDC GLUCOMTR-MCNC: 252 MG/DL (ref 70–130)
GLUCOSE SERPL-MCNC: 221 MG/DL (ref 65–99)
GRAM STN SPEC: NORMAL
HCT VFR BLD AUTO: 27.5 % (ref 37.5–51)
HGB BLD-MCNC: 8.2 G/DL (ref 13–17.7)
IMM GRANULOCYTES # BLD AUTO: 0.13 10*3/MM3 (ref 0–0.05)
IMM GRANULOCYTES NFR BLD AUTO: 1.4 % (ref 0–0.5)
LYMPHOCYTES # BLD AUTO: 0.91 10*3/MM3 (ref 0.7–3.1)
LYMPHOCYTES NFR BLD AUTO: 9.6 % (ref 19.6–45.3)
MAGNESIUM SERPL-MCNC: 2.5 MG/DL (ref 1.6–2.4)
MCH RBC QN AUTO: 31.4 PG (ref 26.6–33)
MCHC RBC AUTO-ENTMCNC: 29.8 G/DL (ref 31.5–35.7)
MCV RBC AUTO: 105.4 FL (ref 79–97)
MONOCYTES # BLD AUTO: 0.96 10*3/MM3 (ref 0.1–0.9)
MONOCYTES NFR BLD AUTO: 10.1 % (ref 5–12)
NEUTROPHILS NFR BLD AUTO: 7.32 10*3/MM3 (ref 1.7–7)
NEUTROPHILS NFR BLD AUTO: 76.9 % (ref 42.7–76)
NRBC BLD AUTO-RTO: 0 /100 WBC (ref 0–0.2)
PLAT MORPH BLD: NORMAL
PLATELET # BLD AUTO: 228 10*3/MM3 (ref 140–450)
PMV BLD AUTO: 9 FL (ref 6–12)
POTASSIUM SERPL-SCNC: 4.6 MMOL/L (ref 3.5–5.2)
RBC # BLD AUTO: 2.61 10*6/MM3 (ref 4.14–5.8)
SODIUM SERPL-SCNC: 137 MMOL/L (ref 136–145)
UNIT  ABO: NORMAL
UNIT  ABO: NORMAL
UNIT  RH: NORMAL
UNIT  RH: NORMAL
WBC MORPH BLD: NORMAL
WBC NRBC COR # BLD: 9.51 10*3/MM3 (ref 3.4–10.8)

## 2023-04-15 PROCEDURE — 82962 GLUCOSE BLOOD TEST: CPT

## 2023-04-15 PROCEDURE — 63710000001 INSULIN LISPRO (HUMAN) PER 5 UNITS

## 2023-04-15 PROCEDURE — 85025 COMPLETE CBC W/AUTO DIFF WBC: CPT

## 2023-04-15 PROCEDURE — 63710000001 INSULIN LISPRO (HUMAN) PER 5 UNITS: Performed by: STUDENT IN AN ORGANIZED HEALTH CARE EDUCATION/TRAINING PROGRAM

## 2023-04-15 PROCEDURE — 85007 BL SMEAR W/DIFF WBC COUNT: CPT

## 2023-04-15 PROCEDURE — 83735 ASSAY OF MAGNESIUM: CPT | Performed by: NURSE PRACTITIONER

## 2023-04-15 PROCEDURE — 71045 X-RAY EXAM CHEST 1 VIEW: CPT

## 2023-04-15 PROCEDURE — 63710000001 INSULIN DETEMIR PER 5 UNITS: Performed by: INTERNAL MEDICINE

## 2023-04-15 PROCEDURE — 99232 SBSQ HOSP IP/OBS MODERATE 35: CPT | Performed by: INTERNAL MEDICINE

## 2023-04-15 PROCEDURE — 80048 BASIC METABOLIC PNL TOTAL CA: CPT

## 2023-04-15 PROCEDURE — 97530 THERAPEUTIC ACTIVITIES: CPT

## 2023-04-15 RX ORDER — INSULIN LISPRO 100 [IU]/ML
0-14 INJECTION, SOLUTION INTRAVENOUS; SUBCUTANEOUS
Status: DISCONTINUED | OUTPATIENT
Start: 2023-04-15 | End: 2023-04-20 | Stop reason: HOSPADM

## 2023-04-15 RX ADMIN — MEGESTROL ACETATE 400 MG: 40 SUSPENSION ORAL at 08:18

## 2023-04-15 RX ADMIN — SENNOSIDES AND DOCUSATE SODIUM 2 TABLET: 8.6; 5 TABLET ORAL at 20:28

## 2023-04-15 RX ADMIN — ASPIRIN 81 MG CHEWABLE TABLET 81 MG: 81 TABLET CHEWABLE at 08:17

## 2023-04-15 RX ADMIN — Medication 10 ML: at 20:29

## 2023-04-15 RX ADMIN — ATORVASTATIN CALCIUM 80 MG: 40 TABLET, FILM COATED ORAL at 20:28

## 2023-04-15 RX ADMIN — FAMOTIDINE 20 MG: 20 TABLET ORAL at 08:18

## 2023-04-15 RX ADMIN — OXYCODONE HYDROCHLORIDE 5 MG: 5 TABLET ORAL at 03:42

## 2023-04-15 RX ADMIN — CARVEDILOL 50 MG: 12.5 TABLET, FILM COATED ORAL at 08:17

## 2023-04-15 RX ADMIN — SENNOSIDES AND DOCUSATE SODIUM 2 TABLET: 8.6; 5 TABLET ORAL at 08:17

## 2023-04-15 RX ADMIN — Medication 10 ML: at 08:18

## 2023-04-15 RX ADMIN — OXYCODONE HYDROCHLORIDE AND ACETAMINOPHEN 1000 MG: 500 TABLET ORAL at 08:18

## 2023-04-15 RX ADMIN — DOCUSATE SODIUM 100 MG: 100 CAPSULE, LIQUID FILLED ORAL at 08:18

## 2023-04-15 RX ADMIN — INSULIN LISPRO 8 UNITS: 100 INJECTION, SOLUTION INTRAVENOUS; SUBCUTANEOUS at 20:41

## 2023-04-15 RX ADMIN — ACETAMINOPHEN 1000 MG: 500 TABLET ORAL at 22:34

## 2023-04-15 RX ADMIN — INSULIN LISPRO 5 UNITS: 100 INJECTION, SOLUTION INTRAVENOUS; SUBCUTANEOUS at 12:15

## 2023-04-15 RX ADMIN — ACETAMINOPHEN 1000 MG: 500 TABLET ORAL at 14:08

## 2023-04-15 RX ADMIN — GABAPENTIN 100 MG: 100 CAPSULE ORAL at 16:34

## 2023-04-15 RX ADMIN — GABAPENTIN 100 MG: 100 CAPSULE ORAL at 20:28

## 2023-04-15 RX ADMIN — POLYETHYLENE GLYCOL 3350 17 G: 17 POWDER, FOR SOLUTION ORAL at 08:19

## 2023-04-15 RX ADMIN — OXYCODONE HYDROCHLORIDE 5 MG: 5 TABLET ORAL at 12:15

## 2023-04-15 RX ADMIN — GABAPENTIN 100 MG: 100 CAPSULE ORAL at 08:17

## 2023-04-15 RX ADMIN — DOCUSATE SODIUM 100 MG: 100 CAPSULE, LIQUID FILLED ORAL at 20:28

## 2023-04-15 RX ADMIN — CYCLOBENZAPRINE 5 MG: 10 TABLET, FILM COATED ORAL at 22:34

## 2023-04-15 RX ADMIN — PANTOPRAZOLE SODIUM 40 MG: 40 TABLET, DELAYED RELEASE ORAL at 20:28

## 2023-04-15 RX ADMIN — TERAZOSIN HYDROCHLORIDE 1 MG: 1 CAPSULE ORAL at 20:28

## 2023-04-15 RX ADMIN — INSULIN DETEMIR 10 UNITS: 100 INJECTION, SOLUTION SUBCUTANEOUS at 08:17

## 2023-04-15 RX ADMIN — MIRTAZAPINE 15 MG: 15 TABLET, FILM COATED ORAL at 20:28

## 2023-04-15 RX ADMIN — INSULIN LISPRO 3 UNITS: 100 INJECTION, SOLUTION INTRAVENOUS; SUBCUTANEOUS at 08:16

## 2023-04-15 NOTE — PROGRESS NOTES
Intensive Care Follow-up     Hospital:  LOS: 22 days   Mr. Timmy Guajardo, 72 y.o. male is followed for:   Bilateral pulmonary embolism            History of present illness:   Timmy Guajardo is a 72 y.o. male with PMH DVT / Bilateral PE on Eliquis, HTN, HLP, HFrEF, CVA with right sided weakness residual, T2DM and GERD who was admitted 3/24/23 to the Hospitalist service for progressive weakness, functional decline and unintentional weight loss.  CTA c/a/p revealed bilateral pulmonary emboli, partially loculated moderate right pleural effusion, emphysema, moderate colonic stool burden with mild inspissated rectal stool burden.  Eliquis was held and he was placed on a heparin drip.  He had a right-sided chest tube placed in IR 3/27/23.  Pleural fluid studies c/w exudative effusion. Small bore CT was discontinued 3/30/23.  Rt large bore chest tube was placed by CTS 4/4/23.  Follow up CT chest revealed persistent right loculated pleural effusion with some increased hemorrhage/clot and right apical nodule vs scarring.  He had some bleeding from and around chest tube site and heparin was held 4/6/23. Large clot was extracted from the tubing by stripping the tubes, silver nitrate sticks were applied and stitch was placed around chest tube with improvement.  Heparin was restarted.  Patient underwent right VATS decortication and post procedure transferred to ICU.     In October 2022, he had a fall with fracture and was sent to [facility] for rehab.  His Elquis was decreased to 2.5 mg BID at that time for unknown reason.  He also contracted COVID while there.  Since then, he has had decreased appetite, worsening weakness and 52 lbs unintentional weight loss.  On this admission, CT a/p were negative for malignancy.  Oncology recommended age-appropriate screening.  He was evaluated by GI and underwent upper and lower endoscopy.  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  Colonoscopy was unremarkable.  Weight  loss felt to be due to anorexia.  He was placed on Megace and Mirtazapine.  (End of copied text)      Subjective   Interval History:  Overnight patient denied any acute events.  Today morning seen sitting in chair on room air.  States that he is eating well.  Pain is under control.  Still has 3 chest tubes in place.             The patient's past medical, surgical and social history were reviewed and updated in Epic as appropriate.       Objective     Infusions:     Medications:  acetaminophen, 1,000 mg, Oral, Q8H  vitamin C, 1,000 mg, Oral, Daily  aspirin, 81 mg, Oral, Daily  atorvastatin, 80 mg, Oral, Nightly  carvedilol, 50 mg, Oral, BID With Meals  docusate sodium, 100 mg, Oral, BID  famotidine, 20 mg, Oral, Daily  gabapentin, 100 mg, Oral, TID  insulin detemir, 10 Units, Subcutaneous, Daily  insulin lispro, 0-7 Units, Subcutaneous, TID With Meals  megestrol, 400 mg, Oral, Daily  mirtazapine, 15 mg, Oral, Nightly  pantoprazole, 40 mg, Oral, Nightly  polyethylene glycol, 17 g, Oral, Daily  senna-docusate sodium, 2 tablet, Oral, BID  sodium chloride, 10 mL, Intravenous, Q12H  terazosin, 1 mg, Oral, Nightly        Vital Sign Min/Max for last 24 hours  Temp  Min: 98 °F (36.7 °C)  Max: 99.6 °F (37.6 °C)   BP  Min: 77/62  Max: 153/64   Pulse  Min: 76  Max: 98   Resp  Min: 16  Max: 22   SpO2  Min: 88 %  Max: 95 %   No data recorded       Input/Output for last 24 hour shift  04/14 0701 - 04/15 0700  In: 1486 [P.O.:1300; I.V.:186]  Out: 1750 [Urine:1650]      Objective  General Appearance: Awake, alert, in no acute distress  Lungs:   B/L Breath sounds present with decreased breath sounds on bases, no wheezing heard, no crackles.  Chest tubes in place and air leak noted.  Heart: S1 and S2 present, no murmur, paced rhythm  Abdomen: Soft, nontender, no guarding or rigidity, bowel sounds positive.  Extremities:  no cyanosis or clubbing,  no edema, warm to touch.  Neurologic:  Moving all four extremities. Good strength  bilaterally.  Psychological: Normal affect, Cooperative    Results from last 7 days   Lab Units 04/15/23  0343 04/14/23  0423 04/13/23  0410   WBC 10*3/mm3 9.51 9.75 12.49*   HEMOGLOBIN g/dL 8.2* 7.4* 8.2*   PLATELETS 10*3/mm3 228 226 284     Results from last 7 days   Lab Units 04/15/23  0343 04/14/23  0423 04/13/23  0410   SODIUM mmol/L 137 141 139   POTASSIUM mmol/L 4.6 4.1 5.1   CO2 mmol/L 19.0* 21.0* 20.0*   BUN mg/dL 25* 28* 23   CREATININE mg/dL 0.74* 0.71* 0.75*   MAGNESIUM mg/dL 2.5* 1.5*  --    PHOSPHORUS mg/dL  --  4.5  --    GLUCOSE mg/dL 221* 137* 245*     Estimated Creatinine Clearance: 79.2 mL/min (A) (by C-G formula based on SCr of 0.74 mg/dL (L)).          Images:   Chest x-ray reviewed personally showed clear left lung.  Right lower lung zone shows probable effusion versus atelectasis.  Chest tubes in good position.  No obvious pneumothorax noted.    I reviewed the patient's results and images.     Assessment & Plan   Impression        Bilateral pulmonary embolism    HFrEF    HTN    Presence of cardiac pacemaker    Right exudative pleural effusion status post decortication 4/12/2023    Unintentional weight loss    Hypoalbuminemia    Severe malnutrition    History of CVA (cerebrovascular accident)    Debility    GERD (gastroesophageal reflux disease)    Hyperlipidemia    Rt Apical Lung Nodule vs Scarring (needs follow up)       Plan        1.  Patient s/p VATS decortication for loculated pleural effusion.  Clinically doing better.  Chest x-ray showing improvement.  CT surgery wants to continue chest tubes for now.  Will await final pathology.  2.  Patient with bilateral pulmonary emboli.  Currently off anticoagulation.  Will await CT surgery recommendation on when to resume his anticoagulation.  3.  Continue pain control.  4.  Hemoglobin is stable no further bleed noted at this time.  She is 7.  5.  Continue Levemir insulin and sliding scale insulin coverage for blood glucose less than 180.  6.   Continue statins for dyslipidemia.  7.  Incentive spirometry.  Up and mobilize as tolerated.  8.  Recent weight loss and so far no clear-cut evidence of any malignant process.  Await pleural pathology.  Encourage oral intake.  Patient states that he is eating much better compared to before.  9.  Bowel regimen.  10.  GI prophylaxis.    Continue close monitoring.    Plan of care and goals reviewed with multidisciplinary/antibiotic stewardship team during rounds.   I discussed the patient's findings and my recommendations with patient and nursing staff       Candelario Cordero MD, [facility]  Pulmonary, Critical care and Sleep Medicine

## 2023-04-15 NOTE — PLAN OF CARE
Goal Outcome Evaluation:   VSS overnight on room air, afebrile. Adequate uop, no BM. Pt insisted he slept in the recliner overnight, educated on shifting weight frequently. Chest tubes with minimal output. Pain is well controlled.

## 2023-04-15 NOTE — PROGRESS NOTES
"Timmy Guajardo  [id number]  1950     LOS: 22 days   Patient Care Team:  Arsen Seals APRN as PCP - General (Family Medicine)    Chief Complaint: Decortication      Subjective: Resting comfortably    Objective:     Vital Sign Min/Max for last 24 hours  Temp  Min: 98 °F (36.7 °C)  Max: 99.6 °F (37.6 °C)   BP  Min: 77/62  Max: 153/64   Pulse  Min: 76  Max: 103   Resp  Min: 16  Max: 22   SpO2  Min: 88 %  Max: 95 %   No data recorded   No data recorded     Flowsheet Rows    Flowsheet Row First Filed Value   Admission Height 177.8 cm (70\") Documented at 03/24/2023 1718   Admission Weight 51.7 kg (114 lb) Documented at 03/24/2023 1718          Physical Exam:    Wound:    Pulses:     Mediastinal and Chest Tube Drainage: No air leak noted       Results Review:   Results from last 7 days   Lab Units 04/15/23  0343   WBC 10*3/mm3 9.51   HEMOGLOBIN g/dL 8.2*   HEMATOCRIT % 27.5*   PLATELETS 10*3/mm3 228     Results from last 7 days   Lab Units 04/15/23  0343   SODIUM mmol/L 137   POTASSIUM mmol/L 4.6   CHLORIDE mmol/L 107   CO2 mmol/L 19.0*   BUN mg/dL 25*   CREATININE mg/dL 0.74*   GLUCOSE mg/dL 221*   CALCIUM mg/dL 8.5*             Assessment      Bilateral pulmonary embolism    HFrEF    HTN    Presence of cardiac pacemaker    Right exudative pleural effusion status post decortication 4/12/2023    Unintentional weight loss    Hypoalbuminemia    Severe malnutrition    History of CVA (cerebrovascular accident)    Debility    GERD (gastroesophageal reflux disease)    Hyperlipidemia    Rt Apical Lung Nodule vs Scarring (needs follow up)      Continue chest tube drainage.        Júnior Priest MD  04/15/23  08:28 EDT      Please note that portions of this note were completed with a voice recognition program. Efforts were made to edit the dictations, but words may be mistranscribed  "

## 2023-04-15 NOTE — THERAPY TREATMENT NOTE
Patient Name: Timmy Guajardo  : 1950    MRN: [id number]                              Today's Date: 4/15/2023       Admit Date: 3/24/2023    Visit Dx:     ICD-10-CM ICD-9-CM   1. Weight loss  R63.4 783.21   2. Unintentional weight loss  R63.4 783.21   3. 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 Pleural effusion  J90 511.9   5. Shortness of breath  R06.02 786.05     Patient Active Problem List   Diagnosis   • Acute right-sided weakness   • Suspected cerebrovascular accident (CVA)   • Leukocytosis   • HFrEF   • Thrombocytopenia   • HTN   • Presence of cardiac pacemaker   • Hyperglycemia   • Gangrene of toe of left foot   • Closed displaced intertrochanteric fracture of right femur, initial encounter   • Right exudative pleural effusion status post decortication 2023   • Unintentional weight loss   • Hypoalbuminemia   • Severe malnutrition   • Bilateral pulmonary embolism   • History of CVA (cerebrovascular accident)   • Debility   • GERD (gastroesophageal reflux disease)   • Hyperlipidemia   • Rt Apical Lung Nodule vs Scarring (needs follow up)     Past Medical History:   Diagnosis Date   • Cardiomyopathy    • CHF (congestive heart failure)    • Coronary artery disease    • Diabetes mellitus    • GERD (gastroesophageal reflux disease)    • HTN (hypertension)    • Stroke     Right sided weakness   • Stroke      Past Surgical History:   Procedure Laterality Date   • AMPUTATION DIGIT Left 2021    Procedure: AMPUTATION DIGIT - GREAT TOE AMPUTATION LEFT;  Surgeon: Dario Marmolejo MD;  Location: [facility] OR;  Service: General;  Laterality: Left;   • AORTAGRAM N/A 2021    Procedure: AORTAGRAM WITH RUNOFFS, LEFT SFA BALLOON ANGIOPLASTY;  Surgeon: Tim Mahan MD;  Location: [facility];  Service: Vascular;  Laterality: N/A;  FL TIME 6 MIN 54 SECS  82 MGY  CONTRAST VISIPAQUE 100ML     • CARDIAC CATHETERIZATION     • CARDIAC PACEMAKER PLACEMENT      pacemaker/defibrillator, [employer]   • COLONOSCOPY N/A 3/31/2023    Procedure: COLONOSCOPY;  Surgeon: Brunner, Mark I, MD;  Location:  [facility];  Service: Gastroenterology;  Laterality: N/A;   • ENDOSCOPY N/A 3/29/2023    Procedure: ESOPHAGOGASTRODUODENOSCOPY;  Surgeon: Brunner, Mark I, MD;  Location:  [facility];  Service: Gastroenterology;  Laterality: N/A;   • HERNIA REPAIR Bilateral     Inguinal hernia   • HIP TROCHANTERIC NAILING WITH INTRAMEDULLARY HIP SCREW Right 10/18/2022    Procedure: HIP TROCHANTERIC NAILING WITH INTRAMEDULLARY HIP SCREW RIGHT;  Surgeon: Bj Steinberg MD;  Location:  [facility];  Service: Orthopedics;  Laterality: Right;   • THORACOSCOPY VIDEO ASSISTED WITH LOBECTOMY Right 4/12/2023    Procedure: BRONCHOSCOPY, THORACOSCOPY VIDEO ASSISTED WITH DECORTICATION, MECHANICAL PLEURODESIS;  Surgeon: Tim Mahan MD;  Location:  [facility];  Service: Cardiothoracic;  Laterality: Right;      General Information     Row Name 04/15/23 1508          Physical Therapy Time and Intention    Document Type therapy note (daily note)  -KG     Mode of Treatment physical therapy  -KG     Row Name 04/15/23 1508          General Information    Existing Precautions/Restrictions fall;other (see comments)  remote CVA with residual R sided weakness; CT x2; confusion  -KG     Barriers to Rehab medically complex;previous functional deficit;cognitive status  -KG     Row Name 04/15/23 1508          Cognition    Orientation Status (Cognition) oriented x 3  -KG     Row Name 04/15/23 1508          Safety Issues, Functional Mobility    Safety Issues Affecting Function (Mobility) awareness of need for assistance;insight into deficits/self-awareness;safety precaution awareness;safety precautions follow-through/compliance  -KG     Impairments Affecting Function (Mobility) balance;cognition;coordination;endurance/activity tolerance;postural/trunk control;range of motion (ROM);pain;strength  -KG     Cognitive Impairments, Mobility  Safety/Performance awareness, need for assistance;insight into deficits/self-awareness;safety precaution awareness;safety precaution follow-through  -KG           User Key  (r) = Recorded By, (t) = Taken By, (c) = Cosigned By    Initials Name Provider Type    KG Melonie Beaulieu, PT Physical Therapist               Mobility     Row Name 04/15/23 1508          Bed Mobility    Bed Mobility scooting/bridging;sit-supine  -KG     Scooting/Bridging [place] (Bed Mobility) maximum assist (25% patient effort);2 person assist;verbal cues  -KG     Sit-Supine [place] (Bed Mobility) minimum assist (75% patient effort);2 person assist;verbal cues  -KG     Assistive Device (Bed Mobility) bed rails;draw sheet;head of bed elevated  -KG     Comment, (Bed Mobility) VC's for sequencing. Pt required assistance at trunk and BLEs.  -KG     Row Name 04/15/23 1508          Transfers    Comment, (Transfers) VC's for sequencing and safe hand placement. Cues for upright posture with trunk and alexis knee extension. Pt unable to achieve full upright posture. Minimal weight bearing through R LE.  -KG     Row Name 04/15/23 1508          Sit-Stand Transfer    Sit-Stand [place] (Transfers) moderate assist (50% patient effort);2 person assist;verbal cues  -KG     Assistive Device (Sit-Stand Transfers) other (see comments)  B UE support  -KG     Row Name 04/15/23 1508          Gait/Stairs (Locomotion)    Florence Level (Gait) moderate assist (50% patient effort);2 person assist;verbal cues  -KG     Assistive Device (Gait) other (see comments)  B UE support  -KG     Distance in Feet (Gait) 3  -KG     Deviations/Abnormal Patterns (Gait) base of support, narrow;ericka decreased;festinating/shuffling;stride length decreased  -KG     Bilateral Gait Deviations forward flexed posture;heel strike decreased;knee buckling bilaterally  -KG     Comment, (Gait/Stairs) Pt able to take steps from chair to bed demonstrating step to gait pattern  with slow ericka and short, shuffled steps. Pt with very forward flexed posture and significant alexis knee flexion throughout mobility. Unable to achieve full upright posture. Minimal weight bearing through R LE. Episodes of alexis knee buckling. Very unsteady; worse with turns. Pt required increased encouragement and education to participate. Limited by c/o pain and weakness.  -KG           User Key  (r) = Recorded By, (t) = Taken By, (c) = Cosigned By    Initials Name Provider Type    KG Melonie Beaulieu, PT Physical Therapist               Obj/Interventions     Row Name 04/15/23 1512          Balance    Balance Assessment sitting static balance;standing static balance;standing dynamic balance  -KG     Static Sitting Balance standby assist  -KG     Position, Sitting Balance unsupported;sitting in chair  -KG     Static Standing Balance moderate assist  -KG     Dynamic Standing Balance moderate assist;2-person assist  -KG     Position/Device Used, Standing Balance supported  -KG           User Key  (r) = Recorded By, (t) = Taken By, (c) = Cosigned By    Initials Name Provider Type    KG Melonie Beaulieu, PT Physical Therapist               Goals/Plan    No documentation.                Clinical Impression     Row Name 04/15/23 1512          Pain    Additional Documentation Pain Scale: FACES Pre/Post-Treatment (Group)  -KG     Row Name 04/15/23 1512          Pain Scale: FACES Pre/Post-Treatment    Pain: FACES Scale, Pretreatment 4-->hurts little more  -KG     Posttreatment Pain Rating 6-->hurts even more  -KG     Pain Location - Side/Orientation Right  -KG     Pain Location incisional  -KG     Pain Location - flank  -KG     Row Name 04/15/23 1512          Plan of Care Review    Plan of Care Reviewed With patient  -KG     Progress improving  -KG     Outcome Evaluation Pt ambulated 3ft from chair to bed with modA x2 and B UE support. Pt demonstrated very slow ericka with short, shuffled steps and forward flexed  posture. Required increased encouragement and education to participate. Ambulation distance limited by c/o pain. Continue to recommend PT skilled care.  -KG     Row Name 04/15/23 1512          Vital Signs    Pre Systolic BP Rehab 105  -KG     Pre Treatment Diastolic BP 51  -KG     Post Systolic BP Rehab 150  -KG     Post Treatment Diastolic BP 76  -KG     Pretreatment Heart Rate (beats/min) 90  -KG     Posttreatment Heart Rate (beats/min) 89  -KG     Pre SpO2 (%) 93  -KG     O2 Delivery Pre Treatment room air  -KG     Post SpO2 (%) 93  -KG     O2 Delivery Post Treatment room air  -KG     Pre Patient Position Sitting  -KG     Intra Patient Position Standing  -KG     Post Patient Position Supine  -KG     Row Name 04/15/23 1512          Positioning and Restraints    Pre-Treatment Position sitting in chair/recliner  -KG     Post Treatment Position bed  -KG     In Bed notified nsg;supine;call light within reach;encouraged to call for assist;exit alarm on;side rails up x3  -KG           User Key  (r) = Recorded By, (t) = Taken By, (c) = Cosigned By    Initials Name Provider Type    KG Melonie Beaulieu, PT Physical Therapist               Outcome Measures     Row Name 04/15/23 1514          How much help from another person do you currently need...    Turning from your back to your side while in flat bed without using bedrails? 3  -KG     Moving from lying on back to sitting on the side of a flat bed without bedrails? 2  -KG     Moving to and from a bed to a chair (including a wheelchair)? 2  -KG     Standing up from a chair using your arms (e.g., wheelchair, bedside chair)? 2  -KG     Climbing 3-5 steps with a railing? 1  -KG     To walk in hospital room? 2  -KG     AM-PAC 6 Clicks Score (PT) 12  -KG     Highest level of mobility 4 --> Transferred to chair/commode  -KG     Row Name 04/15/23 1514          Functional Assessment    Outcome Measure Options AM-PAC 6 Clicks Basic Mobility (PT)  -KG           User Key  (r)  = Recorded By, (t) = Taken By, (c) = Cosigned By    Initials Name Provider Type    KG Melonie Beaulieu, PT Physical Therapist                             Physical Therapy Education     Title: PT OT SLP Therapies (In Progress)     Topic: Physical Therapy (In Progress)     Point: Mobility training (In Progress)     Learning Progress Summary           Patient Acceptance, E, NR by KG at 4/15/2023 1321    Acceptance, E,TB, VU,NR by AY at 4/13/2023 1154    Acceptance, E, VU,NR by NS at 4/11/2023 1611    Comment: benefits of OOB activity, ther ex, purpose of IP PT    Acceptance, E, VU,NR by ML at 4/7/2023 1151    Acceptance, E, VU by  at 4/5/2023 1554    Acceptance, E, VU,NR by NS at 4/3/2023 0926    Acceptance, E, VU,NR by ML at 3/31/2023 1604    Acceptance, E, VU,NR by ML at 3/28/2023 1323    Acceptance, E,TB, VU,NR by KL at 3/26/2023 1536   Family Acceptance, E,TB, VU,NR by KL at 3/26/2023 1536                   Point: Home exercise program (In Progress)     Learning Progress Summary           Patient Acceptance, E, NR by KG at 4/15/2023 1321    Acceptance, E, VU,NR by NS at 4/11/2023 1611    Comment: benefits of OOB activity, ther ex, purpose of IP PT    Acceptance, E, VU,NR by ML at 4/7/2023 1151    Acceptance, E, VU by  at 4/5/2023 1554    Acceptance, E, VU,NR by NS at 4/3/2023 0926    Acceptance, E, VU,NR by ML at 3/31/2023 1604    Acceptance, E, VU,NR by ML at 3/28/2023 1323    Acceptance, E,TB, VU,NR by KL at 3/26/2023 1536   Family Acceptance, E,TB, VU,NR by KL at 3/26/2023 1536                   Point: Body mechanics (In Progress)     Learning Progress Summary           Patient Acceptance, E, NR by KG at 4/15/2023 1321    Acceptance, E,TB, VU,NR by AY at 4/13/2023 1154    Acceptance, E, VU,NR by NS at 4/11/2023 1611    Comment: benefits of OOB activity, ther ex, purpose of IP PT    Acceptance, E, VU,NR by ML at 4/7/2023 1151    Acceptance, E, VU by LH at 4/5/2023 1554    Acceptance, E, VU,NR by NS at  4/3/2023 0926    Acceptance, E,TB, VU,NR by  at 3/26/2023 1536   Family Acceptance, E,TB, VU,NR by KL at 3/26/2023 1536                   Point: Precautions (In Progress)     Learning Progress Summary           Patient Acceptance, E, NR by KG at 4/15/2023 1321    Acceptance, E,TB, VU,NR by  at 4/13/2023 1154    Acceptance, E, VU,NR by NS at 4/11/2023 1611    Comment: benefits of OOB activity, ther ex, purpose of IP PT    Acceptance, E, VU,NR by ML at 4/7/2023 1151    Acceptance, E, VU by  at 4/5/2023 1554    Acceptance, E, VU,NR by NS at 4/3/2023 0926    Acceptance, E, VU,NR by  at 3/31/2023 1604    Acceptance, E, VU,NR by  at 3/28/2023 1323    Acceptance, E,TB, VU,NR by  at 3/26/2023 1536   Family Acceptance, E,TB, VU,NR by  at 3/26/2023 1536                               User Key     Initials Effective Dates Name Provider Type Discipline    KG 05/22/20 -  Melonie Beaulieu, PT Physical Therapist PT     11/02/22 -  Gabino Tirado, PT Physical Therapist PT    NS 06/16/21 -  Juliet Kuhn, PT Physical Therapist PT    AY 11/10/20 -  Alie Trujillo, PT Physical Therapist PT     04/22/21 -  Sharon Heller Physical Therapist PT     09/21/21 -  Ernst Reid, PT Physical Therapist PT              PT Recommendation and Plan     Plan of Care Reviewed With: patient  Progress: improving  Outcome Evaluation: Pt ambulated 3ft from chair to bed with modA x2 and B UE support. Pt demonstrated very slow ericka with short, shuffled steps and forward flexed posture. Required increased encouragement and education to participate. Ambulation distance limited by c/o pain. Continue to recommend PT skilled care.     Time Calculation:    PT Charges     Row Name 04/15/23 1321             Time Calculation    Start Time 1321  -KG      PT Received On 04/15/23  -KG      PT Goal Re-Cert Due Date 04/23/23  -KG         Time Calculation- PT    Total Timed Code Minutes- PT 24 minute(s)  -KG         Timed Charges    85946 -  PT Therapeutic Activity Minutes 24  -KG         Total Minutes    Timed Charges Total Minutes 24  -KG       Total Minutes 24  -KG            User Key  (r) = Recorded By, (t) = Taken By, (c) = Cosigned By    Initials Name Provider Type    Melonie Graham, PT Physical Therapist              Therapy Charges for Today     Code Description Service Date Service Provider Modifiers Qty    99234180437 HC PT THERAPEUTIC ACT EA 15 MIN 4/15/2023 Melonie Beaulieu, PT GP 2    54689102303 HC PT THER SUPP EA 15 MIN 4/15/2023 Melonie Beaulieu, PT GP 2          PT G-Codes  Outcome Measure Options: AM-PAC 6 Clicks Basic Mobility (PT)  AM-PAC 6 Clicks Score (PT): 12  AM-PAC 6 Clicks Score (OT): 16       Tereza Beaulieu PT  4/15/2023

## 2023-04-15 NOTE — PLAN OF CARE
Goal Outcome Evaluation:  Plan of Care Reviewed With: patient        Progress: improving  Outcome Evaluation: Pt ambulated 3ft from chair to bed with modA x2 and B UE support. Pt demonstrated very slow ericka with short, shuffled steps and forward flexed posture. Required increased encouragement and education to participate. Ambulation distance limited by c/o pain. Continue to recommend PT skilled care.

## 2023-04-16 ENCOUNTER — APPOINTMENT (OUTPATIENT)
Dept: GENERAL RADIOLOGY | Facility: HOSPITAL | Age: 73
DRG: 163 | End: 2023-04-16
Payer: MEDICARE

## 2023-04-16 LAB
ALBUMIN SERPL-MCNC: 2.6 G/DL (ref 3.5–5.2)
ALBUMIN/GLOB SERPL: 0.7 G/DL
ALP SERPL-CCNC: 89 U/L (ref 39–117)
ALT SERPL W P-5'-P-CCNC: 25 U/L (ref 1–41)
ANION GAP SERPL CALCULATED.3IONS-SCNC: 9 MMOL/L (ref 5–15)
AST SERPL-CCNC: 37 U/L (ref 1–40)
BASOPHILS # BLD AUTO: 0.04 10*3/MM3 (ref 0–0.2)
BASOPHILS NFR BLD AUTO: 0.5 % (ref 0–1.5)
BILIRUB SERPL-MCNC: 0.2 MG/DL (ref 0–1.2)
BUN SERPL-MCNC: 22 MG/DL (ref 8–23)
BUN/CREAT SERPL: 37.3 (ref 7–25)
CALCIUM SPEC-SCNC: 8.7 MG/DL (ref 8.6–10.5)
CHLORIDE SERPL-SCNC: 109 MMOL/L (ref 98–107)
CO2 SERPL-SCNC: 22 MMOL/L (ref 22–29)
CREAT SERPL-MCNC: 0.59 MG/DL (ref 0.76–1.27)
DEPRECATED RDW RBC AUTO: 67.4 FL (ref 37–54)
EGFRCR SERPLBLD CKD-EPI 2021: 104.4 ML/MIN/1.73
EOSINOPHIL # BLD AUTO: 0.22 10*3/MM3 (ref 0–0.4)
EOSINOPHIL NFR BLD AUTO: 2.7 % (ref 0.3–6.2)
ERYTHROCYTE [DISTWIDTH] IN BLOOD BY AUTOMATED COUNT: 18 % (ref 12.3–15.4)
GLOBULIN UR ELPH-MCNC: 3.5 GM/DL
GLUCOSE BLDC GLUCOMTR-MCNC: 205 MG/DL (ref 70–130)
GLUCOSE BLDC GLUCOMTR-MCNC: 235 MG/DL (ref 70–130)
GLUCOSE BLDC GLUCOMTR-MCNC: 236 MG/DL (ref 70–130)
GLUCOSE BLDC GLUCOMTR-MCNC: 301 MG/DL (ref 70–130)
GLUCOSE SERPL-MCNC: 178 MG/DL (ref 65–99)
HCT VFR BLD AUTO: 25.3 % (ref 37.5–51)
HGB BLD-MCNC: 7.9 G/DL (ref 13–17.7)
IMM GRANULOCYTES # BLD AUTO: 0.09 10*3/MM3 (ref 0–0.05)
IMM GRANULOCYTES NFR BLD AUTO: 1.1 % (ref 0–0.5)
LYMPHOCYTES # BLD AUTO: 0.99 10*3/MM3 (ref 0.7–3.1)
LYMPHOCYTES NFR BLD AUTO: 12.4 % (ref 19.6–45.3)
MAGNESIUM SERPL-MCNC: 2 MG/DL (ref 1.6–2.4)
MCH RBC QN AUTO: 32 PG (ref 26.6–33)
MCHC RBC AUTO-ENTMCNC: 31.2 G/DL (ref 31.5–35.7)
MCV RBC AUTO: 102.4 FL (ref 79–97)
MONOCYTES # BLD AUTO: 0.64 10*3/MM3 (ref 0.1–0.9)
MONOCYTES NFR BLD AUTO: 8 % (ref 5–12)
NEUTROPHILS NFR BLD AUTO: 6.03 10*3/MM3 (ref 1.7–7)
NEUTROPHILS NFR BLD AUTO: 75.3 % (ref 42.7–76)
NRBC BLD AUTO-RTO: 0 /100 WBC (ref 0–0.2)
PHOSPHATE SERPL-MCNC: 3.3 MG/DL (ref 2.5–4.5)
PLATELET # BLD AUTO: 249 10*3/MM3 (ref 140–450)
PMV BLD AUTO: 9.6 FL (ref 6–12)
POTASSIUM SERPL-SCNC: 4.6 MMOL/L (ref 3.5–5.2)
PROT SERPL-MCNC: 6.1 G/DL (ref 6–8.5)
RBC # BLD AUTO: 2.47 10*6/MM3 (ref 4.14–5.8)
SODIUM SERPL-SCNC: 140 MMOL/L (ref 136–145)
WBC NRBC COR # BLD: 8.01 10*3/MM3 (ref 3.4–10.8)

## 2023-04-16 PROCEDURE — 99232 SBSQ HOSP IP/OBS MODERATE 35: CPT | Performed by: INTERNAL MEDICINE

## 2023-04-16 PROCEDURE — 83735 ASSAY OF MAGNESIUM: CPT | Performed by: INTERNAL MEDICINE

## 2023-04-16 PROCEDURE — 84100 ASSAY OF PHOSPHORUS: CPT | Performed by: INTERNAL MEDICINE

## 2023-04-16 PROCEDURE — 82962 GLUCOSE BLOOD TEST: CPT

## 2023-04-16 PROCEDURE — 63710000001 INSULIN DETEMIR PER 5 UNITS: Performed by: INTERNAL MEDICINE

## 2023-04-16 PROCEDURE — 63710000001 INSULIN LISPRO (HUMAN) PER 5 UNITS

## 2023-04-16 PROCEDURE — 80053 COMPREHEN METABOLIC PANEL: CPT | Performed by: INTERNAL MEDICINE

## 2023-04-16 PROCEDURE — 85025 COMPLETE CBC W/AUTO DIFF WBC: CPT | Performed by: INTERNAL MEDICINE

## 2023-04-16 PROCEDURE — 71045 X-RAY EXAM CHEST 1 VIEW: CPT

## 2023-04-16 RX ORDER — INSULIN LISPRO 100 [IU]/ML
3 INJECTION, SOLUTION INTRAVENOUS; SUBCUTANEOUS
Status: DISCONTINUED | OUTPATIENT
Start: 2023-04-16 | End: 2023-04-20 | Stop reason: HOSPADM

## 2023-04-16 RX ORDER — LISINOPRIL 5 MG/1
5 TABLET ORAL
Status: DISCONTINUED | OUTPATIENT
Start: 2023-04-16 | End: 2023-04-20 | Stop reason: HOSPADM

## 2023-04-16 RX ADMIN — MEGESTROL ACETATE 400 MG: 40 SUSPENSION ORAL at 08:36

## 2023-04-16 RX ADMIN — OXYCODONE HYDROCHLORIDE AND ACETAMINOPHEN 1000 MG: 500 TABLET ORAL at 08:37

## 2023-04-16 RX ADMIN — ATORVASTATIN CALCIUM 80 MG: 40 TABLET, FILM COATED ORAL at 20:07

## 2023-04-16 RX ADMIN — GABAPENTIN 100 MG: 100 CAPSULE ORAL at 16:20

## 2023-04-16 RX ADMIN — ASPIRIN 81 MG CHEWABLE TABLET 81 MG: 81 TABLET CHEWABLE at 08:37

## 2023-04-16 RX ADMIN — MIRTAZAPINE 15 MG: 15 TABLET, FILM COATED ORAL at 20:07

## 2023-04-16 RX ADMIN — CARVEDILOL 50 MG: 12.5 TABLET, FILM COATED ORAL at 18:35

## 2023-04-16 RX ADMIN — SENNOSIDES AND DOCUSATE SODIUM 2 TABLET: 8.6; 5 TABLET ORAL at 20:07

## 2023-04-16 RX ADMIN — ACETAMINOPHEN 1000 MG: 500 TABLET ORAL at 06:57

## 2023-04-16 RX ADMIN — INSULIN LISPRO 10 UNITS: 100 INJECTION, SOLUTION INTRAVENOUS; SUBCUTANEOUS at 12:05

## 2023-04-16 RX ADMIN — GABAPENTIN 100 MG: 100 CAPSULE ORAL at 08:37

## 2023-04-16 RX ADMIN — CYCLOBENZAPRINE 5 MG: 10 TABLET, FILM COATED ORAL at 21:26

## 2023-04-16 RX ADMIN — INSULIN LISPRO 5 UNITS: 100 INJECTION, SOLUTION INTRAVENOUS; SUBCUTANEOUS at 07:00

## 2023-04-16 RX ADMIN — INSULIN LISPRO 5 UNITS: 100 INJECTION, SOLUTION INTRAVENOUS; SUBCUTANEOUS at 18:36

## 2023-04-16 RX ADMIN — Medication 10 ML: at 08:38

## 2023-04-16 RX ADMIN — DOCUSATE SODIUM 100 MG: 100 CAPSULE, LIQUID FILLED ORAL at 08:37

## 2023-04-16 RX ADMIN — GABAPENTIN 100 MG: 100 CAPSULE ORAL at 20:07

## 2023-04-16 RX ADMIN — CARVEDILOL 50 MG: 12.5 TABLET, FILM COATED ORAL at 08:36

## 2023-04-16 RX ADMIN — LISINOPRIL 5 MG: 5 TABLET ORAL at 21:45

## 2023-04-16 RX ADMIN — POLYETHYLENE GLYCOL 3350 17 G: 17 POWDER, FOR SOLUTION ORAL at 08:36

## 2023-04-16 RX ADMIN — DOCUSATE SODIUM 100 MG: 100 CAPSULE, LIQUID FILLED ORAL at 20:07

## 2023-04-16 RX ADMIN — PANTOPRAZOLE SODIUM 40 MG: 40 TABLET, DELAYED RELEASE ORAL at 20:07

## 2023-04-16 RX ADMIN — INSULIN DETEMIR 10 UNITS: 100 INJECTION, SOLUTION SUBCUTANEOUS at 08:37

## 2023-04-16 RX ADMIN — ACETAMINOPHEN 1000 MG: 500 TABLET ORAL at 21:26

## 2023-04-16 RX ADMIN — ACETAMINOPHEN 1000 MG: 500 TABLET ORAL at 14:31

## 2023-04-16 RX ADMIN — FAMOTIDINE 20 MG: 20 TABLET ORAL at 08:36

## 2023-04-16 RX ADMIN — TERAZOSIN HYDROCHLORIDE 1 MG: 1 CAPSULE ORAL at 20:07

## 2023-04-16 RX ADMIN — Medication 10 ML: at 20:07

## 2023-04-16 RX ADMIN — SENNOSIDES AND DOCUSATE SODIUM 2 TABLET: 8.6; 5 TABLET ORAL at 08:36

## 2023-04-16 RX ADMIN — INSULIN LISPRO 5 UNITS: 100 INJECTION, SOLUTION INTRAVENOUS; SUBCUTANEOUS at 21:26

## 2023-04-16 RX ADMIN — INSULIN LISPRO 3 UNITS: 100 INJECTION, SOLUTION INTRAVENOUS; SUBCUTANEOUS at 18:35

## 2023-04-16 NOTE — PROGRESS NOTES
"Timmy Guajardo  [id number]  1950     LOS: 23 days   Patient Care Team:  Arsen Seals APRN as PCP - General (Family Medicine)    Chief Complaint: Status post decortication      Subjective: Alert    Objective:     Vital Sign Min/Max for last 24 hours  Temp  Min: 98 °F (36.7 °C)  Max: 98.9 °F (37.2 °C)   BP  Min: 105/95  Max: 146/64   Pulse  Min: 73  Max: 96   Resp  Min: 16  Max: 20   SpO2  Min: 88 %  Max: 99 %   No data recorded   Weight  Min: 64.9 kg (143 lb 1.3 oz)  Max: 64.9 kg (143 lb 1.3 oz)     Flowsheet Rows    Flowsheet Row First Filed Value   Admission Height 177.8 cm (70\") Documented at 03/24/2023 1718   Admission Weight 51.7 kg (114 lb) Documented at 03/24/2023 1718          Physical Exam:    Wound:    Pulses:     Mediastinal and Chest Tube Drainage: No air leak noted       Results Review:   Results from last 7 days   Lab Units 04/16/23  0414   WBC 10*3/mm3 8.01   HEMOGLOBIN g/dL 7.9*   HEMATOCRIT % 25.3*   PLATELETS 10*3/mm3 249     Results from last 7 days   Lab Units 04/16/23  0414   SODIUM mmol/L 140   POTASSIUM mmol/L 4.6   CHLORIDE mmol/L 109*   CO2 mmol/L 22.0   BUN mg/dL 22   CREATININE mg/dL 0.59*   GLUCOSE mg/dL 178*   CALCIUM mg/dL 8.7             Assessment      Bilateral pulmonary embolism    HFrEF    HTN    Presence of cardiac pacemaker    Right exudative pleural effusion status post decortication 4/12/2023    Unintentional weight loss    Hypoalbuminemia    Severe malnutrition    History of CVA (cerebrovascular accident)    Debility    GERD (gastroesophageal reflux disease)    Hyperlipidemia    Rt Apical Lung Nodule vs Scarring (needs follow up)      Continue chest tube drainage.        Júnior Priest MD  04/16/23  08:09 EDT      Please note that portions of this note were completed with a voice recognition program. Efforts were made to edit the dictations, but words may be mistranscribed  "

## 2023-04-16 NOTE — PROGRESS NOTES
Intensive Care Follow-up     Hospital:  LOS: 23 days   Mr. Timmy Guajardo, 72 y.o. male is followed for:   Bilateral pulmonary embolism            History of present illness:   Timmy Guajardo is a 72 y.o. male with PMH DVT / Bilateral PE on Eliquis, HTN, HLP, HFrEF, CVA with right sided weakness residual, T2DM and GERD who was admitted 3/24/23 to the Hospitalist service for progressive weakness, functional decline and unintentional weight loss.  CTA c/a/p revealed bilateral pulmonary emboli, partially loculated moderate right pleural effusion, emphysema, moderate colonic stool burden with mild inspissated rectal stool burden.  Eliquis was held and he was placed on a heparin drip.  He had a right-sided chest tube placed in IR 3/27/23.  Pleural fluid studies c/w exudative effusion. Small bore CT was discontinued 3/30/23.  Rt large bore chest tube was placed by CTS 4/4/23.  Follow up CT chest revealed persistent right loculated pleural effusion with some increased hemorrhage/clot and right apical nodule vs scarring.  He had some bleeding from and around chest tube site and heparin was held 4/6/23. Large clot was extracted from the tubing by stripping the tubes, silver nitrate sticks were applied and stitch was placed around chest tube with improvement.  Heparin was restarted.  Patient underwent right VATS decortication and post procedure transferred to ICU.     In October 2022, he had a fall with fracture and was sent to [facility] for rehab.  His Elquis was decreased to 2.5 mg BID at that time for unknown reason.  He also contracted COVID while there.  Since then, he has had decreased appetite, worsening weakness and 52 lbs unintentional weight loss.  On this admission, CT a/p were negative for malignancy.  Oncology recommended age-appropriate screening.  He was evaluated by GI and underwent upper and lower endoscopy.  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  Colonoscopy was unremarkable.  Weight  "loss felt to be due to anorexia.  He was placed on Megace and Mirtazapine.  (End of copied text)      Subjective   Interval History:  Overnight no acute events.  Patient on 1 L nasal cannula oxygen currently.  Denies any other issues.  Pain is under control.  CT surgery following closely.  Still has chest tubes in place.             The patient's past medical, surgical and social history were reviewed and updated in Epic as appropriate.       Objective     Infusions:     Medications:  acetaminophen, 1,000 mg, Oral, Q8H  vitamin C, 1,000 mg, Oral, Daily  aspirin, 81 mg, Oral, Daily  atorvastatin, 80 mg, Oral, Nightly  carvedilol, 50 mg, Oral, BID With Meals  docusate sodium, 100 mg, Oral, BID  famotidine, 20 mg, Oral, Daily  gabapentin, 100 mg, Oral, TID  insulin detemir, 10 Units, Subcutaneous, Daily  insulin lispro, 0-14 Units, Subcutaneous, 4x Daily With Meals & Nightly  megestrol, 400 mg, Oral, Daily  mirtazapine, 15 mg, Oral, Nightly  pantoprazole, 40 mg, Oral, Nightly  polyethylene glycol, 17 g, Oral, Daily  senna-docusate sodium, 2 tablet, Oral, BID  sodium chloride, 10 mL, Intravenous, Q12H  terazosin, 1 mg, Oral, Nightly        Vital Sign Min/Max for last 24 hours  Temp  Min: 97.1 °F (36.2 °C)  Max: 98.9 °F (37.2 °C)   BP  Min: 105/95  Max: 154/73   Pulse  Min: 73  Max: 93   Resp  Min: 16  Max: 20   SpO2  Min: 88 %  Max: 99 %   Flow (L/min)  Min: 1  Max: 1       Input/Output for last 24 hour shift  04/15 0701 - 04/16 0700  In: -   Out: 1204 [Urine:1100]      Objective:  Vital signs: (most recent): Blood pressure 133/62, pulse 81, temperature 97.1 °F (36.2 °C), temperature source Axillary, resp. rate 18, height 177.8 cm (70\"), weight 64.9 kg (143 lb 1.3 oz), SpO2 94 %.            General Appearance: Awake, alert, in no acute distress  Lungs:   B/L Breath sounds present with decreased breath sounds on bases, no wheezing heard, no crackles.  Chest tubes in place and no air leak noted.  Heart: S1 and S2 present, " no murmur, paced rhythm  Abdomen: Soft, nontender, no guarding or rigidity, bowel sounds positive.  Extremities:  no cyanosis or clubbing,  no edema, warm to touch.  Neurologic:  Moving all four extremities. Good strength bilaterally.  Psychological: Normal affect, Cooperative    Results from last 7 days   Lab Units 04/16/23  0414 04/15/23  0343 04/14/23  0423   WBC 10*3/mm3 8.01 9.51 9.75   HEMOGLOBIN g/dL 7.9* 8.2* 7.4*   PLATELETS 10*3/mm3 249 228 226     Results from last 7 days   Lab Units 04/16/23  0414 04/15/23  0343 04/14/23  0423   SODIUM mmol/L 140 137 141   POTASSIUM mmol/L 4.6 4.6 4.1   CO2 mmol/L 22.0 19.0* 21.0*   BUN mg/dL 22 25* 28*   CREATININE mg/dL 0.59* 0.74* 0.71*   MAGNESIUM mg/dL 2.0 2.5* 1.5*   PHOSPHORUS mg/dL 3.3  --  4.5   GLUCOSE mg/dL 178* 221* 137*     Estimated Creatinine Clearance: 106.9 mL/min (A) (by C-G formula based on SCr of 0.59 mg/dL (L)).          Images:   Chest x-ray reviewed personally showed clear left lung.  Right lower lung zone shows probable effusion versus atelectasis.  Chest tubes in good position.  No obvious pneumothorax noted.  No significant change compared to yesterday.    I reviewed the patient's results and images.     Assessment & Plan   Impression        Bilateral pulmonary embolism    HFrEF    HTN    Presence of cardiac pacemaker    Right exudative pleural effusion status post decortication 4/12/2023    Unintentional weight loss    Hypoalbuminemia    Severe malnutrition    History of CVA (cerebrovascular accident)    Debility    GERD (gastroesophageal reflux disease)    Hyperlipidemia    Rt Apical Lung Nodule vs Scarring (needs follow up)       Plan        1.  Patient s/p VATS decortication for loculated pleural effusion.  Clinically doing better.  Chest x-ray showing improvement.  Chest tube output is improved.  CT surgery wants to continue chest tubes for now..Final pathology showing fibrinous pleuritis negative for malignant process.  2.  Patient with  bilateral pulmonary emboli.  Currently off anticoagulation.  Will await CT surgery recommendation on when to resume his anticoagulation.  3.  Continue pain control.  4.  Hemoglobin is stable no further bleed noted at this time. .  5.  Continue Levemir insulin and sliding scale insulin coverage for blood glucose less than 180.  6.  Continue statins for dyslipidemia.  7.  Out of bed as tolerated.  Continue working with do breathing exercises to prevent further atelectasis.  8.  Recent weight loss and so far no clear-cut evidence of any malignant process.  Encourage oral intake.  9.  Bowel regimen to continue.  10.  GI prophylaxis.    Continue close monitoring.  Can move out of ICU if okay with CT surgery.    Plan of care and goals reviewed with multidisciplinary/antibiotic stewardship team during rounds.   I discussed the patient's findings and my recommendations with patient and nursing staff       Candelario Cordero MD, [facility]  Pulmonary, Critical care and Sleep Medicine

## 2023-04-16 NOTE — PLAN OF CARE
Goal Outcome Evaluation:           Progress: improving  Outcome Evaluation: VSS. no acute changes. chest tubes remain in place. good UOP.

## 2023-04-16 NOTE — PLAN OF CARE
Goal Outcome Evaluation:           Progress: improving       VSS overnight on NC 1L O2, afebrile. No acute events overnight. Chest tubes with minimal output. Shift UO: 500 mL. No BM.        Latest Reference Range & Units 04/15/23 20:37 04/16/23 04:14   Glucose 65 - 99 mg/dL 252 (H) 178 (H)   Sodium 136 - 145 mmol/L  140   Potassium 3.5 - 5.2 mmol/L  4.6   CO2 22.0 - 29.0 mmol/L  22.0   Chloride 98 - 107 mmol/L  109 (H)   Anion Gap 5.0 - 15.0 mmol/L  9.0   Creatinine 0.76 - 1.27 mg/dL  0.59 (L)   BUN 8 - 23 mg/dL  22   BUN/Creatinine Ratio 7.0 - 25.0   37.3 (H)   Calcium 8.6 - 10.5 mg/dL  8.7   eGFR >60.0 mL/min/1.73  104.4   Alkaline Phosphatase 39 - 117 U/L  89   Total Protein 6.0 - 8.5 g/dL  6.1   ALT (SGPT) 1 - 41 U/L  25   AST (SGOT) 1 - 40 U/L  37   Total Bilirubin 0.0 - 1.2 mg/dL  0.2   Albumin 3.5 - 5.2 g/dL  2.6 (L)   Globulin gm/dL  3.5   A/G Ratio g/dL  0.7   Phosphorus 2.5 - 4.5 mg/dL  3.3   Magnesium 1.6 - 2.4 mg/dL  2.0   WBC 3.40 - 10.80 10*3/mm3  8.01   RBC 4.14 - 5.80 10*6/mm3  2.47 (L)   Hemoglobin 13.0 - 17.7 g/dL  7.9 (L)   Hematocrit 37.5 - 51.0 %  25.3 (L)   RDW 12.3 - 15.4 %  18.0 (H)   MCV 79.0 - 97.0 fL  102.4 (H)   MCH 26.6 - 33.0 pg  32.0   MCHC 31.5 - 35.7 g/dL  31.2 (L)   MPV 6.0 - 12.0 fL  9.6   Platelets 140 - 450 10*3/mm3  249   RDW-SD 37.0 - 54.0 fl  67.4 (H)   (H): Data is abnormally high  (L): Data is abnormally low

## 2023-04-17 ENCOUNTER — APPOINTMENT (OUTPATIENT)
Dept: GENERAL RADIOLOGY | Facility: HOSPITAL | Age: 73
DRG: 163 | End: 2023-04-17
Payer: MEDICARE

## 2023-04-17 LAB
ANION GAP SERPL CALCULATED.3IONS-SCNC: 10 MMOL/L (ref 5–15)
BACTERIA SPEC ANAEROBE CULT: NORMAL
BACTERIA SPEC ANAEROBE CULT: NORMAL
BASOPHILS # BLD AUTO: 0.05 10*3/MM3 (ref 0–0.2)
BASOPHILS NFR BLD AUTO: 0.7 % (ref 0–1.5)
BUN SERPL-MCNC: 19 MG/DL (ref 8–23)
BUN/CREAT SERPL: 27.1 (ref 7–25)
CALCIUM SPEC-SCNC: 8.8 MG/DL (ref 8.6–10.5)
CHLORIDE SERPL-SCNC: 106 MMOL/L (ref 98–107)
CO2 SERPL-SCNC: 23 MMOL/L (ref 22–29)
CREAT SERPL-MCNC: 0.7 MG/DL (ref 0.76–1.27)
DEPRECATED RDW RBC AUTO: 66.4 FL (ref 37–54)
EGFRCR SERPLBLD CKD-EPI 2021: 99.1 ML/MIN/1.73
EOSINOPHIL # BLD AUTO: 0.34 10*3/MM3 (ref 0–0.4)
EOSINOPHIL NFR BLD AUTO: 4.5 % (ref 0.3–6.2)
ERYTHROCYTE [DISTWIDTH] IN BLOOD BY AUTOMATED COUNT: 17.8 % (ref 12.3–15.4)
GLUCOSE BLDC GLUCOMTR-MCNC: 112 MG/DL (ref 70–130)
GLUCOSE BLDC GLUCOMTR-MCNC: 158 MG/DL (ref 70–130)
GLUCOSE BLDC GLUCOMTR-MCNC: 164 MG/DL (ref 70–130)
GLUCOSE BLDC GLUCOMTR-MCNC: 234 MG/DL (ref 70–130)
GLUCOSE SERPL-MCNC: 188 MG/DL (ref 65–99)
HCT VFR BLD AUTO: 26.6 % (ref 37.5–51)
HGB BLD-MCNC: 8.3 G/DL (ref 13–17.7)
IMM GRANULOCYTES # BLD AUTO: 0.12 10*3/MM3 (ref 0–0.05)
IMM GRANULOCYTES NFR BLD AUTO: 1.6 % (ref 0–0.5)
LYMPHOCYTES # BLD AUTO: 1.18 10*3/MM3 (ref 0.7–3.1)
LYMPHOCYTES NFR BLD AUTO: 15.8 % (ref 19.6–45.3)
MCH RBC QN AUTO: 31.9 PG (ref 26.6–33)
MCHC RBC AUTO-ENTMCNC: 31.2 G/DL (ref 31.5–35.7)
MCV RBC AUTO: 102.3 FL (ref 79–97)
MONOCYTES # BLD AUTO: 0.61 10*3/MM3 (ref 0.1–0.9)
MONOCYTES NFR BLD AUTO: 8.2 % (ref 5–12)
NEUTROPHILS NFR BLD AUTO: 5.18 10*3/MM3 (ref 1.7–7)
NEUTROPHILS NFR BLD AUTO: 69.2 % (ref 42.7–76)
NRBC BLD AUTO-RTO: 0 /100 WBC (ref 0–0.2)
PLATELET # BLD AUTO: 298 10*3/MM3 (ref 140–450)
PMV BLD AUTO: 9 FL (ref 6–12)
POTASSIUM SERPL-SCNC: 4.3 MMOL/L (ref 3.5–5.2)
RBC # BLD AUTO: 2.6 10*6/MM3 (ref 4.14–5.8)
SODIUM SERPL-SCNC: 139 MMOL/L (ref 136–145)
WBC NRBC COR # BLD: 7.48 10*3/MM3 (ref 3.4–10.8)

## 2023-04-17 PROCEDURE — 99232 SBSQ HOSP IP/OBS MODERATE 35: CPT | Performed by: NURSE PRACTITIONER

## 2023-04-17 PROCEDURE — 82962 GLUCOSE BLOOD TEST: CPT

## 2023-04-17 PROCEDURE — 71045 X-RAY EXAM CHEST 1 VIEW: CPT

## 2023-04-17 PROCEDURE — 63710000001 INSULIN DETEMIR PER 5 UNITS: Performed by: INTERNAL MEDICINE

## 2023-04-17 PROCEDURE — 25010000002 HYDROMORPHONE PER 4 MG: Performed by: THORACIC SURGERY (CARDIOTHORACIC VASCULAR SURGERY)

## 2023-04-17 PROCEDURE — 63710000001 INSULIN LISPRO (HUMAN) PER 5 UNITS

## 2023-04-17 PROCEDURE — 85025 COMPLETE CBC W/AUTO DIFF WBC: CPT | Performed by: INTERNAL MEDICINE

## 2023-04-17 PROCEDURE — 80048 BASIC METABOLIC PNL TOTAL CA: CPT | Performed by: INTERNAL MEDICINE

## 2023-04-17 RX ADMIN — ACETAMINOPHEN 1000 MG: 500 TABLET ORAL at 20:54

## 2023-04-17 RX ADMIN — PANTOPRAZOLE SODIUM 40 MG: 40 TABLET, DELAYED RELEASE ORAL at 20:53

## 2023-04-17 RX ADMIN — INSULIN LISPRO 5 UNITS: 100 INJECTION, SOLUTION INTRAVENOUS; SUBCUTANEOUS at 17:56

## 2023-04-17 RX ADMIN — ATORVASTATIN CALCIUM 80 MG: 40 TABLET, FILM COATED ORAL at 20:53

## 2023-04-17 RX ADMIN — CARVEDILOL 50 MG: 12.5 TABLET, FILM COATED ORAL at 08:33

## 2023-04-17 RX ADMIN — SENNOSIDES AND DOCUSATE SODIUM 2 TABLET: 8.6; 5 TABLET ORAL at 20:55

## 2023-04-17 RX ADMIN — INSULIN LISPRO 3 UNITS: 100 INJECTION, SOLUTION INTRAVENOUS; SUBCUTANEOUS at 08:35

## 2023-04-17 RX ADMIN — HYDROMORPHONE HYDROCHLORIDE 0.5 MG: 2 INJECTION, SOLUTION INTRAMUSCULAR; INTRAVENOUS; SUBCUTANEOUS at 09:02

## 2023-04-17 RX ADMIN — INSULIN LISPRO 3 UNITS: 100 INJECTION, SOLUTION INTRAVENOUS; SUBCUTANEOUS at 12:21

## 2023-04-17 RX ADMIN — CYCLOBENZAPRINE 5 MG: 10 TABLET, FILM COATED ORAL at 20:59

## 2023-04-17 RX ADMIN — ASPIRIN 81 MG CHEWABLE TABLET 81 MG: 81 TABLET CHEWABLE at 08:34

## 2023-04-17 RX ADMIN — FAMOTIDINE 20 MG: 20 TABLET ORAL at 08:34

## 2023-04-17 RX ADMIN — ACETAMINOPHEN 1000 MG: 500 TABLET ORAL at 06:17

## 2023-04-17 RX ADMIN — DOCUSATE SODIUM 100 MG: 100 CAPSULE, LIQUID FILLED ORAL at 08:34

## 2023-04-17 RX ADMIN — OXYCODONE HYDROCHLORIDE AND ACETAMINOPHEN 1000 MG: 500 TABLET ORAL at 08:34

## 2023-04-17 RX ADMIN — TERAZOSIN HYDROCHLORIDE 1 MG: 1 CAPSULE ORAL at 20:53

## 2023-04-17 RX ADMIN — Medication 10 ML: at 20:55

## 2023-04-17 RX ADMIN — GABAPENTIN 100 MG: 100 CAPSULE ORAL at 20:53

## 2023-04-17 RX ADMIN — CARVEDILOL 50 MG: 12.5 TABLET, FILM COATED ORAL at 18:47

## 2023-04-17 RX ADMIN — SENNOSIDES AND DOCUSATE SODIUM 2 TABLET: 8.6; 5 TABLET ORAL at 08:34

## 2023-04-17 RX ADMIN — POLYETHYLENE GLYCOL 3350 17 G: 17 POWDER, FOR SOLUTION ORAL at 08:36

## 2023-04-17 RX ADMIN — INSULIN LISPRO 3 UNITS: 100 INJECTION, SOLUTION INTRAVENOUS; SUBCUTANEOUS at 17:56

## 2023-04-17 RX ADMIN — DOCUSATE SODIUM 100 MG: 100 CAPSULE, LIQUID FILLED ORAL at 20:55

## 2023-04-17 RX ADMIN — MEGESTROL ACETATE 400 MG: 40 SUSPENSION ORAL at 08:34

## 2023-04-17 RX ADMIN — GABAPENTIN 100 MG: 100 CAPSULE ORAL at 17:56

## 2023-04-17 RX ADMIN — Medication 10 ML: at 08:36

## 2023-04-17 RX ADMIN — MIRTAZAPINE 15 MG: 15 TABLET, FILM COATED ORAL at 20:53

## 2023-04-17 RX ADMIN — GABAPENTIN 100 MG: 100 CAPSULE ORAL at 08:34

## 2023-04-17 RX ADMIN — ACETAMINOPHEN 1000 MG: 500 TABLET ORAL at 15:24

## 2023-04-17 RX ADMIN — INSULIN DETEMIR 10 UNITS: 100 INJECTION, SOLUTION SUBCUTANEOUS at 08:34

## 2023-04-17 NOTE — PROGRESS NOTES
Cardiothoracic Surgery Progress Note      POD # 5 s/p Right VATS decortication     LOS: 24 days      Subjective:  No complaints.     Objective:  Vital Signs  Temp:  [97.1 °F (36.2 °C)-98.9 °F (37.2 °C)] 98.2 °F (36.8 °C)  Heart Rate:  [73-92] 75  Resp:  [16-18] 18  BP: (120-182)/() 120/61    Physical Exam:   General Appearance: alert, appears stated age and cooperative   Lungs: clear to auscultation, respirations regular, respirations even and respirations unlabored   Heart: regular rhythm & normal rate, normal S1, S2 and no murmur, no gallop, no rub   Skin: Incision c/d/i   CT: No air leak, 68 cc/24 hrs  Results:    Results from last 7 days   Lab Units 04/17/23  0423   WBC 10*3/mm3 7.48   HEMOGLOBIN g/dL 8.3*   HEMATOCRIT % 26.6*   PLATELETS 10*3/mm3 298     Results from last 7 days   Lab Units 04/17/23  0423   SODIUM mmol/L 139   POTASSIUM mmol/L 4.3   CHLORIDE mmol/L 106   CO2 mmol/L 23.0   BUN mg/dL 19   CREATININE mg/dL 0.70*   GLUCOSE mg/dL 188*   CALCIUM mg/dL 8.8     Pathology:  Final Diagnosis   RIGHT PLEURAL PEEL:                 Fibrinous pleuritis with fibrosis; negative for neoplasm     Assessment:  POD # 5 s/p Right VATS decortication    Plan:  D/C chest tubes  PT/OT  Pulmonary toilet  Pathology and cytology benign, awaiting cultures  Transfer to telemetry   to arrange rehab placement    Tim Mahan MD  04/17/23  07:52 EDT

## 2023-04-17 NOTE — PROGRESS NOTES
"Intensive Care Follow-up     Hospital:  LOS: 24 days   Mr. Timmy Guajardo, 72 y.o. male is followed for:   Bilateral pulmonary embolism        Subjective   Interval History:  No acute events overnight. Resting in bed. Oxygenating well on NC. Chest tubes pulled this morning. Diet is somewhat improved. No family is present.      The patient's past medical, surgical and social history were reviewed and updated in Epic as appropriate.       Objective     Infusions:     Medications:  acetaminophen, 1,000 mg, Oral, Q8H  vitamin C, 1,000 mg, Oral, Daily  aspirin, 81 mg, Oral, Daily  atorvastatin, 80 mg, Oral, Nightly  carvedilol, 50 mg, Oral, BID With Meals  docusate sodium, 100 mg, Oral, BID  famotidine, 20 mg, Oral, Daily  gabapentin, 100 mg, Oral, TID  insulin detemir, 10 Units, Subcutaneous, Daily  insulin lispro, 0-14 Units, Subcutaneous, 4x Daily With Meals & Nightly  Insulin Lispro, 3 Units, Subcutaneous, TID With Meals  lisinopril, 5 mg, Oral, Q24H  megestrol, 400 mg, Oral, Daily  mirtazapine, 15 mg, Oral, Nightly  pantoprazole, 40 mg, Oral, Nightly  polyethylene glycol, 17 g, Oral, Daily  senna-docusate sodium, 2 tablet, Oral, BID  sodium chloride, 10 mL, Intravenous, Q12H  terazosin, 1 mg, Oral, Nightly        Vital Sign Min/Max for last 24 hours  Temp  Min: 97.5 °F (36.4 °C)  Max: 98.9 °F (37.2 °C)   BP  Min: 120/61  Max: 182/81   Pulse  Min: 73  Max: 93   Resp  Min: 16  Max: 20   SpO2  Min: 93 %  Max: 98 %   Flow (L/min)  Min: 1  Max: 1       Input/Output for last 24 hour shift  04/16 0701 - 04/17 0700  In: 200 [P.O.:200]  Out: 2093 [Urine:2025]      Objective:  Vital signs: (most recent): Blood pressure (!) 150/115, pulse 93, temperature 97.8 °F (36.6 °C), temperature source Axillary, resp. rate 20, height 177.8 cm (70\"), weight 64.9 kg (143 lb 1.3 oz), SpO2 94 %.            Physical Exam:    GENERAL: Patient lying in bed and conversant. No acute distress.   HEENT:  Atraumatic. Neck supple. Trachea midline. " PERRLA 3mm. EOM WNL. Tongue midline.     LUNGS:  Chest rise of normal depth and symmetric. Lungs clear to auscultation throughout all lung fields.    HEART:  S1S2 detected. No JVD. Regular rate and rhythm. NSR. No rub, murmur, or gallop.    ABDOMEN:  Soft, round, nondistended, and nontender. Bowel sounds normoactive in all quadrants. No HSM.    SKIN:  Right lateral chest duoderm dressings CDI.    EXTREMITIES:  No clubbing, edema, or cyanosis. Peripheral pulses 2+ and regular. Capillary refill <3sec-     NEURO/PSYCH:  A&O x4. Follows commands. Moves all extremities.      Results from last 7 days   Lab Units 04/17/23  0423 04/16/23  0414 04/15/23  0343   WBC 10*3/mm3 7.48 8.01 9.51   HEMOGLOBIN g/dL 8.3* 7.9* 8.2*   PLATELETS 10*3/mm3 298 249 228     Results from last 7 days   Lab Units 04/17/23  0423 04/16/23  0414 04/15/23  0343 04/14/23  0423   SODIUM mmol/L 139 140 137 141   POTASSIUM mmol/L 4.3 4.6 4.6 4.1   CO2 mmol/L 23.0 22.0 19.0* 21.0*   BUN mg/dL 19 22 25* 28*   CREATININE mg/dL 0.70* 0.59* 0.74* 0.71*   MAGNESIUM mg/dL  --  2.0 2.5* 1.5*   PHOSPHORUS mg/dL  --  3.3  --  4.5   GLUCOSE mg/dL 188* 178* 221* 137*     Estimated Creatinine Clearance: 90.1 mL/min (A) (by C-G formula based on SCr of 0.7 mg/dL (L)).          Images:   Chest x-ray reviewed personally showed clear left lung.  Right lower lung zone shows probable effusion versus atelectasis.  Chest tubes in good position.  No obvious pneumothorax noted.  No significant change compared to yesterday.    I reviewed the patient's results and images.     Assessment & Plan   Impression        Bilateral pulmonary embolism    HFrEF    HTN    Presence of cardiac pacemaker    Right exudative pleural effusion status post decortication 4/12/2023    Unintentional weight loss    Hypoalbuminemia    Severe malnutrition    History of CVA (cerebrovascular accident)    Debility    GERD (gastroesophageal reflux disease)    Hyperlipidemia    Rt Apical Lung Nodule vs  Scarring (needs follow up)       Plan        Timmy Guajardo is a 72 y.o. male with PMH DVT / Bilateral PE on Eliquis, HTN, HLP, HFrEF, CVA with right sided weakness residual, T2DM and GERD who was admitted 3/24/23 to the Hospitalist service for progressive weakness, functional decline and unintentional weight loss. CTA c/a/p revealed bilateral pulmonary emboli, partially loculated moderate right pleural effusion, emphysema, moderate colonic stool burden with mild inspissated rectal stool burden. Eliquis was held and he was placed on a heparin drip with a right-sided chest tube placed in IR 3/27/23 c/w exudative effusion. Small bore CT was discontinued 3/30/23. CTS was consulted who placed a right large bore chest tube on 4/4, however subsequent CT chest revealed persistent right loculated pleural effusion with some increased hemorrhage/clot and right apical nodule vs scarring. He had some bleeding from and around chest tube site and heparin was held 4/6/23. Large clot was extracted from the tubing by stripping the tubes, silver nitrate sticks were applied and stitch was placed around chest tube with improvement. Heparin was restarted. Patient underwent right VATS decortication with mechanical pleurodesis on 4/12 by Dr. Mahan and post procedure transferred to ICU.     In October 2022, he had a fall with fracture and was sent to [facility] for rehab.  His Elquis was decreased to 2.5 mg BID at that time for unknown reason.  He also contracted COVID while there.  Since then, he has had decreased appetite, worsening weakness and 52 lbs unintentional weight loss. On this admission, CT a/p were negative for malignancy. Oncology recommended age-appropriate screening.  He was evaluated by GI and underwent upper and lower endoscopy.  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 Colonoscopy was unremarkable. Weight loss felt to be due to anorexia placed on Megace and Mirtazapine.  (End of copied  text)    · Patient s/p VATS decortication for loculated pleural effusion. Clinically doing better. Chest x-ray showing improvement.  Chest tubes removed today by CTS. Final pathology showing fibrinous pleuritis negative for malignant process. F/u final pathology. Increase activity as tolerated. Planning on in-patient PT at discharge per case management. Aggressive pulmonary toilet. AM labs/CXR.   · Patient with bilateral pulmonary emboli.  Currently off anticoagulation.  Will await CT surgery recommendation on when to resume his anticoagulation. Hemoglobin currently stable.   · Continue pain control.  · Continue Levemir insulin and sliding scale insulin coverage for blood glucose less than 180.  · Continue statins for dyslipidemia.  · Recent weight loss and so far no clear-cut evidence of any malignant process. Encourage oral intake. Overall patient feels this has improved.   · Bowel regimen to continue.  · GI prophylaxis: PPI    Transfer to telemetry upon bed availability.     Plan of care and goals reviewed during interdisciplinary rounds.  I discussed the patient's findings and my recommendations with nursing staff     MDM:    Problem(s) High due to: Acute or Chronic illness or injury that may poses a threat to life or bodily function  Risk: Moderate due to: prescription drug management    Moderate    Micaela Deal, APRN, AGACNP-BC, FNP-BC   Pulmonary and Critical Care

## 2023-04-17 NOTE — PLAN OF CARE
Goal Outcome Evaluation:               VSS, Patient alert and oriented. Chest tubes remain in place to  -20 of suction. UOP adequate, no BM.

## 2023-04-17 NOTE — CASE MANAGEMENT/SOCIAL WORK
Continued Stay Note  [facility]     Patient Name: Timmy Guajardo  MRN: [id number]  Today's Date: 4/17/2023    Admit Date: 3/24/2023    Plan: IPR   Discharge Plan     Row Name 04/17/23 1315       Plan    Plan IPR    Patient/Family in Agreement with Plan yes    Plan Comments I have met with Mr. Guajardo at his bedside today to discuss the discharge plan.  He continues to be agreeable to skilled nursing/rehab at [facility].  Per MDR, His Chest tubes have been discintinued today.   Per Micaela with [facility] she is reviewing the referral for a possible bed offer soon.  CM will cont to follow the plan of care and assist with discharge needs as appropriate.    Final Discharge Disposition Code 03 - skilled nursing facility (SNF)               Discharge Codes    No documentation.               Expected Discharge Date and Time     Expected Discharge Date Expected Discharge Time    Apr 18, 2023             Violeta Mccabe RN

## 2023-04-18 ENCOUNTER — APPOINTMENT (OUTPATIENT)
Dept: GENERAL RADIOLOGY | Facility: HOSPITAL | Age: 73
DRG: 163 | End: 2023-04-18
Payer: MEDICARE

## 2023-04-18 LAB
ANION GAP SERPL CALCULATED.3IONS-SCNC: 12 MMOL/L (ref 5–15)
BUN SERPL-MCNC: 23 MG/DL (ref 8–23)
BUN/CREAT SERPL: 38.3 (ref 7–25)
CALCIUM SPEC-SCNC: 8.3 MG/DL (ref 8.6–10.5)
CHLORIDE SERPL-SCNC: 108 MMOL/L (ref 98–107)
CO2 SERPL-SCNC: 22 MMOL/L (ref 22–29)
CREAT SERPL-MCNC: 0.6 MG/DL (ref 0.76–1.27)
DEPRECATED RDW RBC AUTO: 67 FL (ref 37–54)
EGFRCR SERPLBLD CKD-EPI 2021: 103.8 ML/MIN/1.73
ERYTHROCYTE [DISTWIDTH] IN BLOOD BY AUTOMATED COUNT: 17.9 % (ref 12.3–15.4)
GLUCOSE BLDC GLUCOMTR-MCNC: 126 MG/DL (ref 70–130)
GLUCOSE BLDC GLUCOMTR-MCNC: 140 MG/DL (ref 70–130)
GLUCOSE BLDC GLUCOMTR-MCNC: 161 MG/DL (ref 70–130)
GLUCOSE BLDC GLUCOMTR-MCNC: 226 MG/DL (ref 70–130)
GLUCOSE SERPL-MCNC: 137 MG/DL (ref 65–99)
HCT VFR BLD AUTO: 28 % (ref 37.5–51)
HGB BLD-MCNC: 8.7 G/DL (ref 13–17.7)
MAGNESIUM SERPL-MCNC: 1.8 MG/DL (ref 1.6–2.4)
MCH RBC QN AUTO: 31.8 PG (ref 26.6–33)
MCHC RBC AUTO-ENTMCNC: 31.1 G/DL (ref 31.5–35.7)
MCV RBC AUTO: 102.2 FL (ref 79–97)
PHOSPHATE SERPL-MCNC: 3.8 MG/DL (ref 2.5–4.5)
PLATELET # BLD AUTO: 260 10*3/MM3 (ref 140–450)
PMV BLD AUTO: 10.9 FL (ref 6–12)
POTASSIUM SERPL-SCNC: 4.5 MMOL/L (ref 3.5–5.2)
RBC # BLD AUTO: 2.74 10*6/MM3 (ref 4.14–5.8)
SODIUM SERPL-SCNC: 142 MMOL/L (ref 136–145)
WBC NRBC COR # BLD: 9.34 10*3/MM3 (ref 3.4–10.8)

## 2023-04-18 PROCEDURE — 83735 ASSAY OF MAGNESIUM: CPT | Performed by: NURSE PRACTITIONER

## 2023-04-18 PROCEDURE — 71045 X-RAY EXAM CHEST 1 VIEW: CPT

## 2023-04-18 PROCEDURE — 63710000001 INSULIN DETEMIR PER 5 UNITS

## 2023-04-18 PROCEDURE — 80048 BASIC METABOLIC PNL TOTAL CA: CPT | Performed by: NURSE PRACTITIONER

## 2023-04-18 PROCEDURE — 63710000001 INSULIN LISPRO (HUMAN) PER 5 UNITS

## 2023-04-18 PROCEDURE — 85027 COMPLETE CBC AUTOMATED: CPT | Performed by: NURSE PRACTITIONER

## 2023-04-18 PROCEDURE — 84100 ASSAY OF PHOSPHORUS: CPT | Performed by: NURSE PRACTITIONER

## 2023-04-18 PROCEDURE — 82962 GLUCOSE BLOOD TEST: CPT

## 2023-04-18 PROCEDURE — 97110 THERAPEUTIC EXERCISES: CPT

## 2023-04-18 PROCEDURE — 99232 SBSQ HOSP IP/OBS MODERATE 35: CPT | Performed by: INTERNAL MEDICINE

## 2023-04-18 PROCEDURE — 97530 THERAPEUTIC ACTIVITIES: CPT

## 2023-04-18 RX ADMIN — ACETAMINOPHEN 1000 MG: 500 TABLET ORAL at 13:44

## 2023-04-18 RX ADMIN — FAMOTIDINE 20 MG: 20 TABLET ORAL at 09:50

## 2023-04-18 RX ADMIN — APIXABAN 5 MG: 5 TABLET, FILM COATED ORAL at 17:10

## 2023-04-18 RX ADMIN — GABAPENTIN 100 MG: 100 CAPSULE ORAL at 17:09

## 2023-04-18 RX ADMIN — PANTOPRAZOLE SODIUM 40 MG: 40 TABLET, DELAYED RELEASE ORAL at 20:03

## 2023-04-18 RX ADMIN — MIRTAZAPINE 15 MG: 15 TABLET, FILM COATED ORAL at 20:03

## 2023-04-18 RX ADMIN — ASPIRIN 81 MG CHEWABLE TABLET 81 MG: 81 TABLET CHEWABLE at 09:50

## 2023-04-18 RX ADMIN — MEGESTROL ACETATE 400 MG: 40 SUSPENSION ORAL at 11:38

## 2023-04-18 RX ADMIN — DOCUSATE SODIUM 100 MG: 100 CAPSULE, LIQUID FILLED ORAL at 20:05

## 2023-04-18 RX ADMIN — SENNOSIDES AND DOCUSATE SODIUM 2 TABLET: 8.6; 5 TABLET ORAL at 20:03

## 2023-04-18 RX ADMIN — OXYCODONE HYDROCHLORIDE AND ACETAMINOPHEN 1000 MG: 500 TABLET ORAL at 09:50

## 2023-04-18 RX ADMIN — Medication 10 ML: at 09:51

## 2023-04-18 RX ADMIN — GABAPENTIN 100 MG: 100 CAPSULE ORAL at 09:50

## 2023-04-18 RX ADMIN — INSULIN LISPRO 3 UNITS: 100 INJECTION, SOLUTION INTRAVENOUS; SUBCUTANEOUS at 17:10

## 2023-04-18 RX ADMIN — INSULIN LISPRO 3 UNITS: 100 INJECTION, SOLUTION INTRAVENOUS; SUBCUTANEOUS at 09:51

## 2023-04-18 RX ADMIN — ATORVASTATIN CALCIUM 80 MG: 40 TABLET, FILM COATED ORAL at 20:03

## 2023-04-18 RX ADMIN — INSULIN DETEMIR 10 UNITS: 100 INJECTION, SOLUTION SUBCUTANEOUS at 09:51

## 2023-04-18 RX ADMIN — SENNOSIDES AND DOCUSATE SODIUM 2 TABLET: 8.6; 5 TABLET ORAL at 09:51

## 2023-04-18 RX ADMIN — NYSTATIN 500000 UNITS: 100000 SUSPENSION ORAL at 20:04

## 2023-04-18 RX ADMIN — LISINOPRIL 5 MG: 5 TABLET ORAL at 09:50

## 2023-04-18 RX ADMIN — ACETAMINOPHEN 1000 MG: 500 TABLET ORAL at 05:12

## 2023-04-18 RX ADMIN — INSULIN LISPRO 5 UNITS: 100 INJECTION, SOLUTION INTRAVENOUS; SUBCUTANEOUS at 21:27

## 2023-04-18 RX ADMIN — CARVEDILOL 50 MG: 12.5 TABLET, FILM COATED ORAL at 09:50

## 2023-04-18 RX ADMIN — CYCLOBENZAPRINE 5 MG: 10 TABLET, FILM COATED ORAL at 21:27

## 2023-04-18 RX ADMIN — DOCUSATE SODIUM 100 MG: 100 CAPSULE, LIQUID FILLED ORAL at 09:50

## 2023-04-18 RX ADMIN — INSULIN LISPRO 3 UNITS: 100 INJECTION, SOLUTION INTRAVENOUS; SUBCUTANEOUS at 11:38

## 2023-04-18 RX ADMIN — NYSTATIN 500000 UNITS: 100000 SUSPENSION ORAL at 17:10

## 2023-04-18 RX ADMIN — TERAZOSIN HYDROCHLORIDE 1 MG: 1 CAPSULE ORAL at 20:04

## 2023-04-18 RX ADMIN — GABAPENTIN 100 MG: 100 CAPSULE ORAL at 20:03

## 2023-04-18 RX ADMIN — POLYETHYLENE GLYCOL 3350 17 G: 17 POWDER, FOR SOLUTION ORAL at 09:50

## 2023-04-18 RX ADMIN — INSULIN LISPRO 3 UNITS: 100 INJECTION, SOLUTION INTRAVENOUS; SUBCUTANEOUS at 11:37

## 2023-04-18 RX ADMIN — Medication 10 ML: at 20:04

## 2023-04-18 RX ADMIN — ACETAMINOPHEN 1000 MG: 500 TABLET ORAL at 20:06

## 2023-04-18 RX ADMIN — CARVEDILOL 50 MG: 12.5 TABLET, FILM COATED ORAL at 17:09

## 2023-04-18 NOTE — PLAN OF CARE
Goal Outcome Evaluation:  Plan of Care Reviewed With: patient        Progress: no change  Outcome Evaluation: patient took 3 steps to recliner with Mod assist x2 and B UE support, cues for upright posture and manual assist to block R knee from buckling. Progress limited by weakness and R knee buckling. Completed B LE exercises. Patient is below baseline level of function due to weakness, decreased strength and impaired balance. Recommend SNF at baseline.

## 2023-04-18 NOTE — PROGRESS NOTES
[facility] Medicine Services  PROGRESS NOTE    Patient Name: Timmy Guajardo  : 1950  MRN: [id number]    Date of Admission: 3/24/2023  Primary Care Physician: Arsen Seals APRN    Subjective   Subjective     CC:  PE, loculated pleural effusions, weight loss, thrush    HPI:  Transferred out of icu last night. No dyspnea currently. Feeling ok    ROS:  No cp  No palpitations  No n/v/d    Objective   Objective     Vital Signs:   Temp:  [98 °F (36.7 °C)-99.2 °F (37.3 °C)] 98.2 °F (36.8 °C)  Heart Rate:  [83-94] 90  Resp:  [18-20] 20  BP: (128-150)/() 136/71     Physical Exam:  Constitutional:Alert, oriented x 3, nontoxic appearing, sitting up in chair, normal resp effort  Psych:Normal/appropriate affect  HEENT:NCAT, oropharynx clear  Neck: neck supple, full range of motion  Neuro: Face symmetric, speech clear, equal , moves all extremities  Cardiac: RRR; No pretibial pitting edema  Resp: CTAB, normal effort  GI: abd soft, nontender  Skin: No extremity rash  Musculoskeletal/extremities: no cyanosis of extremities; no significant ankle edema        Results Reviewed:  LAB RESULTS:      Lab 23  0454 23  0423 23  0414 04/15/23  0343 23  0423 23  0410 23  1557 23  0345   WBC 9.34 7.48 8.01 9.51 9.75 12.49*  --  8.42   HEMOGLOBIN 8.7* 8.3* 7.9* 8.2* 7.4* 8.2*  --  8.7*   HEMATOCRIT 28.0* 26.6* 25.3* 27.5* 23.9* 26.5*  --  27.9*   PLATELETS 260 298 249 228 226 284  --  266   NEUTROS ABS  --  5.18 6.03 7.32* 7.73* 10.50*  --   --    IMMATURE GRANS (ABS)  --  0.12* 0.09* 0.13* 0.15* 0.25*  --   --    LYMPHS ABS  --  1.18 0.99 0.91 0.81 0.87  --   --    MONOS ABS  --  0.61 0.64 0.96* 0.96* 0.83  --   --    EOS ABS  --  0.34 0.22 0.16 0.07 0.01  --   --    .2* 102.3* 102.4* 105.4* 102.1* 101.1*  --  103.0*   PROTIME  --   --   --   --   --   --  14.7*  --    HEPARIN ANTI-XA  --   --   --   --   --   --   --  0.10*         Lab  04/18/23  0454 04/17/23  0423 04/16/23  0414 04/15/23  0343 04/14/23  0423   SODIUM 142 139 140 137 141   POTASSIUM 4.5 4.3 4.6 4.6 4.1   CHLORIDE 108* 106 109* 107 110*   CO2 22.0 23.0 22.0 19.0* 21.0*   ANION GAP 12.0 10.0 9.0 11.0 10.0   BUN 23 19 22 25* 28*   CREATININE 0.60* 0.70* 0.59* 0.74* 0.71*   EGFR 103.8 99.1 104.4 97.5 98.7   GLUCOSE 137* 188* 178* 221* 137*   CALCIUM 8.3* 8.8 8.7 8.5* 8.3*   MAGNESIUM 1.8  --  2.0 2.5* 1.5*   PHOSPHORUS 3.8  --  3.3  --  4.5         Lab 04/16/23  0414 04/12/23  0345   TOTAL PROTEIN 6.1 6.4   ALBUMIN 2.6* 2.9*   GLOBULIN 3.5 3.5   ALT (SGPT) 25 11   AST (SGOT) 37 16   BILIRUBIN 0.2 0.2   ALK PHOS 89 67         Lab 04/12/23  1557   PROTIME 14.7*   INR 1.16*                 Brief Urine Lab Results  (Last result in the past 365 days)      Color   Clarity   Blood   Leuk Est   Nitrite   Protein   CREAT   Urine HCG        03/24/23 2329 Yellow   Turbid   Small (1+)   Moderate (2+)   Negative   30 mg/dL (1+)                 Microbiology Results Abnormal     Procedure Component Value - Date/Time    Fungus Culture - Body Fluid, Pleural Cavity [[id number]] Collected: 04/12/23 1738    Lab Status: Preliminary result Specimen: Body Fluid from Pleural Cavity Updated: 04/17/23 2000     Fungus Culture No fungus isolated at less than 1 week    AFB Culture - Body Fluid, Pleural Cavity [[id number]] Collected: 04/12/23 1738    Lab Status: Preliminary result Specimen: Body Fluid from Pleural Cavity Updated: 04/17/23 2000     AFB Culture No AFB isolated at less than 1 week     AFB Stain No acid fast bacilli seen on concentrated smear    Fungus Culture - Tissue, Pleural Cavity [[id number]] Collected: 04/12/23 1747    Lab Status: Preliminary result Specimen: Tissue from Pleural Cavity Updated: 04/17/23 2000     Fungus Culture No fungus isolated at less than 1 week    AFB Culture - Tissue, Pleural Cavity [[id number]] Collected: 04/12/23 1747    Lab Status: Preliminary result Specimen: Tissue from  Pleural Cavity Updated: 04/17/23 2000     AFB Culture No AFB isolated at less than 1 week     AFB Stain No acid fast bacilli seen on concentrated smear    Anaerobic Culture - Tissue, Pleural Cavity [[id number]]  (Normal) Collected: 04/12/23 1747    Lab Status: Final result Specimen: Tissue from Pleural Cavity Updated: 04/17/23 0656     Anaerobic Culture No anaerobes isolated at 5 days    Anaerobic Culture - Body Fluid, Pleural Cavity [[id number]]  (Normal) Collected: 04/12/23 1738    Lab Status: Final result Specimen: Body Fluid from Pleural Cavity Updated: 04/17/23 0656     Anaerobic Culture No anaerobes isolated at 5 days    Tissue / Bone Culture - Tissue, Pleural Cavity [[id number]] Collected: 04/12/23 1747    Lab Status: Final result Specimen: Tissue from Pleural Cavity Updated: 04/15/23 1246     Tissue Culture No growth at 3 days     Gram Stain Many (4+) Red blood cells      Moderate (3+) WBCs seen      No organisms seen    Body Fluid Culture - Body Fluid, Pleural Cavity [[id number]] Collected: 04/12/23 1738    Lab Status: Final result Specimen: Body Fluid from Pleural Cavity Updated: 04/15/23 1245     Body Fluid Culture No growth at 3 days     Gram Stain Few (2+) WBCs per low power field      No organisms seen    Body Fluid Culture - Body Fluid, Pleural Cavity [[id number]] Collected: 03/27/23 1532    Lab Status: Final result Specimen: Body Fluid from Pleural Cavity Updated: 03/31/23 0952     Body Fluid Culture No growth at 3 days     Gram Stain Rare (1+) WBCs per low power field      No organisms seen    MRSA Screen, PCR (Inpatient) - Swab, Nares [[id number]]  (Normal) Collected: 03/27/23 0857    Lab Status: Final result Specimen: Swab from Nares Updated: 03/27/23 1107     MRSA PCR Negative    Narrative:      The negative predictive value of this diagnostic test is high and should only be used to consider de-escalating anti-MRSA therapy. A positive result may indicate colonization with MRSA and must be correlated  clinically.  MRSA Negative    COVID PRE-OP / PRE-PROCEDURE SCREENING ORDER (NO ISOLATION) - Swab, Nasopharynx [[id number]]  (Normal) Collected: 03/24/23 2256    Lab Status: Final result Specimen: Swab from Nasopharynx Updated: 03/24/23 2356    Narrative:      The following orders were created for panel order COVID PRE-OP / PRE-PROCEDURE SCREENING ORDER (NO ISOLATION) - Swab, Nasopharynx.  Procedure                               Abnormality         Status                     ---------                               -----------         ------                     Respiratory Panel PCR w/...[[id number]]  Normal              Final result                 Please view results for these tests on the individual orders.    Respiratory Panel PCR w/COVID-19(SARS-CoV-2) SULEIMAN/TAWANNA/BLAISE/PAD/COR/MAD/ANTHONY In-House, NP Swab in UTM/VTM, 3-4 HR TAT - Swab, Nasopharynx [[id number]]  (Normal) Collected: 03/24/23 2256    Lab Status: Final result Specimen: Swab from Nasopharynx Updated: 03/24/23 2356     ADENOVIRUS, PCR Not Detected     Coronavirus 229E Not Detected     Coronavirus HKU1 Not Detected     Coronavirus NL63 Not Detected     Coronavirus OC43 Not Detected     COVID19 Not Detected     Human Metapneumovirus Not Detected     Human Rhinovirus/Enterovirus Not Detected     Influenza A PCR Not Detected     Influenza B PCR Not Detected     Parainfluenza Virus 1 Not Detected     Parainfluenza Virus 2 Not Detected     Parainfluenza Virus 3 Not Detected     Parainfluenza Virus 4 Not Detected     RSV, PCR Not Detected     Bordetella pertussis pcr Not Detected     Bordetella parapertussis PCR Not Detected     Chlamydophila pneumoniae PCR Not Detected     Mycoplasma pneumo by PCR Not Detected    Narrative:      In the setting of a positive respiratory panel with a viral infection PLUS a negative procalcitonin without other underlying concern for bacterial infection, consider observing off antibiotics or discontinuation of antibiotics and continue  supportive care. If the respiratory panel is positive for atypical bacterial infection (Bordetella pertussis, Chlamydophila pneumoniae, or Mycoplasma pneumoniae), consider antibiotic de-escalation to target atypical bacterial infection.          XR Chest 1 View    Result Date: 4/18/2023  XR CHEST 1 VW Date of Exam: 4/18/2023 4:10 AM EDT Indication: Pleural effusion. Comparison: 4/17/2023 Findings: Interval removal of right-sided chest tubes. No new tubes or lines. Stable small right pleural effusion and airspace disease in the right lower lung. Left lung remains grossly clear. Heart size and pulmonary vessels are stable     Impression: Impression: No pneumothorax status post right-sided chest tube removals. Stable small right pleural effusion and airspace disease in the lower right lung Electronically Signed: David Cardoso  4/18/2023 8:29 AM EDT  Workstation ID: [id number]    XR Chest 1 View    Result Date: 4/17/2023  XR CHEST 1 VW Date of Exam: 4/17/2023 2:54 AM EDT Indication: Pleural effusion. Comparison: 4/16/2023 and prior Findings: Study is limited by overlying support and monitoring apparatus. Chest tubes overlying the right hemithorax project at the right apex and right lung base similar to the prior study. Mild volume loss on the right noted. Diffuse groundglass and interstitial  changes noted in the right hemithorax. Right-sided pleural effusion is slightly decreased from the comparison. Left-sided AICD device remains in place from a subclavian approach. The heart size is stable. Left lung is grossly clear. Osseous structures are stable     Impression: Impression: Pleural-parenchymal changes on the right with slight decrease in size of pleural effusion. Chest tubes remain in position at the right apex and right lung base. Electronically Signed: Cesar Vargas  4/17/2023 8:32 AM EDT  Workstation ID: [id number]      Results for orders placed during the hospital encounter of 10/16/22    Adult Transthoracic Echo  Complete W/ Cont if Necessary Per Protocol    Interpretation Summary  •  Estimated left ventricular EF = 30%.  There is global hypokinesis.  The basal-mid posterior wall appears akinetic.  •  Normal right ventricular cavity size, wall thickness, systolic function and septal motion noted. Electronic lead present in the ventricle  •  Septal wall motion is abnormal, consistent with right ventricular pacing  •  No hemodynamically significant valvular stenosis or regurgitation noted.      Current medications:  Scheduled Meds:acetaminophen, 1,000 mg, Oral, Q8H  vitamin C, 1,000 mg, Oral, Daily  aspirin, 81 mg, Oral, Daily  atorvastatin, 80 mg, Oral, Nightly  carvedilol, 50 mg, Oral, BID With Meals  docusate sodium, 100 mg, Oral, BID  famotidine, 20 mg, Oral, Daily  gabapentin, 100 mg, Oral, TID  insulin detemir, 10 Units, Subcutaneous, Daily  insulin lispro, 0-14 Units, Subcutaneous, 4x Daily With Meals & Nightly  Insulin Lispro, 3 Units, Subcutaneous, TID With Meals  lisinopril, 5 mg, Oral, Q24H  megestrol, 400 mg, Oral, Daily  mirtazapine, 15 mg, Oral, Nightly  pantoprazole, 40 mg, Oral, Nightly  polyethylene glycol, 17 g, Oral, Daily  senna-docusate sodium, 2 tablet, Oral, BID  sodium chloride, 10 mL, Intravenous, Q12H  terazosin, 1 mg, Oral, Nightly      Continuous Infusions:   PRN Meds:.•  senna-docusate sodium **AND** polyethylene glycol **AND** bisacodyl **AND** bisacodyl  •  Calcium Replacement - Follow Nurse / BPA Driven Protocol  •  cyclobenzaprine  •  dextrose  •  dextrose  •  glucagon (human recombinant)  •  HYDROmorphone  •  Magnesium Standard Dose Replacement - Follow Nurse / BPA Driven Protocol  •  melatonin  •  ondansetron **OR** ondansetron  •  oxyCODONE  •  Phosphorus Replacement - Follow Nurse / BPA Driven Protocol  •  Potassium Replacement - Follow Nurse / BPA Driven Protocol  •  sodium chloride  •  sodium chloride    Assessment & Plan   Assessment & Plan     Active Hospital Problems    Diagnosis  POA    • **Bilateral pulmonary embolism [I26.99]  Yes   • GERD (gastroesophageal reflux disease) [K21.9]  Yes   • Hyperlipidemia [E78.5]  Yes   • Rt Apical Lung Nodule vs Scarring (needs follow up) [R91.1]  Yes   • Right exudative pleural effusion status post decortication 4/12/2023 [J90]  Yes   • Unintentional weight loss [R63.4]  Yes   • Hypoalbuminemia [E88.09]  Yes   • Severe malnutrition [E43]  Yes   • History of CVA (cerebrovascular accident) [Z86.73]  Not Applicable   • Debility [R53.81]  Yes   • Presence of cardiac pacemaker [Z95.0]  Yes   • HTN [I10]  Yes   • HFrEF [I50.20]  Yes      Resolved Hospital Problems   No resolved problems to display.        Brief Hospital Course to date:  Timmy Guajardo is a 72 y.o. male with PMH DVT / Bilateral PE on Eliquis, HTN, HLP, HFrEF, CVA with right sided weakness residual, T2DM and GERD. In October 2022 had fall and was sent to [facility] for rehab and eliquis dose was apparently decreased to 2.5mg bid at that time for unknown reason, patient also contracted covid while there. Since that time has had worsening generalized weakness and ~50 lb weight loss.    On this occasion patient was admitted 3/24/23 to the Hospitalist service for progressive weakness, functional decline and unintentional weight loss. CTA c/a/p revealed bilateral pulmonary emboli, partially loculated moderate right pleural effusion, emphysema, moderate colonic stool burden with mild inspissated rectal stool burden; otherwise no overt malinancy or mass noted. Oncology was consulted and felt the PE's were induced by his previous and undertreated DVT (on lower dose eliquis) and recommended full dose anticoagulation and age appropriate malignancy screening as outpatient. Patient was initially placed on a heparin drip with a right-sided chest tube placed in IR 3/27/23 c/w exudative effusion. Small bore CT was discontinued 3/30/23. CTS was consulted who placed a right large bore chest tube on 4/4, however subsequent  CT chest revealed persistent right loculated pleural effusion with some increased hemorrhage/clot and right apical nodule vs scarring. He had some bleeding from and around chest tube site and heparin was held 4/6/23. Large clot was extracted from the tubing by stripping the tubes, silver nitrate sticks were applied and stitch was placed around chest tube with improvement. Heparin was restarted. Patient underwent right VATS decortication with mechanical pleurodesis on 4/12 by Dr. Mahan and post procedure transferred to ICU. Patient's condition improved and patient was transferred out of icu to telemetry on 4/18/23 and hospitalist assumed care.    Bilateral PE's  -per heme-onc, likely due to undertreated dvt/pE (eliquis was decreased to 2.5mg bid at rehab last fall)  -3/24/23 ct angio chest segmental LLL and right upper lobe PE's (no strain)  -have been holding anticoagulation post-op til ok'd to restart by ct surgery  -d/w CTS on 4/18/23, ok to start eliquis 5mg bid    S/p vats decortication for loculated pleural effusion  -s/p right vats/decortication by Dr. Mahan pod #6  -biopsy negative  -chest tubes removed by CTSx on 4/17/23  -continue pulm toilet    DM  -continue basal bolus insulins, titrate prn    Hx cad  Hx HFrEF (ef 30% per 10/24/22 echo)  Hx cardiac pacer  -continue asa 81, high dose statin, coreg 50mg bid, lisinopril 5mg  -currently euvolemic    Hx cva w/ mild right residual weakness  -asa 81 (also eliquis for PE), high dose statin    Question of RUL apical nodule vs scarring  -outpatient f/u imaging in 3-6 months    ~50 lbs weight loss  -no overt mass noted on imaging; oncology recommends outpatient malignancy screening ; also follow up ct chest 3-6 months follow up rul nodule vs scarring    Thrush  -nystatin s&s    Dispo: rehab once set up, possibly medically ready by 4/19/23 if ok w/ ct surgery     Am labs: cbc,bmp      DVT prophylaxis:  Mechanical DVT prophylaxis orders are present.     AM-PAC 6  Clicks Score (PT): 12 (04/18/23 0946)    CODE STATUS:   Code Status and Medical Interventions:   Ordered at: 03/24/23 4347     Code Status (Patient has no pulse and is not breathing):    CPR (Attempt to Resuscitate)     Medical Interventions (Patient has pulse or is breathing):    Full Support       Vince Sheridan MD  04/18/23

## 2023-04-18 NOTE — PLAN OF CARE
Problem: Fall Injury Risk  Goal: Absence of Fall and Fall-Related Injury  Intervention: Promote Injury-Free Environment  Recent Flowsheet Documentation  Taken 4/18/2023 0405 by Marilynn Hill RN  Safety Promotion/Fall Prevention:   activity supervised   assistive device/personal items within reach   safety round/check completed  Taken 4/18/2023 0210 by Marilynn Hill RN  Safety Promotion/Fall Prevention:   activity supervised   assistive device/personal items within reach   safety round/check completed  Taken 4/18/2023 0010 by Marilynn Hill RN  Safety Promotion/Fall Prevention:   activity supervised   assistive device/personal items within reach   safety round/check completed  Taken 4/17/2023 2205 by Marilynn Hill RN  Safety Promotion/Fall Prevention:   activity supervised   assistive device/personal items within reach   safety round/check completed  Taken 4/17/2023 2010 by Marilynn Hill RN  Safety Promotion/Fall Prevention:   activity supervised   assistive device/personal items within reach   safety round/check completed     Problem: Skin Injury Risk Increased  Goal: Skin Health and Integrity  Intervention: Optimize Skin Protection  Recent Flowsheet Documentation  Taken 4/18/2023 0405 by Marilynn Hill RN  Head of Bed (HOB) Positioning: HOB elevated  Taken 4/18/2023 0210 by Marilynn Hill RN  Pressure Reduction Techniques: frequent weight shift encouraged  Head of Bed (HOB) Positioning: HOB elevated  Pressure Reduction Devices: positioning supports utilized  Skin Protection:   adhesive use limited   incontinence pads utilized  Taken 4/18/2023 0010 by Marilynn Hill RN  Pressure Reduction Techniques: frequent weight shift encouraged  Head of Bed (HOB) Positioning: HOB elevated  Pressure Reduction Devices: positioning supports utilized  Skin Protection:   adhesive use limited   incontinence pads utilized  Taken 4/17/2023 2205 by Marilynn Hill RN  Pressure Reduction Techniques: frequent weight shift encouraged  Head of  Bed ([facility]) Positioning: [facility] elevated  Pressure Reduction Devices: positioning supports utilized  Skin Protection:   adhesive use limited   incontinence pads utilized  Taken 4/17/2023 2010 by Marilynn Hill RN  Pressure Reduction Techniques: frequent weight shift encouraged  Head of Bed ([facility]) Positioning: [facility] elevated  Pressure Reduction Devices: positioning supports utilized  Skin Protection:   adhesive use limited   incontinence pads utilized     Problem: Adult Inpatient Plan of Care  Goal: Absence of Hospital-Acquired Illness or Injury  Intervention: Identify and Manage Fall Risk  Recent Flowsheet Documentation  Taken 4/18/2023 0405 by Marilynn Hill RN  Safety Promotion/Fall Prevention:   activity supervised   assistive device/personal items within reach   safety round/check completed  Taken 4/18/2023 0210 by Marilynn Hill RN  Safety Promotion/Fall Prevention:   activity supervised   assistive device/personal items within reach   safety round/check completed  Taken 4/18/2023 0010 by Marilynn Hill RN  Safety Promotion/Fall Prevention:   activity supervised   assistive device/personal items within reach   safety round/check completed  Taken 4/17/2023 2205 by Marilynn Hill RN  Safety Promotion/Fall Prevention:   activity supervised   assistive device/personal items within reach   safety round/check completed  Taken 4/17/2023 2010 by Marilynn Hill RN  Safety Promotion/Fall Prevention:   activity supervised   assistive device/personal items within reach   safety round/check completed     Problem: Adult Inpatient Plan of Care  Goal: Absence of Hospital-Acquired Illness or Injury  Intervention: Prevent and Manage VTE (Venous Thromboembolism) Risk  Recent Flowsheet Documentation  Taken 4/18/2023 0405 by Marilynn Hill RN  Activity Management: activity encouraged  Taken 4/18/2023 0210 by Marilynn Hill RN  Activity Management: activity encouraged  Taken 4/18/2023 0010 by Marilynn Hill RN  Activity Management: activity  encouraged  Taken 4/17/2023 2205 by Marilynn Hill RN  Activity Management: activity encouraged  Taken 4/17/2023 2010 by Marilynn Hill RN  Activity Management: activity encouraged     Problem: Respiratory Compromise (Pneumothorax)  Goal: Optimal Oxygenation and Ventilation  Intervention: Manage Pneumothorax Effects  Recent Flowsheet Documentation  Taken 4/18/2023 0405 by Marilynn Hill RN  Patient Tolerance (IS): fair  Taken 4/18/2023 0210 by Marilynn Hill RN  Patient Tolerance (IS): fair  Taken 4/18/2023 0010 by Marilynn Hill RN  Patient Tolerance (IS): fair  Taken 4/17/2023 2205 by Marilynn Hill RN  Patient Tolerance (IS): fair  Taken 4/17/2023 2010 by Marilynn Hill RN  Patient Tolerance (IS): fair   Goal Outcome Evaluation:           Progress: improving

## 2023-04-18 NOTE — THERAPY TREATMENT NOTE
Patient Name: Timmy Guajardo  : 1950    MRN: [id number]                              Today's Date: 2023       Admit Date: 3/24/2023    Visit Dx:     ICD-10-CM ICD-9-CM   1. Weight loss  R63.4 783.21   2. Unintentional weight loss  R63.4 783.21   3. 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 Pleural effusion  J90 511.9   5. Shortness of breath  R06.02 786.05     Patient Active Problem List   Diagnosis   • Acute right-sided weakness   • Suspected cerebrovascular accident (CVA)   • Leukocytosis   • HFrEF   • Thrombocytopenia   • HTN   • Presence of cardiac pacemaker   • Hyperglycemia   • Gangrene of toe of left foot   • Closed displaced intertrochanteric fracture of right femur, initial encounter   • Right exudative pleural effusion status post decortication 2023   • Unintentional weight loss   • Hypoalbuminemia   • Severe malnutrition   • Bilateral pulmonary embolism   • History of CVA (cerebrovascular accident)   • Debility   • GERD (gastroesophageal reflux disease)   • Hyperlipidemia   • Rt Apical Lung Nodule vs Scarring (needs follow up)     Past Medical History:   Diagnosis Date   • Cardiomyopathy    • CHF (congestive heart failure)    • Coronary artery disease    • Diabetes mellitus    • GERD (gastroesophageal reflux disease)    • HTN (hypertension)    • Stroke     Right sided weakness   • Stroke      Past Surgical History:   Procedure Laterality Date   • AMPUTATION DIGIT Left 2021    Procedure: AMPUTATION DIGIT - GREAT TOE AMPUTATION LEFT;  Surgeon: Dario Marmolejo MD;  Location: [facility] OR;  Service: General;  Laterality: Left;   • AORTAGRAM N/A 2021    Procedure: AORTAGRAM WITH RUNOFFS, LEFT SFA BALLOON ANGIOPLASTY;  Surgeon: Tim Mahan MD;  Location: [facility];  Service: Vascular;  Laterality: N/A;  FL TIME 6 MIN 54 SECS  82 MGY  CONTRAST VISIPAQUE 100ML     • CARDIAC CATHETERIZATION     • CARDIAC PACEMAKER PLACEMENT      pacemaker/defibrillator, [employer]   • COLONOSCOPY N/A 3/31/2023    Procedure: COLONOSCOPY;  Surgeon: Brunner, Mark I, MD;  Location:  [facility];  Service: Gastroenterology;  Laterality: N/A;   • ENDOSCOPY N/A 3/29/2023    Procedure: ESOPHAGOGASTRODUODENOSCOPY;  Surgeon: Brunner, Mark I, MD;  Location:  [facility];  Service: Gastroenterology;  Laterality: N/A;   • HERNIA REPAIR Bilateral     Inguinal hernia   • HIP TROCHANTERIC NAILING WITH INTRAMEDULLARY HIP SCREW Right 10/18/2022    Procedure: HIP TROCHANTERIC NAILING WITH INTRAMEDULLARY HIP SCREW RIGHT;  Surgeon: Bj Steinberg MD;  Location:  [facility];  Service: Orthopedics;  Laterality: Right;   • THORACOSCOPY VIDEO ASSISTED WITH LOBECTOMY Right 4/12/2023    Procedure: BRONCHOSCOPY, THORACOSCOPY VIDEO ASSISTED WITH DECORTICATION, MECHANICAL PLEURODESIS;  Surgeon: Tim Mahan MD;  Location:  [facility];  Service: Cardiothoracic;  Laterality: Right;      General Information     Row Name 04/18/23 0935          Physical Therapy Time and Intention    Document Type therapy note (daily note)  -AS     Mode of Treatment physical therapy  -AS     Row Name 04/18/23 0935          General Information    Patient Profile Reviewed yes  -AS     Existing Precautions/Restrictions fall;other (see comments)  remote CVA with R sided weakness, R knee buckling with weight bearing  -AS     Barriers to Rehab medically complex;previous functional deficit;cognitive status  -AS     Row Name 04/18/23 0935          Cognition    Orientation Status (Cognition) oriented x 3  -AS     Row Name 04/18/23 0935          Safety Issues, Functional Mobility    Safety Issues Affecting Function (Mobility) awareness of need for assistance;insight into deficits/self-awareness;safety precaution awareness;safety precautions follow-through/compliance;sequencing abilities  -AS     Impairments Affecting Function (Mobility) balance;cognition;coordination;endurance/activity tolerance;postural/trunk control;range of motion  (ROM);pain;strength  -AS     Cognitive Impairments, Mobility Safety/Performance awareness, need for assistance;safety precaution awareness;safety precaution follow-through;sequencing abilities  -AS     Comment, Safety Issues/Impairments (Mobility) R knee buckling with standing activity and transfer to recliner  -AS           User Key  (r) = Recorded By, (t) = Taken By, (c) = Cosigned By    Initials Name Provider Type    AS Alie Love PTA Physical Therapist Assistant               Mobility     Row Name 04/18/23 0936          Bed Mobility    Supine-Sit [place] (Bed Mobility) verbal cues;minimum assist (75% patient effort);1 person assist  -AS     Assistive Device (Bed Mobility) head of bed elevated;bed rails  -AS     Comment, (Bed Mobility) cues for sequencing, assist for BLEs and at trunk to complete transfer  -AS     Row Name 04/18/23 0936          Transfers    Comment, (Transfers) bed>recliner with Mod assist x2 and UE support, cues for upright posture, keeps L knee flexed and has forward flexed posture throughout transfer. Assist to block R knee due to buckling.  -AS     Row Name 04/18/23 0936          Bed-Chair Transfer    Bed-Chair [place] (Transfers) moderate assist (50% patient effort);2 person assist;verbal cues  -AS     Comment, (Bed-Chair Transfer) B UE support due to safety concerns  -AS     Row Name 04/18/23 09          Sit-Stand Transfer    Sit-Stand [place] (Transfers) verbal cues;moderate assist (50% patient effort);2 person assist  -AS     Comment, (Sit-Stand Transfer) B UE support  -AS     [facility] Name 04/18/23 09          Gait/Stairs (Locomotion)    [place] Level (Gait) moderate assist (50% patient effort);2 person assist;verbal cues  -AS     Assistive Device (Gait) other (see comments)  B UE support  -AS     Distance in Feet (Gait) 3  -AS     Deviations/Abnormal Patterns (Gait) base of support, narrow;ericka decreased;festinating/shuffling;stride length decreased  -AS      Bilateral Gait Deviations forward flexed posture;heel strike decreased;knee buckling bilaterally  -AS     Right Sided Gait Deviations weight shift ability decreased;knee buckling, right side;foot drop/toe drag  -AS     Comment, (Gait/Stairs) patient took 3 steps to recliner with Mod assist x2 and B UE support, cues for upright posture and manual assist to block R knee from buckling. Progress limited by weakness and R knee buckling.  -AS           User Key  (r) = Recorded By, (t) = Taken By, (c) = Cosigned By    Initials Name Provider Type    AS Alie Love PTA Physical Therapist Assistant               Obj/Interventions     Row Name 04/18/23 0943          Motor Skills    Therapeutic Exercise knee;ankle  -AS     Row Name 04/18/23 0943          Knee (Therapeutic Exercise)    Knee (Therapeutic Exercise) strengthening exercise  -AS     Knee Strengthening (Therapeutic Exercise) bilateral;marching while seated;LAQ (long arc quad);sitting;10 repetitions  -AS     [facility] Name 04/18/23 0943          Ankle (Therapeutic Exercise)    Ankle (Therapeutic Exercise) AROM (active range of motion)  -AS     Ankle AROM (Therapeutic Exercise) bilateral;dorsiflexion;plantarflexion;sitting;10 repetitions  -AS     Row Name 04/18/23 0943          Balance    Dynamic Standing Balance verbal cues;moderate assist;2-person assist  -AS     Position/Device Used, Standing Balance supported;other (see comments)  B UE support  -AS     Comment, Balance unsteadiness and LOB when turning towards chair due to R LE weakness/buckling  -AS           User Key  (r) = Recorded By, (t) = Taken By, (c) = Cosigned By    Initials Name Provider Type    AS Alie Love PTA Physical Therapist Assistant               Goals/Plan    No documentation.                Clinical Impression     Row Name 04/18/23 0944          Pain    Pretreatment Pain Rating 0/10 - no pain  -AS     Posttreatment Pain Rating 0/10 - no pain  -AS     Row Name 04/18/23 0944           Plan of Care Review    Plan of Care Reviewed With patient  -AS     Progress no change  -AS     Outcome Evaluation patient took 3 steps to recliner with Mod assist x2 and B UE support, cues for upright posture and manual assist to block R knee from buckling. Progress limited by weakness and R knee buckling. Completed B LE exercises. Patient is below baseline level of function due to weakness, decreased strength and impaired balance. Recommend SNF at baseline.  -AS     Row Name 04/18/23 0944          Positioning and Restraints    Pre-Treatment Position in bed  -AS     Post Treatment Position chair  -AS     In Chair reclined;call light within reach;notified nsg;exit alarm on;encouraged to call for assist;waffle cushion;legs elevated;on mechanical lift sling  -AS           User Key  (r) = Recorded By, (t) = Taken By, (c) = Cosigned By    Initials Name Provider Type    AS Alie Love PTA Physical Therapist Assistant               Outcome Measures     Row Name 04/18/23 0946          How much help from another person do you currently need...    Turning from your back to your side while in flat bed without using bedrails? 3  -AS     Moving from lying on back to sitting on the side of a flat bed without bedrails? 2  -AS     Moving to and from a bed to a chair (including a wheelchair)? 2  -AS     Standing up from a chair using your arms (e.g., wheelchair, bedside chair)? 2  -AS     Climbing 3-5 steps with a railing? 1  -AS     To walk in hospital room? 2  -AS     AM-PAC 6 Clicks Score (PT) 12  -AS     Highest level of mobility 4 --> Transferred to chair/commode  -AS     Row Name 04/18/23 0946          Functional Assessment    Outcome Measure Options AM-PAC 6 Clicks Basic Mobility (PT)  -AS           User Key  (r) = Recorded By, (t) = Taken By, (c) = Cosigned By    Initials Name Provider Type    AS Alie Love PTA Physical Therapist Assistant                             Physical Therapy Education      Title: PT OT SLP Therapies (In Progress)     Topic: Physical Therapy (In Progress)     Point: Mobility training (In Progress)     Learning Progress Summary           Patient Acceptance, E, NR by AS at 4/18/2023 0946    Acceptance, E, NR by KG at 4/15/2023 1321    Acceptance, E,TB, VU,NR by AY at 4/13/2023 1154    Acceptance, E, VU,NR by NS at 4/11/2023 1611    Comment: benefits of OOB activity, ther ex, purpose of IP PT    Acceptance, E, VU,NR by ML at 4/7/2023 1151    Acceptance, E, VU by LH at 4/5/2023 1554    Acceptance, E, VU,NR by NS at 4/3/2023 0926    Acceptance, E, VU,NR by ML at 3/31/2023 1604    Acceptance, E, VU,NR by ML at 3/28/2023 1323    Acceptance, E,TB, VU,NR by KL at 3/26/2023 1536   Family Acceptance, E,TB, VU,NR by KL at 3/26/2023 1536                   Point: Home exercise program (In Progress)     Learning Progress Summary           Patient Acceptance, E, NR by AS at 4/18/2023 0946    Acceptance, E, NR by KG at 4/15/2023 1321    Acceptance, E, VU,NR by NS at 4/11/2023 1611    Comment: benefits of OOB activity, ther ex, purpose of IP PT    Acceptance, E, VU,NR by ML at 4/7/2023 1151    Acceptance, E, VU by LH at 4/5/2023 1554    Acceptance, E, VU,NR by NS at 4/3/2023 0926    Acceptance, E, VU,NR by ML at 3/31/2023 1604    Acceptance, E, VU,NR by ML at 3/28/2023 1323    Acceptance, E,TB, VU,NR by KL at 3/26/2023 1536   Family Acceptance, E,TB, VU,NR by KL at 3/26/2023 1536                   Point: Body mechanics (In Progress)     Learning Progress Summary           Patient Acceptance, E, NR by AS at 4/18/2023 0946    Acceptance, E, NR by KG at 4/15/2023 1321    Acceptance, E,TB, VU,NR by AY at 4/13/2023 1154    Acceptance, E, VU,NR by NS at 4/11/2023 1611    Comment: benefits of OOB activity, ther ex, purpose of IP PT    Acceptance, E, VU,NR by ML at 4/7/2023 1151    Acceptance, E, VU by LH at 4/5/2023 1554    Acceptance, E, VU,NR by NS at 4/3/2023 0926    Acceptance, E,TB, VU,NR by SELVIN at  3/26/2023 1536   Family Acceptance, E,TB, VU,NR by KL at 3/26/2023 1536                   Point: Precautions (In Progress)     Learning Progress Summary           Patient Acceptance, E, NR by AS at 4/18/2023 0946    Acceptance, E, NR by KG at 4/15/2023 1321    Acceptance, E,TB, VU,NR by AY at 4/13/2023 1154    Acceptance, E, VU,NR by NS at 4/11/2023 1611    Comment: benefits of OOB activity, ther ex, purpose of IP PT    Acceptance, E, VU,NR by ML at 4/7/2023 1151    Acceptance, E, VU by LH at 4/5/2023 1554    Acceptance, E, VU,NR by NS at 4/3/2023 0926    Acceptance, E, VU,NR by ML at 3/31/2023 1604    Acceptance, E, VU,NR by ML at 3/28/2023 1323    Acceptance, E,TB, VU,NR by KL at 3/26/2023 1536   Family Acceptance, E,TB, VU,NR by KL at 3/26/2023 1536                               User Key     Initials Effective Dates Name Provider Type Discipline    AS 02/03/23 -  Alie Love, PTA Physical Therapist Assistant PT    KG 05/22/20 -  Melonie Beaulieu, PT Physical Therapist PT    KL 11/02/22 -  Gabino Tirado, PT Physical Therapist PT    NS 06/16/21 -  Juliet Kuhn, PT Physical Therapist PT    AY 11/10/20 -  Alie Trujillo, PT Physical Therapist PT    ML 04/22/21 -  Sharon Heller Physical Therapist PT     09/21/21 -  Ernst Reid, PT Physical Therapist PT              PT Recommendation and Plan     Plan of Care Reviewed With: patient  Progress: no change  Outcome Evaluation: patient took 3 steps to recliner with Mod assist x2 and B UE support, cues for upright posture and manual assist to block R knee from buckling. Progress limited by weakness and R knee buckling. Completed B LE exercises. Patient is below baseline level of function due to weakness, decreased strength and impaired balance. Recommend SNF at baseline.     Time Calculation:    PT Charges     Row Name 04/18/23 0946             Time Calculation    Start Time 0852  -AS      PT Received On 04/18/23  -AS      PT Goal Re-Cert Due Date  04/23/23  -AS         Timed Charges    64566 - PT Therapeutic Exercise Minutes 15  -AS      91816 - PT Therapeutic Activity Minutes 8  -AS         Total Minutes    Timed Charges Total Minutes 23  -AS       Total Minutes 23  -AS            User Key  (r) = Recorded By, (t) = Taken By, (c) = Cosigned By    Initials Name Provider Type    AS Alie Love PTA Physical Therapist Assistant              Therapy Charges for Today     Code Description Service Date Service Provider Modifiers Qty    24080788295 HC PT THER PROC EA 15 MIN 4/18/2023 Alie Love, AMARILYS GP 1    78994823591 HC PT THERAPEUTIC ACT EA 15 MIN 4/18/2023 Alie Love, AMARILYS GP 1    60650046292 HC PT THER SUPP EA 15 MIN 4/18/2023 Alie Love, AMARILYS GP 2          PT G-Codes  Outcome Measure Options: AM-PAC 6 Clicks Basic Mobility (PT)  AM-PAC 6 Clicks Score (PT): 12  AM-PAC 6 Clicks Score (OT): 16       Alie Love PTA  4/18/2023

## 2023-04-18 NOTE — CONSULTS
Clinical Nutrition     Nutrition Support Assessment  Reason for Visit: Follow-up protocol    Patient Name: Timmy Guajardo  YOB: 1950  MRN: [id number]  Date of Encounter: 04/17/23 22:43 EDT  Admission date: 3/24/2023    Pt rpts cont doing well eating. Uses suppl in addition to food and food ordered out.     Nutrition Assessment   Admission Diagnosis:  Pleural effusion [J90]      Problem List:    Bilateral pulmonary embolism    HFrEF    HTN    Presence of cardiac pacemaker    Right exudative pleural effusion status post decortication 4/12/2023    Unintentional weight loss    Hypoalbuminemia    Severe malnutrition    History of CVA (cerebrovascular accident)    Debility    GERD (gastroesophageal reflux disease)    Hyperlipidemia    Rt Apical Lung Nodule vs Scarring (needs follow up)  Severe malnutrition      PMH:   He  has a past medical history of Cardiomyopathy, CHF (congestive heart failure), Coronary artery disease, Diabetes mellitus, GERD (gastroesophageal reflux disease), HTN (hypertension), Stroke, and Stroke.    PSH:  He  has a past surgical history that includes Cardiac pacemaker placement; Hernia repair (Bilateral); Cardiac catheterization; Aortagram (N/A, 5/11/2021); Finger amputation (Left, 5/12/2021); Hip Trochanteric Nailing (Right, 10/18/2022); Esophagogastroduodenoscopy (N/A, 3/29/2023); Colonoscopy (N/A, 3/31/2023); and thoracoscopy video assisted with lobectomy (Right, 4/12/2023).      Applicable Nutrition Concerns:   Skin:  Oral: edentulous  GI:       Applicable Interval History:         Reported/Observed/Food/Nutrition Related History:     4/17) Pt rpts cont to do well. If no Ensure suppl wants Boost instead - not Premier Protein.     4/10) Pt rpts now doing well eating. Allows uses suppl, gives menu choices and cont ordering out for food he chooses.   Obs had taken all of suppl at time of visit and was waiting for food order to arrive.    4/4) Pt sitting up in bed  eating breakfast at time visit. Tells me he does not like his breakfast or vanilla boost, wants chocolate. Denied offer for new breakfast but agreed to karolina boost, ordered. He is happy to report that despite this meal, overall he is eating significantly better. He tells me the nystatin has helped his thrush greatly, upset because he thought taste changes were r/t COVID and feels this could have been addressed sooner. In addition, the 2 appetite stimulants he has been started on (remeron and megace) have been helping. He tells me only barrier left is not liking some of the food in hospital and he is concerned this will be worse at rehab. He tells me he has been ordering out most dinner meals. He states has been told he cannot order certain things and would like diet liberalized to assist with this. Specifically fritz is the only breakfast protein avail that he likes so would like this. RD encouraged continuing to prioritize nutrition as needed, especially with limited food acceptance in hospital/rehab. He is still drinking boost but not when vanilla, order updated. He does endorse feelings of fullness with boost now that he is eating more food at meals, encouraged/educated on HP ONS 1x/d. Pt very pleasant and appreciative, receptive to diet education/counseling. He denied further needs or concerns at this time.     3/25) Pt lying in bed - able to provide weight/nutrition hx. Endorsed anorexia for ~4 months following COVID infection with change in taste. Reports difficulty with PO intake began in November. Denies difficulty chewing/swallowing. Of note, pt had teeth pulled with plans for implants in sept/oct but unable to complete procedure 2/2 hip fx with repair followed by rehab. Endorsed increased time to eat a meal. Drinks ONS 2-3d daily to supplement poor intake. Continued functional decline noted. Pt reports significant weight loss of 58# in 5 months however weight hx unable to verify. Endorsed severe  "constipation prior to admission - received enema and produced BM x3. NKFA     Labs    Labs Reviewed: Yes     Results from last 7 days   Lab Units 04/17/23  0423 04/16/23  0414 04/15/23  0343 04/14/23  0423 04/13/23  0410 04/12/23  0345 04/11/23  0504   GLUCOSE mg/dL 188* 178* 221* 137*   < > 242* 161*   BUN mg/dL 19 22 25* 28*   < > 17 14   CREATININE mg/dL 0.70* 0.59* 0.74* 0.71*   < > 0.72* 0.71*   SODIUM mmol/L 139 140 137 141   < > 140 144   CHLORIDE mmol/L 106 109* 107 110*   < > 106 110*   POTASSIUM mmol/L 4.3 4.6 4.6 4.1   < > 4.4 4.4   PHOSPHORUS mg/dL  --  3.3  --  4.5  --   --   --    MAGNESIUM mg/dL  --  2.0 2.5* 1.5*  --   --   --    ALT (SGPT) U/L  --  25  --   --   --  11 8    < > = values in this interval not displayed.       Results from last 7 days   Lab Units 04/16/23 0414 04/12/23  0345 04/11/23  0504   ALBUMIN g/dL 2.6* 2.9* 3.1*       Results from last 7 days   Lab Units 04/17/23 2028 04/17/23  1636 04/17/23  1113 04/17/23  0659 04/16/23 2011 04/16/23  1609   GLUCOSE mg/dL 112 234* 164* 158* 205* 236*     Lab Results   Lab Value Date/Time    HGBA1C 6.30 (H) 03/25/2023 0722    HGBA1C 6.50 (H) 10/17/2022 0422                 Medications    Medications Reviewed: Yes  Pertinent: Vit C, colace, pepcid, insulin, megace, remeron, miralax, pericolace  Infusion:   PRN:     Intake/Ouptut 24 hrs (0701 - 0700)   I&O's Reviewed: Yes   4/17  BM x3    Anthropometrics     Height: Height: 177.8 cm (70\")  Last Filed Weight: Weight: 64.9 kg (143 lb 1.3 oz) (04/16/23 0500)  Method: bed scale  BMI: BMI (Calculated): 20.5  BMI classification: Normal: 18.5-24.9kg/m2  IBW:  160lbs    UBW:     Last 15 Recorded Weights  View Complete Flowsheet  Weight Weight (kg) Weight (lbs) Weight Method VISIT REPORT   3/25/2023 56.7 kg 125 lb - -   3/24/2023 51.71 kg 114 lb Stated -   11/23/2022 72.576 kg 160 lb Stated Report   10/24/2022 82.146 kg 181 lb 1.6 oz - -   10/17/2022 83.008 kg 183 lb Stated -   10/17/2022 83.008 kg 183 " lb - -   10/17/2022 83.144 kg 183 lb 4.8 oz - -   10/16/2022 77.565 kg 171 lb Stated -   4/25/2022 75.479 kg 166 lb 6.4 oz - -   8/4/2021 73.483 kg 162 lb - Report   6/30/2021 77.565 kg 171 lb - Report   5/26/2021 77.565 kg 171 lb - Report     Weight change:   Noted 58# (31.6%) weight loss in 5 months    Nutrition Focused Physical Exam     Date: 3/25    Patient meets criteria for malnutrition diagnosis, see MSA note.    Current Nutrition Prescription     PO: Diet: Regular/House Diet; Texture: Regular Texture (IDDSI 7); Fluid Consistency: Thin (IDDSI 0)  Oral Nutrition Supplement: Ensure HP or Boost Glucose Control daily, Boost Glucose Control 2x/da    Intake:  5 Days 58% x 6 meals - also taking suppl and extra food from outside.    Nutrition Diagnosis   Date: 3/25 Updated: 4/4, 4/10  Problem Malnutrition chronic, severe   Etiology Dysgeusia, anorexia   Signs/Symptoms severe muscle wasting and subcutaneous fat loss, <75% of EEN for >1 month, & a significant wt loss of 31.6% weight loss in 5 months   Status: ongoing, PO intakes improved    Date:  3/25 Updated:4/10  Problem Predicted suboptimal energy intake   Etiology Continued dysgeusia   Signs/Symptoms NPO x1   Status: resolved On regular diet w suppl - apparent good intake    Goal:   General: Nutrition to support treatment  PO: Maintain intake per report   EN/PN: N/A    Nutrition Intervention      Follow treatment progress, Care plan reviewed, Advise alternate selection, Encourage intake      Monitoring/Evaluation:   Per protocol, I&O, PO intake, Supplement intake, Pertinent labs, Weight, Symptoms, POC/GOC      Zahraa Huynh RD  Time Spent: 25min

## 2023-04-18 NOTE — CASE MANAGEMENT/SOCIAL WORK
"Continued Stay Note  [facility]     Patient Name: Timmy Guajardo  MRN: [id number]  Today's Date: 4/18/2023    Admit Date: 3/24/2023    Plan: IPR   Discharge Plan     Row Name 04/18/23 1103       Plan    Plan Comments [facility] cannot offer pt a bed at this time. CM spoke with pt who reported understanding and is agreeable to referrals \"anywhere but [facility]\". Referrals have been made to [facility] and [facility].               Discharge Codes    No documentation.               Expected Discharge Date and Time     Expected Discharge Date Expected Discharge Time    Apr 18, 2023             Rosenda Maradiaga RN    "

## 2023-04-18 NOTE — PROGRESS NOTES
Cardiothoracic Surgery Progress Note      POD # 6 s/p Right VATS decortication     LOS: 25 days      Subjective:  No complaints.  Eating breakfast.    Objective:  Vital Signs  Temp:  [97.5 °F (36.4 °C)-99.2 °F (37.3 °C)] 99.2 °F (37.3 °C)  Heart Rate:  [75-94] 84  Resp:  [18-20] 20  BP: (128-150)/() 128/75    Physical Exam:   General Appearance: alert, appears stated age and cooperative   Lungs: clear to auscultation, respirations regular, respirations even and respirations unlabored   Heart: regular rhythm & normal rate, normal S1, S2 and no murmur, no gallop, no rub   Skin: Incision c/d/i  Results:    Results from last 7 days   Lab Units 04/18/23  0454   WBC 10*3/mm3 9.34   HEMOGLOBIN g/dL 8.7*   HEMATOCRIT % 28.0*   PLATELETS 10*3/mm3 260     Results from last 7 days   Lab Units 04/18/23  0454   SODIUM mmol/L 142   POTASSIUM mmol/L 4.5   CHLORIDE mmol/L 108*   CO2 mmol/L 22.0   BUN mg/dL 23   CREATININE mg/dL 0.60*   GLUCOSE mg/dL 137*   CALCIUM mg/dL 8.3*     Assessment:  POD # 6 s/p Right VATS decortication    Plan:  PT/OT  Pulmonary toilet  Pathology and cytology benign, cultures negative to date  Ok to resume anticoagulation   to arrange rehab placement - medically ready    Tim Mahan MD  04/18/23  07:19 EDT

## 2023-04-19 ENCOUNTER — APPOINTMENT (OUTPATIENT)
Dept: GENERAL RADIOLOGY | Facility: HOSPITAL | Age: 73
DRG: 163 | End: 2023-04-19
Payer: MEDICARE

## 2023-04-19 LAB
ANION GAP SERPL CALCULATED.3IONS-SCNC: 14 MMOL/L (ref 5–15)
BUN SERPL-MCNC: 17 MG/DL (ref 8–23)
BUN/CREAT SERPL: 29.3 (ref 7–25)
CALCIUM SPEC-SCNC: 8.1 MG/DL (ref 8.6–10.5)
CHLORIDE SERPL-SCNC: 109 MMOL/L (ref 98–107)
CO2 SERPL-SCNC: 17 MMOL/L (ref 22–29)
CREAT SERPL-MCNC: 0.58 MG/DL (ref 0.76–1.27)
DEPRECATED RDW RBC AUTO: 68 FL (ref 37–54)
EGFRCR SERPLBLD CKD-EPI 2021: 104.9 ML/MIN/1.73
ERYTHROCYTE [DISTWIDTH] IN BLOOD BY AUTOMATED COUNT: 17.9 % (ref 12.3–15.4)
FLUAV RNA RESP QL NAA+PROBE: NOT DETECTED
FLUBV RNA RESP QL NAA+PROBE: NOT DETECTED
FUNGUS WND CULT: NORMAL
FUNGUS WND CULT: NORMAL
GLUCOSE BLDC GLUCOMTR-MCNC: 166 MG/DL (ref 70–130)
GLUCOSE BLDC GLUCOMTR-MCNC: 203 MG/DL (ref 70–130)
GLUCOSE BLDC GLUCOMTR-MCNC: 204 MG/DL (ref 70–130)
GLUCOSE BLDC GLUCOMTR-MCNC: 206 MG/DL (ref 70–130)
GLUCOSE SERPL-MCNC: 248 MG/DL (ref 65–99)
HCT VFR BLD AUTO: 29.4 % (ref 37.5–51)
HGB BLD-MCNC: 9 G/DL (ref 13–17.7)
MCH RBC QN AUTO: 31.9 PG (ref 26.6–33)
MCHC RBC AUTO-ENTMCNC: 30.6 G/DL (ref 31.5–35.7)
MCV RBC AUTO: 104.3 FL (ref 79–97)
MYCOBACTERIUM SPEC CULT: NORMAL
MYCOBACTERIUM SPEC CULT: NORMAL
NIGHT BLUE STAIN TISS: NORMAL
NIGHT BLUE STAIN TISS: NORMAL
PLATELET # BLD AUTO: 314 10*3/MM3 (ref 140–450)
PMV BLD AUTO: 9.3 FL (ref 6–12)
POTASSIUM SERPL-SCNC: 4.5 MMOL/L (ref 3.5–5.2)
RBC # BLD AUTO: 2.82 10*6/MM3 (ref 4.14–5.8)
RSV RNA NPH QL NAA+NON-PROBE: NOT DETECTED
SARS-COV-2 RNA RESP QL NAA+PROBE: NOT DETECTED
SODIUM SERPL-SCNC: 140 MMOL/L (ref 136–145)
WBC NRBC COR # BLD: 10.4 10*3/MM3 (ref 3.4–10.8)

## 2023-04-19 PROCEDURE — 97530 THERAPEUTIC ACTIVITIES: CPT

## 2023-04-19 PROCEDURE — 71045 X-RAY EXAM CHEST 1 VIEW: CPT

## 2023-04-19 PROCEDURE — 63710000001 INSULIN LISPRO (HUMAN) PER 5 UNITS

## 2023-04-19 PROCEDURE — 80048 BASIC METABOLIC PNL TOTAL CA: CPT | Performed by: INTERNAL MEDICINE

## 2023-04-19 PROCEDURE — 99232 SBSQ HOSP IP/OBS MODERATE 35: CPT | Performed by: INTERNAL MEDICINE

## 2023-04-19 PROCEDURE — 63710000001 INSULIN DETEMIR PER 5 UNITS

## 2023-04-19 PROCEDURE — 82962 GLUCOSE BLOOD TEST: CPT

## 2023-04-19 PROCEDURE — 85027 COMPLETE CBC AUTOMATED: CPT | Performed by: INTERNAL MEDICINE

## 2023-04-19 PROCEDURE — 87637 SARSCOV2&INF A&B&RSV AMP PRB: CPT | Performed by: INTERNAL MEDICINE

## 2023-04-19 PROCEDURE — 97110 THERAPEUTIC EXERCISES: CPT

## 2023-04-19 PROCEDURE — 97535 SELF CARE MNGMENT TRAINING: CPT

## 2023-04-19 RX ADMIN — INSULIN LISPRO 5 UNITS: 100 INJECTION, SOLUTION INTRAVENOUS; SUBCUTANEOUS at 12:59

## 2023-04-19 RX ADMIN — INSULIN LISPRO 5 UNITS: 100 INJECTION, SOLUTION INTRAVENOUS; SUBCUTANEOUS at 09:40

## 2023-04-19 RX ADMIN — GABAPENTIN 100 MG: 100 CAPSULE ORAL at 09:39

## 2023-04-19 RX ADMIN — ACETAMINOPHEN 1000 MG: 500 TABLET ORAL at 12:59

## 2023-04-19 RX ADMIN — INSULIN LISPRO 3 UNITS: 100 INJECTION, SOLUTION INTRAVENOUS; SUBCUTANEOUS at 12:59

## 2023-04-19 RX ADMIN — DOCUSATE SODIUM 100 MG: 100 CAPSULE, LIQUID FILLED ORAL at 20:16

## 2023-04-19 RX ADMIN — ACETAMINOPHEN 1000 MG: 500 TABLET ORAL at 05:13

## 2023-04-19 RX ADMIN — INSULIN LISPRO 3 UNITS: 100 INJECTION, SOLUTION INTRAVENOUS; SUBCUTANEOUS at 09:40

## 2023-04-19 RX ADMIN — Medication 10 ML: at 09:40

## 2023-04-19 RX ADMIN — NYSTATIN 500000 UNITS: 100000 SUSPENSION ORAL at 20:17

## 2023-04-19 RX ADMIN — INSULIN LISPRO 5 UNITS: 100 INJECTION, SOLUTION INTRAVENOUS; SUBCUTANEOUS at 16:56

## 2023-04-19 RX ADMIN — ATORVASTATIN CALCIUM 80 MG: 40 TABLET, FILM COATED ORAL at 20:16

## 2023-04-19 RX ADMIN — PANTOPRAZOLE SODIUM 40 MG: 40 TABLET, DELAYED RELEASE ORAL at 20:17

## 2023-04-19 RX ADMIN — NYSTATIN 500000 UNITS: 100000 SUSPENSION ORAL at 09:40

## 2023-04-19 RX ADMIN — LISINOPRIL 5 MG: 5 TABLET ORAL at 09:39

## 2023-04-19 RX ADMIN — INSULIN DETEMIR 10 UNITS: 100 INJECTION, SOLUTION SUBCUTANEOUS at 09:41

## 2023-04-19 RX ADMIN — ACETAMINOPHEN 1000 MG: 500 TABLET ORAL at 20:18

## 2023-04-19 RX ADMIN — FAMOTIDINE 20 MG: 20 TABLET ORAL at 09:40

## 2023-04-19 RX ADMIN — INSULIN LISPRO 3 UNITS: 100 INJECTION, SOLUTION INTRAVENOUS; SUBCUTANEOUS at 20:22

## 2023-04-19 RX ADMIN — GABAPENTIN 100 MG: 100 CAPSULE ORAL at 16:55

## 2023-04-19 RX ADMIN — APIXABAN 5 MG: 5 TABLET, FILM COATED ORAL at 20:17

## 2023-04-19 RX ADMIN — NYSTATIN 500000 UNITS: 100000 SUSPENSION ORAL at 16:55

## 2023-04-19 RX ADMIN — DOCUSATE SODIUM 100 MG: 100 CAPSULE, LIQUID FILLED ORAL at 09:39

## 2023-04-19 RX ADMIN — CARVEDILOL 50 MG: 12.5 TABLET, FILM COATED ORAL at 16:55

## 2023-04-19 RX ADMIN — POLYETHYLENE GLYCOL 3350 17 G: 17 POWDER, FOR SOLUTION ORAL at 09:40

## 2023-04-19 RX ADMIN — APIXABAN 5 MG: 5 TABLET, FILM COATED ORAL at 05:13

## 2023-04-19 RX ADMIN — SENNOSIDES AND DOCUSATE SODIUM 2 TABLET: 8.6; 5 TABLET ORAL at 20:16

## 2023-04-19 RX ADMIN — ASPIRIN 81 MG CHEWABLE TABLET 81 MG: 81 TABLET CHEWABLE at 09:40

## 2023-04-19 RX ADMIN — MEGESTROL ACETATE 400 MG: 40 SUSPENSION ORAL at 09:40

## 2023-04-19 RX ADMIN — TERAZOSIN HYDROCHLORIDE 1 MG: 1 CAPSULE ORAL at 20:17

## 2023-04-19 RX ADMIN — Medication 10 ML: at 20:17

## 2023-04-19 RX ADMIN — SENNOSIDES AND DOCUSATE SODIUM 2 TABLET: 8.6; 5 TABLET ORAL at 09:39

## 2023-04-19 RX ADMIN — GABAPENTIN 100 MG: 100 CAPSULE ORAL at 20:16

## 2023-04-19 RX ADMIN — OXYCODONE HYDROCHLORIDE AND ACETAMINOPHEN 1000 MG: 500 TABLET ORAL at 09:39

## 2023-04-19 RX ADMIN — OXYCODONE HYDROCHLORIDE 5 MG: 5 TABLET ORAL at 09:50

## 2023-04-19 RX ADMIN — INSULIN LISPRO 3 UNITS: 100 INJECTION, SOLUTION INTRAVENOUS; SUBCUTANEOUS at 16:55

## 2023-04-19 RX ADMIN — NYSTATIN 500000 UNITS: 100000 SUSPENSION ORAL at 12:59

## 2023-04-19 RX ADMIN — CARVEDILOL 50 MG: 12.5 TABLET, FILM COATED ORAL at 09:39

## 2023-04-19 RX ADMIN — MIRTAZAPINE 15 MG: 15 TABLET, FILM COATED ORAL at 20:16

## 2023-04-19 NOTE — PLAN OF CARE
Goal Outcome Evaluation:  Plan of Care Reviewed With: patient        Progress: improving  Outcome Evaluation: Pt requires encouragement for participation as he is fearful of falling. Completes all grooming with setup assist from OT. ModAx2 for transfers. Continues to present below baseline with decreased strength, balance, and over all independence with ADLS. Recommend d/c to SNF.

## 2023-04-19 NOTE — PLAN OF CARE
Goal Outcome Evaluation:  Plan of Care Reviewed With: patient        Progress: improving  Outcome Evaluation: completed bed>recliner transfer with Mod assist x2 and BUE support, cues for step sequencing, anterior weight shifting and improvement in posture. Keeps bilateral knees flexed during transfers, once in front of chair worked on balance, weight shifting and improvement in posture, patient fearful of falling limiting safe functional mobility. Patient continues to be below baseline level of funciton due to weakness, balance deficits and decreased safety awareness. Recommend SNF at d/c,

## 2023-04-19 NOTE — CASE MANAGEMENT/SOCIAL WORK
Case Management Discharge Note      Final Note: Spoke with Carolina at [facility].  Insurance precert has been approved and a bed is available on Thurs, 4/20.  RN to call report to [phone] and fax DC summary to [phone].  [facility] van to transport at 1430.  Will  in room.         Selected Continued Care - Admitted Since 3/24/2023     Destination Coordination complete.    Service Provider Selected Services Address Phone Fax Patient Preferred    [facility] [address] [facility] [address] [phone] --          Durable Medical Equipment    No services have been selected for the patient.              Dialysis/Infusion    No services have been selected for the patient.              Home Medical Care    No services have been selected for the patient.              Therapy    No services have been selected for the patient.              Community Resources    No services have been selected for the patient.              Community & DME    No services have been selected for the patient.                  Transportation Services  W/C Van:  ([facility])    Final Discharge Disposition Code: 03 - skilled nursing facility (SNF)

## 2023-04-19 NOTE — PLAN OF CARE
Problem: Fall Injury Risk  Goal: Absence of Fall and Fall-Related Injury  Intervention: Promote Injury-Free Environment  Recent Flowsheet Documentation  Taken 4/19/2023 0410 by Marilynn Hill RN  Safety Promotion/Fall Prevention:   activity supervised   assistive device/personal items within reach   safety round/check completed  Taken 4/19/2023 0210 by Marilynn Hill RN  Safety Promotion/Fall Prevention:   activity supervised   assistive device/personal items within reach   safety round/check completed  Taken 4/19/2023 0010 by Marilynn Hill RN  Safety Promotion/Fall Prevention:   activity supervised   assistive device/personal items within reach   safety round/check completed  Taken 4/18/2023 2210 by Marilynn Hill RN  Safety Promotion/Fall Prevention:   activity supervised   assistive device/personal items within reach   safety round/check completed  Taken 4/18/2023 2010 by Marilynn Hill RN  Safety Promotion/Fall Prevention:   activity supervised   assistive device/personal items within reach   safety round/check completed     Problem: Skin Injury Risk Increased  Goal: Skin Health and Integrity  Intervention: Optimize Skin Protection  Recent Flowsheet Documentation  Taken 4/19/2023 0410 by Marilynn Hill RN  Pressure Reduction Techniques: frequent weight shift encouraged  Head of Bed (HOB) Positioning: HOB elevated  Pressure Reduction Devices: positioning supports utilized  Skin Protection:   adhesive use limited   incontinence pads utilized  Taken 4/19/2023 0210 by Marilynn Hill RN  Pressure Reduction Techniques: frequent weight shift encouraged  Head of Bed (HOB) Positioning: HOB elevated  Pressure Reduction Devices: positioning supports utilized  Skin Protection:   adhesive use limited   incontinence pads utilized  Taken 4/19/2023 0010 by Marilynn iHll RN  Head of Bed (HOB) Positioning: HOB elevated  Taken 4/18/2023 2210 by Marilynn Hill RN  Pressure Reduction Techniques: frequent weight shift encouraged  Head of  Bed (HOB) Positioning: HOB elevated  Pressure Reduction Devices: positioning supports utilized  Skin Protection:   adhesive use limited   incontinence pads utilized  Taken 4/18/2023 2010 by Marilynn Hill RN  Pressure Reduction Techniques: frequent weight shift encouraged  Pressure Reduction Devices: positioning supports utilized  Skin Protection:   adhesive use limited   incontinence pads utilized     Problem: Adult Inpatient Plan of Care  Goal: Absence of Hospital-Acquired Illness or Injury  Intervention: Identify and Manage Fall Risk  Recent Flowsheet Documentation  Taken 4/19/2023 0410 by Marilynn Hill RN  Safety Promotion/Fall Prevention:   activity supervised   assistive device/personal items within reach   safety round/check completed  Taken 4/19/2023 0210 by Marilynn Hill RN  Safety Promotion/Fall Prevention:   activity supervised   assistive device/personal items within reach   safety round/check completed  Taken 4/19/2023 0010 by Marilynn Hill RN  Safety Promotion/Fall Prevention:   activity supervised   assistive device/personal items within reach   safety round/check completed  Taken 4/18/2023 2210 by Marilynn Hill RN  Safety Promotion/Fall Prevention:   activity supervised   assistive device/personal items within reach   safety round/check completed  Taken 4/18/2023 2010 by Marilynn Hill RN  Safety Promotion/Fall Prevention:   activity supervised   assistive device/personal items within reach   safety round/check completed     Problem: Adult Inpatient Plan of Care  Goal: Absence of Hospital-Acquired Illness or Injury  Intervention: Prevent Skin Injury  Recent Flowsheet Documentation  Taken 4/19/2023 0410 by Marilynn Hill RN  Skin Protection:   adhesive use limited   incontinence pads utilized  Taken 4/19/2023 0210 by Marilynn Hill RN  Skin Protection:   adhesive use limited   incontinence pads utilized  Taken 4/18/2023 2210 by Marilynn Hill RN  Skin Protection:   adhesive use limited   incontinence pads  utilized  Taken 4/18/2023 2010 by Marilynn Hill, RN  Skin Protection:   adhesive use limited   incontinence pads utilized     Problem: Adult Inpatient Plan of Care  Goal: Absence of Hospital-Acquired Illness or Injury  Intervention: Prevent and Manage VTE (Venous Thromboembolism) Risk  Recent Flowsheet Documentation  Taken 4/19/2023 0410 by Marilynn Hill, RN  Activity Management: activity encouraged  Taken 4/19/2023 0210 by Marilynn Hill, RN  Activity Management: activity encouraged  Taken 4/19/2023 0010 by Marilynn Hill, RN  Activity Management: activity encouraged  Taken 4/18/2023 2210 by Marilynn Hill, RN  Activity Management: back to bed  Taken 4/18/2023 2010 by Marilynn Hill, RN  Activity Management: up in chair   Goal Outcome Evaluation:           Progress: no change

## 2023-04-19 NOTE — THERAPY TREATMENT NOTE
Patient Name: Timmy Guajardo  : 1950    MRN: [id number]                              Today's Date: 2023       Admit Date: 3/24/2023    Visit Dx:     ICD-10-CM ICD-9-CM   1. Weight loss  R63.4 783.21   2. Unintentional weight loss  R63.4 783.21   3. 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 Pleural effusion  J90 511.9   5. Shortness of breath  R06.02 786.05     Patient Active Problem List   Diagnosis   • Acute right-sided weakness   • Suspected cerebrovascular accident (CVA)   • Leukocytosis   • HFrEF   • Thrombocytopenia   • HTN   • Presence of cardiac pacemaker   • Hyperglycemia   • Gangrene of toe of left foot   • Closed displaced intertrochanteric fracture of right femur, initial encounter   • Right exudative pleural effusion status post decortication 2023   • Unintentional weight loss   • Hypoalbuminemia   • Severe malnutrition   • Bilateral pulmonary embolism   • History of CVA (cerebrovascular accident)   • Debility   • GERD (gastroesophageal reflux disease)   • Hyperlipidemia   • Rt Apical Lung Nodule vs Scarring (needs follow up)     Past Medical History:   Diagnosis Date   • Cardiomyopathy    • CHF (congestive heart failure)    • Coronary artery disease    • Diabetes mellitus    • GERD (gastroesophageal reflux disease)    • HTN (hypertension)    • Stroke     Right sided weakness   • Stroke      Past Surgical History:   Procedure Laterality Date   • AMPUTATION DIGIT Left 2021    Procedure: AMPUTATION DIGIT - GREAT TOE AMPUTATION LEFT;  Surgeon: Dario Marmolejo MD;  Location: [facility] OR;  Service: General;  Laterality: Left;   • AORTAGRAM N/A 2021    Procedure: AORTAGRAM WITH RUNOFFS, LEFT SFA BALLOON ANGIOPLASTY;  Surgeon: Tim Mahan MD;  Location: [facility];  Service: Vascular;  Laterality: N/A;  FL TIME 6 MIN 54 SECS  82 MGY  CONTRAST VISIPAQUE 100ML     • CARDIAC CATHETERIZATION     • CARDIAC PACEMAKER PLACEMENT      pacemaker/defibrillator, [place]  [employer]   • COLONOSCOPY N/A 3/31/2023    Procedure: COLONOSCOPY;  Surgeon: Brunner, Mark I, MD;  Location:  [facility];  Service: Gastroenterology;  Laterality: N/A;   • ENDOSCOPY N/A 3/29/2023    Procedure: ESOPHAGOGASTRODUODENOSCOPY;  Surgeon: Brunner, Mark I, MD;  Location:  [facility];  Service: Gastroenterology;  Laterality: N/A;   • HERNIA REPAIR Bilateral     Inguinal hernia   • HIP TROCHANTERIC NAILING WITH INTRAMEDULLARY HIP SCREW Right 10/18/2022    Procedure: HIP TROCHANTERIC NAILING WITH INTRAMEDULLARY HIP SCREW RIGHT;  Surgeon: Bj Steinberg MD;  Location:  [facility];  Service: Orthopedics;  Laterality: Right;   • THORACOSCOPY VIDEO ASSISTED WITH LOBECTOMY Right 4/12/2023    Procedure: BRONCHOSCOPY, THORACOSCOPY VIDEO ASSISTED WITH DECORTICATION, MECHANICAL PLEURODESIS;  Surgeon: Tim Mahan MD;  Location:  [facility];  Service: Cardiothoracic;  Laterality: Right;      General Information     Row Name 04/19/23 1325          OT Time and Intention    Document Type therapy note (daily note)  -     Mode of Treatment occupational therapy  -     Row Name 04/19/23 1325          General Information    Patient Profile Reviewed yes  -     Existing Precautions/Restrictions fall;other (see comments)  Remote CVA with residual R side weakness; encouragement to participate with OT.  -     Row Name 04/19/23 1325          Cognition    Orientation Status (Cognition) oriented to;person;situation  -     Row Name 04/19/23 1325          Safety Issues, Functional Mobility    Safety Issues Affecting Function (Mobility) safety precautions follow-through/compliance;safety precaution awareness;insight into deficits/self-awareness;ability to follow commands;awareness of need for assistance;judgment;problem-solving;sequencing abilities;friction/shear risk;at risk behavior observed  -     Impairments Affecting Function (Mobility) balance;cognition;coordination;endurance/activity tolerance;postural/trunk  control;range of motion (ROM);pain;strength  -     Cognitive Impairments, Mobility Safety/Performance attention;awareness, need for assistance;insight into deficits/self-awareness;problem-solving/reasoning;judgment;sequencing abilities;safety precaution follow-through;safety precaution awareness  -           User Key  (r) = Recorded By, (t) = Taken By, (c) = Cosigned By    Initials Name Provider Type     Whitley Burt OT Occupational Therapist                 Mobility/ADL's     Row Name 04/19/23 1326          Bed Mobility    Bed Mobility scooting/bridging;supine-sit  -HM     Scooting/Bridging [place] (Bed Mobility) contact guard;verbal cues  -     Supine-Sit [place] (Bed Mobility) verbal cues;contact guard;1 person assist  -     Bed Mobility, Safety Issues decreased use of arms for pushing/pulling;decreased use of legs for bridging/pushing;impaired trunk control for bed mobility  -     Assistive Device (Bed Mobility) head of bed elevated;bed rails  -     Row Name 04/19/23 1326          Transfers    Transfers sit-stand transfer;bed-chair transfer  -     Comment, (Transfers) strong posterior lean upon standing. max verbal and tactile cues for posture. Pt very fearful to follow directions.  -     Row Name 04/19/23 1326          Bed-Chair Transfer    Bed-Chair [place] (Transfers) moderate assist (50% patient effort);2 person assist;verbal cues  -     Assistive Device (Bed-Chair Transfers) other (see comments)  UE support  -     Row Name 04/19/23 1326          Sit-Stand Transfer    Sit-Stand [place] (Transfers) verbal cues;moderate assist (50% patient effort);2 person assist  -     Assistive Device (Sit-Stand Transfers) other (see comments)  UE support  -     Row Name 04/19/23 1326          Activities of Daily Living    BADL Assessment/Intervention grooming  -     Row Name 04/19/23 1326          Grooming Assessment/Training    [place] Level (Grooming) hair care,  combing/brushing;oral care regimen;wash face, hands;supervision;verbal cues  -     Position (Grooming) supported sitting  -     Comment, (Grooming) Encouragement to participate. Self limiting behaviors at times.  -     Row Name 04/19/23 1326          Upper Body Dressing Assessment/Training    [place] Level (Upper Body Dressing) doff;don;moderate assist (50% patient effort)  -     Position (Upper Body Dressing) edge of bed sitting  -           User Key  (r) = Recorded By, (t) = Taken By, (c) = Cosigned By    Initials Name Provider Type     Whitley Burt, RICARDO Occupational Therapist               Obj/Interventions     Row Name 04/19/23 1343          Sensory Assessment (Somatosensory)    Sensory Assessment (Somatosensory) sensation intact  -     Row Name 04/19/23 1343          Vision Assessment/Intervention    Visual Impairment/Limitations WFL  -     Row Name 04/19/23 1343          Motor Skills    Motor Skills coordination  -     Coordination fine motor deficit;gross motor deficit;right;upper extremity  -     Row Name 04/19/23 1343          Balance    Balance Assessment sitting static balance;sitting dynamic balance;standing static balance;standing dynamic balance  -     Static Sitting Balance standby assist  -     Dynamic Sitting Balance contact guard  -     Position, Sitting Balance unsupported;sitting edge of bed  -     Static Standing Balance moderate assist  -     Dynamic Standing Balance moderate assist;2-person assist  -     Position/Device Used, Standing Balance supported  UE support  -     Balance Interventions sitting;occupation based/functional task  -           User Key  (r) = Recorded By, (t) = Taken By, (c) = Cosigned By    Initials Name Provider Type     Whitley Burt, RICARDO Occupational Therapist               Goals/Plan    No documentation.                Clinical Impression     Row Name 04/19/23 1341          Pain Assessment    Pretreatment Pain Rating  2/10  -     Posttreatment Pain Rating 2/10  -     Pain Location - Side/Orientation Right  -     Pain Location generalized  -     Pain Location - --  thigh  -     Pain Intervention(s) Ambulation/increased activity;Repositioned  -     Row Name 04/19/23 1020          Plan of Care Review    Plan of Care Reviewed With patient  -     Progress improving  -     Outcome Evaluation Pt requires encouragement for participation as he is fearful of falling. Completes all grooming with setup assist from OT. ModAx2 for transfers. Continues to present below baseline with decreased strength, balance, and over all independence with ADLS. Recommend d/c to SNF.  -     Row Name 04/19/23 7624          Therapy Assessment/Plan (OT)    Patient/Family Therapy Goal Statement (OT) Pt would like to improve and return home.  -     Rehab Potential (OT) fair, will monitor progress closely  -     Criteria for Skilled Therapeutic Interventions Met (OT) yes;meets criteria;skilled treatment is necessary  -     Therapy Frequency (OT) daily  -     Row Name 04/19/23 1897          Therapy Plan Review/Discharge Plan (OT)    Anticipated Discharge Disposition (OT) skilled nursing facility  -     Row Name 04/19/23 1347          Vital Signs    Pre Systolic BP Rehab --  RN cleared for tx; VSS  -     O2 Delivery Pre Treatment room air  -HM     O2 Delivery Intra Treatment room air  -HM     O2 Delivery Post Treatment room air  -     Pre Patient Position Supine  -     Intra Patient Position Standing  -     Post Patient Position Sitting  -     Row Name 04/19/23 3490          Positioning and Restraints    Pre-Treatment Position in bed  -     Post Treatment Position chair  -HM     In Chair notified nsg;reclined;call light within reach;encouraged to call for assist;exit alarm on;waffle cushion;with nsg  -           User Key  (r) = Recorded By, (t) = Taken By, (c) = Cosigned By    Initials Name Provider Type    [facility]  Whitley ADHIKARI OT Occupational Therapist               Outcome Measures     Row Name 04/19/23 1346          How much help from another is currently needed...    Putting on and taking off regular lower body clothing? 2  -HM     Bathing (including washing, rinsing, and drying) 2  -HM     Toileting (which includes using toilet bed pan or urinal) 2  -HM     Putting on and taking off regular upper body clothing 3  -HM     Taking care of personal grooming (such as brushing teeth) 3  -HM     Eating meals 4  -HM     AM-PAC 6 Clicks Score (OT) 16  -HM     Row Name 04/19/23 1144 04/19/23 0800       How much help from another person do you currently need...    Turning from your back to your side while in flat bed without using bedrails? 3  -AS 3  -AM    Moving from lying on back to sitting on the side of a flat bed without bedrails? 3  -AS 2  -AM    Moving to and from a bed to a chair (including a wheelchair)? 2  -AS 2  -AM    Standing up from a chair using your arms (e.g., wheelchair, bedside chair)? 2  -AS 2  -AM    Climbing 3-5 steps with a railing? 1  -AS 1  -AM    To walk in hospital room? 2  -AS 2  -AM    AM-PAC 6 Clicks Score (PT) 13  -AS 12  -AM    Highest level of mobility 4 --> Transferred to chair/commode  -AS 4 --> Transferred to chair/commode  -AM    Row Name 04/19/23 1346 04/19/23 1144       Functional Assessment    Outcome Measure Options AM-PAC 6 Clicks Daily Activity (OT)  - AM-PAC 6 Clicks Basic Mobility (PT)  -AS          User Key  (r) = Recorded By, (t) = Taken By, (c) = Cosigned By    Initials Name Provider Type    AS Alie Love PTA Physical Therapist Assistant    Miranda Cooper, RN Registered Nurse     Whitley Burt OT Occupational Therapist                Occupational Therapy Education     Title: PT OT SLP Therapies (In Progress)     Topic: Occupational Therapy (Done)     Point: ADL training (Done)     Description:   Instruct learner(s) on proper safety adaptation and remediation  techniques during self care or transfers.   Instruct in proper use of assistive devices.              Learning Progress Summary           Patient Acceptance, E,TB,D, VU,NR by  at 4/19/2023 1346    Acceptance, E, NR by  at 4/13/2023 1113    Acceptance, E, VU by AN at 4/10/2023 1549    Acceptance, E,D, NR by BAILEY at 4/4/2023 1408    Comment: AE use for LBD, UE TE, transfer safety,                   Point: Home exercise program (Done)     Description:   Instruct learner(s) on appropriate technique for monitoring, assisting and/or progressing therapeutic exercises/activities.              Learning Progress Summary           Patient Acceptance, E, VU by  at 4/10/2023 1549    Acceptance, E,D, NR by BAILEY at 4/4/2023 1408    Comment: AE use for LBD, UE TE, transfer safety,    Acceptance, E,D, NR by BAILEY at 3/30/2023 1438    Comment: UE and LE TE to support BADL and related transfer independence    Acceptance, E,D, VU,NR by BAILEY at 3/27/2023 1201    Comment: reason for consult, noted deficits, UE TE, bed mobility and transfer sequencing, benefit of mvmt/activity for health and not loosing strength and endurance                   Point: Precautions (Done)     Description:   Instruct learner(s) on prescribed precautions during self-care and functional transfers.              Learning Progress Summary           Patient Acceptance, E,TB,D, VU,NR by  at 4/19/2023 1346    Acceptance, E, NR by  at 4/13/2023 1113    Acceptance, E, VU by AN at 4/10/2023 1549    Acceptance, E,D, NR by BAILEY at 4/4/2023 1408    Comment: AE use for LBD, UE TE, transfer safety,    Acceptance, E,D, VU,NR by BAILEY at 3/27/2023 1201    Comment: reason for consult, noted deficits, UE TE, bed mobility and transfer sequencing, benefit of mvmt/activity for health and not loosing strength and endurance                   Point: Body mechanics (Done)     Description:   Instruct learner(s) on proper positioning and spine alignment during self-care, functional mobility  activities and/or exercises.              Learning Progress Summary           Patient Acceptance, E,TB,D, VU,NR by  at 4/19/2023 1346    Acceptance, E, NR by  at 4/13/2023 1113    Acceptance, E, VU by AN at 4/10/2023 1549    Acceptance, E,D, VU,NR by  at 3/27/2023 1201    Comment: reason for consult, noted deficits, UE TE, bed mobility and transfer sequencing, benefit of mvmt/activity for health and not loosing strength and endurance                               User Key     Initials Effective Dates Name Provider Type Discipline    BAILEY 02/03/23 -  Mary Nath, OT Occupational Therapist OT     10/25/22 -  Whitley Burt, OT Occupational Therapist OT     10/14/22 -  Greta Lawrence, OT Occupational Therapist OT    JUAQUIN 09/21/21 -  Lorena Tomlinson, OT Occupational Therapist OT              OT Recommendation and Plan  Therapy Frequency (OT): daily  Plan of Care Review  Plan of Care Reviewed With: patient  Progress: improving  Outcome Evaluation: Pt requires encouragement for participation as he is fearful of falling. Completes all grooming with setup assist from OT. ModAx2 for transfers. Continues to present below baseline with decreased strength, balance, and over all independence with ADLS. Recommend d/c to SNF.     Time Calculation:    Time Calculation- OT     Row Name 04/19/23 1117             Time Calculation- OT    OT Start Time 1117  -HM      OT Received On 04/19/23  -HM         Timed Charges    57414 - OT Self Care/Mgmt Minutes 15  -HM         Total Minutes    Timed Charges Total Minutes 15  -HM       Total Minutes 15  -HM            User Key  (r) = Recorded By, (t) = Taken By, (c) = Cosigned By    Initials Name Provider Type     Whitley Burt OT Occupational Therapist              Therapy Charges for Today     Code Description Service Date Service Provider Modifiers Qty    [id number] HC OT SELF CARE/MGMT/TRAIN EA 15 MIN 4/19/2023 Whitley Burt OT GO 1               Whitley ADHIKARI  [place], OT  4/19/2023

## 2023-04-19 NOTE — THERAPY TREATMENT NOTE
Patient Name: Timmy Guajardo  : 1950    MRN: [id number]                              Today's Date: 2023       Admit Date: 3/24/2023    Visit Dx:     ICD-10-CM ICD-9-CM   1. Weight loss  R63.4 783.21   2. Unintentional weight loss  R63.4 783.21   3. 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 Pleural effusion  J90 511.9   5. Shortness of breath  R06.02 786.05     Patient Active Problem List   Diagnosis   • Acute right-sided weakness   • Suspected cerebrovascular accident (CVA)   • Leukocytosis   • HFrEF   • Thrombocytopenia   • HTN   • Presence of cardiac pacemaker   • Hyperglycemia   • Gangrene of toe of left foot   • Closed displaced intertrochanteric fracture of right femur, initial encounter   • Right exudative pleural effusion status post decortication 2023   • Unintentional weight loss   • Hypoalbuminemia   • Severe malnutrition   • Bilateral pulmonary embolism   • History of CVA (cerebrovascular accident)   • Debility   • GERD (gastroesophageal reflux disease)   • Hyperlipidemia   • Rt Apical Lung Nodule vs Scarring (needs follow up)     Past Medical History:   Diagnosis Date   • Cardiomyopathy    • CHF (congestive heart failure)    • Coronary artery disease    • Diabetes mellitus    • GERD (gastroesophageal reflux disease)    • HTN (hypertension)    • Stroke     Right sided weakness   • Stroke      Past Surgical History:   Procedure Laterality Date   • AMPUTATION DIGIT Left 2021    Procedure: AMPUTATION DIGIT - GREAT TOE AMPUTATION LEFT;  Surgeon: Dario Marmolejo MD;  Location: [facility] OR;  Service: General;  Laterality: Left;   • AORTAGRAM N/A 2021    Procedure: AORTAGRAM WITH RUNOFFS, LEFT SFA BALLOON ANGIOPLASTY;  Surgeon: Tim Mahan MD;  Location: [facility];  Service: Vascular;  Laterality: N/A;  FL TIME 6 MIN 54 SECS  82 MGY  CONTRAST VISIPAQUE 100ML     • CARDIAC CATHETERIZATION     • CARDIAC PACEMAKER PLACEMENT      pacemaker/defibrillator, [employer]   • COLONOSCOPY N/A 3/31/2023    Procedure: COLONOSCOPY;  Surgeon: Brunner, Mark I, MD;  Location:  [facility];  Service: Gastroenterology;  Laterality: N/A;   • ENDOSCOPY N/A 3/29/2023    Procedure: ESOPHAGOGASTRODUODENOSCOPY;  Surgeon: Brunner, Mark I, MD;  Location:  [facility];  Service: Gastroenterology;  Laterality: N/A;   • HERNIA REPAIR Bilateral     Inguinal hernia   • HIP TROCHANTERIC NAILING WITH INTRAMEDULLARY HIP SCREW Right 10/18/2022    Procedure: HIP TROCHANTERIC NAILING WITH INTRAMEDULLARY HIP SCREW RIGHT;  Surgeon: Bj Steinberg MD;  Location:  [facility];  Service: Orthopedics;  Laterality: Right;   • THORACOSCOPY VIDEO ASSISTED WITH LOBECTOMY Right 4/12/2023    Procedure: BRONCHOSCOPY, THORACOSCOPY VIDEO ASSISTED WITH DECORTICATION, MECHANICAL PLEURODESIS;  Surgeon: Tim Mahan MD;  Location:  [facility];  Service: Cardiothoracic;  Laterality: Right;      General Information     Row Name 04/19/23 1132          Physical Therapy Time and Intention    Document Type therapy note (daily note)  -AS     Mode of Treatment physical therapy  -AS     Row Name 04/19/23 1132          General Information    Patient Profile Reviewed yes  -AS     Existing Precautions/Restrictions fall;other (see comments)  remote CVA with R sided weakness, needs encouragement to participate in therapy, h/o R knee buckling  -AS     Barriers to Rehab medically complex;previous functional deficit;cognitive status  -AS     Row Name 04/19/23 1132          Cognition    Orientation Status (Cognition) oriented to;person  -AS     Row Name 04/19/23 1132          Safety Issues, Functional Mobility    Safety Issues Affecting Function (Mobility) awareness of need for assistance;insight into deficits/self-awareness;safety precaution awareness;safety precautions follow-through/compliance;sequencing abilities  -AS     Impairments Affecting Function (Mobility) balance;cognition;coordination;endurance/activity  tolerance;postural/trunk control;range of motion (ROM);pain;strength  -AS     Cognitive Impairments, Mobility Safety/Performance awareness, need for assistance;insight into deficits/self-awareness;problem-solving/reasoning;safety precaution awareness;safety precaution follow-through;sequencing abilities  -AS     Comment, Safety Issues/Impairments (Mobility) fearful of falling, R knee buckling  -AS           User Key  (r) = Recorded By, (t) = Taken By, (c) = Cosigned By    Initials Name Provider Type    AS Alie Love PTA Physical Therapist Assistant               Mobility     Row Name 04/19/23 1134          Bed Mobility    Supine-Sit Otto (Bed Mobility) verbal cues;contact guard;1 person assist  -AS     Assistive Device (Bed Mobility) head of bed elevated;bed rails  -AS     Comment, (Bed Mobility) cues for sequencing, increased time and effort to reach EOB  -AS     Row Name 04/19/23 1134          Transfers    Comment, (Transfers) completed bed>recliner transfer with Mod assist x2 and BUE support, cues for step sequencing, anterior weight shifting and improvement in posture. Keeps bilateral knees flexed during transfers, once in front of chair worked on balance, weight shifting and improvement in posture, patient fearful of falling limiting safe functional mobility.  -AS     Row Name 04/19/23 1134          Bed-Chair Transfer    Bed-Chair Otto (Transfers) moderate assist (50% patient effort);2 person assist;verbal cues  -AS     Assistive Device (Bed-Chair Transfers) other (see comments)  -AS     Comment, (Bed-Chair Transfer) B UE support  -AS     Row Name 04/19/23 1134          Sit-Stand Transfer    Sit-Stand Otto (Transfers) verbal cues;moderate assist (50% patient effort);2 person assist  -AS     Assistive Device (Sit-Stand Transfers) other (see comments)  -AS     Comment, (Sit-Stand Transfer) B UE support  -AS     Row Name 04/19/23 1134          Gait/Stairs (Locomotion)     [place] Level (Gait) moderate assist (50% patient effort);2 person assist;verbal cues  -AS     Assistive Device (Gait) other (see comments)  -AS     Distance in Feet (Gait) 4, steps to chair only  -AS     Deviations/Abnormal Patterns (Gait) base of support, narrow;ericka decreased;festinating/shuffling;stride length decreased  -AS     Bilateral Gait Deviations heel strike decreased;knee buckling bilaterally  posterior lean  -AS     Right Sided Gait Deviations weight shift ability decreased;knee buckling, right side;foot drop/toe drag  -AS     Comment, (Gait/Stairs) patient took 4 steps to recliner with Mod assist x2 and B UE support  -AS           User Key  (r) = Recorded By, (t) = Taken By, (c) = Cosigned By    Initials Name Provider Type    AS Alie Love PTA Physical Therapist Assistant               Obj/Interventions     Row Name 04/19/23 1139          Motor Skills    Therapeutic Exercise knee;ankle  -AS     [facility] Name 04/19/23 1139          Knee (Therapeutic Exercise)    Knee (Therapeutic Exercise) strengthening exercise  -AS     Knee Strengthening (Therapeutic Exercise) bilateral;marching while seated;LAQ (long arc quad);sitting;10 repetitions  -AS     Row Name 04/19/23 1139          Ankle (Therapeutic Exercise)    Ankle (Therapeutic Exercise) AROM (active range of motion)  -AS     Ankle AROM (Therapeutic Exercise) bilateral;dorsiflexion;plantarflexion;sitting;10 repetitions  -AS     Row Name 04/19/23 1139          Balance    Dynamic Standing Balance verbal cues;moderate assist;2-person assist  -AS     Position/Device Used, Standing Balance supported;other (see comments)  B UE support  -AS           User Key  (r) = Recorded By, (t) = Taken By, (c) = Cosigned By    Initials Name Provider Type    AS Alie Love PTA Physical Therapist Assistant               Goals/Plan    No documentation.                Clinical Impression     Row Name 04/19/23 1141          Pain    Pretreatment Pain Rating  2/10  -AS     Posttreatment Pain Rating 2/10  -AS     Pain Location - Side/Orientation Right  -AS     Pain Location - --  right thigh  -AS     Pain Intervention(s) Repositioned;Ambulation/increased activity  -AS     Row Name 04/19/23 1141          Plan of Care Review    Plan of Care Reviewed With patient  -AS     Progress improving  -AS     Outcome Evaluation completed bed>recliner transfer with Mod assist x2 and BUE support, cues for step sequencing, anterior weight shifting and improvement in posture. Keeps bilateral knees flexed during transfers, once in front of chair worked on balance, weight shifting and improvement in posture, patient fearful of falling limiting safe functional mobility. Patient continues to be below baseline level of funciton due to weakness, balance deficits and decreased safety awareness. Recommend SNF at d/c,  -AS     Row Name 04/19/23 1141          Positioning and Restraints    Pre-Treatment Position in bed  -AS     Post Treatment Position chair  -AS     In Chair call light within reach;encouraged to call for assist;exit alarm on;waffle cushion;with OT;sitting  -AS           User Key  (r) = Recorded By, (t) = Taken By, (c) = Cosigned By    Initials Name Provider Type    AS Alie Love, AMARILYS Physical Therapist Assistant               Outcome Measures     Row Name 04/19/23 1144 04/19/23 0800       How much help from another person do you currently need...    Turning from your back to your side while in flat bed without using bedrails? 3  -AS 3  -AM    Moving from lying on back to sitting on the side of a flat bed without bedrails? 3  -AS 2  -AM    Moving to and from a bed to a chair (including a wheelchair)? 2  -AS 2  -AM    Standing up from a chair using your arms (e.g., wheelchair, bedside chair)? 2  -AS 2  -AM    Climbing 3-5 steps with a railing? 1  -AS 1  -AM    To walk in hospital room? 2  -AS 2  -AM    AM-PAC 6 Clicks Score (PT) 13  -AS 12  -AM    Highest level of mobility 4  --> Transferred to chair/commode  -AS 4 --> Transferred to chair/commode  -AM    Row Name 04/19/23 1144          Functional Assessment    Outcome Measure Options AM-PAC 6 Clicks Basic Mobility (PT)  -AS           User Key  (r) = Recorded By, (t) = Taken By, (c) = Cosigned By    Initials Name Provider Type    AS Alie Love PTA Physical Therapist Assistant    Miranda Cooper RN Registered Nurse                             Physical Therapy Education     Title: PT OT SLP Therapies (In Progress)     Topic: Physical Therapy (In Progress)     Point: Mobility training (In Progress)     Learning Progress Summary           Patient Acceptance, E, NR by AS at 4/19/2023 1144    Acceptance, E, NR by AS at 4/18/2023 0946    Acceptance, E, NR by KG at 4/15/2023 1321    Acceptance, E,TB, VU,NR by AY at 4/13/2023 1154    Acceptance, E, VU,NR by NS at 4/11/2023 1611    Comment: benefits of OOB activity, ther ex, purpose of IP PT    Acceptance, E, VU,NR by ML at 4/7/2023 1151    Acceptance, E, VU by LH at 4/5/2023 1554    Acceptance, E, VU,NR by NS at 4/3/2023 0926    Acceptance, E, VU,NR by ML at 3/31/2023 1604    Acceptance, E, VU,NR by ML at 3/28/2023 1323    Acceptance, E,TB, VU,NR by KL at 3/26/2023 1536   Family Acceptance, E,TB, VU,NR by KL at 3/26/2023 1536                   Point: Home exercise program (In Progress)     Learning Progress Summary           Patient Acceptance, E, NR by AS at 4/19/2023 1144    Acceptance, E, NR by AS at 4/18/2023 0946    Acceptance, E, NR by KG at 4/15/2023 1321    Acceptance, E, VU,NR by NS at 4/11/2023 1611    Comment: benefits of OOB activity, ther ex, purpose of IP PT    Acceptance, E, VU,NR by ML at 4/7/2023 1151    Acceptance, E, VU by LH at 4/5/2023 1554    Acceptance, E, VU,NR by NS at 4/3/2023 0926    Acceptance, E, VU,NR by ML at 3/31/2023 1604    Acceptance, E, VU,NR by ML at 3/28/2023 1323    Acceptance, E,TB, VU,NR by KL at 3/26/2023 1536   Family Acceptance, E,TB,  VU,NR by KL at 3/26/2023 1536                   Point: Body mechanics (In Progress)     Learning Progress Summary           Patient Acceptance, E, NR by AS at 4/19/2023 1144    Acceptance, E, NR by AS at 4/18/2023 0946    Acceptance, E, NR by KG at 4/15/2023 1321    Acceptance, E,TB, VU,NR by AY at 4/13/2023 1154    Acceptance, E, VU,NR by NS at 4/11/2023 1611    Comment: benefits of OOB activity, ther ex, purpose of IP PT    Acceptance, E, VU,NR by ML at 4/7/2023 1151    Acceptance, E, VU by LH at 4/5/2023 1554    Acceptance, E, VU,NR by NS at 4/3/2023 0926    Acceptance, E,TB, VU,NR by KL at 3/26/2023 1536   Family Acceptance, E,TB, VU,NR by KL at 3/26/2023 1536                   Point: Precautions (In Progress)     Learning Progress Summary           Patient Acceptance, E, NR by AS at 4/19/2023 1144    Acceptance, E, NR by AS at 4/18/2023 0946    Acceptance, E, NR by KG at 4/15/2023 1321    Acceptance, E,TB, VU,NR by AY at 4/13/2023 1154    Acceptance, E, VU,NR by NS at 4/11/2023 1611    Comment: benefits of OOB activity, ther ex, purpose of IP PT    Acceptance, E, VU,NR by ML at 4/7/2023 1151    Acceptance, E, VU by  at 4/5/2023 1554    Acceptance, E, VU,NR by NS at 4/3/2023 0926    Acceptance, E, VU,NR by ML at 3/31/2023 1604    Acceptance, E, VU,NR by ML at 3/28/2023 1323    Acceptance, E,TB, VU,NR by KL at 3/26/2023 1536   Family Acceptance, E,TB, VU,NR by KL at 3/26/2023 1536                               User Key     Initials Effective Dates Name Provider Type Discipline    AS 02/03/23 -  Alie Love, PTA Physical Therapist Assistant PT    KG 05/22/20 -  Gensheimer, Melonie N, PT Physical Therapist PT    KL 11/02/22 -  Gabino Tirado P, PT Physical Therapist PT    NS 06/16/21 -  Juliet Kuhn, PT Physical Therapist PT    AY 11/10/20 -  Alie Trujillo, PT Physical Therapist PT    ML 04/22/21 -  Sharon Heller Physical Therapist PT    LH 09/21/21 -  Ernst Reid, PT Physical Therapist PT               PT Recommendation and Plan     Plan of Care Reviewed With: patient  Progress: improving  Outcome Evaluation: completed bed>recliner transfer with Mod assist x2 and BUE support, cues for step sequencing, anterior weight shifting and improvement in posture. Keeps bilateral knees flexed during transfers, once in front of chair worked on balance, weight shifting and improvement in posture, patient fearful of falling limiting safe functional mobility. Patient continues to be below baseline level of funciton due to weakness, balance deficits and decreased safety awareness. Recommend SNF at d/c,     Time Calculation:    PT Charges     Row Name 04/19/23 1144             Time Calculation    Start Time 1116  -AS      PT Received On 04/19/23  -AS      PT Goal Re-Cert Due Date 04/23/23  -AS         Timed Charges    38987 - PT Therapeutic Exercise Minutes 13  -AS      41169 - PT Therapeutic Activity Minutes 10  -AS         Total Minutes    Timed Charges Total Minutes 23  -AS       Total Minutes 23  -AS            User Key  (r) = Recorded By, (t) = Taken By, (c) = Cosigned By    Initials Name Provider Type    AS Alie Love PTA Physical Therapist Assistant              Therapy Charges for Today     Code Description Service Date Service Provider Modifiers Qty    46538035289 HC PT THER PROC EA 15 MIN 4/18/2023 Alie Love, AMARILYS GP 1    83934260374 HC PT THERAPEUTIC ACT EA 15 MIN 4/18/2023 Alie Love, AMARILYS GP 1    58356304054 HC PT THER SUPP EA 15 MIN 4/18/2023 Alie Love, AMARILYS GP 2    96091254538 HC PT THER PROC EA 15 MIN 4/19/2023 Alie Love, AMARILYS GP 1    22969439942 HC PT THERAPEUTIC ACT EA 15 MIN 4/19/2023 Alie Love PTA GP 1          PT G-Codes  Outcome Measure Options: AM-PAC 6 Clicks Basic Mobility (PT)  AM-PAC 6 Clicks Score (PT): 13  AM-PAC 6 Clicks Score (OT): 16       Alie Love PTA  4/19/2023

## 2023-04-19 NOTE — PROGRESS NOTES
[facility] Medicine Services  PROGRESS NOTE    Patient Name: Timmy Guajardo  : 1950  MRN: [id number]    Date of Admission: 3/24/2023  Primary Care Physician: Arsen Seals APRN    Subjective   Subjective     CC:  PE, loculated pleural effusions, weight loss, thrush    HPI:  No dyspnea, no cough. No chest pain today. Feels quite well  ROS:  No cp  No palpitations  No n/v/d    Objective   Objective     Vital Signs:   Temp:  [98.3 °F (36.8 °C)-99.9 °F (37.7 °C)] 98.3 °F (36.8 °C)  Heart Rate:  [79-96] 84  Resp:  [16-20] 20  BP: (129-158)/(67-79) 129/72     Physical Exam:  Constitutional:Alert, oriented x 3, nontoxic appearing, sitting up in chair, normal resp effort at rest  Psych:Normal/appropriate affect  HEENT:NCAT, oropharynx clear  Neck: neck supple, full range of motion  Neuro: Face symmetric, speech clear, equal , moves all extremities  Cardiac: RRR; No pretibial pitting edema  Resp: CTAB, normal effort  GI: abd soft, nontender  Skin: No extremity rash  Musculoskeletal/extremities: no cyanosis of extremities; no significant ankle edema        Results Reviewed:  LAB RESULTS:      Lab 23  0908 23  0454 23  0423 23  0414 04/15/23  0343 23  0423 23  0410 23  0410 23  1557   WBC 10.40 9.34 7.48 8.01 9.51 9.75   < > 12.49*  --    HEMOGLOBIN 9.0* 8.7* 8.3* 7.9* 8.2* 7.4*   < > 8.2*  --    HEMATOCRIT 29.4* 28.0* 26.6* 25.3* 27.5* 23.9*   < > 26.5*  --    PLATELETS 314 260 298 249 228 226   < > 284  --    NEUTROS ABS  --   --  5.18 6.03 7.32* 7.73*  --  10.50*  --    IMMATURE GRANS (ABS)  --   --  0.12* 0.09* 0.13* 0.15*  --  0.25*  --    LYMPHS ABS  --   --  1.18 0.99 0.91 0.81  --  0.87  --    MONOS ABS  --   --  0.61 0.64 0.96* 0.96*  --  0.83  --    EOS ABS  --   --  0.34 0.22 0.16 0.07  --  0.01  --    .3* 102.2* 102.3* 102.4* 105.4* 102.1*   < > 101.1*  --    PROTIME  --   --   --   --   --   --   --   --  14.7*     < > = values in this interval not displayed.         Lab 04/19/23  0847 04/18/23  0454 04/17/23  0423 04/16/23  0414 04/15/23  0343 04/14/23  0423   SODIUM 140 142 139 140 137 141   POTASSIUM 4.5 4.5 4.3 4.6 4.6 4.1   CHLORIDE 109* 108* 106 109* 107 110*   CO2 17.0* 22.0 23.0 22.0 19.0* 21.0*   ANION GAP 14.0 12.0 10.0 9.0 11.0 10.0   BUN 17 23 19 22 25* 28*   CREATININE 0.58* 0.60* 0.70* 0.59* 0.74* 0.71*   EGFR 104.9 103.8 99.1 104.4 97.5 98.7   GLUCOSE 248* 137* 188* 178* 221* 137*   CALCIUM 8.1* 8.3* 8.8 8.7 8.5* 8.3*   MAGNESIUM  --  1.8  --  2.0 2.5* 1.5*   PHOSPHORUS  --  3.8  --  3.3  --  4.5         Lab 04/16/23  0414   TOTAL PROTEIN 6.1   ALBUMIN 2.6*   GLOBULIN 3.5   ALT (SGPT) 25   AST (SGOT) 37   BILIRUBIN 0.2   ALK PHOS 89         Lab 04/12/23  1557   PROTIME 14.7*   INR 1.16*                 Brief Urine Lab Results  (Last result in the past 365 days)      Color   Clarity   Blood   Leuk Est   Nitrite   Protein   CREAT   Urine HCG        03/24/23 2329 Yellow   Turbid   Small (1+)   Moderate (2+)   Negative   30 mg/dL (1+)                 Microbiology Results Abnormal     Procedure Component Value - Date/Time    Fungus Culture - Body Fluid, Pleural Cavity [[id number]] Collected: 04/12/23 1738    Lab Status: Preliminary result Specimen: Body Fluid from Pleural Cavity Updated: 04/17/23 2000     Fungus Culture No fungus isolated at less than 1 week    AFB Culture - Body Fluid, Pleural Cavity [[id number]] Collected: 04/12/23 1738    Lab Status: Preliminary result Specimen: Body Fluid from Pleural Cavity Updated: 04/17/23 2000     AFB Culture No AFB isolated at less than 1 week     AFB Stain No acid fast bacilli seen on concentrated smear    Fungus Culture - Tissue, Pleural Cavity [[id number]] Collected: 04/12/23 1747    Lab Status: Preliminary result Specimen: Tissue from Pleural Cavity Updated: 04/17/23 2000     Fungus Culture No fungus isolated at less than 1 week    AFB Culture - Tissue, Pleural Cavity  [[id number]] Collected: 04/12/23 1747    Lab Status: Preliminary result Specimen: Tissue from Pleural Cavity Updated: 04/17/23 2000     AFB Culture No AFB isolated at less than 1 week     AFB Stain No acid fast bacilli seen on concentrated smear    Anaerobic Culture - Tissue, Pleural Cavity [[id number]]  (Normal) Collected: 04/12/23 1747    Lab Status: Final result Specimen: Tissue from Pleural Cavity Updated: 04/17/23 0656     Anaerobic Culture No anaerobes isolated at 5 days    Anaerobic Culture - Body Fluid, Pleural Cavity [[id number]]  (Normal) Collected: 04/12/23 1738    Lab Status: Final result Specimen: Body Fluid from Pleural Cavity Updated: 04/17/23 0656     Anaerobic Culture No anaerobes isolated at 5 days    Tissue / Bone Culture - Tissue, Pleural Cavity [[id number]] Collected: 04/12/23 1747    Lab Status: Final result Specimen: Tissue from Pleural Cavity Updated: 04/15/23 1246     Tissue Culture No growth at 3 days     Gram Stain Many (4+) Red blood cells      Moderate (3+) WBCs seen      No organisms seen    Body Fluid Culture - Body Fluid, Pleural Cavity [[id number]] Collected: 04/12/23 1738    Lab Status: Final result Specimen: Body Fluid from Pleural Cavity Updated: 04/15/23 1245     Body Fluid Culture No growth at 3 days     Gram Stain Few (2+) WBCs per low power field      No organisms seen    Body Fluid Culture - Body Fluid, Pleural Cavity [[id number]] Collected: 03/27/23 1532    Lab Status: Final result Specimen: Body Fluid from Pleural Cavity Updated: 03/31/23 0952     Body Fluid Culture No growth at 3 days     Gram Stain Rare (1+) WBCs per low power field      No organisms seen    MRSA Screen, PCR (Inpatient) - Swab, Nares [[id number]]  (Normal) Collected: 03/27/23 0857    Lab Status: Final result Specimen: Swab from Nares Updated: 03/27/23 1107     MRSA PCR Negative    Narrative:      The negative predictive value of this diagnostic test is high and should only be used to consider de-escalating  anti-MRSA therapy. A positive result may indicate colonization with MRSA and must be correlated clinically.  MRSA Negative    COVID PRE-OP / PRE-PROCEDURE SCREENING ORDER (NO ISOLATION) - Swab, Nasopharynx [[id number]]  (Normal) Collected: 03/24/23 2256    Lab Status: Final result Specimen: Swab from Nasopharynx Updated: 03/24/23 2356    Narrative:      The following orders were created for panel order COVID PRE-OP / PRE-PROCEDURE SCREENING ORDER (NO ISOLATION) - Swab, Nasopharynx.  Procedure                               Abnormality         Status                     ---------                               -----------         ------                     Respiratory Panel PCR w/...[[id number]]  Normal              Final result                 Please view results for these tests on the individual orders.    Respiratory Panel PCR w/COVID-19(SARS-CoV-2) SULEIMAN/TAWANNA/BLAISE/PAD/COR/MAD/ANTHONY In-House, NP Swab in UTM/VTM, 3-4 HR TAT - Swab, Nasopharynx [[id number]]  (Normal) Collected: 03/24/23 2256    Lab Status: Final result Specimen: Swab from Nasopharynx Updated: 03/24/23 2356     ADENOVIRUS, PCR Not Detected     Coronavirus 229E Not Detected     Coronavirus HKU1 Not Detected     Coronavirus NL63 Not Detected     Coronavirus OC43 Not Detected     COVID19 Not Detected     Human Metapneumovirus Not Detected     Human Rhinovirus/Enterovirus Not Detected     Influenza A PCR Not Detected     Influenza B PCR Not Detected     Parainfluenza Virus 1 Not Detected     Parainfluenza Virus 2 Not Detected     Parainfluenza Virus 3 Not Detected     Parainfluenza Virus 4 Not Detected     RSV, PCR Not Detected     Bordetella pertussis pcr Not Detected     Bordetella parapertussis PCR Not Detected     Chlamydophila pneumoniae PCR Not Detected     Mycoplasma pneumo by PCR Not Detected    Narrative:      In the setting of a positive respiratory panel with a viral infection PLUS a negative procalcitonin without other underlying concern for bacterial  infection, consider observing off antibiotics or discontinuation of antibiotics and continue supportive care. If the respiratory panel is positive for atypical bacterial infection (Bordetella pertussis, Chlamydophila pneumoniae, or Mycoplasma pneumoniae), consider antibiotic de-escalation to target atypical bacterial infection.          XR Chest 1 View    Result Date: 4/19/2023  XR CHEST 1 VW Date of Exam: 4/19/2023 4:40 AM EDT Indication: Pleural effusion. Comparison: 4/18/2023. Findings: There is a new left-sided multilead ICD. There is persistent right basilar airspace disease with likely small pleural effusion versus scarring. Left lung remains clear. No pneumothorax. No new lung opacity. Heart size and pulmonary vasculature appear within normal limits and there are no acute osseous abnormalities.     Impression: Impression: Stable right basilar airspace disease with small pleural effusion versus scarring. Electronically Signed: Aiden Harrison  4/19/2023 7:52 AM EDT  Workstation ID: [id number]    XR Chest 1 View    Result Date: 4/18/2023  XR CHEST 1 VW Date of Exam: 4/18/2023 4:10 AM EDT Indication: Pleural effusion. Comparison: 4/17/2023 Findings: Interval removal of right-sided chest tubes. No new tubes or lines. Stable small right pleural effusion and airspace disease in the right lower lung. Left lung remains grossly clear. Heart size and pulmonary vessels are stable     Impression: Impression: No pneumothorax status post right-sided chest tube removals. Stable small right pleural effusion and airspace disease in the lower right lung Electronically Signed: David Cardoso  4/18/2023 8:29 AM EDT  Workstation ID: [id number]      Results for orders placed during the hospital encounter of 10/16/22    Adult Transthoracic Echo Complete W/ Cont if Necessary Per Protocol    Interpretation Summary  •  Estimated left ventricular EF = 30%.  There is global hypokinesis.  The basal-mid posterior wall appears akinetic.  •   Normal right ventricular cavity size, wall thickness, systolic function and septal motion noted. Electronic lead present in the ventricle  •  Septal wall motion is abnormal, consistent with right ventricular pacing  •  No hemodynamically significant valvular stenosis or regurgitation noted.      Current medications:  Scheduled Meds:acetaminophen, 1,000 mg, Oral, Q8H  apixaban, 5 mg, Oral, Q12H  vitamin C, 1,000 mg, Oral, Daily  aspirin, 81 mg, Oral, Daily  atorvastatin, 80 mg, Oral, Nightly  carvedilol, 50 mg, Oral, BID With Meals  docusate sodium, 100 mg, Oral, BID  famotidine, 20 mg, Oral, Daily  gabapentin, 100 mg, Oral, TID  insulin detemir, 10 Units, Subcutaneous, Daily  insulin lispro, 0-14 Units, Subcutaneous, 4x Daily With Meals & Nightly  Insulin Lispro, 3 Units, Subcutaneous, TID With Meals  lisinopril, 5 mg, Oral, Q24H  megestrol, 400 mg, Oral, Daily  mirtazapine, 15 mg, Oral, Nightly  nystatin, 5 mL, Oral, 4x Daily  pantoprazole, 40 mg, Oral, Nightly  polyethylene glycol, 17 g, Oral, Daily  senna-docusate sodium, 2 tablet, Oral, BID  sodium chloride, 10 mL, Intravenous, Q12H  terazosin, 1 mg, Oral, Nightly      Continuous Infusions:   PRN Meds:.•  senna-docusate sodium **AND** polyethylene glycol **AND** bisacodyl **AND** bisacodyl  •  Calcium Replacement - Follow Nurse / BPA Driven Protocol  •  cyclobenzaprine  •  dextrose  •  dextrose  •  glucagon (human recombinant)  •  HYDROmorphone  •  Magnesium Standard Dose Replacement - Follow Nurse / BPA Driven Protocol  •  melatonin  •  ondansetron **OR** ondansetron  •  oxyCODONE  •  Phosphorus Replacement - Follow Nurse / BPA Driven Protocol  •  Potassium Replacement - Follow Nurse / BPA Driven Protocol  •  sodium chloride  •  sodium chloride    Assessment & Plan   Assessment & Plan     Active Hospital Problems    Diagnosis  POA   • **Bilateral pulmonary embolism [I26.99]  Yes   • GERD (gastroesophageal reflux disease) [K21.9]  Yes   • Hyperlipidemia [E78.5]   Yes   • Rt Apical Lung Nodule vs Scarring (needs follow up) [R91.1]  Yes   • Right exudative pleural effusion status post decortication 4/12/2023 [J90]  Yes   • Unintentional weight loss [R63.4]  Yes   • Hypoalbuminemia [E88.09]  Yes   • Severe malnutrition [E43]  Yes   • History of CVA (cerebrovascular accident) [Z86.73]  Not Applicable   • Debility [R53.81]  Yes   • Presence of cardiac pacemaker [Z95.0]  Yes   • HTN [I10]  Yes   • HFrEF [I50.20]  Yes      Resolved Hospital Problems   No resolved problems to display.        Brief Hospital Course to date:  Timmy Guajardo is a 72 y.o. male with PMH DVT / Bilateral PE on Eliquis, HTN, HLP, HFrEF, CVA with right sided weakness residual, T2DM and GERD. In October 2022 had fall and was sent to [facility] for rehab and eliquis dose was apparently decreased to 2.5mg bid at that time for unknown reason, patient also contracted covid while there. Since that time has had worsening generalized weakness and ~50 lb weight loss.    On this occasion patient was admitted 3/24/23 to the Hospitalist service for progressive weakness, functional decline and unintentional weight loss. CTA c/a/p revealed bilateral pulmonary emboli, partially loculated moderate right pleural effusion, emphysema, moderate colonic stool burden with mild inspissated rectal stool burden; otherwise no overt malinancy or mass noted. Oncology was consulted and felt the PE's were induced by his previous and undertreated DVT (on lower dose eliquis) and recommended full dose anticoagulation and age appropriate malignancy screening as outpatient. Patient was initially placed on a heparin drip with a right-sided chest tube placed in IR 3/27/23 c/w exudative effusion. Small bore CT was discontinued 3/30/23. CTS was consulted who placed a right large bore chest tube on 4/4, however subsequent CT chest revealed persistent right loculated pleural effusion with some increased hemorrhage/clot and right apical nodule vs  scarring. He had some bleeding from and around chest tube site and heparin was held 4/6/23. Large clot was extracted from the tubing by stripping the tubes, silver nitrate sticks were applied and stitch was placed around chest tube with improvement. Heparin was restarted. Patient underwent right VATS decortication with mechanical pleurodesis on 4/12 by Dr. Mahan and post procedure transferred to ICU. Patient's condition improved and patient was transferred out of icu to telemetry on 4/18/23 and hospitalist assumed care.    Bilateral PE's  -per heme-onc, likely due to undertreated dvt/pE (eliquis was decreased to 2.5mg bid at rehab last fall)  -3/24/23 ct angio chest segmental LLL and right upper lobe PE's (no strain)  -cleared to restart eliquis, started back on 4/18/23    S/p vats decortication for loculated pleural effusion  -s/p right vats/decortication by Dr. Mahan pod #7  -biopsy negative  -chest tubes removed by CTSx on 4/17/23; cleared for discharge by cts for discharge  -ct surgery ok'd to resume eliquis    DM  -continue basal bolus insulins, titrate prn    Hx CAD  Hx HFrEF (ef 30% per 10/24/22 echo)  Hx cardiac pacer  -continue asa 81, high dose statin, coreg 50mg bid, lisinopril 5mg  -currently euvolemic    Hx cva w/ mild right residual weakness  -asa 81 (also eliquis for PE), high dose statin    Question of RUL apical nodule vs scarring  -outpatient f/u imaging in 3-6 months    ~50 lbs weight loss  -no overt mass noted on imaging; oncology recommends outpatient malignancy screening ; also follow up ct chest 3-6 months follow up rul nodule vs scarring    Dispo: to rehab tomorrow 4/20/23, van transport at 14:30    DVT prophylaxis:  Medical and mechanical DVT prophylaxis orders are present.     AM-PAC 6 Clicks Score (PT): 13 (04/19/23 1554)    CODE STATUS:   Code Status and Medical Interventions:   Ordered at: 03/24/23 4504     Code Status (Patient has no pulse and is not breathing):    CPR (Attempt to  Resuscitate)     Medical Interventions (Patient has pulse or is breathing):    Full Support       Vince Sheridan MD  04/19/23

## 2023-04-20 VITALS
BODY MASS INDEX: 20.48 KG/M2 | TEMPERATURE: 97.8 F | OXYGEN SATURATION: 95 % | HEART RATE: 88 BPM | SYSTOLIC BLOOD PRESSURE: 131 MMHG | DIASTOLIC BLOOD PRESSURE: 79 MMHG | WEIGHT: 143.08 LBS | RESPIRATION RATE: 18 BRPM | HEIGHT: 70 IN

## 2023-04-20 LAB
ANION GAP SERPL CALCULATED.3IONS-SCNC: 12 MMOL/L (ref 5–15)
BUN SERPL-MCNC: 20 MG/DL (ref 8–23)
BUN/CREAT SERPL: 38.5 (ref 7–25)
CALCIUM SPEC-SCNC: 7.9 MG/DL (ref 8.6–10.5)
CHLORIDE SERPL-SCNC: 109 MMOL/L (ref 98–107)
CO2 SERPL-SCNC: 21 MMOL/L (ref 22–29)
CREAT SERPL-MCNC: 0.52 MG/DL (ref 0.76–1.27)
EGFRCR SERPLBLD CKD-EPI 2021: 108.4 ML/MIN/1.73
GLUCOSE BLDC GLUCOMTR-MCNC: 134 MG/DL (ref 70–130)
GLUCOSE BLDC GLUCOMTR-MCNC: 276 MG/DL (ref 70–130)
GLUCOSE SERPL-MCNC: 188 MG/DL (ref 65–99)
MAGNESIUM SERPL-MCNC: 1.4 MG/DL (ref 1.6–2.4)
POTASSIUM SERPL-SCNC: 5 MMOL/L (ref 3.5–5.2)
SODIUM SERPL-SCNC: 142 MMOL/L (ref 136–145)

## 2023-04-20 PROCEDURE — 63710000001 INSULIN LISPRO (HUMAN) PER 5 UNITS

## 2023-04-20 PROCEDURE — 82962 GLUCOSE BLOOD TEST: CPT

## 2023-04-20 PROCEDURE — 99239 HOSP IP/OBS DSCHRG MGMT >30: CPT | Performed by: INTERNAL MEDICINE

## 2023-04-20 PROCEDURE — 25010000002 MAGNESIUM SULFATE 2 GM/50ML SOLUTION: Performed by: INTERNAL MEDICINE

## 2023-04-20 PROCEDURE — 80048 BASIC METABOLIC PNL TOTAL CA: CPT | Performed by: NURSE PRACTITIONER

## 2023-04-20 PROCEDURE — 83735 ASSAY OF MAGNESIUM: CPT | Performed by: NURSE PRACTITIONER

## 2023-04-20 PROCEDURE — 63710000001 INSULIN DETEMIR PER 5 UNITS

## 2023-04-20 RX ORDER — ONDANSETRON 4 MG/1
4 TABLET, FILM COATED ORAL EVERY 6 HOURS PRN
Start: 2023-04-20

## 2023-04-20 RX ORDER — ASPIRIN 81 MG/1
81 TABLET, CHEWABLE ORAL DAILY
Start: 2023-04-21

## 2023-04-20 RX ORDER — MAGNESIUM SULFATE HEPTAHYDRATE 40 MG/ML
2 INJECTION, SOLUTION INTRAVENOUS
Status: COMPLETED | OUTPATIENT
Start: 2023-04-20 | End: 2023-04-20

## 2023-04-20 RX ORDER — GABAPENTIN 100 MG/1
100 CAPSULE ORAL 3 TIMES DAILY
Qty: 12 CAPSULE | Refills: 0 | Status: SHIPPED | OUTPATIENT
Start: 2023-04-20

## 2023-04-20 RX ORDER — GABAPENTIN 100 MG/1
100 CAPSULE ORAL 3 TIMES DAILY
Start: 2023-04-20 | End: 2023-04-20 | Stop reason: SDUPTHER

## 2023-04-20 RX ADMIN — Medication 10 ML: at 09:16

## 2023-04-20 RX ADMIN — MAGNESIUM SULFATE HEPTAHYDRATE 2 G: 2 INJECTION, SOLUTION INTRAVENOUS at 05:33

## 2023-04-20 RX ADMIN — FAMOTIDINE 20 MG: 20 TABLET ORAL at 08:54

## 2023-04-20 RX ADMIN — NYSTATIN 500000 UNITS: 100000 SUSPENSION ORAL at 08:53

## 2023-04-20 RX ADMIN — INSULIN LISPRO 3 UNITS: 100 INJECTION, SOLUTION INTRAVENOUS; SUBCUTANEOUS at 09:15

## 2023-04-20 RX ADMIN — ASPIRIN 81 MG CHEWABLE TABLET 81 MG: 81 TABLET CHEWABLE at 08:53

## 2023-04-20 RX ADMIN — APIXABAN 5 MG: 5 TABLET, FILM COATED ORAL at 08:54

## 2023-04-20 RX ADMIN — SENNOSIDES AND DOCUSATE SODIUM 2 TABLET: 8.6; 5 TABLET ORAL at 08:53

## 2023-04-20 RX ADMIN — INSULIN LISPRO 8 UNITS: 100 INJECTION, SOLUTION INTRAVENOUS; SUBCUTANEOUS at 13:53

## 2023-04-20 RX ADMIN — INSULIN DETEMIR 10 UNITS: 100 INJECTION, SOLUTION SUBCUTANEOUS at 09:15

## 2023-04-20 RX ADMIN — POLYETHYLENE GLYCOL 3350 17 G: 17 POWDER, FOR SOLUTION ORAL at 08:54

## 2023-04-20 RX ADMIN — MAGNESIUM SULFATE HEPTAHYDRATE 2 G: 2 INJECTION, SOLUTION INTRAVENOUS at 03:32

## 2023-04-20 RX ADMIN — MAGNESIUM SULFATE HEPTAHYDRATE 2 G: 2 INJECTION, SOLUTION INTRAVENOUS at 01:28

## 2023-04-20 RX ADMIN — DOCUSATE SODIUM 100 MG: 100 CAPSULE, LIQUID FILLED ORAL at 08:53

## 2023-04-20 RX ADMIN — OXYCODONE HYDROCHLORIDE AND ACETAMINOPHEN 1000 MG: 500 TABLET ORAL at 08:54

## 2023-04-20 RX ADMIN — MEGESTROL ACETATE 400 MG: 40 SUSPENSION ORAL at 09:16

## 2023-04-20 RX ADMIN — ACETAMINOPHEN 1000 MG: 500 TABLET ORAL at 05:31

## 2023-04-20 RX ADMIN — GABAPENTIN 100 MG: 100 CAPSULE ORAL at 08:53

## 2023-04-20 RX ADMIN — CYCLOBENZAPRINE 5 MG: 10 TABLET, FILM COATED ORAL at 09:20

## 2023-04-20 RX ADMIN — NYSTATIN 500000 UNITS: 100000 SUSPENSION ORAL at 13:53

## 2023-04-20 RX ADMIN — CARVEDILOL 50 MG: 12.5 TABLET, FILM COATED ORAL at 08:53

## 2023-04-20 RX ADMIN — INSULIN LISPRO 3 UNITS: 100 INJECTION, SOLUTION INTRAVENOUS; SUBCUTANEOUS at 13:53

## 2023-04-20 RX ADMIN — LISINOPRIL 5 MG: 5 TABLET ORAL at 08:53

## 2023-04-20 NOTE — PROGRESS NOTES
Cardiothoracic Surgery Progress Note      POD # 8 s/p Right VATS decortication     LOS: 27 days      Subjective:  Sitting in bed eating breakfast, no complaints this AM     Objective:  Vital Signs  Temp:  [97.5 °F (36.4 °C)-98.8 °F (37.1 °C)] 98.2 °F (36.8 °C)  Heart Rate:  [73-96] 79  Resp:  [18-20] 20  BP: (124-147)/(63-77) 147/77    Physical Exam:   General Appearance: alert, appears stated age and cooperative   Lungs: clear to auscultation, respirations regular, respirations even and respirations unlabored,   Heart: regular rhythm & normal rate, normal S1, S2 and no murmur, no gallop, no rub   Skin: Incision c/d/i  Results:    Results from last 7 days   Lab Units 04/19/23  0908   WBC 10*3/mm3 10.40   HEMOGLOBIN g/dL 9.0*   HEMATOCRIT % 29.4*   PLATELETS 10*3/mm3 314     Results from last 7 days   Lab Units 04/20/23  0008   SODIUM mmol/L 142   POTASSIUM mmol/L 5.0   CHLORIDE mmol/L 109*   CO2 mmol/L 21.0*   BUN mg/dL 20   CREATININE mg/dL 0.52*   GLUCOSE mg/dL 188*   CALCIUM mg/dL 7.9*     Assessment:  POD # 8 s/p Right VATS decortication    Plan:  PT/OT  Pulmonary toilet   to arrange rehab placement - still waiting for placement    Tim Mahan MD  04/20/23  07:09 EDT

## 2023-04-20 NOTE — DISCHARGE SUMMARY
[facility] Medicine Services  DISCHARGE SUMMARY    Patient Name: Timmy Guajardo  : 1950  MRN: [id number]    Date of Admission: 3/24/2023  8:39 PM  Date of Discharge:  2023  Primary Care Physician: Arsen Seals APRN    Consults     Date and Time Order Name Status Description    3/28/2023 12:58 PM Inpatient Gastroenterology Consult Completed     3/26/2023  9:07 AM Inpatient Thoracic Surgery Consult Completed     3/24/2023 10:38 PM Inpatient Hematology & Oncology Consult Completed           Hospital Course     Presenting Problem:   Pleural effusion [J90]    Active Hospital Problems    Diagnosis  POA   • **Bilateral pulmonary embolism [I26.99]  Yes   • GERD (gastroesophageal reflux disease) [K21.9]  Yes   • Hyperlipidemia [E78.5]  Yes   • Rt Apical Lung Nodule vs Scarring (needs follow up) [R91.1]  Yes   • Right exudative pleural effusion status post decortication 2023 [J90]  Yes   • Unintentional weight loss [R63.4]  Yes   • Hypoalbuminemia [E88.09]  Yes   • Severe malnutrition [E43]  Yes   • History of CVA (cerebrovascular accident) [Z86.73]  Not Applicable   • Debility [R53.81]  Yes   • Presence of cardiac pacemaker [Z95.0]  Yes   • HTN [I10]  Yes   • HFrEF [I50.20]  Yes      Resolved Hospital Problems   No resolved problems to display.      Bilateral PE's  -eliquis  Loculated pleural effusion, s/p VATS/Decortication  -chest tubes out, biopsy negative; f/u w/ ct surgery 3 weeks  CAD  Chronic HFrEF (ef 30% per 10/24/22 echo)  Hx cardiac pacemaker  Hx CVA w/ mild residual weakness  Question of RUL apical lung nodule (vs scarring)  -recommend f/u imaging chest 3-6 months  Weight loss (~50 lbs) over past year  -no overt mass noted on imaging this admission; seen by oncology, recommends outpatient malignancy screening  -as above, recommend surveillance chest imaging 3-6 months    Hospital Course:  Timmy Guajardo is a 72 y.o. male with PMH DVT / Bilateral PE on Eliquis,  HTN, HLP, HFrEF, CVA with right sided weakness residual, T2DM and GERD. In October 2022 had fall and was sent to [facility] for rehab and eliquis dose was apparently decreased to 2.5mg bid at that time for unknown reason, patient also contracted covid while there. Since that time has had worsening generalized weakness and ~50 lb weight loss.               On this occasion patient was admitted 3/24/23 to the Hospitalist service for progressive weakness, functional decline and unintentional weight loss. CTA c/a/p revealed bilateral pulmonary emboli, partially loculated moderate right pleural effusion, emphysema, moderate colonic stool burden with mild inspissated rectal stool burden; otherwise no overt malinancy or mass noted. Oncology was consulted and felt the PE's were induced by his previous and undertreated DVT (on lower dose eliquis) and recommended full dose anticoagulation and age appropriate malignancy screening as outpatient. Patient was initially placed on a heparin drip with a right-sided chest tube placed in IR 3/27/23 c/w exudative effusion. Small bore CT was discontinued 3/30/23. CTS was consulted who placed a right large bore chest tube on 4/4, however subsequent CT chest revealed persistent right loculated pleural effusion with some increased hemorrhage/clot and right apical nodule vs scarring. He had some bleeding from and around chest tube site and heparin was held 4/6/23. Large clot was extracted from the tubing by stripping the tubes, silver nitrate sticks were applied and stitch was placed around chest tube with improvement. Heparin was restarted. Patient underwent right VATS decortication with mechanical pleurodesis on 4/12 by Dr. Mahan and post procedure transferred to ICU. Patient's condition improved and patient was transferred out of icu to telemetry on 4/18/23 and hospitalist assumed care. Patient now feeling well and ready for discharge to rehab         Discharge Follow Up Recommendations for  outpatient labs/diagnostics:  F/u pcp 1 week after d/c from rehab  F/u Dr. Mahan (CT surgery) 3 weeks    Recommend outpatient follow up w/ pcp and outpatient malignancy screening (ensure up to date) given ~50 lbs weight loss. Recommend repeat chest imaging ~3 months to ensure the rul nodularity is stable    Day of Discharge     HPI:   No cp  No palpitations   feels well and ready for rehab    Review of Systems  No f/c    Vital Signs:   Temp:  [97.5 °F (36.4 °C)-98.3 °F (36.8 °C)] 97.6 °F (36.4 °C)  Heart Rate:  [73-96] 76  Resp:  [18-20] 18  BP: (124-147)/(63-77) 145/69      Physical Exam:  A&ox3  Ncat, oroph clear  Lungs clear  rrr  abd soft, nontender  No cce      Pertinent  and/or Most Recent Results     LAB RESULTS:      Lab 04/19/23  0908 04/18/23  0454 04/17/23  0423 04/16/23  0414 04/15/23  0343 04/14/23  0423   WBC 10.40 9.34 7.48 8.01 9.51 9.75   HEMOGLOBIN 9.0* 8.7* 8.3* 7.9* 8.2* 7.4*   HEMATOCRIT 29.4* 28.0* 26.6* 25.3* 27.5* 23.9*   PLATELETS 314 260 298 249 228 226   NEUTROS ABS  --   --  5.18 6.03 7.32* 7.73*   IMMATURE GRANS (ABS)  --   --  0.12* 0.09* 0.13* 0.15*   LYMPHS ABS  --   --  1.18 0.99 0.91 0.81   MONOS ABS  --   --  0.61 0.64 0.96* 0.96*   EOS ABS  --   --  0.34 0.22 0.16 0.07   .3* 102.2* 102.3* 102.4* 105.4* 102.1*         Lab 04/20/23  0008 04/19/23  0847 04/18/23  0454 04/17/23  0423 04/16/23  0414 04/15/23  0343 04/14/23  0423   SODIUM 142 140 142 139 140 137 141   POTASSIUM 5.0 4.5 4.5 4.3 4.6 4.6 4.1   CHLORIDE 109* 109* 108* 106 109* 107 110*   CO2 21.0* 17.0* 22.0 23.0 22.0 19.0* 21.0*   ANION GAP 12.0 14.0 12.0 10.0 9.0 11.0 10.0   BUN 20 17 23 19 22 25* 28*   CREATININE 0.52* 0.58* 0.60* 0.70* 0.59* 0.74* 0.71*   EGFR 108.4 104.9 103.8 99.1 104.4 97.5 98.7   GLUCOSE 188* 248* 137* 188* 178* 221* 137*   CALCIUM 7.9* 8.1* 8.3* 8.8 8.7 8.5* 8.3*   MAGNESIUM 1.4*  --  1.8  --  2.0 2.5* 1.5*   PHOSPHORUS  --   --  3.8  --  3.3  --  4.5         Lab 04/16/23  0414   TOTAL  PROTEIN 6.1   ALBUMIN 2.6*   GLOBULIN 3.5   ALT (SGPT) 25   AST (SGOT) 37   BILIRUBIN 0.2   ALK PHOS 89                     Brief Urine Lab Results  (Last result in the past 365 days)      Color   Clarity   Blood   Leuk Est   Nitrite   Protein   CREAT   Urine HCG        03/24/23 2329 Yellow   Turbid   Small (1+)   Moderate (2+)   Negative   30 mg/dL (1+)               Microbiology Results (last 10 days)     Procedure Component Value - Date/Time    COVID PRE-OP / PRE-PROCEDURE SCREENING ORDER (NO ISOLATION) - Swab, Nasopharynx [[id number]]  (Normal) Collected: 04/19/23 1817    Lab Status: Final result Specimen: Swab from Nasopharynx Updated: 04/1950    Narrative:      The following orders were created for panel order COVID PRE-OP / PRE-PROCEDURE SCREENING ORDER (NO ISOLATION) - Swab, Nasopharynx.  Procedure                               Abnormality         Status                     ---------                               -----------         ------                     COVID-19, FLU A/B, RSV P...[[id number]]  Normal              Final result                 Please view results for these tests on the individual orders.    COVID-19, FLU A/B, RSV PCR - Swab, Nasopharynx [[id number]]  (Normal) Collected: 04/19/23 1817    Lab Status: Final result Specimen: Swab from Nasopharynx Updated: 04/1950     COVID19 Not Detected     Influenza A PCR Not Detected     Influenza B PCR Not Detected     RSV, PCR Not Detected    Narrative:      Fact sheet for providers: https://www.fda.gov/media/196209/download    Fact sheet for patients: https://www.fda.gov/media/388832/download    Test performed by PCR.    Anaerobic Culture - Tissue, Pleural Cavity [[id number]]  (Normal) Collected: 04/12/23 1747    Lab Status: Final result Specimen: Tissue from Pleural Cavity Updated: 04/17/23 0656     Anaerobic Culture No anaerobes isolated at 5 days    Fungus Culture - Tissue, Pleural Cavity [[id number]] Collected: 04/12/23 1747    Lab Status:  Preliminary result Specimen: Tissue from Pleural Cavity Updated: 04/19/23 2000     Fungus Culture No fungus isolated at 1 week    Tissue / Bone Culture - Tissue, Pleural Cavity [[id number]] Collected: 04/12/23 1747    Lab Status: Final result Specimen: Tissue from Pleural Cavity Updated: 04/15/23 1246     Tissue Culture No growth at 3 days     Gram Stain Many (4+) Red blood cells      Moderate (3+) WBCs seen      No organisms seen    AFB Culture - Tissue, Pleural Cavity [[id number]] Collected: 04/12/23 1747    Lab Status: Preliminary result Specimen: Tissue from Pleural Cavity Updated: 04/19/23 2000     AFB Culture No AFB isolated at 1 week     AFB Stain No acid fast bacilli seen on concentrated smear    Anaerobic Culture - Body Fluid, Pleural Cavity [[id number]]  (Normal) Collected: 04/12/23 1738    Lab Status: Final result Specimen: Body Fluid from Pleural Cavity Updated: 04/17/23 0656     Anaerobic Culture No anaerobes isolated at 5 days    Fungus Culture - Body Fluid, Pleural Cavity [[id number]] Collected: 04/12/23 1738    Lab Status: Preliminary result Specimen: Body Fluid from Pleural Cavity Updated: 04/19/23 2000     Fungus Culture No fungus isolated at 1 week    AFB Culture - Body Fluid, Pleural Cavity [[id number]] Collected: 04/12/23 1738    Lab Status: Preliminary result Specimen: Body Fluid from Pleural Cavity Updated: 04/19/23 2000     AFB Culture No AFB isolated at 1 week     AFB Stain No acid fast bacilli seen on concentrated smear    Body Fluid Culture - Body Fluid, Pleural Cavity [[id number]] Collected: 04/12/23 1738    Lab Status: Final result Specimen: Body Fluid from Pleural Cavity Updated: 04/15/23 1245     Body Fluid Culture No growth at 3 days     Gram Stain Few (2+) WBCs per low power field      No organisms seen          SLP FEES - Fiberoptic Endo Eval Swallow    Result Date: 4/14/2023  This procedure was auto-finalized with no dictation required.    XR Chest 1 View    Result Date: 4/19/2023  XR  CHEST 1 VW Date of Exam: 4/19/2023 4:40 AM EDT Indication: Pleural effusion. Comparison: 4/18/2023. Findings: There is a new left-sided multilead ICD. There is persistent right basilar airspace disease with likely small pleural effusion versus scarring. Left lung remains clear. No pneumothorax. No new lung opacity. Heart size and pulmonary vasculature appear within normal limits and there are no acute osseous abnormalities.     Impression: Stable right basilar airspace disease with small pleural effusion versus scarring. Electronically Signed: Aiden Harrison  4/19/2023 7:52 AM EDT  Workstation ID: [id number]    XR Chest 1 View    Result Date: 4/18/2023  XR CHEST 1 VW Date of Exam: 4/18/2023 4:10 AM EDT Indication: Pleural effusion. Comparison: 4/17/2023 Findings: Interval removal of right-sided chest tubes. No new tubes or lines. Stable small right pleural effusion and airspace disease in the right lower lung. Left lung remains grossly clear. Heart size and pulmonary vessels are stable     Impression: No pneumothorax status post right-sided chest tube removals. Stable small right pleural effusion and airspace disease in the lower right lung Electronically Signed: David Cardoso  4/18/2023 8:29 AM EDT  Workstation ID: [id number]    XR Chest 1 View    Result Date: 4/17/2023  XR CHEST 1 VW Date of Exam: 4/17/2023 2:54 AM EDT Indication: Pleural effusion. Comparison: 4/16/2023 and prior Findings: Study is limited by overlying support and monitoring apparatus. Chest tubes overlying the right hemithorax project at the right apex and right lung base similar to the prior study. Mild volume loss on the right noted. Diffuse groundglass and interstitial  changes noted in the right hemithorax. Right-sided pleural effusion is slightly decreased from the comparison. Left-sided AICD device remains in place from a subclavian approach. The heart size is stable. Left lung is grossly clear. Osseous structures are stable     Impression:  Pleural-parenchymal changes on the right with slight decrease in size of pleural effusion. Chest tubes remain in position at the right apex and right lung base. Electronically Signed: Cesar Vargas  4/17/2023 8:32 AM EDT  Workstation ID: [id number]    XR Chest 1 View    Result Date: 4/16/2023  XR CHEST 1 VW Date of Exam: 4/16/2023 4:40 AM EDT Indication: Pleural effusion. Comparison: 4/15/2023 Findings: Left subclavian transvenous pacemaker defibrillator is unchanged. Heart size and pulmonary vessels are within normal limits. There are 2 right-sided thoracostomy tubes which remain in place and unchanged in position. No evidence pneumothorax. There is a small residual right pleural effusion. There is some minimal patchy airspace disease in the right lung base likely due to atelectasis. Left lung is clear. No left pleural effusion or pneumothorax.     Impression: 1. No change in right-sided thoracostomy tubes. No evidence pneumothorax. 2. No change in right basilar airspace disease and small right pleural effusion. Electronically Signed: Deon Heredia  4/16/2023 7:40 AM EDT  Workstation ID: [id number]    XR Chest 1 View    Result Date: 4/15/2023  XR CHEST 1 VW Date of Exam: 4/15/2023 4:06 AM EDT Indication: Pleural effusion. Comparison: 4/14/2023, 4/13/2023 Findings: There is a left subclavian AICD/pacemaker device. The heart appears enlarged. There is indistinctness of the pulmonary vasculature. Right-sided chest tubes present. No pneumothorax. Atelectatic changes and scarring present within the right lung base with  probable small right-sided pleural effusion. No pneumothorax. No acute osseous abnormality identified.     Impression: No significant change. No pneumothorax. Stable right basilar atelectasis with probable small right-sided pleural effusion. Electronically Signed: Huyen Aldana  4/15/2023 8:45 AM EDT  Workstation ID: [id number]    XR Chest 1 View    Result Date: 4/14/2023  XR CHEST 1 VW Date of Exam: 4/14/2023  3:18 AM EDT Indication: Pleural effusion. Comparison: AP portable chest 4/13/2023. Findings: 2 right chest tubes appear unchanged in position. Tiny right apical pneumothorax measures about 3 mm thickness, and has improved since the prior study. There is some patchy alveolar disease in the right mid to lower lung zone which appears slightly increased. Left lung remains clear. The heart size is normal. Pacemaker and leads appear unchanged.     Impression: 1. Small right apical pneumothorax appears improved since the prior study. 2. Increased right mid and lower lung zone opacities favored to represent increased atelectasis. Pneumonia not excluded. Electronically Signed: Florence Bowen  4/14/2023 7:10 AM EDT  Workstation ID: [id number]    XR Chest 1 View    Result Date: 4/13/2023  XR CHEST 1 VW Date of Exam: 4/13/2023 12:53 AM EDT Indication: Pleural effusion. Comparison: Chest x-ray 4/12/2023 Findings: Stable medical support devices. Stable cardiomediastinal silhouette within normal limits. Redemonstration of small right pneumothorax with right apical and right basilar components. Right thoracostomy tubes are unchanged. There are mild streaky opacities  in the right lung base, unchanged, which may reflect some associated atelectasis. The left lung remains grossly clear. There is a tiny amount of right chest wall subcutaneous emphysema.     Impression: No significant interval change. Stable small right pneumothorax with right thoracostomy tubes in place. Electronically Signed: Fabián Mondragon  4/13/2023 7:14 AM EDT  Workstation ID: [id number]    XR Chest 1 View    Result Date: 4/12/2023  XR CHEST 1 VW Date of Exam: 4/12/2023 7:58 PM EDT Indication: postop. Comparison: Chest x-ray 4/12/2023 0537 hours Findings: There are overlying devices limiting evaluation. There is pneumothorax on the right seen inferiorly and laterally measuring about 4.1 cm. There is partial collapse of the right lower lobe. Right-sided chest tube is  suspected. There is change in position of the chest tube with probable kinking at its distal aspect. The left lung is clear. Transvenous pacemaker device.     Impression: Right pneumothorax inferiorly with partial atelectasis right lower lobe. A chest tube inferior right thorax obtained at its distal end. Correlate with function. Electronically Signed: Micaela Horn  4/12/2023 8:25 PM EDT  Workstation ID: [id number]    XR Chest 1 View    Result Date: 4/12/2023  XR CHEST 1 VW Date of Exam: 4/12/2023 5:40 AM EDT Indication: Pleural effusion. Comparison: 4/11/2023 Findings: 3-lead AICD pacemaker. Right-sided chest tube is unchanged. No evidence of right-sided pneumothorax. There is persistent right effusion and right base opacity, unchanged. Left lung is clear. Cardiac mediastinal contours are within normal limits. Regional  skeleton is unremarkable.     Impression: 1. Stable exam with right base chest tube, small effusion and basilar atelectasis. Electronically Signed: Deon Sen  4/12/2023 9:03 AM EDT  Workstation ID: [id number]    XR Chest 1 View    Result Date: 4/11/2023  XR CHEST 1 VW Date of Exam: 4/11/2023 4:50 AM EDT Indication: Pleural effusion. Comparison: 4/10/2023 Findings: Left-sided AICD noted. Heart size normal. There is a stable small right pleural effusion with right basilar airspace disease. No pneumothorax. The left lung is clear. No pleural effusion on the left. Osseous structures are intact. Stable chest tube overlying the right lung base.     Impression: 1. Stable exam with chest tube overlying the right lung base and persistent small right pleural effusion with mild right basilar atelectasis. 2. No pneumothorax. Electronically Signed: Jesse Maykel  4/11/2023 8:46 AM EDT  Workstation ID: [id number]    XR Chest 1 View    Result Date: 4/10/2023  XR CHEST 1 VW Date of Exam: 4/10/2023 4:30 AM EDT Indication: Pleural effusion. Comparison: 4/9/2023, 4/8/2023 Findings: There is a left subclavian AICD/pacemaker  device. The heart appears normal in size. No pneumothorax. Patchy airspace disease seen within the right lung base with a right basilar chest tube. No pneumothorax. Small right-sided pleural effusion present. Left lung is clear. No acute osseous abnormality.     Impression: No significant changes compared to the previous study. Right basilar atelectasis and right sided pleural effusion. Electronically Signed: Huyen Aldana  4/10/2023 5:57 AM EDT  Workstation ID: [id number]      Results for orders placed during the hospital encounter of 05/04/21    Duplex Lower Extremity Art / Grafts - Left CAR    Interpretation Summary  · Left RADHA is moderately reduced, 0.67  · There is atherosclerotic disease throughout the left lower extremity  · There are abnormal monophasic waveforms starting at the level of the SFA  · The left proximal superficial femoral artery demonstrates 76-99% stenosis.  · The left proximal deep femoral artery demonstrates <50% stenosis.  · The left distal superficial femoral artery demonstrates 50-75% stenosis.  · The infrapopliteal vessels are patent      Results for orders placed during the hospital encounter of 05/04/21    Duplex Lower Extremity Art / Grafts - Left CAR    Interpretation Summary  · Left RADHA is moderately reduced, 0.67  · There is atherosclerotic disease throughout the left lower extremity  · There are abnormal monophasic waveforms starting at the level of the SFA  · The left proximal superficial femoral artery demonstrates 76-99% stenosis.  · The left proximal deep femoral artery demonstrates <50% stenosis.  · The left distal superficial femoral artery demonstrates 50-75% stenosis.  · The infrapopliteal vessels are patent      Results for orders placed during the hospital encounter of 10/16/22    Adult Transthoracic Echo Complete W/ Cont if Necessary Per Protocol    Interpretation Summary  •  Estimated left ventricular EF = 30%.  There is global hypokinesis.  The basal-mid posterior wall  appears akinetic.  •  Normal right ventricular cavity size, wall thickness, systolic function and septal motion noted. Electronic lead present in the ventricle  •  Septal wall motion is abnormal, consistent with right ventricular pacing  •  No hemodynamically significant valvular stenosis or regurgitation noted.      Plan for Follow-up of Pending Labs/Results: dr. black  Pending Labs     Order Current Status    AFB Culture - Body Fluid, Pleural Cavity Preliminary result    AFB Culture - Tissue, Pleural Cavity Preliminary result    Fungus Culture - Body Fluid, Pleural Cavity Preliminary result    Fungus Culture - Tissue, Pleural Cavity Preliminary result        Discharge Details        Discharge Medications      New Medications      Instructions Start Date   aspirin 81 MG chewable tablet   81 mg, Oral, Daily   Start Date: April 21, 2023     gabapentin 100 MG capsule  Commonly known as: NEURONTIN   100 mg, Oral, 3 Times Daily      ondansetron 4 MG tablet  Commonly known as: ZOFRAN   4 mg, Oral, Every 6 Hours PRN         Changes to Medications      Instructions Start Date   apixaban 5 MG tablet tablet  Commonly known as: ELIQUIS  What changed:   · medication strength  · how much to take   5 mg, Oral, Every 12 Hours Scheduled         Continue These Medications      Instructions Start Date   acetaminophen 325 MG tablet  Commonly known as: TYLENOL   650 mg, Oral, Every 8 Hours Scheduled      atorvastatin 80 MG tablet  Commonly known as: LIPITOR   80 mg, Oral, Nightly      carvedilol 25 MG tablet  Commonly known as: COREG   50 mg, Oral, 2 Times Daily With Meals      cyclobenzaprine 5 MG tablet  Commonly known as: FLEXERIL   5 mg, Oral, 3 Times Daily PRN      docusate sodium 100 MG capsule   100 mg, Oral, 2 Times Daily      famotidine 20 MG tablet  Commonly known as: PEPCID   20 mg, Oral, Daily      lisinopril 5 MG tablet  Commonly known as: PRINIVIL,ZESTRIL   5 mg, Oral, Every 24 Hours Scheduled      melatonin 5 MG tablet  tablet   5 mg, Oral, Nightly PRN      pantoprazole 40 MG EC tablet  Commonly known as: PROTONIX   40 mg, Oral, Daily, evening      polyethylene glycol 17 g packet  Commonly known as: MIRALAX   17 g, Oral, Daily      terazosin 1 MG capsule  Commonly known as: HYTRIN   1 capsule, Oral      Trulicity 0.75 MG/0.5ML solution pen-injector  Generic drug: Dulaglutide   No dose, route, or frequency recorded.      vitamin C 500 MG tablet  Commonly known as: ASCORBIC ACID   No dose, route, or frequency recorded.         Stop These Medications    oxyCODONE 5 MG immediate release tablet  Commonly known as: ROXICODONE     Zinc 220 (50 Zn) MG capsule            Allergies   Allergen Reactions   • Other Unknown - Low Severity     Mercury metals- as a child         Discharge Disposition:  Skilled Nursing Facility (DC - External)    Diet:  Hospital:  Diet Order   Procedures   • Diet: Regular/House Diet; Texture: Regular Texture (IDDSI 7); Fluid Consistency: Thin (IDDSI 0)       Activity:           CODE STATUS:    Code Status and Medical Interventions:   Ordered at: 03/24/23 6040     Code Status (Patient has no pulse and is not breathing):    CPR (Attempt to Resuscitate)     Medical Interventions (Patient has pulse or is breathing):    Full Support       No future appointments.    Additional Instructions for the Follow-ups that You Need to Schedule     Discharge Follow-up with PCP   As directed       Currently Documented PCP:    Arsen Seals APRN    PCP Phone Number:    [phone]     Follow Up Details: 1 week after d/c from rehab         Discharge Follow-up with Specialty: Dr. Mahan (ct surgery) 3 weeks   As directed      Specialty: Dr. Mahan (ct surgery) 3 weeks                     Vince Sheridan MD  04/20/23      Time Spent on Discharge:  I spent  35  minutes on this discharge activity which included: face-to-face encounter with the patient, reviewing the data in the system, coordination of the care with the nursing  staff as well as consultants, documentation, and entering orders.

## 2023-04-20 NOTE — PLAN OF CARE
Problem: Fall Injury Risk  Goal: Absence of Fall and Fall-Related Injury  Intervention: Promote Injury-Free Environment  Recent Flowsheet Documentation  Taken 4/20/2023 0410 by Marilynn Hill RN  Safety Promotion/Fall Prevention:   activity supervised   assistive device/personal items within reach   safety round/check completed  Taken 4/20/2023 0210 by Marilynn Hill RN  Safety Promotion/Fall Prevention:   assistive device/personal items within reach   activity supervised   safety round/check completed  Taken 4/20/2023 0010 by Marilynn Hill RN  Safety Promotion/Fall Prevention:   activity supervised   assistive device/personal items within reach   safety round/check completed  Taken 4/19/2023 2210 by Marilynn Hill RN  Safety Promotion/Fall Prevention:   activity supervised   assistive device/personal items within reach   safety round/check completed  Taken 4/19/2023 2010 by Marilynn Hill RN  Safety Promotion/Fall Prevention:   assistive device/personal items within reach   activity supervised   safety round/check completed     Problem: Skin Injury Risk Increased  Goal: Skin Health and Integrity  Intervention: Optimize Skin Protection  Recent Flowsheet Documentation  Taken 4/20/2023 0410 by Marilynn Hill RN  Pressure Reduction Techniques: frequent weight shift encouraged  Head of Bed (HOB) Positioning: HOB elevated  Pressure Reduction Devices: positioning supports utilized  Skin Protection:   adhesive use limited   incontinence pads utilized  Taken 4/20/2023 0210 by Marilynn Hill RN  Pressure Reduction Techniques: frequent weight shift encouraged  Head of Bed (HOB) Positioning: HOB elevated  Pressure Reduction Devices: positioning supports utilized  Skin Protection:   adhesive use limited   incontinence pads utilized  Taken 4/20/2023 0010 by Marilynn Hill RN  Pressure Reduction Techniques: frequent weight shift encouraged  Head of Bed (HOB) Positioning: HOB elevated  Pressure Reduction Devices: positioning supports  utilized  Skin Protection:   adhesive use limited   incontinence pads utilized  Taken 4/19/2023 2210 by Marilynn Hill RN  Pressure Reduction Techniques: frequent weight shift encouraged  Head of Bed (HOB) Positioning: HOB elevated  Pressure Reduction Devices: positioning supports utilized  Skin Protection:   adhesive use limited   incontinence pads utilized  Taken 4/19/2023 2010 by Marilynn Hill RN  Pressure Reduction Techniques: frequent weight shift encouraged  Head of Bed (HOB) Positioning: [facility] elevated  Pressure Reduction Devices: positioning supports utilized  Skin Protection:   adhesive use limited   incontinence pads utilized     Problem: Adult Inpatient Plan of Care  Goal: Absence of Hospital-Acquired Illness or Injury  Intervention: Identify and Manage Fall Risk  Recent Flowsheet Documentation  Taken 4/20/2023 0410 by Marilynn Hill RN  Safety Promotion/Fall Prevention:   activity supervised   assistive device/personal items within reach   safety round/check completed  Taken 4/20/2023 0210 by Marilynn Hill RN  Safety Promotion/Fall Prevention:   assistive device/personal items within reach   activity supervised   safety round/check completed  Taken 4/20/2023 0010 by Marilynn Hill RN  Safety Promotion/Fall Prevention:   activity supervised   assistive device/personal items within reach   safety round/check completed  Taken 4/19/2023 2210 by Marilynn Hill RN  Safety Promotion/Fall Prevention:   activity supervised   assistive device/personal items within reach   safety round/check completed  Taken 4/19/2023 2010 by Marilynn Hill RN  Safety Promotion/Fall Prevention:   assistive device/personal items within reach   activity supervised   safety round/check completed     Problem: Adult Inpatient Plan of Care  Goal: Absence of Hospital-Acquired Illness or Injury  Intervention: Prevent Skin Injury  Recent Flowsheet Documentation  Taken 4/20/2023 0410 by Marilynn Hill RN  Skin Protection:   adhesive use limited    incontinence pads utilized  Taken 4/20/2023 0210 by Marilynn Hill RN  Skin Protection:   adhesive use limited   incontinence pads utilized  Taken 4/20/2023 0010 by Marilynn Hill RN  Skin Protection:   adhesive use limited   incontinence pads utilized  Taken 4/19/2023 2210 by Marilynn Hill RN  Skin Protection:   adhesive use limited   incontinence pads utilized  Taken 4/19/2023 2010 by Marilynn Hill RN  Skin Protection:   adhesive use limited   incontinence pads utilized     Problem: Adult Inpatient Plan of Care  Goal: Absence of Hospital-Acquired Illness or Injury  Intervention: Prevent and Manage VTE (Venous Thromboembolism) Risk  Recent Flowsheet Documentation  Taken 4/20/2023 0410 by Marilynn Hill RN  Activity Management: activity encouraged  Taken 4/20/2023 0210 by Marilynn Hill RN  Activity Management: activity encouraged  Taken 4/20/2023 0010 by Marilynn Hill RN  Activity Management: activity encouraged  Taken 4/19/2023 2210 by Marilynn Hill RN  Activity Management: activity encouraged  Taken 4/19/2023 2010 by Marilynn Hill RN  Activity Management: activity encouraged   Goal Outcome Evaluation:           Progress: improving

## 2023-04-21 ENCOUNTER — PREP FOR SURGERY (OUTPATIENT)
Dept: OTHER | Facility: HOSPITAL | Age: 73
End: 2023-04-21
Payer: MEDICARE

## 2023-04-21 DIAGNOSIS — I26.09 ACUTE PULMONARY EMBOLISM WITH ACUTE COR PULMONALE, UNSPECIFIED PULMONARY EMBOLISM TYPE: ICD-10-CM

## 2023-04-21 DIAGNOSIS — Z95.0 CARDIAC PACEMAKER: ICD-10-CM

## 2023-04-21 DIAGNOSIS — Z87.19 HISTORY OF BARRETT'S ESOPHAGUS: Primary | ICD-10-CM

## 2023-04-21 DIAGNOSIS — I50.20 HFREF (HEART FAILURE WITH REDUCED EJECTION FRACTION): ICD-10-CM

## 2023-04-21 RX ORDER — SODIUM CHLORIDE, SODIUM LACTATE, POTASSIUM CHLORIDE, CALCIUM CHLORIDE 600; 310; 30; 20 MG/100ML; MG/100ML; MG/100ML; MG/100ML
30 INJECTION, SOLUTION INTRAVENOUS CONTINUOUS PRN
OUTPATIENT
Start: 2023-04-21

## 2023-04-26 LAB
FUNGUS WND CULT: NORMAL
FUNGUS WND CULT: NORMAL
MYCOBACTERIUM SPEC CULT: NORMAL
MYCOBACTERIUM SPEC CULT: NORMAL
NIGHT BLUE STAIN TISS: NORMAL
NIGHT BLUE STAIN TISS: NORMAL

## 2023-05-04 ENCOUNTER — TELEPHONE (OUTPATIENT)
Dept: CARDIAC SURGERY | Facility: CLINIC | Age: 73
End: 2023-05-04
Payer: MEDICARE

## 2023-05-04 NOTE — TELEPHONE ENCOUNTER
Anne, with the [facility], called to ask if the sutures could stay in until the patient's appointment on 5/9/23 and if she needed to take bandages off. Advised that sutures could stay in until 5/9/23 and that they would most likely be removed then and that the bandage could be removed. Instructed them to keep the area clean and dry. Can wash incision area with antibacterial soap and water, gently pat dry. She verbalized understanding, agreed, and knows to call us back with any further questions or concerns.

## 2023-05-09 ENCOUNTER — OFFICE VISIT (OUTPATIENT)
Dept: CARDIAC SURGERY | Facility: CLINIC | Age: 73
End: 2023-05-09
Payer: MEDICARE

## 2023-05-09 VITALS
SYSTOLIC BLOOD PRESSURE: 120 MMHG | WEIGHT: 130 LBS | OXYGEN SATURATION: 97 % | HEART RATE: 85 BPM | HEIGHT: 70 IN | BODY MASS INDEX: 18.61 KG/M2 | DIASTOLIC BLOOD PRESSURE: 70 MMHG | TEMPERATURE: 97.5 F

## 2023-05-09 DIAGNOSIS — J90 RECURRENT RIGHT PLEURAL EFFUSION: Primary | ICD-10-CM

## 2023-05-09 PROCEDURE — 3078F DIAST BP <80 MM HG: CPT | Performed by: REGISTERED NURSE

## 2023-05-09 PROCEDURE — 3074F SYST BP LT 130 MM HG: CPT | Performed by: REGISTERED NURSE

## 2023-05-09 PROCEDURE — 99024 POSTOP FOLLOW-UP VISIT: CPT | Performed by: REGISTERED NURSE

## 2023-05-09 RX ORDER — SODIUM ZIRCONIUM CYCLOSILICATE 10 G/10G
10 POWDER, FOR SUSPENSION ORAL
Status: ON HOLD | COMMUNITY

## 2023-05-09 RX ORDER — MIRTAZAPINE 15 MG/1
15 TABLET, FILM COATED ORAL NIGHTLY
Status: ON HOLD | COMMUNITY

## 2023-05-09 RX ORDER — SENNA PLUS 8.6 MG/1
1 TABLET ORAL DAILY
Status: ON HOLD | COMMUNITY

## 2023-05-09 RX ORDER — FLUTICASONE PROPIONATE 50 MCG
2 SPRAY, SUSPENSION (ML) NASAL DAILY
Status: ON HOLD | COMMUNITY

## 2023-05-09 NOTE — PROGRESS NOTES
[facility] Cardiothoracic Surgery Office Follow Up Note    Date of Encounter: 2023     Name: Timmy Guajardo  : 1950     Referred By: No ref. provider found  PCP: Arsen Seals APRN    Chief Complaint:    Chief Complaint   Patient presents with   • Post-op Follow-up     Hosp D/C Right VATs / Decortication 23-Pleural Effusion       Subjective      History of Present Illness:    It was nice to see Timmy Guajardo in follow up.  He is a pleasant 72 y.o. male with PMH significant for hyperlipidemia, hypertension, coronary artery disease, type 2 diabetes mellitus, GERD, bilateral pulmonary embolism, cardiomyopathy with a 30% ejection fraction, and persistent right pleural effusion.  Patient was originally admitted on 3/24/2023 for pleural effusion.  Please see discharge summary for details prior to surgery.    Patient is s/p bronchoscopy, right VATS, decortication, mechanical pleurodesis, and intercostal nerve blocks with local by Dr. Mahan on 2023.  See op report for full details.  He did well post-operatively.  On postop day #8 patient met required criteria and was deemed appropriate for discharge to rehab.  Without readmission.  Patient was discharged to the [facility] at citation.    Mr. Guajardo presents to office today accompanied by his sister for initial postop follow-up.  Patient denies fever or chills.  He reports improved shortness of breath.  He is currently residing at the [facility] for rehab.  Patient with poor appetite.  Reports following with PCP.  Reports medication compliance.      Review of Systems:  Review of Systems   Constitutional: Positive for malaise/fatigue and weight loss. Negative for chills, decreased appetite, diaphoresis, fever, night sweats and weight gain.   HENT: Negative for hoarse voice.    Eyes: Negative for blurred vision, double vision and visual disturbance.   Cardiovascular: Negative for chest pain, claudication, dyspnea on exertion, irregular  heartbeat, leg swelling, near-syncope, orthopnea, palpitations, paroxysmal nocturnal dyspnea and syncope.   Respiratory: Negative for cough, hemoptysis, shortness of breath, sputum production and wheezing.    Hematologic/Lymphatic: Negative for adenopathy and bleeding problem. Bruises/bleeds easily.   Skin: Negative for color change, nail changes, poor wound healing and rash.   Musculoskeletal: Negative for back pain, falls and muscle cramps.   Gastrointestinal: Negative for abdominal pain, dysphagia and heartburn.   Genitourinary: Negative for flank pain.   Neurological: Positive for weakness. Negative for brief paralysis, disturbances in coordination, dizziness, focal weakness, headaches, light-headedness, loss of balance, numbness, paresthesias, sensory change and vertigo.   Psychiatric/Behavioral: Negative for depression and suicidal ideas.   Allergic/Immunologic: Positive for environmental allergies. Negative for persistent infections.       I have reviewed the following portions of the patient's history: allergies, current medications, past family history, past medical history, past social history, past surgical history and problem list and confirm it's accurate.    Allergies:  Allergies   Allergen Reactions   • Other Unknown - Low Severity     Mercury metals- as a child       Medications:      Current Outpatient Medications:   •  acetaminophen (TYLENOL) 325 MG tablet, Take 2 tablets by mouth Every 8 (Eight) Hours., Disp: , Rfl:   •  ALBUTEROL IN, Inhale., Disp: , Rfl:   •  apixaban (ELIQUIS) 5 MG tablet tablet, Take 1 tablet by mouth Every 12 (Twelve) Hours. Indications: DVT/PE (active thrombosis), Disp: 60 tablet, Rfl:   •  aspirin 81 MG chewable tablet, Chew 1 tablet Daily., Disp: , Rfl:   •  atorvastatin (LIPITOR) 80 MG tablet, Take 1 tablet by mouth Every Night., Disp: , Rfl:   •  carvedilol (COREG) 25 MG tablet, Take 2 tablets by mouth 2 (Two) Times a Day With Meals., Disp: , Rfl:   •  Cholecalciferol  (Vitamin D-3) 125 MCG (5000 UT) tablet, Take  by mouth Daily., Disp: , Rfl:   •  cyclobenzaprine (FLEXERIL) 5 MG tablet, Take 1 tablet by mouth 3 (Three) Times a Day As Needed for Muscle Spasms., Disp: , Rfl:   •  docusate sodium 100 MG capsule, Take 1 capsule by mouth 2 (Two) Times a Day., Disp: , Rfl:   •  famotidine (PEPCID) 20 MG tablet, Take 1 tablet by mouth Daily., Disp: , Rfl:   •  fluticasone (FLONASE) 50 MCG/ACT nasal spray, 2 sprays into the nostril(s) as directed by provider Daily., Disp: , Rfl:   •  gabapentin (NEURONTIN) 100 MG capsule, Take 1 capsule by mouth 3 (Three) Times a Day., Disp: 12 capsule, Rfl: 0  •  lisinopril (PRINIVIL,ZESTRIL) 5 MG tablet, Take 1 tablet by mouth Daily., Disp: , Rfl:   •  melatonin 5 MG tablet tablet, Take 1 tablet by mouth At Night As Needed (sleep)., Disp: , Rfl:   •  mirtazapine (REMERON) 7.5 MG half tablet, Take 2 half tablet by mouth Every Night., Disp: , Rfl:   •  nystatin (MYCOSTATIN) 100,000 unit/mL suspension, 2 (Two) Times a Day As Needed., Disp: , Rfl:   •  ondansetron (ZOFRAN) 4 MG tablet, Take 1 tablet by mouth Every 6 (Six) Hours As Needed for Nausea or Vomiting., Disp: , Rfl:   •  pantoprazole (PROTONIX) 40 MG EC tablet, Take 1 tablet by mouth Daily. evening, Disp: , Rfl:   •  polyethylene glycol (MIRALAX) 17 g packet, Take 17 g by mouth Daily., Disp: , Rfl:   •  Probiotic Product (PROBIOTIC DAILY PO), Take  by mouth., Disp: , Rfl:   •  senna 8.6 MG tablet, Take 1 tablet by mouth Daily., Disp: , Rfl:   •  sodium zirconium cyclosilicate (Lokelma) 10 g pack, Take 10 g by mouth., Disp: , Rfl:   •  terazosin (HYTRIN) 1 MG capsule, Take 1 capsule by mouth., Disp: , Rfl:   •  Trulicity 0.75 MG/0.5ML solution pen-injector, , Disp: , Rfl:   •  vitamin C (ASCORBIC ACID) 500 MG tablet, , Disp: , Rfl:     History:   Past Medical History:   Diagnosis Date   • Cardiomyopathy    • CHF (congestive heart failure)    • Coronary artery disease    • COVID-19    • Diabetes  mellitus    • GERD (gastroesophageal reflux disease)    • HTN (hypertension)    • Pleural effusion    • Stroke     Right sided weakness   • Stroke        Past Surgical History:   Procedure Laterality Date   • AMPUTATION DIGIT Left 5/12/2021    Procedure: AMPUTATION DIGIT - GREAT TOE AMPUTATION LEFT;  Surgeon: Dario Marmolejo MD;  Location:  [facility];  Service: General;  Laterality: Left;   • AORTAGRAM N/A 5/11/2021    Procedure: AORTAGRAM WITH RUNOFFS, LEFT SFA BALLOON ANGIOPLASTY;  Surgeon: Tim Mahan MD;  Location:  [facility];  Service: Vascular;  Laterality: N/A;  FL TIME 6 MIN 54 SECS  82 MGY  CONTRAST VISIPAQUE 100ML     • CARDIAC CATHETERIZATION     • CARDIAC PACEMAKER PLACEMENT      pacemaker/defibrillator, [employer]   • COLONOSCOPY N/A 3/31/2023    Procedure: COLONOSCOPY;  Surgeon: Brunner, Mark I, MD;  Location:  [facility];  Service: Gastroenterology;  Laterality: N/A;   • ENDOSCOPY N/A 3/29/2023    Procedure: ESOPHAGOGASTRODUODENOSCOPY;  Surgeon: Brunner, Mark I, MD;  Location:  [facility];  Service: Gastroenterology;  Laterality: N/A;   • HERNIA REPAIR Bilateral     Inguinal hernia   • HIP TROCHANTERIC NAILING WITH INTRAMEDULLARY HIP SCREW Right 10/18/2022    Procedure: HIP TROCHANTERIC NAILING WITH INTRAMEDULLARY HIP SCREW RIGHT;  Surgeon: Bj Steinberg MD;  Location:  [facility];  Service: Orthopedics;  Laterality: Right;   • THORACOSCOPY VIDEO ASSISTED WITH LOBECTOMY Right 4/12/2023    Procedure: BRONCHOSCOPY, THORACOSCOPY VIDEO ASSISTED WITH DECORTICATION, MECHANICAL PLEURODESIS;  Surgeon: Tim Mahan MD;  Location:  [facility];  Service: Cardiothoracic;  Laterality: Right;       Social History     Socioeconomic History   • Marital status: Single   • Number of children: 0   Tobacco Use   • Smoking status: Former     Packs/day: 1.00     Years: 30.00     Pack years: 30.00     Types: Cigarettes     Quit date: 2013     Years since quitting: 10.3   • Smokeless tobacco:  "Never   • Tobacco comments:     quit 2015   Vaping Use   • Vaping Use: Never used   Substance and Sexual Activity   • Alcohol use: Never   • Drug use: Never   • Sexual activity: Defer        Family History   Problem Relation Age of Onset   • Alzheimer's disease Mother    • Kidney failure Mother    • Cancer Father    • Heart failure Father        Objective     Physical Exam:  Vitals:    05/09/23 1048   BP: 120/70   BP Location: Left arm   Patient Position: Sitting   Pulse: 85   Temp: 97.5 °F (36.4 °C)   SpO2: 97%   Weight: 59 kg (130 lb)   Height: 177.8 cm (70\")  Comment: patient reported      Body mass index is 18.65 kg/m².    Physical Exam  Vitals reviewed.   Constitutional:       Appearance: He is underweight.   Cardiovascular:      Rate and Rhythm: Normal rate and regular rhythm.      Heart sounds: Normal heart sounds.   Pulmonary:      Effort: Pulmonary effort is normal.      Breath sounds: Normal breath sounds.   Musculoskeletal:      Right lower leg: No edema.      Left lower leg: No edema.   Neurological:      General: No focal deficit present.      Mental Status: He is alert and oriented to person, place, and time.      Motor: Weakness present.   Psychiatric:         Mood and Affect: Mood normal.         Behavior: Behavior normal.         Thought Content: Thought content normal.         Imaging/Labs:  XR Chest AP -- 2 view   Impression:  Similar to mildly worsened size and appearance of a small to moderate right pleural effusion with associated right base opacities likely reflecting atelectasis.  XR chest ap (05/09/2023 11:13)    ..I personally reviewed this report and imaging.      Assessment / Plan      Assessment / Plan:   PMH significant for hyperlipidemia, hypertension, coronary artery disease, type 2 diabetes mellitus, GERD, bilateral pulmonary embolism, cardiomyopathy with a 30% ejection fraction, and persistent right pleural effusion.  Patient was originally admitted on 3/24/2023 for pleural " effusion.  Please see discharge summary for details prior to surgery.    1.  Persistent right pleural effusion  · S/p bronchoscopy, right VATS, decortication, mechanical pleurodesis, and intercostal nerve blocks with local by Dr. Mahan on 4/12/2023.  See op report for full details.  · Did well postoperatively.  On postop day #8 was deemed appropriate for discharge to rehab.  Without readmission.  Discharged to the [facility] at citation.  · Presents to office today for initial post-op follow-up.    · Denies fever or chills.  Reports improved shortness of breath.  With poor appetite.  · Upon examination the right VATS sites have healed nicely, without erythema, edema, or drainage.  Sutures remain intact.   · All sutures were removed without complication.  The area was heavily painted with betadine prior to and after removal.  Patient tolerated well.   · Encouraged continue use of incentive spirometer and cough/deep breathing exercises.     Patient Education:  • Continue to keep your incisions clean and dry.  Use plain antibacterial soap (i.e. dial) and water to clean and pat dry.    • Do no apply any lotions, creams, oils, powders, salves, or ointments directly to incisional sites.  • Please watch for signs and symptoms of infection which may include but are not limited to: increased pain or tenderness around your incision, warmth at the site or fever, redness or red streaking, and foul smelling or appearing drainage.  Clear drainage and bloody drainage are not worrisome.  • If your incision opens up please contact our office.    Instructions and post-operative restrictions verbally reviewed -- patient verbalized understanding.    Follow Up:      Or sooner for any further concerns or worsening sign and symptoms.  Patient encouraged to call the office with any questions or concerns.     Thank you for allowing me to participate in your care.  Best Regards,    YUE Samayoa  [facility] Cardiothoracic  Surgery  05/19/23  12:04 EDT

## 2023-05-13 ENCOUNTER — HOSPITAL ENCOUNTER (EMERGENCY)
Facility: HOSPITAL | Age: 73
Discharge: HOME | End: 2023-05-14
Attending: EMERGENCY MEDICINE
Payer: MEDICARE

## 2023-05-13 VITALS
OXYGEN SATURATION: 94 % | WEIGHT: 150 LBS | RESPIRATION RATE: 18 BRPM | SYSTOLIC BLOOD PRESSURE: 116 MMHG | HEIGHT: 70 IN | BODY MASS INDEX: 21.47 KG/M2 | DIASTOLIC BLOOD PRESSURE: 73 MMHG | TEMPERATURE: 98.9 F | HEART RATE: 101 BPM

## 2023-05-13 DIAGNOSIS — R41.0 CONFUSION AND DISORIENTATION: ICD-10-CM

## 2023-05-13 DIAGNOSIS — E11.65 HYPERGLYCEMIA DUE TO DIABETES MELLITUS: Primary | ICD-10-CM

## 2023-05-13 LAB
ALBUMIN SERPL-MCNC: 3.5 G/DL (ref 3.5–5.2)
ALBUMIN/GLOB SERPL: 0.8 G/DL
ALP SERPL-CCNC: 143 U/L (ref 39–117)
ALT SERPL W P-5'-P-CCNC: 43 U/L (ref 1–41)
ANION GAP SERPL CALCULATED.3IONS-SCNC: 15 MMOL/L (ref 5–15)
AST SERPL-CCNC: 43 U/L (ref 1–40)
BACTERIA UR QL AUTO: NORMAL /HPF
BASOPHILS # BLD AUTO: 0.06 10*3/MM3 (ref 0–0.2)
BASOPHILS NFR BLD AUTO: 0.5 % (ref 0–1.5)
BILIRUB SERPL-MCNC: 0.7 MG/DL (ref 0–1.2)
BILIRUB UR QL STRIP: NEGATIVE
BUN SERPL-MCNC: 27 MG/DL (ref 8–23)
BUN/CREAT SERPL: 29 (ref 7–25)
CALCIUM SPEC-SCNC: 9.6 MG/DL (ref 8.6–10.5)
CHLORIDE SERPL-SCNC: 96 MMOL/L (ref 98–107)
CLARITY UR: CLEAR
CO2 SERPL-SCNC: 22 MMOL/L (ref 22–29)
COLOR UR: YELLOW
CREAT SERPL-MCNC: 0.93 MG/DL (ref 0.76–1.27)
DEPRECATED RDW RBC AUTO: 48.9 FL (ref 37–54)
EGFRCR SERPLBLD CKD-EPI 2021: 88.3 ML/MIN/1.73
EOSINOPHIL # BLD AUTO: 0.05 10*3/MM3 (ref 0–0.4)
EOSINOPHIL NFR BLD AUTO: 0.4 % (ref 0.3–6.2)
ERYTHROCYTE [DISTWIDTH] IN BLOOD BY AUTOMATED COUNT: 14.3 % (ref 12.3–15.4)
GLOBULIN UR ELPH-MCNC: 4.3 GM/DL
GLUCOSE SERPL-MCNC: 329 MG/DL (ref 65–99)
GLUCOSE UR STRIP-MCNC: ABNORMAL MG/DL
HCT VFR BLD AUTO: 40.7 % (ref 37.5–51)
HGB BLD-MCNC: 13.7 G/DL (ref 13–17.7)
HGB UR QL STRIP.AUTO: NEGATIVE
HYALINE CASTS UR QL AUTO: NORMAL /LPF
IMM GRANULOCYTES # BLD AUTO: 0.11 10*3/MM3 (ref 0–0.05)
IMM GRANULOCYTES NFR BLD AUTO: 0.9 % (ref 0–0.5)
KETONES UR QL STRIP: NEGATIVE
LEUKOCYTE ESTERASE UR QL STRIP.AUTO: NEGATIVE
LYMPHOCYTES # BLD AUTO: 0.43 10*3/MM3 (ref 0.7–3.1)
LYMPHOCYTES NFR BLD AUTO: 3.6 % (ref 19.6–45.3)
MCH RBC QN AUTO: 31.4 PG (ref 26.6–33)
MCHC RBC AUTO-ENTMCNC: 33.7 G/DL (ref 31.5–35.7)
MCV RBC AUTO: 93.1 FL (ref 79–97)
MONOCYTES # BLD AUTO: 0.54 10*3/MM3 (ref 0.1–0.9)
MONOCYTES NFR BLD AUTO: 4.6 % (ref 5–12)
NEUTROPHILS NFR BLD AUTO: 10.67 10*3/MM3 (ref 1.7–7)
NEUTROPHILS NFR BLD AUTO: 90 % (ref 42.7–76)
NITRITE UR QL STRIP: NEGATIVE
NRBC BLD AUTO-RTO: 0 /100 WBC (ref 0–0.2)
PH UR STRIP.AUTO: 8 [PH] (ref 5–8)
PLATELET # BLD AUTO: 125 10*3/MM3 (ref 140–450)
PMV BLD AUTO: 10.4 FL (ref 6–12)
POTASSIUM SERPL-SCNC: 5.1 MMOL/L (ref 3.5–5.2)
POTASSIUM SERPL-SCNC: 5.6 MMOL/L (ref 3.5–5.2)
PROT SERPL-MCNC: 7.8 G/DL (ref 6–8.5)
PROT UR QL STRIP: ABNORMAL
RBC # BLD AUTO: 4.37 10*6/MM3 (ref 4.14–5.8)
RBC # UR STRIP: NORMAL /HPF
REF LAB TEST METHOD: NORMAL
SODIUM SERPL-SCNC: 133 MMOL/L (ref 136–145)
SP GR UR STRIP: 1.02 (ref 1–1.03)
SQUAMOUS #/AREA URNS HPF: NORMAL /HPF
UROBILINOGEN UR QL STRIP: ABNORMAL
WBC # UR STRIP: NORMAL /HPF
WBC NRBC COR # BLD: 11.86 10*3/MM3 (ref 3.4–10.8)

## 2023-05-13 PROCEDURE — 81001 URINALYSIS AUTO W/SCOPE: CPT | Performed by: EMERGENCY MEDICINE

## 2023-05-13 PROCEDURE — 80053 COMPREHEN METABOLIC PANEL: CPT | Performed by: EMERGENCY MEDICINE

## 2023-05-13 PROCEDURE — 84132 ASSAY OF SERUM POTASSIUM: CPT | Performed by: EMERGENCY MEDICINE

## 2023-05-13 PROCEDURE — 85025 COMPLETE CBC W/AUTO DIFF WBC: CPT | Performed by: EMERGENCY MEDICINE

## 2023-05-13 PROCEDURE — 36415 COLL VENOUS BLD VENIPUNCTURE: CPT

## 2023-05-13 PROCEDURE — 63710000001 INSULIN REGULAR HUMAN PER 5 UNITS: Performed by: EMERGENCY MEDICINE

## 2023-05-13 PROCEDURE — 99284 EMERGENCY DEPT VISIT MOD MDM: CPT

## 2023-05-13 RX ORDER — SODIUM CHLORIDE 0.9 % (FLUSH) 0.9 %
10 SYRINGE (ML) INJECTION AS NEEDED
Status: DISCONTINUED | OUTPATIENT
Start: 2023-05-13 | End: 2023-05-14 | Stop reason: HOSPADM

## 2023-05-13 RX ADMIN — INSULIN HUMAN 6 UNITS: 100 INJECTION, SOLUTION PARENTERAL at 21:36

## 2023-05-13 RX ADMIN — SODIUM CHLORIDE, POTASSIUM CHLORIDE, SODIUM LACTATE AND CALCIUM CHLORIDE 1000 ML: 600; 310; 30; 20 INJECTION, SOLUTION INTRAVENOUS at 19:56

## 2023-05-14 NOTE — ED PROVIDER NOTES
Subjective   History of Present Illness  72-year-old male presented to the emergency department with some confusion.  Patient is longstanding history of CVA as well as diabetes.  Apparently patient had some confusion since he woke up this morning.  Nursing home staff felt that he was more confused than normal and sent to the emergency department for evaluation.  On my initial evaluation, the patient is alert.  He states that nothing is bothering him.  He feels fine.  Denies any headache or change in vision.  No focal weakness.  No chest pain or shortness of breath.  No fevers or chills.    History provided by:  EMS personnel   used: No        Review of Systems   Constitutional: Negative for chills and fever.   HENT: Negative for congestion, ear pain and sore throat.    Eyes: Negative for visual disturbance.   Respiratory: Negative for shortness of breath.    Cardiovascular: Negative for chest pain.   Gastrointestinal: Negative for abdominal pain.   Genitourinary: Negative for difficulty urinating.   Musculoskeletal: Negative for arthralgias.   Skin: Negative for rash.   Neurological: Negative for dizziness, weakness and numbness.   Psychiatric/Behavioral: Negative for agitation.       Past Medical History:   Diagnosis Date   • Cardiomyopathy    • CHF (congestive heart failure)    • Coronary artery disease    • COVID-19    • Diabetes mellitus    • GERD (gastroesophageal reflux disease)    • HTN (hypertension)    • Pleural effusion    • Stroke     Right sided weakness   • Stroke        Allergies   Allergen Reactions   • Other Unknown - Low Severity     Mercury metals- as a child       Past Surgical History:   Procedure Laterality Date   • AMPUTATION DIGIT Left 5/12/2021    Procedure: AMPUTATION DIGIT - GREAT TOE AMPUTATION LEFT;  Surgeon: Dario Marmolejo MD;  Location: [facility];  Service: General;  Laterality: Left;   • AORTAGRAM N/A 5/11/2021    Procedure: AORTAGRAM WITH RUNOFFS, LEFT SFA BALLOON  ANGIOPLASTY;  Surgeon: Tim Mahan MD;  Location:  [facility];  Service: Vascular;  Laterality: N/A;  FL TIME 6 MIN 54 SECS  82 MGY  CONTRAST VISIPAQUE 100ML     • CARDIAC CATHETERIZATION     • CARDIAC PACEMAKER PLACEMENT      pacemaker/defibrillator, [employer]   • COLONOSCOPY N/A 3/31/2023    Procedure: COLONOSCOPY;  Surgeon: Brunner, Mark I, MD;  Location:  [facility];  Service: Gastroenterology;  Laterality: N/A;   • ENDOSCOPY N/A 3/29/2023    Procedure: ESOPHAGOGASTRODUODENOSCOPY;  Surgeon: Brunner, Mark I, MD;  Location:  [facility];  Service: Gastroenterology;  Laterality: N/A;   • HERNIA REPAIR Bilateral     Inguinal hernia   • HIP TROCHANTERIC NAILING WITH INTRAMEDULLARY HIP SCREW Right 10/18/2022    Procedure: HIP TROCHANTERIC NAILING WITH INTRAMEDULLARY HIP SCREW RIGHT;  Surgeon: Bj Steinberg MD;  Location:  [facility];  Service: Orthopedics;  Laterality: Right;   • THORACOSCOPY VIDEO ASSISTED WITH LOBECTOMY Right 4/12/2023    Procedure: BRONCHOSCOPY, THORACOSCOPY VIDEO ASSISTED WITH DECORTICATION, MECHANICAL PLEURODESIS;  Surgeon: Tim Mahan MD;  Location:  [facility];  Service: Cardiothoracic;  Laterality: Right;       Family History   Problem Relation Age of Onset   • Alzheimer's disease Mother    • Kidney failure Mother    • Cancer Father    • Heart failure Father        Social History     Socioeconomic History   • Marital status: Single   • Number of children: 0   Tobacco Use   • Smoking status: Former     Packs/day: 1.00     Years: 30.00     Pack years: 30.00     Types: Cigarettes     Quit date: 2013     Years since quitting: 10.3   • Smokeless tobacco: Never   • Tobacco comments:     quit 2015   Vaping Use   • Vaping Use: Never used   Substance and Sexual Activity   • Alcohol use: Never   • Drug use: Never   • Sexual activity: Defer           Objective   Physical Exam  Vitals and nursing note reviewed.   Constitutional:       General: He is not in acute distress.      Appearance: He is not ill-appearing or toxic-appearing.   HENT:      Mouth/Throat:      Pharynx: No posterior oropharyngeal erythema.   Eyes:      Extraocular Movements: Extraocular movements intact.      Pupils: Pupils are equal, round, and reactive to light.   Cardiovascular:      Rate and Rhythm: Normal rate and regular rhythm.   Pulmonary:      Effort: Pulmonary effort is normal. No respiratory distress.   Abdominal:      General: Abdomen is flat. There is no distension.      Palpations: There is no mass.      Tenderness: There is no abdominal tenderness. There is no guarding or rebound.   Musculoskeletal:         General: No deformity. Normal range of motion.   Neurological:      General: No focal deficit present.      Mental Status: He is alert.      Sensory: No sensory deficit.      Motor: No weakness.         Procedures           ED Course  ED Course as of 05/13/23 2239   Sat May 13, 2023   2215 CBC & Differential(!)  Repeat potassium was within normal limits. [JK]   2233 Potassium [JK]   2238 Comprehensive Metabolic Panel(!) [JK]   2238 Urinalysis, Microscopic Only - Urine, Clean Catch [JK]   2238 Urinalysis With Culture If Indicated - Urine, Clean Catch(!) [JK]   2238 On reevaluation, patient appears to be back to his normal baseline.  Patient was hyperglycemic on presentation.  This was treated in our emergency department.  At this time, patient is deemed stable for discharge back to nursing facility.  The family were at bedside was given strict return precautions for any change in symptoms.  Verbalized understanding. [JK]      ED Course User Index  [JK] Benito Hernández MD                                           Medical Decision Making  72-year-old male presented to the emergency department with some confusion.  Patient has a history of CVA as well as diabetes.  Differential includes UTI, viral syndrome, infection, hyperglycemia, DKA, medication interaction.  Patient appears to be at his baseline  at this time.  Afebrile.  Nontoxic.  Work-up initiated.    Amount and/or Complexity of Data Reviewed  Labs: ordered. Decision-making details documented in ED Course.      Risk  Prescription drug management.          Final diagnoses:   Hyperglycemia due to diabetes mellitus   Confusion and disorientation       ED Disposition  ED Disposition     ED Disposition   Discharge    Condition   Stable    Comment   --             Arsen Seals, APRN  [address] [id number] [phone]    Call in 1 day           Medication List      No changes were made to your prescriptions during this visit.          Benito Hernández MD  05/13/23 4572

## 2023-05-19 ENCOUNTER — APPOINTMENT (OUTPATIENT)
Dept: GENERAL RADIOLOGY | Facility: HOSPITAL | Age: 73
DRG: 208 | End: 2023-05-19
Payer: MEDICARE

## 2023-05-19 ENCOUNTER — APPOINTMENT (OUTPATIENT)
Dept: CT IMAGING | Facility: HOSPITAL | Age: 73
DRG: 208 | End: 2023-05-19
Payer: MEDICARE

## 2023-05-19 ENCOUNTER — HOSPITAL ENCOUNTER (INPATIENT)
Facility: HOSPITAL | Age: 73
LOS: 15 days | Discharge: OTHER INSTITUTION | DRG: 208 | End: 2023-06-03
Attending: EMERGENCY MEDICINE | Admitting: INTERNAL MEDICINE
Payer: MEDICARE

## 2023-05-19 DIAGNOSIS — Z02.89 REFERRED BY HEALTH CARE PROFESSIONAL: ICD-10-CM

## 2023-05-19 DIAGNOSIS — R41.0 CONFUSION: Primary | ICD-10-CM

## 2023-05-19 DIAGNOSIS — R93.89 ABNORMAL CXR: ICD-10-CM

## 2023-05-19 DIAGNOSIS — R13.10 DYSPHAGIA, UNSPECIFIED TYPE: ICD-10-CM

## 2023-05-19 DIAGNOSIS — D72.829 LEUKOCYTOSIS, UNSPECIFIED TYPE: ICD-10-CM

## 2023-05-19 DIAGNOSIS — I48.91 ATRIAL FIBRILLATION, UNSPECIFIED TYPE: ICD-10-CM

## 2023-05-19 LAB
ALBUMIN SERPL-MCNC: 3.1 G/DL (ref 3.5–5.2)
ALBUMIN/GLOB SERPL: 0.7 G/DL
ALP SERPL-CCNC: 161 U/L (ref 39–117)
ALT SERPL W P-5'-P-CCNC: 50 U/L (ref 1–41)
ANION GAP SERPL CALCULATED.3IONS-SCNC: 14 MMOL/L (ref 5–15)
AST SERPL-CCNC: 76 U/L (ref 1–40)
BACTERIA UR QL AUTO: ABNORMAL /HPF
BASOPHILS # BLD AUTO: 0.04 10*3/MM3 (ref 0–0.2)
BASOPHILS NFR BLD AUTO: 0.2 % (ref 0–1.5)
BILIRUB SERPL-MCNC: 0.6 MG/DL (ref 0–1.2)
BILIRUB UR QL STRIP: NEGATIVE
BUN SERPL-MCNC: 30 MG/DL (ref 8–23)
BUN/CREAT SERPL: 27 (ref 7–25)
CALCIUM SPEC-SCNC: 9.3 MG/DL (ref 8.6–10.5)
CHLORIDE SERPL-SCNC: 94 MMOL/L (ref 98–107)
CLARITY UR: ABNORMAL
CO2 SERPL-SCNC: 24 MMOL/L (ref 22–29)
COLOR UR: ABNORMAL
CREAT SERPL-MCNC: 1.11 MG/DL (ref 0.76–1.27)
D-LACTATE SERPL-SCNC: 1.9 MMOL/L (ref 0.5–2)
DEPRECATED RDW RBC AUTO: 52.6 FL (ref 37–54)
EGFRCR SERPLBLD CKD-EPI 2021: 71.4 ML/MIN/1.73
EOSINOPHIL # BLD AUTO: 0.07 10*3/MM3 (ref 0–0.4)
EOSINOPHIL NFR BLD AUTO: 0.4 % (ref 0.3–6.2)
ERYTHROCYTE [DISTWIDTH] IN BLOOD BY AUTOMATED COUNT: 14.6 % (ref 12.3–15.4)
GEN 5 2HR TROPONIN T REFLEX: 76 NG/L
GLOBULIN UR ELPH-MCNC: 4.5 GM/DL
GLUCOSE SERPL-MCNC: 233 MG/DL (ref 65–99)
GLUCOSE UR STRIP-MCNC: NEGATIVE MG/DL
HCT VFR BLD AUTO: 35.1 % (ref 37.5–51)
HGB BLD-MCNC: 11.1 G/DL (ref 13–17.7)
HGB UR QL STRIP.AUTO: NEGATIVE
HOLD SPECIMEN: NORMAL
HYALINE CASTS UR QL AUTO: ABNORMAL /LPF
IMM GRANULOCYTES # BLD AUTO: 0.19 10*3/MM3 (ref 0–0.05)
IMM GRANULOCYTES NFR BLD AUTO: 1 % (ref 0–0.5)
KETONES UR QL STRIP: NEGATIVE
LEUKOCYTE ESTERASE UR QL STRIP.AUTO: NEGATIVE
LYMPHOCYTES # BLD AUTO: 0.64 10*3/MM3 (ref 0.7–3.1)
LYMPHOCYTES NFR BLD AUTO: 3.3 % (ref 19.6–45.3)
MAGNESIUM SERPL-MCNC: 2 MG/DL (ref 1.6–2.4)
MCH RBC QN AUTO: 30.5 PG (ref 26.6–33)
MCHC RBC AUTO-ENTMCNC: 31.6 G/DL (ref 31.5–35.7)
MCV RBC AUTO: 96.4 FL (ref 79–97)
MONOCYTES # BLD AUTO: 1.08 10*3/MM3 (ref 0.1–0.9)
MONOCYTES NFR BLD AUTO: 5.5 % (ref 5–12)
NEUTROPHILS NFR BLD AUTO: 17.54 10*3/MM3 (ref 1.7–7)
NEUTROPHILS NFR BLD AUTO: 89.6 % (ref 42.7–76)
NITRITE UR QL STRIP: NEGATIVE
NRBC BLD AUTO-RTO: 0 /100 WBC (ref 0–0.2)
PH UR STRIP.AUTO: <=5 [PH] (ref 5–8)
PLATELET # BLD AUTO: 205 10*3/MM3 (ref 140–450)
PMV BLD AUTO: 10.7 FL (ref 6–12)
POTASSIUM SERPL-SCNC: 4.3 MMOL/L (ref 3.5–5.2)
PROCALCITONIN SERPL-MCNC: 0.36 NG/ML (ref 0–0.25)
PROT SERPL-MCNC: 7.6 G/DL (ref 6–8.5)
PROT UR QL STRIP: ABNORMAL
RBC # BLD AUTO: 3.64 10*6/MM3 (ref 4.14–5.8)
RBC # UR STRIP: ABNORMAL /HPF
REF LAB TEST METHOD: ABNORMAL
SODIUM SERPL-SCNC: 132 MMOL/L (ref 136–145)
SP GR UR STRIP: 1.02 (ref 1–1.03)
SQUAMOUS #/AREA URNS HPF: ABNORMAL /HPF
TROPONIN T DELTA: -25 NG/L
TROPONIN T SERPL HS-MCNC: 101 NG/L
UROBILINOGEN UR QL STRIP: ABNORMAL
WBC # UR STRIP: ABNORMAL /HPF
WBC NRBC COR # BLD: 19.56 10*3/MM3 (ref 3.4–10.8)
WHOLE BLOOD HOLD COAG: NORMAL
WHOLE BLOOD HOLD SPECIMEN: NORMAL

## 2023-05-19 PROCEDURE — 36415 COLL VENOUS BLD VENIPUNCTURE: CPT

## 2023-05-19 PROCEDURE — 74176 CT ABD & PELVIS W/O CONTRAST: CPT

## 2023-05-19 PROCEDURE — 80053 COMPREHEN METABOLIC PANEL: CPT | Performed by: EMERGENCY MEDICINE

## 2023-05-19 PROCEDURE — 25510000001 IOPAMIDOL PER 1 ML: Performed by: EMERGENCY MEDICINE

## 2023-05-19 PROCEDURE — 85025 COMPLETE CBC W/AUTO DIFF WBC: CPT | Performed by: EMERGENCY MEDICINE

## 2023-05-19 PROCEDURE — 93005 ELECTROCARDIOGRAM TRACING: CPT | Performed by: EMERGENCY MEDICINE

## 2023-05-19 PROCEDURE — 87040 BLOOD CULTURE FOR BACTERIA: CPT | Performed by: EMERGENCY MEDICINE

## 2023-05-19 PROCEDURE — 87147 CULTURE TYPE IMMUNOLOGIC: CPT | Performed by: EMERGENCY MEDICINE

## 2023-05-19 PROCEDURE — 71045 X-RAY EXAM CHEST 1 VIEW: CPT

## 2023-05-19 PROCEDURE — 25010000002 VANCOMYCIN 10 G RECONSTITUTED SOLUTION: Performed by: EMERGENCY MEDICINE

## 2023-05-19 PROCEDURE — 83735 ASSAY OF MAGNESIUM: CPT | Performed by: EMERGENCY MEDICINE

## 2023-05-19 PROCEDURE — 70450 CT HEAD/BRAIN W/O DYE: CPT

## 2023-05-19 PROCEDURE — 25010000002 PIPERACILLIN SOD-TAZOBACTAM PER 1 G: Performed by: EMERGENCY MEDICINE

## 2023-05-19 PROCEDURE — 87150 DNA/RNA AMPLIFIED PROBE: CPT | Performed by: EMERGENCY MEDICINE

## 2023-05-19 PROCEDURE — 84484 ASSAY OF TROPONIN QUANT: CPT | Performed by: EMERGENCY MEDICINE

## 2023-05-19 PROCEDURE — 84145 PROCALCITONIN (PCT): CPT | Performed by: INTERNAL MEDICINE

## 2023-05-19 PROCEDURE — 83605 ASSAY OF LACTIC ACID: CPT | Performed by: EMERGENCY MEDICINE

## 2023-05-19 PROCEDURE — 71275 CT ANGIOGRAPHY CHEST: CPT

## 2023-05-19 PROCEDURE — 81001 URINALYSIS AUTO W/SCOPE: CPT | Performed by: EMERGENCY MEDICINE

## 2023-05-19 PROCEDURE — 87186 SC STD MICRODIL/AGAR DIL: CPT | Performed by: EMERGENCY MEDICINE

## 2023-05-19 PROCEDURE — 99285 EMERGENCY DEPT VISIT HI MDM: CPT

## 2023-05-19 RX ORDER — SODIUM CHLORIDE 0.9 % (FLUSH) 0.9 %
10 SYRINGE (ML) INJECTION AS NEEDED
Status: DISCONTINUED | OUTPATIENT
Start: 2023-05-19 | End: 2023-06-03 | Stop reason: HOSPADM

## 2023-05-19 RX ADMIN — IOPAMIDOL 80 ML: 755 INJECTION, SOLUTION INTRAVENOUS at 22:40

## 2023-05-19 RX ADMIN — SODIUM CHLORIDE 1000 ML: 9 INJECTION, SOLUTION INTRAVENOUS at 21:37

## 2023-05-19 RX ADMIN — VANCOMYCIN HYDROCHLORIDE 1250 MG: 10 INJECTION, POWDER, LYOPHILIZED, FOR SOLUTION INTRAVENOUS at 23:22

## 2023-05-19 RX ADMIN — TAZOBACTAM SODIUM AND PIPERACILLIN SODIUM 3.38 G: 375; 3 INJECTION, SOLUTION INTRAVENOUS at 22:52

## 2023-05-19 NOTE — Clinical Note
Level of Care: Telemetry [5]   Diagnosis: Confusion [[id number]]   Admitting Physician: ETHAN BRADLEY [[id number]]   Attending Physician: ETHAN BRADLEY [[id number]]   Isolate for COVID?: No [0]   Bed Request Comments: tele   Certification: I Certify That Inpatient Hospital Services Are Medically Necessary For Greater Than 2 Midnights

## 2023-05-19 NOTE — LETTER
EMS Transport Request  For use at [facility], [facility], and [facility] only   Patient Name: Timmy Guajardo : 1950   Weight:61.4 kg (135 lb 6.4 oz) Pick-up Location: [facility] BLS/ALS: BLS/ALS: BLS   Insurance: [employer] MEDICARE REPLACEMENT Auth End Date   Pre-Cert #: D/C Summary complete:    Destination:  hospital   Contact Precautions: None   Equipment (O2, Fluids, etc.): None   Arrive By Date/Time:6/3/23 at 1900 Stretcher/WC: Stretcher   CM Requesting: Pallavi Rincon RN Ext:[phone]   Notes/Medical Necessity: deconditioned; fall risk     ______________________________________________________________________    *Only 2 patient bags OR 1 carry-on size bag are permitted.  Wheelchairs and walkers CANNOT transported with the patient. Acknowledge: Yes

## 2023-05-20 PROBLEM — I26.99 PULMONARY EMBOLUS: Status: ACTIVE | Noted: 2023-05-20

## 2023-05-20 PROBLEM — J18.9 PNEUMONIA OF LEFT LOWER LOBE DUE TO INFECTIOUS ORGANISM: Status: ACTIVE | Noted: 2023-05-20

## 2023-05-20 LAB
ALBUMIN SERPL-MCNC: 2.7 G/DL (ref 3.5–5.2)
ALBUMIN/GLOB SERPL: 0.6 G/DL
ALP SERPL-CCNC: 132 U/L (ref 39–117)
ALT SERPL W P-5'-P-CCNC: 43 U/L (ref 1–41)
ANION GAP SERPL CALCULATED.3IONS-SCNC: 15 MMOL/L (ref 5–15)
APTT PPP: 49.1 SECONDS (ref 60–90)
APTT PPP: 53.1 SECONDS (ref 60–90)
APTT PPP: 68.5 SECONDS (ref 60–90)
AST SERPL-CCNC: 58 U/L (ref 1–40)
BASOPHILS # BLD AUTO: 0.03 10*3/MM3 (ref 0–0.2)
BASOPHILS # BLD AUTO: 0.04 10*3/MM3 (ref 0–0.2)
BASOPHILS NFR BLD AUTO: 0.2 % (ref 0–1.5)
BASOPHILS NFR BLD AUTO: 0.3 % (ref 0–1.5)
BILIRUB SERPL-MCNC: 0.4 MG/DL (ref 0–1.2)
BUN SERPL-MCNC: 23 MG/DL (ref 8–23)
BUN/CREAT SERPL: 29.5 (ref 7–25)
CALCIUM SPEC-SCNC: 9.2 MG/DL (ref 8.6–10.5)
CHLORIDE SERPL-SCNC: 99 MMOL/L (ref 98–107)
CO2 SERPL-SCNC: 20 MMOL/L (ref 22–29)
CREAT SERPL-MCNC: 0.78 MG/DL (ref 0.76–1.27)
DEPRECATED RDW RBC AUTO: 51.5 FL (ref 37–54)
DEPRECATED RDW RBC AUTO: 55 FL (ref 37–54)
EGFRCR SERPLBLD CKD-EPI 2021: 95.9 ML/MIN/1.73
EOSINOPHIL # BLD AUTO: 0.09 10*3/MM3 (ref 0–0.4)
EOSINOPHIL # BLD AUTO: 0.1 10*3/MM3 (ref 0–0.4)
EOSINOPHIL NFR BLD AUTO: 0.6 % (ref 0.3–6.2)
EOSINOPHIL NFR BLD AUTO: 0.7 % (ref 0.3–6.2)
ERYTHROCYTE [DISTWIDTH] IN BLOOD BY AUTOMATED COUNT: 14.6 % (ref 12.3–15.4)
ERYTHROCYTE [DISTWIDTH] IN BLOOD BY AUTOMATED COUNT: 14.6 % (ref 12.3–15.4)
GLOBULIN UR ELPH-MCNC: 4.3 GM/DL
GLUCOSE SERPL-MCNC: 132 MG/DL (ref 65–99)
HCT VFR BLD AUTO: 32.7 % (ref 37.5–51)
HCT VFR BLD AUTO: 35.5 % (ref 37.5–51)
HGB BLD-MCNC: 10.5 G/DL (ref 13–17.7)
HGB BLD-MCNC: 10.7 G/DL (ref 13–17.7)
IMM GRANULOCYTES # BLD AUTO: 0.11 10*3/MM3 (ref 0–0.05)
IMM GRANULOCYTES # BLD AUTO: 0.12 10*3/MM3 (ref 0–0.05)
IMM GRANULOCYTES NFR BLD AUTO: 0.7 % (ref 0–0.5)
IMM GRANULOCYTES NFR BLD AUTO: 0.8 % (ref 0–0.5)
INR PPP: 2.23 (ref 0.89–1.12)
LYMPHOCYTES # BLD AUTO: 0.83 10*3/MM3 (ref 0.7–3.1)
LYMPHOCYTES # BLD AUTO: 1.03 10*3/MM3 (ref 0.7–3.1)
LYMPHOCYTES NFR BLD AUTO: 5.8 % (ref 19.6–45.3)
LYMPHOCYTES NFR BLD AUTO: 7 % (ref 19.6–45.3)
MAGNESIUM SERPL-MCNC: 1.9 MG/DL (ref 1.6–2.4)
MCH RBC QN AUTO: 30.9 PG (ref 26.6–33)
MCH RBC QN AUTO: 31 PG (ref 26.6–33)
MCHC RBC AUTO-ENTMCNC: 30.1 G/DL (ref 31.5–35.7)
MCHC RBC AUTO-ENTMCNC: 32.1 G/DL (ref 31.5–35.7)
MCV RBC AUTO: 102.6 FL (ref 79–97)
MCV RBC AUTO: 96.5 FL (ref 79–97)
MONOCYTES # BLD AUTO: 1.03 10*3/MM3 (ref 0.1–0.9)
MONOCYTES # BLD AUTO: 1.1 10*3/MM3 (ref 0.1–0.9)
MONOCYTES NFR BLD AUTO: 7 % (ref 5–12)
MONOCYTES NFR BLD AUTO: 7.7 % (ref 5–12)
MRSA DNA SPEC QL NAA+PROBE: NEGATIVE
NEUTROPHILS NFR BLD AUTO: 12.14 10*3/MM3 (ref 1.7–7)
NEUTROPHILS NFR BLD AUTO: 12.49 10*3/MM3 (ref 1.7–7)
NEUTROPHILS NFR BLD AUTO: 84.3 % (ref 42.7–76)
NEUTROPHILS NFR BLD AUTO: 84.9 % (ref 42.7–76)
NRBC BLD AUTO-RTO: 0 /100 WBC (ref 0–0.2)
NRBC BLD AUTO-RTO: 0.1 /100 WBC (ref 0–0.2)
PLATELET # BLD AUTO: 181 10*3/MM3 (ref 140–450)
PLATELET # BLD AUTO: 197 10*3/MM3 (ref 140–450)
PMV BLD AUTO: 9.2 FL (ref 6–12)
PMV BLD AUTO: 9.8 FL (ref 6–12)
POTASSIUM SERPL-SCNC: 4.4 MMOL/L (ref 3.5–5.2)
PROT SERPL-MCNC: 7 G/DL (ref 6–8.5)
PROTHROMBIN TIME: 24.9 SECONDS (ref 12.2–14.5)
QT INTERVAL: 404 MS
QTC INTERVAL: 505 MS
RBC # BLD AUTO: 3.39 10*6/MM3 (ref 4.14–5.8)
RBC # BLD AUTO: 3.46 10*6/MM3 (ref 4.14–5.8)
SODIUM SERPL-SCNC: 134 MMOL/L (ref 136–145)
TROPONIN T SERPL HS-MCNC: 79 NG/L
UFH PPP CHRO-ACNC: >1.1 IU/ML (ref 0.3–0.7)
UFH PPP CHRO-ACNC: >1.1 IU/ML (ref 0.3–0.7)
WBC NRBC COR # BLD: 14.31 10*3/MM3 (ref 3.4–10.8)
WBC NRBC COR # BLD: 14.8 10*3/MM3 (ref 3.4–10.8)

## 2023-05-20 PROCEDURE — 25010000002 VANCOMYCIN 10 G RECONSTITUTED SOLUTION

## 2023-05-20 PROCEDURE — 94799 UNLISTED PULMONARY SVC/PX: CPT

## 2023-05-20 PROCEDURE — 85520 HEPARIN ASSAY: CPT | Performed by: INTERNAL MEDICINE

## 2023-05-20 PROCEDURE — 25010000002 MORPHINE PER 10 MG: Performed by: INTERNAL MEDICINE

## 2023-05-20 PROCEDURE — 84484 ASSAY OF TROPONIN QUANT: CPT | Performed by: INTERNAL MEDICINE

## 2023-05-20 PROCEDURE — 83735 ASSAY OF MAGNESIUM: CPT | Performed by: INTERNAL MEDICINE

## 2023-05-20 PROCEDURE — 25010000002 HEPARIN (PORCINE) 25000-0.45 UT/250ML-% SOLUTION

## 2023-05-20 PROCEDURE — 92610 EVALUATE SWALLOWING FUNCTION: CPT

## 2023-05-20 PROCEDURE — 25010000002 PIPERACILLIN SOD-TAZOBACTAM PER 1 G: Performed by: INTERNAL MEDICINE

## 2023-05-20 PROCEDURE — 85610 PROTHROMBIN TIME: CPT | Performed by: INTERNAL MEDICINE

## 2023-05-20 PROCEDURE — 94640 AIRWAY INHALATION TREATMENT: CPT

## 2023-05-20 PROCEDURE — 80053 COMPREHEN METABOLIC PANEL: CPT | Performed by: INTERNAL MEDICINE

## 2023-05-20 PROCEDURE — 85730 THROMBOPLASTIN TIME PARTIAL: CPT | Performed by: INTERNAL MEDICINE

## 2023-05-20 PROCEDURE — 99223 1ST HOSP IP/OBS HIGH 75: CPT | Performed by: INTERNAL MEDICINE

## 2023-05-20 PROCEDURE — 85730 THROMBOPLASTIN TIME PARTIAL: CPT

## 2023-05-20 PROCEDURE — 94761 N-INVAS EAR/PLS OXIMETRY MLT: CPT

## 2023-05-20 PROCEDURE — 85520 HEPARIN ASSAY: CPT

## 2023-05-20 PROCEDURE — 85025 COMPLETE CBC W/AUTO DIFF WBC: CPT | Performed by: INTERNAL MEDICINE

## 2023-05-20 PROCEDURE — 87641 MR-STAPH DNA AMP PROBE: CPT

## 2023-05-20 RX ORDER — ATORVASTATIN CALCIUM 40 MG/1
80 TABLET, FILM COATED ORAL NIGHTLY
Status: DISCONTINUED | OUTPATIENT
Start: 2023-05-20 | End: 2023-05-21

## 2023-05-20 RX ORDER — CYCLOBENZAPRINE HCL 10 MG
5 TABLET ORAL 3 TIMES DAILY PRN
Status: DISCONTINUED | OUTPATIENT
Start: 2023-05-20 | End: 2023-05-21

## 2023-05-20 RX ORDER — ACETAMINOPHEN 325 MG/1
650 TABLET ORAL EVERY 4 HOURS PRN
Status: DISCONTINUED | OUTPATIENT
Start: 2023-05-20 | End: 2023-05-21

## 2023-05-20 RX ORDER — HEPARIN SODIUM 10000 [USP'U]/100ML
14 INJECTION, SOLUTION INTRAVENOUS
Status: DISCONTINUED | OUTPATIENT
Start: 2023-05-20 | End: 2023-05-24

## 2023-05-20 RX ORDER — MORPHINE SULFATE 2 MG/ML
1 INJECTION, SOLUTION INTRAMUSCULAR; INTRAVENOUS EVERY 4 HOURS PRN
Status: DISCONTINUED | OUTPATIENT
Start: 2023-05-20 | End: 2023-05-21

## 2023-05-20 RX ORDER — SODIUM CHLORIDE 0.9 % (FLUSH) 0.9 %
10 SYRINGE (ML) INJECTION EVERY 12 HOURS SCHEDULED
Status: DISCONTINUED | OUTPATIENT
Start: 2023-05-20 | End: 2023-06-03 | Stop reason: HOSPADM

## 2023-05-20 RX ORDER — POLYETHYLENE GLYCOL 3350 17 G/17G
17 POWDER, FOR SOLUTION ORAL DAILY PRN
Status: DISCONTINUED | OUTPATIENT
Start: 2023-05-20 | End: 2023-05-21

## 2023-05-20 RX ORDER — CARVEDILOL 12.5 MG/1
50 TABLET ORAL 2 TIMES DAILY WITH MEALS
Status: DISCONTINUED | OUTPATIENT
Start: 2023-05-20 | End: 2023-05-21

## 2023-05-20 RX ORDER — SODIUM CHLORIDE 9 MG/ML
40 INJECTION, SOLUTION INTRAVENOUS AS NEEDED
Status: DISCONTINUED | OUTPATIENT
Start: 2023-05-20 | End: 2023-06-03 | Stop reason: HOSPADM

## 2023-05-20 RX ORDER — CHOLECALCIFEROL (VITAMIN D3) 125 MCG
5 CAPSULE ORAL NIGHTLY PRN
Status: DISCONTINUED | OUTPATIENT
Start: 2023-05-20 | End: 2023-05-21

## 2023-05-20 RX ORDER — HEPARIN SODIUM 1000 [USP'U]/ML
25 INJECTION, SOLUTION INTRAVENOUS; SUBCUTANEOUS AS NEEDED
Status: DISCONTINUED | OUTPATIENT
Start: 2023-05-20 | End: 2023-05-20

## 2023-05-20 RX ORDER — ONDANSETRON 2 MG/ML
4 INJECTION INTRAMUSCULAR; INTRAVENOUS EVERY 6 HOURS PRN
Status: DISCONTINUED | OUTPATIENT
Start: 2023-05-20 | End: 2023-06-03 | Stop reason: HOSPADM

## 2023-05-20 RX ORDER — SODIUM CHLORIDE 0.9 % (FLUSH) 0.9 %
10 SYRINGE (ML) INJECTION AS NEEDED
Status: DISCONTINUED | OUTPATIENT
Start: 2023-05-20 | End: 2023-06-03 | Stop reason: HOSPADM

## 2023-05-20 RX ORDER — HEPARIN SODIUM 1000 [USP'U]/ML
50 INJECTION, SOLUTION INTRAVENOUS; SUBCUTANEOUS AS NEEDED
Status: DISCONTINUED | OUTPATIENT
Start: 2023-05-20 | End: 2023-05-20

## 2023-05-20 RX ORDER — IPRATROPIUM BROMIDE AND ALBUTEROL SULFATE 2.5; .5 MG/3ML; MG/3ML
3 SOLUTION RESPIRATORY (INHALATION) EVERY 4 HOURS PRN
Status: DISCONTINUED | OUTPATIENT
Start: 2023-05-20 | End: 2023-05-21

## 2023-05-20 RX ORDER — PANTOPRAZOLE SODIUM 40 MG/1
40 TABLET, DELAYED RELEASE ORAL DAILY
Status: DISCONTINUED | OUTPATIENT
Start: 2023-05-20 | End: 2023-05-21

## 2023-05-20 RX ORDER — BISACODYL 10 MG
10 SUPPOSITORY, RECTAL RECTAL DAILY PRN
Status: DISCONTINUED | OUTPATIENT
Start: 2023-05-20 | End: 2023-05-21

## 2023-05-20 RX ORDER — ASPIRIN 81 MG/1
81 TABLET, CHEWABLE ORAL DAILY
Status: DISCONTINUED | OUTPATIENT
Start: 2023-05-20 | End: 2023-05-21

## 2023-05-20 RX ORDER — GABAPENTIN 100 MG/1
100 CAPSULE ORAL 3 TIMES DAILY
Status: DISCONTINUED | OUTPATIENT
Start: 2023-05-20 | End: 2023-05-21

## 2023-05-20 RX ORDER — AMOXICILLIN 250 MG
2 CAPSULE ORAL 2 TIMES DAILY
Status: DISCONTINUED | OUTPATIENT
Start: 2023-05-20 | End: 2023-05-21

## 2023-05-20 RX ORDER — HYDROCODONE BITARTRATE AND ACETAMINOPHEN 5; 325 MG/1; MG/1
1 TABLET ORAL EVERY 4 HOURS PRN
Status: DISCONTINUED | OUTPATIENT
Start: 2023-05-20 | End: 2023-05-21

## 2023-05-20 RX ORDER — BISACODYL 5 MG/1
5 TABLET, DELAYED RELEASE ORAL DAILY PRN
Status: DISCONTINUED | OUTPATIENT
Start: 2023-05-20 | End: 2023-05-21

## 2023-05-20 RX ORDER — IPRATROPIUM BROMIDE AND ALBUTEROL SULFATE 2.5; .5 MG/3ML; MG/3ML
3 SOLUTION RESPIRATORY (INHALATION)
Status: DISCONTINUED | OUTPATIENT
Start: 2023-05-20 | End: 2023-05-25

## 2023-05-20 RX ORDER — FLUTICASONE PROPIONATE 50 MCG
2 SPRAY, SUSPENSION (ML) NASAL DAILY
Status: DISCONTINUED | OUTPATIENT
Start: 2023-05-20 | End: 2023-06-03 | Stop reason: HOSPADM

## 2023-05-20 RX ORDER — LISINOPRIL 5 MG/1
5 TABLET ORAL
Status: DISCONTINUED | OUTPATIENT
Start: 2023-05-20 | End: 2023-05-21

## 2023-05-20 RX ORDER — HEPARIN SODIUM 10000 [USP'U]/100ML
18 INJECTION, SOLUTION INTRAVENOUS
Status: DISCONTINUED | OUTPATIENT
Start: 2023-05-20 | End: 2023-05-20

## 2023-05-20 RX ORDER — NITROGLYCERIN 0.4 MG/1
0.4 TABLET SUBLINGUAL
Status: DISCONTINUED | OUTPATIENT
Start: 2023-05-20 | End: 2023-06-03 | Stop reason: HOSPADM

## 2023-05-20 RX ORDER — NALOXONE HCL 0.4 MG/ML
0.4 VIAL (ML) INJECTION
Status: DISCONTINUED | OUTPATIENT
Start: 2023-05-20 | End: 2023-05-21

## 2023-05-20 RX ADMIN — ASPIRIN 81 MG CHEWABLE TABLET 81 MG: 81 TABLET CHEWABLE at 09:00

## 2023-05-20 RX ADMIN — TAZOBACTAM SODIUM AND PIPERACILLIN SODIUM 3.38 G: 375; 3 INJECTION, SOLUTION INTRAVENOUS at 21:16

## 2023-05-20 RX ADMIN — IPRATROPIUM BROMIDE AND ALBUTEROL SULFATE 3 ML: 2.5; .5 SOLUTION RESPIRATORY (INHALATION) at 14:00

## 2023-05-20 RX ADMIN — Medication 10 ML: at 09:00

## 2023-05-20 RX ADMIN — GABAPENTIN 100 MG: 100 CAPSULE ORAL at 16:05

## 2023-05-20 RX ADMIN — TAZOBACTAM SODIUM AND PIPERACILLIN SODIUM 3.38 G: 375; 3 INJECTION, SOLUTION INTRAVENOUS at 05:21

## 2023-05-20 RX ADMIN — GABAPENTIN 100 MG: 100 CAPSULE ORAL at 21:16

## 2023-05-20 RX ADMIN — CYCLOBENZAPRINE 5 MG: 10 TABLET, FILM COATED ORAL at 21:20

## 2023-05-20 RX ADMIN — VANCOMYCIN HYDROCHLORIDE 1250 MG: 10 INJECTION, POWDER, LYOPHILIZED, FOR SOLUTION INTRAVENOUS at 22:08

## 2023-05-20 RX ADMIN — IPRATROPIUM BROMIDE AND ALBUTEROL SULFATE 3 ML: 2.5; .5 SOLUTION RESPIRATORY (INHALATION) at 19:05

## 2023-05-20 RX ADMIN — GABAPENTIN 100 MG: 100 CAPSULE ORAL at 09:00

## 2023-05-20 RX ADMIN — LISINOPRIL 5 MG: 5 TABLET ORAL at 09:00

## 2023-05-20 RX ADMIN — SENNOSIDES AND DOCUSATE SODIUM 2 TABLET: 50; 8.6 TABLET ORAL at 09:00

## 2023-05-20 RX ADMIN — Medication 10 ML: at 21:16

## 2023-05-20 RX ADMIN — SENNOSIDES AND DOCUSATE SODIUM 2 TABLET: 50; 8.6 TABLET ORAL at 21:16

## 2023-05-20 RX ADMIN — ATORVASTATIN CALCIUM 80 MG: 40 TABLET, FILM COATED ORAL at 21:16

## 2023-05-20 RX ADMIN — POLYETHYLENE GLYCOL 3350 17 G: 17 POWDER, FOR SOLUTION ORAL at 09:00

## 2023-05-20 RX ADMIN — MORPHINE SULFATE 1 MG: 2 INJECTION, SOLUTION INTRAMUSCULAR; INTRAVENOUS at 11:09

## 2023-05-20 RX ADMIN — PANTOPRAZOLE SODIUM 40 MG: 40 TABLET, DELAYED RELEASE ORAL at 09:00

## 2023-05-20 RX ADMIN — IPRATROPIUM BROMIDE AND ALBUTEROL SULFATE 3 ML: 2.5; .5 SOLUTION RESPIRATORY (INHALATION) at 07:28

## 2023-05-20 RX ADMIN — TAZOBACTAM SODIUM AND PIPERACILLIN SODIUM 3.38 G: 375; 3 INJECTION, SOLUTION INTRAVENOUS at 14:45

## 2023-05-20 RX ADMIN — CARVEDILOL 50 MG: 12.5 TABLET, FILM COATED ORAL at 09:00

## 2023-05-20 RX ADMIN — CARVEDILOL 50 MG: 12.5 TABLET, FILM COATED ORAL at 17:19

## 2023-05-20 RX ADMIN — FLUTICASONE PROPIONATE 2 SPRAY: 50 SPRAY, METERED NASAL at 09:00

## 2023-05-20 RX ADMIN — HEPARIN SODIUM 18 UNITS/KG/HR: 10000 INJECTION, SOLUTION INTRAVENOUS at 12:53

## 2023-05-20 NOTE — CONSULTS
Malnutrition Severity Assessment    Patient Name:  Timmy Guajardo  YOB: 1950  MRN: [id number]  Admit Date:  5/19/2023    Patient meets criteria for : Severe Malnutrition    Comments:  Pt meets criteria for severe malnutrition in the context of chronic illness indicated by po intake <75% x >/=1 month, severe muscle wasting and subcutaneous fat loss. Of note, pt first met criteria on 3/25/23.       Malnutrition Severity Assessment  Malnutrition Type: Chronic Disease - Related Malnutrition  Malnutrition Type (last 8 hours)     Malnutrition Severity Assessment     Row Name 05/20/23 0920       Malnutrition Severity Assessment    Malnutrition Type Chronic Disease - Related Malnutrition    Row Name 05/20/23 0920       Insufficient Energy Intake     Insufficient Energy Intake Findings Severe    Insufficient Energy Intake  <75% of est. energy requirement for > or equal to 1 month    Row Name 05/20/23 0920       Muscle Loss    Loss of Muscle Mass Findings Severe    [place] Severe - deep hollowing/scooping, lack of muscle to touch, facial bones well defined    Clavicle Bone Region Severe - protruding prominent bone    Acromion Bone Region Severe - squared shoulders, bones, and acromion process protrusion prominent    Scapular Bone Region Severe - prominent bones, depressions easily visible between ribs, scapula, spine, shoulders    Dorsal Hand Region Severe - prominent depression    Patellar Region Severe - prominent bone, square looking, very little muscle definition    Anterior Thigh Region Severe - line/depression along thigh, obviously thin    Posterior Calf Region Severe - thin with very little definition/firmness    Row Name 05/20/23 0920       Fat Loss    Subcutaneous Fat Loss Findings Severe    Orbital Region  Severe - pronounced hollowness/depression, dark circles, loose saggy skin    Upper Arm Region Severe - mostly skin, very little space between folds, fingers touch    Row Name 05/20/23 0920        Criteria Met (Must meet criteria for severity in at least 2 of these categories: M Wasting, Fat Loss, Fluid, Secondary Signs, Wt. Status, Intake)    Patient meets criteria for  Severe Malnutrition                Electronically signed by:  Kay Moore RD  05/20/23 09:20 EDT

## 2023-05-20 NOTE — CONSULTS
"                    Clinical Nutrition       Patient Name: Timmy Guajardo  YOB: 1950  MRN: [id number]  Date of Encounter: 05/20/23 09:01 EDT  Admission date: 5/19/2023    Dietitian Comments:  Pt meets criteria for severe malnutrition in the context of chronic illness indicated by po intake <75% x >/=1 month, severe muscle wasting and subcutaneous fat loss. Of note, pt first met criteria on 3/25/23.     Ordered Boost GC TID.    Pt reports taking appetite stimulant at home, would recommend resuming if not contraindicated.     Reason for Visit   Physician consult/Chronic Poor Intake    EMR Reviewed     EMR Reviewed: yes     Admission Diagnosis:  Confusion [R41.0]    Problem List:    Pneumonia of left lower lobe due to infectious organism    HFrEF    Confusion    Pulmonary embolus      Anthropometric      Flowsheet Rows    Flowsheet Row First Filed Value   Admission Height 177.8 cm (70\") Documented at 05/19/2023 1529   Admission Weight 63.5 kg (140 lb) Documented at 05/19/2023 1529            Height: 177.8 cm (70\")  Last Filed Weight: Weight: 61.7 kg (136 lb) (05/20/23 0010)  Weight Method: Estimated  BMI: BMI (Calculated): 19.5  BMI classification: Normal: 18.5-24.9kg/m2 underweight for age     UBW: ~160lbs per EMR  Weight change: RD obtained bed wt (unsure if zeroed of 135.7lbs)   Weight       Weight (kg) Weight (lbs) Weight Method Visit Report   4/25/2022 75.479 kg  166 lb 6.4 oz      10/16/2022 77.565 kg  171 lb  Stated     10/17/2022 83.008 kg  183 lb  Stated      83.008 kg  183 lb       83.144 kg  183 lb 4.8 oz      10/24/2022 82.146 kg  181 lb 1.6 oz      11/23/2022 72.576 kg  160 lb  Stated  --    3/24/2023 51.71 kg  114 lb  Stated     3/25/2023 56.7 kg  125 lb      3/31/2023 56.7 kg  125 lb  Stated     4/4/2023 60.328 kg  133 lb  Bed scale     4/16/2023 64.9 kg  143 lb 1.3 oz  Bed scale     5/9/2023 58.968 kg  130 lb   --    5/13/2023 68.04 kg  150 lb      5/19/2023 63.504 kg  140 lb  Estimated "     5/20/2023 61.689 kg  136 lb             Reported/Observed/Food/Nutrition Related - Comments     RD spoke w/pt at bedside, pt appeared A&O, answering nutrition questions appropriately but somewhat difficult to understand. Pt endorses poor po intake (pt suspects po intake ~50% x >/=1 month) 2/2 COVID and taste changes (also consistent w/hx provided at previous admit/RD note 3/2023). Pt states he is on an appetite stimulant but it was recently changed ~2 weeks ago and he does not believe it is working as well. Pt unable to tell me which medication he is taking-unable to verify in H&P/meds. Pt states he drinks ONS at home and is agreeable to ONS TID. Pt states [id number], per EMR appears to be ~ 160lbs. NKFA.     Nutrition Focused Physical Exam     Date: 5/20    Patient meets criteria for malnutrition diagnosis, see MSA note.     Current Nutrition Prescription     Diet: Cardiac Diets, Diabetic Diets; Healthy Heart (2-3 Na+); Consistent Carbohydrate; Texture: Regular Texture (IDDSI 7); Fluid Consistency: Thin (IDDSI 0)  No active supplement orders      Average Intake from Charting: Insufficient data     Nutrition Diagnosis     Problem Malnutrition chronic severe   Etiology Energy intake<energy needs 2/2 COVID, recent admit, poor appetite   Signs/Symptoms Severe muscle wasting and subcutaneous fat loss, po intake <75% x >/=1 month   Status:    Actions     Follow treatment progress, Care plan reviewed, Encourage intake, Supplement provided    Boost GC TID-chocolate     Monitor Per Protocol      Kay Moore RD,   Time Spent: 30

## 2023-05-20 NOTE — PROGRESS NOTES
HEPARIN INFUSION  Timmy Guajardo is a  72 y.o. male receiving heparin infusion.     Therapy for (VTE/Cardiac): VTE  Patient Weight: 58.2 kg  Initial Bolus (Y/N): no  Any Bolus (Y/N): yes    Signs or Symptoms of Bleeding: none noted      VTE (PE/DVT)   Initial rate: 18 units/kg/hr (Max 1,500 units/hr)    aPTT  Rebolus Infusion Hold time Change infusion Dose (Units/kg/hr) Next aPTT Level Due   <45 50 Units/kg  (4000 Units Max) None Increase by  4 Units/kg/hr 6 hours   45 - 52 25 Units/kg  (2000 Units Max) None Increase by  3 Units/kg/hr 6 hours   53 - 59 0 None Increase by  2 Units/kg/hr 6 hours   60 - 90 0 None No Change 6 hours (after 2 consecutive levels in range check qAM)   91 - 98 0 None Decrease by  1 Units/kg/hr 6 hours   99 - 105 0 None Decrease by  2 Units/kg/hr 6 hours   106 - 113 0 60 Minutes Decrease by  3 Units/kg/hr 6 hours   >113 0 Hold  After aPTT less than 90 decrease previous rate by  4 Units/kg/hr Every 2 hours until aPTT less than 90 then when infusion restarts in 6 hours     Results from last 7 days   Lab Units 05/20/23  0549 05/20/23  0111 05/19/23  1547   INR   --  2.23*  --    HEMOGLOBIN g/dL 10.7* 10.5* 11.1*   HEMATOCRIT % 35.5* 32.7* 35.1*   PLATELETS 10*3/mm3 181 197 205          Date   Time   aPTT Current Rate (Unit/kg/hr) Bolus   (Units) Rate Change   (Unit/kg/hr) New Rate (Unit/kg/hr) Next   aPTT Comments  Pump Check Daily   5/20 1104 53.1 -- -- +18 18 1900 W[facility]cal[facility], [facility]  5/20/2023  11:41 EDT

## 2023-05-20 NOTE — H&P
[facility] Medicine Services  HISTORY AND PHYSICAL    Patient Name: Timmy Guajardo  : 1950  MRN: [id number]  Primary Care Physician: Arsen Seals APRN  Date of admission: 2023      Subjective   Subjective     Chief Complaint:  Pleuritic chest pain    HPI:  Timmy Guajardo is a 72 y.o. male who was recently admitted here and treated for left lower lobe pulmonary embolus, admitted 3/24 and discharged on .  He was sent to a rehab facility on discharge and was there today (Friday), when staff reportedly noticed leukocytosis on his lab work, so he was sent to the ED.  The patient states he has had some left-sided pleuritic chest pain, but denies any substernal chest pain, shortness of breath, palpitations, diaphoresis, or nausea/emesis.  He states otherwise he does not know why he was sent to the ED.  From the previous admission here, he was discharged on Eliquis and states he has been taking it as prescribed.  Work-up in the ED again showed left lower lobe PE with possible appearance of pneumonia versus infarct from the PE.  He denies any cough, shortness of breath, fever, or chills.  No abdominal pain or bowel habit change.  His medical history is significant for previous DVT/PE, coronary artery disease with cardiomyopathy (most recent echo here is 10/22, EF = 30%), type 2 diabetes, GERD, hypertension, and CVA with residual right-sided weakness.      Review of Systems   Constitutional: Negative.    HENT: Negative.    Eyes: Negative.    Respiratory: Negative.    Cardiovascular:        Pleuritic chest pain as in HPI   Gastrointestinal: Negative.    Endocrine: Negative.    Genitourinary: Negative.    Musculoskeletal: Negative.    Skin: Negative.    Allergic/Immunologic: Negative.    Neurological: Negative.    Hematological: Negative.    Psychiatric/Behavioral: Negative.           Personal History     Past Medical History:   Diagnosis Date   • Cardiomyopathy    • CHF  (congestive heart failure)    • Coronary artery disease    • COVID-19    • Diabetes mellitus    • GERD (gastroesophageal reflux disease)    • HTN (hypertension)    • Pleural effusion    • Stroke     Right sided weakness   • Stroke              Past Surgical History:   Procedure Laterality Date   • AMPUTATION DIGIT Left 5/12/2021    Procedure: AMPUTATION DIGIT - GREAT TOE AMPUTATION LEFT;  Surgeon: Dario Marmolejo MD;  Location:  [facility];  Service: General;  Laterality: Left;   • AORTAGRAM N/A 5/11/2021    Procedure: AORTAGRAM WITH RUNOFFS, LEFT SFA BALLOON ANGIOPLASTY;  Surgeon: Tim Mahan MD;  Location:  [facility];  Service: Vascular;  Laterality: N/A;  FL TIME 6 MIN 54 SECS  82 MGY  CONTRAST VISIPAQUE 100ML     • CARDIAC CATHETERIZATION     • CARDIAC PACEMAKER PLACEMENT      pacemaker/defibrillator, [employer]   • COLONOSCOPY N/A 3/31/2023    Procedure: COLONOSCOPY;  Surgeon: Brunner, Mark I, MD;  Location:  [facility];  Service: Gastroenterology;  Laterality: N/A;   • ENDOSCOPY N/A 3/29/2023    Procedure: ESOPHAGOGASTRODUODENOSCOPY;  Surgeon: Brunner, Mark I, MD;  Location:  [facility];  Service: Gastroenterology;  Laterality: N/A;   • HERNIA REPAIR Bilateral     Inguinal hernia   • HIP TROCHANTERIC NAILING WITH INTRAMEDULLARY HIP SCREW Right 10/18/2022    Procedure: HIP TROCHANTERIC NAILING WITH INTRAMEDULLARY HIP SCREW RIGHT;  Surgeon: Bj Steinberg MD;  Location:  [facility];  Service: Orthopedics;  Laterality: Right;   • THORACOSCOPY VIDEO ASSISTED WITH LOBECTOMY Right 4/12/2023    Procedure: BRONCHOSCOPY, THORACOSCOPY VIDEO ASSISTED WITH DECORTICATION, MECHANICAL PLEURODESIS;  Surgeon: Tim Mahan MD;  Location:  [facility];  Service: Cardiothoracic;  Laterality: Right;       Family History: family history includes Alzheimer's disease in his mother; Cancer in his father; Heart failure in his father; Kidney failure in his mother.     Social History:  reports that he quit  smoking about 10 years ago. His smoking use included cigarettes. He has a 30.00 pack-year smoking history. He has never used smokeless tobacco. He reports that he does not drink alcohol and does not use drugs.  Social History     Social History Narrative    Pt lives in [place], [place].        Medications:  Available home medication information reviewed.  (Not in a hospital admission)      Allergies   Allergen Reactions   • Other Unknown - Low Severity     Mercury metals- as a child       Objective   Objective     Vital Signs:   Temp:  [98.7 °F (37.1 °C)] 98.7 °F (37.1 °C)  Heart Rate:  [92-96] 93  Resp:  [18] 18  BP: (116-133)/(70-73) 133/73  Flow (L/min):  [2] 2       Physical Exam   Constitutional: Awake, alert, NAD.  Eyes: PERRLA, sclerae anicteric, no conjunctival injection  HENT: NCAT, mucous membranes dry  Neck: Supple, no thyromegaly, no lymphadenopathy, trachea midline  Respiratory: Muffled bilateral breath sounds but no R/R/W, nonlabored respirations   Cardiovascular: RRR, no murmurs, rubs, or gallops, palpable pedal pulses bilaterally  Gastrointestinal: Positive bowel sounds, soft, nontender, nondistended  Musculoskeletal: No bilateral ankle edema, no clubbing or cyanosis to extremities  Psychiatric: Flat affect, cooperative  Neurologic: Oriented x 3, strength 5/5 LLE and LUE, 4/5 RUE and RLE, Cranial Nerves grossly intact to confrontation, speech clear  Skin: No rashes, normal turgor.    Result Review:  I have personally reviewed the results from the time of this admission to 5/20/2023 00:37 EDT and agree with these findings:  [x]  Laboratory list / accordion  []  Microbiology  [x]  Radiology  [x]  EKG/Telemetry   []  Cardiology/Vascular   []  Pathology  [x]  Old records  []  Other:  Most notable findings include: I reviewed chest x-ray which is a single AP view and by my read shows left lower lobe opacity, no edema.  I reviewed EKG which by my read shows paced rhythm at approximately 90 bpm        LAB  RESULTS:      Lab 05/19/23 1828 05/19/23 1547 05/13/23 1924   WBC  --  19.56* 11.86*   HEMOGLOBIN  --  11.1* 13.7   HEMATOCRIT  --  35.1* 40.7   PLATELETS  --  205 125*   NEUTROS ABS  --  17.54* 10.67*   IMMATURE GRANS (ABS)  --  0.19* 0.11*   LYMPHS ABS  --  0.64* 0.43*   MONOS ABS  --  1.08* 0.54   EOS ABS  --  0.07 0.05   MCV  --  96.4 93.1   PROCALCITONIN 0.36*  --   --    LACTATE  --  1.9  --          Lab 05/19/23  1547 05/13/23 2159 05/13/23 1924   SODIUM 132*  --  133*   POTASSIUM 4.3 5.1 5.6*   CHLORIDE 94*  --  96*   CO2 24.0  --  22.0   ANION GAP 14.0  --  15.0   BUN 30*  --  27*   CREATININE 1.11  --  0.93   EGFR 71.4  --  88.3   GLUCOSE 233*  --  329*   CALCIUM 9.3  --  9.6   MAGNESIUM 2.0  --   --          Lab 05/19/23  1547 05/13/23 1924   TOTAL PROTEIN 7.6 7.8   ALBUMIN 3.1* 3.5   GLOBULIN 4.5 4.3   ALT (SGPT) 50* 43*   AST (SGOT) 76* 43*   BILIRUBIN 0.6 0.7   ALK PHOS 161* 143*         Lab 05/19/23 1828 05/19/23 1547   HSTROP T 76* 101*                 UA        3/24/2023    23:29 5/13/2023    20:04 5/19/2023    15:50   Urinalysis   Squamous Epithelial Cells, UA 0-2   None Seen   3-6     Specific Gravity, UA 1.055   1.022   1.024     Ketones, UA Negative   Negative   Negative     Blood, UA Small (1+)   Negative   Negative     Leukocytes, UA Moderate (2+)   Negative   Negative     Nitrite, UA Negative   Negative   Negative     RBC, UA 0-2   0-2   0-2     WBC, UA Too Numerous to Count   None Seen   0-2     Bacteria, UA 2+   None Seen   4+         Microbiology Results (last 10 days)     ** No results found for the last 240 hours. **          CT Abdomen Pelvis Without Contrast    Result Date: 5/19/2023  CT ABDOMEN PELVIS WO CONTRAST Date of Exam: 5/19/2023 10:35 PM EDT Indication: leukocytosis, AMS. Comparison: The 3/24/2023 Technique: Axial CT images were obtained of the abdomen and pelvis without the administration of contrast. Reconstructed coronal and sagittal images were also obtained.  Automated exposure control and iterative construction methods were used. Findings: The actual metal-on-metal there is a small right pleural effusion, decreased in size from prior exam. There is a new small left pleural effusion. There is new left lower lobe airspace disease consistent with pneumonia or aspiration. The liver, pancreas, and spleen are within normal limits. There appear to be small stones layering within the gallbladder. No inflammatory change around the gallbladder. No evidence of biliary tract obstruction. Bilateral adrenal glands are within normal limits. No renal or ureteral stone or hydronephrosis. There are bilateral low-density renal masses consistent with renal cyst. There is no abdominal or retroperitoneal adenopathy. The upper GI tract is within normal limits. There is a minimal fusiform and infrarenal abdominal aortic aneurysm measuring 3.2 x 2.6 cm in size. Pelvis: Urinary bladder is within normal limits. There is thin linear calcification along the posterior wall urinary bladder to the right of midline. Consider cystoscopy when clinically feasible. There are multiple colonic diverticula. No evidence of diverticulitis. Complications there are postoperative changes of the right hip related to prior fracture repair. There is osteopenia and degenerative change of the spine. No lytic or sclerotic bony lesions     Impression: Impression: 1. Small bilateral pleural effusions with interval improvement in right pleural effusion. 2. New left lower lobe airspace disease consistent with pneumonia or aspiration. 3. High density material layering within the gallbladder which may represent multiple small stones. No evidence of acute inflammatory change around the gallbladder. 4. Thin linear calcification along the posterior wall of the urinary bladder just to the right of midline which is nonspecific. Cystoscopy may be useful if clinically indicated. 5. Mild infrarenal abdominal aortic aneurysm measuring  3.2 cm in greatest dimension. Electronically Signed: Deon Heredia  5/19/2023 11:00 PM EDT  Workstation ID: [id number]    CT Head Without Contrast    Result Date: 5/19/2023  CT HEAD WO CONTRAST Date of Exam: 5/19/2023 10:35 PM EDT Indication: AMS. Comparison: 5/4/2020 Technique: Axial CT images were obtained of the head without contrast administration.  Reconstructed coronal and sagittal images were also obtained. Automated exposure control and iterative construction methods were used. Findings: There is mild generalized parenchymal volume loss. Consideration with fragments with mild surgery there is no acute infarct or hemorrhage. No abnormal extra-axial fluid collections are identified. There is no mass effect or hydrocephalus. No acute skull fracture. Globes and orbits are within normal limits. Visualized paranasal sinuses and mastoid air cells are clear.    Impression: Impression: 1. Generalized parenchymal volume loss. No acute intracranial abnormality. Electronically Signed: Deon Heredia  5/19/2023 10:50 PM EDT  Workstation ID: [id number]    CT Angiogram Chest    Result Date: 5/19/2023  CT ANGIOGRAM CHEST Date of Exam: 5/19/2023 10:35 PM EDT Indication: rule out PE. Comparison: 4/26/2023 Technique: CTA of the chest was performed before and after the uneventful intravenous administration of Isovue 370. Reconstructed coronal and sagittal images were also obtained. In addition, a 3-D volume rendered image was created for interpretation. Automated exposure control and iterative reconstruction methods were used. Findings: Pulmonary arteries are well-opacified. There are multiple left lower lobe pulmonary emboli. There is adjacent left lower lobe airspace disease which may be due to pulmonary infarct. There is a new small left pleural effusion. There is effusion, decreased  in size from prior exam. There is been interval removal of previously demonstrated right-sided thoracostomy tube. There has been improvement in  airspace disease within the right lower lobe consistent with resolving atelectasis. No new or worsening right-sided airspace consolidation. There is underlying emphysematous change of the lungs. There is no mediastinal, hilar, or axillary adenopathy. There is a left subclavian transvenous pacemaker in place. Bilateral adrenal glands are within normal limits. No lytic or sclerotic bony lesions identified.     Impression: Impression: 1. Left lower lobe pulmonary emboli. 2. New left lower lobe airspace disease which may be secondary to pulmonary infarct. Pneumonia or aspiration could also give this appearance. 3. New small left pleural effusion. 4. Improving right pleural effusion and improving right basilar airspace disease. Electronically Signed: Deon Heredia  5/19/2023 11:07 PM EDT  Workstation ID: [id number]    XR Chest 1 View    Result Date: 5/19/2023  XR CHEST 1 VW Date of Exam: 5/19/2023 3:33 PM EDT Indication: Weak/Dizzy/AMS triage protocol Comparison: Chest x-ray 5/9/2023 Findings: Redemonstration of biventricular AICD and left chest pulse generator with unremarkable and unchanged appearance of the leads. Stable cardiomediastinal silhouette within normal limits. Decreased size and conspicuity of now small right-sided pleural effusion with improved right base atelectasis. There is new haziness at the left base compatible with new or increased conspicuity of small left-sided pleural effusion and likely some associated left base atelectasis. Mild diffuse interstitial prominence  may reflect a component of mild interstitial edema. No pneumothorax. No acute osseous findings.     Impression: Impression: Decreased atelectasis and decreased size and conspicuity of now small right-sided pleural effusion compared to prior exam. New haziness at the left lung base likely reflects new or increased conspicuity of small left layering pleural effusion and likely some associated atelectasis. Mild diffuse interstitial prominence  may reflect a component of interstitial edema. Electronically Signed: Fabián Mondragon  5/19/2023 3:49 PM EDT  Workstation ID: [id number]      Results for orders placed during the hospital encounter of 10/16/22    Adult Transthoracic Echo Complete W/ Cont if Necessary Per Protocol    Interpretation Summary  •  Estimated left ventricular EF = 30%.  There is global hypokinesis.  The basal-mid posterior wall appears akinetic.  •  Normal right ventricular cavity size, wall thickness, systolic function and septal motion noted. Electronic lead present in the ventricle  •  Septal wall motion is abnormal, consistent with right ventricular pacing  •  No hemodynamically significant valvular stenosis or regurgitation noted.      Assessment & Plan   Assessment & Plan     Active Hospital Problems    Diagnosis  POA   • **Confusion [R41.0]  Yes       72M with apparent left lower lobe pneumonia, persistent left lower lobe PE    Left lower lobe pneumonia  - Was sent to the ED from the rehab facility due to leukocytosis, likely secondary to pneumonia.  - Continue O2 by nasal cannula to maintain saturations >92%.  - DuoNeb treatments every 6 hours scheduled, can have every 4 hours as needed.  - Urine for Legionella and strep pneumo antigens.  - As he is currently residing in a rehab facility and did have a recent hospitalization, we will continue IV antibiotic coverage with vancomycin and Zosyn.  - Await blood culture results.  - Sputum for culture.  - SLP evaluation, given some concern for possibility of aspiration.    Left lower lobe PE  History of previous DVT/PE  - Was admitted in March for bilateral pulmonary emboli.  - Hold Eliquis for now, changed to heparin drip.  - Continue O2 by nasal cannula as above.    History of coronary artery disease  - Continue aspirin, statin, Coreg, ACE from home regimen.  - He will be on heparin drip.  - Continue telemetry monitoring.  - He does have elevated troponin with a large negative delta; will  check a.m. troponin.    Type 2 diabetes  - Hold Trulicity.  - SSI coverage with fingerstick checks AC/at bedtime.  - Would likely benefit from nutrition consult.    Hypertension  - Continue Coreg, ACE from home regimen.    GERD  - Continue Protonix from home regimen.    History of CVA with residual right-sided weakness  - Continue aspirin, statin from home regimen.  - Hold Eliquis, changed to heparin drip for now as above.      Total time spent: 80 minutes  Time spent includes time reviewing chart, face-to-face time, counseling patient/family/caregiver, ordering medications/tests/procedures, communicating with other health care professionals, documenting clinical information in the electronic health record, and coordination of care.    DVT prophylaxis:  Heparin drip      CODE STATUS:  full  Code Status and Medical Interventions:   Ordered at: 05/20/23 0011     Level Of Support Discussed With:    Patient     Code Status (Patient has no pulse and is not breathing):    CPR (Attempt to Resuscitate)     Medical Interventions (Patient has pulse or is breathing):    Full Support       Expected Discharge tbnelda Veras,III, DO  05/20/23

## 2023-05-20 NOTE — PROGRESS NOTES
Pharmacy Consult-Vancomycin Dosing  Timmy Guajardo is a  72 y.o. male receiving vancomycin therapy.     Indication: pneumonia  Consulting Provider: hospitalist  ID Consult: No    Goal AUC: 400 - 600 mg/L*hr    Current Antimicrobial Therapy  Anti-Infectives (From admission, onward)      Ordered     Dose/Rate Route Frequency Start Stop    05/20/23 0019  vancomycin 1250 mg/250 mL 0.9% NS IVPB ([facility])        Ordering Provider: Piter Oneil, PharmD   See [facility] for full Linked Orders Report.    19.7 mg/kg × 63.5 kg  over 75 Minutes Intravenous Every 24 Hours 05/20/23 2200 05/26/23 2159    05/20/23 0012  piperacillin-tazobactam (ZOSYN) 3.375 g in iso-osmotic dextrose 50 ml (premix)        Ordering Provider: Lacho Veras III, DO    3.375 g  over 4 Hours Intravenous Every 8 Hours 05/20/23 0600 05/25/23 0559    05/20/23 0012  Pharmacy to dose vancomycin        Ordering Provider: Lacho Veras III, DO     Does not apply Continuous PRN 05/20/23 0011 05/27/23 0010    05/19/23 2036  piperacillin-tazobactam (ZOSYN) 3.375 g in iso-osmotic dextrose 50 ml (premix)        Ordering Provider: Brady Dumont DO    3.375 g  over 30 Minutes Intravenous Once 05/19/23 2038 05/19/23 2323    05/19/23 2036  vancomycin 1250 mg/250 mL 0.9% NS IVPB ([facility])        Ordering Provider: Brady Dumont DO    20 mg/kg × 63.5 kg  over 90 Minutes Intravenous Once 05/19/23 2038              Allergies  Allergies as of 05/19/2023 - Reviewed 05/19/2023   Allergen Reaction Noted    Other Unknown - Low Severity 11/23/2022       Labs  Results from last 7 days   Lab Units 05/19/23  1547 05/13/23  1924   BUN mg/dL 30* 27*   CREATININE mg/dL 1.11 0.93     Results from last 7 days   Lab Units 05/19/23  1547 05/13/23  1924   WBC 10*3/mm3 19.56* 11.86*       Evaluation of Dosing  Last Dose Received in the ED:               Vancomycin 1250 mg IV 5/19 at 23:22  Is Patient on Dialysis or Renal Replacement:               No    Ht - 177.8 cm  "(70\")  Wt - 63.5 kg (140 lb)    Estimated Creatinine Clearance: 55.6 mL/min (by C-G formula based on SCr of 1.11 mg/dL).  Intake & Output (last 3 days)       None            Microbiology and Radiology  Microbiology Results (last 10 days)       ** No results found for the last 240 hours. **            Reported Vancomycin Levels                   [employer] AUC Calculation:  Current AUC:   -- mg/L*hr    Predicted Steady State AUC on Current Dose: -- mg/L*hr  _________________________________  Predicted Steady State AUC on New Dose:   500 mg/L*hr    Assessment/Plan:  1. Vancomycin 1250 mg IV q 24 hours    2. Vancomycin trough is scheduled 5/21 at 20:00 prior to the 3rd dose. Predicted Steady State AUC at current dose: 500 mg/L*hr.      Piter Oneil, PharmD, BCPS  5/20/2023  00:20 EDT  "

## 2023-05-20 NOTE — THERAPY EVALUATION
Acute Care - Speech Language Pathology   Swallow Initial Evaluation  [place]   Clinical Swallow Evaluation       Patient Name: Timmy Guajardo  : 1950  MRN: [id number]  Today's Date: 2023               Admit Date: 2023    Visit Dx:     ICD-10-CM ICD-9-CM   1. Confusion  R41.0 298.9   2. Leukocytosis, unspecified type  D72.829 288.60   3. Referred by health care professional  Z02.89 V68.89   4. Abnormal CXR  R93.89 793.2     Patient Active Problem List   Diagnosis   • Acute right-sided weakness   • Suspected cerebrovascular accident (CVA)   • Leukocytosis   • HFrEF   • Thrombocytopenia   • HTN   • Presence of cardiac pacemaker   • Hyperglycemia   • Gangrene of toe of left foot   • Closed displaced intertrochanteric fracture of right femur, initial encounter   • Right exudative pleural effusion status post decortication 2023   • Unintentional weight loss   • Hypoalbuminemia   • Severe malnutrition   • Bilateral pulmonary embolism   • History of CVA (cerebrovascular accident)   • Debility   • GERD (gastroesophageal reflux disease)   • Hyperlipidemia   • Rt Apical Lung Nodule vs Scarring (needs follow up)   • Confusion   • Pneumonia of left lower lobe due to infectious organism   • Pulmonary embolus     Past Medical History:   Diagnosis Date   • Cardiomyopathy    • CHF (congestive heart failure)    • Coronary artery disease    • COVID-19    • Diabetes mellitus    • GERD (gastroesophageal reflux disease)    • HTN (hypertension)    • Pleural effusion    • Stroke     Right sided weakness   • Stroke      Past Surgical History:   Procedure Laterality Date   • AMPUTATION DIGIT Left 2021    Procedure: AMPUTATION DIGIT - GREAT TOE AMPUTATION LEFT;  Surgeon: Dario Marmolejo MD;  Location: [facility];  Service: General;  Laterality: Left;   • AORTAGRAM N/A 2021    Procedure: AORTAGRAM WITH RUNOFFS, LEFT SFA BALLOON ANGIOPLASTY;  Surgeon: Tim Mahan MD;  Location: [facility];   Service: Vascular;  Laterality: N/A;  FL TIME 6 MIN 54 SECS  82 MGY  CONTRAST VISIPAQUE 100ML     • CARDIAC CATHETERIZATION     • CARDIAC PACEMAKER PLACEMENT      pacemaker/defibrillator, [employer]   • COLONOSCOPY N/A 3/31/2023    Procedure: COLONOSCOPY;  Surgeon: Brunner, Mark I, MD;  Location:  [facility];  Service: Gastroenterology;  Laterality: N/A;   • ENDOSCOPY N/A 3/29/2023    Procedure: ESOPHAGOGASTRODUODENOSCOPY;  Surgeon: Brunner, Mark I, MD;  Location:  [facility];  Service: Gastroenterology;  Laterality: N/A;   • HERNIA REPAIR Bilateral     Inguinal hernia   • HIP TROCHANTERIC NAILING WITH INTRAMEDULLARY HIP SCREW Right 10/18/2022    Procedure: HIP TROCHANTERIC NAILING WITH INTRAMEDULLARY HIP SCREW RIGHT;  Surgeon: Bj Steinberg MD;  Location:  [facility];  Service: Orthopedics;  Laterality: Right;   • THORACOSCOPY VIDEO ASSISTED WITH LOBECTOMY Right 4/12/2023    Procedure: BRONCHOSCOPY, THORACOSCOPY VIDEO ASSISTED WITH DECORTICATION, MECHANICAL PLEURODESIS;  Surgeon: Tim Mahan MD;  Location:  [facility];  Service: Cardiothoracic;  Laterality: Right;       SLP Recommendation and Plan  SLP Swallowing Diagnosis: moderate, oral dysphagia, functional pharyngeal phase (05/20/23 1100)  SLP Diet Recommendation: soft to chew textures, ground, thin liquids (05/20/23 1100)  Recommended Precautions and Strategies: upright posture during/after eating, small bites of food and sips of liquid, general aspiration precautions (05/20/23 1100)  SLP Rec. for Method of Medication Administration: meds whole, as tolerated (05/20/23 1100)     Monitor for Signs of Aspiration: yes, notify SLP if any concerns (05/20/23 1100)     Swallow Criteria for Skilled Therapeutic Interventions Met: demonstrates skilled criteria (05/20/23 1100)  Anticipated Discharge Disposition (SLP): unknown (05/20/23 1100)  Rehab Potential/Prognosis, Swallowing: good, to achieve stated therapy goals (05/20/23 1100)  Therapy Frequency  (Swallow): 5 days per week (05/20/23 1100)  Predicted Duration Therapy Intervention (Days): until discharge (05/20/23 1100)                                        Plan of Care Reviewed With: patient      SWALLOW EVALUATION (last 72 hours)     SLP Adult Swallow Evaluation     Row Name 05/20/23 1100                   Rehab Evaluation    Document Type evaluation  -KL        Subjective Information no complaints  -KL        Patient Observations alert;cooperative;agree to therapy  -KL        Patient/Family/Caregiver Comments/Observations none  -KL        Patient Effort good  -KL        Symptoms Noted During/After Treatment none  -KL           General Information    Patient Profile Reviewed yes  -KL        Pertinent History Of Current Problem Pt with PMH DVT/PE, CAD w/ cardiomyopathy, DMII, GERD, HTN, CVA w/ R residual weakness. Pt adm w/ LLL PNA. Pt with adm on 3/24 w/ LLL PE.  -KL        Current Method of Nutrition regular textures;thin liquids  -KL        Precautions/Limitations, Vision WFL;for purposes of eval  -KL        Precautions/Limitations, Hearing WFL;for purposes of eval  -KL        Prior Level of Function-Communication unknown  -KL        Prior Level of Function-Swallowing unknown  -KL        Plans/Goals Discussed with patient;agreed upon  -KL        Barriers to Rehab none identified  -KL           Pain    Additional Documentation Pain Scale: FACES Pre/Post-Treatment (Group)  -KL           Pain Scale: FACES Pre/Post-Treatment    Pain: FACES Scale, Pretreatment 0-->no hurt  -KL        Posttreatment Pain Rating 0-->no hurt  -KL           Oral Motor Structure and Function    Dentition Assessment edentulous, does not have dentures  -KL        Secretion Management WNL/WFL  -KL        Mucosal Quality moist, healthy  -KL        Volitional Swallow WFL  -KL        Volitional Cough WFL  -KL           Oral Musculature and Cranial Nerve Assessment    Oral Motor General Assessment generalized oral motor weakness  -KL            General Eating/Swallowing Observations    Respiratory Support Currently in Use nasal cannula  -        Eating/Swallowing Skills self-fed  -        Positioning During Eating upright 90 degree;upright in bed  -        Utensils Used spoon;cup;straw  -        Consistencies Trialed soft to chew textures;thin liquids  -           Respiratory    Respiratory Status WFL  -KL           Clinical Swallow Eval    Oral Prep Phase impaired  -        Oral Transit WFL  -KL        Oral Residue WFL  -KL        Pharyngeal Phase no overt signs/symptoms of pharyngeal impairment  -KL        Esophageal Phase unremarkable  -        Clinical Swallow Evaluation Summary Pt trialed with thin liquid and solid. Pt declined puree. Pt with no overt clinical s/sx of aspiration. Pt attempted solid trial but spit it out d/t difficulty chewing. Recommend soft, ground diet with thin liquids. ST will f/u for diet tolerance.  -           Oral Prep Concerns    Oral Prep Concerns inefficient mastication;bolus removed from mouth manually  -        Inefficient Mastication regular consistencies  -           SLP Evaluation Clinical Impression    SLP Swallowing Diagnosis moderate;oral dysphagia;functional pharyngeal phase  -        Functional Impact risk of aspiration/pneumonia;risk of malnutrition  -        Rehab Potential/Prognosis, Swallowing good, to achieve stated therapy goals  -        Swallow Criteria for Skilled Therapeutic Interventions Met demonstrates skilled criteria  -           Recommendations    Therapy Frequency (Swallow) 5 days per week  -        Predicted Duration Therapy Intervention (Days) until discharge  -        SLP Diet Recommendation soft to chew textures;ground;thin liquids  -        Recommended Precautions and Strategies upright posture during/after eating;small bites of food and sips of liquid;general aspiration precautions  -        Oral Care Recommendations Oral Care BID/PRN  -        SLP  Rec. for Method of Medication Administration meds whole;as tolerated  -        Monitor for Signs of Aspiration yes;notify SLP if any concerns  -KL        Anticipated Discharge Disposition (SLP) unknown  -              User Key  (r) = Recorded By, (t) = Taken By, (c) = Cosigned By    Initials Name Effective Dates    SELVIN NicolasSonal MS CCC-SLP 06/15/21 -                 EDUCATION  The patient has been educated in the following areas:   Dysphagia (Swallowing Impairment).        SLP GOALS     Row Name 05/20/23 1100             (LTG) Patient will demonstrate functional swallow for    Diet Texture (Demonstrate functional swallow) soft to chew (chopped) textures  -KL      Liquid viscosity (Demonstrate functional swallow) thin liquids  -KL      [place] (Demonstrate functional swallow) independently (over 90% accuracy)  -      Time Frame (Demonstrate functional swallow) by discharge  -         (STG) Patient will tolerate trials of    Consistencies Trialed (Tolerate trials) soft to chew (ground) textures;thin liquids  -KL      Desired Outcome (Tolerate trials) without signs/symptoms of aspiration;without signs of distress;with adequate oral prep/transit/clearance  -KL      [place] (Tolerate trials) independently (over 90% accuracy)  -KL      Time Frame (Tolerate trials) by discharge  -            User Key  (r) = Recorded By, (t) = Taken By, (c) = Cosigned By    Initials Name Provider Type    Sonal Ulloa MS CCC-SLP Speech and Language Pathologist                   Time Calculation:    Time Calculation- SLP     Row Name 05/20/23 1335             Time Calculation- SLP    SLP Start Time 1100  -KL      SLP Received On 05/20/23  -         Untimed Charges    SLP Eval/Re-eval  ST Eval Oral Pharyng Swallow - 25292  -      [id number] Eval Oral Pharyng Swallow Minutes 60  -KL         Total Minutes    Untimed Charges Total Minutes 60  -KL       Total Minutes 60  -KL            User Key  (r) = Recorded By, (t) =  Taken By, (c) = Cosigned By    Initials Name Provider Type    Sonal Ulloa, MS CCC-SLP Speech and Language Pathologist                Therapy Charges for Today     Code Description Service Date Service Provider Modifiers Qty    33301017224 HC ST EVAL ORAL PHARYNG SWALLOW 4 5/20/2023 Sonal Valenzuela MS CCC-SLP GN 1               Sonal Valenzuela MS CCC-SLP  5/20/2023

## 2023-05-20 NOTE — PROGRESS NOTES
Patient was admitted to the hospitalist service last night after midnight.  I have seen and examined the patient today at bedside.  Tells me that he is overall feeling better.  Denies any fever or chills.  Currently is on nasal cannula and saturating okay.  Does not have any shortness of breath at rest.  He does have some left back pain.  He is also not oriented.  However, he follows commands.  -Continue current care  -Medicine will follow

## 2023-05-20 NOTE — PLAN OF CARE
Goal Outcome Evaluation:  Plan of Care Reviewed With: patient      SLP evaluation completed. Will continue to address dysphagia. Please see note for further details and recommendations.

## 2023-05-21 ENCOUNTER — APPOINTMENT (OUTPATIENT)
Dept: GENERAL RADIOLOGY | Facility: HOSPITAL | Age: 73
DRG: 208 | End: 2023-05-21
Payer: MEDICARE

## 2023-05-21 PROBLEM — I95.9 HYPOTENSION: Status: ACTIVE | Noted: 2023-05-21

## 2023-05-21 PROBLEM — I48.91 ATRIAL FIBRILLATION: Status: ACTIVE | Noted: 2023-05-21

## 2023-05-21 LAB
ALBUMIN SERPL-MCNC: 2.3 G/DL (ref 3.5–5.2)
ALBUMIN/GLOB SERPL: 0.6 G/DL
ALP SERPL-CCNC: 138 U/L (ref 39–117)
ALT SERPL W P-5'-P-CCNC: 62 U/L (ref 1–41)
AMORPH URATE CRY URNS QL MICRO: ABNORMAL /HPF
ANION GAP SERPL CALCULATED.3IONS-SCNC: 14 MMOL/L (ref 5–15)
ANION GAP SERPL CALCULATED.3IONS-SCNC: 14 MMOL/L (ref 5–15)
APTT PPP: 138.5 SECONDS (ref 60–90)
APTT PPP: 76.9 SECONDS (ref 60–90)
APTT PPP: 80.3 SECONDS (ref 60–90)
APTT PPP: 89.5 SECONDS (ref 60–90)
ARTERIAL PATENCY WRIST A: ABNORMAL
AST SERPL-CCNC: 97 U/L (ref 1–40)
ATMOSPHERIC PRESS: ABNORMAL MM[HG]
BACTERIA BLD CULT: ABNORMAL
BACTERIA UR QL AUTO: ABNORMAL /HPF
BASE EXCESS BLDA CALC-SCNC: -5.9 MMOL/L (ref 0–2)
BASE EXCESS BLDA CALC-SCNC: -7.3 MMOL/L (ref 0–2)
BASE EXCESS BLDA CALC-SCNC: -8.2 MMOL/L (ref 0–2)
BASOPHILS # BLD AUTO: 0.06 10*3/MM3 (ref 0–0.2)
BASOPHILS NFR BLD AUTO: 0.5 % (ref 0–1.5)
BDY SITE: ABNORMAL
BILIRUB SERPL-MCNC: 0.5 MG/DL (ref 0–1.2)
BILIRUB UR QL STRIP: NEGATIVE
BODY TEMPERATURE: 37 C
BUN SERPL-MCNC: 23 MG/DL (ref 8–23)
BUN SERPL-MCNC: 31 MG/DL (ref 8–23)
BUN/CREAT SERPL: 19.3 (ref 7–25)
BUN/CREAT SERPL: 23.5 (ref 7–25)
CALCIUM SPEC-SCNC: 8.2 MG/DL (ref 8.6–10.5)
CALCIUM SPEC-SCNC: 8.3 MG/DL (ref 8.6–10.5)
CHLORIDE SERPL-SCNC: 101 MMOL/L (ref 98–107)
CHLORIDE SERPL-SCNC: 99 MMOL/L (ref 98–107)
CLARITY UR: ABNORMAL
CO2 BLDA-SCNC: 19.4 MMOL/L (ref 22–33)
CO2 BLDA-SCNC: 20.7 MMOL/L (ref 22–33)
CO2 BLDA-SCNC: 20.7 MMOL/L (ref 22–33)
CO2 SERPL-SCNC: 18 MMOL/L (ref 22–29)
CO2 SERPL-SCNC: 18 MMOL/L (ref 22–29)
COHGB MFR BLD: 0.9 % (ref 0–2)
COHGB MFR BLD: 1.2 % (ref 0–2)
COHGB MFR BLD: 1.3 % (ref 0–2)
COLOR UR: YELLOW
CREAT SERPL-MCNC: 0.98 MG/DL (ref 0.76–1.27)
CREAT SERPL-MCNC: 1.61 MG/DL (ref 0.76–1.27)
DEPRECATED RDW RBC AUTO: 53.1 FL (ref 37–54)
EGFRCR SERPLBLD CKD-EPI 2021: 45.7 ML/MIN/1.73
EGFRCR SERPLBLD CKD-EPI 2021: 83 ML/MIN/1.73
EOSINOPHIL # BLD AUTO: 0.16 10*3/MM3 (ref 0–0.4)
EOSINOPHIL NFR BLD AUTO: 1.3 % (ref 0.3–6.2)
EPAP: 0
ERYTHROCYTE [DISTWIDTH] IN BLOOD BY AUTOMATED COUNT: 14.6 % (ref 12.3–15.4)
GEN 5 2HR TROPONIN T REFLEX: 62 NG/L
GLOBULIN UR ELPH-MCNC: 3.9 GM/DL
GLUCOSE BLDC GLUCOMTR-MCNC: 139 MG/DL (ref 70–130)
GLUCOSE BLDC GLUCOMTR-MCNC: 184 MG/DL (ref 70–130)
GLUCOSE BLDC GLUCOMTR-MCNC: 239 MG/DL (ref 70–130)
GLUCOSE BLDC GLUCOMTR-MCNC: 311 MG/DL (ref 70–130)
GLUCOSE SERPL-MCNC: 241 MG/DL (ref 65–99)
GLUCOSE SERPL-MCNC: 257 MG/DL (ref 65–99)
GLUCOSE UR STRIP-MCNC: NEGATIVE MG/DL
HCO3 BLDA-SCNC: 18.3 MMOL/L (ref 20–26)
HCO3 BLDA-SCNC: 19.2 MMOL/L (ref 20–26)
HCO3 BLDA-SCNC: 19.6 MMOL/L (ref 20–26)
HCT VFR BLD AUTO: 31.7 % (ref 37.5–51)
HCT VFR BLD CALC: 26.7 % (ref 38–51)
HCT VFR BLD CALC: 26.9 % (ref 38–51)
HCT VFR BLD CALC: 31.3 % (ref 38–51)
HGB BLD-MCNC: 10 G/DL (ref 13–17.7)
HGB BLDA-MCNC: 10.2 G/DL (ref 13.5–17.5)
HGB BLDA-MCNC: 8.7 G/DL (ref 13.5–17.5)
HGB BLDA-MCNC: 8.8 G/DL (ref 13.5–17.5)
HGB UR QL STRIP.AUTO: NEGATIVE
HYALINE CASTS UR QL AUTO: ABNORMAL /LPF
IMM GRANULOCYTES # BLD AUTO: 0.08 10*3/MM3 (ref 0–0.05)
IMM GRANULOCYTES NFR BLD AUTO: 0.6 % (ref 0–0.5)
INHALED O2 CONCENTRATION: 36 %
INHALED O2 CONCENTRATION: 44 %
INHALED O2 CONCENTRATION: 75 %
IPAP: 0
KETONES UR QL STRIP: NEGATIVE
L PNEUMO1 AG UR QL IA: NEGATIVE
LDH SERPL-CCNC: 272 U/L (ref 135–225)
LEUKOCYTE ESTERASE UR QL STRIP.AUTO: NEGATIVE
LYMPHOCYTES # BLD AUTO: 1.09 10*3/MM3 (ref 0.7–3.1)
LYMPHOCYTES NFR BLD AUTO: 8.5 % (ref 19.6–45.3)
Lab: ABNORMAL
Lab: ABNORMAL
MAGNESIUM SERPL-MCNC: 1.8 MG/DL (ref 1.6–2.4)
MAGNESIUM SERPL-MCNC: 1.9 MG/DL (ref 1.6–2.4)
MCH RBC QN AUTO: 31.1 PG (ref 26.6–33)
MCHC RBC AUTO-ENTMCNC: 31.5 G/DL (ref 31.5–35.7)
MCV RBC AUTO: 98.4 FL (ref 79–97)
METHGB BLD QL: 0.1 % (ref 0–1.5)
MODALITY: ABNORMAL
MONOCYTES # BLD AUTO: 1.03 10*3/MM3 (ref 0.1–0.9)
MONOCYTES NFR BLD AUTO: 8 % (ref 5–12)
NEUTROPHILS NFR BLD AUTO: 10.38 10*3/MM3 (ref 1.7–7)
NEUTROPHILS NFR BLD AUTO: 81.1 % (ref 42.7–76)
NITRITE UR QL STRIP: NEGATIVE
NOTE: ABNORMAL
NOTIFIED BY: ABNORMAL
NOTIFIED BY: ABNORMAL
NOTIFIED WHO: ABNORMAL
NOTIFIED WHO: ABNORMAL
NRBC BLD AUTO-RTO: 0 /100 WBC (ref 0–0.2)
NT-PROBNP SERPL-MCNC: 1949 PG/ML (ref 0–900)
OXYHGB MFR BLDV: 83.5 % (ref 94–99)
OXYHGB MFR BLDV: 95.6 % (ref 94–99)
OXYHGB MFR BLDV: ABNORMAL %
PAW @ PEAK INSP FLOW SETTING VENT: 0 CMH2O
PCO2 BLDA: 36.3 MM HG (ref 35–45)
PCO2 BLDA: 37.7 MM HG (ref 35–45)
PCO2 BLDA: 47.5 MM HG (ref 35–45)
PCO2 TEMP ADJ BLD: 36.3 MM HG (ref 35–48)
PCO2 TEMP ADJ BLD: 37.7 MM HG (ref 35–48)
PCO2 TEMP ADJ BLD: 47.5 MM HG (ref 35–48)
PEEP RESPIRATORY: 5 CM[H2O]
PH BLDA: 7.22 PH UNITS (ref 7.35–7.45)
PH BLDA: 7.31 PH UNITS (ref 7.35–7.45)
PH BLDA: 7.32 PH UNITS (ref 7.35–7.45)
PH UR STRIP.AUTO: <=5 [PH] (ref 5–8)
PH, TEMP CORRECTED: 7.22 PH UNITS
PH, TEMP CORRECTED: 7.31 PH UNITS
PH, TEMP CORRECTED: 7.32 PH UNITS
PLATELET # BLD AUTO: 198 10*3/MM3 (ref 140–450)
PMV BLD AUTO: 9.2 FL (ref 6–12)
PO2 BLDA: 252 MM HG (ref 83–108)
PO2 BLDA: 53.1 MM HG (ref 83–108)
PO2 BLDA: 94.8 MM HG (ref 83–108)
PO2 TEMP ADJ BLD: 252 MM HG (ref 83–108)
PO2 TEMP ADJ BLD: 53.1 MM HG (ref 83–108)
PO2 TEMP ADJ BLD: 94.8 MM HG (ref 83–108)
POTASSIUM SERPL-SCNC: 4.5 MMOL/L (ref 3.5–5.2)
POTASSIUM SERPL-SCNC: 4.6 MMOL/L (ref 3.5–5.2)
PROCALCITONIN SERPL-MCNC: 0.31 NG/ML (ref 0–0.25)
PROT SERPL-MCNC: 6.2 G/DL (ref 6–8.5)
PROT UR QL STRIP: ABNORMAL
RBC # BLD AUTO: 3.22 10*6/MM3 (ref 4.14–5.8)
RBC # UR STRIP: ABNORMAL /HPF
REF LAB TEST METHOD: ABNORMAL
S PNEUM AG SPEC QL LA: NEGATIVE
SODIUM SERPL-SCNC: 131 MMOL/L (ref 136–145)
SODIUM SERPL-SCNC: 133 MMOL/L (ref 136–145)
SP GR UR STRIP: >=1.03 (ref 1–1.03)
SQUAMOUS #/AREA URNS HPF: ABNORMAL /HPF
TOTAL RATE: 0 BREATHS/MINUTE
TOTAL RATE: 0 BREATHS/MINUTE
TOTAL RATE: 20 BREATHS/MINUTE
TROPONIN T DELTA: 0 NG/L
TROPONIN T SERPL HS-MCNC: 62 NG/L
UROBILINOGEN UR QL STRIP: ABNORMAL
VANCOMYCIN TROUGH SERPL-MCNC: 14.7 MCG/ML (ref 5–20)
WBC # UR STRIP: ABNORMAL /HPF
WBC NRBC COR # BLD: 12.8 10*3/MM3 (ref 3.4–10.8)

## 2023-05-21 PROCEDURE — 63710000001 INSULIN LISPRO (HUMAN) PER 5 UNITS: Performed by: NURSE PRACTITIONER

## 2023-05-21 PROCEDURE — 94799 UNLISTED PULMONARY SVC/PX: CPT

## 2023-05-21 PROCEDURE — 5A1945Z RESPIRATORY VENTILATION, 24-96 CONSECUTIVE HOURS: ICD-10-PCS | Performed by: INTERNAL MEDICINE

## 2023-05-21 PROCEDURE — 87086 URINE CULTURE/COLONY COUNT: CPT | Performed by: INTERNAL MEDICINE

## 2023-05-21 PROCEDURE — 4A133B1 MONITORING OF ARTERIAL PRESSURE, PERIPHERAL, PERCUTANEOUS APPROACH: ICD-10-PCS | Performed by: INTERNAL MEDICINE

## 2023-05-21 PROCEDURE — 25010000002 HEPARIN (PORCINE) 25000-0.45 UT/250ML-% SOLUTION

## 2023-05-21 PROCEDURE — 25010000002 AMIODARONE IN DEXTROSE 5% 360-4.14 MG/200ML-% SOLUTION

## 2023-05-21 PROCEDURE — 87899 AGENT NOS ASSAY W/OPTIC: CPT | Performed by: INTERNAL MEDICINE

## 2023-05-21 PROCEDURE — 82805 BLOOD GASES W/O2 SATURATION: CPT

## 2023-05-21 PROCEDURE — 93010 ELECTROCARDIOGRAM REPORT: CPT | Performed by: INTERNAL MEDICINE

## 2023-05-21 PROCEDURE — 25010000002 PHENYLEPHRINE 10 MG/ML SOLUTION 5 ML VIAL

## 2023-05-21 PROCEDURE — 84484 ASSAY OF TROPONIN QUANT: CPT | Performed by: INTERNAL MEDICINE

## 2023-05-21 PROCEDURE — 31500 INSERT EMERGENCY AIRWAY: CPT

## 2023-05-21 PROCEDURE — 85025 COMPLETE CBC W/AUTO DIFF WBC: CPT | Performed by: INTERNAL MEDICINE

## 2023-05-21 PROCEDURE — 71045 X-RAY EXAM CHEST 1 VIEW: CPT

## 2023-05-21 PROCEDURE — 94761 N-INVAS EAR/PLS OXIMETRY MLT: CPT

## 2023-05-21 PROCEDURE — 25010000002 FENTANYL 10 MCG/1 ML NS: Performed by: NURSE PRACTITIONER

## 2023-05-21 PROCEDURE — 80053 COMPREHEN METABOLIC PANEL: CPT | Performed by: INTERNAL MEDICINE

## 2023-05-21 PROCEDURE — 87449 NOS EACH ORGANISM AG IA: CPT | Performed by: INTERNAL MEDICINE

## 2023-05-21 PROCEDURE — 85730 THROMBOPLASTIN TIME PARTIAL: CPT

## 2023-05-21 PROCEDURE — 83615 LACTATE (LD) (LDH) ENZYME: CPT | Performed by: INTERNAL MEDICINE

## 2023-05-21 PROCEDURE — 25010000002 DIGOXIN PER 500 MCG: Performed by: INTERNAL MEDICINE

## 2023-05-21 PROCEDURE — 83050 HGB METHEMOGLOBIN QUAN: CPT

## 2023-05-21 PROCEDURE — 84145 PROCALCITONIN (PCT): CPT | Performed by: INTERNAL MEDICINE

## 2023-05-21 PROCEDURE — 93005 ELECTROCARDIOGRAM TRACING: CPT

## 2023-05-21 PROCEDURE — 83880 ASSAY OF NATRIURETIC PEPTIDE: CPT

## 2023-05-21 PROCEDURE — 80202 ASSAY OF VANCOMYCIN: CPT

## 2023-05-21 PROCEDURE — 83735 ASSAY OF MAGNESIUM: CPT

## 2023-05-21 PROCEDURE — 25010000002 ALBUMIN HUMAN 5% PER 50 ML: Performed by: NURSE PRACTITIONER

## 2023-05-21 PROCEDURE — 81001 URINALYSIS AUTO W/SCOPE: CPT | Performed by: INTERNAL MEDICINE

## 2023-05-21 PROCEDURE — 25010000002 PIPERACILLIN SOD-TAZOBACTAM PER 1 G: Performed by: INTERNAL MEDICINE

## 2023-05-21 PROCEDURE — 36620 INSERTION CATHETER ARTERY: CPT

## 2023-05-21 PROCEDURE — 82375 ASSAY CARBOXYHB QUANT: CPT

## 2023-05-21 PROCEDURE — 99291 CRITICAL CARE FIRST HOUR: CPT | Performed by: INTERNAL MEDICINE

## 2023-05-21 PROCEDURE — 36556 INSERT NON-TUNNEL CV CATH: CPT

## 2023-05-21 PROCEDURE — 83735 ASSAY OF MAGNESIUM: CPT | Performed by: INTERNAL MEDICINE

## 2023-05-21 PROCEDURE — 0BH17EZ INSERTION OF ENDOTRACHEAL AIRWAY INTO TRACHEA, VIA NATURAL OR ARTIFICIAL OPENING: ICD-10-PCS | Performed by: INTERNAL MEDICINE

## 2023-05-21 PROCEDURE — 02HV33Z INSERTION OF INFUSION DEVICE INTO SUPERIOR VENA CAVA, PERCUTANEOUS APPROACH: ICD-10-PCS | Performed by: INTERNAL MEDICINE

## 2023-05-21 PROCEDURE — 0 MAGNESIUM SULFATE 4 GM/100ML SOLUTION

## 2023-05-21 PROCEDURE — 74018 RADEX ABDOMEN 1 VIEW: CPT

## 2023-05-21 PROCEDURE — 76937 US GUIDE VASCULAR ACCESS: CPT

## 2023-05-21 PROCEDURE — 82948 REAGENT STRIP/BLOOD GLUCOSE: CPT

## 2023-05-21 PROCEDURE — 25010000002 AMIODARONE IN DEXTROSE 5% 150-4.21 MG/100ML-% SOLUTION

## 2023-05-21 PROCEDURE — 99223 1ST HOSP IP/OBS HIGH 75: CPT | Performed by: INTERNAL MEDICINE

## 2023-05-21 PROCEDURE — 93005 ELECTROCARDIOGRAM TRACING: CPT | Performed by: INTERNAL MEDICINE

## 2023-05-21 PROCEDURE — 94002 VENT MGMT INPAT INIT DAY: CPT

## 2023-05-21 PROCEDURE — 4A133J1 MONITORING OF ARTERIAL PULSE, PERIPHERAL, PERCUTANEOUS APPROACH: ICD-10-PCS | Performed by: INTERNAL MEDICINE

## 2023-05-21 PROCEDURE — P9041 ALBUMIN (HUMAN),5%, 50ML: HCPCS | Performed by: NURSE PRACTITIONER

## 2023-05-21 PROCEDURE — 25010000002 FENTANYL CITRATE (PF) 50 MCG/ML SOLUTION: Performed by: NURSE PRACTITIONER

## 2023-05-21 RX ORDER — ASPIRIN 81 MG/1
81 TABLET, CHEWABLE ORAL DAILY
Status: DISCONTINUED | OUTPATIENT
Start: 2023-05-21 | End: 2023-05-24

## 2023-05-21 RX ORDER — ACETAMINOPHEN 325 MG/1
650 TABLET ORAL EVERY 4 HOURS PRN
Status: DISCONTINUED | OUTPATIENT
Start: 2023-05-21 | End: 2023-05-24

## 2023-05-21 RX ORDER — CYCLOBENZAPRINE HCL 10 MG
5 TABLET ORAL 3 TIMES DAILY PRN
Status: DISCONTINUED | OUTPATIENT
Start: 2023-05-21 | End: 2023-05-21

## 2023-05-21 RX ORDER — GABAPENTIN 100 MG/1
100 CAPSULE ORAL 3 TIMES DAILY
Status: DISCONTINUED | OUTPATIENT
Start: 2023-05-21 | End: 2023-05-21

## 2023-05-21 RX ORDER — CHLORHEXIDINE GLUCONATE 0.12 MG/ML
15 RINSE ORAL EVERY 12 HOURS SCHEDULED
Status: DISCONTINUED | OUTPATIENT
Start: 2023-05-22 | End: 2023-05-24

## 2023-05-21 RX ORDER — BISACODYL 5 MG/1
5 TABLET, DELAYED RELEASE ORAL DAILY PRN
Status: DISCONTINUED | OUTPATIENT
Start: 2023-05-21 | End: 2023-05-22

## 2023-05-21 RX ORDER — NOREPINEPHRINE BITARTRATE 0.03 MG/ML
.02-.3 INJECTION, SOLUTION INTRAVENOUS
Status: DISCONTINUED | OUTPATIENT
Start: 2023-05-21 | End: 2023-05-25

## 2023-05-21 RX ORDER — AMOXICILLIN 250 MG
2 CAPSULE ORAL 2 TIMES DAILY
Status: DISCONTINUED | OUTPATIENT
Start: 2023-05-21 | End: 2023-05-24

## 2023-05-21 RX ORDER — BISACODYL 10 MG
10 SUPPOSITORY, RECTAL RECTAL DAILY PRN
Status: DISCONTINUED | OUTPATIENT
Start: 2023-05-21 | End: 2023-05-24

## 2023-05-21 RX ORDER — KETAMINE HYDROCHLORIDE 50 MG/ML
100 INJECTION, SOLUTION, CONCENTRATE INTRAMUSCULAR; INTRAVENOUS ONCE
Status: COMPLETED | OUTPATIENT
Start: 2023-05-21 | End: 2023-05-21

## 2023-05-21 RX ORDER — CHOLECALCIFEROL (VITAMIN D3) 125 MCG
5 CAPSULE ORAL NIGHTLY PRN
Status: DISCONTINUED | OUTPATIENT
Start: 2023-05-21 | End: 2023-05-21

## 2023-05-21 RX ORDER — ATORVASTATIN CALCIUM 40 MG/1
80 TABLET, FILM COATED ORAL NIGHTLY
Status: DISCONTINUED | OUTPATIENT
Start: 2023-05-21 | End: 2023-05-24

## 2023-05-21 RX ORDER — DIGOXIN 0.25 MG/ML
250 INJECTION INTRAMUSCULAR; INTRAVENOUS ONCE
Status: COMPLETED | OUTPATIENT
Start: 2023-05-21 | End: 2023-05-21

## 2023-05-21 RX ORDER — FENTANYL CITRATE 50 UG/ML
100 INJECTION, SOLUTION INTRAMUSCULAR; INTRAVENOUS ONCE
Status: COMPLETED | OUTPATIENT
Start: 2023-05-21 | End: 2023-05-21

## 2023-05-21 RX ORDER — POLYETHYLENE GLYCOL 3350 17 G/17G
17 POWDER, FOR SOLUTION ORAL DAILY PRN
Status: DISCONTINUED | OUTPATIENT
Start: 2023-05-21 | End: 2023-05-24

## 2023-05-21 RX ORDER — PANTOPRAZOLE SODIUM 40 MG/10ML
40 INJECTION, POWDER, LYOPHILIZED, FOR SOLUTION INTRAVENOUS
Status: DISCONTINUED | OUTPATIENT
Start: 2023-05-22 | End: 2023-05-26

## 2023-05-21 RX ORDER — ALBUMIN, HUMAN INJ 5% 5 %
500 SOLUTION INTRAVENOUS ONCE
Status: COMPLETED | OUTPATIENT
Start: 2023-05-21 | End: 2023-05-21

## 2023-05-21 RX ORDER — HYDROCODONE BITARTRATE AND ACETAMINOPHEN 5; 325 MG/1; MG/1
1 TABLET ORAL EVERY 4 HOURS PRN
Status: DISCONTINUED | OUTPATIENT
Start: 2023-05-21 | End: 2023-05-21

## 2023-05-21 RX ORDER — INSULIN LISPRO 100 [IU]/ML
0-9 INJECTION, SOLUTION INTRAVENOUS; SUBCUTANEOUS
Status: DISCONTINUED | OUTPATIENT
Start: 2023-05-21 | End: 2023-05-21

## 2023-05-21 RX ORDER — MAGNESIUM SULFATE HEPTAHYDRATE 40 MG/ML
4 INJECTION, SOLUTION INTRAVENOUS ONCE
Status: COMPLETED | OUTPATIENT
Start: 2023-05-21 | End: 2023-05-21

## 2023-05-21 RX ADMIN — VASOPRESSIN 0.03 UNITS/MIN: 0.2 INJECTION INTRAVENOUS at 12:15

## 2023-05-21 RX ADMIN — GABAPENTIN 100 MG: 100 CAPSULE ORAL at 13:21

## 2023-05-21 RX ADMIN — PHENYLEPHRINE HYDROCHLORIDE 3 MCG/KG/MIN: 10 INJECTION INTRAVENOUS at 09:51

## 2023-05-21 RX ADMIN — TAZOBACTAM SODIUM AND PIPERACILLIN SODIUM 3.38 G: 375; 3 INJECTION, SOLUTION INTRAVENOUS at 22:36

## 2023-05-21 RX ADMIN — ASPIRIN 81 MG CHEWABLE TABLET 81 MG: 81 TABLET CHEWABLE at 13:20

## 2023-05-21 RX ADMIN — Medication 0.3 MCG/KG/MIN: at 16:48

## 2023-05-21 RX ADMIN — AMIODARONE HYDROCHLORIDE 150 MG: 1.5 INJECTION, SOLUTION INTRAVENOUS at 03:44

## 2023-05-21 RX ADMIN — ASPIRIN 81 MG CHEWABLE TABLET 81 MG: 81 TABLET CHEWABLE at 14:43

## 2023-05-21 RX ADMIN — IPRATROPIUM BROMIDE AND ALBUTEROL SULFATE 3 ML: 2.5; .5 SOLUTION RESPIRATORY (INHALATION) at 19:30

## 2023-05-21 RX ADMIN — TAZOBACTAM SODIUM AND PIPERACILLIN SODIUM 3.38 G: 375; 3 INJECTION, SOLUTION INTRAVENOUS at 14:38

## 2023-05-21 RX ADMIN — KETAMINE HYDROCHLORIDE 100 MG: 50 INJECTION INTRAMUSCULAR; INTRAVENOUS at 22:21

## 2023-05-21 RX ADMIN — AMIODARONE HYDROCHLORIDE 1 MG/MIN: 1.8 INJECTION, SOLUTION INTRAVENOUS at 04:01

## 2023-05-21 RX ADMIN — AMIODARONE HYDROCHLORIDE 0.5 MG/MIN: 1.8 INJECTION, SOLUTION INTRAVENOUS at 17:28

## 2023-05-21 RX ADMIN — TAZOBACTAM SODIUM AND PIPERACILLIN SODIUM 3.38 G: 375; 3 INJECTION, SOLUTION INTRAVENOUS at 07:35

## 2023-05-21 RX ADMIN — PANTOPRAZOLE SODIUM 40 MG: 40 TABLET, DELAYED RELEASE ORAL at 13:19

## 2023-05-21 RX ADMIN — Medication 10 ML: at 20:59

## 2023-05-21 RX ADMIN — VASOPRESSIN 0.03 UNITS/MIN: 0.2 INJECTION INTRAVENOUS at 21:02

## 2023-05-21 RX ADMIN — ATORVASTATIN CALCIUM 80 MG: 40 TABLET, FILM COATED ORAL at 21:08

## 2023-05-21 RX ADMIN — PHENYLEPHRINE HYDROCHLORIDE 1 MCG/KG/MIN: 10 INJECTION INTRAVENOUS at 03:42

## 2023-05-21 RX ADMIN — DIGOXIN 250 MCG: 0.25 INJECTION INTRAMUSCULAR; INTRAVENOUS at 02:23

## 2023-05-21 RX ADMIN — Medication 0.02 MCG/KG/MIN: at 06:14

## 2023-05-21 RX ADMIN — IPRATROPIUM BROMIDE AND ALBUTEROL SULFATE 3 ML: 2.5; .5 SOLUTION RESPIRATORY (INHALATION) at 06:45

## 2023-05-21 RX ADMIN — PHENYLEPHRINE HYDROCHLORIDE 3 MCG/KG/MIN: 10 INJECTION INTRAVENOUS at 19:55

## 2023-05-21 RX ADMIN — GABAPENTIN 100 MG: 100 CAPSULE ORAL at 14:39

## 2023-05-21 RX ADMIN — PHENYLEPHRINE HYDROCHLORIDE 3 MCG/KG/MIN: 10 INJECTION INTRAVENOUS at 14:38

## 2023-05-21 RX ADMIN — ALBUMIN (HUMAN) 500 ML: 12.5 INJECTION, SOLUTION INTRAVENOUS at 20:25

## 2023-05-21 RX ADMIN — MAGNESIUM SULFATE IN WATER 4 G: 40 INJECTION, SOLUTION INTRAVENOUS at 13:22

## 2023-05-21 RX ADMIN — HEPARIN SODIUM 18 UNITS/KG/HR: 10000 INJECTION, SOLUTION INTRAVENOUS at 12:52

## 2023-05-21 RX ADMIN — ACETAMINOPHEN 650 MG: 325 TABLET ORAL at 13:19

## 2023-05-21 RX ADMIN — INSULIN LISPRO 6 UNITS: 100 INJECTION, SOLUTION INTRAVENOUS; SUBCUTANEOUS at 17:27

## 2023-05-21 RX ADMIN — Medication 50 MCG/HR: at 22:50

## 2023-05-21 RX ADMIN — FENTANYL CITRATE 100 MCG: 50 INJECTION, SOLUTION INTRAMUSCULAR; INTRAVENOUS at 22:22

## 2023-05-21 RX ADMIN — IPRATROPIUM BROMIDE AND ALBUTEROL SULFATE 3 ML: 2.5; .5 SOLUTION RESPIRATORY (INHALATION) at 15:57

## 2023-05-21 RX ADMIN — Medication 10 ML: at 09:30

## 2023-05-21 NOTE — PROCEDURES
Insert Arterial Line    Date/Time: 5/21/2023 6:31 AM  Performed by: Zina Doty APRN  Authorized by: Zina Doty APRN     Universal Protocol:     Written consent obtained?: Yes      Consent given by:  Power of     Patient identity confirmed:  Verbally with patient and arm band  A time out verifies correct patient, procedure, equipment, support staff and site/side marked as required:   Indications:     Indications: hemodynamic monitoring    Location:     Location:  Right radial  Anesthesia:     Local anesthetic:  Lidocaine 1% without epinephrine    Anesthetic total (ml):  3    Patient sedated: No    Procedure Details:     Ultrasound Guidance: yes  The ultrasound was used for evaluation of possible access sites.  Vessel patency was confirmed with the ultrasound.  Needle entry into vessel was visualized in realtime with the ultrasound.   Permanent recorded image(s) of the vascular access site will be included in the patient record.      Ravinder's test normal?: Yes      Needle gauge:  20    Seldinger technique: Seldinger technique used      Number of attempts:  1  Post-procedure:     Post-procedure:  Line sutured and dressing applied    Post-procedure CMS:  Normal     patient tolerated the procedure well with no immediate complications     Inserted with ultrasound guidance with bright red pulsatile blood detected. Connected to pressure tubing with arterial waveform present. Suture placed and sterile dressing applied.     Parisa Doty, MSN, APRN, ACNPC-AG  Pulmonary and Critical Care Medicine  Electronically signed by YUE Higginbotham, 05/21/23, 6:32 AM EDT.

## 2023-05-21 NOTE — SIGNIFICANT NOTE
I responded to a rapid response due to tachyarrhythmia and hypotension.  I reviewed patient's EKG as well as monitored telemetry.  Rate was sustaining around 152, and was regular on EKG.  I suspect atrial flutter.  I ordered CBC, CMP, magnesium, procalcitonin, lactic acid, troponin, chest x-ray.  I gave a 1 L IVF bolus, and ordered digoxin 250 mcg IV.

## 2023-05-21 NOTE — CONSULTS
Timmy Guajardo  [id number]  1950   LOS: 2 days   Patient Care Team:  Arsen Seals APRN as PCP - General (Family Medicine)    Mr. Guajardo is a 72-year-old single white male from [place], [place].    Chief Complaint: NICM, VT    Problem List:  1. Systolic heart failure/cardiomyopathy  1. LHC 2015, reportedly normal, data deficit  2. EF 15% in 2015.  [employer] ICD placed.  Data deficit  3. Echo 5/5/2021: EF 25%, saline test results negative  2. Possible atrial fibrillation, anticoagulated with Eliquis  3. Hypertension  4. Type 2 diabetes  5. Peripheral arterial disease  1. Aortogram with runoff 5/11/2021, Dr. Mahan: Left SFA balloon angioplasty  2. Left great toe amputation 5/12/2021, Dr. Horne  6.  Infrarenal abdominal aortic aneurysm measuring 3.2 cm  7. History of CVA, 2021 -with residual right-sided deficits  8. History of tobacco use, cessation 2015  9. Bilateral PE March 2023  10. Right VATS April 2023  11. [facility] admission for recurrent LLL PE/PNA, had episode of VT    Allergies   Allergen Reactions   • Other Unknown - Low Severity     Mercury metals- as a child     Medications Prior to Admission   Medication Sig Dispense Refill Last Dose   • acetaminophen (TYLENOL) 325 MG tablet Take 2 tablets by mouth Every 8 (Eight) Hours.   Unknown   • ALBUTEROL IN Inhale.   Unknown   • apixaban (ELIQUIS) 5 MG tablet tablet Take 1 tablet by mouth Every 12 (Twelve) Hours. Indications: DVT/PE (active thrombosis) 60 tablet  Unknown   • aspirin 81 MG chewable tablet Chew 1 tablet Daily.   Unknown   • atorvastatin (LIPITOR) 80 MG tablet Take 1 tablet by mouth Every Night.   Unknown   • carvedilol (COREG) 25 MG tablet Take 2 tablets by mouth 2 (Two) Times a Day With Meals.   Unknown   • Cholecalciferol (Vitamin D-3) 125 MCG (5000 UT) tablet Take  by mouth Daily.   Unknown   • cyclobenzaprine (FLEXERIL) 5 MG tablet Take 1 tablet by mouth 3 (Three) Times a Day As Needed for Muscle Spasms.   Unknown   •  docusate sodium 100 MG capsule Take 1 capsule by mouth 2 (Two) Times a Day.   Unknown   • famotidine (PEPCID) 20 MG tablet Take 1 tablet by mouth Daily.   Unknown   • fluticasone (FLONASE) 50 MCG/ACT nasal spray 2 sprays into the nostril(s) as directed by provider Daily.   Unknown   • gabapentin (NEURONTIN) 100 MG capsule Take 1 capsule by mouth 3 (Three) Times a Day. 12 capsule 0 Unknown   • lisinopril (PRINIVIL,ZESTRIL) 5 MG tablet Take 1 tablet by mouth Daily.   Unknown   • melatonin 5 MG tablet tablet Take 1 tablet by mouth At Night As Needed (sleep).   Unknown   • mirtazapine (REMERON) 7.5 MG half tablet Take 2 half tablet by mouth Every Night.   Unknown   • nystatin (MYCOSTATIN) 100,000 unit/mL suspension 2 (Two) Times a Day As Needed.   Unknown   • ondansetron (ZOFRAN) 4 MG tablet Take 1 tablet by mouth Every 6 (Six) Hours As Needed for Nausea or Vomiting.      • pantoprazole (PROTONIX) 40 MG EC tablet Take 1 tablet by mouth Daily. evening   Unknown   • polyethylene glycol (MIRALAX) 17 g packet Take 17 g by mouth Daily.      • Probiotic Product (PROBIOTIC DAILY PO) Take  by mouth.   Unknown   • senna (SENOKOT) 8.6 MG tablet Take 1 tablet by mouth Daily.   Unknown   • sodium zirconium cyclosilicate (Lokelma) 10 g pack Take 10 g by mouth.   Unknown   • terazosin (HYTRIN) 1 MG capsule Take 1 capsule by mouth.   Unknown   • Trulicity 0.75 MG/0.5ML solution pen-injector    Unknown   • vitamin C (ASCORBIC ACID) 500 MG tablet         Scheduled Meds:aspirin, 81 mg, Oral, Daily  atorvastatin, 80 mg, Oral, Nightly  fluticasone, 2 spray, Nasal, Daily  gabapentin, 100 mg, Oral, TID  ipratropium-albuterol, 3 mL, Nebulization, Q6H While Awake - RT  metoprolol tartrate, 25 mg, Oral, Q12H  pantoprazole, 40 mg, Oral, Daily  piperacillin-tazobactam, 3.375 g, Intravenous, Q8H  senna-docusate sodium, 2 tablet, Oral, BID  sodium chloride, 10 mL, Intravenous, Q12H  vancomycin, 19.7 mg/kg, Intravenous, Q24H      Continuous  Infusions:amiodarone, 0.5 mg/min, Last Rate: 0.5 mg/min (05/21/23 1133)  heparin, 18 Units/kg/hr, Last Rate: 18 Units/kg/hr (05/20/23 1253)  norepinephrine, 0.02-0.3 mcg/kg/min, Last Rate: 0.3 mcg/kg/min (05/21/23 0933)  Pharmacy to Dose Heparin,   Pharmacy to dose vancomycin,   phenylephrine, 0.5-3 mcg/kg/min, Last Rate: 3 mcg/kg/min (05/21/23 0919)  vasopressin, 0.03 Units/min      PRN Meds:.•  acetaminophen  •  senna-docusate sodium **AND** polyethylene glycol **AND** bisacodyl **AND** bisacodyl  •  Calcium Replacement - Follow Nurse / BPA Driven Protocol  •  cyclobenzaprine  •  HYDROcodone-acetaminophen  •  ipratropium-albuterol  •  Magnesium Standard Dose Replacement - Follow Nurse / BPA Driven Protocol  •  melatonin  •  Morphine **AND** naloxone  •  nitroglycerin  •  ondansetron  •  Pharmacy to Dose Heparin  •  Pharmacy to dose vancomycin  •  Phosphorus Replacement - Follow Nurse / BPA Driven Protocol  •  Potassium Replacement - Follow Nurse / BPA Driven Protocol  •  sodium chloride  •  sodium chloride  •  sodium chloride       History of Present Illness:   This is a 72-year-old white male who presented to [facility] 5/20/2023 after being at his rehab facility and staff reported leukocytosis on his lab work.  He has had some left-sided pleuritic chest pain but no shortness of breath, palpitations, nausea, or diaphoresis.  He did not know why he was sent to the ED.  He had a recent [facility] hospitalization March 2023 for bilateral PE.  He was discharged home on Eliquis and reports taking it as prescribed.  Work-up in the ED showed LLL PE again with PNA versus infarct from the PE and small bilateral pleural effusions.  Last EF was 30% on echocardiogram October 2022. He had a right VATS in April 2023.  He was switched from Eliquis to a heparin gtt.  Rapid response was called 5/21/2023 for wide complex tachyarrhythmia and hypotension.  Heart rates were in the 150s and he was given 1 L IV fluid bolus and digoxin 250 mcg times  "once.  Vagal maneuvers were attempted and then he was started on amiodarone and pressors for blood pressure support.  He is febrile today with temperature 101.6. Drowsy/confused currently but appears comfortable.     Cardiac risk factors: advanced age (older than 55 for men, 65 for women), diabetes mellitus, dyslipidemia, hypertension and male gender.    Social History     Socioeconomic History   • Marital status: Single   • Number of children: 0   Tobacco Use   • Smoking status: Former     Packs/day: 1.00     Years: 30.00     Pack years: 30.00     Types: Cigarettes     Quit date: 2013     Years since quitting: 10.3   • Smokeless tobacco: Never   • Tobacco comments:     quit 2015   Vaping Use   • Vaping Use: Never used   Substance and Sexual Activity   • Alcohol use: Never   • Drug use: Never   • Sexual activity: Defer     Family History   Problem Relation Age of Onset   • Alzheimer's disease Mother    • Kidney failure Mother    • Cancer Father    • Heart failure Father        Review of Systems  10 point review of systems was completed, positives outlined in the HPI, and otherwise all other systems are negative.      Objective:       Physical Exam  BP (!) 68/57   Pulse 85   Temp (!) 101.6 °F (38.7 °C) (Axillary)   Resp 20   Ht 177.8 cm (70\")   Wt 58.2 kg (128 lb 6.4 oz)   SpO2 96%   BMI 18.42 kg/m²       05/20/23  0010 05/20/23  1023   Weight: 61.7 kg (136 lb) 58.2 kg (128 lb 6.4 oz)     Body mass index is 18.42 kg/m².    Intake/Output Summary (Last 24 hours) at 5/21/2023 1143  Last data filed at 5/21/2023 1000  Gross per 24 hour   Intake 694.54 ml   Output --   Net 694.54 ml       General Appearance:  Drowsy with mild confusion, no distress, appears older than stated age, right CVL, NG, right radial art line, ill appearing   Head:  Normocephalic, without obvious abnormality, atraumatic   Neck: Supple, symmetrical, trachea midline, no adenopathy, thyroid: not enlarged, symmetric, no tenderness/mass/nodules, " no carotid bruit or JVD   Lungs:   Left basilar inspiratory rales, right side clear to auscultation bilaterally, respirations unlabored, 6 L O2 NC   Heart:  Regular rate and rhythm, S1, S2 normal, no murmur, rub or gallop   Abdomen:   Soft, nontender, no masses, no organomegaly, bowel sounds audible x4   Extremities: No edema, normal range of motion   Pulses: 2+ and symmetric   Skin: Skin color, texture, turgor normal, no rashes or lesions   Neurologic: Normal       Cardiographics  EKG 5/21/2023:   Atrial-sensed ventricular-paced rhythm  Abnormal ECG  When compared with ECG of 21-MAY-2023 02:16, (Unconfirmed)  Electronic ventricular pacemaker has replaced Wide QRS tachycardia  Vent. rate has decreased BY  60 BPM    Imaging  Chest x-ray 5/21/2023:  1.  Small right jugular venous catheter tip in the lower SVC is new from radiograph earlier today.  2.  No pneumothorax or other interval change.    CTA chest 5/19/2023:  1. Left lower lobe pulmonary emboli.  2. New left lower lobe airspace disease which may be secondary to pulmonary infarct. Pneumonia or aspiration could also give this appearance.   3. New small left pleural effusion.  4. Improving right pleural effusion and improving right basilar airspace disease.    CT abdomen/pelvis 5/19/2023:  1. Small bilateral pleural effusions with interval improvement in right pleural effusion. 2. New left lower lobe airspace disease consistent with pneumonia or aspiration. 3. High density material layering within the gallbladder which may represent multiple small stones. No evidence of acute inflammatory change around the gallbladder. 4. Thin linear calcification along the posterior wall of the urinary bladder just to the right of midline which is nonspecific. Cystoscopy may be useful if clinically indicated. 5. Mild infrarenal abdominal aortic aneurysm measuring 3.2 cm in greatest dimension.    CT head 5/19/2023:1. Generalized parenchymal volume loss. No acute intracranial  abnormality    Lab Review   Results from last 7 days   Lab Units 05/21/23  0433 05/20/23  0549 05/19/23  1547   SODIUM mmol/L 131* 134* 132*   POTASSIUM mmol/L 4.6 4.4 4.3   CHLORIDE mmol/L 99 99 94*   CO2 mmol/L 18.0* 20.0* 24.0   BUN mg/dL 23 23 30*   CREATININE mg/dL 0.98 0.78 1.11   GLUCOSE mg/dL 241* 132* 233*   CALCIUM mg/dL 8.3* 9.2 9.3     Results from last 7 days   Lab Units 05/21/23  0233 05/20/23  0549 05/20/23  0111   WBC 10*3/mm3 12.80* 14.31* 14.80*   HEMOGLOBIN g/dL 10.0* 10.7* 10.5*   HEMATOCRIT % 31.7* 35.5* 32.7*   PLATELETS 10*3/mm3 198 181 197             Results from last 7 days   Lab Units 05/21/23  0433 05/21/23  0233 05/20/23  0549   HSTROP T ng/L 62* 62* 79*     Amiodarone at 1 mg/min  Heparin at 18 units/kg/h  Norepinephrine 0.3 mcg/kg/min  Phenylephrine 3 mcg/kg/min   Assessment:        Patient with wide complex tachycardia in the setting of recurrent LLL PE and PNA. Will interrogate [facility] ICD tomorrow. Last EF 30% October 2022.  ECG and rhythm strips concerning for sustained slow VT.      Plan:   1. Continue IV amiodarone per protocol  2. Interrogate [facility] ICD tomorrow  3. Continue pressors for blood pressure support  4. Echocardiogram to reassess EF  5. Continue heparin gtt    Scribed for Tim Crooks MD by Elizabeth Morrison, APRN. 5/21/2023  12:06 EDT

## 2023-05-21 NOTE — SIGNIFICANT NOTE
At approx 0210 patient converted into a tachyarrhythmia. Pt was resting at time of conversion. Blood pressure symptomatic. Rapid response called. MD at bedside ordered digoxin 250 mcg IV and 1L bolus. Labs drawn. I&O cath for urine sample. Chest Xray completed. Vagal maneuvers attempted. Pt sustaining HR of 160 despite interventions. Transferred to ICU for higher level of care. Pt alert and talking upon transfer, HR sustained at 160 and BP remained symptomatically low.

## 2023-05-21 NOTE — PROCEDURES
Insert Central Line At Bedside    Date/Time: 5/21/2023 6:32 AM  Performed by: Zina Doty APRN  Authorized by: Zina Doty APRN   Consent: Written consent obtained.  Consent given by: power of   Patient identity confirmed: arm band and provided demographic data  Anesthesia: see MAR for details    Anesthesia:  Local Anesthetic: lidocaine 1% without epinephrine  Anesthetic total: 5 mL    Sedation:  Patient sedated: no    Preparation: skin prepped with 2% chlorhexidine  Skin prep agent dried: skin prep agent completely dried prior to procedure  Sterile barriers: all five maximum sterile barriers used - cap, mask, sterile gown, sterile gloves, and large sterile sheet  Hand hygiene: hand hygiene performed prior to central venous catheter insertion  Location details: right internal jugular  Patient position: flat  Catheter type: triple lumen  Catheter size: 7 Fr  Pre-procedure: landmarks identified  Ultrasound guidance: yes  Sterile ultrasound techniques: sterile gel and sterile probe covers were used  Number of attempts: 1  Successful placement: yes  Post-procedure: line sutured and dressing applied  Assessment: blood return through all ports,  free fluid flow,  placement verified by x-ray and no pneumothorax on x-ray  Patient tolerance: patient tolerated the procedure well with no immediate complications  Comments: Dark, nonpulsatile blood was aspirated from RIJ using finder needle under ultrasound.  Wire was threaded via finder needle without difficulty and visualized in RIJ under ultrasound.  Dilator was advanced over wire after small incision was made in the skin.  Dilator was removed.  Catheter was advanced over the wire up to 15 cm.  Wire was removed.  Flushed end caps were placed on all three ports.  All three ports aspirated dark, nonpulsatile blood and flushed back well.  Catheter was sutured in place x 2. An antibacterial dressing was applied over the site.      Parisa Doty, MSN, APRN,  [facility]-  Pulmonary and Critical Care Medicine  Electronically signed by Zina Doty, YUE, 05/21/23, 6:33 AM EDT.

## 2023-05-21 NOTE — PROGRESS NOTES
Intensive Care Follow-up     Hospital:  LOS: 2 days   Mr. Timmy Guajardo, 72 y.o. male is followed for:   Pneumonia of left lower lobe due to infectious organism      History of present illness:     Timmy Guajardo is a 72 y.o. male with PMHx significant for PE on Eliquis, HTN, dyslipidemia, HFrEF (30% on 10/2022), T2DM, CVA with right-sided residual weakness, and GERD who presented to [facility] ED on 5/20/23 from [facility] for evaluation of leukocytosis and left-sided pleuritic chest pain.    Patient was recently admitted to [facility] on 3/24/23 with bilateral pulmonary emboli and a partially loculated right pleural effusion. He was treated with a heparin drip for PE and underwent right VATS decortication with mechanical pleurodesis on 4/12/23 per Dr. Mahan for effusion s/p attempted chest tube drainage x 2. He was discharged to [facility] for rehabilitation on 4/20/23. At that time, he was discharged on Eliquis and reports taking it as prescribed.    Upon arrival to the ED, CTA chest was performed, demonstrating left lower lobe pulmonary embolism with new left lower lobe airspace disease 2* pulmonary infarct vs PNA vs aspiration. His Eliquis was held and he was started on a heparin drip and admitted to Hospital Medicine. Blood and sputum cultures and U/A collected with results pending.     On the morning of 5/21/23, a rapid response was called for tachyarrhythmia and hypotension. EKG demonstrated wide-complex tachycardia with rate sustaining in the 150s. He was given 1 L NS and digoxin 250 mcg IV. STAT CXR with increased left basilar airspace disease and trace effusions. WBC 12.80 with PCT 0.31. He is currently being treated with Vancomycin and Zosyn.    He was transferred to the ICU for further management.     Time spent: 5 minutes  Electronically signed by YUE Higginbotham, 05/21/23, 3:59 AM EDT.       Upon arrival to ICU, patient was alert and oriented with intermittent confused speech. Atrial  fibrillation with RVR in the 160s on telemetry. Patient was hypotensive with SBP in the 70s and he was started on phenylephrine and loaded with amiodarone. ProBNP elevated at 1,949. Echocardiogram and Cardiology consult ordered.      Subjective   Interval History:  On arrival to ICU patient remains to have a heart rate into the 160s consistent with A-fib with RVR.  Denies any chest pain.  Previously diagnosed with pneumonia and a pulmonary embolus based on CT scan from 5/19/2023.  Patient blood pressure currently low, patient has a known EF of 30%.             The patient's past medical, surgical and social history were reviewed and updated in Epic as appropriate.       Objective     Infusions:  amiodarone, 1 mg/min, Last Rate: 1 mg/min (05/21/23 3494)   Followed by  amiodarone, 0.5 mg/min  heparin, 18 Units/kg/hr, Last Rate: 18 Units/kg/hr (05/20/23 1253)  Pharmacy to Dose Heparin,   Pharmacy to dose vancomycin,   phenylephrine, 0.5-3 mcg/kg/min, Last Rate: 3 mcg/kg/min (05/21/23 0547)      Medications:  aspirin, 81 mg, Oral, Daily  atorvastatin, 80 mg, Oral, Nightly  fluticasone, 2 spray, Nasal, Daily  gabapentin, 100 mg, Oral, TID  ipratropium-albuterol, 3 mL, Nebulization, Q6H While Awake - RT  metoprolol tartrate, 25 mg, Oral, Q12H  pantoprazole, 40 mg, Oral, Daily  piperacillin-tazobactam, 3.375 g, Intravenous, Q8H  senna-docusate sodium, 2 tablet, Oral, BID  sodium chloride, 10 mL, Intravenous, Q12H  vancomycin, 19.7 mg/kg, Intravenous, Q24H      I reviewed the patient's medications.    Vital Sign Min/Max for last 24 hours  Temp  Min: 97.5 °F (36.4 °C)  Max: 98.5 °F (36.9 °C)   BP  Min: 70/57  Max: 139/80   Pulse  Min: 82  Max: 158   Resp  Min: 16  Max: 20   SpO2  Min: 90 %  Max: 99 %   Flow (L/min)  Min: 2  Max: 2       Input/Output for last 24 hour shift  05/20 0701 - 05/21 0700  In: 223.6 [I.V.:223.6]  Out: 275 [Urine:275]      GENERAL : NAD, conversant  RESPIRATORY/THORAX : normal respiratory effort and  no intercostal retractions, Coarse crackles bilaterally  CARDIOVASCULAR : Normal S1/S2, RRR. 1+ lower ext edema.  GASTROINTESTINAL : Soft, NT/ND. BS x 4 normoactive. No hepatosplenomegaly.  MUSCULOSKELETAL : No cyanosis, clubbing, or ischemia  NEUROLOGICAL: alert and oriented to person, place and time  PSYCHOLOGICAL : Appropriate affect      Results from last 7 days   Lab Units 05/21/23  0233 05/20/23  0549 05/20/23  0111   WBC 10*3/mm3 12.80* 14.31* 14.80*   HEMOGLOBIN g/dL 10.0* 10.7* 10.5*   PLATELETS 10*3/mm3 198 181 197     Results from last 7 days   Lab Units 05/21/23  0433 05/21/23  0233 05/20/23  0549 05/19/23  1547   SODIUM mmol/L 131*  --  134* 132*   POTASSIUM mmol/L 4.6  --  4.4 4.3   CO2 mmol/L 18.0*  --  20.0* 24.0   BUN mg/dL 23  --  23 30*   CREATININE mg/dL 0.98  --  0.78 1.11   MAGNESIUM mg/dL 1.8 1.9 1.9 2.0   GLUCOSE mg/dL 241*  --  132* 233*     Estimated Creatinine Clearance: 57.7 mL/min (by C-G formula based on SCr of 0.98 mg/dL).          I reviewed the patient's new clinical results.  I reviewed the patient's new imaging results/reports including actual images and agree with reports.       Imaging Results (Last 24 Hours)     Procedure Component Value Units Date/Time    XR Chest 1 View [[id number]] Collected: 05/21/23 0119     Updated: 05/21/23 0322    Narrative:      Examination: AP view of the chest    Indication: RRT    Comparison: 5/19/2023    Findings:  A defibrillator is present in the left chest wall. The cardiomediastinal silhouette is within normal size limits. Thoracic aortic calcifications are present.    There is increasing patchy airspace disease in the left base. Trace pleural effusions appear similar. No sizable pleural fluid accumulation or pneumothorax is identified.     No acute osseous abnormality is identified.      Impression:      Impression:  Patchy airspace disease in the left lung base and trace pleural effusions. Left basilar airspace disease has increased over the  interim.    Electronically signed by:  Nash Griffin M.D.    5/21/2023 1:21 AM Mountain Time          Assessment & Plan   Impression        Pneumonia of left lower lobe due to infectious organism    HFrEF (EF 30%)    History of CVA (cerebrovascular accident)    GERD (gastroesophageal reflux disease)    Confusion    Pulmonary embolus    Atrial fibrillation with RVR    Hypotension       Plan         72 y.o. male with PMHx significant for PE on Eliquis, HTN, dyslipidemia, HFrEF (30% on 10/2022), T2DM, CVA with right-sided residual weakness, and GERD.  He was admitted to the ICU on 5/21/2023 as a transfer from the floor.  He was initially admitted with a pulmonary embolism and a left lower lobe pneumonia versus infarct on 5/19/2023.  This morning he suddenly went into A-fib with RVR became hypotensive with prompted transition to the ICU.    -Admit patient to ICU  -Loaded with amiodarone for atrial fibrillation, patient received digoxin on the floor  -Consult cardiology  -Due to hypotension we will hold the patient's lisinopril and Coreg, I will go ahead with the Coreg over to metoprolol though 25 mg twice daily due to the patient's atrial fibrillation  -Systemic anticoagulation with heparin for pulmonary embolism and now atrial fibrillation  -Pressors as needed to maintain a MAP greater than 65  -Continue antibiotics with Zosyn for possible pneumonia follow-up cultures  -P.o. diet  -A.m. labs       I discussed the patient's findings and my recommendations with patient and nursing staff     Critical Care time spent in direct patient care: 35 minutes (excluding procedure time, if applicable) including high complexity decision making to assess, manipulate, and support vital organ system failure in this individual who has impairment of one or more vital organ systems such that there is a high probability of imminent or life threatening deterioration in the patient's condition.      Loyd Garcia, DO  Pulmonary, Critical  care and Sleep Medicine

## 2023-05-21 NOTE — PLAN OF CARE
Goal Outcome Evaluation:  Plan of Care Reviewed With: patient, sibling           Outcome Evaluation: PAtient HR remained in 130s-140s til 0645. Patient converted to 90s . EKG gotten, patient desating around 0630 and increased from 2L oxygen to 4L. ABH gotten and chest x ray to verify central line. PAtient was maxed on lucia and started on levo to keep MAP greater than 65. AFter converting, patient;s BP was high and slowly coming down on pressors.

## 2023-05-22 ENCOUNTER — APPOINTMENT (OUTPATIENT)
Dept: GENERAL RADIOLOGY | Facility: HOSPITAL | Age: 73
DRG: 208 | End: 2023-05-22
Payer: MEDICARE

## 2023-05-22 ENCOUNTER — APPOINTMENT (OUTPATIENT)
Dept: CARDIOLOGY | Facility: HOSPITAL | Age: 73
DRG: 208 | End: 2023-05-22
Payer: MEDICARE

## 2023-05-22 PROBLEM — R57.9 SHOCK: Status: ACTIVE | Noted: 2023-05-22

## 2023-05-22 PROBLEM — J96.01 ACUTE RESPIRATORY FAILURE WITH HYPOXIA: Status: ACTIVE | Noted: 2023-05-22

## 2023-05-22 LAB
ALBUMIN SERPL-MCNC: 2.6 G/DL (ref 3.5–5.2)
ALBUMIN/GLOB SERPL: 0.8 G/DL
ALP SERPL-CCNC: 127 U/L (ref 39–117)
ALT SERPL W P-5'-P-CCNC: 35 U/L (ref 1–41)
ANION GAP SERPL CALCULATED.3IONS-SCNC: 16 MMOL/L (ref 5–15)
APTT PPP: 63.9 SECONDS (ref 60–90)
APTT PPP: 65.2 SECONDS (ref 60–90)
ARTERIAL PATENCY WRIST A: ABNORMAL
ASCENDING AORTA: 3.8 CM
AST SERPL-CCNC: 44 U/L (ref 1–40)
ATMOSPHERIC PRESS: ABNORMAL MM[HG]
BACTERIA SPEC AEROBE CULT: NO GROWTH
BASE EXCESS BLDA CALC-SCNC: -7.9 MMOL/L (ref 0–2)
BASOPHILS # BLD AUTO: 0.03 10*3/MM3 (ref 0–0.2)
BASOPHILS NFR BLD AUTO: 0.1 % (ref 0–1.5)
BDY SITE: ABNORMAL
BH CV ECHO MEAS - AO MAX PG: 4.4 MMHG
BH CV ECHO MEAS - AO MEAN PG: 2 MMHG
BH CV ECHO MEAS - AO ROOT DIAM: 2.7 CM
BH CV ECHO MEAS - AO V2 MAX: 105 CM/SEC
BH CV ECHO MEAS - AO V2 VTI: 22.4 CM
BH CV ECHO MEAS - AVA(I,D): 2.29 CM2
BH CV ECHO MEAS - EDV(CUBED): 103.8 ML
BH CV ECHO MEAS - EDV(MOD-SP2): 91.5 ML
BH CV ECHO MEAS - EDV(MOD-SP4): 95.2 ML
BH CV ECHO MEAS - EF(MOD-BP): 45.7 %
BH CV ECHO MEAS - EF(MOD-SP2): 47.7 %
BH CV ECHO MEAS - EF(MOD-SP4): 43.1 %
BH CV ECHO MEAS - ESV(CUBED): 50.7 ML
BH CV ECHO MEAS - ESV(MOD-SP2): 47.9 ML
BH CV ECHO MEAS - ESV(MOD-SP4): 54.2 ML
BH CV ECHO MEAS - FS: 21.3 %
BH CV ECHO MEAS - IVS/LVPW: 0.92 CM
BH CV ECHO MEAS - IVSD: 1.1 CM
BH CV ECHO MEAS - LA DIMENSION: 3.4 CM
BH CV ECHO MEAS - LAT PEAK E' VEL: 7.4 CM/SEC
BH CV ECHO MEAS - LV MASS(C)D: 199.6 GRAMS
BH CV ECHO MEAS - LV MAX PG: 2.5 MMHG
BH CV ECHO MEAS - LV MEAN PG: 1 MMHG
BH CV ECHO MEAS - LV V1 MAX: 79 CM/SEC
BH CV ECHO MEAS - LV V1 VTI: 16.3 CM
BH CV ECHO MEAS - LVIDD: 4.7 CM
BH CV ECHO MEAS - LVIDS: 3.7 CM
BH CV ECHO MEAS - LVOT AREA: 3.1 CM2
BH CV ECHO MEAS - LVOT DIAM: 2 CM
BH CV ECHO MEAS - LVPWD: 1.2 CM
BH CV ECHO MEAS - MED PEAK E' VEL: 7.3 CM/SEC
BH CV ECHO MEAS - MV A MAX VEL: 88.6 CM/SEC
BH CV ECHO MEAS - MV DEC SLOPE: 423 CM/SEC2
BH CV ECHO MEAS - MV DEC TIME: 0.18 MSEC
BH CV ECHO MEAS - MV E MAX VEL: 81.2 CM/SEC
BH CV ECHO MEAS - MV E/A: 0.92
BH CV ECHO MEAS - MV MAX PG: 3.2 MMHG
BH CV ECHO MEAS - MV MEAN PG: 2 MMHG
BH CV ECHO MEAS - MV P1/2T: 61.6 MSEC
BH CV ECHO MEAS - MV V2 VTI: 28.1 CM
BH CV ECHO MEAS - MVA(P1/2T): 3.6 CM2
BH CV ECHO MEAS - MVA(VTI): 1.82 CM2
BH CV ECHO MEAS - PA ACC TIME: 0.1 SEC
BH CV ECHO MEAS - PA PR(ACCEL): 36.3 MMHG
BH CV ECHO MEAS - RAP SYSTOLE: 3 MMHG
BH CV ECHO MEAS - RVSP: 18 MMHG
BH CV ECHO MEAS - SV(LVOT): 51.2 ML
BH CV ECHO MEAS - SV(MOD-SP2): 43.6 ML
BH CV ECHO MEAS - SV(MOD-SP4): 41 ML
BH CV ECHO MEAS - TAPSE (>1.6): 1.73 CM
BH CV ECHO MEAS - TR MAX PG: 14.6 MMHG
BH CV ECHO MEAS - TR MAX VEL: 186.7 CM/SEC
BH CV ECHO MEASUREMENTS AVERAGE E/E' RATIO: 11.05
BH CV VAS BP RIGHT ARM: NORMAL MMHG
BH CV XLRA - RV BASE: 4.2 CM
BH CV XLRA - RV LENGTH: 8 CM
BH CV XLRA - RV MID: 2.7 CM
BH CV XLRA - TDI S': 7.2 CM/SEC
BILIRUB SERPL-MCNC: 0.7 MG/DL (ref 0–1.2)
BODY TEMPERATURE: 37 C
BUN SERPL-MCNC: 29 MG/DL (ref 8–23)
BUN/CREAT SERPL: 18.5 (ref 7–25)
CALCIUM SPEC-SCNC: 8 MG/DL (ref 8.6–10.5)
CHLORIDE SERPL-SCNC: 104 MMOL/L (ref 98–107)
CO2 BLDA-SCNC: 18.2 MMOL/L (ref 22–33)
CO2 SERPL-SCNC: 16 MMOL/L (ref 22–29)
COHGB MFR BLD: 0.9 % (ref 0–2)
CORTIS SERPL-MCNC: 17.37 MCG/DL
CREAT SERPL-MCNC: 1.57 MG/DL (ref 0.76–1.27)
D-LACTATE SERPL-SCNC: 0.8 MMOL/L (ref 0.5–2)
DEPRECATED RDW RBC AUTO: 54 FL (ref 37–54)
EGFRCR SERPLBLD CKD-EPI 2021: 47.1 ML/MIN/1.73
EOSINOPHIL # BLD AUTO: 0.03 10*3/MM3 (ref 0–0.4)
EOSINOPHIL NFR BLD AUTO: 0.1 % (ref 0.3–6.2)
EPAP: 0
ERYTHROCYTE [DISTWIDTH] IN BLOOD BY AUTOMATED COUNT: 14.9 % (ref 12.3–15.4)
GLOBULIN UR ELPH-MCNC: 3.3 GM/DL
GLUCOSE BLDC GLUCOMTR-MCNC: 123 MG/DL (ref 70–130)
GLUCOSE BLDC GLUCOMTR-MCNC: 160 MG/DL (ref 70–130)
GLUCOSE BLDC GLUCOMTR-MCNC: 241 MG/DL (ref 70–130)
GLUCOSE BLDC GLUCOMTR-MCNC: 257 MG/DL (ref 70–130)
GLUCOSE SERPL-MCNC: 230 MG/DL (ref 65–99)
HCO3 BLDA-SCNC: 17.2 MMOL/L (ref 20–26)
HCT VFR BLD AUTO: 25.7 % (ref 37.5–51)
HCT VFR BLD CALC: 26.1 % (ref 38–51)
HGB BLD-MCNC: 8.1 G/DL (ref 13–17.7)
HGB BLDA-MCNC: 8.5 G/DL (ref 13.5–17.5)
IMM GRANULOCYTES # BLD AUTO: 0.34 10*3/MM3 (ref 0–0.05)
IMM GRANULOCYTES NFR BLD AUTO: 1.7 % (ref 0–0.5)
INHALED O2 CONCENTRATION: 40 %
INR PPP: 1.76 (ref 0.89–1.12)
IPAP: 0
LEFT ATRIUM VOLUME INDEX: 20.7 ML/M2
LYMPHOCYTES # BLD AUTO: 1.6 10*3/MM3 (ref 0.7–3.1)
LYMPHOCYTES NFR BLD AUTO: 7.9 % (ref 19.6–45.3)
MAGNESIUM SERPL-MCNC: 2.8 MG/DL (ref 1.6–2.4)
MAXIMAL PREDICTED HEART RATE: 150 BPM
MCH RBC QN AUTO: 31.2 PG (ref 26.6–33)
MCHC RBC AUTO-ENTMCNC: 31.5 G/DL (ref 31.5–35.7)
MCV RBC AUTO: 98.8 FL (ref 79–97)
METHGB BLD QL: 0.1 % (ref 0–1.5)
MODALITY: ABNORMAL
MONOCYTES # BLD AUTO: 1.05 10*3/MM3 (ref 0.1–0.9)
MONOCYTES NFR BLD AUTO: 5.2 % (ref 5–12)
NEUTROPHILS NFR BLD AUTO: 17.14 10*3/MM3 (ref 1.7–7)
NEUTROPHILS NFR BLD AUTO: 85 % (ref 42.7–76)
NOTE: ABNORMAL
NRBC BLD AUTO-RTO: 0 /100 WBC (ref 0–0.2)
OXYHGB MFR BLDV: 98.3 % (ref 94–99)
PAW @ PEAK INSP FLOW SETTING VENT: 0 CMH2O
PCO2 BLDA: 32.7 MM HG (ref 35–45)
PCO2 TEMP ADJ BLD: 32.7 MM HG (ref 35–48)
PEEP RESPIRATORY: 5 CM[H2O]
PH BLDA: 7.33 PH UNITS (ref 7.35–7.45)
PH, TEMP CORRECTED: 7.33 PH UNITS
PHOSPHATE SERPL-MCNC: 5 MG/DL (ref 2.5–4.5)
PLATELET # BLD AUTO: 200 10*3/MM3 (ref 140–450)
PMV BLD AUTO: 10.2 FL (ref 6–12)
PO2 BLDA: 128 MM HG (ref 83–108)
PO2 TEMP ADJ BLD: 128 MM HG (ref 83–108)
POTASSIUM SERPL-SCNC: 4.3 MMOL/L (ref 3.5–5.2)
PROCALCITONIN SERPL-MCNC: 43.4 NG/ML (ref 0–0.25)
PROT SERPL-MCNC: 5.9 G/DL (ref 6–8.5)
PROTHROMBIN TIME: 20.7 SECONDS (ref 12.2–14.5)
QT INTERVAL: 342 MS
QT INTERVAL: 392 MS
QTC INTERVAL: 484 MS
QTC INTERVAL: 543 MS
RBC # BLD AUTO: 2.6 10*6/MM3 (ref 4.14–5.8)
SODIUM SERPL-SCNC: 136 MMOL/L (ref 136–145)
STRESS TARGET HR: 128 BPM
TOTAL RATE: 20 BREATHS/MINUTE
VT ON VENT VENT: 0.44 ML
WBC NRBC COR # BLD: 20.19 10*3/MM3 (ref 3.4–10.8)

## 2023-05-22 PROCEDURE — 84100 ASSAY OF PHOSPHORUS: CPT | Performed by: INTERNAL MEDICINE

## 2023-05-22 PROCEDURE — 84145 PROCALCITONIN (PCT): CPT | Performed by: INTERNAL MEDICINE

## 2023-05-22 PROCEDURE — 25010000002 PHENYLEPHRINE 10 MG/ML SOLUTION 5 ML VIAL

## 2023-05-22 PROCEDURE — 94799 UNLISTED PULMONARY SVC/PX: CPT

## 2023-05-22 PROCEDURE — 25010000002 VANCOMYCIN PER 500 MG

## 2023-05-22 PROCEDURE — 25010000002 HEPARIN (PORCINE) 25000-0.45 UT/250ML-% SOLUTION

## 2023-05-22 PROCEDURE — 93306 TTE W/DOPPLER COMPLETE: CPT | Performed by: INTERNAL MEDICINE

## 2023-05-22 PROCEDURE — 99291 CRITICAL CARE FIRST HOUR: CPT | Performed by: INTERNAL MEDICINE

## 2023-05-22 PROCEDURE — 25010000002 AMIODARONE IN DEXTROSE 5% 360-4.14 MG/200ML-% SOLUTION

## 2023-05-22 PROCEDURE — 82533 TOTAL CORTISOL: CPT | Performed by: INTERNAL MEDICINE

## 2023-05-22 PROCEDURE — 83735 ASSAY OF MAGNESIUM: CPT | Performed by: INTERNAL MEDICINE

## 2023-05-22 PROCEDURE — 25010000002 FENTANYL 10 MCG/1 ML NS: Performed by: NURSE PRACTITIONER

## 2023-05-22 PROCEDURE — 93306 TTE W/DOPPLER COMPLETE: CPT

## 2023-05-22 PROCEDURE — 87205 SMEAR GRAM STAIN: CPT | Performed by: INTERNAL MEDICINE

## 2023-05-22 PROCEDURE — 63710000001 INSULIN REGULAR HUMAN PER 5 UNITS: Performed by: NURSE PRACTITIONER

## 2023-05-22 PROCEDURE — 71045 X-RAY EXAM CHEST 1 VIEW: CPT

## 2023-05-22 PROCEDURE — 63710000001 INSULIN DETEMIR PER 5 UNITS: Performed by: INTERNAL MEDICINE

## 2023-05-22 PROCEDURE — 82805 BLOOD GASES W/O2 SATURATION: CPT

## 2023-05-22 PROCEDURE — 82375 ASSAY CARBOXYHB QUANT: CPT

## 2023-05-22 PROCEDURE — 94761 N-INVAS EAR/PLS OXIMETRY MLT: CPT

## 2023-05-22 PROCEDURE — 85730 THROMBOPLASTIN TIME PARTIAL: CPT

## 2023-05-22 PROCEDURE — 87040 BLOOD CULTURE FOR BACTERIA: CPT | Performed by: INTERNAL MEDICINE

## 2023-05-22 PROCEDURE — 85610 PROTHROMBIN TIME: CPT | Performed by: INTERNAL MEDICINE

## 2023-05-22 PROCEDURE — 87070 CULTURE OTHR SPECIMN AEROBIC: CPT | Performed by: INTERNAL MEDICINE

## 2023-05-22 PROCEDURE — 94003 VENT MGMT INPAT SUBQ DAY: CPT

## 2023-05-22 PROCEDURE — 99232 SBSQ HOSP IP/OBS MODERATE 35: CPT | Performed by: INTERNAL MEDICINE

## 2023-05-22 PROCEDURE — 82948 REAGENT STRIP/BLOOD GLUCOSE: CPT

## 2023-05-22 PROCEDURE — 80053 COMPREHEN METABOLIC PANEL: CPT | Performed by: INTERNAL MEDICINE

## 2023-05-22 PROCEDURE — 85025 COMPLETE CBC W/AUTO DIFF WBC: CPT | Performed by: INTERNAL MEDICINE

## 2023-05-22 PROCEDURE — 25010000002 DEXAMETHASONE PER 1 MG: Performed by: INTERNAL MEDICINE

## 2023-05-22 PROCEDURE — 25010000002 PIPERACILLIN SOD-TAZOBACTAM PER 1 G: Performed by: INTERNAL MEDICINE

## 2023-05-22 PROCEDURE — 83605 ASSAY OF LACTIC ACID: CPT | Performed by: INTERNAL MEDICINE

## 2023-05-22 PROCEDURE — 83050 HGB METHEMOGLOBIN QUAN: CPT

## 2023-05-22 PROCEDURE — 25010000002 AMIODARONE IN DEXTROSE 5% 360-4.14 MG/200ML-% SOLUTION: Performed by: NURSE PRACTITIONER

## 2023-05-22 RX ORDER — DEXAMETHASONE SODIUM PHOSPHATE 4 MG/ML
4 INJECTION, SOLUTION INTRA-ARTICULAR; INTRALESIONAL; INTRAMUSCULAR; INTRAVENOUS; SOFT TISSUE DAILY
Status: COMPLETED | OUTPATIENT
Start: 2023-05-22 | End: 2023-05-24

## 2023-05-22 RX ADMIN — INSULIN HUMAN 2 UNITS: 100 INJECTION, SOLUTION PARENTERAL at 17:12

## 2023-05-22 RX ADMIN — Medication 0.27 MCG/KG/MIN: at 00:25

## 2023-05-22 RX ADMIN — CHLORHEXIDINE GLUCONATE 15 ML: 1.2 RINSE ORAL at 09:39

## 2023-05-22 RX ADMIN — Medication 0.27 MCG/KG/MIN: at 08:56

## 2023-05-22 RX ADMIN — IPRATROPIUM BROMIDE AND ALBUTEROL SULFATE 3 ML: 2.5; .5 SOLUTION RESPIRATORY (INHALATION) at 13:15

## 2023-05-22 RX ADMIN — DOCUSATE SODIUM 50 MG AND SENNOSIDES 8.6 MG 2 TABLET: 8.6; 5 TABLET, FILM COATED ORAL at 20:07

## 2023-05-22 RX ADMIN — HEPARIN SODIUM 14 UNITS/KG/HR: 10000 INJECTION, SOLUTION INTRAVENOUS at 19:35

## 2023-05-22 RX ADMIN — ATORVASTATIN CALCIUM 80 MG: 40 TABLET, FILM COATED ORAL at 20:07

## 2023-05-22 RX ADMIN — AMIODARONE HYDROCHLORIDE 0.5 MG/MIN: 1.8 INJECTION, SOLUTION INTRAVENOUS at 17:24

## 2023-05-22 RX ADMIN — INSULIN HUMAN 4 UNITS: 100 INJECTION, SOLUTION PARENTERAL at 06:37

## 2023-05-22 RX ADMIN — IPRATROPIUM BROMIDE AND ALBUTEROL SULFATE 3 ML: 2.5; .5 SOLUTION RESPIRATORY (INHALATION) at 18:45

## 2023-05-22 RX ADMIN — AMIODARONE HYDROCHLORIDE 0.5 MG/MIN: 1.8 INJECTION, SOLUTION INTRAVENOUS at 05:04

## 2023-05-22 RX ADMIN — Medication 0.25 MCG/KG/MIN: at 17:15

## 2023-05-22 RX ADMIN — ASPIRIN 81 MG CHEWABLE TABLET 81 MG: 81 TABLET CHEWABLE at 09:39

## 2023-05-22 RX ADMIN — CHLORHEXIDINE GLUCONATE 15 ML: 1.2 RINSE ORAL at 00:08

## 2023-05-22 RX ADMIN — TAZOBACTAM SODIUM AND PIPERACILLIN SODIUM 3.38 G: 375; 3 INJECTION, SOLUTION INTRAVENOUS at 15:08

## 2023-05-22 RX ADMIN — INSULIN HUMAN 2 UNITS: 100 INJECTION, SOLUTION PARENTERAL at 00:08

## 2023-05-22 RX ADMIN — CHLORHEXIDINE GLUCONATE 15 ML: 1.2 RINSE ORAL at 20:07

## 2023-05-22 RX ADMIN — Medication 10 ML: at 09:41

## 2023-05-22 RX ADMIN — VANCOMYCIN HYDROCHLORIDE 750 MG: 750 INJECTION, SOLUTION INTRAVENOUS at 00:08

## 2023-05-22 RX ADMIN — INSULIN DETEMIR 10 UNITS: 100 INJECTION, SOLUTION SUBCUTANEOUS at 12:44

## 2023-05-22 RX ADMIN — VASOPRESSIN 0.03 UNITS/MIN: 0.2 INJECTION INTRAVENOUS at 19:17

## 2023-05-22 RX ADMIN — DEXAMETHASONE SODIUM PHOSPHATE 4 MG: 4 INJECTION INTRA-ARTICULAR; INTRALESIONAL; INTRAMUSCULAR; INTRAVENOUS; SOFT TISSUE at 15:36

## 2023-05-22 RX ADMIN — Medication 10 ML: at 20:07

## 2023-05-22 RX ADMIN — PHENYLEPHRINE HYDROCHLORIDE 2.7 MCG/KG/MIN: 10 INJECTION INTRAVENOUS at 01:39

## 2023-05-22 RX ADMIN — INSULIN HUMAN 6 UNITS: 100 INJECTION, SOLUTION PARENTERAL at 12:44

## 2023-05-22 RX ADMIN — PANTOPRAZOLE SODIUM 40 MG: 40 INJECTION, POWDER, LYOPHILIZED, FOR SOLUTION INTRAVENOUS at 09:39

## 2023-05-22 RX ADMIN — TAZOBACTAM SODIUM AND PIPERACILLIN SODIUM 3.38 G: 375; 3 INJECTION, SOLUTION INTRAVENOUS at 06:37

## 2023-05-22 RX ADMIN — IPRATROPIUM BROMIDE AND ALBUTEROL SULFATE 3 ML: 2.5; .5 SOLUTION RESPIRATORY (INHALATION) at 07:43

## 2023-05-22 RX ADMIN — TAZOBACTAM SODIUM AND PIPERACILLIN SODIUM 3.38 G: 375; 3 INJECTION, SOLUTION INTRAVENOUS at 21:08

## 2023-05-22 RX ADMIN — Medication 100 MCG/HR: at 21:20

## 2023-05-22 NOTE — PROGRESS NOTES
Pharmacy Consult-Vancomycin Dosing  Timmy Guajardo is a  72 y.o. male receiving vancomycin therapy.     Indication: pneumonia  Consulting Provider: hospitalist  ID Consult: No    Goal AUC: 400 - 600 mg/L*hr    Current Antimicrobial Therapy  Anti-Infectives (From admission, onward)      Ordered     Dose/Rate Route Frequency Start Stop    05/20/23 0019  vancomycin 1250 mg/250 mL 0.9% NS IVPB ([facility])        Ordering Provider: Piter Oneil, PharmD   See [facility] for full Linked Orders Report.    19.7 mg/kg × 63.5 kg  over 75 Minutes Intravenous Every 24 Hours 05/20/23 2200 05/26/23 2159    05/20/23 0012  piperacillin-tazobactam (ZOSYN) 3.375 g in iso-osmotic dextrose 50 ml (premix)        Ordering Provider: Lacho Veras III, DO    3.375 g  over 4 Hours Intravenous Every 8 Hours 05/20/23 0600 05/25/23 0559    05/20/23 0012  Pharmacy to dose vancomycin        Ordering Provider: Lacho Veras III, DO     Does not apply Continuous PRN 05/20/23 0011 05/27/23 0010    05/19/23 2036  piperacillin-tazobactam (ZOSYN) 3.375 g in iso-osmotic dextrose 50 ml (premix)        Ordering Provider: Brady Dumont DO    3.375 g  over 30 Minutes Intravenous Once 05/19/23 2038 05/19/23 2323    05/19/23 2036  vancomycin 1250 mg/250 mL 0.9% NS IVPB ([facility])        Ordering Provider: Brady Dumont DO    20 mg/kg × 63.5 kg  over 90 Minutes Intravenous Once 05/19/23 2038              Allergies  Allergies as of 05/19/2023 - Reviewed 05/19/2023   Allergen Reaction Noted    Other Unknown - Low Severity 11/23/2022       Labs  Results from last 7 days   Lab Units 05/19/23  1547 05/13/23  1924   BUN mg/dL 30* 27*   CREATININE mg/dL 1.11 0.93     Results from last 7 days   Lab Units 05/19/23  1547 05/13/23  1924   WBC 10*3/mm3 19.56* 11.86*       Evaluation of Dosing  Last Dose Received in the ED:               Vancomycin 1250 mg IV 5/19 at 23:22  Is Patient on Dialysis or Renal Replacement:               No    Ht - 177.8 cm  "(70\")  Wt - 63.5 kg (140 lb)    Estimated Creatinine Clearance: 55.6 mL/min (by C-G formula based on SCr of 1.11 mg/dL).  Intake & Output (last 3 days)       None            Microbiology and Radiology  Microbiology Results (last 10 days)       ** No results found for the last 240 hours. **            Reported Vancomycin Levels                   InsightRX AUC Calculation:  Current AUC:   547 mg/L*hr    Predicted Steady State AUC on Current Dose: 847 mg/L*hr  _________________________________  Predicted Steady State AUC on New Dose:   521 mg/L*hr    Assessment/Plan:  1. Decrease vancomycin to 750 mg IV q24h    2. Vancomycin trough is scheduled 5/24 at 20:00 prior to the 4th dose of new regimen. Predicted Steady State AUC at current dose: 521 mg/L*hr.      Piter Oneil, NaomiD, BCPS  5/20/2023  00:20 EDT    "

## 2023-05-22 NOTE — CASE MANAGEMENT/SOCIAL WORK
Continued Stay Note  [facility]     Patient Name: Timmy Guajardo  MRN: [id number]  Today's Date: 5/22/2023    Admit Date: 5/19/2023    Plan: TBD   Discharge Plan     Row Name 05/22/23 5341       Plan    Plan TBD    Patient/Family in Agreement with Plan unable to assess    Plan Comments Per MDR, Mr. Guajardo is requiring mechanical ventilation with fio2@ .28.  He is sedated and requires pressor support.  He is being followed by the Critical care/Pulmonology team and Cardiology for Pneumonia, Heart failure and Atrial Fibrillation.  CM unable to reach his brother and sister by phone today.  I will f/u with family again tomorrow to complete inital discharge planning assessment.    Row Name 05/22/23 9225       Plan    Plan TBD    Patient/Family in Agreement with Plan unable to assess               Discharge Codes    No documentation.                     Violeta Mccabe RN

## 2023-05-22 NOTE — PROGRESS NOTES
Intensive Care Follow-up     Hospital:  LOS: 3 days   Mr. Timmy Guajardo, 72 y.o. male is followed for:   Pneumonia of left lower lobe due to infectious organism        Subjective     72 y.o. male with PMHx significant for PE on Eliquis, HTN, dyslipidemia, HFrEF (30% on 10/2022), T2DM, CVA with right-sided residual weakness, and GERD.  He was admitted to the ICU on 5/21/2023 as a transfer from the floor.  He was initially admitted with a pulmonary embolism and a left lower lobe pneumonia versus infarct on 5/19/2023.  This morning he suddenly went into A-fib with RVR became hypotensive with prompted transition to the ICU.     Interval History:  Overnight the patient required endotracheal intubation.  This morning he is comfortable on the ventilator and arouses.  He follows all commands.  Chest x-ray has been reviewed and shows clear lungs.  I do note the patient has coagulase negative staph in 2 separate blood cultures.  MRSA probe was negative.  He is currently on vancomycin and Zosyn.  He remains afebrile.  He does remain in shock requiring multiple pressors including vasopressin and norepinephrine.  Currently appears to be in sinus rhythm with the help of amiodarone drip.    The patient's past medical, surgical and social history were reviewed and updated in Epic as appropriate.        Objective     Infusions:  fentanyl 10 mcg/mL,  mcg/hr, Last Rate: 100 mcg/hr (05/22/23 0530)  heparin, 14 Units/kg/hr, Last Rate: 14 Units/kg/hr (05/21/23 2155)  norepinephrine, 0.02-0.3 mcg/kg/min, Last Rate: 0.27 mcg/kg/min (05/22/23 5656)  Pharmacy to Dose Heparin,   phenylephrine, 0.5-3 mcg/kg/min, Last Rate: Stopped (05/22/23 0535)  vasopressin, 0.03 Units/min, Last Rate: Stopped (05/22/23 1032)      Medications:  aspirin, 81 mg, Nasogastric, Daily  atorvastatin, 80 mg, Nasogastric, Nightly  chlorhexidine, 15 mL, Mouth/Throat, Q12H  fluticasone, 2 spray, Nasal, Daily  insulin regular, 0-9 Units, Subcutaneous,  "Q6H  ipratropium-albuterol, 3 mL, Nebulization, Q6H While Awake - RT  metoprolol tartrate, 25 mg, Nasogastric, Q12H  pantoprazole, 40 mg, Intravenous, Q24H  piperacillin-tazobactam, 3.375 g, Intravenous, Q8H  senna-docusate sodium, 2 tablet, Nasogastric, BID  sodium chloride, 10 mL, Intravenous, Q12H        Vital Sign Min/Max for last 24 hours  Temp  Min: 94.6 °F (34.8 °C)  Max: 99 °F (37.2 °C)   BP  Min: 68/57  Max: 148/50   Pulse  Min: 69  Max: 85   Resp  Min: 16  Max: 20   SpO2  Min: 93 %  Max: 100 %   Flow (L/min)  Min: 6  Max: 6       Input/Output for last 24 hour shift  05/21 0701 - 05/22 0700  In: 3957.9 [I.V.:3494.9]  Out: 490 [Urine:490]   FiO2 (%):  [28 %-100 %] 28 %  S RR:  [16-20] 16  PEEP/CPAP (cm H2O):  [5 cm H20-6 cm H20] 5 cm H20  VA SUP:  [0 cm H20] 0 cm H20  MAP (cm H2O):  [3.6-10] 6.7  Objective:  General Appearance:  Comfortable, ill-appearing and in no acute distress (Intubated and arousable).    Vital signs: (most recent): Blood pressure (!) 82/44, pulse 70, temperature 98.1 °F (36.7 °C), temperature source Bladder, resp. rate 20, height 177.8 cm (70\"), weight 62.5 kg (137 lb 12.6 oz), SpO2 97 %.    HEENT: (Endotracheal tube in place.)    Lungs:  Normal effort and normal respiratory rate.  Breath sounds clear to auscultation.  He is not in respiratory distress.  No stridor.  No rales, decreased breath sounds, wheezes or rhonchi.    Heart: Normal rate.  Regular rhythm.  S1 normal and S2 normal.    Chest: Symmetric chest wall expansion.   Abdomen: Abdomen is soft and non-distended.  There is no abdominal tenderness.     Extremities: Normal range of motion.  There is no dependent edema.    Neurological: (Arousable, follows commands, no focal deficits.).    Pupils:  Pupils are equal, round, and reactive to light.  Pupils are equal.   Skin:  Warm.              Results from last 7 days   Lab Units 05/22/23  0618 05/21/23  0233 05/20/23  0549   WBC 10*3/mm3 20.19* 12.80* 14.31*   HEMOGLOBIN g/dL 8.1* " 10.0* 10.7*   PLATELETS 10*3/mm3 200 198 181     Results from last 7 days   Lab Units 05/22/23  0618 05/21/23  1934 05/21/23  0433 05/21/23  0233   SODIUM mmol/L 136 133* 131*  --    POTASSIUM mmol/L 4.3 4.5 4.6  --    CO2 mmol/L 16.0* 18.0* 18.0*  --    BUN mg/dL 29* 31* 23  --    CREATININE mg/dL 1.57* 1.61* 0.98  --    MAGNESIUM mg/dL 2.8*  --  1.8 1.9   PHOSPHORUS mg/dL 5.0*  --   --   --    GLUCOSE mg/dL 230* 257* 241*  --      Estimated Creatinine Clearance: 38.7 mL/min (A) (by C-G formula based on SCr of 1.57 mg/dL (H)).    Results from last 7 days   Lab Units 05/22/23  0333   PH, ARTERIAL pH units 7.330*   PCO2, ARTERIAL mm Hg 32.7*   PO2 ART mm Hg 128.0*         I reviewed the patient's results and images.     Assessment & Plan   Impression        Pneumonia of left lower lobe due to infectious organism    HFrEF (EF 30%)    History of CVA (cerebrovascular accident)    GERD (gastroesophageal reflux disease)    Confusion    Pulmonary embolus    Atrial fibrillation with RVR    Hypotension    Acute respiratory failure with hypoxia    Shock, likely multifactorial       Plan        It is unclear if we are dealing with contaminant only from his 2 blood cultures with coagulase negative staph or if this could represent actual bacteremia.  I would like to go ahead and recheck blood cultures x2 now.  MRSA probe has been found to be negative so we will stop vancomycin for now.  Continue on with Zosyn.  We will watch respiratory culture and adjust as necessary.  Radiographically he does not have signs of pneumonia at this time.  Continue to wean pressors as able.  I would like to let him rest on the ventilator today and move towards extubation tomorrow.  It is unclear to me what precipitated his respiratory failure however he was quite unstable last evening requiring multiple pressors and he may simply have gotten overwhelmed.  Follow-up echocardiogram which has been ordered for today.  This patient remains critically  ill with shock of unknown etiology though likely multifactorial from a cardiogenic source and possibly infection now requiring multiple pressors.  I believe that his risk of imminent death is high.  He will remain a full code with full support.    Plan of care and goals reviewed with mulitdisciplinary/antibiotic stewardship team during rounds.   I discussed the patient's findings and my recommendations with patient, nursing staff and consulting provider     High level of risk due to:  drug(s) requiring intensive monitoring for toxicity and parenteral controlled substances.    Time spent Critical care 33 min (exclusive of procedure time)  including high complexity decision making to assess, manipulate, and support vital organ system failure in this individual who has impairment of one or more vital organ systems such that there is a high probability of imminent or life threatening deterioration in the patient’s condition.      Jarrett Buenrostro MD, [facility]  Pulmonology and Critical Care Medicine

## 2023-05-22 NOTE — PROGRESS NOTES
HEPARIN INFUSION  Timmy Guajardo is a  72 y.o. male receiving heparin infusion.     Therapy for (VTE/Cardiac): VTE  Patient Weight: 58.2 kg  Initial Bolus (Y/N): no  Any Bolus (Y/N): yes    Signs or Symptoms of Bleeding: none noted  VTE (PE/DVT)   Initial rate: 18 units/kg/hr (Max 1,500 units/hr)    aPTT  Rebolus Infusion Hold time Change infusion Dose (Units/kg/hr) Next aPTT Level Due   <45 50 Units/kg  (4000 Units Max) None Increase by  4 Units/kg/hr 6 hours   45 - 52 25 Units/kg  (2000 Units Max) None Increase by  3 Units/kg/hr 6 hours   53 - 59 0 None Increase by  2 Units/kg/hr 6 hours   60 - 90 0 None No Change 6 hours (after 2 consecutive levels in range check qAM)   91 - 98 0 None Decrease by  1 Units/kg/hr 6 hours   99 - 105 0 None Decrease by  2 Units/kg/hr 6 hours   106 - 113 0 60 Minutes Decrease by  3 Units/kg/hr 6 hours   >113 0 Hold  After aPTT less than 90 decrease previous rate by  4 Units/kg/hr Every 2 hours until aPTT less than 90 then when infusion restarts in 6 hours     Results from last 7 days   Lab Units 05/22/23  0618 05/21/23  0233 05/20/23  0549 05/20/23  0111   INR  1.76*  --   --  2.23*   HEMOGLOBIN g/dL 8.1* 10.0* 10.7* 10.5*   HEMATOCRIT % 25.7* 31.7* 35.5* 32.7*   PLATELETS 10*3/mm3 200 198 181 197        Date   Time   aPTT Current Rate (Unit/kg/hr) Bolus   (Units) Rate Change   (Unit/kg/hr) New Rate (Unit/kg/hr) Next   aPTT Comments  Pump Check Daily   5/20 1104 53.1 -- -- +18 18 1900 LAURIE Mosley RN  Pump verified, BDF   5/20 1901 68.5 18 -- -- 18 0100 Spoke with RN   5/21 0041 76.9 18 -- -- 18 0600 Discussed w/ nurse   5/21 0711 89.5 18 -- -- 18 1800 Dw WADE   5/21 1830 138.5 18 -- -18 HOLD 2200 Dw WADE   5/21 2203 80.3 HOLD -- +14 14 0600 Sonal 5523 -acb    5/22 0618 63.9 14 -- -- 14 1200 DW RN   5/22 1155 65.2 14 -- -- 14 0600 DW RN, Pump Verified                                                                                                                                                   Guerrero Forrest, Pharmacy Intern  05/22/2023

## 2023-05-22 NOTE — PROGRESS NOTES
"[facility] at [facility]  IP Progress Note    PROBLEM LIST:  1. Systolic heart failure/cardiomyopathy  1. C 2015, reportedly normal, data deficit  2. EF 15% in 2015.  [employer] ICD placed.  Data deficit  3. Echo 5/5/2021: EF 25%, saline test results negative  2. Possible atrial fibrillation, anticoagulated with Eliquis  3. Hypertension  4. Type 2 diabetes  5. Peripheral arterial disease  1. Aortogram with runoff 5/11/2021, Dr. Mahan: Left SFA balloon angioplasty  2. Left great toe amputation 5/12/2021, Dr. Horne  6.  Infrarenal abdominal aortic aneurysm measuring 3.2 cm  7. History of CVA, 2021 -with residual right-sided deficits  8. History of tobacco use, cessation 2015  9. Bilateral PE March 2023  10. Right VATS April 2023  11. [facility] admission for recurrent LLL PE/PNA, had episode of VT    CHIEF COMPLAINTS:  Wide-complex tachycardia    Subjective   Patient remains intubated and history obtained from nurse.  Patient currently on fentanyl drip for sedation although is awake in the room.  Remains on vasopressin and Juaquin-Synephrine for pressor support.  Patient also on amiodarone.    Interval history: Spoke with [employer] rep and patient had no episodes of VT read by the BiV ICD.  Although he did state if it is lower than 190 it will not record SVT.      Objective     Blood pressure 117/53, pulse 70, temperature 98.1 °F (36.7 °C), temperature source Bladder, resp. rate 20, height 177.8 cm (70\"), weight 62.5 kg (137 lb 12.6 oz), SpO2 97 %.     Intake/Output Summary (Last 24 hours) at 5/22/2023 0841  Last data filed at 5/22/2023 0600  Gross per 24 hour   Intake 3957.88 ml   Output 490 ml   Net 3467.88 ml       Constitutional:       Appearance: Healthy appearance. Not in distress.   Eyes:      Conjunctiva/sclera: Conjunctivae normal.   HENT:      Head:      Comments: ET tube in place.   Mouth/Throat:      Pharynx: Oropharynx is clear.   Neck:      Vascular: JVD normal.   Pulmonary: "      Effort: Pulmonary effort is normal.      Breath sounds: Examination of the right-lower field reveals decreased breath sounds. Examination of the left-lower field reveals decreased breath sounds. Decreased breath sounds present. No wheezing. No rhonchi. No rales.   Cardiovascular:      Normal rate. Regular rhythm.      Murmurs: There is no murmur.      No rub.   Pulses:     Intact distal pulses.   Abdominal:      General: There is no distension.   Musculoskeletal:         General: No deformity. Skin:     General: Skin is warm and dry.   Genitourinary:     Comments: Rosenberg catheter in place with straw-colored urine.  Neurological:      General: No focal deficit present.      Mental Status: Alert and oriented to person, place and time.      Comments: Response to me speaking to him.            RESULTS REVIEW:    I reviewed the patient's new clinical results.      MEDICATIONS:    aspirin, 81 mg, Nasogastric, Daily  atorvastatin, 80 mg, Nasogastric, Nightly  chlorhexidine, 15 mL, Mouth/Throat, Q12H  fluticasone, 2 spray, Nasal, Daily  insulin regular, 0-9 Units, Subcutaneous, Q6H  ipratropium-albuterol, 3 mL, Nebulization, Q6H While Awake - RT  metoprolol tartrate, 25 mg, Nasogastric, Q12H  pantoprazole, 40 mg, Intravenous, Q24H  piperacillin-tazobactam, 3.375 g, Intravenous, Q8H  senna-docusate sodium, 2 tablet, Nasogastric, BID  sodium chloride, 10 mL, Intravenous, Q12H  vancomycin, 750 mg, Intravenous, Q24H          Results from last 7 days   Lab Units 05/22/23 0618   WBC 10*3/mm3 20.19*   HEMOGLOBIN g/dL 8.1*   HEMATOCRIT % 25.7*   PLATELETS 10*3/mm3 200     Results from last 7 days   Lab Units 05/22/23 0618   SODIUM mmol/L 136   POTASSIUM mmol/L 4.3   CHLORIDE mmol/L 104   CO2 mmol/L 16.0*   BUN mg/dL 29*   CREATININE mg/dL 1.57*   CALCIUM mg/dL 8.0*   BILIRUBIN mg/dL 0.7   ALK PHOS U/L 127*   ALT (SGPT) U/L 35   AST (SGOT) U/L 44*   GLUCOSE mg/dL 230*     Results from last 7 days   Lab Units 05/22/23 0618  05/20/23  0111   INR  1.76* 2.23*     Lab Results   Component Value Date    TROPONINT 62 (C) 05/21/2023                 Iron   Date Value Ref Range Status   10/20/2022 39 (L) 59 - 158 mcg/dL Final     Iron Saturation   Date Value Ref Range Status   10/20/2022 18 (L) 20 - 50 % Final     TIBC   Date Value Ref Range Status   10/20/2022 218 (L) 298 - 536 mcg/dL Final      Hemoglobin A1C   Date Value Ref Range Status   03/25/2023 6.30 (H) 4.80 - 5.60 % Final     Magnesium   Date Value Ref Range Status   05/22/2023 2.8 (H) 1.6 - 2.4 mg/dL Final        Results for orders placed during the hospital encounter of 10/16/22    Adult Transthoracic Echo Complete W/ Cont if Necessary Per Protocol    Interpretation Summary  •  Estimated left ventricular EF = 30%.  There is global hypokinesis.  The basal-mid posterior wall appears akinetic.  •  Normal right ventricular cavity size, wall thickness, systolic function and septal motion noted. Electronic lead present in the ventricle  •  Septal wall motion is abnormal, consistent with right ventricular pacing  •  No hemodynamically significant valvular stenosis or regurgitation noted.      Telemetry: V paced with infrequent PACs  Result Review:  I have personally reviewed the results from the time of admission and agree with these findings:  [x]  Laboratory  [x]  Radiology  [x]  EKG/Telemetry   []  Pathology  []  Old records  []  Other:           Assessment:   1. Wide-complex tachycardia, no VT shown on device interrogation however would not rule out slow VT.  2. Nonischemic cardiomyopathy s/p BiV ICD 2015.  3. History of possible PAF.  4. History of bilateral pulmonary emboli.       Plan:   1. Continue IV amiodarone, will continue it at 0.5 mg/min as patient is still intubated and then can transition to oral amiodarone.  2. Continue to wean pressors as tolerated, and monitor patient's rate and rhythm while weaning these pressors.  3. Check echocardiogram to review EF.  4. Continue  heparin drip for PE and possible paroxysmal atrial fibrillation.    Electronically signed by Radha Chavarria PA-C, 05/22/23, 1:33 PM EDT.        STAFF CARDIOLOGIST:      SUMMARY:    1. 72 years old gentleman with history of nonischemic cardiomyopathy is admitted again with possible wide-complex tachycardia and heart failure.      PERTINENT:    1. Intubated  2. Creatinine 1.5  3. EF around 40-45  4. proBNP 1947      IMPRESSION:    1. Nonischemic cardiomyopathy  2. Acute on chronic renal failure  3. Respiratory failure      RECOMMENDATIONS:    1. We will keep on the current medications.  2. He is on antibiotic for possible pneumonia  3. We will gently keep on diuresing  4. We will start heart failure medication as soon as he becomes stable  5. No evidence of V. tach at this time.        I have seen and examined the patient, reviewed the above note, necessary changes were made and I agree with the final note.   Jarrett Mueller MD

## 2023-05-22 NOTE — PLAN OF CARE
Problem: Adult Inpatient Plan of Care  Goal: Plan of Care Review  Outcome: Ongoing, Progressing  Goal: Patient-Specific Goal (Individualized)  Outcome: Ongoing, Progressing  Goal: Absence of Hospital-Acquired Illness or Injury  Outcome: Ongoing, Progressing  Intervention: Identify and Manage Fall Risk  Intervention: Prevent Skin Injury  Intervention: Prevent and Manage VTE (Venous Thromboembolism) Risk  Intervention: Prevent Infection  Goal: Optimal Comfort and Wellbeing  Outcome: Ongoing, Progressing  Intervention: Monitor Pain and Promote Comfort  Intervention: Provide Person-Centered Care  Goal: Readiness for Transition of Care  Outcome: Ongoing, Progressing     Problem: Fall Injury Risk  Goal: Absence of Fall and Fall-Related Injury  Outcome: Ongoing, Progressing  Intervention: Identify and Manage Contributors  Intervention: Promote Injury-Free Environment     Problem: Heart Failure Comorbidity  Goal: Maintenance of Heart Failure Symptom Control  Outcome: Ongoing, Progressing  Intervention: Maintain Heart Failure-Management     Problem: Pain Chronic (Persistent) (Comorbidity Management)  Goal: Acceptable Pain Control and Functional Ability  Outcome: Ongoing, Progressing  Intervention: Manage Persistent Pain  Intervention: Develop Pain Management Plan  Intervention: Optimize Psychosocial Wellbeing     Problem: Skin Injury Risk Increased  Goal: Skin Health and Integrity  Outcome: Ongoing, Progressing  Intervention: Optimize Skin Protection     Problem: Adjustment to Illness (Sepsis/Septic Shock)  Goal: Optimal Coping  Outcome: Ongoing, Progressing  Intervention: Optimize Psychosocial Adjustment to Illness     Problem: Bleeding (Sepsis/Septic Shock)  Goal: Absence of Bleeding  Outcome: Ongoing, Progressing  Intervention: Monitor and Manage Bleeding     Problem: Glycemic Control Impaired (Sepsis/Septic Shock)  Goal: Blood Glucose Level Within Desired Range  Outcome: Ongoing, Progressing  Intervention: Optimize  Glycemic Control     Problem: Infection Progression (Sepsis/Septic Shock)  Goal: Absence of Infection Signs and Symptoms  Outcome: Ongoing, Progressing  Intervention: Initiate Sepsis Management  Intervention: Promote Recovery     Problem: Nutrition Impaired (Sepsis/Septic Shock)  Goal: Optimal Nutrition Intake  Outcome: Ongoing, Progressing     Problem: Restraint, Nonviolent  Goal: Absence of Harm or Injury  Outcome: Ongoing, Progressing  Intervention: Implement Least Restrictive Safety Strategies  Intervention: Protect Dignity, Rights, and Personal Wellbeing  Intervention: Protect Skin and Joint Integrity     Problem: Communication Impairment (Mechanical Ventilation, Invasive)  Goal: Effective Communication  Outcome: Ongoing, Progressing  Intervention: Ensure Effective Communication     Problem: Device-Related Complication Risk (Mechanical Ventilation, Invasive)  Goal: Optimal Device Function  Outcome: Ongoing, Progressing  Intervention: Optimize Device Care and Function     Problem: Inability to Wean (Mechanical Ventilation, Invasive)  Goal: Mechanical Ventilation Liberation  Outcome: Ongoing, Progressing  Intervention: Promote Extubation and Mechanical Ventilation Liberation     Problem: Nutrition Impairment (Mechanical Ventilation, Invasive)  Goal: Optimal Nutrition Delivery  Outcome: Ongoing, Progressing     Problem: Skin and Tissue Injury (Mechanical Ventilation, Invasive)  Goal: Absence of Device-Related Skin and Tissue Injury  Outcome: Ongoing, Progressing  Intervention: Maintain Skin and Tissue Health     Problem: Ventilator-Induced Lung Injury (Mechanical Ventilation, Invasive)  Goal: Absence of Ventilator-Induced Lung Injury  Outcome: Ongoing, Progressing  Intervention: Prevent Ventilator-Associated Pneumonia   Goal Outcome Evaluation:           Progress: no change  Outcome Evaluation: Pt alert and nodding head yes and no to questions- resting in between care. Vasopressin and levo infusing with  vasopressin turned off in late AM- restarted to keep MAP >65. Levo decreased slowly thru out day. Rosenberg cath in place with 575 ml of yellow, cloudy UOP. Heparin gtt, amiodarone and fentanyl infusing with aPPT recheck in AM. Family at bedside in afternoon with questions answered and plan of care discussed. VSS. WCTM

## 2023-05-22 NOTE — PLAN OF CARE
Goal Outcome Evaluation:  Plan of Care Reviewed With: patient, sibling           Outcome Evaluation: BMP showed creatinine was higher and patient had not urinated all day, guadalupe was givenPAtient very drowsy and hard to keep awake at 2000 assessment, APRN called and ordered an ABG, decision to intubate was made. Temp sensing guadalupe showed 34.6 and warm blankets applied. After intubation cocoon was applied. Flotrak started and monitored. Sputum culture sent down. Juaquin to be titrated down first. This morning, juaquin turned off and levo being adjusted. Fentany started and restraints applied. Sister and brother updated after intubation. UOP monitored.

## 2023-05-22 NOTE — PROGRESS NOTES
Called by RN for worsening lethargy.  Currently, he is on heparin drip, amiodarone drip and maxed on Levophed, Vasopressin and Phenylephrine drips.  ABG revealed pH 7.21, pCO2 47 and pO2 94 on 6L NC.  Discussed with Dr. Terrazas.  Discussed with patient's brother who was at bedside.  He states that he along with his sister and two brothers are his NOK.  Patient is not  and does not have children.  He confirmed that the patient was a FULL CODE.  Patient was electively intubated by RT on first attempt after patient received Fentanyl and Ketamine.  + positive color change on CO2 detector and bilateral breath sounds on auscultation.  CXR ordered.   Post intubation ABG ordered.  Instructed RN to hook up arterial line to Flotrac.      ECHO has already been ordered for tomorrow.    Electronically signed by YUE Gautam, 05/22/23, 12:32 AM EDT.

## 2023-05-23 ENCOUNTER — APPOINTMENT (OUTPATIENT)
Dept: GENERAL RADIOLOGY | Facility: HOSPITAL | Age: 73
DRG: 208 | End: 2023-05-23
Payer: MEDICARE

## 2023-05-23 LAB
ANION GAP SERPL CALCULATED.3IONS-SCNC: 15 MMOL/L (ref 5–15)
APTT PPP: 49.1 SECONDS (ref 60–90)
APTT PPP: 92 SECONDS (ref 60–90)
APTT PPP: 94.9 SECONDS (ref 60–90)
ARTERIAL PATENCY WRIST A: ABNORMAL
ATMOSPHERIC PRESS: ABNORMAL MM[HG]
BACTERIA SPEC AEROBE CULT: ABNORMAL
BACTERIA SPEC AEROBE CULT: ABNORMAL
BASE EXCESS BLDA CALC-SCNC: -6 MMOL/L (ref 0–2)
BASOPHILS # BLD AUTO: 0.02 10*3/MM3 (ref 0–0.2)
BASOPHILS NFR BLD AUTO: 0.1 % (ref 0–1.5)
BDY SITE: ABNORMAL
BODY TEMPERATURE: 37 C
BUN SERPL-MCNC: 30 MG/DL (ref 8–23)
BUN/CREAT SERPL: 20.3 (ref 7–25)
CALCIUM SPEC-SCNC: 8.3 MG/DL (ref 8.6–10.5)
CHLORIDE SERPL-SCNC: 104 MMOL/L (ref 98–107)
CO2 BLDA-SCNC: 20.4 MMOL/L (ref 22–33)
CO2 SERPL-SCNC: 17 MMOL/L (ref 22–29)
COHGB MFR BLD: 1.2 % (ref 0–2)
CREAT SERPL-MCNC: 1.48 MG/DL (ref 0.76–1.27)
DEPRECATED RDW RBC AUTO: 56.1 FL (ref 37–54)
EGFRCR SERPLBLD CKD-EPI 2021: 50.6 ML/MIN/1.73
EOSINOPHIL # BLD AUTO: 0 10*3/MM3 (ref 0–0.4)
EOSINOPHIL NFR BLD AUTO: 0 % (ref 0.3–6.2)
ERYTHROCYTE [DISTWIDTH] IN BLOOD BY AUTOMATED COUNT: 15 % (ref 12.3–15.4)
GLUCOSE BLDC GLUCOMTR-MCNC: 109 MG/DL (ref 70–130)
GLUCOSE BLDC GLUCOMTR-MCNC: 112 MG/DL (ref 70–130)
GLUCOSE BLDC GLUCOMTR-MCNC: 132 MG/DL (ref 70–130)
GLUCOSE BLDC GLUCOMTR-MCNC: 95 MG/DL (ref 70–130)
GLUCOSE SERPL-MCNC: 131 MG/DL (ref 65–99)
GRAM STN SPEC: ABNORMAL
HCO3 BLDA-SCNC: 19.3 MMOL/L (ref 20–26)
HCT VFR BLD AUTO: 27.9 % (ref 37.5–51)
HCT VFR BLD CALC: 25.7 % (ref 38–51)
HGB BLD-MCNC: 8.4 G/DL (ref 13–17.7)
HGB BLDA-MCNC: 8.4 G/DL (ref 13.5–17.5)
IMM GRANULOCYTES # BLD AUTO: 0.35 10*3/MM3 (ref 0–0.05)
IMM GRANULOCYTES NFR BLD AUTO: 1.7 % (ref 0–0.5)
INHALED O2 CONCENTRATION: 28 %
ISOLATED FROM: ABNORMAL
ISOLATED FROM: ABNORMAL
LYMPHOCYTES # BLD AUTO: 0.74 10*3/MM3 (ref 0.7–3.1)
LYMPHOCYTES NFR BLD AUTO: 3.7 % (ref 19.6–45.3)
MAGNESIUM SERPL-MCNC: 2.6 MG/DL (ref 1.6–2.4)
MCH RBC QN AUTO: 31.2 PG (ref 26.6–33)
MCHC RBC AUTO-ENTMCNC: 30.1 G/DL (ref 31.5–35.7)
MCV RBC AUTO: 103.7 FL (ref 79–97)
METHGB BLD QL: 0.1 % (ref 0–1.5)
MODALITY: ABNORMAL
MONOCYTES # BLD AUTO: 0.65 10*3/MM3 (ref 0.1–0.9)
MONOCYTES NFR BLD AUTO: 3.2 % (ref 5–12)
NEUTROPHILS NFR BLD AUTO: 18.41 10*3/MM3 (ref 1.7–7)
NEUTROPHILS NFR BLD AUTO: 91.3 % (ref 42.7–76)
NOTE: ABNORMAL
NRBC BLD AUTO-RTO: 0 /100 WBC (ref 0–0.2)
OXYHGB MFR BLDV: 96.6 % (ref 94–99)
PCO2 BLDA: 36.5 MM HG (ref 35–45)
PCO2 TEMP ADJ BLD: 36.5 MM HG (ref 35–48)
PEEP RESPIRATORY: 5 CM[H2O]
PH BLDA: 7.33 PH UNITS (ref 7.35–7.45)
PH, TEMP CORRECTED: 7.33 PH UNITS
PLATELET # BLD AUTO: 188 10*3/MM3 (ref 140–450)
PMV BLD AUTO: 10.4 FL (ref 6–12)
PO2 BLDA: 91.2 MM HG (ref 83–108)
PO2 TEMP ADJ BLD: 91.2 MM HG (ref 83–108)
POTASSIUM SERPL-SCNC: 4.9 MMOL/L (ref 3.5–5.2)
RBC # BLD AUTO: 2.69 10*6/MM3 (ref 4.14–5.8)
SODIUM SERPL-SCNC: 136 MMOL/L (ref 136–145)
UFH PPP CHRO-ACNC: >1.1 IU/ML (ref 0.3–0.7)
VENTILATOR MODE: ABNORMAL
WBC NRBC COR # BLD: 20.17 10*3/MM3 (ref 3.4–10.8)

## 2023-05-23 PROCEDURE — 82375 ASSAY CARBOXYHB QUANT: CPT

## 2023-05-23 PROCEDURE — 63710000001 INSULIN DETEMIR PER 5 UNITS: Performed by: INTERNAL MEDICINE

## 2023-05-23 PROCEDURE — 94799 UNLISTED PULMONARY SVC/PX: CPT

## 2023-05-23 PROCEDURE — 94003 VENT MGMT INPAT SUBQ DAY: CPT

## 2023-05-23 PROCEDURE — 99232 SBSQ HOSP IP/OBS MODERATE 35: CPT | Performed by: INTERNAL MEDICINE

## 2023-05-23 PROCEDURE — 85520 HEPARIN ASSAY: CPT

## 2023-05-23 PROCEDURE — 83050 HGB METHEMOGLOBIN QUAN: CPT

## 2023-05-23 PROCEDURE — 25010000002 PIPERACILLIN SOD-TAZOBACTAM PER 1 G: Performed by: INTERNAL MEDICINE

## 2023-05-23 PROCEDURE — 85730 THROMBOPLASTIN TIME PARTIAL: CPT

## 2023-05-23 PROCEDURE — 82948 REAGENT STRIP/BLOOD GLUCOSE: CPT

## 2023-05-23 PROCEDURE — 25010000002 DEXAMETHASONE PER 1 MG: Performed by: INTERNAL MEDICINE

## 2023-05-23 PROCEDURE — 94761 N-INVAS EAR/PLS OXIMETRY MLT: CPT

## 2023-05-23 PROCEDURE — 80048 BASIC METABOLIC PNL TOTAL CA: CPT | Performed by: INTERNAL MEDICINE

## 2023-05-23 PROCEDURE — 25010000002 HEPARIN (PORCINE) PER 1000 UNITS

## 2023-05-23 PROCEDURE — 82805 BLOOD GASES W/O2 SATURATION: CPT

## 2023-05-23 PROCEDURE — 83735 ASSAY OF MAGNESIUM: CPT | Performed by: INTERNAL MEDICINE

## 2023-05-23 PROCEDURE — 85025 COMPLETE CBC W/AUTO DIFF WBC: CPT | Performed by: INTERNAL MEDICINE

## 2023-05-23 PROCEDURE — 25010000002 DAPTOMYCIN PER 1 MG: Performed by: INTERNAL MEDICINE

## 2023-05-23 PROCEDURE — 92610 EVALUATE SWALLOWING FUNCTION: CPT

## 2023-05-23 PROCEDURE — 25010000002 AMIODARONE IN DEXTROSE 5% 360-4.14 MG/200ML-% SOLUTION: Performed by: NURSE PRACTITIONER

## 2023-05-23 PROCEDURE — 25010000002 HEPARIN (PORCINE) 25000-0.45 UT/250ML-% SOLUTION: Performed by: INTERNAL MEDICINE

## 2023-05-23 PROCEDURE — 71045 X-RAY EXAM CHEST 1 VIEW: CPT

## 2023-05-23 PROCEDURE — 99233 SBSQ HOSP IP/OBS HIGH 50: CPT | Performed by: INTERNAL MEDICINE

## 2023-05-23 RX ORDER — HEPARIN SODIUM 1000 [USP'U]/ML
1400 INJECTION, SOLUTION INTRAVENOUS; SUBCUTANEOUS ONCE
Status: COMPLETED | OUTPATIENT
Start: 2023-05-23 | End: 2023-05-23

## 2023-05-23 RX ADMIN — ATORVASTATIN CALCIUM 80 MG: 40 TABLET, FILM COATED ORAL at 20:26

## 2023-05-23 RX ADMIN — DAPTOMYCIN 500 MG: 500 INJECTION, POWDER, LYOPHILIZED, FOR SOLUTION INTRAVENOUS at 12:10

## 2023-05-23 RX ADMIN — CHLORHEXIDINE GLUCONATE 15 ML: 1.2 RINSE ORAL at 20:26

## 2023-05-23 RX ADMIN — PANTOPRAZOLE SODIUM 40 MG: 40 INJECTION, POWDER, LYOPHILIZED, FOR SOLUTION INTRAVENOUS at 08:51

## 2023-05-23 RX ADMIN — EMPAGLIFLOZIN 10 MG: 10 TABLET, FILM COATED ORAL at 12:10

## 2023-05-23 RX ADMIN — INSULIN DETEMIR 10 UNITS: 100 INJECTION, SOLUTION SUBCUTANEOUS at 08:51

## 2023-05-23 RX ADMIN — TAZOBACTAM SODIUM AND PIPERACILLIN SODIUM 3.38 G: 375; 3 INJECTION, SOLUTION INTRAVENOUS at 05:08

## 2023-05-23 RX ADMIN — DEXAMETHASONE SODIUM PHOSPHATE 4 MG: 4 INJECTION INTRA-ARTICULAR; INTRALESIONAL; INTRAMUSCULAR; INTRAVENOUS; SOFT TISSUE at 08:51

## 2023-05-23 RX ADMIN — HEPARIN SODIUM 15 UNITS/KG/HR: 10000 INJECTION, SOLUTION INTRAVENOUS at 22:19

## 2023-05-23 RX ADMIN — ASPIRIN 81 MG CHEWABLE TABLET 81 MG: 81 TABLET CHEWABLE at 08:51

## 2023-05-23 RX ADMIN — IPRATROPIUM BROMIDE AND ALBUTEROL SULFATE 3 ML: 2.5; .5 SOLUTION RESPIRATORY (INHALATION) at 19:09

## 2023-05-23 RX ADMIN — AMIODARONE HYDROCHLORIDE 0.5 MG/MIN: 1.8 INJECTION, SOLUTION INTRAVENOUS at 03:48

## 2023-05-23 RX ADMIN — Medication 10 ML: at 20:26

## 2023-05-23 RX ADMIN — DOCUSATE SODIUM 50 MG AND SENNOSIDES 8.6 MG 2 TABLET: 8.6; 5 TABLET, FILM COATED ORAL at 20:26

## 2023-05-23 RX ADMIN — IPRATROPIUM BROMIDE AND ALBUTEROL SULFATE 3 ML: 2.5; .5 SOLUTION RESPIRATORY (INHALATION) at 07:10

## 2023-05-23 RX ADMIN — AMIODARONE HYDROCHLORIDE 0.5 MG/MIN: 1.8 INJECTION, SOLUTION INTRAVENOUS at 15:23

## 2023-05-23 RX ADMIN — Medication 10 ML: at 08:51

## 2023-05-23 RX ADMIN — CHLORHEXIDINE GLUCONATE 15 ML: 1.2 RINSE ORAL at 08:51

## 2023-05-23 RX ADMIN — DOCUSATE SODIUM 50 MG AND SENNOSIDES 8.6 MG 2 TABLET: 8.6; 5 TABLET, FILM COATED ORAL at 08:51

## 2023-05-23 RX ADMIN — HEPARIN SODIUM 1400 UNITS: 1000 INJECTION INTRAVENOUS; SUBCUTANEOUS at 06:45

## 2023-05-23 NOTE — CONSULTS
INFECTIOUS DISEASE CONSULT/INITIAL HOSPITAL VISIT    Timmy Guajardo  1950  [id number]    Date of Consult: 5/23/2023    Admission Date: 5/19/2023      Requesting Provider: No ref. provider found  Evaluating Physician: Travon Brito MD    Reason for Consultation: Positive blood cultures    History of present illness:    Patient is a 72 y.o. male with past medical history of pulmonary embolism, dyslipidemia, heart failure, type II d. mellitus, CVA with right-sided residual weakness and GERD.      Patient admitted to hospital for pulmonary embolism left lower lobe pneumonia versus infarction on May/9/2023 patient had atrial fibrillation with RVR with hypotension has been transferred the ICU.      Patient found have positive blood cultures for staph coccus epidermidis.  We are being consulted for further interpretation of these positive blood cultures.    Patient reports having indwelling pacemaker/ ICD patient says this is fired 1 time since he has had it placed              Past Medical History:   Diagnosis Date   • Cardiomyopathy    • CHF (congestive heart failure)    • Coronary artery disease    • COVID-19    • Diabetes mellitus    • GERD (gastroesophageal reflux disease)    • HTN (hypertension)    • Pleural effusion    • Stroke     Right sided weakness   • Stroke        Past Surgical History:   Procedure Laterality Date   • AMPUTATION DIGIT Left 5/12/2021    Procedure: AMPUTATION DIGIT - GREAT TOE AMPUTATION LEFT;  Surgeon: Dario Marmolejo MD;  Location: [facility] OR;  Service: General;  Laterality: Left;   • AORTAGRAM N/A 5/11/2021    Procedure: AORTAGRAM WITH RUNOFFS, LEFT SFA BALLOON ANGIOPLASTY;  Surgeon: Tim Mahan MD;  Location: [facility];  Service: Vascular;  Laterality: N/A;  FL TIME 6 MIN 54 SECS  82 MGY  CONTRAST VISIPAQUE 100ML     • CARDIAC CATHETERIZATION     • CARDIAC PACEMAKER PLACEMENT      pacemaker/defibrillator, [employer]   • COLONOSCOPY N/A 3/31/2023     Procedure: COLONOSCOPY;  Surgeon: Brunner, Mark I, MD;  Location:  [facility];  Service: Gastroenterology;  Laterality: N/A;   • ENDOSCOPY N/A 3/29/2023    Procedure: ESOPHAGOGASTRODUODENOSCOPY;  Surgeon: Brunner, Mark I, MD;  Location:  [facility];  Service: Gastroenterology;  Laterality: N/A;   • HERNIA REPAIR Bilateral     Inguinal hernia   • HIP TROCHANTERIC NAILING WITH INTRAMEDULLARY HIP SCREW Right 10/18/2022    Procedure: HIP TROCHANTERIC NAILING WITH INTRAMEDULLARY HIP SCREW RIGHT;  Surgeon: Bj Steinberg MD;  Location:  [facility];  Service: Orthopedics;  Laterality: Right;   • THORACOSCOPY VIDEO ASSISTED WITH LOBECTOMY Right 4/12/2023    Procedure: BRONCHOSCOPY, THORACOSCOPY VIDEO ASSISTED WITH DECORTICATION, MECHANICAL PLEURODESIS;  Surgeon: Tim Mahan MD;  Location:  [facility];  Service: Cardiothoracic;  Laterality: Right;       Family History   Problem Relation Age of Onset   • Alzheimer's disease Mother    • Kidney failure Mother    • Cancer Father    • Heart failure Father        Social History     Socioeconomic History   • Marital status: Single   • Number of children: 0   Tobacco Use   • Smoking status: Former     Packs/day: 1.00     Years: 30.00     Pack years: 30.00     Types: Cigarettes     Quit date: 2013     Years since quitting: 10.3   • Smokeless tobacco: Never   • Tobacco comments:     quit 2015   Vaping Use   • Vaping Use: Never used   Substance and Sexual Activity   • Alcohol use: Never   • Drug use: Never   • Sexual activity: Defer       Allergies   Allergen Reactions   • Other Unknown - Low Severity     Mercury metals- as a child         Medication:    Current Facility-Administered Medications:   •  acetaminophen (TYLENOL) tablet 650 mg, 650 mg, Nasogastric, Q4H PRN, Vince Garcia, DO  •  amiodarone 360 mg in 200 mL D5W infusion, 0.5 mg/min, Intravenous, Continuous, Destiny Trujillo, APRN, Last Rate: 16.67 mL/hr at 05/23/23 0348, 0.5 mg/min at 05/23/23 0348  •   aspirin chewable tablet 81 mg, 81 mg, Nasogastric, Daily, Vince Garcia, DO, 81 mg at 05/23/23 0851  •  atorvastatin (LIPITOR) tablet 80 mg, 80 mg, Nasogastric, Nightly, Vince Garcia, DO, 80 mg at 05/22/23 2007  •  sennosides-docusate (PERICOLACE) 8.6-50 MG per tablet 2 tablet, 2 tablet, Nasogastric, BID, 2 tablet at 05/23/23 0851 **AND** polyethylene glycol (MIRALAX) packet 17 g, 17 g, Nasogastric, Daily PRN **AND** [DISCONTINUED] bisacodyl (DULCOLAX) EC tablet 5 mg, 5 mg, Oral, Daily PRN **AND** bisacodyl (DULCOLAX) suppository 10 mg, 10 mg, Rectal, Daily PRN, Vince Garcia, DO  •  Calcium Replacement - Follow Nurse / BPA Driven Protocol, , Does not apply, PRN, Lacho Veras III, DO  •  chlorhexidine (PERIDEX) 0.12 % solution 15 mL, 15 mL, Mouth/Throat, Q12H, Tim Terrazas MD, 15 mL at 05/23/23 0851  •  DAPTOmycin (CUBICIN) 500 mg in sodium chloride 0.9 % 50 mL IVPB, 8 mg/kg, Intravenous, Q24H, Jarrett Buenrostro MD, Last Rate: 100 mL/hr at 05/23/23 1210, 500 mg at 05/23/23 1210  •  dexamethasone (DECADRON) injection 4 mg, 4 mg, Intravenous, Daily, Jarrett Buenrostro MD, 4 mg at 05/23/23 0851  •  empagliflozin (JARDIANCE) tablet 10 mg, 10 mg, Oral, Daily, Jarrett Mueller MD, 10 mg at 05/23/23 1210  •  fentaNYL 2500 mcg/250 mL NS infusion,  mcg/hr, Intravenous, Titrated, Debbie Guerra APRN, Stopped at 05/23/23 0903  •  fluticasone (FLONASE) 50 MCG/ACT nasal spray 2 spray, 2 spray, Nasal, Daily, Lacho Veras III, DO, 2 spray at 05/20/23 0900  •  heparin 70492 units/250 mL (100 units/mL) in 0.45 % NaCl infusion, 17 Units/kg/hr, Intravenous, Titrated, Bj Linton [facility], Last Rate: 9.89 mL/hr at 05/23/23 0645, 17 Units/kg/hr at 05/23/23 0645  •  insulin detemir (LEVEMIR) injection 10 Units, 10 Units, Subcutaneous, Daily, Jarrett Buenrostro MD, 10 Units at 05/23/23 0851  •  insulin regular (humuLIN R,novoLIN R) injection 0-9  Units, 0-9 Units, Subcutaneous, Q6H, Micaela Deal, APRN, 2 Units at 05/22/23 1712  •  ipratropium-albuterol (DUO-NEB) nebulizer solution 3 mL, 3 mL, Nebulization, Q6H While Awake - RT, Lacho Veras III, DO, 3 mL at 05/23/23 0710  •  Magnesium Cardiology Dose Replacement - Follow Nurse / BPA Driven Protocol, , Does not apply, PRN, Destiny Rosas, PharmD  •  nitroglycerin (NITROSTAT) SL tablet 0.4 mg, 0.4 mg, Sublingual, Q5 Min PRN, Lacho Veras III, DO  •  norepinephrine (LEVOPHED) 8 mg in 250 mL NS infusion (premix), 0.02-0.3 mcg/kg/min, Intravenous, Titrated, Zina Doty, APRN, Stopped at 05/23/23 0630  •  ondansetron (ZOFRAN) injection 4 mg, 4 mg, Intravenous, Q6H PRN, Lacho Veras III, DO  •  pantoprazole (PROTONIX) injection 40 mg, 40 mg, Intravenous, Q24H, Tim Terrazas MD, 40 mg at 05/23/23 0851  •  Pharmacy to Dose Heparin, , Does not apply, Continuous PRN, Bj Linton, [facility]  •  phenylephrine (AMRIT-SYNEPHRINE) 50 mg in sodium chloride 0.9 % 250 mL infusion, 0.5-3 mcg/kg/min, Intravenous, Titrated, Zina Doty APRN, Stopped at 05/22/23 0535  •  Phosphorus Replacement - Follow Nurse / BPA Driven Protocol, , Does not apply, PRN, Lacho Veras III, DO  •  Potassium Replacement - Follow Nurse / BPA Driven Protocol, , Does not apply, PRN, Lacho Veras III, DO  •  sodium chloride 0.9 % flush 10 mL, 10 mL, Intravenous, PRN, Brady Dumont, DO  •  sodium chloride 0.9 % flush 10 mL, 10 mL, Intravenous, Q12H, Lacho Veras III, DO, 10 mL at 05/23/23 0851  •  sodium chloride 0.9 % flush 10 mL, 10 mL, Intravenous, PRN, Lacho Veras III, DO  •  sodium chloride 0.9 % infusion 40 mL, 40 mL, Intravenous, PRN, Lacho Veras III, DO  •  Vasopressin (VASOSTRICT) 0.2 UNIT/ML solution, 0.03 Units/min, Intravenous, Continuous, Jarrett Buenrostro MD, Stopped at 05/22/23 4328    Antibiotics:  Anti-Infectives (From admission, onward)     Ordered     Dose/Rate Route Frequency Start Stop    23 0920  DAPTOmycin (CUBICIN) 500 mg in sodium chloride 0.9 % 50 mL IVPB        Ordering Provider: Jarrett Buenrostro MD    8 mg/kg × 65.2 kg  100 mL/hr over 30 Minutes Intravenous Every 24 Hours 23 1100 23 1059    23  piperacillin-tazobactam (ZOSYN) 3.375 g in iso-osmotic dextrose 50 ml (premix)        Ordering Provider: Brady Dumont DO    3.375 g  over 30 Minutes Intravenous Once 23 2323    23  vancomycin 1250 mg/250 mL 0.9% NS IVPB ([facility])        Ordering Provider: Brady Dumont DO    20 mg/kg × 63.5 kg  over 90 Minutes Intravenous Once 23 0052            Review of Systems:  Remark for pleuritic chest pain denies fevers or chills    Physical Exam:   Vital Signs  Temp (24hrs), Av.7 °F (36.5 °C), Min:96.8 °F (36 °C), Max:98.8 °F (37.1 °C)    Temp  Min: 96.8 °F (36 °C)  Max: 98.8 °F (37.1 °C)  BP  Min: 107/48  Max: 148/67  Pulse  Min: 69  Max: 70  Resp  Min: 16  Max: 21  SpO2  Min: 96 %  Max: 100 %    GENERAL: Awake and alert, in no acute distress.   HEENT: Normocephalic, atraumatic.  PERRL. EOMI. No conjunctival injection. No icterus.  No external oral lesions    HEART: RRR; No murmur  LUNGS: Clear to auscultation bilaterally   ABDOMEN: Soft, nontender, nondistended.  EXT:  No edema  :  Without Rosenberg catheter.  MSK: No joint effusions or erythema  SKIN: Warm and dry without cutaneous eruptions on Inspection/palpation.        Laboratory Data    Results from last 7 days   Lab Units 23  0432 23  0618 23  0233   WBC 10*3/mm3 20.17* 20.19* 12.80*   HEMOGLOBIN g/dL 8.4* 8.1* 10.0*   HEMATOCRIT % 27.9* 25.7* 31.7*   PLATELETS 10*3/mm3 188 200 198     Results from last 7 days   Lab Units 23  0432   SODIUM mmol/L 136   POTASSIUM mmol/L 4.9   CHLORIDE mmol/L 104   CO2 mmol/L 17.0*   BUN mg/dL 30*   CREATININE mg/dL 1.48*   GLUCOSE mg/dL 131*   CALCIUM mg/dL 8.3*      Results from last 7 days   Lab Units 05/22/23  0618   ALK PHOS U/L 127*   BILIRUBIN mg/dL 0.7   ALT (SGPT) U/L 35   AST (SGOT) U/L 44*             Results from last 7 days   Lab Units 05/22/23  0618   LACTATE mmol/L 0.8         Results from last 7 days   Lab Units 05/21/23  1934   VANCOMYCIN TR mcg/mL 14.70     Estimated Creatinine Clearance: 42.8 mL/min (A) (by C-G formula based on SCr of 1.48 mg/dL (H)).      Microbiology:  Blood Culture   Date Value Ref Range Status   05/19/2023 Staphylococcus epidermidis (C)  Final   05/19/2023 Staphylococcus epidermidis (C)  Final     BCID, PCR   Date Value Ref Range Status   05/19/2023 (A) Negative by BCID PCR. Culture to Follow. Final    Staph spp, not aureus or lugdunensis. Identification by BCID2 PCR.     No results found for: CULTURES, HSVCX, URCX  No results found for: EYECULTURE, GCCX, HSVCULTURE, LABHSV  No results found for: LEGIONELLA, MRSACX, MUMPSCX, MYCOPLASCX  No results found for: NOCARDIACX, STOOLCX  Urine Culture   Date Value Ref Range Status   05/21/2023 No growth  Final     No results found for: VIRALCULTU, WOUNDCX        Radiology:  Imaging Results (Last 72 Hours)     Procedure Component Value Units Date/Time    XR Chest 1 View [[id number]] Collected: 05/23/23 0731     Updated: 05/23/23 0737    Narrative:      XR CHEST 1 VW    Date of Exam: 5/23/2023 2:57 AM EDT    Indication: Intubated Patient    Comparison: 1 day prior.    Findings:  Support hardware projects unchanged. There is no distinct pneumothorax. Scattered atelectasis and small left pleural effusion are stable. Unchanged heart and mediastinal contours.      Impression:      Impression:  Support hardware projects unchanged. There is no distinct pneumothorax. Scattered atelectasis and small left pleural effusion are stable. Unchanged heart and mediastinal contours.        Electronically Signed: Nicolas Siddiqi    5/23/2023 7:34 AM EDT    Workstation ID: [id number]    XR Chest 1 View [[id number]]  Collected: 05/22/23 1052     Updated: 05/22/23 1056    Narrative:      XR CHEST 1 VW    Date of Exam: 5/22/2023 10:38 AM EDT    Indication: Ventilator tube placement    Comparison: None available.    Findings:  Endotracheal tube approximately 1 cm above the vazquez. Gastric tube and right IJ line remain in place stable bilateral pleural effusions and left-sided airspace disease. No new abnormality      Impression:      Impression:  Endotracheal tube now 1 cm above the vazquez. Stable bilateral pleural effusions and left-sided airspace disease      Electronically Signed: David Cardoso    5/22/2023 10:53 AM EDT    Workstation ID: [id number]    XR Chest 1 View [[id number]] Collected: 05/22/23 0755     Updated: 05/22/23 0801    Narrative:      XR CHEST 1 VW    Date of Exam: 5/22/2023 4:10 AM EDT    Indication: Intubated Patient    Comparison: 5/21/2023    Findings:  Endotracheal tube is positioned just above the vazquez. Gastric tube and right IJ line remain in place. No new tubes or lines. Heart size stable ill-defined opacity in the mid to lower left lung compatible with airspace disease. Small bilateral pleural   effusions which appear grossly stable. Atelectasis in the lung bases. No pneumothorax      Impression:      Impression:    1. Endotracheal tube is just above the vazquez. Recommend retracting the endotracheal tube 3 to 4 cm  2. Stable small bilateral pleural effusions  3. Airspace disease in the mid to lower left lung which may be atelectasis or pneumonia      Electronically Signed: David Cardoso    5/22/2023 7:58 AM EDT    Workstation ID: [id number]    XR Chest 1 View [[id number]] Collected: 05/21/23 2219     Updated: 05/22/23 0022    Narrative:      EXAMINATION: XR CHEST 1 VW    DATE: 5/21/2023 10:35 PM    INDICATION:  Intubated;    COMPARISON:  6:42 AM same day            Impression:        1.  Endotracheal tube tip 2 cm above vazquez, good position.  2.  Enteric tube tip past GE junction and off image.  3.  No  other interval change from radiograph earlier today.          Electronically signed by:  Gianni Gonzales M.D.    5/21/2023 10:21 PM Mountain Time    XR Abdomen KUB [[id number]] Collected: 05/21/23 1202     Updated: 05/21/23 1206    Narrative:      XR ABDOMEN KUB    Date of Exam: 5/21/2023 11:42 AM EDT    Indication: Cortrak placement    Comparison: CT May 19, 2023    Findings:  Enteric tube terminates in the right upper quadrant with tip likely in the proximal duodenum. Visualized bowel gas pattern appears grossly unremarkable.    There appear to be small pleural effusions and mild bibasilar opacities.      Impression:      Impression:  1.Enteric tube terminates in the right upper quadrant, likely in the proximal duodenum.  2.Small pleural effusions and bibasilar opacities.      Electronically Signed: Jarrett Lynn    5/21/2023 12:03 PM EDT    Workstation ID: [id number]    XR Chest 1 View [[id number]] Collected: 05/21/23 0548     Updated: 05/21/23 0750    Narrative:      EXAMINATION: XR CHEST 1 VW    DATE: 5/21/2023 6:42 AM    INDICATION:  Central line placement;    COMPARISON:  2:47 AM same day            Impression:        1.  Small right jugular venous catheter tip in the lower SVC is new from radiograph earlier today.  2.  No pneumothorax or other interval change.          Electronically signed by:  Gianni Gonzales M.D.    5/21/2023 5:49 AM Mountain Time    XR Chest 1 View [[id number]] Collected: 05/21/23 0119     Updated: 05/21/23 0322    Narrative:      Examination: AP view of the chest    Indication: RRT    Comparison: 5/19/2023    Findings:  A defibrillator is present in the left chest wall. The cardiomediastinal silhouette is within normal size limits. Thoracic aortic calcifications are present.    There is increasing patchy airspace disease in the left base. Trace pleural effusions appear similar. No sizable pleural fluid accumulation or pneumothorax is identified.     No acute osseous abnormality is  identified.      Impression:      Impression:  Patchy airspace disease in the left lung base and trace pleural effusions. Left basilar airspace disease has increased over the interim.    Electronically signed by:  Nash Griffin M.D.    5/21/2023 1:21 AM Mountain Time            Impression:   Left lower lobe infiltrate from infarction versus infection  Left lower lobe pulmonary embolism  Leukocytosis with neutrophilia  High-grade positive blood culture for staph coccus epidermidis  Elevated procalcitonin  PLAN/RECOMMENDATIONS:   Thank you for asking us to see Timmy Guajardo, I recommend the following:  Given the presence indwelling endovascular hardware staphylococcus epidermidis bacteremia is concerning    High-grade positive blood cultures and indwelling hardware probably warrants further investigation    Patient does have leukocytosis and markedly elevated procalcitonin    Having both a pulmonary embolism and a bacterial pneumonia unless from a septic emboli would be less expected.    Follow-up repeat blood cultures    I have reviewed the transthoracic echocardiogram and there is no mention of tricuspid valve vegetation or pulmonic valve vegetation    Consider transesophageal echocardiogram to look for any vegetation on the leads of the pacemaker device    There is calcification of the chordae and papillary muscle of the mitral valve    Continue daptomycin for 14-day course  Follow-up repeated blood cultures from 5/22  Reasonable to follow procalcitonin to see if this is descending    Patient has been started on steroids which may explain leukocytosis    Discuss further with pulmonary critical care           Travon Brito MD  5/23/2023  13:45 EDT

## 2023-05-23 NOTE — PLAN OF CARE
Goal Outcome Evaluation:      Pt alert and responsive, follows commands, denies pain, weaned off Levo and Vaso overnight, maintaining MAP > 65, urine output adequate, Heparin gtt increased to 17units/hr, recheck PTT at 1300, plan to extubate today

## 2023-05-23 NOTE — PROGRESS NOTES
HEPARIN INFUSION  Timmy Guajardo is a  72 y.o. male receiving heparin infusion.     Therapy for (VTE/Cardiac): VTE  Patient Weight: 58.2 kg  Initial Bolus (Y/N): no  Any Bolus (Y/N): yes    Signs or Symptoms of Bleeding: none noted  VTE (PE/DVT)   Initial rate: 18 units/kg/hr (Max 1,500 units/hr)    aPTT  Rebolus Infusion Hold time Change infusion Dose (Units/kg/hr) Next aPTT Level Due   <45 50 Units/kg  (4000 Units Max) None Increase by  4 Units/kg/hr 6 hours   45 - 52 25 Units/kg  (2000 Units Max) None Increase by  3 Units/kg/hr 6 hours   53 - 59 0 None Increase by  2 Units/kg/hr 6 hours   60 - 90 0 None No Change 6 hours (after 2 consecutive levels in range check qAM)   91 - 98 0 None Decrease by  1 Units/kg/hr 6 hours   99 - 105 0 None Decrease by  2 Units/kg/hr 6 hours   106 - 113 0 60 Minutes Decrease by  3 Units/kg/hr 6 hours   >113 0 Hold  After aPTT less than 90 decrease previous rate by  4 Units/kg/hr Every 2 hours until aPTT less than 90 then when infusion restarts in 6 hours     Results from last 7 days   Lab Units 05/22/23  0618 05/21/23  0233 05/20/23  0549 05/20/23  0111   INR  1.76*  --   --  2.23*   HEMOGLOBIN g/dL 8.1* 10.0* 10.7* 10.5*   HEMATOCRIT % 25.7* 31.7* 35.5* 32.7*   PLATELETS 10*3/mm3 200 198 181 197        Date   Time   aPTT Current Rate (Unit/kg/hr) Bolus   (Units) Rate Change   (Unit/kg/hr) New Rate (Unit/kg/hr) Next   aPTT Comments  Pump Check Daily   5/20 1104 53.1 -- -- +18 18 1900 LAURIE Mosley RN  Pump verified, BDF   5/20 1901 68.5 18 -- -- 18 0100 Spoke with RN   5/21 0041 76.9 18 -- -- 18 0600 Discussed w/ nurse   5/21 0711 89.5 18 -- -- 18 1800 Dw WADE   5/21 1830 138.5 18 -- -18 HOLD 2200 Dw WADE   5/21 2203 80.3 HOLD -- +14 14 0600 Sonal 5523 -acb    5/22 0618 63.9 14 -- -- 14 1200 DW RN   5/22 1155 65.2 14 -- -- 14 0600 DW RN, Pump Verified   5/23 0432 49.1 14 1400 +3 17 1300 Discussed w/ nurse   5/23 1300 92 17 -- -1 16 2100 DW RN. Pump verified.                                                                                                                               Ruperto Quinones, PharmD  5/23/2023  14:20 EDT

## 2023-05-23 NOTE — CASE MANAGEMENT/SOCIAL WORK
Discharge Planning Assessment  [facility]     Patient Name: Timmy Guajardo  MRN: [id number]  Today's Date: 5/23/2023    Admit Date: 5/19/2023    Plan: Home with Home Health vs SNF   Discharge Needs Assessment     Row Name 05/23/23 1126       Living Environment    People in Home alone    Current Living Arrangements home    Primary Care Provided by self;other (see comments)  sibling    Provides Primary Care For no one, unable/limited ability to care for self    Family Caregiver if Needed sibling(s)    Family Caregiver Names Mary Ellen Miranda- Sister    Quality of Family Relationships helpful;involved;supportive    Able to Return to Prior Arrangements other (see comments)  TBD       Transition Planning    Patient/Family Anticipates Transition to inpatient rehabilitation facility    Patient/Family Anticipated Services at Transition ;rehabilitation services    Transportation Anticipated family or friend will provide       Discharge Needs Assessment    Readmission Within the Last 30 Days current reason for admission unrelated to previous admission    Equipment Currently Used at Home walker, rolling;wheelchair;shower chair;commode    Concerns to be Addressed discharge planning;adjustment to diagnosis/illness    Anticipated Changes Related to Illness inability to care for self    Outpatient/Agency/Support Group Needs inpatient rehabilitation facility    Discharge Facility/Level of Care Needs rehabilitation facility;nursing facility, skilled;nursing facility, intermediate    Current Discharge Risk chronically ill;dependent with mobility/activities of daily living;lives alone;physical impairment               Discharge Plan     Row Name 05/23/23 1134       Plan    Plan Home with Home Health vs SNF    Patient/Family in Agreement with Plan yes    Plan Comments I have spoken to Mr. Guajardo's sister, Mary Ellen by phone today regarding the discharge plan.  She states that her brother lived alone prior to last hospitalization  in his home in [facility].  He was discharged from [facility] on 4/20 to [facility] for skilled rehab where he has resided since he was admitted here on 5/19.  Mary Ellen states that her brother now requires assistance with activities of daily living and uses a rolling walker, wheelchair to assist with mobility.  He is being followed now by [facility]/Pulmonology team, Infectious disease and Cardiology for Pneumonia, heart failure, Hx of CVA, Pulmonary embolus and atrial fibrillation.  Per MDR, hopeful extubation today.  PT/OT consults are pending.  Mary Ellen is agreeable to inpatient skilled rehab if recommended.  He may be approaching his co-pay days.  Mary Ellen states that her brother will likely not be able to live alone and plans to have him move in with her at some point after rehab.  CM will cont to follow the plan of care and assist with discharge needs as recommnedations become available.    Final Discharge Disposition Code 30 - still a patient              Continued Care and Services - Admitted Since 5/19/2023     Destination     Service Provider Request Status Selected Services Address Phone Fax Patient Preferred    [facility] Pending - No Request Sent N/A [id number], [facility] [address] [phone] --            Selected Continued Care - Prior Encounters Includes continued care and service providers with selected services from prior encounters from 2/18/2023 to 5/23/2023    Discharged on 4/20/2023 Admission date: 3/24/2023 - Discharge disposition: Skilled Nursing Facility (DC - External)    Destination     Service Provider Selected Services Address Phone Fax Patient Preferred    [facility] [address], [facility] [id number] [phone] --                       Demographic Summary     Row Name 05/23/23 1017       General Information    Admission Type inpatient    Arrived From home    Referral Source  admission list    Reason for Consult discharge planning    General Information Comments I have confirmed with Mr. Guajardo's sister, Mary Ellen that his PCP is YUE Vieyra and his insurance is [employer].       Contact Information    Permission Granted to Share Info With                Functional Status     Row Name 05/23/23 1125       Functional Status, IADL    Medications assistive person    Meal Preparation assistive person    Housekeeping completely dependent    Laundry completely dependent    Shopping completely dependent               Psychosocial    No documentation.                Abuse/Neglect    No documentation.                Legal    No documentation.                Substance Abuse    No documentation.                Patient Forms    No documentation.                   Violeta Mccabe RN

## 2023-05-23 NOTE — PROGRESS NOTES
"[facility]  IP Progress Note    PROBLEM LIST:  1. CARDIAC  a. Coronary Artery Disease:   i. LHC, data unknown apparently normal    b. Myocardium:   i. LVEF multiple echoes around 30s  ii. Echo, 5/23/2023: LVEF 45%, G2 DD, RV dilated    c. Valvular:   i. No valvular disease    d. Electrical:   i. A-fib  ii. [employer] ICD    e. Percardium:   i. Small pericardial effusion    2. VASCULAR:  a. Arterial  i. Cerebrovascular disease:   1. History of CVA with right-sided deficit  2. Negative saline test    ii. Peripheral vascular disease:   1. ABIs, 5/6/2021: Left RADHA 0.67 severe stenosis of SFA.  2. Angioplasty L SFA  3. Left great toe amputation    iii. AAA:   1. Mild dilatation of ascending aorta  2. Infrarenal AAA 3.2    b. Venous:  i. History of pulmonary embolisms      3. CARDIAC RISK FACTORS:  a.        Hypertension  b.        Dyslipidemia:   c.        Tobacco Use: Former Smoker    4. NON-CARDIAC:  a. COVID-19  b. GERD  c. Gangrene of left toe of the foot  d. Pleural effusion    5. SURGERIES:  a. Left digit amputation  b. Bilateral hernia repair  c. Right hip repair  d. Thoracoscopy with lobectomy        HOSPITAL COURSE:  Patient was admitted with possible pneumonia and respiratory failure.  Has been doing fine this morning.  Off of all sedation and awake and responsive.  Still intubated      CHIEF COMPLAINTS:  Respiratory failure with wide-complex tachycardia      Subjective   Patient is awake still intubated      Objective     Blood pressure 121/61, pulse 70, temperature 97.3 °F (36.3 °C), temperature source Bladder, resp. rate 21, height 177.8 cm (70\"), weight 65.2 kg (143 lb 11.8 oz), SpO2 98 %.     Intake/Output Summary (Last 24 hours) at 5/23/2023 0711  Last data filed at 5/23/2023 0600  Gross per 24 hour   Intake 1983.58 ml   Output 1225 ml   Net 758.58 ml       PHYSICAL EXAM:  Constitutional:       General: Appears healthy     Appearance: Not anxious    Neck:     " JVP: Not elevated     Carotid artery: No carotid bruit    Pulmonary:      Effort: Pulmonary effort is normal.      Breath sounds: Good breath sounds    Cardiovascular:      Normal rate. Regular rhythm. Normal S1. Normal S2.      Murmurs: There is no murmur.      No gallop. No click. No rub.     Abdominal:      General: Bowel sounds are normal.      Palpations: Abdomen is soft.      Tenderness: There is no abdominal tenderness.    Extremities:     Pulses: Decreased pulses on the left     Edema: No edema    RESULR REVIEW:    I reviewed the patient's new clinical results.      MEDICATIONS:    aspirin, 81 mg, Nasogastric, Daily  atorvastatin, 80 mg, Nasogastric, Nightly  chlorhexidine, 15 mL, Mouth/Throat, Q12H  dexamethasone, 4 mg, Intravenous, Daily  fluticasone, 2 spray, Nasal, Daily  insulin detemir, 10 Units, Subcutaneous, Daily  insulin regular, 0-9 Units, Subcutaneous, Q6H  ipratropium-albuterol, 3 mL, Nebulization, Q6H While Awake - RT  pantoprazole, 40 mg, Intravenous, Q24H  piperacillin-tazobactam, 3.375 g, Intravenous, Q8H  senna-docusate sodium, 2 tablet, Nasogastric, BID  sodium chloride, 10 mL, Intravenous, Q12H          Results from last 7 days   Lab Units 05/23/23  0432   WBC 10*3/mm3 20.17*   HEMOGLOBIN g/dL 8.4*   HEMATOCRIT % 27.9*   PLATELETS 10*3/mm3 188     Results from last 7 days   Lab Units 05/23/23  0432 05/22/23  0618   SODIUM mmol/L 136 136   POTASSIUM mmol/L 4.9 4.3   CHLORIDE mmol/L 104 104   CO2 mmol/L 17.0* 16.0*   BUN mg/dL 30* 29*   CREATININE mg/dL 1.48* 1.57*   CALCIUM mg/dL 8.3* 8.0*   BILIRUBIN mg/dL  --  0.7   ALK PHOS U/L  --  127*   ALT (SGPT) U/L  --  35   AST (SGOT) U/L  --  44*   GLUCOSE mg/dL 131* 230*     Results from last 7 days   Lab Units 05/22/23  0618 05/20/23  0111   INR  1.76* 2.23*     Lab Results   Component Value Date    TROPONINT 62 (C) 05/21/2023                 Iron   Date Value Ref Range Status   10/20/2022 39 (L) 59 - 158 mcg/dL Final     Iron Saturation    Date Value Ref Range Status   10/20/2022 18 (L) 20 - 50 % Final     TIBC   Date Value Ref Range Status   10/20/2022 218 (L) 298 - 536 mcg/dL Final      Hemoglobin A1C   Date Value Ref Range Status   03/25/2023 6.30 (H) 4.80 - 5.60 % Final     Magnesium   Date Value Ref Range Status   05/23/2023 2.6 (H) 1.6 - 2.4 mg/dL Final        Tele: Sinus Rythym      ASSESSMENT:     1. Nonischemic cardiomyopathy last evaluation unknown for coronary artery disease  2. Peripheral vascular disease  3. Acute on chronic kidney disease  4. Respiratory failure with possible pneumonia          PLAN:     1. Hopefully patient will be extubated today.  2. We will start Jardiance today.  3. Cautiously diuresis.  4. We will keep adding heart failure medication once he is more stable.  5. He has been in sinus rhythm we will switch it to p.o. once extubated on amiodarone.  6. We will switch to oral anticoagulation when extubated  7. May need stress test once stable for evaluation of coronary artery disease.

## 2023-05-23 NOTE — THERAPY EVALUATION
Acute Care - Speech Language Pathology   Swallow Initial Evaluation  [facility]   Clinical Swallow Evaluation     Patient Name: Timmy Guajardo  : 1950  MRN: [id number]  Today's Date: 2023               Admit Date: 2023    Visit Dx:     ICD-10-CM ICD-9-CM   1. Confusion  R41.0 298.9   2. Leukocytosis, unspecified type  D72.829 288.60   3. Referred by health care professional  Z02.89 V68.89   4. Abnormal CXR  R93.89 793.2   5. Atrial fibrillation, unspecified type  I48.91 427.31   6. Dysphagia, unspecified type  R13.10 787.20     Patient Active Problem List   Diagnosis   • Acute right-sided weakness   • Suspected cerebrovascular accident (CVA)   • Leukocytosis   • HFrEF (EF 30%)   • Thrombocytopenia   • HTN   • Presence of cardiac pacemaker   • Hyperglycemia   • Gangrene of toe of left foot   • Closed displaced intertrochanteric fracture of right femur, initial encounter   • Right exudative pleural effusion status post decortication 2023   • Unintentional weight loss   • Hypoalbuminemia   • Severe malnutrition   • Bilateral pulmonary embolism   • History of CVA (cerebrovascular accident)   • Debility   • GERD (gastroesophageal reflux disease)   • Hyperlipidemia   • Rt Apical Lung Nodule vs Scarring (needs follow up)   • Confusion   • Pneumonia of left lower lobe due to infectious organism   • Pulmonary embolus   • Atrial fibrillation with RVR   • Hypotension   • Acute respiratory failure with hypoxia   • Shock, likely multifactorial     Past Medical History:   Diagnosis Date   • Cardiomyopathy    • CHF (congestive heart failure)    • Coronary artery disease    • COVID-19    • Diabetes mellitus    • GERD (gastroesophageal reflux disease)    • HTN (hypertension)    • Pleural effusion    • Stroke     Right sided weakness   • Stroke      Past Surgical History:   Procedure Laterality Date   • AMPUTATION DIGIT Left 2021    Procedure: AMPUTATION DIGIT - GREAT TOE AMPUTATION LEFT;  Surgeon:  Dario Marmolejo MD;  Location:  [facility];  Service: General;  Laterality: Left;   • AORTAGRAM N/A 5/11/2021    Procedure: AORTAGRAM WITH RUNOFFS, LEFT SFA BALLOON ANGIOPLASTY;  Surgeon: Tim Mahan MD;  Location:  [facility];  Service: Vascular;  Laterality: N/A;  FL TIME 6 MIN 54 SECS  82 MGY  CONTRAST VISIPAQUE 100ML     • CARDIAC CATHETERIZATION     • CARDIAC PACEMAKER PLACEMENT      pacemaker/defibrillator, [employer]   • COLONOSCOPY N/A 3/31/2023    Procedure: COLONOSCOPY;  Surgeon: Brunner, Mark I, MD;  Location:  [facility];  Service: Gastroenterology;  Laterality: N/A;   • ENDOSCOPY N/A 3/29/2023    Procedure: ESOPHAGOGASTRODUODENOSCOPY;  Surgeon: Brunner, Mark I, MD;  Location:  [facility];  Service: Gastroenterology;  Laterality: N/A;   • HERNIA REPAIR Bilateral     Inguinal hernia   • HIP TROCHANTERIC NAILING WITH INTRAMEDULLARY HIP SCREW Right 10/18/2022    Procedure: HIP TROCHANTERIC NAILING WITH INTRAMEDULLARY HIP SCREW RIGHT;  Surgeon: Bj Steinberg MD;  Location:  [facility];  Service: Orthopedics;  Laterality: Right;   • THORACOSCOPY VIDEO ASSISTED WITH LOBECTOMY Right 4/12/2023    Procedure: BRONCHOSCOPY, THORACOSCOPY VIDEO ASSISTED WITH DECORTICATION, MECHANICAL PLEURODESIS;  Surgeon: Tim Mahan MD;  Location:  [facility];  Service: Cardiothoracic;  Laterality: Right;       SLP Recommendation and Plan  SLP Swallowing Diagnosis: swallow WFL/no suspected pharyngeal impairment (05/23/23 1345)  SLP Diet Recommendation: soft to chew textures, chopped, thin liquids (05/23/23 1345)  Recommended Precautions and Strategies: general aspiration precautions, other (see comments) (over next 24 hours as precaution give recent extubation) (05/23/23 1345)  SLP Rec. for Method of Medication Administration: meds whole, with puree (over next 24 hours and then can adjust to with thin liquids as tolerated.) (05/23/23 1345)     Monitor for Signs of Aspiration: yes, notify SLP if any  concerns (05/23/23 1345)        Anticipated Discharge Disposition (SLP): unknown (05/23/23 1345)     Therapy Frequency (Swallow): PRN, 5 days per week (05/23/23 1345)  Predicted Duration Therapy Intervention (Days): until discharge (05/23/23 1345)                                        Plan of Care Reviewed With: patient      SWALLOW EVALUATION (last 72 hours)     SLP Adult Swallow Evaluation     Row Name 05/23/23 1345                   Rehab Evaluation    Document Type evaluation  -        Subjective Information no complaints  -        Patient Observations alert;cooperative  -           General Information    Pertinent History Of Current Problem Since last seen, Afib, intubated 5/22-5/23.  -        Current Method of Nutrition NPO;nasogastric feedings;small-bore  -        Plans/Goals Discussed with patient;agreed upon  -        Barriers to Rehab cognitive status  -           Pain Scale: FACES Pre/Post-Treatment    Pain: FACES Scale, Pretreatment 0-->no hurt  -SM        Posttreatment Pain Rating 0-->no hurt  -SM           Oral Musculature and Cranial Nerve Assessment    Oral Motor General Assessment WFL  -SM           Clinical Swallow Eval    Oral Prep Phase WFL  -SM        Oral Transit WFL  -SM        Oral Residue WFL  -SM        Pharyngeal Phase no overt signs/symptoms of pharyngeal impairment  -        Clinical Swallow Evaluation Summary Prolonged manipulation r/t edentulous state. Pushed with large, consecutive sips of thin liquids, no s/s aspiration. No hx chronic dysphagia with instrumental swallow study in April revealing no aspiration/dysphagia. Pt is currently presenting intermittent confusion, disconnected converation/topic interaction. Difficult to cue pt to provide insight into diet preference (r/t edentulous state). RN observing similar and monitoring. SLP will check back to ensure no further needs.  -           SLP Evaluation Clinical Impression    SLP Swallowing Diagnosis swallow  WFL/no suspected pharyngeal impairment  -           Recommendations    Therapy Frequency (Swallow) PRN;5 days per week  -        Predicted Duration Therapy Intervention (Days) until discharge  -        SLP Diet Recommendation soft to chew textures;chopped;thin liquids  -        Recommended Precautions and Strategies general aspiration precautions;other (see comments)  over next 24 hours as precaution give recent extubation  -        Oral Care Recommendations Oral Care BID/PRN;Toothbrush  -        SLP Rec. for Method of Medication Administration meds whole;with puree  over next 24 hours and then can adjust to with thin liquids as tolerated.  -        Monitor for Signs of Aspiration yes;notify SLP if any concerns  -        Anticipated Discharge Disposition (SLP) unknown  -              User Key  (r) = Recorded By, (t) = Taken By, (c) = Cosigned By    Initials Name Effective Dates    Dionne Pimentel MS CCC-SLP 02/03/23 -                 EDUCATION  The patient has been educated in the following areas:   Dysphagia (Swallowing Impairment) Modified Diet Instruction.              Time Calculation:    Time Calculation- SLP     Row Name 05/23/23 1433             Time Calculation- [facility]    SLP Start Time 1345  -      SLP Received On 05/23/23  -         Untimed Charges    32019-WJ Eval Oral Pharyng Swallow Minutes 40  -SM         Total Minutes    Untimed Charges Total Minutes 40  -SM       Total Minutes 40  -SM            User Key  (r) = Recorded By, (t) = Taken By, (c) = Cosigned By    Initials Name Provider Type    Dionne Pimentel MS CCC-SLP Speech and Language Pathologist                Therapy Charges for Today     Code Description Service Date Service Provider Modifiers Qty    [id number] HC ST EVAL ORAL PHARYNG SWALLOW 3 5/23/2023 Dionne Trevino MS CCC-SLP GN 1               Dionne Trevino MS CCC-SLP  5/23/2023

## 2023-05-23 NOTE — SIGNIFICANT NOTE
05/23/23 1022   SLP Deferred Reason   SLP Deferred Reason Patient unavailable for treatment  (Pt intubated 5/22. Will hold for reconsult following extubation.)

## 2023-05-23 NOTE — PROGRESS NOTES
Intensive Care Follow-up     Hospital:  LOS: 4 days   Mr. Timmy Guajardo, 72 y.o. male is followed for:   Pneumonia of left lower lobe due to infectious organism        Subjective     72 y.o. male with PMHx significant for PE on Eliquis, HTN, dyslipidemia, HFrEF (30% on 10/2022), T2DM, CVA with right-sided residual weakness, and GERD.  He was admitted to the ICU on 5/21/2023 as a transfer from the floor.  He was initially admitted with a pulmonary embolism and a left lower lobe pneumonia versus infarct on 5/19/2023.  This morning he suddenly went into A-fib with RVR became hypotensive with prompted transition to the ICU.  Interval History:  The chart has been reviewed.  The patient has remained afebrile overnight.  The patient is arousable on the ventilator.  I was able to transition him over to a pressure support trial with a pressure support of 10 and he has been able to generate adequate minute ventilation with this.  R SBI was calculated at 15 this morning.  Culture data has been reviewed.    The patient's past medical, surgical and social history were reviewed and updated in Epic as appropriate.        Objective     Infusions:  amiodarone, 0.5 mg/min, Last Rate: 0.5 mg/min (05/23/23 0348)  fentanyl 10 mcg/mL,  mcg/hr, Last Rate: Stopped (05/23/23 0903)  heparin, 17 Units/kg/hr, Last Rate: 17 Units/kg/hr (05/23/23 0645)  norepinephrine, 0.02-0.3 mcg/kg/min, Last Rate: Stopped (05/23/23 0630)  Pharmacy to Dose Heparin,   phenylephrine, 0.5-3 mcg/kg/min, Last Rate: Stopped (05/22/23 0535)  vasopressin, 0.03 Units/min, Last Rate: Stopped (05/22/23 2130)      Medications:  aspirin, 81 mg, Nasogastric, Daily  atorvastatin, 80 mg, Nasogastric, Nightly  chlorhexidine, 15 mL, Mouth/Throat, Q12H  DAPTOmycin, 8 mg/kg, Intravenous, Q24H  dexamethasone, 4 mg, Intravenous, Daily  empagliflozin, 10 mg, Oral, Daily  fluticasone, 2 spray, Nasal, Daily  insulin detemir, 10 Units, Subcutaneous, Daily  insulin regular, 0-9  "Units, Subcutaneous, Q6H  ipratropium-albuterol, 3 mL, Nebulization, Q6H While Awake - RT  pantoprazole, 40 mg, Intravenous, Q24H  senna-docusate sodium, 2 tablet, Nasogastric, BID  sodium chloride, 10 mL, Intravenous, Q12H        Vital Sign Min/Max for last 24 hours  Temp  Min: 97.2 °F (36.2 °C)  Max: 98.8 °F (37.1 °C)   BP  Min: 107/48  Max: 148/67   Pulse  Min: 69  Max: 70   Resp  Min: 16  Max: 21   SpO2  Min: 96 %  Max: 100 %   Flow (L/min)  Min: 4  Max: 4       Input/Output for last 24 hour shift  05/22 0701 - 05/23 0700  In: 1983.6 [I.V.:1757.2]  Out: 1225 [Urine:1225]   FiO2 (%):  [28 %] 28 %  S RR:  [16] 16  PEEP/CPAP (cm H2O):  [5 cm H20] 5 cm H20  OR SUP:  [10 cm H20] 10 cm H20  MAP (cm H2O):  [6.7-9.8] 8.3  Objective:  General Appearance:  Comfortable, ill-appearing and in no acute distress (Intubated, awakens and follows commands.).    Vital signs: (most recent): Blood pressure 121/61, pulse 70, temperature 97.3 °F (36.3 °C), temperature source Bladder, resp. rate 18, height 177.8 cm (70\"), weight 65.2 kg (143 lb 11.8 oz), SpO2 100 %.    HEENT: (Endotracheal tube in place.)    Lungs:  Normal effort and normal respiratory rate.  Breath sounds clear to auscultation.  He is not in respiratory distress.  No stridor.  No rales, decreased breath sounds, wheezes or rhonchi.    Heart: Normal rate.  Regular rhythm.  S1 normal and S2 normal.    Chest: Symmetric chest wall expansion.   Abdomen: Abdomen is soft and non-distended.  There is no abdominal tenderness.     Extremities: Normal range of motion.  There is no dependent edema.    Neurological: (Arousable, follows commands, no focal deficits.).    Pupils:  Pupils are equal, round, and reactive to light.  Pupils are equal.   Skin:  Warm.              Results from last 7 days   Lab Units 05/23/23  0432 05/22/23  0618 05/21/23  0233   WBC 10*3/mm3 20.17* 20.19* 12.80*   HEMOGLOBIN g/dL 8.4* 8.1* 10.0*   PLATELETS 10*3/mm3 188 200 198     Results from last 7 days "   Lab Units 05/23/23  0432 05/22/23  0618 05/21/23  1934 05/21/23  0433   SODIUM mmol/L 136 136 133* 131*   POTASSIUM mmol/L 4.9 4.3 4.5 4.6   CO2 mmol/L 17.0* 16.0* 18.0* 18.0*   BUN mg/dL 30* 29* 31* 23   CREATININE mg/dL 1.48* 1.57* 1.61* 0.98   MAGNESIUM mg/dL 2.6* 2.8*  --  1.8   PHOSPHORUS mg/dL  --  5.0*  --   --    GLUCOSE mg/dL 131* 230* 257* 241*     Estimated Creatinine Clearance: 42.8 mL/min (A) (by C-G formula based on SCr of 1.48 mg/dL (H)).    Results from last 7 days   Lab Units 05/23/23  0320   PH, ARTERIAL pH units 7.332*   PCO2, ARTERIAL mm Hg 36.5   PO2 ART mm Hg 91.2         I reviewed the patient's results and images.     Assessment & Plan   Impression        Pneumonia of left lower lobe due to infectious organism    HFrEF (EF 30%)    History of CVA (cerebrovascular accident)    GERD (gastroesophageal reflux disease)    Confusion    Pulmonary embolus    Atrial fibrillation with RVR    Hypotension    Acute respiratory failure with hypoxia    Shock, likely multifactorial       Plan        Patient is showing MRSE on the previous 2 blood cultures.  Subsequent blood cultures are negative x2 to date.  Is unclear to me if this is contaminant or not.  I am going to go ahead and dosed the patient with daptomycin given his underlying renal function and I would like for the infectious disease service to help in the management of this particular situation.  Echocardiogram has been reviewed.  Patient has good mechanics and he seems to be tolerating pressure support trials.  We will attempt ventilator liberation for now.  Watch off of pressors for now.  Transition to p.o. amiodarone when able to take p.o. today.  Assess for nutritional needs later today if he is able to stay off of the ventilator.  Continue dexamethasone for the next 2 days.  Discontinue after that.  Patient may have a small degree of relative adrenal insufficiency.  He will remain under close observation in the intensive care unit today  secondary to his high risk of worsening from respiratory failure.    Plan of care and goals reviewed with mulitdisciplinary/antibiotic stewardship team during rounds.   I discussed the patient's findings and my recommendations with patient and nursing staff     High level of risk due to:  parenteral controlled substances.      Jarrett Buenrostro MD, [facility]  Pulmonology and Critical Care Medicine

## 2023-05-23 NOTE — PLAN OF CARE
Goal Outcome Evaluation:  Plan of Care Reviewed With: patient            SLP evaluation completed. Will f/u to ensure no further dysphagia or cog-comm needs. Please see note for further details and recommendations.

## 2023-05-24 ENCOUNTER — APPOINTMENT (OUTPATIENT)
Dept: GENERAL RADIOLOGY | Facility: HOSPITAL | Age: 73
DRG: 208 | End: 2023-05-24
Payer: MEDICARE

## 2023-05-24 LAB
ANION GAP SERPL CALCULATED.3IONS-SCNC: 12 MMOL/L (ref 5–15)
APTT PPP: 93.9 SECONDS (ref 60–90)
ARTERIAL PATENCY WRIST A: ABNORMAL
ATMOSPHERIC PRESS: ABNORMAL MM[HG]
BACTERIA SPEC RESP CULT: NORMAL
BASE EXCESS BLDA CALC-SCNC: -3.7 MMOL/L (ref 0–2)
BASOPHILS # BLD AUTO: 0.02 10*3/MM3 (ref 0–0.2)
BASOPHILS NFR BLD AUTO: 0.1 % (ref 0–1.5)
BDY SITE: ABNORMAL
BODY TEMPERATURE: 37 C
BUN SERPL-MCNC: 29 MG/DL (ref 8–23)
BUN/CREAT SERPL: 23.2 (ref 7–25)
CALCIUM SPEC-SCNC: 8.8 MG/DL (ref 8.6–10.5)
CHLORIDE SERPL-SCNC: 110 MMOL/L (ref 98–107)
CO2 BLDA-SCNC: 22.2 MMOL/L (ref 22–33)
CO2 SERPL-SCNC: 20 MMOL/L (ref 22–29)
COHGB MFR BLD: 1.1 % (ref 0–2)
CREAT SERPL-MCNC: 1.25 MG/DL (ref 0.76–1.27)
DEPRECATED RDW RBC AUTO: 54.6 FL (ref 37–54)
EGFRCR SERPLBLD CKD-EPI 2021: 61.9 ML/MIN/1.73
EOSINOPHIL # BLD AUTO: 0 10*3/MM3 (ref 0–0.4)
EOSINOPHIL NFR BLD AUTO: 0 % (ref 0.3–6.2)
EPAP: 0
ERYTHROCYTE [DISTWIDTH] IN BLOOD BY AUTOMATED COUNT: 15.2 % (ref 12.3–15.4)
FUNGUS WND CULT: NORMAL
FUNGUS WND CULT: NORMAL
GLUCOSE BLDC GLUCOMTR-MCNC: 116 MG/DL (ref 70–130)
GLUCOSE BLDC GLUCOMTR-MCNC: 78 MG/DL (ref 70–130)
GLUCOSE BLDC GLUCOMTR-MCNC: 81 MG/DL (ref 70–130)
GLUCOSE SERPL-MCNC: 74 MG/DL (ref 65–99)
GRAM STN SPEC: NORMAL
HCO3 BLDA-SCNC: 21.1 MMOL/L (ref 20–26)
HCT VFR BLD AUTO: 27.2 % (ref 37.5–51)
HCT VFR BLD CALC: 26.9 % (ref 38–51)
HGB BLD-MCNC: 8.6 G/DL (ref 13–17.7)
HGB BLDA-MCNC: 8.8 G/DL (ref 13.5–17.5)
IMM GRANULOCYTES # BLD AUTO: 0.2 10*3/MM3 (ref 0–0.05)
IMM GRANULOCYTES NFR BLD AUTO: 1.1 % (ref 0–0.5)
INHALED O2 CONCENTRATION: 28 %
IPAP: 0
LYMPHOCYTES # BLD AUTO: 1.03 10*3/MM3 (ref 0.7–3.1)
LYMPHOCYTES NFR BLD AUTO: 5.8 % (ref 19.6–45.3)
MCH RBC QN AUTO: 30.9 PG (ref 26.6–33)
MCHC RBC AUTO-ENTMCNC: 31.6 G/DL (ref 31.5–35.7)
MCV RBC AUTO: 97.8 FL (ref 79–97)
METHGB BLD QL: 0.2 % (ref 0–1.5)
MODALITY: ABNORMAL
MONOCYTES # BLD AUTO: 0.74 10*3/MM3 (ref 0.1–0.9)
MONOCYTES NFR BLD AUTO: 4.1 % (ref 5–12)
MYCOBACTERIUM SPEC CULT: NORMAL
MYCOBACTERIUM SPEC CULT: NORMAL
NEUTROPHILS NFR BLD AUTO: 15.85 10*3/MM3 (ref 1.7–7)
NEUTROPHILS NFR BLD AUTO: 88.9 % (ref 42.7–76)
NIGHT BLUE STAIN TISS: NORMAL
NIGHT BLUE STAIN TISS: NORMAL
NOTE: ABNORMAL
NRBC BLD AUTO-RTO: 0 /100 WBC (ref 0–0.2)
OXYHGB MFR BLDV: 95.7 % (ref 94–99)
PAW @ PEAK INSP FLOW SETTING VENT: 0 CMH2O
PCO2 BLDA: 36.1 MM HG (ref 35–45)
PCO2 TEMP ADJ BLD: 36.1 MM HG (ref 35–48)
PH BLDA: 7.38 PH UNITS (ref 7.35–7.45)
PH, TEMP CORRECTED: 7.38 PH UNITS
PLATELET # BLD AUTO: 213 10*3/MM3 (ref 140–450)
PMV BLD AUTO: 9.7 FL (ref 6–12)
PO2 BLDA: 83.2 MM HG (ref 83–108)
PO2 TEMP ADJ BLD: 83.2 MM HG (ref 83–108)
POTASSIUM SERPL-SCNC: 4.1 MMOL/L (ref 3.5–5.2)
RBC # BLD AUTO: 2.78 10*6/MM3 (ref 4.14–5.8)
SODIUM SERPL-SCNC: 142 MMOL/L (ref 136–145)
TOTAL RATE: 0 BREATHS/MINUTE
WBC NRBC COR # BLD: 17.84 10*3/MM3 (ref 3.4–10.8)

## 2023-05-24 PROCEDURE — 83050 HGB METHEMOGLOBIN QUAN: CPT

## 2023-05-24 PROCEDURE — 82375 ASSAY CARBOXYHB QUANT: CPT

## 2023-05-24 PROCEDURE — 94664 DEMO&/EVAL PT USE INHALER: CPT

## 2023-05-24 PROCEDURE — 71045 X-RAY EXAM CHEST 1 VIEW: CPT

## 2023-05-24 PROCEDURE — 99232 SBSQ HOSP IP/OBS MODERATE 35: CPT | Performed by: INTERNAL MEDICINE

## 2023-05-24 PROCEDURE — 25010000002 AMIODARONE IN DEXTROSE 5% 360-4.14 MG/200ML-% SOLUTION: Performed by: NURSE PRACTITIONER

## 2023-05-24 PROCEDURE — 94761 N-INVAS EAR/PLS OXIMETRY MLT: CPT

## 2023-05-24 PROCEDURE — 97166 OT EVAL MOD COMPLEX 45 MIN: CPT

## 2023-05-24 PROCEDURE — 85025 COMPLETE CBC W/AUTO DIFF WBC: CPT | Performed by: INTERNAL MEDICINE

## 2023-05-24 PROCEDURE — 25010000002 DAPTOMYCIN PER 1 MG: Performed by: INTERNAL MEDICINE

## 2023-05-24 PROCEDURE — 94799 UNLISTED PULMONARY SVC/PX: CPT

## 2023-05-24 PROCEDURE — 82805 BLOOD GASES W/O2 SATURATION: CPT

## 2023-05-24 PROCEDURE — 63710000001 INSULIN DETEMIR PER 5 UNITS: Performed by: INTERNAL MEDICINE

## 2023-05-24 PROCEDURE — 80048 BASIC METABOLIC PNL TOTAL CA: CPT | Performed by: INTERNAL MEDICINE

## 2023-05-24 PROCEDURE — 25010000002 DEXAMETHASONE PER 1 MG: Performed by: INTERNAL MEDICINE

## 2023-05-24 PROCEDURE — 92526 ORAL FUNCTION THERAPY: CPT

## 2023-05-24 PROCEDURE — 99233 SBSQ HOSP IP/OBS HIGH 50: CPT | Performed by: INTERNAL MEDICINE

## 2023-05-24 PROCEDURE — 97162 PT EVAL MOD COMPLEX 30 MIN: CPT

## 2023-05-24 PROCEDURE — 85730 THROMBOPLASTIN TIME PARTIAL: CPT | Performed by: INTERNAL MEDICINE

## 2023-05-24 PROCEDURE — 97530 THERAPEUTIC ACTIVITIES: CPT

## 2023-05-24 PROCEDURE — 82948 REAGENT STRIP/BLOOD GLUCOSE: CPT

## 2023-05-24 RX ORDER — TERAZOSIN 1 MG/1
1 CAPSULE ORAL DAILY
Status: DISCONTINUED | OUTPATIENT
Start: 2023-05-24 | End: 2023-06-03 | Stop reason: HOSPADM

## 2023-05-24 RX ORDER — AMIODARONE HYDROCHLORIDE 200 MG/1
200 TABLET ORAL EVERY 12 HOURS SCHEDULED
Status: DISCONTINUED | OUTPATIENT
Start: 2023-05-24 | End: 2023-06-03 | Stop reason: HOSPADM

## 2023-05-24 RX ORDER — BUMETANIDE 0.25 MG/ML
1 INJECTION INTRAMUSCULAR; INTRAVENOUS ONCE
Status: COMPLETED | OUTPATIENT
Start: 2023-05-24 | End: 2023-05-24

## 2023-05-24 RX ORDER — ACETAMINOPHEN 325 MG/1
650 TABLET ORAL EVERY 4 HOURS PRN
Status: DISCONTINUED | OUTPATIENT
Start: 2023-05-24 | End: 2023-06-03 | Stop reason: HOSPADM

## 2023-05-24 RX ORDER — CARVEDILOL 12.5 MG/1
12.5 TABLET ORAL 2 TIMES DAILY WITH MEALS
Status: DISCONTINUED | OUTPATIENT
Start: 2023-05-24 | End: 2023-05-25

## 2023-05-24 RX ORDER — POLYETHYLENE GLYCOL 3350 17 G/17G
17 POWDER, FOR SOLUTION ORAL DAILY PRN
Status: DISCONTINUED | OUTPATIENT
Start: 2023-05-24 | End: 2023-06-03 | Stop reason: HOSPADM

## 2023-05-24 RX ORDER — AMOXICILLIN 250 MG
2 CAPSULE ORAL 2 TIMES DAILY
Status: DISCONTINUED | OUTPATIENT
Start: 2023-05-24 | End: 2023-06-03 | Stop reason: HOSPADM

## 2023-05-24 RX ORDER — ASPIRIN 81 MG/1
81 TABLET, CHEWABLE ORAL DAILY
Status: DISCONTINUED | OUTPATIENT
Start: 2023-05-25 | End: 2023-06-03 | Stop reason: HOSPADM

## 2023-05-24 RX ORDER — ATORVASTATIN CALCIUM 40 MG/1
80 TABLET, FILM COATED ORAL NIGHTLY
Status: DISCONTINUED | OUTPATIENT
Start: 2023-05-24 | End: 2023-06-03 | Stop reason: HOSPADM

## 2023-05-24 RX ORDER — CYCLOBENZAPRINE HCL 10 MG
5 TABLET ORAL 3 TIMES DAILY PRN
Status: DISCONTINUED | OUTPATIENT
Start: 2023-05-24 | End: 2023-05-24

## 2023-05-24 RX ORDER — CYCLOBENZAPRINE HCL 10 MG
5 TABLET ORAL 3 TIMES DAILY PRN
Status: DISCONTINUED | OUTPATIENT
Start: 2023-05-24 | End: 2023-06-03 | Stop reason: HOSPADM

## 2023-05-24 RX ORDER — BISACODYL 10 MG
10 SUPPOSITORY, RECTAL RECTAL DAILY PRN
Status: DISCONTINUED | OUTPATIENT
Start: 2023-05-24 | End: 2023-06-03 | Stop reason: HOSPADM

## 2023-05-24 RX ADMIN — PANTOPRAZOLE SODIUM 40 MG: 40 INJECTION, POWDER, LYOPHILIZED, FOR SOLUTION INTRAVENOUS at 10:13

## 2023-05-24 RX ADMIN — CARVEDILOL 12.5 MG: 12.5 TABLET, FILM COATED ORAL at 10:12

## 2023-05-24 RX ADMIN — Medication 10 ML: at 10:13

## 2023-05-24 RX ADMIN — CYCLOBENZAPRINE 5 MG: 10 TABLET, FILM COATED ORAL at 12:02

## 2023-05-24 RX ADMIN — DEXAMETHASONE SODIUM PHOSPHATE 4 MG: 4 INJECTION INTRA-ARTICULAR; INTRALESIONAL; INTRAMUSCULAR; INTRAVENOUS; SOFT TISSUE at 10:13

## 2023-05-24 RX ADMIN — DAPTOMYCIN 500 MG: 500 INJECTION, POWDER, LYOPHILIZED, FOR SOLUTION INTRAVENOUS at 11:00

## 2023-05-24 RX ADMIN — IPRATROPIUM BROMIDE AND ALBUTEROL SULFATE 3 ML: 2.5; .5 SOLUTION RESPIRATORY (INHALATION) at 12:33

## 2023-05-24 RX ADMIN — ASPIRIN 81 MG CHEWABLE TABLET 81 MG: 81 TABLET CHEWABLE at 10:12

## 2023-05-24 RX ADMIN — APIXABAN 5 MG: 5 TABLET, FILM COATED ORAL at 12:02

## 2023-05-24 RX ADMIN — Medication 10 ML: at 21:16

## 2023-05-24 RX ADMIN — TERAZOSIN HYDROCHLORIDE 1 MG: 1 CAPSULE ORAL at 10:12

## 2023-05-24 RX ADMIN — AMIODARONE HYDROCHLORIDE 200 MG: 200 TABLET ORAL at 10:15

## 2023-05-24 RX ADMIN — CARVEDILOL 12.5 MG: 12.5 TABLET, FILM COATED ORAL at 18:06

## 2023-05-24 RX ADMIN — AMIODARONE HYDROCHLORIDE 200 MG: 200 TABLET ORAL at 21:15

## 2023-05-24 RX ADMIN — ATORVASTATIN CALCIUM 80 MG: 40 TABLET, FILM COATED ORAL at 21:15

## 2023-05-24 RX ADMIN — EMPAGLIFLOZIN 10 MG: 10 TABLET, FILM COATED ORAL at 10:12

## 2023-05-24 RX ADMIN — BUMETANIDE 1 MG: 0.25 INJECTION, SOLUTION INTRAMUSCULAR; INTRAVENOUS at 10:25

## 2023-05-24 RX ADMIN — AMIODARONE HYDROCHLORIDE 0.5 MG/MIN: 1.8 INJECTION, SOLUTION INTRAVENOUS at 02:51

## 2023-05-24 RX ADMIN — APIXABAN 5 MG: 5 TABLET, FILM COATED ORAL at 21:15

## 2023-05-24 RX ADMIN — IPRATROPIUM BROMIDE AND ALBUTEROL SULFATE 3 ML: 2.5; .5 SOLUTION RESPIRATORY (INHALATION) at 07:03

## 2023-05-24 NOTE — PROGRESS NOTES
Intensive Care Follow-up     Hospital:  LOS: 5 days   Mr. Timmy Guajardo, 72 y.o. male is followed for:   Pneumonia of left lower lobe due to infectious organism        Subjective     72 y.o. male with PMHx significant for PE on Eliquis, HTN, dyslipidemia, HFrEF (30% on 10/2022), T2DM, CVA with right-sided residual weakness, and GERD.  He was admitted to the ICU on 5/21/2023 as a transfer from the floor.  He was initially admitted with a pulmonary embolism and a left lower lobe pneumonia versus infarct on 5/19/2023.  This morning he suddenly went into A-fib with RVR became hypotensive with prompted transition to the ICU.  Interval History:  The chart has been reviewed.  The patient has remained afebrile.  He remains off pressors.  He was successfully extubated yesterday.  Culture data has been reviewed and remains negative on repeat blood cultures.  He states that he is feeling relatively well.  He specifically denies shortness of breath or chest pain currently.    The patient's past medical, surgical and social history were reviewed and updated in Epic as appropriate.        Objective     Infusions:  amiodarone, 0.5 mg/min, Last Rate: 0.5 mg/min (05/24/23 0251)  fentanyl 10 mcg/mL,  mcg/hr, Last Rate: Stopped (05/23/23 0903)  heparin, 14 Units/kg/hr, Last Rate: 14 Units/kg/hr (05/24/23 0418)  norepinephrine, 0.02-0.3 mcg/kg/min, Last Rate: Stopped (05/23/23 0630)  Pharmacy to Dose Heparin,   phenylephrine, 0.5-3 mcg/kg/min, Last Rate: Stopped (05/22/23 0535)  vasopressin, 0.03 Units/min, Last Rate: Stopped (05/22/23 2130)      Medications:  aspirin, 81 mg, Nasogastric, Daily  atorvastatin, 80 mg, Nasogastric, Nightly  chlorhexidine, 15 mL, Mouth/Throat, Q12H  DAPTOmycin, 8 mg/kg, Intravenous, Q24H  dexamethasone, 4 mg, Intravenous, Daily  empagliflozin, 10 mg, Oral, Daily  fluticasone, 2 spray, Nasal, Daily  insulin detemir, 10 Units, Subcutaneous, Daily  insulin regular, 0-9 Units, Subcutaneous,  "Q6H  ipratropium-albuterol, 3 mL, Nebulization, Q6H While Awake - RT  pantoprazole, 40 mg, Intravenous, Q24H  senna-docusate sodium, 2 tablet, Nasogastric, BID  sodium chloride, 10 mL, Intravenous, Q12H        Vital Sign Min/Max for last 24 hours  Temp  Min: 96.6 °F (35.9 °C)  Max: 97.3 °F (36.3 °C)   BP  Min: 128/71  Max: 160/79   Pulse  Min: 69  Max: 70   Resp  Min: 16  Max: 20   SpO2  Min: 97 %  Max: 100 %   Flow (L/min)  Min: 2  Max: 4       Input/Output for last 24 hour shift  05/23 0701 - 05/24 0700  In: 981.7 [P.O.:240; I.V.:661.7]  Out: 2200 [Urine:2200]   FiO2 (%):  [28 %] 28 %  PEEP/CPAP (cm H2O):  [5 cm H20] 5 cm H20  DE SUP:  [10 cm H20] 10 cm H20  MAP (cm H2O):  [8.3] 8.3  Objective:  General Appearance:  Ill-appearing, in no acute distress and uncomfortable.    Vital signs: (most recent): Blood pressure 160/79, pulse 70, temperature 96.6 °F (35.9 °C), temperature source Bladder, resp. rate 18, height 177.8 cm (70\"), weight 65.1 kg (143 lb 8.3 oz), SpO2 100 %.    HEENT: (Nasogastric tube in place.)    Lungs:  Normal effort and normal respiratory rate.  Breath sounds clear to auscultation.  He is not in respiratory distress.  No stridor.  No rales, decreased breath sounds, wheezes or rhonchi.    Heart: Normal rate.  Regular rhythm.  S1 normal and S2 normal.    Chest: Symmetric chest wall expansion.   Abdomen: Abdomen is soft and non-distended.  There is no abdominal tenderness.     Extremities: Normal range of motion.  There is no dependent edema.    Neurological: Patient is alert and oriented to person, place and time.    Pupils:  Pupils are equal, round, and reactive to light.  Pupils are equal.   Skin:  Warm.            Results from last 7 days   Lab Units 05/24/23  0307 05/23/23  0432 05/22/23  0618   WBC 10*3/mm3 17.84* 20.17* 20.19*   HEMOGLOBIN g/dL 8.6* 8.4* 8.1*   PLATELETS 10*3/mm3 213 188 200     Results from last 7 days   Lab Units 05/24/23  0307 05/23/23  0432 05/22/23  0618 05/21/23  1934 " 05/21/23  0433   SODIUM mmol/L 142 136 136   < > 131*   POTASSIUM mmol/L 4.1 4.9 4.3   < > 4.6   CO2 mmol/L 20.0* 17.0* 16.0*   < > 18.0*   BUN mg/dL 29* 30* 29*   < > 23   CREATININE mg/dL 1.25 1.48* 1.57*   < > 0.98   MAGNESIUM mg/dL  --  2.6* 2.8*  --  1.8   PHOSPHORUS mg/dL  --   --  5.0*  --   --    GLUCOSE mg/dL 74 131* 230*   < > 241*    < > = values in this interval not displayed.     Estimated Creatinine Clearance: 50.6 mL/min (by C-G formula based on SCr of 1.25 mg/dL).    Results from last 7 days   Lab Units 05/24/23  0341   PH, ARTERIAL pH units 7.375   PCO2, ARTERIAL mm Hg 36.1   PO2 ART mm Hg 83.2         I reviewed the patient's results and images.     Assessment & Plan   Impression        Pneumonia of left lower lobe due to infectious organism    HFrEF (EF 30%)    History of CVA (cerebrovascular accident)    GERD (gastroesophageal reflux disease)    Confusion    Pulmonary embolus    Atrial fibrillation with RVR    Hypotension    Acute respiratory failure with hypoxia    Shock, likely multifactorial       Plan        Continue with antimicrobial therapy as per the infectious disease service recommendations.  I appreciate their help.  We will continue to follow cultures closely.  Pulmonary hygiene has been encouraged.  Evaluate for dysphagia and if he is able to pass we will discontinue the feeding tube.  Continue with amiodarone and heparin for now.  Physical and Occupational Therapy will be consulted for further therapy.  Continue to avoid all nephrotoxins.  Follow-up labs have been placed for tomorrow morning.    Plan of care and goals reviewed with mulitdisciplinary/antibiotic stewardship team during rounds.   I discussed the patient's findings and my recommendations with patient     High level of risk due to:  drug(s) requiring intensive monitoring for toxicity and parenteral controlled substances.      Jarrett Buenrostro MD, [facility]  Pulmonology and Critical Care Medicine

## 2023-05-24 NOTE — PLAN OF CARE
Goal Outcome Evaluation:  Plan of Care Reviewed With: patient        Progress: improving  Outcome Evaluation: PT initial eval completed. Pt limited by decreased functional endurance, residual R weakness/incoordination, and balance deficit compared to baseline. Pt ambulated 4ft with max Ax2 and BUE support requiring manual assist to advance RLE. Rec continued skilled PT to increase indep with mobility. d/c rec for SNF.

## 2023-05-24 NOTE — PROGRESS NOTES
INFECTIOUS DISEASE CONSULT/INITIAL HOSPITAL VISIT    Timmy Guajardo  1950  [id number]    Date of Consult: 5/24/2023    Admission Date: 5/19/2023      Requesting Provider: No ref. provider found  Evaluating Physician: Travon Brito MD    Reason for Consultation: Positive blood cultures    History of present illness:    Patient is a 72 y.o. male with past medical history of pulmonary embolism, dyslipidemia, heart failure, type II d. mellitus, CVA with right-sided residual weakness and GERD.      Patient admitted to hospital for pulmonary embolism left lower lobe pneumonia versus infarction on May/9/2023 patient had atrial fibrillation with RVR with hypotension has been transferred the ICU.      Patient found have positive blood cultures for staph coccus epidermidis.  We are being consulted for further interpretation of these positive blood cultures.    Patient reports having indwelling pacemaker/ ICD patient says this is fired 1 time since he has had it placed        5/24/23; no events overnight Noemy antibiotics no complaints      Past Medical History:   Diagnosis Date   • Cardiomyopathy    • CHF (congestive heart failure)    • Coronary artery disease    • COVID-19    • Diabetes mellitus    • GERD (gastroesophageal reflux disease)    • HTN (hypertension)    • Pleural effusion    • Stroke     Right sided weakness   • Stroke        Past Surgical History:   Procedure Laterality Date   • AMPUTATION DIGIT Left 5/12/2021    Procedure: AMPUTATION DIGIT - GREAT TOE AMPUTATION LEFT;  Surgeon: Dario Marmolejo MD;  Location: [facility] OR;  Service: General;  Laterality: Left;   • AORTAGRAM N/A 5/11/2021    Procedure: AORTAGRAM WITH RUNOFFS, LEFT SFA BALLOON ANGIOPLASTY;  Surgeon: Tim Mahan MD;  Location: [facility] HYBRID SHELIA;  Service: Vascular;  Laterality: N/A;  FL TIME 6 MIN 54 SECS  82 MGY  CONTRAST VISIPAQUE 100ML     • CARDIAC CATHETERIZATION     • CARDIAC PACEMAKER PLACEMENT      pacemaker/defibrillator,  [facility]   • COLONOSCOPY N/A 3/31/2023    Procedure: COLONOSCOPY;  Surgeon: Brunner, Mark I, MD;  Location:  [facility];  Service: Gastroenterology;  Laterality: N/A;   • ENDOSCOPY N/A 3/29/2023    Procedure: ESOPHAGOGASTRODUODENOSCOPY;  Surgeon: Brunner, Mark I, MD;  Location:  [facility];  Service: Gastroenterology;  Laterality: N/A;   • HERNIA REPAIR Bilateral     Inguinal hernia   • HIP TROCHANTERIC NAILING WITH INTRAMEDULLARY HIP SCREW Right 10/18/2022    Procedure: HIP TROCHANTERIC NAILING WITH INTRAMEDULLARY HIP SCREW RIGHT;  Surgeon: Bj Steinberg MD;  Location:  [facility];  Service: Orthopedics;  Laterality: Right;   • THORACOSCOPY VIDEO ASSISTED WITH LOBECTOMY Right 4/12/2023    Procedure: BRONCHOSCOPY, THORACOSCOPY VIDEO ASSISTED WITH DECORTICATION, MECHANICAL PLEURODESIS;  Surgeon: Tim Mahan MD;  Location:  [facility];  Service: Cardiothoracic;  Laterality: Right;       Family History   Problem Relation Age of Onset   • Alzheimer's disease Mother    • Kidney failure Mother    • Cancer Father    • Heart failure Father        Social History     Socioeconomic History   • Marital status: Single   • Number of children: 0   Tobacco Use   • Smoking status: Former     Packs/day: 1.00     Years: 30.00     Pack years: 30.00     Types: Cigarettes     Quit date: 2013     Years since quitting: 10.3   • Smokeless tobacco: Never   • Tobacco comments:     quit 2015   Vaping Use   • Vaping Use: Never used   Substance and Sexual Activity   • Alcohol use: Never   • Drug use: Never   • Sexual activity: Defer       Allergies   Allergen Reactions   • Other Unknown - Low Severity     Mercury metals- as a child         Medication:    Current Facility-Administered Medications:   •  acetaminophen (TYLENOL) tablet 650 mg, 650 mg, Oral, Q4H PRN, Jarrett Buenrostro MD  •  amiodarone (PACERONE) tablet 200 mg, 200 mg, Oral, Q12H, Radha Chavarria PA-C, 200 mg at 05/24/23 1015  •  apixaban (ELIQUIS) tablet 5  mg, 5 mg, Oral, Q12H, Radha Chavarria PA-C, 5 mg at 05/24/23 1202  •  [START ON 5/25/2023] aspirin chewable tablet 81 mg, 81 mg, Oral, Daily, Jarrett Buenrostro MD  •  atorvastatin (LIPITOR) tablet 80 mg, 80 mg, Oral, Nightly, Jarrett Buenrostro MD  •  sennosides-docusate (PERICOLACE) 8.6-50 MG per tablet 2 tablet, 2 tablet, Oral, BID **AND** polyethylene glycol (MIRALAX) packet 17 g, 17 g, Oral, Daily PRN **AND** [DISCONTINUED] bisacodyl (DULCOLAX) EC tablet 5 mg, 5 mg, Oral, Daily PRN **AND** bisacodyl (DULCOLAX) suppository 10 mg, 10 mg, Rectal, Daily PRN, Jarrett Buenrostro MD  •  Calcium Replacement - Follow Nurse / BPA Driven Protocol, , Does not apply, PRN, Lacho Veras III, DO  •  carvedilol (COREG) tablet 12.5 mg, 12.5 mg, Oral, BID With Meals, Jarrett Buenrostro MD, 12.5 mg at 05/24/23 1012  •  cyclobenzaprine (FLEXERIL) tablet 5 mg, 5 mg, Oral, TID PRN, Jarrett Buenrostro MD  •  DAPTOmycin (CUBICIN) 500 mg in sodium chloride 0.9 % 50 mL IVPB, 8 mg/kg, Intravenous, Q24H, Jarrett Buenrostro MD, Last Rate: 100 mL/hr at 05/24/23 1100, 500 mg at 05/24/23 1100  •  empagliflozin (JARDIANCE) tablet 10 mg, 10 mg, Oral, Daily, Jarrett Mueller MD, 10 mg at 05/24/23 1012  •  fluticasone (FLONASE) 50 MCG/ACT nasal spray 2 spray, 2 spray, Nasal, Daily, Lacho Veras III, DO, 2 spray at 05/20/23 0900  •  insulin detemir (LEVEMIR) injection 10 Units, 10 Units, Subcutaneous, Daily, Jarrett Buenrostro MD, 10 Units at 05/23/23 0851  •  insulin regular (humuLIN R,novoLIN R) injection 0-9 Units, 0-9 Units, Subcutaneous, Q6H, Micaela Deal, YUE, 2 Units at 05/22/23 1712  •  ipratropium-albuterol (DUO-NEB) nebulizer solution 3 mL, 3 mL, Nebulization, Q6H While Awake - RT, Lacho Veras III, DO, 3 mL at 05/24/23 1233  •  Magnesium Cardiology Dose Replacement - Follow Nurse / BPA Driven Protocol, , Does not apply, PRN, Destiny Rosas, PharmD  •  nitroglycerin (NITROSTAT) SL tablet 0.4  mg, 0.4 mg, Sublingual, Q5 Min PRN, Lacho Veras III, DO  •  norepinephrine (LEVOPHED) 8 mg in 250 mL NS infusion (premix), 0.02-0.3 mcg/kg/min, Intravenous, Titrated, Zina Doty, APRN, Stopped at 05/23/23 0630  •  ondansetron (ZOFRAN) injection 4 mg, 4 mg, Intravenous, Q6H PRN, Lacho Veras III, DO  •  pantoprazole (PROTONIX) injection 40 mg, 40 mg, Intravenous, Q24H, Tim Terrazas MD, 40 mg at 05/24/23 1013  •  phenylephrine (AMRIT-SYNEPHRINE) 50 mg in sodium chloride 0.9 % 250 mL infusion, 0.5-3 mcg/kg/min, Intravenous, Titrated, Zina Doty, APRN, Stopped at 05/22/23 0535  •  Phosphorus Replacement - Follow Nurse / BPA Driven Protocol, , Does not apply, PRN, Lacho Veras III, DO  •  Potassium Replacement - Follow Nurse / BPA Driven Protocol, , Does not apply, PRN, Lacho Veras III, DO  •  sodium chloride 0.9 % flush 10 mL, 10 mL, Intravenous, PRN, Brady Dumont, DO  •  sodium chloride 0.9 % flush 10 mL, 10 mL, Intravenous, Q12H, Lacho Veras III, DO, 10 mL at 05/24/23 1013  •  sodium chloride 0.9 % flush 10 mL, 10 mL, Intravenous, PRN, Lacho Veras III, DO  •  sodium chloride 0.9 % infusion 40 mL, 40 mL, Intravenous, PRN, Lacho Veras III, DO  •  terazosin (HYTRIN) capsule 1 mg, 1 mg, Oral, Daily, Jarrett Buenrostro MD, 1 mg at 05/24/23 1012  •  Vasopressin (VASOSTRICT) 0.2 UNIT/ML solution, 0.03 Units/min, Intravenous, Continuous, Jarrett Buenrostro MD, Stopped at 05/22/23 2130    Antibiotics:  Anti-Infectives (From admission, onward)    Ordered     Dose/Rate Route Frequency Start Stop    05/23/23 0920  DAPTOmycin (CUBICIN) 500 mg in sodium chloride 0.9 % 50 mL IVPB        Ordering Provider: Jarrett Buenrostro MD    8 mg/kg × 65.2 kg  100 mL/hr over 30 Minutes Intravenous Every 24 Hours 05/23/23 1100 05/30/23 1059    05/19/23 2036  piperacillin-tazobactam (ZOSYN) 3.375 g in iso-osmotic dextrose 50 ml (premix)         Ordering Provider: Brady Dumont DO    3.375 g  over 30 Minutes Intravenous Once 23  vancomycin 1250 mg/250 mL 0.9% NS IVPB ([facility])        Ordering Provider: Brady Dumont DO    20 mg/kg × 63.5 kg  over 90 Minutes Intravenous Once 23 0052            Review of Systems:  See HPI    Physical Exam:   Vital Signs  Temp (24hrs), Av °F (36.1 °C), Min:96.6 °F (35.9 °C), Max:97.5 °F (36.4 °C)    Temp  Min: 96.6 °F (35.9 °C)  Max: 97.5 °F (36.4 °C)  BP  Min: 127/83  Max: 169/84  Pulse  Min: 69  Max: 70  Resp  Min: 16  Max: 20  SpO2  Min: 93 %  Max: 100 %    GENERAL: Awake and alert, in no acute distress.   HEENT: Normocephalic, atraumatic.  PERRL. EOMI. No conjunctival injection. No icterus.  No external oral lesions    HEART: RRR; No murmur  LUNGS: Clear to auscultation bilaterally   ABDOMEN: Soft, nontender, nondistended.  EXT:  No edema  :  Without Rosenberg catheter.  MSK: No joint effusions or erythema  SKIN: Warm and dry without cutaneous eruptions on Inspection/palpation.        Laboratory Data    Results from last 7 days   Lab Units 23  0307 23  0432 23  0618   WBC 10*3/mm3 17.84* 20.17* 20.19*   HEMOGLOBIN g/dL 8.6* 8.4* 8.1*   HEMATOCRIT % 27.2* 27.9* 25.7*   PLATELETS 10*3/mm3 213 188 200     Results from last 7 days   Lab Units 23  0307   SODIUM mmol/L 142   POTASSIUM mmol/L 4.1   CHLORIDE mmol/L 110*   CO2 mmol/L 20.0*   BUN mg/dL 29*   CREATININE mg/dL 1.25   GLUCOSE mg/dL 74   CALCIUM mg/dL 8.8     Results from last 7 days   Lab Units 23  0618   ALK PHOS U/L 127*   BILIRUBIN mg/dL 0.7   ALT (SGPT) U/L 35   AST (SGOT) U/L 44*             Results from last 7 days   Lab Units 23  0618   LACTATE mmol/L 0.8         Results from last 7 days   Lab Units 23  1934   VANCOMYCIN TR mcg/mL 14.70     Estimated Creatinine Clearance: 50.6 mL/min (by C-G formula based on SCr of 1.25 mg/dL).      Microbiology:  Blood Culture    Date Value Ref Range Status   05/19/2023 Staphylococcus epidermidis (C)  Final   05/19/2023 Staphylococcus epidermidis (C)  Final     BCID, PCR   Date Value Ref Range Status   05/19/2023 (A) Negative by BCID PCR. Culture to Follow. Final    Staph spp, not aureus or lugdunensis. Identification by BCID2 PCR.     No results found for: CULTURES, HSVCX, URCX  No results found for: EYECULTURE, GCCX, HSVCULTURE, LABHSV  No results found for: LEGIONELLA, MRSACX, MUMPSCX, MYCOPLASCX  No results found for: NOCARDIACX, STOOLCX  Urine Culture   Date Value Ref Range Status   05/21/2023 No growth  Final     No results found for: VIRALCULTU, WOUNDCX        Radiology:  Imaging Results (Last 72 Hours)     Procedure Component Value Units Date/Time    XR Chest 1 View [[id number]] Collected: 05/24/23 0704     Updated: 05/24/23 0709    Narrative:      XR CHEST 1 VW    Date of Exam: 5/24/2023 2:26 AM EDT    Indication: Intubated Patient    Comparison: Chest x-ray 5/23/2023    Findings:  Pacemaker device is present. Esophagogastric tube seen past the distal esophagus. Internal jugular central venous catheter tip terminates at the super vena cava. There is perihilar opacity. There are small pleural effusions. There is increased opacity in   the left lower lobe. No pneumothorax.    Impression:      Impression:  Pulmonary edema. Increased consolidation retrocardiac left lower lobe. Stable perihilar opacity left greater than right. Small pleural effusions.  Support devices have appropriate position.      Electronically Signed: Micaela Horn    5/24/2023 7:06 AM EDT    Workstation ID: [id number]    XR Chest 1 View [[id number]] Collected: 05/23/23 0731     Updated: 05/23/23 0737    Narrative:      XR CHEST 1 VW    Date of Exam: 5/23/2023 2:57 AM EDT    Indication: Intubated Patient    Comparison: 1 day prior.    Findings:  Support hardware projects unchanged. There is no distinct pneumothorax. Scattered atelectasis and small left pleural effusion  are stable. Unchanged heart and mediastinal contours.      Impression:      Impression:  Support hardware projects unchanged. There is no distinct pneumothorax. Scattered atelectasis and small left pleural effusion are stable. Unchanged heart and mediastinal contours.        Electronically Signed: Nicolas Devang    5/23/2023 7:34 AM EDT    Workstation ID: [id number]    XR Chest 1 View [[id number]] Collected: 05/22/23 1052     Updated: 05/22/23 1056    Narrative:      XR CHEST 1 VW    Date of Exam: 5/22/2023 10:38 AM EDT    Indication: Ventilator tube placement    Comparison: None available.    Findings:  Endotracheal tube approximately 1 cm above the vazquez. Gastric tube and right IJ line remain in place stable bilateral pleural effusions and left-sided airspace disease. No new abnormality      Impression:      Impression:  Endotracheal tube now 1 cm above the vazquez. Stable bilateral pleural effusions and left-sided airspace disease      Electronically Signed: David Cardoso    5/22/2023 10:53 AM EDT    Workstation ID: [id number]    XR Chest 1 View [[id number]] Collected: 05/22/23 0755     Updated: 05/22/23 0801    Narrative:      XR CHEST 1 VW    Date of Exam: 5/22/2023 4:10 AM EDT    Indication: Intubated Patient    Comparison: 5/21/2023    Findings:  Endotracheal tube is positioned just above the vazquez. Gastric tube and right IJ line remain in place. No new tubes or lines. Heart size stable ill-defined opacity in the mid to lower left lung compatible with airspace disease. Small bilateral pleural   effusions which appear grossly stable. Atelectasis in the lung bases. No pneumothorax      Impression:      Impression:    1. Endotracheal tube is just above the vazquez. Recommend retracting the endotracheal tube 3 to 4 cm  2. Stable small bilateral pleural effusions  3. Airspace disease in the mid to lower left lung which may be atelectasis or pneumonia      Electronically Signed: David Cardoso    5/22/2023 7:58 AM EDT     Workstation ID: [id number]    XR Chest 1 View [[id number]] Collected: 05/21/23 2219     Updated: 05/22/23 0022    Narrative:      EXAMINATION: XR CHEST 1 VW    DATE: 5/21/2023 10:35 PM    INDICATION:  Intubated;    COMPARISON:  6:42 AM same day            Impression:        1.  Endotracheal tube tip 2 cm above vazquez, good position.  2.  Enteric tube tip past GE junction and off image.  3.  No other interval change from radiograph earlier today.          Electronically signed by:  Gianni Gonzales M.D.    5/21/2023 10:21 PM Mountain Time            Impression:   Left lower lobe infiltrate from infarction versus infection  Left lower lobe pulmonary embolism  Leukocytosis with neutrophilia  High-grade positive blood culture for staph coccus epidermidis  Elevated procalcitonin  PLAN/RECOMMENDATIONS:   Thank you for asking us to see Timmy Guajardo, I recommend the following:  Given the presence indwelling endovascular hardware staphylococcus epidermidis bacteremia is concerning    High-grade positive blood cultures and indwelling hardware probably warrants further investigation    Patient does have leukocytosis and markedly elevated procalcitonin    Having both a pulmonary embolism and a bacterial pneumonia unless from a septic emboli would be less expected.    Follow-up repeat blood cultures    I have reviewed the transthoracic echocardiogram and there is no mention of tricuspid valve vegetation or pulmonic valve vegetation    order transesophageal echocardiogram to look for any vegetation on the leads of the pacemaker device    There is calcification of the chordae and papillary muscle of the mitral valve    Continue daptomycin for 14-day course  Follow-up repeated blood cultures from 5/22  Reasonable to follow procalcitonin to see if this is descending    Patient has been started on steroids which may explain leukocytosis    If there is concern for vegetation on hardware of pacemaker/IVCD leads may consider  extraction           Travon Brito MD  5/24/2023  15:19 EDT

## 2023-05-24 NOTE — PROGRESS NOTES
"[facility]  IP Progress Note    PROBLEM LIST:  1. CARDIAC  a. Coronary Artery Disease:   i. LHC, data unknown apparently normal    b. Myocardium:   i. LVEF multiple echoes around 30s  ii. Echo, 5/23/2023: LVEF 45%, G2 DD, RV dilated    c. Valvular:   i. No valvular disease    d. Electrical:   i. A-fib  ii. Pleasant [employer] ICD    e. Percardium:   i. Small pericardial effusion    2. VASCULAR:  a. Arterial  i. Cerebrovascular disease:   1. History of CVA with right-sided deficit  2. Negative saline test    ii. Peripheral vascular disease:   1. ABIs, 5/6/2021: Left RADHA 0.67 severe stenosis of SFA.  2. Angioplasty L SFA  3. Left great toe amputation    iii. AAA:   1. Mild dilatation of ascending aorta  2. Infrarenal AAA 3.2    b. Venous:  i. History of pulmonary embolisms      3. CARDIAC RISK FACTORS:  a.        Hypertension  b.        Dyslipidemia:   c.        Tobacco Use: Former Smoker    4. NON-CARDIAC:  a. COVID-19  b. GERD  c. Gangrene of left toe of the foot  d. Pleural effusion    5. SURGERIES:  a. Left digit amputation  b. Bilateral hernia repair  c. Right hip repair  d. Thoracoscopy with lobectomy        HOSPITAL COURSE:  Patient was admitted with possible pneumonia and respiratory failure.  Has been doing fine this morning.  Off of all sedation and awake and responsive.       CHIEF COMPLAINTS:  Respiratory failure with wide-complex tachycardia      Subjective   Patient has been extubated yesterday afternoon.  Sitting up in chair.  Denies any chest pain this morning.  Is a little bit confused after being extubated yesterday.      Objective     Blood pressure 159/70, pulse 70, temperature 97.5 °F (36.4 °C), temperature source Axillary, resp. rate 16, height 177.8 cm (70\"), weight 65.1 kg (143 lb 8.3 oz), SpO2 98 %.     Intake/Output Summary (Last 24 hours) at 5/24/2023 0953  Last data filed at 5/24/2023 0800  Gross per 24 hour   Intake 1034.81 ml   Output 2260 ml   Net " -1225.19 ml       Constitutional:       Appearance: Healthy appearance. Not in distress. Frail.   Eyes:      Conjunctiva/sclera: Conjunctivae normal.   HENT:      Head:      Comments: NG tube in place.   Mouth/Throat:      Pharynx: Oropharynx is clear.   Neck:      Vascular: JVD normal.   Pulmonary:      Effort: Pulmonary effort is normal.      Breath sounds: Examination of the right-lower field reveals decreased breath sounds. Examination of the left-lower field reveals decreased breath sounds. Decreased breath sounds present. No wheezing. No rhonchi. No rales.   Cardiovascular:      Normal rate. Regular rhythm.      Murmurs: There is no murmur.      No rub.   Pulses:     Intact distal pulses.   Abdominal:      General: There is no distension.   Musculoskeletal:         General: No deformity. Skin:     General: Skin is warm and dry.   Neurological:      General: No focal deficit present.      Mental Status: Alert and oriented to person, place and time.           RESULTS REVIEW:    I reviewed the patient's new clinical results.      MEDICATIONS:    aspirin, 81 mg, Nasogastric, Daily  atorvastatin, 80 mg, Nasogastric, Nightly  carvedilol, 12.5 mg, Oral, BID With Meals  DAPTOmycin, 8 mg/kg, Intravenous, Q24H  dexamethasone, 4 mg, Intravenous, Daily  empagliflozin, 10 mg, Oral, Daily  fluticasone, 2 spray, Nasal, Daily  insulin detemir, 10 Units, Subcutaneous, Daily  insulin regular, 0-9 Units, Subcutaneous, Q6H  ipratropium-albuterol, 3 mL, Nebulization, Q6H While Awake - RT  pantoprazole, 40 mg, Intravenous, Q24H  senna-docusate sodium, 2 tablet, Nasogastric, BID  sodium chloride, 10 mL, Intravenous, Q12H  terazosin, 1 mg, Oral, Daily          Results from last 7 days   Lab Units 05/24/23  0307   WBC 10*3/mm3 17.84*   HEMOGLOBIN g/dL 8.6*   HEMATOCRIT % 27.2*   PLATELETS 10*3/mm3 213     Results from last 7 days   Lab Units 05/24/23  0307 05/23/23  0432 05/22/23  0618   SODIUM mmol/L 142   < > 136   POTASSIUM mmol/L  4.1   < > 4.3   CHLORIDE mmol/L 110*   < > 104   CO2 mmol/L 20.0*   < > 16.0*   BUN mg/dL 29*   < > 29*   CREATININE mg/dL 1.25   < > 1.57*   CALCIUM mg/dL 8.8   < > 8.0*   BILIRUBIN mg/dL  --   --  0.7   ALK PHOS U/L  --   --  127*   ALT (SGPT) U/L  --   --  35   AST (SGOT) U/L  --   --  44*   GLUCOSE mg/dL 74   < > 230*    < > = values in this interval not displayed.     Results from last 7 days   Lab Units 05/22/23  0618 05/20/23  0111   INR  1.76* 2.23*     Lab Results   Component Value Date    TROPONINT 62 (C) 05/21/2023                 Iron   Date Value Ref Range Status   10/20/2022 39 (L) 59 - 158 mcg/dL Final     Iron Saturation   Date Value Ref Range Status   10/20/2022 18 (L) 20 - 50 % Final     TIBC   Date Value Ref Range Status   10/20/2022 218 (L) 298 - 536 mcg/dL Final      Hemoglobin A1C   Date Value Ref Range Status   03/25/2023 6.30 (H) 4.80 - 5.60 % Final     Magnesium   Date Value Ref Range Status   05/23/2023 2.6 (H) 1.6 - 2.4 mg/dL Final        Tele: Sinus Rythym    Results for orders placed during the hospital encounter of 05/19/23    Adult Transthoracic Echo Complete W/ Cont if Necessary Per Protocol    Interpretation Summary  •  Left ventricular systolic function is mildly decreased. Calculated left ventricular EF = 45.7%  •  Left ventricular wall thickness is consistent with mild concentric hypertrophy.  •  Left ventricular diastolic function is consistent with (grade II w/high LAP) pseudonormalization.  •  The right ventricular cavity is mildly dilated.  •  Estimated right ventricular systolic pressure from tricuspid regurgitation is normal (<35 mmHg). Calculated right ventricular systolic pressure from tricuspid regurgitation is 18 mmHg.  •  Mild dilation of the ascending aorta is present.  •  There is a small (<1cm) pericardial effusion. There is no evidence of cardiac tamponade.  •  EF appears improved from previous with persistent stable regional wall motion abnormality of the  inferolateral wall    Assessment:   1. A-fib with RVR, no obvious signs of VT as thought upon admission.  2. Nonischemic cardiomyopathy s/p BiV ICD 2015, EF around 45%.  3. History of bilateral pulmonary emboli.   4. PVD  5. Acute on chronic kidney disease  6. Acute hypoxic respiratory failure with pneumonia, extubated 5/23      Plan:   1. Transition amiodarone drip to oral amiodarone.  200 mg twice daily to start today.  Stop amiodarone drip after his oral dose has been given.  2. If patient is not scheduled for any procedures in the foreseeable future can be transition from heparin drip to Eliquis 5 mg twice daily.  3. Continue carvedilol 12.5 mg twice daily.  Continue Jardiance 10 mg daily.  Kidney function is improving may be able to add back lisinopril tomorrow.  4. Will give one-time dose of Bumex 1 mg IV given patient's mild fluid overload and continue to monitor patient's kidney function.    Electronically signed by Radha Chavarria PA-C, 05/24/23, 10:17 AM EDT.          STAFF CARDIOLOGIST:      SUMMARY:    1. 72 years old admitted with respiratory failure      PERTINENT:    1. Extubated  2. Sitting on chair      IMPRESSION:    1. Possible respiratory failure due to pneumonia  2. Paroxysmal atrial fibrillation      RECOMMENDATIONS:    1. We will continue patient on current medications.  We will switch it to p.o. Amio  2. Patient kidney function is improving  3. May give Entresto a chance this time.        I have seen and examined the patient, reviewed the above note, necessary changes were made and I agree with the final note.   Jarrett Mueller MD

## 2023-05-24 NOTE — PLAN OF CARE
Goal Outcome Evaluation:  Plan of Care Reviewed With: patient        Progress: improving  Outcome Evaluation: OT eval complete. Pt presents w/ significant generalized weakness R>L, balance deficits, and c/o fatigue. Recommend cont skilled IPOT POC to promote return to PLOF. Recommend pt DC to SNF.

## 2023-05-24 NOTE — THERAPY EVALUATION
Patient Name: Timmy Guajardo  : 1950    MRN: [id number]                              Today's Date: 2023       Admit Date: 2023    Visit Dx:     ICD-10-CM ICD-9-CM   1. Confusion  R41.0 298.9   2. Leukocytosis, unspecified type  D72.829 288.60   3. Referred by health care professional  Z02.89 V68.89   4. Abnormal CXR  R93.89 793.2   5. Atrial fibrillation, unspecified type  I48.91 427.31   6. Dysphagia, unspecified type  R13.10 787.20     Patient Active Problem List   Diagnosis   • Acute right-sided weakness   • Suspected cerebrovascular accident (CVA)   • Leukocytosis   • HFrEF (EF 30%)   • Thrombocytopenia   • HTN   • Presence of cardiac pacemaker   • Hyperglycemia   • Gangrene of toe of left foot   • Closed displaced intertrochanteric fracture of right femur, initial encounter   • Right exudative pleural effusion status post decortication 2023   • Unintentional weight loss   • Hypoalbuminemia   • Severe malnutrition   • Bilateral pulmonary embolism   • History of CVA (cerebrovascular accident)   • Debility   • GERD (gastroesophageal reflux disease)   • Hyperlipidemia   • Rt Apical Lung Nodule vs Scarring (needs follow up)   • Confusion   • Pneumonia of left lower lobe due to infectious organism   • Pulmonary embolus   • Atrial fibrillation with RVR   • Hypotension   • Acute respiratory failure with hypoxia   • Shock, likely multifactorial     Past Medical History:   Diagnosis Date   • Cardiomyopathy    • CHF (congestive heart failure)    • Coronary artery disease    • COVID-19    • Diabetes mellitus    • GERD (gastroesophageal reflux disease)    • HTN (hypertension)    • Pleural effusion    • Stroke     Right sided weakness   • Stroke      Past Surgical History:   Procedure Laterality Date   • AMPUTATION DIGIT Left 2021    Procedure: AMPUTATION DIGIT - GREAT TOE AMPUTATION LEFT;  Surgeon: Dario Marmolejo MD;  Location: [facility];  Service: General;  Laterality: Left;   • AORTAGRAM N/A  5/11/2021    Procedure: AORTAGRAM WITH RUNOFFS, LEFT SFA BALLOON ANGIOPLASTY;  Surgeon: Tim Mahan MD;  Location:  [facility];  Service: Vascular;  Laterality: N/A;  FL TIME 6 MIN 54 SECS  82 MGY  CONTRAST VISIPAQUE 100ML     • CARDIAC CATHETERIZATION     • CARDIAC PACEMAKER PLACEMENT      pacemaker/defibrillator, [employer]   • COLONOSCOPY N/A 3/31/2023    Procedure: COLONOSCOPY;  Surgeon: Brunner, Mark I, MD;  Location:  [facility];  Service: Gastroenterology;  Laterality: N/A;   • ENDOSCOPY N/A 3/29/2023    Procedure: ESOPHAGOGASTRODUODENOSCOPY;  Surgeon: Brunner, Mark I, MD;  Location:  [facility];  Service: Gastroenterology;  Laterality: N/A;   • HERNIA REPAIR Bilateral     Inguinal hernia   • HIP TROCHANTERIC NAILING WITH INTRAMEDULLARY HIP SCREW Right 10/18/2022    Procedure: HIP TROCHANTERIC NAILING WITH INTRAMEDULLARY HIP SCREW RIGHT;  Surgeon: Bj Steinberg MD;  Location:  [facility];  Service: Orthopedics;  Laterality: Right;   • THORACOSCOPY VIDEO ASSISTED WITH LOBECTOMY Right 4/12/2023    Procedure: BRONCHOSCOPY, THORACOSCOPY VIDEO ASSISTED WITH DECORTICATION, MECHANICAL PLEURODESIS;  Surgeon: Tim Mahan MD;  Location:  [facility];  Service: Cardiothoracic;  Laterality: Right;      General Information     Row Name 05/24/23 1154          OT Time and Intention    Document Type evaluation  -CS     Mode of Treatment occupational therapy  -CS     Row Name 05/24/23 1154          General Information    Patient Profile Reviewed yes  -CS     Prior Level of Function --  TBA further, pt limited historian  -CS     Existing Precautions/Restrictions fall;oxygen therapy device and L/min;other (see comments)  NG, remote CVA w/ residual R sided weakness, R wrist art line  -CS     Barriers to Rehab medically complex;previous functional deficit;cognitive status  -CS     Row Name 05/24/23 1154          Living Environment    People in Home --  TBA further, pt limited historian  -CS     Row  Name 05/24/23 1154          Cognition    Orientation Status (Cognition) oriented to;person;place;verbal cues/prompts needed for orientation;time;situation  -CS     Row Name 05/24/23 1154          Safety Issues, Functional Mobility    Impairments Affecting Function (Mobility) balance;cognition;coordination;endurance/activity tolerance;motor control;shortness of breath;strength;sensation/sensory awareness;pain  -CS     Cognitive Impairments, Mobility Safety/Performance attention;sequencing abilities;insight into deficits/self-awareness;awareness, need for assistance  -CS           User Key  (r) = Recorded By, (t) = Taken By, (c) = Cosigned By    Initials Name Provider Type    CS Abida Kuhn, RICARDO Occupational Therapist                 Mobility/ADL's     Row Name 05/24/23 1155          Transfers    Transfers sit-stand transfer;stand-sit transfer  -CS     Comment, (Transfers) BUE support w/ B knees blocked  -CS     Row Name 05/24/23 1155          Sit-Stand Transfer    Sit-Stand [place] (Transfers) maximum assist (25% patient effort);verbal cues  -CS     Row Name 05/24/23 1155          Stand-Sit Transfer    Stand-Sit [place] (Transfers) maximum assist (25% patient effort);verbal cues  -CS     Row Name 05/24/23 1155          Activities of Daily Living    BADL Assessment/Intervention feeding;grooming  -CS     Row Name 05/24/23 1155          Self-Feeding Assessment/Training    [place] Level (Feeding) scoop food and bring to mouth;supervision;verbal cues  -CS     Position (Self-Feeding) supported sitting  -CS     Comment, (Feeding) simulated  -CS     Row Name 05/24/23 1155          Grooming Assessment/Training    [place] Level (Grooming) wash face, hands;set up  -CS     Position (Grooming) supported sitting  -CS           User Key  (r) = Recorded By, (t) = Taken By, (c) = Cosigned By    Initials Name Provider Type    CS Abida Kuhn, RICARDO Occupational Therapist               Obj/Interventions     Row  Name 05/24/23 1156          Sensory Assessment (Somatosensory)    Sensory Assessment (Somatosensory) UE sensation intact  -CS     Row Name 05/24/23 1156          Vision Assessment/Intervention    Visual Impairment/Limitations WFL;corrective lenses full-time  -CS     Row Name 05/24/23 1156          Range of Motion Comprehensive    General Range of Motion bilateral upper extremity ROM WFL  -     Row Name 05/24/23 1156          Strength Comprehensive (MMT)    Comment, General Manual Muscle Testing (MMT) Assessment RUE grossly 2-/5, LUE grossly 3+/5  -CS     Row Name 05/24/23 1156          Balance    Balance Assessment sitting static balance;sitting dynamic balance;standing static balance  -     Static Sitting Balance minimal assist  -CS     Dynamic Sitting Balance moderate assist  -CS     Position, Sitting Balance sitting in chair  -CS     Static Standing Balance maximum assist  -CS     Position/Device Used, Standing Balance supported  -     Balance Interventions sitting;standing;static;dynamic;dynamic reaching;occupation based/functional task;weight shifting activity  -CS           User Key  (r) = Recorded By, (t) = Taken By, (c) = Cosigned By    Initials Name Provider Type    CS Abida Kuhn, OT Occupational Therapist               Goals/Plan     Row Name 05/24/23 1158          Transfer Goal 1 (OT)    Activity/Assistive Device (Transfer Goal 1, OT) sit-to-stand/stand-to-sit;toilet  -     [place] Level/Cues Needed (Transfer Goal 1, OT) minimum assist (75% or more patient effort)  -CS     Time Frame (Transfer Goal 1, OT) long term goal (LTG);10 days  -     Progress/Outcome (Transfer Goal 1, OT) goal ongoing  -     Row Name 05/24/23 1158          Dressing Goal 1 (OT)    Activity/Device (Dressing Goal 1, OT) upper body dressing  -     [place]/Cues Needed (Dressing Goal 1, OT) minimum assist (75% or more patient effort)  -CS     Time Frame (Dressing Goal 1, OT) long term goal (LTG);10 days  -CS      Strategies/Barriers (Dressing Goal 1, OT) [employer]  -CS     Progress/Outcome (Dressing Goal 1, OT) goal ongoing  -CS     Row Name 05/24/23 1158          Strength Goal 1 (OT)    Strength Goal 1 (OT) Pt will tolerate BUE AROM HEP x15 reps.  -CS     Time Frame (Strength Goal 1, OT) long term goal (LTG);10 days  -CS     Progress/Outcome (Strength Goal 1, OT) goal ongoing  -CS     Row Name 05/24/23 1158          Therapy Assessment/Plan (OT)    Planned Therapy Interventions (OT) activity tolerance training;adaptive equipment training;BADL retraining;occupation/activity based interventions;functional balance retraining;ROM/therapeutic exercise;strengthening exercise;transfer/mobility retraining  -CS           User Key  (r) = Recorded By, (t) = Taken By, (c) = Cosigned By    Initials Name Provider Type    CS Abida Kuhn, RICARDO Occupational Therapist               Clinical Impression     Row Name 05/24/23 1156          Pain Assessment    Pretreatment Pain Rating 0/10 - no pain  -CS     Posttreatment Pain Rating 0/10 - no pain  -CS     Row Name 05/24/23 1150          Plan of Care Review    Plan of Care Reviewed With patient  -CS     Progress improving  -CS     Outcome Evaluation OT eval complete. Pt presents w/ significant generalized weakness R>L, balance deficits, and c/o fatigue. Recommend cont skilled IPOT POC to promote return to PLOF. Recommend pt DC to SNF.  -CS     Row Name 05/24/23 1153          Therapy Assessment/Plan (OT)    Patient/Family Therapy Goal Statement (OT) Return to PLOF  -CS     Rehab Potential (OT) good, to achieve stated therapy goals  -CS     Criteria for Skilled Therapeutic Interventions Met (OT) yes;skilled treatment is necessary  -CS     Therapy Frequency (OT) daily  -CS     Row Name 05/24/23 1153          Therapy Plan Review/Discharge Plan (OT)    Anticipated Discharge Disposition (OT) skilled nursing facility  -CS     Row Name 05/24/23 1153          Vital Signs    Pre Systolic BP Rehab  --  VSS  -CS     O2 Delivery Pre Treatment nasal cannula  -CS     O2 Delivery Intra Treatment nasal cannula  -CS     O2 Delivery Post Treatment nasal cannula  -CS     Pre Patient Position Sitting  -CS     Intra Patient Position Standing  -CS     Post Patient Position Sitting  -CS     Row Name 05/24/23 1157          Positioning and Restraints    Pre-Treatment Position sitting in chair/recliner  -CS     Post Treatment Position chair  -CS     In Chair notified nsg;reclined;call light within reach;encouraged to call for assist;exit alarm on;RUE elevated;LUE elevated;waffle cushion;on mechanical lift sling;legs elevated;heels elevated  -CS           User Key  (r) = Recorded By, (t) = Taken By, (c) = Cosigned By    Initials Name Provider Type    CS Abida Kuhn, RICARDO Occupational Therapist               Outcome Measures     Row Name 05/24/23 1200          How much help from another is currently needed...    Putting on and taking off regular lower body clothing? 1  -CS     Bathing (including washing, rinsing, and drying) 2  -CS     Toileting (which includes using toilet bed pan or urinal) 1  -CS     Putting on and taking off regular upper body clothing 2  -CS     Taking care of personal grooming (such as brushing teeth) 2  -CS     Eating meals 2  -CS     AM-PAC 6 Clicks Score (OT) 10  -CS     Row Name 05/24/23 1131          How much help from another person do you currently need...    Turning from your back to your side while in flat bed without using bedrails? 2  -AY     Moving from lying on back to sitting on the side of a flat bed without bedrails? 2  -AY     Moving to and from a bed to a chair (including a wheelchair)? 2  -AY     Standing up from a chair using your arms (e.g., wheelchair, bedside chair)? 2  -AY     Climbing 3-5 steps with a railing? 1  -AY     To walk in hospital room? 1  -AY     AM-PAC 6 Clicks Score (PT) 10  -AY     Highest level of mobility 4 --> Transferred to chair/commode  -AY     Row Name  05/24/23 1200 05/24/23 1131       Functional Assessment    Outcome Measure Options AM-PAC 6 Clicks Daily Activity (OT)  - AM-PAC 6 Clicks Basic Mobility (PT)  -AY          User Key  (r) = Recorded By, (t) = Taken By, (c) = Cosigned By    Initials Name Provider Type     Abida Kuhn OT Occupational Therapist    Alie Lopez, PT Physical Therapist                Occupational Therapy Education     Title: PT OT SLP Therapies (In Progress)     Topic: Occupational Therapy (In Progress)     Point: ADL training (In Progress)     Description:   Instruct learner(s) on proper safety adaptation and remediation techniques during self care or transfers.   Instruct in proper use of assistive devices.              Learning Progress Summary           Patient Acceptance, E, NR by  at 5/24/2023 1201                   Point: Home exercise program (Not Started)     Description:   Instruct learner(s) on appropriate technique for monitoring, assisting and/or progressing therapeutic exercises/activities.              Learner Progress:  Not documented in this visit.          Point: Precautions (In Progress)     Description:   Instruct learner(s) on prescribed precautions during self-care and functional transfers.              Learning Progress Summary           Patient Acceptance, E, NR by  at 5/24/2023 1201                   Point: Body mechanics (In Progress)     Description:   Instruct learner(s) on proper positioning and spine alignment during self-care, functional mobility activities and/or exercises.              Learning Progress Summary           Patient Acceptance, E, NR by  at 5/24/2023 1201                               User Key     Initials Effective Dates Name Provider Type Discipline     09/02/21 -  Abida Kuhn OT Occupational Therapist OT              OT Recommendation and Plan  Planned Therapy Interventions (OT): activity tolerance training, adaptive equipment training, BADL retraining,  occupation/activity based interventions, functional balance retraining, ROM/therapeutic exercise, strengthening exercise, transfer/mobility retraining  Therapy Frequency (OT): daily  Plan of Care Review  Plan of Care Reviewed With: patient  Progress: improving  Outcome Evaluation: OT eval complete. Pt presents w/ significant generalized weakness R>L, balance deficits, and c/o fatigue. Recommend cont skilled IPOT POC to promote return to PLOF. Recommend pt DC to SNF.     Time Calculation:    Time Calculation- OT     Row Name 05/24/23 1201             Time Calculation- OT    OT Start Time 1008  -CS      OT Received On 05/24/23  -CS      OT Goal Re-Cert Due Date 06/03/23  -CS         Untimed Charges    OT Eval/Re-eval Minutes 46  -CS         Total Minutes    Untimed Charges Total Minutes 46  -CS       Total Minutes 46  -CS            User Key  (r) = Recorded By, (t) = Taken By, (c) = Cosigned By    Initials Name Provider Type    CS Abida Kuhn OT Occupational Therapist              Therapy Charges for Today     Code Description Service Date Service Provider Modifiers Qty    [id number] HC OT EVAL MOD COMPLEXITY 4 5/24/2023 Abida Kuhn OT GO 1               Abida Kuhn OT  5/24/2023

## 2023-05-24 NOTE — ED PROVIDER NOTES
Subjective   History of Present Illness  Patient is a pleasant 72-year-old male who presents from the [facility] for left lower chest wall pain and leukocytosis.  Patient was recent admitted to the hospital for a DVT/PE.  Currently on Eliquis.  He is found have a white count of 20,000 on lab work at the facility.  Patient is also had increased confusion throughout the day today.  Currently rates his pain as a 7 out of 10.  Denies recent trauma to the affected area.  Denies definitive fever.    Patient has multiple baseline medical elements including coronary disease, type 2 diabetes, baseline right-sided weakness from previous stroke.        Review of Systems   Unable to perform ROS: Mental status change   All other systems reviewed and are negative.      Past Medical History:   Diagnosis Date   • Cardiomyopathy    • CHF (congestive heart failure)    • Coronary artery disease    • COVID-19    • Diabetes mellitus    • GERD (gastroesophageal reflux disease)    • HTN (hypertension)    • Pleural effusion    • Stroke     Right sided weakness   • Stroke        Allergies   Allergen Reactions   • Other Unknown - Low Severity     Mercury metals- as a child       Past Surgical History:   Procedure Laterality Date   • AMPUTATION DIGIT Left 5/12/2021    Procedure: AMPUTATION DIGIT - GREAT TOE AMPUTATION LEFT;  Surgeon: Dario Marmolejo MD;  Location: [facility] OR;  Service: General;  Laterality: Left;   • AORTAGRAM N/A 5/11/2021    Procedure: AORTAGRAM WITH RUNOFFS, LEFT SFA BALLOON ANGIOPLASTY;  Surgeon: Tim Mahan MD;  Location: [facility] HYBRID SHELIA;  Service: Vascular;  Laterality: N/A;  FL TIME 6 MIN 54 SECS  82 MGY  CONTRAST VISIPAQUE 100ML     • CARDIAC CATHETERIZATION     • CARDIAC PACEMAKER PLACEMENT      pacemaker/defibrillator, [employer]   • COLONOSCOPY N/A 3/31/2023    Procedure: COLONOSCOPY;  Surgeon: Brunner, Mark I, MD;  Location: [facility] ENDOSCOPY;  Service: Gastroenterology;  Laterality: N/A;    • ENDOSCOPY N/A 3/29/2023    Procedure: ESOPHAGOGASTRODUODENOSCOPY;  Surgeon: Brunner, Mark I, MD;  Location: [facility] ENDOSCOPY;  Service: Gastroenterology;  Laterality: N/A;   • HERNIA REPAIR Bilateral     Inguinal hernia   • HIP TROCHANTERIC NAILING WITH INTRAMEDULLARY HIP SCREW Right 10/18/2022    Procedure: HIP TROCHANTERIC NAILING WITH INTRAMEDULLARY HIP SCREW RIGHT;  Surgeon: Bj Steinberg MD;  Location: [facility] OR;  Service: Orthopedics;  Laterality: Right;   • THORACOSCOPY VIDEO ASSISTED WITH LOBECTOMY Right 4/12/2023    Procedure: BRONCHOSCOPY, THORACOSCOPY VIDEO ASSISTED WITH DECORTICATION, MECHANICAL PLEURODESIS;  Surgeon: Tim Mahan MD;  Location: [facility] OR;  Service: Cardiothoracic;  Laterality: Right;       Family History   Problem Relation Age of Onset   • Alzheimer's disease Mother    • Kidney failure Mother    • Cancer Father    • Heart failure Father        Social History     Socioeconomic History   • Marital status: Single   • Number of children: 0   Tobacco Use   • Smoking status: Former     Packs/day: 1.00     Years: 30.00     Pack years: 30.00     Types: Cigarettes     Quit date: 2013     Years since quitting: 10.3   • Smokeless tobacco: Never   • Tobacco comments:     quit 2015   Vaping Use   • Vaping Use: Never used   Substance and Sexual Activity   • Alcohol use: Never   • Drug use: Never   • Sexual activity: Defer           Objective   Physical Exam  Vitals and nursing note reviewed.   Constitutional:       General: He is not in acute distress.     Appearance: He is well-developed.   HENT:      Head: Normocephalic and atraumatic.   Eyes:      Conjunctiva/sclera: Conjunctivae normal.      Pupils: Pupils are equal, round, and reactive to light.   Neck:      Thyroid: No thyromegaly.   Cardiovascular:      Rate and Rhythm: Normal rate and regular rhythm.      Heart sounds: Normal heart sounds. No murmur heard.    No friction rub. No gallop.   Pulmonary:      Effort: Pulmonary effort is  normal. No respiratory distress.      Breath sounds: Normal breath sounds.      Comments: Shallow breathing  Abdominal:      General: Bowel sounds are normal.      Palpations: Abdomen is soft.      Tenderness: There is no abdominal tenderness.   Musculoskeletal:         General: Normal range of motion.      Cervical back: Normal range of motion and neck supple.   Lymphadenopathy:      Cervical: No cervical adenopathy.   Skin:     General: Skin is warm and dry.      Capillary Refill: Capillary refill takes less than 2 seconds.   Neurological:      Mental Status: He is alert and oriented to person, place, and time. He is confused.   Psychiatric:         Behavior: Behavior normal.         Procedures           ED Course          CT Abdomen Pelvis Without Contrast    Result Date: 5/19/2023  CT ABDOMEN PELVIS WO CONTRAST Date of Exam: 5/19/2023 10:35 PM EDT Indication: leukocytosis, AMS. Comparison: The 3/24/2023 Technique: Axial CT images were obtained of the abdomen and pelvis without the administration of contrast. Reconstructed coronal and sagittal images were also obtained. Automated exposure control and iterative construction methods were used. Findings: The actual metal-on-metal there is a small right pleural effusion, decreased in size from prior exam. There is a new small left pleural effusion. There is new left lower lobe airspace disease consistent with pneumonia or aspiration. The liver, pancreas, and spleen are within normal limits. There appear to be small stones layering within the gallbladder. No inflammatory change around the gallbladder. No evidence of biliary tract obstruction. Bilateral adrenal glands are within normal limits. No renal or ureteral stone or hydronephrosis. There are bilateral low-density renal masses consistent with renal cyst. There is no abdominal or retroperitoneal adenopathy. The upper GI tract is within normal limits. There is a minimal fusiform and infrarenal abdominal aortic  aneurysm measuring 3.2 x 2.6 cm in size. Pelvis: Urinary bladder is within normal limits. There is thin linear calcification along the posterior wall urinary bladder to the right of midline. Consider cystoscopy when clinically feasible. There are multiple colonic diverticula. No evidence of diverticulitis. Complications there are postoperative changes of the right hip related to prior fracture repair. There is osteopenia and degenerative change of the spine. No lytic or sclerotic bony lesions     Impression: 1. Small bilateral pleural effusions with interval improvement in right pleural effusion. 2. New left lower lobe airspace disease consistent with pneumonia or aspiration. 3. High density material layering within the gallbladder which may represent multiple small stones. No evidence of acute inflammatory change around the gallbladder. 4. Thin linear calcification along the posterior wall of the urinary bladder just to the right of midline which is nonspecific. Cystoscopy may be useful if clinically indicated. 5. Mild infrarenal abdominal aortic aneurysm measuring 3.2 cm in greatest dimension. Electronically Signed: Deon Heredia  5/19/2023 11:00 PM EDT  Workstation ID: [id number]    CT Head Without Contrast    Result Date: 5/19/2023  CT HEAD WO CONTRAST Date of Exam: 5/19/2023 10:35 PM EDT Indication: AMS. Comparison: 5/4/2020 Technique: Axial CT images were obtained of the head without contrast administration.  Reconstructed coronal and sagittal images were also obtained. Automated exposure control and iterative construction methods were used. Findings: There is mild generalized parenchymal volume loss. Consideration with fragments with mild surgery there is no acute infarct or hemorrhage. No abnormal extra-axial fluid collections are identified. There is no mass effect or hydrocephalus. No acute skull fracture. Globes and orbits are within normal limits. Visualized paranasal sinuses and mastoid air cells are  clear.    Impression: 1. Generalized parenchymal volume loss. No acute intracranial abnormality. Electronically Signed: Deon Heredia  5/19/2023 10:50 PM EDT  Workstation ID: [id number]    CT Angiogram Chest    Result Date: 5/19/2023  CT ANGIOGRAM CHEST Date of Exam: 5/19/2023 10:35 PM EDT Indication: rule out PE. Comparison: 4/26/2023 Technique: CTA of the chest was performed before and after the uneventful intravenous administration of Isovue 370. Reconstructed coronal and sagittal images were also obtained. In addition, a 3-D volume rendered image was created for interpretation. Automated exposure control and iterative reconstruction methods were used. Findings: Pulmonary arteries are well-opacified. There are multiple left lower lobe pulmonary emboli. There is adjacent left lower lobe airspace disease which may be due to pulmonary infarct. There is a new small left pleural effusion. There is effusion, decreased  in size from prior exam. There is been interval removal of previously demonstrated right-sided thoracostomy tube. There has been improvement in airspace disease within the right lower lobe consistent with resolving atelectasis. No new or worsening right-sided airspace consolidation. There is underlying emphysematous change of the lungs. There is no mediastinal, hilar, or axillary adenopathy. There is a left subclavian transvenous pacemaker in place. Bilateral adrenal glands are within normal limits. No lytic or sclerotic bony lesions identified.     Impression: 1. Left lower lobe pulmonary emboli. 2. New left lower lobe airspace disease which may be secondary to pulmonary infarct. Pneumonia or aspiration could also give this appearance. 3. New small left pleural effusion. 4. Improving right pleural effusion and improving right basilar airspace disease. Electronically Signed: Deon Heredia  5/19/2023 11:07 PM EDT  Workstation ID: [id number]              XR Chest 1 View        Result Date: 5/19/2023  XR CHEST  1 VW Date of Exam: 5/19/2023 3:33 PM EDT Indication: Weak/Dizzy/AMS triage protocol Comparison: Chest x-ray 5/9/2023 Findings: Redemonstration of biventricular AICD and left chest pulse generator with unremarkable and unchanged appearance of the leads. Stable cardiomediastinal silhouette within normal limits. Decreased size and conspicuity of now small right-sided pleural effusion with improved right base atelectasis. There is new haziness at the left base compatible with new or increased conspicuity of small left-sided pleural effusion and likely some associated left base atelectasis. Mild diffuse interstitial prominence  may reflect a component of mild interstitial edema. No pneumothorax. No acute osseous findings.     Impression: Decreased atelectasis and decreased size and conspicuity of now small right-sided pleural effusion compared to prior exam. New haziness at the left lung base likely reflects new or increased conspicuity of small left layering pleural effusion and likely some associated atelectasis. Mild diffuse interstitial prominence may reflect a component of interstitial edema. Electronically Signed: Fabián Mondragon  5/19/2023 3:49 PM EDT  Workstation ID: [id number]        XR chest ap    Result Date: 5/10/2023  XR CHEST AP Date of Exam: 5/9/2023 11:02 AM EDT Indication: Post Op Right VATS Comparison: Chest x-ray 4/19/2023 Findings: Unchanged appearance of biventricular AICD with left chest pulse generator with unremarkable and unchanged appearance of the leads. Stable cardiomediastinal silhouette within normal limits. There is similar to mildly worsened dense consolidation and haziness at the right lung base, compatible with small to moderate right pleural effusion and associated atelectasis. No evidence of left-sided pleural effusion. No pneumothorax. No acute osseous findings.     Impression: Similar to mildly worsened size and appearance of a small to moderate right pleural effusion with associated  right base opacities likely reflecting atelectasis. Electronically Signed: Fabián Alexanderlamarjoel  5/10/2023 5:51 PM EDT  Workstation ID: [id number]             Latest Reference Range & Units 05/19/23 15:47 05/19/23 15:50 05/19/23 18:28   Lactate 0.5 - 2.0 mmol/L 1.9     Magnesium 1.6 - 2.4 mg/dL 2.0     Procalcitonin 0.00 - 0.25 ng/mL   0.36 (H)   WBC 3.40 - 10.80 10*3/mm3 19.56 (H)     RBC 4.14 - 5.80 10*6/mm3 3.64 (L)     Hemoglobin 13.0 - 17.7 g/dL 11.1 (L)     Hematocrit 37.5 - 51.0 % 35.1 (L)     RDW 12.3 - 15.4 % 14.6     MCV 79.0 - 97.0 fL 96.4     MCH 26.6 - 33.0 pg 30.5     MCHC 31.5 - 35.7 g/dL 31.6     MPV 6.0 - 12.0 fL 10.7     Platelets 140 - 450 10*3/mm3 205     RDW-SD 37.0 - 54.0 fl 52.6     Neutrophil Rel % 42.7 - 76.0 % 89.6 (H)     Lymphocyte Rel % 19.6 - 45.3 % 3.3 (L)     Monocyte Rel % 5.0 - 12.0 % 5.5     Eosinophil Rel % 0.3 - 6.2 % 0.4     Basophil Rel % 0.0 - 1.5 % 0.2     Immature Granulocyte Rel % 0.0 - 0.5 % 1.0 (H)     Neutrophils Absolute 1.70 - 7.00 10*3/mm3 17.54 (H)     Lymphocytes Absolute 0.70 - 3.10 10*3/mm3 0.64 (L)     Monocytes Absolute 0.10 - 0.90 10*3/mm3 1.08 (H)     Eosinophils Absolute 0.00 - 0.40 10*3/mm3 0.07     Basophils Absolute 0.00 - 0.20 10*3/mm3 0.04     Immature Grans, Absolute 0.00 - 0.05 10*3/mm3 0.19 (H)     nRBC 0.0 - 0.2 /100 WBC 0.0     Color, UA Yellow, Straw   Dark Yellow !    Appearance, UA Clear   Cloudy !    Specific Gravity, UA 1.001 - 1.030   1.024    pH, UA 5.0 - 8.0   <=5.0    Glucose Negative   Negative    Ketones, UA Negative   Negative    Blood, UA Negative   Negative    Nitrite, UA Negative   Negative    Leukocytes, UA Negative   Negative    Protein, UA Negative   100 mg/dL (2+) !    Bilirubin, UA Negative   Negative    Urobilinogen, UA 0.2 - 1.0 E.U./dL   1.0 E.U./dL    RBC, UA None Seen, 0-2 /HPF  0-2    WBC, UA None Seen, 0-2 /HPF  0-2    Bacteria, UA None Seen, Trace /HPF  4+ !    Squamous Epithelial Cells, UA None Seen, 0-2 /HPF  3-6 !    Hyaline  Casts, UA 0 - 6 /LPF  0-6    Methodology:   Manual Light Microscopy    (H): Data is abnormally high  (L): Data is abnormally low  !: Data is abnormal                           Medical Decision Making  Abnormal CXR: complicated acute illness or injury  Confusion: complicated acute illness or injury  Leukocytosis, unspecified type: acute illness or injury  Referred by health care professional: complicated acute illness or injury  Amount and/or Complexity of Data Reviewed  Independent Historian: EMS  External Data Reviewed: notes.     Details: Prevnious IM DC summary  Labs: ordered. Decision-making details documented in ED Course.  Radiology: ordered and independent interpretation performed. Decision-making details documented in ED Course.  ECG/medicine tests: ordered and independent interpretation performed. Decision-making details documented in ED Course.  Discussion of management or test interpretation with external provider(s): Antibiotics started per sepsis protocol.  Case discussed with internal medicine attending.  Pt admitted for further evaluation and management.    Risk  Prescription drug management.  Decision regarding hospitalization.                Final diagnoses:   Confusion   Leukocytosis, unspecified type   Referred by health care professional   Abnormal CXR         ED Disposition  ED Disposition     ED Disposition   Decision to Admit    Condition   --    Comment   Level of Care: Telemetry [5]   Diagnosis: Confusion [[id number]]   Admitting Physician: ETHAN BRADLEY [[id number]]   Attending Physician: ETHAN BRADLEY [[id number]]   Bed Request Comments: Brady Baldwin DO  05/24/23 1124

## 2023-05-24 NOTE — THERAPY EVALUATION
Patient Name: Timmy Guajardo  : 1950    MRN: [id number]                              Today's Date: 2023       Admit Date: 2023    Visit Dx:     ICD-10-CM ICD-9-CM   1. Confusion  R41.0 298.9   2. Leukocytosis, unspecified type  D72.829 288.60   3. Referred by health care professional  Z02.89 V68.89   4. Abnormal CXR  R93.89 793.2   5. Atrial fibrillation, unspecified type  I48.91 427.31   6. Dysphagia, unspecified type  R13.10 787.20     Patient Active Problem List   Diagnosis   • Acute right-sided weakness   • Suspected cerebrovascular accident (CVA)   • Leukocytosis   • HFrEF (EF 30%)   • Thrombocytopenia   • HTN   • Presence of cardiac pacemaker   • Hyperglycemia   • Gangrene of toe of left foot   • Closed displaced intertrochanteric fracture of right femur, initial encounter   • Right exudative pleural effusion status post decortication 2023   • Unintentional weight loss   • Hypoalbuminemia   • Severe malnutrition   • Bilateral pulmonary embolism   • History of CVA (cerebrovascular accident)   • Debility   • GERD (gastroesophageal reflux disease)   • Hyperlipidemia   • Rt Apical Lung Nodule vs Scarring (needs follow up)   • Confusion   • Pneumonia of left lower lobe due to infectious organism   • Pulmonary embolus   • Atrial fibrillation with RVR   • Hypotension   • Acute respiratory failure with hypoxia   • Shock, likely multifactorial     Past Medical History:   Diagnosis Date   • Cardiomyopathy    • CHF (congestive heart failure)    • Coronary artery disease    • COVID-19    • Diabetes mellitus    • GERD (gastroesophageal reflux disease)    • HTN (hypertension)    • Pleural effusion    • Stroke     Right sided weakness   • Stroke      Past Surgical History:   Procedure Laterality Date   • AMPUTATION DIGIT Left 2021    Procedure: AMPUTATION DIGIT - GREAT TOE AMPUTATION LEFT;  Surgeon: Dario Marmolejo MD;  Location: [facility];  Service: General;  Laterality: Left;   • AORTAGRAM N/A  5/11/2021    Procedure: AORTAGRAM WITH RUNOFFS, LEFT SFA BALLOON ANGIOPLASTY;  Surgeon: Tim Mahan MD;  Location:  [facility];  Service: Vascular;  Laterality: N/A;  FL TIME 6 MIN 54 SECS  82 MGY  CONTRAST VISIPAQUE 100ML     • CARDIAC CATHETERIZATION     • CARDIAC PACEMAKER PLACEMENT      pacemaker/defibrillator, [employer]   • COLONOSCOPY N/A 3/31/2023    Procedure: COLONOSCOPY;  Surgeon: Brunner, Mark I, MD;  Location:  [facility];  Service: Gastroenterology;  Laterality: N/A;   • ENDOSCOPY N/A 3/29/2023    Procedure: ESOPHAGOGASTRODUODENOSCOPY;  Surgeon: Brunner, Mark I, MD;  Location:  [facility];  Service: Gastroenterology;  Laterality: N/A;   • HERNIA REPAIR Bilateral     Inguinal hernia   • HIP TROCHANTERIC NAILING WITH INTRAMEDULLARY HIP SCREW Right 10/18/2022    Procedure: HIP TROCHANTERIC NAILING WITH INTRAMEDULLARY HIP SCREW RIGHT;  Surgeon: Bj Steinberg MD;  Location:  [facility];  Service: Orthopedics;  Laterality: Right;   • THORACOSCOPY VIDEO ASSISTED WITH LOBECTOMY Right 4/12/2023    Procedure: BRONCHOSCOPY, THORACOSCOPY VIDEO ASSISTED WITH DECORTICATION, MECHANICAL PLEURODESIS;  Surgeon: Tim Mahan MD;  Location:  [facility];  Service: Cardiothoracic;  Laterality: Right;      General Information     Row Name 05/24/23 1121          Physical Therapy Time and Intention    Document Type evaluation  -AY     Mode of Treatment physical therapy  -AY     Row Name 05/24/23 1121          General Information    Patient Profile Reviewed yes  -AY     Prior Level of Function min assist:;all household mobility;gait;transfer;bed mobility  admit from rehab where he was working on ambulating with RWx and assist x1. Prior to recent admission was amb short HH distances with RW or rollator. Owns w/c for comm mobility. Residual R weakness from prior CVA.  -AY     Existing Precautions/Restrictions fall;oxygen therapy device and L/min;other (see comments)  residual R  weakness/incoordination; NG; situational confusion  -AY     Barriers to Rehab medically complex;previous functional deficit;cognitive status  -AY     Row Name 05/24/23 1121          Living Environment    People in Home sibling(s)  lives alone, but planning to stay with sister  -AY     Row Name 05/24/23 1121          Home Main Entrance    Number of Stairs, Main Entrance none  ramp to enter  -AY     Row Name 05/24/23 1121          Stairs Within Home, Primary    Number of Stairs, Within Home, Primary none  -AY     Row Name 05/24/23 1121          Cognition    Orientation Status (Cognition) oriented to;person;place;time  -AY     Row Name 05/24/23 1121          Safety Issues, Functional Mobility    Safety Issues Affecting Function (Mobility) insight into deficits/self-awareness;safety precautions follow-through/compliance;sequencing abilities;awareness of need for assistance;safety precaution awareness  -AY     Impairments Affecting Function (Mobility) balance;cognition;coordination;endurance/activity tolerance;motor control;shortness of breath;strength;sensation/sensory awareness;pain  -AY     Cognitive Impairments, Mobility Safety/Performance attention;awareness, need for assistance;safety precaution awareness  -AY           User Key  (r) = Recorded By, (t) = Taken By, (c) = Cosigned By    Initials Name Provider Type    AY Alie Trujillo PT Physical Therapist               Mobility     Row Name 05/24/23 1124          Bed Mobility    Bed Mobility supine-sit  -AY     Supine-Sit [place] (Bed Mobility) moderate assist (50% patient effort);1 person assist;verbal cues  -AY     Assistive Device (Bed Mobility) bed rails;draw sheet;head of bed elevated  -AY     Comment, (Bed Mobility) cueing for sequencing and hand placement. Manual assist required for RLE advancement and trunk control.  -AY     Row Name 05/24/23 1124          Sit-Stand Transfer    Sit-Stand [place] (Transfers) maximum assist (25% patient effort);2  person assist;verbal cues  -AY     Assistive Device (Sit-Stand Transfers) other (see comments)  BUE support.  -AY     Comment, (Sit-Stand Transfer) improved to mod Ax2 on third attempt  -AY     Row Name 05/24/23 1124          Gait/Stairs (Locomotion)    Mono Level (Gait) maximum assist (25% patient effort);2 person assist;verbal cues  -AY     Assistive Device (Gait) other (see comments)  BUE support  -AY     Distance in Feet (Gait) 4  -AY     Deviations/Abnormal Patterns (Gait) ericka decreased;festinating/shuffling;gait speed decreased;stride length decreased;weight shifting decreased;base of support, narrow;bilateral deviations  -AY     Bilateral Gait Deviations heel strike decreased;forward flexed posture  -AY     Right Sided Gait Deviations knee buckling, right side  -AY     Comment, (Gait/Stairs) pt amb to chair with manual assist required for RLE advancement/movement and blocking required during stance phase d/t buckling. Cueing for sequencing, posture, and increased stride length. Moderate posterior lean noted in standing.  -AY           User Key  (r) = Recorded By, (t) = Taken By, (c) = Cosigned By    Initials Name Provider Type    AY Alie Trujillo, PT Physical Therapist               Obj/Interventions     Row Name 05/24/23 1127          Range of Motion Comprehensive    General Range of Motion bilateral lower extremity ROM WFL  -AY     Row Name 05/24/23 1127          Strength Comprehensive (MMT)    General Manual Muscle Testing (MMT) Assessment lower extremity strength deficits identified  -AY     Comment, General Manual Muscle Testing (MMT) Assessment LLE grossly 3+/5. RLE: DF 0/5, hip flex 2+/5, knee ext 3-/5  -AY     Row Name 05/24/23 1127          Motor Skills    Motor Skills coordination  -AY     Coordination gross motor deficit;right;lower extremity;moderate impairment;heel to james  -AY     Row Name 05/24/23 1127          Balance    Balance Assessment sitting static balance;sit to stand  dynamic balance;sitting dynamic balance;standing static balance;standing dynamic balance  -AY     Static Sitting Balance minimal assist  -AY     Dynamic Sitting Balance minimal assist  -AY     Position, Sitting Balance sitting edge of bed;unsupported  -AY     Sit to Stand Dynamic Balance moderate assist  -AY     Static Standing Balance moderate assist  -AY     Dynamic Standing Balance maximum assist  -AY     Position/Device Used, Standing Balance supported  -AY     Row Name 05/24/23 1127          Sensory Assessment (Somatosensory)    Sensory Assessment (Somatosensory) right LE;left LE  -AY     Left LE Sensory Assessment general sensation;light touch awareness;intact  -AY     Right LE Sensory Assessment general sensation;light touch awareness;intact  -AY           User Key  (r) = Recorded By, (t) = Taken By, (c) = Cosigned By    Initials Name Provider Type    AY Alie Trujillo, PT Physical Therapist               Goals/Plan     Row Name 05/24/23 1131          Bed Mobility Goal 1 (PT)    Activity/Assistive Device (Bed Mobility Goal 1, PT) sit to supine/supine to sit  -AY     Hickman Level/Cues Needed (Bed Mobility Goal 1, PT) contact guard required  -AY     Time Frame (Bed Mobility Goal 1, PT) long term goal (LTG);10 days  -AY     Progress/Outcomes (Bed Mobility Goal 1, PT) goal ongoing  -AY     Row Name 05/24/23 1131          Transfer Goal 1 (PT)    Activity/Assistive Device (Transfer Goal 1, PT) sit-to-stand/stand-to-sit;bed-to-chair/chair-to-bed;walker, rolling  -AY     Hickman Level/Cues Needed (Transfer Goal 1, PT) minimum assist (75% or more patient effort)  -AY     Time Frame (Transfer Goal 1, PT) long term goal (LTG);10 days  -AY     Progress/Outcome (Transfer Goal 1, PT) goal ongoing  -AY     Row Name 05/24/23 1131          Gait Training Goal 1 (PT)    Activity/Assistive Device (Gait Training Goal 1, PT) gait (walking locomotion);walker, rolling  -AY     Hickman Level (Gait Training Goal 1, PT)  moderate assist (50-74% patient effort)  -AY     Distance (Gait Training Goal 1, PT) 20  -AY     Time Frame (Gait Training Goal 1, PT) long term goal (LTG);10 days  -AY     Progress/Outcome (Gait Training Goal 1, PT) goal ongoing  -AY     Row Name 05/24/23 1131          Problem Specific Goal 1 (PT)    Problem Specific Goal 1 (PT) pt able to self-propel w/c 100ft with two full turns.  -AY     Time Frame (Problem Specific Goal 1, PT) long-term goal (LTG);2 weeks  -AY     Progress/Outcome (Problem Specific Goal 1, PT) goal ongoing  -AY     Row Name 05/24/23 1131          Therapy Assessment/Plan (PT)    Planned Therapy Interventions (PT) balance training;bed mobility training;gait training;home exercise program;postural re-education;transfer training;patient/family education;strengthening;neuromuscular re-education;wheelchair management/propulsion training  -AY           User Key  (r) = Recorded By, (t) = Taken By, (c) = Cosigned By    Initials Name Provider Type    AY Alie Trujillo, PT Physical Therapist               Clinical Impression     Row Name 05/24/23 1128          Pain    Pretreatment Pain Rating 0/10 - no pain  -AY     Posttreatment Pain Rating 0/10 - no pain  -AY     Pain Intervention(s) Ambulation/increased activity;Repositioned  -AY     Additional Documentation Pain Scale: Numbers Pre/Post-Treatment (Group)  -AY     Row Name 05/24/23 1128          Plan of Care Review    Plan of Care Reviewed With patient  -AY     Progress improving  -AY     Outcome Evaluation PT initial eval completed. Pt limited by decreased functional endurance, residual R weakness/incoordination, and balance deficit compared to baseline. Pt ambulated 4ft with max Ax2 and BUE support requiring manual assist to advance RLE. Rec continued skilled PT to increase indep with mobility. d/c rec for SNF.  -AY     Row Name 05/24/23 1128          Therapy Assessment/Plan (PT)    Rehab Potential (PT) good, to achieve stated therapy goals  -AY      Criteria for Skilled Interventions Met (PT) yes;meets criteria;skilled treatment is necessary  -AY     Therapy Frequency (PT) daily  -AY     Row Name 05/24/23 1128          Vital Signs    Pre Systolic BP Rehab 169  -AY     Pre Treatment Diastolic BP 84  -AY     Post Systolic BP Rehab 156  -AY     Post Treatment Diastolic BP 77  -AY     Pretreatment Heart Rate (beats/min) 70  -AY     Posttreatment Heart Rate (beats/min) 75  -AY     Pre SpO2 (%) 96  -AY     O2 Delivery Pre Treatment nasal cannula  -AY     O2 Delivery Intra Treatment nasal cannula  -AY     Post SpO2 (%) 97  -AY     O2 Delivery Post Treatment nasal cannula  -AY     Pre Patient Position Supine  -AY     Intra Patient Position Standing  -AY     Post Patient Position Sitting  -AY     Row Name 05/24/23 1128          Positioning and Restraints    Pre-Treatment Position in bed  -AY     Post Treatment Position chair  -AY     In Chair notified nsg;reclined;sitting;call light within reach;encouraged to call for assist;exit alarm on;legs elevated;with family/caregiver;LUE elevated;RUE elevated;on mechanical lift sling;waffle cushion;patient within staff view  -AY           User Key  (r) = Recorded By, (t) = Taken By, (c) = Cosigned By    Initials Name Provider Type    Alie Lopez, PT Physical Therapist               Outcome Measures     Row Name 05/24/23 1131          How much help from another person do you currently need...    Turning from your back to your side while in flat bed without using bedrails? 2  -AY     Moving from lying on back to sitting on the side of a flat bed without bedrails? 2  -AY     Moving to and from a bed to a chair (including a wheelchair)? 2  -AY     Standing up from a chair using your arms (e.g., wheelchair, bedside chair)? 2  -AY     Climbing 3-5 steps with a railing? 1  -AY     To walk in hospital room? 1  -AY     AM-PAC 6 Clicks Score (PT) 10  -AY     Highest level of mobility 4 --> Transferred to chair/commode  -AY     Ward  Name 05/24/23 1131          Functional Assessment    Outcome Measure Options AM-PAC 6 Clicks Basic Mobility (PT)  -AY           User Key  (r) = Recorded By, (t) = Taken By, (c) = Cosigned By    Initials Name Provider Type    AY Alie Trujillo PT Physical Therapist                             Physical Therapy Education     Title: PT OT SLP Therapies (In Progress)     Topic: Physical Therapy (In Progress)     Point: Mobility training (In Progress)     Learning Progress Summary           Patient Acceptance, E,TB, NR by AY at 5/24/2023 1132   Family Acceptance, E,TB, NR by AY at 5/24/2023 1132                   Point: Home exercise program (Not Started)     Learner Progress:  Not documented in this visit.          Point: Body mechanics (In Progress)     Learning Progress Summary           Patient Acceptance, E,TB, NR by AY at 5/24/2023 1132   Family Acceptance, E,TB, NR by AY at 5/24/2023 1132                   Point: Precautions (In Progress)     Learning Progress Summary           Patient Acceptance, E,TB, NR by AY at 5/24/2023 1132   Family Acceptance, E,TB, NR by AY at 5/24/2023 1132                               User Key     Initials Effective Dates Name Provider Type Discipline    AY 11/10/20 -  Alie Trujillo PT Physical Therapist PT              PT Recommendation and Plan  Planned Therapy Interventions (PT): balance training, bed mobility training, gait training, home exercise program, postural re-education, transfer training, patient/family education, strengthening, neuromuscular re-education, wheelchair management/propulsion training  Plan of Care Reviewed With: patient  Progress: improving  Outcome Evaluation: PT initial eval completed. Pt limited by decreased functional endurance, residual R weakness/incoordination, and balance deficit compared to baseline. Pt ambulated 4ft with max Ax2 and BUE support requiring manual assist to advance RLE. Rec continued skilled PT to increase indep with mobility. d/c rec  for SNF.     Time Calculation:    PT Charges     Row Name 05/24/23 1132             Time Calculation    Start Time 0810  -AY      PT Received On 05/24/23  -AY      PT Goal Re-Cert Due Date 06/03/23  -AY         Timed Charges    91469 - PT Therapeutic Activity Minutes 10  -AY         Untimed Charges    PT Eval/Re-eval Minutes 46  -AY         Total Minutes    Timed Charges Total Minutes 10  -AY      Untimed Charges Total Minutes 46  -AY       Total Minutes 56  -AY            User Key  (r) = Recorded By, (t) = Taken By, (c) = Cosigned By    Initials Name Provider Type    AY Alie Trujillo, PT Physical Therapist              Therapy Charges for Today     Code Description Service Date Service Provider Modifiers Qty    86682841161 HC PT THERAPEUTIC ACT EA 15 MIN 5/24/2023 Alie Trujillo, PT GP 1    90608100851 HC PT THER SUPP EA 15 MIN 5/24/2023 Alie Trujillo, PT GP 2    90145680785 HC PT EVAL MOD COMPLEXITY 4 5/24/2023 Alie Trujillo, PT GP 1          PT G-Codes  Outcome Measure Options: AM-PAC 6 Clicks Basic Mobility (PT)  AM-PAC 6 Clicks Score (PT): 10  PT Discharge Summary  Anticipated Discharge Disposition (PT): skilled nursing facility    Alie Trujillo PT  5/24/2023

## 2023-05-24 NOTE — PLAN OF CARE
Goal Outcome Evaluation:  Plan of Care Reviewed With: patient            SLP treatment completed. Will sign-off. Please see note for further details and recommendations.

## 2023-05-24 NOTE — THERAPY TREATMENT NOTE
Acute Care - Speech Language Pathology   Swallow Treatment Note  [place]     Patient Name: Timmy Guajardo  : 1950  MRN: [id number]  Today's Date: 2023               Admit Date: 2023    Visit Dx:     ICD-10-CM ICD-9-CM   1. Confusion  R41.0 298.9   2. Leukocytosis, unspecified type  D72.829 288.60   3. Referred by health care professional  Z02.89 V68.89   4. Abnormal CXR  R93.89 793.2   5. Atrial fibrillation, unspecified type  I48.91 427.31   6. Dysphagia, unspecified type  R13.10 787.20     Patient Active Problem List   Diagnosis   • Acute right-sided weakness   • Suspected cerebrovascular accident (CVA)   • Leukocytosis   • HFrEF (EF 30%)   • Thrombocytopenia   • HTN   • Presence of cardiac pacemaker   • Hyperglycemia   • Gangrene of toe of left foot   • Closed displaced intertrochanteric fracture of right femur, initial encounter   • Right exudative pleural effusion status post decortication 2023   • Unintentional weight loss   • Hypoalbuminemia   • Severe malnutrition   • Bilateral pulmonary embolism   • History of CVA (cerebrovascular accident)   • Debility   • GERD (gastroesophageal reflux disease)   • Hyperlipidemia   • Rt Apical Lung Nodule vs Scarring (needs follow up)   • Confusion   • Pneumonia of left lower lobe due to infectious organism   • Pulmonary embolus   • Atrial fibrillation with RVR   • Hypotension   • Acute respiratory failure with hypoxia   • Shock, likely multifactorial     Past Medical History:   Diagnosis Date   • Cardiomyopathy    • CHF (congestive heart failure)    • Coronary artery disease    • COVID-19    • Diabetes mellitus    • GERD (gastroesophageal reflux disease)    • HTN (hypertension)    • Pleural effusion    • Stroke     Right sided weakness   • Stroke      Past Surgical History:   Procedure Laterality Date   • AMPUTATION DIGIT Left 2021    Procedure: AMPUTATION DIGIT - GREAT TOE AMPUTATION LEFT;  Surgeon: Dario Marmolejo MD;  Location: [facility]  OR;  Service: General;  Laterality: Left;   • AORTAGRAM N/A 5/11/2021    Procedure: AORTAGRAM WITH RUNOFFS, LEFT SFA BALLOON ANGIOPLASTY;  Surgeon: Tim Mahan MD;  Location: [facility] HYBRID SHELIA;  Service: Vascular;  Laterality: N/A;  FL TIME 6 MIN 54 SECS  82 MGY  CONTRAST VISIPAQUE 100ML     • CARDIAC CATHETERIZATION     • CARDIAC PACEMAKER PLACEMENT      pacemaker/defibrillator, [employer]   • COLONOSCOPY N/A 3/31/2023    Procedure: COLONOSCOPY;  Surgeon: Brunner, Mark I, MD;  Location:  [facility];  Service: Gastroenterology;  Laterality: N/A;   • ENDOSCOPY N/A 3/29/2023    Procedure: ESOPHAGOGASTRODUODENOSCOPY;  Surgeon: Brunner, Mark I, MD;  Location:  [facility];  Service: Gastroenterology;  Laterality: N/A;   • HERNIA REPAIR Bilateral     Inguinal hernia   • HIP TROCHANTERIC NAILING WITH INTRAMEDULLARY HIP SCREW Right 10/18/2022    Procedure: HIP TROCHANTERIC NAILING WITH INTRAMEDULLARY HIP SCREW RIGHT;  Surgeon: Bj Steinberg MD;  Location:  [facility];  Service: Orthopedics;  Laterality: Right;   • THORACOSCOPY VIDEO ASSISTED WITH LOBECTOMY Right 4/12/2023    Procedure: BRONCHOSCOPY, THORACOSCOPY VIDEO ASSISTED WITH DECORTICATION, MECHANICAL PLEURODESIS;  Surgeon: Tim Mahan MD;  Location:  [facility];  Service: Cardiothoracic;  Laterality: Right;       SLP Recommendation and Plan     SLP Diet Recommendation: soft to chew textures, whole, thin liquids, other (see comments) (can upgrade to regular if requesting - just soft 2' edentulous) (05/24/23 1400)  Recommended Precautions and Strategies: general aspiration precautions (05/24/23 1400)  SLP Rec. for Method of Medication Administration: as tolerated (05/24/23 1400)     Monitor for Signs of Aspiration: notify SLP if any concerns (05/24/23 1400)        Anticipated Discharge Disposition (SLP): home with assist (05/24/23 1400)     Therapy Frequency (Swallow): evaluation only (05/24/23 1400)           Daily Summary of Progress (SLP):  progress toward functional goals is good (05/24/23 1400)               Treatment Assessment (SLP): toleration of diet (05/24/23 1400)     Plan for Continued Treatment (SLP): treatment no longer indicated as all goals met (05/24/23 1400)         Plan of Care Reviewed With: patient      SWALLOW EVALUATION (last 72 hours)     SLP Adult Swallow Evaluation     Row Name 05/24/23 1400 05/23/23 2731                Rehab Evaluation    Document Type therapy note (daily note)  -MP evaluation  -       Subjective Information no complaints  -MP no complaints  -SM       Patient Observations alert;cooperative  -MP alert;cooperative  -       Patient/Family/Caregiver Comments/Observations No family present  - --       Patient Effort good  - --          General Information    Pertinent History Of Current Problem -- Since last seen, Afib, intubated 5/22-5/23.  -       Current Method of Nutrition -- NPO;nasogastric feedings;small-bore  -       Plans/Goals Discussed with -- patient;agreed upon  -       Barriers to Rehab -- cognitive status  -          Pain    Additional Documentation Pain Scale: FACES Pre/Post-Treatment (Group)  -MP --          Pain Scale: FACES Pre/Post-Treatment    Pain: FACES Scale, Pretreatment 0-->no hurt  -MP 0-->no hurt  -SM       Posttreatment Pain Rating 0-->no hurt  -MP 0-->no hurt  -SM          Oral Musculature and Cranial Nerve Assessment    Oral Motor General Assessment -- WFL  -SM          Clinical Swallow Eval    Oral Prep Phase -- WFL  -SM       Oral Transit -- WFL  -SM       Oral Residue -- WFL  -SM       Pharyngeal Phase -- no overt signs/symptoms of pharyngeal impairment  -       Clinical Swallow Evaluation Summary -- Prolonged manipulation r/t edentulous state. Pushed with large, consecutive sips of thin liquids, no s/s aspiration. No hx chronic dysphagia with instrumental swallow study in April revealing no aspiration/dysphagia. Pt is currently presenting intermittent confusion,  disconnected converation/topic interaction. Difficult to cue pt to provide insight into diet preference (r/t edentulous state). RN observing similar and monitoring. SLP will check back to ensure no further needs.  -          SLP Evaluation Clinical Impression    SLP Swallowing Diagnosis -- swallow WFL/no suspected pharyngeal impairment  -          SLP Treatment Clinical Impressions    Treatment Assessment (SLP) toleration of diet  -MP --       Daily Summary of Progress (SLP) progress toward functional goals is good  - --       Plan for Continued Treatment (SLP) treatment no longer indicated as all goals met  - --          Recommendations    Therapy Frequency (Swallow) evaluation only  - PRN;5 days per week  -       Predicted Duration Therapy Intervention (Days) -- until discharge  -       SLP Diet Recommendation soft to chew textures;whole;thin liquids;other (see comments)  can upgrade to regular if requesting - just soft 2' edentulous  - soft to chew textures;chopped;thin liquids  -       Recommended Precautions and Strategies general aspiration precautions  - general aspiration precautions;other (see comments)  over next 24 hours as precaution give recent extubation  -       Oral Care Recommendations Oral Care BID/PRN;Toothbrush  - Oral Care BID/PRN;Toothbrush  -       SLP Rec. for Method of Medication Administration as tolerated  - meds whole;with puree  over next 24 hours and then can adjust to with thin liquids as tolerated.  -       Monitor for Signs of Aspiration notify SLP if any concerns  -MP yes;notify SLP if any concerns  -       Anticipated Discharge Disposition (SLP) home with assist  -MP unknown  -             User Key  (r) = Recorded By, (t) = Taken By, (c) = Cosigned By    Initials Name Effective Dates    Dionne Pimentel, MS CCC-SLP 02/03/23 -     MP Pollo Braxton MS CCC-SLP 12/28/21 -                 EDUCATION  The patient has been educated in the  following areas:   Dysphagia (Swallowing Impairment).              Time Calculation:    Time Calculation- SLP     Row Name 05/24/23 1438             Time Calculation- SLP    SLP Start Time 1400  -MP      SLP Received On 05/24/23  -MP         Untimed Charges    [id number] Treatment Swallow Minutes 25  -MP         Total Minutes    Untimed Charges Total Minutes 25  -MP       Total Minutes 25  -MP            User Key  (r) = Recorded By, (t) = Taken By, (c) = Cosigned By    Initials Name Provider Type    MP Pollo Braxton, MS CCC-SLP Speech and Language Pathologist                Therapy Charges for Today     Code Description Service Date Service Provider Modifiers Qty    [id number]  ST TREATMENT SWALLOW 2 5/24/2023 Pollo Braxton MS CCC-SLP GN 1               MS WALE Castano  5/24/2023

## 2023-05-24 NOTE — CONSULTS
"                    Clinical Nutrition       Patient Name: Timmy Guajardo  YOB: 1950  MRN: [id number]  Date of Encounter: 05/24/23 18:05 EDT  Admission date: 5/19/2023    Comments:    Reason for Visit   Follow-up protocol    EMR Reviewed     EMR Reviewed: yes     Admission Diagnosis:  Confusion [R41.0]  PE  PNA    Problem List:    Pneumonia of left lower lobe due to infectious organism    HFrEF (EF 30%)    History of CVA (cerebrovascular accident)    GERD (gastroesophageal reflux disease)    Confusion    Pulmonary embolus    Atrial fibrillation with RVR    Hypotension    Acute respiratory failure with hypoxia    Shock, likely multifactorial     DM II  Hx CM, CHF, toe amputation    Went into Afib w Hyptotension  Intubated 5/22  Extubated 5/23  SLP 5/23 rec Soft to chew chopped    Anthropometric      Admission Height 177.8 cm (70\") Documented at 05/19/2023 1529   Admission Weight 63.5 kg (140 lb) Documented at 05/19/2023 1529     Height: 177.8 cm (70\")  Last Filed Weight: Weight: 65.1 kg (143 lb 8.3 oz) (05/24/23 0600)  Weight Method: Bed scale  BMI: BMI (Calculated): 20.6  BMI classification: Normal: 18.5-24.9kg/m2 underweight for age     UBW: ~160lbs per EMR  Weight change: RD obtained bed wt (unsure if zeroed of 135.7lbs)   Weight       Weight (kg) Weight (lbs) Weight Method Visit Report   4/25/2022 75.479 kg  166 lb 6.4 oz      10/16/2022 77.565 kg  171 lb  Stated     10/17/2022 83.008 kg  183 lb  Stated      83.008 kg  183 lb       83.144 kg  183 lb 4.8 oz      10/24/2022 82.146 kg  181 lb 1.6 oz      11/23/2022 72.576 kg  160 lb  Stated  --    3/24/2023 51.71 kg  114 lb  Stated     3/25/2023 56.7 kg  125 lb      3/31/2023 56.7 kg  125 lb  Stated     4/4/2023 60.328 kg  133 lb  Bed scale     4/16/2023 64.9 kg  143 lb 1.3 oz  Bed scale     5/9/2023 58.968 kg  130 lb   --    5/13/2023 68.04 kg  150 lb      5/19/2023 63.504 kg  140 lb  Estimated     5/20/2023 61.689 kg  136 lb             " "Reported/Observed/Food/Nutrition Related - Comments       Pt allows likes karolina Ensure but was told not to take karolina by \"someone\" Says food is ok.       RD spoke w/pt at bedside, pt appeared A&O, answering nutrition questions appropriately but somewhat difficult to understand. Pt endorses poor po intake (pt suspects po intake ~50% x >/=1 month) 2/2 COVID and taste changes (also consistent w/hx provided at previous admit/RD note 3/2023). Pt states he is on an appetite stimulant but it was recently changed ~2 weeks ago and he does not believe it is working as well. Pt unable to tell me which medication he is taking-unable to verify in H&P/meds. Pt states he drinks ONS at home and is agreeable to ONS TID. Pt states CZD=275hjm, per EMR appears to be ~ 160lbs. NKFA.     Nutrition Focused Physical Exam     Date:     Patient meets criteria for malnutrition diagnosis, see MSA note.     Current Nutrition Prescription     Diet: Cardiac Diets, Diabetic Diets; Healthy Heart (2-3 Na+); Consistent Carbohydrate; Texture: Soft to Chew (NDD 3); Soft to Chew: Whole Meat; Fluid Consistency: Thin (IDDSI 0)  Oral Nutrition Supplement: trial of Novasource Renal added per RD    Average Intake from Chart[facility] Days: 38% x 4 meals recorded     Nutrition Diagnosis     Problem Malnutrition chronic severe   Etiology Energy intake<energy needs 2/2 COVID, recent admit, poor appetite   Signs/Symptoms Severe muscle wasting and subcutaneous fat loss, po intake <75% x >/=1 month   Status:    Actions     Follow treatment progress, Care plan reviewed, Advise alternate selection, Menu provided, Encourage intake, Supplement provided    Monitor Per Protocol      Zahraa Huynh, RD,   Time Spent: 25      "

## 2023-05-25 ENCOUNTER — APPOINTMENT (OUTPATIENT)
Dept: CARDIOLOGY | Facility: HOSPITAL | Age: 73
DRG: 208 | End: 2023-05-25
Payer: MEDICARE

## 2023-05-25 LAB
ANION GAP SERPL CALCULATED.3IONS-SCNC: 14 MMOL/L (ref 5–15)
BASOPHILS # BLD AUTO: 0.01 10*3/MM3 (ref 0–0.2)
BASOPHILS NFR BLD AUTO: 0.1 % (ref 0–1.5)
BUN SERPL-MCNC: 26 MG/DL (ref 8–23)
BUN/CREAT SERPL: 21.3 (ref 7–25)
CALCIUM SPEC-SCNC: 9.2 MG/DL (ref 8.6–10.5)
CHLORIDE SERPL-SCNC: 109 MMOL/L (ref 98–107)
CO2 SERPL-SCNC: 22 MMOL/L (ref 22–29)
CREAT SERPL-MCNC: 1.22 MG/DL (ref 0.76–1.27)
DEPRECATED RDW RBC AUTO: 54.3 FL (ref 37–54)
EGFRCR SERPLBLD CKD-EPI 2021: 63.8 ML/MIN/1.73
EOSINOPHIL # BLD AUTO: 0.01 10*3/MM3 (ref 0–0.4)
EOSINOPHIL NFR BLD AUTO: 0.1 % (ref 0.3–6.2)
ERYTHROCYTE [DISTWIDTH] IN BLOOD BY AUTOMATED COUNT: 15 % (ref 12.3–15.4)
GLUCOSE BLDC GLUCOMTR-MCNC: 104 MG/DL (ref 70–130)
GLUCOSE BLDC GLUCOMTR-MCNC: 125 MG/DL (ref 70–130)
GLUCOSE BLDC GLUCOMTR-MCNC: 186 MG/DL (ref 70–130)
GLUCOSE BLDC GLUCOMTR-MCNC: 79 MG/DL (ref 70–130)
GLUCOSE BLDC GLUCOMTR-MCNC: 79 MG/DL (ref 70–130)
GLUCOSE BLDC GLUCOMTR-MCNC: 90 MG/DL (ref 70–130)
GLUCOSE SERPL-MCNC: 69 MG/DL (ref 65–99)
HCT VFR BLD AUTO: 34.9 % (ref 37.5–51)
HGB BLD-MCNC: 10.6 G/DL (ref 13–17.7)
IMM GRANULOCYTES # BLD AUTO: 0.14 10*3/MM3 (ref 0–0.05)
IMM GRANULOCYTES NFR BLD AUTO: 1.6 % (ref 0–0.5)
LV EF 2D ECHO EST: 45 %
LYMPHOCYTES # BLD AUTO: 0.81 10*3/MM3 (ref 0.7–3.1)
LYMPHOCYTES NFR BLD AUTO: 9.4 % (ref 19.6–45.3)
MAGNESIUM SERPL-MCNC: 2.2 MG/DL (ref 1.6–2.4)
MAXIMAL PREDICTED HEART RATE: 150 BPM
MCH RBC QN AUTO: 30.1 PG (ref 26.6–33)
MCHC RBC AUTO-ENTMCNC: 30.4 G/DL (ref 31.5–35.7)
MCV RBC AUTO: 99.1 FL (ref 79–97)
MONOCYTES # BLD AUTO: 0.6 10*3/MM3 (ref 0.1–0.9)
MONOCYTES NFR BLD AUTO: 6.9 % (ref 5–12)
NEUTROPHILS NFR BLD AUTO: 7.09 10*3/MM3 (ref 1.7–7)
NEUTROPHILS NFR BLD AUTO: 81.9 % (ref 42.7–76)
NRBC BLD AUTO-RTO: 0 /100 WBC (ref 0–0.2)
PLATELET # BLD AUTO: 232 10*3/MM3 (ref 140–450)
PMV BLD AUTO: 9.4 FL (ref 6–12)
POTASSIUM SERPL-SCNC: 3.8 MMOL/L (ref 3.5–5.2)
RBC # BLD AUTO: 3.52 10*6/MM3 (ref 4.14–5.8)
SODIUM SERPL-SCNC: 145 MMOL/L (ref 136–145)
STRESS TARGET HR: 128 BPM
WBC NRBC COR # BLD: 8.66 10*3/MM3 (ref 3.4–10.8)

## 2023-05-25 PROCEDURE — 93321 DOPPLER ECHO F-UP/LMTD STD: CPT | Performed by: INTERNAL MEDICINE

## 2023-05-25 PROCEDURE — 82948 REAGENT STRIP/BLOOD GLUCOSE: CPT

## 2023-05-25 PROCEDURE — 25010000002 FENTANYL CITRATE (PF) 50 MCG/ML SOLUTION: Performed by: INTERNAL MEDICINE

## 2023-05-25 PROCEDURE — 25010000002 MIDAZOLAM PER 1 MG: Performed by: INTERNAL MEDICINE

## 2023-05-25 PROCEDURE — 99232 SBSQ HOSP IP/OBS MODERATE 35: CPT | Performed by: INTERNAL MEDICINE

## 2023-05-25 PROCEDURE — 25010000002 DAPTOMYCIN PER 1 MG: Performed by: INTERNAL MEDICINE

## 2023-05-25 PROCEDURE — 80048 BASIC METABOLIC PNL TOTAL CA: CPT | Performed by: INTERNAL MEDICINE

## 2023-05-25 PROCEDURE — 99153 MOD SED SAME PHYS/QHP EA: CPT

## 2023-05-25 PROCEDURE — 83735 ASSAY OF MAGNESIUM: CPT | Performed by: INTERNAL MEDICINE

## 2023-05-25 PROCEDURE — 93312 ECHO TRANSESOPHAGEAL: CPT | Performed by: INTERNAL MEDICINE

## 2023-05-25 PROCEDURE — 85025 COMPLETE CBC W/AUTO DIFF WBC: CPT | Performed by: INTERNAL MEDICINE

## 2023-05-25 PROCEDURE — 93321 DOPPLER ECHO F-UP/LMTD STD: CPT

## 2023-05-25 PROCEDURE — 25010000002 FENTANYL CITRATE (PF) 50 MCG/ML SOLUTION

## 2023-05-25 PROCEDURE — 93325 DOPPLER ECHO COLOR FLOW MAPG: CPT | Performed by: INTERNAL MEDICINE

## 2023-05-25 PROCEDURE — 25010000002 MIDAZOLAM PER 1 MG

## 2023-05-25 PROCEDURE — 94799 UNLISTED PULMONARY SVC/PX: CPT

## 2023-05-25 PROCEDURE — 94664 DEMO&/EVAL PT USE INHALER: CPT

## 2023-05-25 PROCEDURE — 93312 ECHO TRANSESOPHAGEAL: CPT

## 2023-05-25 PROCEDURE — 94761 N-INVAS EAR/PLS OXIMETRY MLT: CPT

## 2023-05-25 PROCEDURE — 93325 DOPPLER ECHO COLOR FLOW MAPG: CPT

## 2023-05-25 PROCEDURE — 99152 MOD SED SAME PHYS/QHP 5/>YRS: CPT

## 2023-05-25 RX ORDER — MIDAZOLAM HYDROCHLORIDE 1 MG/ML
1 INJECTION INTRAMUSCULAR; INTRAVENOUS ONCE
Status: COMPLETED | OUTPATIENT
Start: 2023-05-25 | End: 2023-05-25

## 2023-05-25 RX ORDER — LISINOPRIL 5 MG/1
5 TABLET ORAL
Status: DISCONTINUED | OUTPATIENT
Start: 2023-05-25 | End: 2023-05-28

## 2023-05-25 RX ORDER — MIDAZOLAM HYDROCHLORIDE 1 MG/ML
INJECTION INTRAMUSCULAR; INTRAVENOUS
Status: COMPLETED
Start: 2023-05-25 | End: 2023-05-25

## 2023-05-25 RX ORDER — CARVEDILOL 12.5 MG/1
25 TABLET ORAL 2 TIMES DAILY WITH MEALS
Status: DISCONTINUED | OUTPATIENT
Start: 2023-05-25 | End: 2023-06-03 | Stop reason: HOSPADM

## 2023-05-25 RX ORDER — DEXTROSE MONOHYDRATE 25 G/50ML
50 INJECTION, SOLUTION INTRAVENOUS
Status: DISCONTINUED | OUTPATIENT
Start: 2023-05-25 | End: 2023-06-03 | Stop reason: HOSPADM

## 2023-05-25 RX ORDER — IPRATROPIUM BROMIDE AND ALBUTEROL SULFATE 2.5; .5 MG/3ML; MG/3ML
3 SOLUTION RESPIRATORY (INHALATION) EVERY 6 HOURS PRN
Status: DISCONTINUED | OUTPATIENT
Start: 2023-05-25 | End: 2023-06-03 | Stop reason: HOSPADM

## 2023-05-25 RX ORDER — FENTANYL CITRATE 50 UG/ML
25 INJECTION, SOLUTION INTRAMUSCULAR; INTRAVENOUS ONCE
Status: COMPLETED | OUTPATIENT
Start: 2023-05-25 | End: 2023-05-25

## 2023-05-25 RX ORDER — FENTANYL CITRATE 50 UG/ML
INJECTION, SOLUTION INTRAMUSCULAR; INTRAVENOUS
Status: COMPLETED
Start: 2023-05-25 | End: 2023-05-25

## 2023-05-25 RX ORDER — INSULIN LISPRO 100 [IU]/ML
0-9 INJECTION, SOLUTION INTRAVENOUS; SUBCUTANEOUS
Status: DISCONTINUED | OUTPATIENT
Start: 2023-05-25 | End: 2023-06-03 | Stop reason: HOSPADM

## 2023-05-25 RX ADMIN — ASPIRIN 81 MG 81 MG: 81 TABLET ORAL at 08:49

## 2023-05-25 RX ADMIN — DAPTOMYCIN 500 MG: 500 INJECTION, POWDER, LYOPHILIZED, FOR SOLUTION INTRAVENOUS at 11:16

## 2023-05-25 RX ADMIN — Medication 10 ML: at 08:48

## 2023-05-25 RX ADMIN — FENTANYL CITRATE 25 MCG: 50 INJECTION, SOLUTION INTRAMUSCULAR; INTRAVENOUS at 13:13

## 2023-05-25 RX ADMIN — Medication 10 ML: at 21:33

## 2023-05-25 RX ADMIN — ATORVASTATIN CALCIUM 80 MG: 40 TABLET, FILM COATED ORAL at 21:31

## 2023-05-25 RX ADMIN — AMIODARONE HYDROCHLORIDE 200 MG: 200 TABLET ORAL at 08:49

## 2023-05-25 RX ADMIN — PANTOPRAZOLE SODIUM 40 MG: 40 INJECTION, POWDER, LYOPHILIZED, FOR SOLUTION INTRAVENOUS at 08:48

## 2023-05-25 RX ADMIN — EMPAGLIFLOZIN 10 MG: 10 TABLET, FILM COATED ORAL at 08:49

## 2023-05-25 RX ADMIN — TERAZOSIN HYDROCHLORIDE 1 MG: 1 CAPSULE ORAL at 08:49

## 2023-05-25 RX ADMIN — CARVEDILOL 25 MG: 12.5 TABLET, FILM COATED ORAL at 19:25

## 2023-05-25 RX ADMIN — MIDAZOLAM HYDROCHLORIDE 1 MG: 1 INJECTION INTRAMUSCULAR; INTRAVENOUS at 13:13

## 2023-05-25 RX ADMIN — FENTANYL CITRATE 25 MCG: 50 INJECTION, SOLUTION INTRAMUSCULAR; INTRAVENOUS at 13:20

## 2023-05-25 RX ADMIN — IPRATROPIUM BROMIDE AND ALBUTEROL SULFATE 3 ML: 2.5; .5 SOLUTION RESPIRATORY (INHALATION) at 07:58

## 2023-05-25 RX ADMIN — MIDAZOLAM HYDROCHLORIDE 1 MG: 1 INJECTION, SOLUTION INTRAMUSCULAR; INTRAVENOUS at 13:13

## 2023-05-25 RX ADMIN — CARVEDILOL 12.5 MG: 12.5 TABLET, FILM COATED ORAL at 08:49

## 2023-05-25 RX ADMIN — APIXABAN 5 MG: 5 TABLET, FILM COATED ORAL at 21:31

## 2023-05-25 RX ADMIN — MIDAZOLAM HYDROCHLORIDE 1 MG: 1 INJECTION, SOLUTION INTRAMUSCULAR; INTRAVENOUS at 13:18

## 2023-05-25 RX ADMIN — METHOHEXITAL SODIUM 20 MG: 500 INJECTION, POWDER, LYOPHILIZED, FOR SOLUTION INTRAMUSCULAR; INTRAVENOUS; RECTAL at 13:21

## 2023-05-25 RX ADMIN — DEXTROSE MONOHYDRATE 50 ML: 25 INJECTION, SOLUTION INTRAVENOUS at 06:00

## 2023-05-25 RX ADMIN — APIXABAN 5 MG: 5 TABLET, FILM COATED ORAL at 08:49

## 2023-05-25 RX ADMIN — AMIODARONE HYDROCHLORIDE 200 MG: 200 TABLET ORAL at 21:31

## 2023-05-25 RX ADMIN — DOCUSATE SODIUM 50 MG AND SENNOSIDES 8.6 MG 2 TABLET: 8.6; 5 TABLET, FILM COATED ORAL at 21:31

## 2023-05-25 NOTE — PROGRESS NOTES
Intensive Care Follow-up     Hospital:  LOS: 6 days   Mr. Timmy Guajardo, 72 y.o. male is followed for:   Pneumonia of left lower lobe due to infectious organism        Subjective     72 y.o. male with PMHx significant for PE on Eliquis, HTN, dyslipidemia, HFrEF (30% on 10/2022), T2DM, CVA with right-sided residual weakness, and GERD.  He was admitted to the ICU on 5/21/2023 as a transfer from the floor.  He was initially admitted with a pulmonary embolism and a left lower lobe pneumonia versus infarct on 5/19/2023.  This morning he suddenly went into A-fib with RVR became hypotensive with prompted transition to the ICU.  Interval History:  The chart has been reviewed.  The patient has remained afebrile overnight.  He has not required hemodynamic support with pressors.  Culture data has been reviewed and repeat blood cultures are currently negative.  He is currently n.p.o. in preparation for transesophageal echocardiogram.    The patient's past medical, surgical and social history were reviewed and updated in Epic as appropriate.        Objective     Infusions:  norepinephrine, 0.02-0.3 mcg/kg/min, Last Rate: Stopped (05/23/23 0630)      Medications:  amiodarone, 200 mg, Oral, Q12H  apixaban, 5 mg, Oral, Q12H  aspirin, 81 mg, Oral, Daily  atorvastatin, 80 mg, Oral, Nightly  carvedilol, 25 mg, Oral, BID With Meals  DAPTOmycin, 8 mg/kg, Intravenous, Q24H  empagliflozin, 10 mg, Oral, Daily  fluticasone, 2 spray, Nasal, Daily  Insulin Lispro, 0-9 Units, Subcutaneous, 4x Daily With Meals & Nightly  lisinopril, 5 mg, Oral, Q24H  pantoprazole, 40 mg, Intravenous, Q24H  senna-docusate sodium, 2 tablet, Oral, BID  sodium chloride, 10 mL, Intravenous, Q12H  terazosin, 1 mg, Oral, Daily        Vital Sign Min/Max for last 24 hours  Temp  Min: 97.1 °F (36.2 °C)  Max: 97.5 °F (36.4 °C)   BP  Min: 116/94  Max: 166/79   Pulse  Min: 69  Max: 73   Resp  Min: 16  Max: 18   SpO2  Min: 93 %  Max: 97 %   Flow (L/min)  Min: 2  Max: 2     "   Input/Output for last 24 hour shift  05/24 0701 - 05/25 0700  In: 253.1 [P.O.:200; I.V.:53.1]  Out: 2360 [Urine:2360]      Objective:  General Appearance:  In no acute distress and uncomfortable.    Vital signs: (most recent): Blood pressure (!) 165/110, pulse 70, temperature 97.5 °F (36.4 °C), temperature source Oral, resp. rate 16, height 177.8 cm (70\"), weight 58.9 kg (129 lb 13.6 oz), SpO2 96 %.    Lungs:  Normal effort and normal respiratory rate.  Breath sounds clear to auscultation.  He is not in respiratory distress.  No stridor.  No rales, decreased breath sounds, wheezes or rhonchi.    Heart: Normal rate.  Regular rhythm.  S1 normal and S2 normal.    Chest: Symmetric chest wall expansion.   Abdomen: Abdomen is soft and non-distended.  There is no abdominal tenderness.     Extremities: Normal range of motion.  There is no dependent edema.    Neurological: Patient is alert and oriented to person, place and time.    Pupils:  Pupils are equal, round, and reactive to light.  Pupils are equal.   Skin:  Warm.              Results from last 7 days   Lab Units 05/24/23  0307 05/23/23  0432 05/22/23  0618   WBC 10*3/mm3 17.84* 20.17* 20.19*   HEMOGLOBIN g/dL 8.6* 8.4* 8.1*   PLATELETS 10*3/mm3 213 188 200     Results from last 7 days   Lab Units 05/25/23  0501 05/24/23  0307 05/23/23  0432 05/22/23  0618   SODIUM mmol/L 145 142 136 136   POTASSIUM mmol/L 3.8 4.1 4.9 4.3   CO2 mmol/L 22.0 20.0* 17.0* 16.0*   BUN mg/dL 26* 29* 30* 29*   CREATININE mg/dL 1.22 1.25 1.48* 1.57*   MAGNESIUM mg/dL 2.2  --  2.6* 2.8*   PHOSPHORUS mg/dL  --   --   --  5.0*   GLUCOSE mg/dL 69 74 131* 230*     Estimated Creatinine Clearance: 46.9 mL/min (by C-G formula based on SCr of 1.22 mg/dL).    Results from last 7 days   Lab Units 05/24/23  0341   PH, ARTERIAL pH units 7.375   PCO2, ARTERIAL mm Hg 36.1   PO2 ART mm Hg 83.2         I reviewed the patient's results and images.     Assessment & Plan   Impression        Pneumonia of left " lower lobe due to infectious organism    HFrEF (EF 30%)    History of CVA (cerebrovascular accident)    GERD (gastroesophageal reflux disease)    Confusion    Pulmonary embolus    Atrial fibrillation with RVR    Hypotension    Acute respiratory failure with hypoxia    Shock, likely multifactorial       Plan        We will follow the results of the transesophageal echo today.  We will increase the dose of his Coreg closer to his home dose and add back his lisinopril for better blood pressure control.  Stop Levemir for now.  We will readjust as necessary.  I suspect that the majority of his hyperglycemia was due to empiric steroids given earlier in his admission.  Progress with physical and Occupational Therapy.  Antibiotic therapy per the infectious disease service.  Continue with antiarrhythmic control.  Plan to transition to telemetry today.    Plan of care and goals reviewed with mulitdisciplinary/antibiotic stewardship team during rounds.   I discussed the patient's findings and my recommendations with patient and nursing staff         Jarrett Buenrostro MD, [facility]  Pulmonology and Critical Care Medicine

## 2023-05-25 NOTE — PLAN OF CARE
Goal Outcome Evaluation:      Patient is resting in bed with eyes closed, no s/s of pain or SOB noted at this time. VS stable. O2 98% with o2 in use @2 L/M. Call light in reach.

## 2023-05-25 NOTE — PROGRESS NOTES
INFECTIOUS DISEASE CONSULT/INITIAL HOSPITAL VISIT    Timmy Guajardo  1950  [id number]    Date of Consult: 5/25/2023    Admission Date: 5/19/2023      Requesting Provider: No ref. provider found  Evaluating Physician: Travon Brito MD    Reason for Consultation: Positive blood cultures    History of present illness:    Patient is a 72 y.o. male with past medical history of pulmonary embolism, dyslipidemia, heart failure, type II d. mellitus, CVA with right-sided residual weakness and GERD.      Patient admitted to hospital for pulmonary embolism left lower lobe pneumonia versus infarction on May/9/2023 patient had atrial fibrillation with RVR with hypotension has been transferred the ICU.      Patient found have positive blood cultures for staph coccus epidermidis.  We are being consulted for further interpretation of these positive blood cultures.    Patient reports having indwelling pacemaker/ ICD patient says this is fired 1 time since he has had it placed        5/24/23; no events overnight Noemy antibiotics no complaints    5/25/2023 patient had KAT which was remarkable for right atrial mass and vegetation on pacemaker lead patient feels well no complaints no events overnight    Past Medical History:   Diagnosis Date   • Cardiomyopathy    • CHF (congestive heart failure)    • Coronary artery disease    • COVID-19    • Diabetes mellitus    • GERD (gastroesophageal reflux disease)    • HTN (hypertension)    • Pleural effusion    • Stroke     Right sided weakness   • Stroke        Past Surgical History:   Procedure Laterality Date   • AMPUTATION DIGIT Left 5/12/2021    Procedure: AMPUTATION DIGIT - GREAT TOE AMPUTATION LEFT;  Surgeon: Dario Marmolejo MD;  Location: [facility] OR;  Service: General;  Laterality: Left;   • AORTAGRAM N/A 5/11/2021    Procedure: AORTAGRAM WITH RUNOFFS, LEFT SFA BALLOON ANGIOPLASTY;  Surgeon: Tim Mahan MD;  Location: [facility];  Service: Vascular;  Laterality:  N/A;  FL TIME 6 MIN 54 SECS  82 MGY  CONTRAST VISIPAQUE 100ML     • CARDIAC CATHETERIZATION     • CARDIAC PACEMAKER PLACEMENT      pacemaker/defibrillator, [employer]   • COLONOSCOPY N/A 3/31/2023    Procedure: COLONOSCOPY;  Surgeon: Brunner, Mark I, MD;  Location:  [facility];  Service: Gastroenterology;  Laterality: N/A;   • ENDOSCOPY N/A 3/29/2023    Procedure: ESOPHAGOGASTRODUODENOSCOPY;  Surgeon: Brunner, Mark I, MD;  Location:  [facility];  Service: Gastroenterology;  Laterality: N/A;   • HERNIA REPAIR Bilateral     Inguinal hernia   • HIP TROCHANTERIC NAILING WITH INTRAMEDULLARY HIP SCREW Right 10/18/2022    Procedure: HIP TROCHANTERIC NAILING WITH INTRAMEDULLARY HIP SCREW RIGHT;  Surgeon: Bj Steinberg MD;  Location:  [facility];  Service: Orthopedics;  Laterality: Right;   • THORACOSCOPY VIDEO ASSISTED WITH LOBECTOMY Right 4/12/2023    Procedure: BRONCHOSCOPY, THORACOSCOPY VIDEO ASSISTED WITH DECORTICATION, MECHANICAL PLEURODESIS;  Surgeon: Tim Mahan MD;  Location:  [facility];  Service: Cardiothoracic;  Laterality: Right;       Family History   Problem Relation Age of Onset   • Alzheimer's disease Mother    • Kidney failure Mother    • Cancer Father    • Heart failure Father        Social History     Socioeconomic History   • Marital status: Single   • Number of children: 0   Tobacco Use   • Smoking status: Former     Packs/day: 1.00     Years: 30.00     Pack years: 30.00     Types: Cigarettes     Quit date: 2013     Years since quitting: 10.4   • Smokeless tobacco: Never   • Tobacco comments:     quit 2015   Vaping Use   • Vaping Use: Never used   Substance and Sexual Activity   • Alcohol use: Never   • Drug use: Never   • Sexual activity: Defer       Allergies   Allergen Reactions   • Other Unknown - Low Severity     Mercury metals- as a child         Medication:    Current Facility-Administered Medications:   •  acetaminophen (TYLENOL) tablet 650 mg, 650 mg, Oral, Q4H PRN,  Jarrett Buenrostro MD  •  amiodarone (PACERONE) tablet 200 mg, 200 mg, Oral, Q12H, Radha Chavarria PA-C, 200 mg at 05/25/23 0849  •  apixaban (ELIQUIS) tablet 5 mg, 5 mg, Oral, Q12H, Radha Chavarria PA-C, 5 mg at 05/25/23 0849  •  aspirin chewable tablet 81 mg, 81 mg, Oral, Daily, Jarrett Buenrostro MD, 81 mg at 05/25/23 0849  •  atorvastatin (LIPITOR) tablet 80 mg, 80 mg, Oral, Nightly, Jarrett Buenrostro MD, 80 mg at 05/24/23 2115  •  sennosides-docusate (PERICOLACE) 8.6-50 MG per tablet 2 tablet, 2 tablet, Oral, BID **AND** polyethylene glycol (MIRALAX) packet 17 g, 17 g, Oral, Daily PRN **AND** [DISCONTINUED] bisacodyl (DULCOLAX) EC tablet 5 mg, 5 mg, Oral, Daily PRN **AND** bisacodyl (DULCOLAX) suppository 10 mg, 10 mg, Rectal, Daily PRN, Jarrett Buenrostro MD  •  Calcium Replacement - Follow Nurse / BPA Driven Protocol, , Does not apply, PRN, Lacho Veras III, DO  •  carvedilol (COREG) tablet 25 mg, 25 mg, Oral, BID With Meals, Jarrett Buenrostro MD  •  cyclobenzaprine (FLEXERIL) tablet 5 mg, 5 mg, Oral, TID PRN, Jarrett Buenrostro MD  •  DAPTOmycin (CUBICIN) 500 mg in sodium chloride 0.9 % 50 mL IVPB, 8 mg/kg, Intravenous, Q24H, Jarrett Buenrostro MD, Last Rate: 100 mL/hr at 05/25/23 1116, 500 mg at 05/25/23 1116  •  dextrose (D50W) (25 g/50 mL) IV injection 50 mL, 50 mL, Intravenous, Q1H PRN, Vince Garcia, DO, 50 mL at 05/25/23 0600  •  empagliflozin (JARDIANCE) tablet 10 mg, 10 mg, Oral, Daily, Jarrett Mueller MD, 10 mg at 05/25/23 0849  •  fluticasone (FLONASE) 50 MCG/ACT nasal spray 2 spray, 2 spray, Nasal, Daily, Lacho Veras III, DO, 2 spray at 05/20/23 0900  •  Insulin Lispro (humaLOG) injection 0-9 Units, 0-9 Units, Subcutaneous, 4x Daily With Meals & Nightly, Micaela Deal, APRN  •  ipratropium-albuterol (DUO-NEB) nebulizer solution 3 mL, 3 mL, Nebulization, Q6H PRN, Vince Garcia, DO  •  lisinopril (PRINIVIL,ZESTRIL) tablet 5 mg, 5 mg,  Oral, Q24H, Jarrett Buenrostro MD  •  Magnesium Cardiology Dose Replacement - Follow Nurse / BPA Driven Protocol, , Does not apply, PRN, Destiny Rosas, PharmD  •  nitroglycerin (NITROSTAT) SL tablet 0.4 mg, 0.4 mg, Sublingual, Q5 Min PRN, Lacho Veras III, DO  •  norepinephrine (LEVOPHED) 8 mg in 250 mL NS infusion (premix), 0.02-0.3 mcg/kg/min, Intravenous, Titrated, Zina Doty, APRN, Stopped at 05/23/23 0630  •  ondansetron (ZOFRAN) injection 4 mg, 4 mg, Intravenous, Q6H PRN, Lacho Veras III, DO  •  pantoprazole (PROTONIX) injection 40 mg, 40 mg, Intravenous, Q24H, Tim Terrazas MD, 40 mg at 05/25/23 0848  •  Phosphorus Replacement - Follow Nurse / BPA Driven Protocol, , Does not apply, PRN, Lacho Veras III, DO  •  Potassium Replacement - Follow Nurse / BPA Driven Protocol, , Does not apply, PRN, Lacho Veras III, DO  •  sodium chloride 0.9 % flush 10 mL, 10 mL, Intravenous, PRN, Brady Dumont, DO  •  sodium chloride 0.9 % flush 10 mL, 10 mL, Intravenous, Q12H, Lacho Veras III, DO, 10 mL at 05/25/23 0848  •  sodium chloride 0.9 % flush 10 mL, 10 mL, Intravenous, PRN, Lacho Veras III, DO  •  sodium chloride 0.9 % infusion 40 mL, 40 mL, Intravenous, PRN, Lacho Veras III, DO  •  terazosin (HYTRIN) capsule 1 mg, 1 mg, Oral, Daily, Jarrett Buenrostro MD, 1 mg at 05/25/23 0849    Antibiotics:  Anti-Infectives (From admission, onward)    Ordered     Dose/Rate Route Frequency Start Stop    05/23/23 0920  DAPTOmycin (CUBICIN) 500 mg in sodium chloride 0.9 % 50 mL IVPB        Ordering Provider: Jarrett Buenrostro MD    8 mg/kg × 65.2 kg  100 mL/hr over 30 Minutes Intravenous Every 24 Hours 05/23/23 1100 05/30/23 1059    05/19/23 2036  piperacillin-tazobactam (ZOSYN) 3.375 g in iso-osmotic dextrose 50 ml (premix)        Ordering Provider: Brady Dumont DO    3.375 g  over 30 Minutes Intravenous Once 05/19/23 2038 05/19/23 1206     23  vancomycin 1250 mg/250 mL 0.9% NS IVPB ([facility])        Ordering Provider: Brady Dumont DO    20 mg/kg × 63.5 kg  over 90 Minutes Intravenous Once 23 0052            Review of Systems:  See HPI    Physical Exam:   Vital Signs  Temp (24hrs), Av.4 °F (36.3 °C), Min:97.1 °F (36.2 °C), Max:97.5 °F (36.4 °C)    Temp  Min: 97.1 °F (36.2 °C)  Max: 97.5 °F (36.4 °C)  BP  Min: 116/94  Max: 166/79  Pulse  Min: 69  Max: 73  Resp  Min: 16  Max: 18  SpO2  Min: 94 %  Max: 97 %    GENERAL: Awake and alert, in no acute distress.   HEENT: Normocephalic, atraumatic.  PERRL. EOMI. No conjunctival injection. No icterus.  No external oral lesions    HEART: RRR; No murmur  LUNGS: Clear to auscultation bilaterally   ABDOMEN: Soft, nontender, nondistended.  EXT:  No edema  :  Without Rosenberg catheter.  MSK: No joint effusions or erythema  SKIN: Warm and dry without cutaneous eruptions on Inspection/palpation.        Laboratory Data    Results from last 7 days   Lab Units 23  0307 23  0432 23  0618   WBC 10*3/mm3 17.84* 20.17* 20.19*   HEMOGLOBIN g/dL 8.6* 8.4* 8.1*   HEMATOCRIT % 27.2* 27.9* 25.7*   PLATELETS 10*3/mm3 213 188 200     Results from last 7 days   Lab Units 23  0501   SODIUM mmol/L 145   POTASSIUM mmol/L 3.8   CHLORIDE mmol/L 109*   CO2 mmol/L 22.0   BUN mg/dL 26*   CREATININE mg/dL 1.22   GLUCOSE mg/dL 69   CALCIUM mg/dL 9.2     Results from last 7 days   Lab Units 23  0618   ALK PHOS U/L 127*   BILIRUBIN mg/dL 0.7   ALT (SGPT) U/L 35   AST (SGOT) U/L 44*             Results from last 7 days   Lab Units 23  0618   LACTATE mmol/L 0.8         Results from last 7 days   Lab Units 23  1934   VANCOMYCIN TR mcg/mL 14.70     Estimated Creatinine Clearance: 46.6 mL/min (by C-G formula based on SCr of 1.22 mg/dL).      Microbiology:  Blood Culture   Date Value Ref Range Status   2023 Staphylococcus epidermidis (C)  Final   2023 Staphylococcus  epidermidis (C)  Final     BCID, PCR   Date Value Ref Range Status   05/19/2023 (A) Negative by BCID PCR. Culture to Follow. Final    Staph spp, not aureus or lugdunensis. Identification by BCID2 PCR.     No results found for: CULTURES, HSVCX, URCX  No results found for: EYECULTURE, GCCX, HSVCULTURE, LABHSV  No results found for: LEGIONELLA, MRSACX, MUMPSCX, MYCOPLASCX  No results found for: NOCARDIACX, STOOLCX  Urine Culture   Date Value Ref Range Status   05/21/2023 No growth  Final     No results found for: VIRALCULTU, WOUNDCX        Radiology:  Imaging Results (Last 72 Hours)     Procedure Component Value Units Date/Time    XR Chest 1 View [[id number]] Collected: 05/24/23 0704     Updated: 05/24/23 0709    Narrative:      XR CHEST 1 VW    Date of Exam: 5/24/2023 2:26 AM EDT    Indication: Intubated Patient    Comparison: Chest x-ray 5/23/2023    Findings:  Pacemaker device is present. Esophagogastric tube seen past the distal esophagus. Internal jugular central venous catheter tip terminates at the super vena cava. There is perihilar opacity. There are small pleural effusions. There is increased opacity in   the left lower lobe. No pneumothorax.    Impression:      Impression:  Pulmonary edema. Increased consolidation retrocardiac left lower lobe. Stable perihilar opacity left greater than right. Small pleural effusions.  Support devices have appropriate position.      Electronically Signed: Micaela Horn    5/24/2023 7:06 AM EDT    Workstation ID: [id number]    XR Chest 1 View [[id number]] Collected: 05/23/23 0731     Updated: 05/23/23 0737    Narrative:      XR CHEST 1 VW    Date of Exam: 5/23/2023 2:57 AM EDT    Indication: Intubated Patient    Comparison: 1 day prior.    Findings:  Support hardware projects unchanged. There is no distinct pneumothorax. Scattered atelectasis and small left pleural effusion are stable. Unchanged heart and mediastinal contours.      Impression:      Impression:  Support hardware  projects unchanged. There is no distinct pneumothorax. Scattered atelectasis and small left pleural effusion are stable. Unchanged heart and mediastinal contours.        Electronically Signed: Nicolas Siddiqi    5/23/2023 7:34 AM EDT    Workstation ID: [id number]            Impression:   Left lower lobe infiltrate from infarction versus infection  Left lower lobe pulmonary embolism  Leukocytosis with neutrophilia  High-grade positive blood culture for staph coccus epidermidis  Elevated procalcitonin  Pacemaker infection  Right atrial endocarditis  PLAN/RECOMMENDATIONS:   Thank you for asking us to see Timmy Guajardo, I recommend the following:  Given the presence indwelling endovascular hardware staphylococcus epidermidis bacteremia is concerning    High-grade positive blood cultures and indwelling hardware probably warrants further investigation    Patient does have leukocytosis and markedly elevated procalcitonin    Having both a pulmonary embolism and a bacterial pneumonia unless from a septic emboli would be less expected.    Follow-up repeat blood cultures    Appreciate cardiology assistance if patient has pacemaker infection we need at least consider the possibility and feasibility of removal of endovascular hardware.    If this can be removed this will increase chance for clinical cure          Continue daptomycin 6 mg/kg every 24 hours may extend this length of treatment to up to 6 weeks  Follow-up repeated blood cultures from 5/22 remain no growth at 3 days  Reasonable to follow procalcitonin to see if this is descending    Patient has been started on steroids which may explain leukocytosis    If there is concern for vegetation on hardware of pacemaker/IVCD leads may consider extraction    Case discussed with cardiology, pulmonary critical care         Travon Brito MD  5/25/2023  14:21 EDT

## 2023-05-25 NOTE — CASE MANAGEMENT/SOCIAL WORK
Continued Stay Note   [place]     Patient Name: Timmy Guajardo  MRN: [id number]  Today's Date: 5/25/2023    Admit Date: 5/19/2023    Plan: SNF   Discharge Plan     Row Name 05/25/23 1347       Plan    Plan SNF    Patient/Family in Agreement with Plan yes    Plan Comments Per Micaela with [facility] they will decline a bed offer r/t need for IV Daptomycin.  I have updated Mr. Guajardo's sister, Mary Ellen who now requests a referral be submitted to [facility].  I have submitted this referral and confirmed with Adriana that she will review.  Mr. Guajardo is scheduled to have a KAT today.  CM will cont to follow the plan of care and assist with discharge needs as recommendations become available.    Final Discharge Disposition Code 03 - skilled nursing facility (SNF)               Discharge Codes    No documentation.                     Violeta Mccabe RN

## 2023-05-26 LAB
ANION GAP SERPL CALCULATED.3IONS-SCNC: 15 MMOL/L (ref 5–15)
BASOPHILS # BLD AUTO: 0.02 10*3/MM3 (ref 0–0.2)
BASOPHILS NFR BLD AUTO: 0.2 % (ref 0–1.5)
BUN SERPL-MCNC: 17 MG/DL (ref 8–23)
BUN/CREAT SERPL: 17.9 (ref 7–25)
CALCIUM SPEC-SCNC: 9 MG/DL (ref 8.6–10.5)
CHLORIDE SERPL-SCNC: 106 MMOL/L (ref 98–107)
CK SERPL-CCNC: 14 U/L (ref 20–200)
CO2 SERPL-SCNC: 21 MMOL/L (ref 22–29)
CREAT SERPL-MCNC: 0.95 MG/DL (ref 0.76–1.27)
DEPRECATED RDW RBC AUTO: 52.1 FL (ref 37–54)
EGFRCR SERPLBLD CKD-EPI 2021: 86.1 ML/MIN/1.73
EOSINOPHIL # BLD AUTO: 0.03 10*3/MM3 (ref 0–0.4)
EOSINOPHIL NFR BLD AUTO: 0.3 % (ref 0.3–6.2)
ERYTHROCYTE [DISTWIDTH] IN BLOOD BY AUTOMATED COUNT: 14.6 % (ref 12.3–15.4)
GLUCOSE BLDC GLUCOMTR-MCNC: 137 MG/DL (ref 70–130)
GLUCOSE BLDC GLUCOMTR-MCNC: 198 MG/DL (ref 70–130)
GLUCOSE BLDC GLUCOMTR-MCNC: 205 MG/DL (ref 70–130)
GLUCOSE BLDC GLUCOMTR-MCNC: 90 MG/DL (ref 70–130)
GLUCOSE SERPL-MCNC: 158 MG/DL (ref 65–99)
HCT VFR BLD AUTO: 32.9 % (ref 37.5–51)
HGB BLD-MCNC: 10.2 G/DL (ref 13–17.7)
IMM GRANULOCYTES # BLD AUTO: 0.1 10*3/MM3 (ref 0–0.05)
IMM GRANULOCYTES NFR BLD AUTO: 0.9 % (ref 0–0.5)
LYMPHOCYTES # BLD AUTO: 0.77 10*3/MM3 (ref 0.7–3.1)
LYMPHOCYTES NFR BLD AUTO: 7.1 % (ref 19.6–45.3)
MAGNESIUM SERPL-MCNC: 1.9 MG/DL (ref 1.6–2.4)
MCH RBC QN AUTO: 30 PG (ref 26.6–33)
MCHC RBC AUTO-ENTMCNC: 31 G/DL (ref 31.5–35.7)
MCV RBC AUTO: 96.8 FL (ref 79–97)
MONOCYTES # BLD AUTO: 0.66 10*3/MM3 (ref 0.1–0.9)
MONOCYTES NFR BLD AUTO: 6.1 % (ref 5–12)
NEUTROPHILS NFR BLD AUTO: 85.4 % (ref 42.7–76)
NEUTROPHILS NFR BLD AUTO: 9.19 10*3/MM3 (ref 1.7–7)
NRBC BLD AUTO-RTO: 0 /100 WBC (ref 0–0.2)
PHOSPHATE SERPL-MCNC: 3.5 MG/DL (ref 2.5–4.5)
PLATELET # BLD AUTO: 193 10*3/MM3 (ref 140–450)
PMV BLD AUTO: 9.9 FL (ref 6–12)
POTASSIUM SERPL-SCNC: 3.5 MMOL/L (ref 3.5–5.2)
POTASSIUM SERPL-SCNC: 4.3 MMOL/L (ref 3.5–5.2)
RBC # BLD AUTO: 3.4 10*6/MM3 (ref 4.14–5.8)
SODIUM SERPL-SCNC: 142 MMOL/L (ref 136–145)
WBC NRBC COR # BLD: 10.77 10*3/MM3 (ref 3.4–10.8)

## 2023-05-26 PROCEDURE — 94799 UNLISTED PULMONARY SVC/PX: CPT

## 2023-05-26 PROCEDURE — 83735 ASSAY OF MAGNESIUM: CPT | Performed by: INTERNAL MEDICINE

## 2023-05-26 PROCEDURE — 84100 ASSAY OF PHOSPHORUS: CPT

## 2023-05-26 PROCEDURE — 63710000001 INSULIN LISPRO (HUMAN) PER 5 UNITS: Performed by: INTERNAL MEDICINE

## 2023-05-26 PROCEDURE — 84132 ASSAY OF SERUM POTASSIUM: CPT | Performed by: FAMILY MEDICINE

## 2023-05-26 PROCEDURE — 94761 N-INVAS EAR/PLS OXIMETRY MLT: CPT

## 2023-05-26 PROCEDURE — 82550 ASSAY OF CK (CPK): CPT

## 2023-05-26 PROCEDURE — 99232 SBSQ HOSP IP/OBS MODERATE 35: CPT | Performed by: INTERNAL MEDICINE

## 2023-05-26 PROCEDURE — 0 MAGNESIUM SULFATE 4 GM/100ML SOLUTION: Performed by: FAMILY MEDICINE

## 2023-05-26 PROCEDURE — 25010000002 DAPTOMYCIN PER 1 MG: Performed by: INTERNAL MEDICINE

## 2023-05-26 PROCEDURE — 80048 BASIC METABOLIC PNL TOTAL CA: CPT | Performed by: INTERNAL MEDICINE

## 2023-05-26 PROCEDURE — 82948 REAGENT STRIP/BLOOD GLUCOSE: CPT

## 2023-05-26 PROCEDURE — 99232 SBSQ HOSP IP/OBS MODERATE 35: CPT | Performed by: FAMILY MEDICINE

## 2023-05-26 PROCEDURE — 99222 1ST HOSP IP/OBS MODERATE 55: CPT | Performed by: THORACIC SURGERY (CARDIOTHORACIC VASCULAR SURGERY)

## 2023-05-26 PROCEDURE — 85025 COMPLETE CBC W/AUTO DIFF WBC: CPT | Performed by: INTERNAL MEDICINE

## 2023-05-26 RX ORDER — PANTOPRAZOLE SODIUM 40 MG/1
40 TABLET, DELAYED RELEASE ORAL
Status: DISCONTINUED | OUTPATIENT
Start: 2023-05-27 | End: 2023-06-03 | Stop reason: HOSPADM

## 2023-05-26 RX ORDER — POTASSIUM CHLORIDE 20 MEQ/1
40 TABLET, EXTENDED RELEASE ORAL EVERY 4 HOURS
Status: COMPLETED | OUTPATIENT
Start: 2023-05-26 | End: 2023-05-26

## 2023-05-26 RX ORDER — MAGNESIUM SULFATE HEPTAHYDRATE 40 MG/ML
4 INJECTION, SOLUTION INTRAVENOUS ONCE
Status: COMPLETED | OUTPATIENT
Start: 2023-05-26 | End: 2023-05-26

## 2023-05-26 RX ORDER — MEGESTROL ACETATE 40 MG/ML
400 SUSPENSION ORAL DAILY
Status: DISCONTINUED | OUTPATIENT
Start: 2023-05-26 | End: 2023-06-01

## 2023-05-26 RX ADMIN — MEGESTROL ACETATE 400 MG: 40 SUSPENSION ORAL at 12:42

## 2023-05-26 RX ADMIN — CARVEDILOL 25 MG: 12.5 TABLET, FILM COATED ORAL at 08:36

## 2023-05-26 RX ADMIN — INSULIN LISPRO 2 UNITS: 100 INJECTION, SOLUTION INTRAVENOUS; SUBCUTANEOUS at 18:05

## 2023-05-26 RX ADMIN — FLUTICASONE PROPIONATE 2 SPRAY: 50 SPRAY, METERED NASAL at 11:48

## 2023-05-26 RX ADMIN — TERAZOSIN HYDROCHLORIDE 1 MG: 1 CAPSULE ORAL at 08:37

## 2023-05-26 RX ADMIN — INSULIN LISPRO 4 UNITS: 100 INJECTION, SOLUTION INTRAVENOUS; SUBCUTANEOUS at 21:55

## 2023-05-26 RX ADMIN — DAPTOMYCIN 500 MG: 500 INJECTION, POWDER, LYOPHILIZED, FOR SOLUTION INTRAVENOUS at 11:48

## 2023-05-26 RX ADMIN — EMPAGLIFLOZIN 10 MG: 10 TABLET, FILM COATED ORAL at 08:38

## 2023-05-26 RX ADMIN — ATORVASTATIN CALCIUM 80 MG: 40 TABLET, FILM COATED ORAL at 21:56

## 2023-05-26 RX ADMIN — Medication 10 ML: at 08:38

## 2023-05-26 RX ADMIN — CARVEDILOL 25 MG: 12.5 TABLET, FILM COATED ORAL at 18:04

## 2023-05-26 RX ADMIN — APIXABAN 5 MG: 5 TABLET, FILM COATED ORAL at 08:36

## 2023-05-26 RX ADMIN — AMIODARONE HYDROCHLORIDE 200 MG: 200 TABLET ORAL at 21:55

## 2023-05-26 RX ADMIN — Medication 10 ML: at 21:56

## 2023-05-26 RX ADMIN — APIXABAN 5 MG: 5 TABLET, FILM COATED ORAL at 21:56

## 2023-05-26 RX ADMIN — POTASSIUM CHLORIDE 40 MEQ: 1500 TABLET, EXTENDED RELEASE ORAL at 18:04

## 2023-05-26 RX ADMIN — MAGNESIUM SULFATE HEPTAHYDRATE 4 G: 40 INJECTION, SOLUTION INTRAVENOUS at 14:03

## 2023-05-26 RX ADMIN — SODIUM CHLORIDE 40 ML: 9 INJECTION, SOLUTION INTRAVENOUS at 11:49

## 2023-05-26 RX ADMIN — LISINOPRIL 5 MG: 5 TABLET ORAL at 08:36

## 2023-05-26 RX ADMIN — POTASSIUM CHLORIDE 40 MEQ: 1500 TABLET, EXTENDED RELEASE ORAL at 14:03

## 2023-05-26 RX ADMIN — AMIODARONE HYDROCHLORIDE 200 MG: 200 TABLET ORAL at 08:37

## 2023-05-26 RX ADMIN — ASPIRIN 81 MG 81 MG: 81 TABLET ORAL at 08:37

## 2023-05-26 RX ADMIN — PANTOPRAZOLE SODIUM 40 MG: 40 INJECTION, POWDER, LYOPHILIZED, FOR SOLUTION INTRAVENOUS at 08:36

## 2023-05-26 RX ADMIN — DOCUSATE SODIUM 50 MG AND SENNOSIDES 8.6 MG 2 TABLET: 8.6; 5 TABLET, FILM COATED ORAL at 08:37

## 2023-05-26 NOTE — PROGRESS NOTES
"                    Clinical Nutrition       Patient Name: Timmy Guajardo  YOB: 1950  MRN: [id number]  Date of Encounter: 05/26/23 11:40 EDT  Admission date: 5/19/2023    Comments:  Adjusted ONS to Boost GC BID    Pt continues w/poor po intake; preferences obtained. Encourage po intake.     Pt reports taking appetite stimulant at home though pt unable to recall name of medication, would recommend resuming if not contraindicated.     Reason for Visit   Follow-up protocol    EMR Reviewed     EMR Reviewed: yes     Admission Diagnosis:  Confusion [R41.0]  PE  PNA    Problem List:    Pneumonia of left lower lobe due to infectious organism    HFrEF (EF 30%)    History of CVA (cerebrovascular accident)    GERD (gastroesophageal reflux disease)    Confusion    Pulmonary embolus    Atrial fibrillation with RVR    Hypotension    Acute respiratory failure with hypoxia    Shock, likely multifactorial     DM II  Hx CM, CHF, toe amputation    Went into Afib w Hyptotension  Intubated 5/22  Extubated 5/23  SLP 5/23 rec Soft to chew chopped    Anthropometric      Admission Height 177.8 cm (70\") Documented at 05/19/2023 1529   Admission Weight 63.5 kg (140 lb) Documented at 05/19/2023 1529     Height: 177.8 cm (70\")  Last Filed Weight: Weight: 60 kg (132 lb 3.2 oz) (05/26/23 0600)  Weight Method: Bed scale  BMI: BMI (Calculated): 19  BMI classification: Normal: 18.5-24.9kg/m2 underweight for age     UBW: ~160lbs per EMR  Weight change: RD obtained bed wt (unsure if zeroed of 135.7lbs)   Weight       Weight (kg) Weight (lbs) Weight Method Visit Report   4/25/2022 75.479 kg  166 lb 6.4 oz      10/16/2022 77.565 kg  171 lb  Stated     10/17/2022 83.008 kg  183 lb  Stated      83.008 kg  183 lb       83.144 kg  183 lb 4.8 oz      10/24/2022 82.146 kg  181 lb 1.6 oz      11/23/2022 72.576 kg  160 lb  Stated  --    3/24/2023 51.71 kg  114 lb  Stated     3/25/2023 56.7 kg  125 lb      3/31/2023 56.7 kg  125 lb  Stated   " "  2023 60.328 kg  133 lb  Bed scale     2023 64.9 kg  143 lb 1.3 oz  Bed scale     2023 58.968 kg  130 lb   --    2023 68.04 kg  150 lb      2023 63.504 kg  140 lb  Estimated     2023 61.689 kg  136 lb             Reported/Observed/Food/Nutrition Related - Comments       Pt continues w/poor po intake. Preferences obtained, see below. RD will adjust ONS. Encouraged po intake. Ordered phos (5.0 on ).       Pt allows likes karolina Ensure but was told not to take karolina by \"someone\" Says food is ok.       RD spoke w/pt at bedside, pt appeared A&O, answering nutrition questions appropriately but somewhat difficult to understand. Pt endorses poor po intake (pt suspects po intake ~50% x >/=1 month) 2/2 COVID and taste changes (also consistent w/hx provided at previous admit/RD note 3/2023). Pt states he is on an appetite stimulant but it was recently changed ~2 weeks ago and he does not believe it is working as well. Pt unable to tell me which medication he is taking-unable to verify in H&P/meds. Pt states he drinks ONS at home and is agreeable to ONS TID. Pt states [id number], per EMR appears to be ~ 160lbs. NKFA.     Nutrition Focused Physical Exam     Date:     Patient meets criteria for malnutrition diagnosis, see MSA note.     Current Nutrition Prescription     Diet: Cardiac Diets, Diabetic Diets; Healthy Heart (2-3 Na+); Consistent Carbohydrate; Texture: Soft to Chew (NDD 3); Soft to Chew: Whole Meat; Fluid Consistency: Thin (IDDSI 0)  Oral Nutrition Supplement: trial of Novasource Renal added per RD    Average Intake from [place]% x 6 meals    Nutrition Diagnosis     Problem Malnutrition chronic severe   Etiology Energy intake<energy needs 2/2 COVID, recent admit, poor appetite   Signs/Symptoms Severe muscle wasting and subcutaneous fat loss, po intake <75% x >/=1 month   Status:    Date:   Updated:  Problem Inadequate oral intake   Etiology Clinical condition, " lack of appetite   Signs/Symptoms Po intake 17% x 6 meals   Status:    Actions     Follow treatment progress, Care plan reviewed, Advise alternate selection, Adjusted supplement    Boost GC (karolina) BID  Pt likes: yogurt parfaits, mashed potatoes/baked potatoes, fruit cups    Monitor Per Protocol      Kay Moore RD,   Time Spent: 25

## 2023-05-26 NOTE — CONSULTS
[facility] Cardiothoracic Surgery In-Patient Consult    Name:  Timmy Guajardo  MRN Number:  [id number]  Date of Admission:  5/19/2023  Date of Consultation: 5/26/2023    Consulting Provider:    PCP: Arsen Seals APRN  IP Care Team:  Patient Care Team:  Arsen Seals APRN as PCP - General (Family Medicine)    Reason for Consultation: s/p right VATS and decortication on 4/12 w/ Dr. Mahan/TV vegetation    History of Present Illness:    Timmy Guajardo is a 72 y.o. 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  Patient was recently admitted to [facility] from 3/24-4/20/23 was found to have bilateral pulmonary emboli and a loculated right pleural effusion he underwent right VATS decortication on 4/12 with Dr. Mahan and he was transferred to rehab in stable condition.  Patient presented to [facility] on 5/19 for bacteremia.  Labs significant on admission: WBCs 19.56, procalcitonin 43.4, CRP 10.22, prealbumin 9.0, blood cultures were positive for staphylococcus epidermidis. KAT done on 5/25 revealed a large 2.9 x 1.7 cm mass in the right atrium which is adherent to the atrial aspect of one of the 2 pacemaker leads, appears to be more consistent with older thrombus versus acute vegetation, moderate size 1.7 mm mobile mass on the tricuspid valve that is consistent with vegetation, and LVEF 45%.  He endorses chills and malaise while at rehab before coming into our ER.  He denies any chest pain or shortness of breath.  He is hemodynamically stable on 1 L nasal cannula saturations 96%.    Review of Systems:  Review of Systems   Constitutional: Positive for chills and fatigue.   HENT: Negative.    Eyes: Negative.    Cardiovascular: Negative.    Gastrointestinal: Negative.    Endocrine: Negative.    Genitourinary: Negative.    Musculoskeletal: Negative.    Skin: Negative.     Allergic/Immunologic: Negative.    Neurological: Negative.    Hematological: Negative.    Psychiatric/Behavioral: Negative.        Hospital Problems:   Pneumonia of left lower lobe due to infectious organism    HFrEF (EF 30%)    History of CVA (cerebrovascular accident)    GERD (gastroesophageal reflux disease)    Confusion    Pulmonary embolus    Atrial fibrillation with RVR    Hypotension    Acute respiratory failure with hypoxia    Shock, likely multifactorial       Past Medical History:    Past Medical History:   Diagnosis Date   • Cardiomyopathy    • CHF (congestive heart failure)    • Coronary artery disease    • COVID-19    • Diabetes mellitus    • GERD (gastroesophageal reflux disease)    • HTN (hypertension)    • Pleural effusion    • Stroke     Right sided weakness   • Stroke        Past Surgical History:    Past Surgical History:   Procedure Laterality Date   • AMPUTATION DIGIT Left 5/12/2021    Procedure: AMPUTATION DIGIT - GREAT TOE AMPUTATION LEFT;  Surgeon: Dario Marmolejo MD;  Location: [facility] OR;  Service: General;  Laterality: Left;   • AORTAGRAM N/A 5/11/2021    Procedure: AORTAGRAM WITH RUNOFFS, LEFT SFA BALLOON ANGIOPLASTY;  Surgeon: Tim Mahan MD;  Location:  [facility];  Service: Vascular;  Laterality: N/A;  FL TIME 6 MIN 54 SECS  82 MGY  CONTRAST VISIPAQUE 100ML     • CARDIAC CATHETERIZATION     • CARDIAC PACEMAKER PLACEMENT      pacemaker/defibrillator, [employer]   • COLONOSCOPY N/A 3/31/2023    Procedure: COLONOSCOPY;  Surgeon: Brunner, Mark I, MD;  Location:  [facility];  Service: Gastroenterology;  Laterality: N/A;   • ENDOSCOPY N/A 3/29/2023    Procedure: ESOPHAGOGASTRODUODENOSCOPY;  Surgeon: Brunner, Mark I, MD;  Location:  [facility];  Service: Gastroenterology;  Laterality: N/A;   • HERNIA REPAIR Bilateral     Inguinal hernia   • HIP TROCHANTERIC NAILING WITH INTRAMEDULLARY HIP SCREW Right 10/18/2022    Procedure: HIP TROCHANTERIC NAILING WITH  INTRAMEDULLARY HIP SCREW RIGHT;  Surgeon: Bj Steinberg MD;  Location:  [facility];  Service: Orthopedics;  Laterality: Right;   • THORACOSCOPY VIDEO ASSISTED WITH LOBECTOMY Right 4/12/2023    Procedure: BRONCHOSCOPY, THORACOSCOPY VIDEO ASSISTED WITH DECORTICATION, MECHANICAL PLEURODESIS;  Surgeon: Tim Mahan MD;  Location:  [facility];  Service: Cardiothoracic;  Laterality: Right;       Family History:    Family History   Problem Relation Age of Onset   • Alzheimer's disease Mother    • Kidney failure Mother    • Cancer Father    • Heart failure Father        Social History:    Social History     Socioeconomic History   • Marital status: Single   • Number of children: 0   Tobacco Use   • Smoking status: Former     Packs/day: 1.00     Years: 30.00     Pack years: 30.00     Types: Cigarettes     Quit date: 2013     Years since quitting: 10.4   • Smokeless tobacco: Never   • Tobacco comments:     quit 2015   Vaping Use   • Vaping Use: Never used   Substance and Sexual Activity   • Alcohol use: Never   • Drug use: Never   • Sexual activity: Defer       Allergies:  Allergies   Allergen Reactions   • Other Unknown - Low Severity     Mercury metals- as a child         Physical Exam:  Vital Signs:    Temp:  [97.4 °F (36.3 °C)-98 °F (36.7 °C)] 97.4 °F (36.3 °C)  Heart Rate:  [70] 70  Resp:  [14-16] 16  BP: (138-175)/(69-92) 141/74  Body mass index is 18.97 kg/m².     Physical Exam  Vitals and nursing note reviewed.   Constitutional:       Appearance: Normal appearance.   HENT:      Head: Normocephalic.      Mouth/Throat:      Pharynx: Oropharynx is clear.   Eyes:      Pupils: Pupils are equal, round, and reactive to light.   Cardiovascular:      Rate and Rhythm: Normal rate.      Pulses: Normal pulses.   Pulmonary:      Effort: Pulmonary effort is normal.   Abdominal:      General: Bowel sounds are normal.   Musculoskeletal:         General: Normal range of motion.      Cervical back: Normal range of motion.   Skin:      General: Skin is warm.      Comments: VATS site well-healed without surrounding erythema, drainage, tenderness, or warmth   Neurological:      General: No focal deficit present.      Mental Status: He is alert.   Psychiatric:         Mood and Affect: Mood normal.         Labs/Imaging/Procedures:   Results from last 7 days   Lab Units 05/26/23  1014 05/23/23  0432 05/22/23  0618   SODIUM mmol/L 142   < > 136   POTASSIUM mmol/L 3.5   < > 4.3   CHLORIDE mmol/L 106   < > 104   CO2 mmol/L 21.0*   < > 16.0*   BUN mg/dL 17   < > 29*   CREATININE mg/dL 0.95   < > 1.57*   CALCIUM mg/dL 9.0   < > 8.0*   BILIRUBIN mg/dL  --   --  0.7   ALK PHOS U/L  --   --  127*   ALT (SGPT) U/L  --   --  35   AST (SGOT) U/L  --   --  44*   GLUCOSE mg/dL 158*   < > 230*    < > = values in this interval not displayed.     Results from last 7 days   Lab Units 05/26/23  1014 05/21/23  0433 05/21/23  0233 05/20/23  0549   CK TOTAL U/L 14*  --   --   --    HSTROP T ng/L  --  62* 62* 79*     Results from last 7 days   Lab Units 05/26/23  1020 05/25/23  1636 05/24/23  0307   WBC 10*3/mm3 10.77 8.66 17.84*   HEMOGLOBIN g/dL 10.2* 10.6* 8.6*   HEMATOCRIT % 32.9* 34.9* 27.2*   PLATELETS 10*3/mm3 193 232 213     Results from last 7 days   Lab Units 05/24/23  0307 05/23/23  2020 05/23/23  1300 05/22/23  1155 05/22/23  0618 05/20/23  1104 05/20/23  0111   INR   --   --   --   --  1.76*  --  2.23*   APTT seconds 93.9* 94.9* 92.0*   < > 63.9   < > 49.1*    < > = values in this interval not displayed.     Results from last 7 days   Lab Units 05/26/23  1014   MAGNESIUM mg/dL 1.9         CT Abdomen Pelvis Without Contrast    Result Date: 5/19/2023  Impression: 1. Small bilateral pleural effusions with interval improvement in right pleural effusion. 2. New left lower lobe airspace disease consistent with pneumonia or aspiration. 3. High density material layering within the gallbladder which may represent multiple small stones. No evidence of acute inflammatory  change around the gallbladder. 4. Thin linear calcification along the posterior wall of the urinary bladder just to the right of midline which is nonspecific. Cystoscopy may be useful if clinically indicated. 5. Mild infrarenal abdominal aortic aneurysm measuring 3.2 cm in greatest dimension. Electronically Signed: Deon Heredia  5/19/2023 11:00 PM EDT  Workstation ID: [id number]    CT Head Without Contrast    Result Date: 5/19/2023  Impression: 1. Generalized parenchymal volume loss. No acute intracranial abnormality. Electronically Signed: Deon Heredia  5/19/2023 10:50 PM EDT  Workstation ID: [id number]    CT Angiogram Chest    Result Date: 5/19/2023  Impression: 1. Left lower lobe pulmonary emboli. 2. New left lower lobe airspace disease which may be secondary to pulmonary infarct. Pneumonia or aspiration could also give this appearance. 3. New small left pleural effusion. 4. Improving right pleural effusion and improving right basilar airspace disease. Electronically Signed: Deon Heredia  5/19/2023 11:07 PM EDT  Workstation ID: [id number]    XR Chest 1 View    Result Date: 5/24/2023  Impression: Pulmonary edema. Increased consolidation retrocardiac left lower lobe. Stable perihilar opacity left greater than right. Small pleural effusions. Support devices have appropriate position. Electronically Signed: Micaela Horn  5/24/2023 7:06 AM EDT  Workstation ID: [id number]    XR Chest 1 View    Result Date: 5/23/2023  Impression: Support hardware projects unchanged. There is no distinct pneumothorax. Scattered atelectasis and small left pleural effusion are stable. Unchanged heart and mediastinal contours. Electronically Signed: Nicolas Siddiqi  5/23/2023 7:34 AM EDT  Workstation ID: [id number]    XR Chest 1 View    Result Date: 5/22/2023  Impression: Endotracheal tube now 1 cm above the vazquez. Stable bilateral pleural effusions and left-sided airspace disease Electronically Signed: David Cardoso  5/22/2023 10:53 AM EDT   Workstation ID: [id number]    XR Chest 1 View    Result Date: 5/22/2023  Impression: 1. Endotracheal tube is just above the vazquez. Recommend retracting the endotracheal tube 3 to 4 cm 2. Stable small bilateral pleural effusions 3. Airspace disease in the mid to lower left lung which may be atelectasis or pneumonia Electronically Signed: David Janae  5/22/2023 7:58 AM EDT  Workstation ID: [id number]    XR Chest 1 View    Result Date: 5/22/2023  1.  Endotracheal tube tip 2 cm above vazquez, good position. 2.  Enteric tube tip past GE junction and off image. 3.  No other interval change from radiograph earlier today. Electronically signed by:  Gianni Gonzales M.D.  5/21/2023 10:21 PM Mountain Time    XR Chest 1 View    Result Date: 5/21/2023  1.  Small right jugular venous catheter tip in the lower SVC is new from radiograph earlier today. 2.  No pneumothorax or other interval change. Electronically signed by:  Gianni Gonzales M.D.  5/21/2023 5:49 AM Mountain Time    XR Chest 1 View    Result Date: 5/21/2023  Impression: Patchy airspace disease in the left lung base and trace pleural effusions. Left basilar airspace disease has increased over the interim. Electronically signed by:  Nash Griffin M.D.  5/21/2023 1:21 AM Mountain Time    XR Chest 1 View    Result Date: 5/19/2023  Impression: Decreased atelectasis and decreased size and conspicuity of now small right-sided pleural effusion compared to prior exam. New haziness at the left lung base likely reflects new or increased conspicuity of small left layering pleural effusion and likely some associated atelectasis. Mild diffuse interstitial prominence may reflect a component of interstitial edema. Electronically Signed: Fabián Mondragon  5/19/2023 3:49 PM EDT  Workstation ID: [id number]    XR Abdomen KUB    Result Date: 5/21/2023  Impression: 1.Enteric tube terminates in the right upper quadrant, likely in the proximal duodenum. 2.Small pleural effusions and bibasilar  opacities. Electronically Signed: Jarrett Lynn  5/21/2023 12:03 PM EDT  Workstation ID: [id number]    XR chest ap    Result Date: 5/10/2023  Impression: Similar to mildly worsened size and appearance of a small to moderate right pleural effusion with associated right base opacities likely reflecting atelectasis. Electronically Signed: Fabián Mondragon  5/10/2023 5:51 PM EDT  Workstation ID: [id number]     LHC: No results found for this or any previous visit.     Echo: Results for orders placed during the hospital encounter of 05/19/23    Adult Transesophageal Echo (KAT) W/ Cont if Necessary Per Protocol    Interpretation Summary  •  There is a large 2.9 x 1.7 cm mass in the right atrium which appears adherent to the atrial aspect of one of the 2 pacemaker leads. The edges are smooth with some peripheral hyperechoic regions. This appears to be more consistent with older thrombus than acute vegetation. Does move in and out of the tricuspid valve plane with the cardiac cycle.  •  There is a moderate-sized (1.7 mm) mobile mass on the tricuspid valve that is consistent with a vegetation.Mild tricuspid valve regurgitation is present. No evidence of significant tricuspid valve stenosis is present. There is a at least 1.7 cm x 0.6 cm highly mobile vegetation on the tricuspid valve. The attachment point is difficult to discern due to artifact from the surrounding pacemaker wires however it is freely prolapsing across the valve throughout the cardiac cycle.  •  Left ventricular systolic function is mildly decreased. Estimated left ventricular EF = 45%  •  Left ventricular systolic function is mildly decreased. Estimated left ventricular EF = 45% Normal left ventricular cavity size and wall thickness noted. There is left ventricular global hypokinesis noted. Left ventricular diastolic function is consistent with (grade I) impaired relaxation.     Carotid Duplex: Results for orders placed during the hospital encounter of  05/04/21    Duplex Lower Extremity Art / Grafts - Left CAR    Interpretation Summary  · Left RADHA is moderately reduced, 0.67  · There is atherosclerotic disease throughout the left lower extremity  · There are abnormal monophasic waveforms starting at the level of the SFA  · The left proximal superficial femoral artery demonstrates 76-99% stenosis.  · The left proximal deep femoral artery demonstrates <50% stenosis.  · The left distal superficial femoral artery demonstrates 50-75% stenosis.  · The infrapopliteal vessels are patent    ABG:   Site   Date Value Ref Range Status   05/24/2023 Arterial Line  Final     Ravinder's Test   Date Value Ref Range Status   05/24/2023 N/A  Final     pH, Arterial   Date Value Ref Range Status   05/24/2023 7.375 7.350 - 7.450 pH units Final     pCO2, Arterial   Date Value Ref Range Status   05/24/2023 36.1 35.0 - 45.0 mm Hg Final     pO2, Arterial   Date Value Ref Range Status   05/24/2023 83.2 83.0 - 108.0 mm Hg Final     HCO3, Arterial   Date Value Ref Range Status   05/24/2023 21.1 20.0 - 26.0 mmol/L Final     Base Excess, Arterial   Date Value Ref Range Status   05/24/2023 -3.7 (L) 0.0 - 2.0 mmol/L Final     Hemoglobin, Blood Gas   Date Value Ref Range Status   05/24/2023 8.8 (L) 13.5 - 17.5 g/dL Final     Comment:     84 Value below reference range     Hematocrit, Blood Gas   Date Value Ref Range Status   05/24/2023 26.9 (L) 38.0 - 51.0 % Final     Oxyhemoglobin   Date Value Ref Range Status   05/24/2023 95.7 94 - 99 % Final     Methemoglobin   Date Value Ref Range Status   05/24/2023 0.20 0.00 - 1.50 % Final     Carboxyhemoglobin   Date Value Ref Range Status   05/24/2023 1.1 0 - 2 % Final     CO2 Content   Date Value Ref Range Status   05/24/2023 22.2 22 - 33 mmol/L Final     Barometric Pressure for Blood Gas   Date Value Ref Range Status   05/24/2023   Final     Comment:     N/A     Modality   Date Value Ref Range Status   05/24/2023 Nasal Cannula  Final     FIO2   Date Value  Ref Range Status   05/24/2023 28 % Final        Assessment:    Pneumonia of left lower lobe due to infectious organism    HFrEF (EF 30%)    History of CVA (cerebrovascular accident)    GERD (gastroesophageal reflux disease)    Confusion    Pulmonary embolus    Atrial fibrillation with RVR    Hypotension    Acute respiratory failure with hypoxia    Shock, likely multifactorial  Dr. Priest assessment: Patient has what appears to be thrombus in his right atrium versus a vegetation.  He has pacemaker leads there.  Certainly at this point I would continue antibiotics.  Dr. Mahan has done a previous VATS on the patient.  Discussed fully with patient.  We will continue to follow closely.  Thank you for this consultation.    Plan:  -Continue antibiotics per ID, planning for at least 6 weeks of antibiotic therapy  -He is currently on Eliquis for PE. Will discuss with Dr. Priest regarding TV vegetation and possible surgical intervention.  -EP consult         YUE Damian  11:43 EDT  05/26/23     Thank you for allowing us to participate in the care of your patient. Please do not hesitate to contact us with additional questions or concerns.

## 2023-05-26 NOTE — CASE MANAGEMENT/SOCIAL WORK
Continued Stay Note   [facility]     Patient Name: Timmy Guajardo  MRN: [id number]  Today's Date: 5/26/2023    Admit Date: 5/19/2023    Plan: SNF   Discharge Plan     Row Name 05/26/23 1451       Plan    Plan SNF    Patient/Family in Agreement with Plan yes    Plan Comments I spoke w/pt in room regarding d/c plan. Plan remains to go to rehab, SNF @ d/c. [facility] is following for [facility] Osorio. KAT revealed mass, CT consulted for  possible surgical removal. Per ID will need 6 weeks of IV Daptomycin @ d/c as well. Would need PICC for access for facility. [facility] will f/u beginning of next week.    Final Discharge Disposition Code 03 - skilled nursing facility (SNF)               Discharge Codes    No documentation.               Expected Discharge Date and Time     Expected Discharge Date Expected Discharge Time    May 31, 2023             Varinder Martinez RN

## 2023-05-26 NOTE — PROGRESS NOTES
[facility] Medicine Services  PROGRESS NOTE    Patient Name: Timmy Guajardo  : 1950  MRN: [id number]    Date of Admission: 2023  Primary Care Physician: Arsen Seals APRN    Subjective   Subjective     CC:  F/U vegetation TV/pacer lead    HPI:  Patient seen and examined. Feels fine. No complaints. Eating breakfast. Asks about resuming his appetite stimulant.     ROS:  Gen- No fevers, chills  CV- No chest pain, palpitations  Resp- No cough, dyspnea  GI- No N/V/D, abd pain    Objective   Objective     Vital Signs:   Temp:  [97.4 °F (36.3 °C)-98 °F (36.7 °C)] 97.4 °F (36.3 °C)  Heart Rate:  [70] 70  Resp:  [14-16] 16  BP: (138-175)/(69-92) 141/74  Flow (L/min):  [1-2] 1     Physical Exam:  Constitutional: No acute distress, awake, alert  HENT: NCAT, mucous membranes moist  Respiratory: Clear to auscultation bilaterally, respiratory effort normal   Cardiovascular: RRR, +murmur, No rubs or gallops  Gastrointestinal: Positive bowel sounds, soft, nontender, nondistended  Musculoskeletal: No bilateral ankle edema  Psychiatric: Appropriate affect, cooperative  Neurologic: Oriented x 3, PARISH, speech clear  Skin: No rashes    Results Reviewed:  LAB RESULTS:      Lab 23  1020 23  1636 23  0307 23  2020 23  1300 23  0432 23  1155 23  0618 23  0711 23  0233 23  1104 23  0549 23  0111 23  1828 23  1547   WBC 10.77 8.66 17.84*  --   --  20.17*  --  20.19*  --  12.80*  --  14.31* 14.80*  --  19.56*   HEMOGLOBIN 10.2* 10.6* 8.6*  --   --  8.4*  --  8.1*  --  10.0*  --  10.7* 10.5*  --  11.1*   HEMATOCRIT 32.9* 34.9* 27.2*  --   --  27.9*  --  25.7*  --  31.7*  --  35.5* 32.7*  --  35.1*   PLATELETS 193 232 213  --   --  188  --  200  --  198  --  181 197  --  205   NEUTROS ABS 9.19* 7.09* 15.85*  --   --  18.41*  --  17.14*  --  10.38*  --  12.14* 12.49*  --  17.54*   IMMATURE GRANS (ABS) 0.10*  0.14* 0.20*  --   --  0.35*  --  0.34*  --  0.08*  --  0.12* 0.11*  --  0.19*   LYMPHS ABS 0.77 0.81 1.03  --   --  0.74  --  1.60  --  1.09  --  0.83 1.03  --  0.64*   MONOS ABS 0.66 0.60 0.74  --   --  0.65  --  1.05*  --  1.03*  --  1.10* 1.03*  --  1.08*   EOS ABS 0.03 0.01 0.00  --   --  0.00  --  0.03  --  0.16  --  0.09 0.10  --  0.07   MCV 96.8 99.1* 97.8*  --   --  103.7*  --  98.8*  --  98.4*  --  102.6* 96.5  --  96.4   PROCALCITONIN  --   --   --   --   --   --   --  43.40*  --  0.31*  --   --   --  0.36*  --    LACTATE  --   --   --   --   --   --   --  0.8  --   --   --   --   --   --  1.9   LDH  --   --   --   --   --   --   --   --   --  272*  --   --   --   --   --    PROTIME  --   --   --   --   --   --   --  20.7*  --   --   --   --  24.9*  --   --    APTT  --   --  93.9* 94.9* 92.0* 49.1* 65.2 63.9   < >  --    < >  --  49.1*  --   --    HEPARIN ANTI-XA  --   --   --   --  >1.10*  --   --   --   --   --   --  >1.10* >1.10*  --   --     < > = values in this interval not displayed.         Lab 05/26/23  1014 05/25/23  0501 05/24/23  0307 05/23/23  0432 05/22/23  0618 05/21/23  1934 05/21/23  0433   SODIUM 142 145 142 136 136   < > 131*   POTASSIUM 3.5 3.8 4.1 4.9 4.3   < > 4.6   CHLORIDE 106 109* 110* 104 104   < > 99   CO2 21.0* 22.0 20.0* 17.0* 16.0*   < > 18.0*   ANION GAP 15.0 14.0 12.0 15.0 16.0*   < > 14.0   BUN 17 26* 29* 30* 29*   < > 23   CREATININE 0.95 1.22 1.25 1.48* 1.57*   < > 0.98   EGFR 86.1 63.8 61.9 50.6* 47.1*   < > 83.0   GLUCOSE 158* 69 74 131* 230*   < > 241*   CALCIUM 9.0 9.2 8.8 8.3* 8.0*   < > 8.3*   MAGNESIUM 1.9 2.2  --  2.6* 2.8*  --  1.8   PHOSPHORUS 3.5  --   --   --  5.0*  --   --     < > = values in this interval not displayed.         Lab 05/22/23  0618 05/21/23  0433 05/20/23  0549 05/19/23  1547   TOTAL PROTEIN 5.9* 6.2 7.0 7.6   ALBUMIN 2.6* 2.3* 2.7* 3.1*   GLOBULIN 3.3 3.9 4.3 4.5   ALT (SGPT) 35 62* 43* 50*   AST (SGOT) 44* 97* 58* 76*   BILIRUBIN 0.7 0.5 0.4  0.6   ALK PHOS 127* 138* 132* 161*         Lab 05/22/23  0618 05/21/23  0433 05/21/23  0233 05/20/23  0549 05/20/23  0111 05/19/23  1828 05/19/23  1547   PROBNP  --   --  1,949.0*  --   --   --   --    HSTROP T  --  62* 62* 79*  --  76* 101*   PROTIME 20.7*  --   --   --  24.9*  --   --    INR 1.76*  --   --   --  2.23*  --   --                  Lab 05/24/23  0341 05/23/23  0320 05/22/23  0333   PH, ARTERIAL 7.375 7.332* 7.330*   PCO2, ARTERIAL 36.1 36.5 32.7*   PO2 ART 83.2 91.2 128.0*   FIO2 28 28 40   HCO3 ART 21.1 19.3* 17.2*   BASE EXCESS ART -3.7* -6.0* -7.9*   CARBOXYHEMOGLOBIN 1.1 1.2 0.9     Brief Urine Lab Results  (Last result in the past 365 days)      Color   Clarity   Blood   Leuk Est   Nitrite   Protein   CREAT   Urine HCG        05/21/23 0240 Yellow   Turbid   Negative   Negative   Negative   30 mg/dL (1+)                 Microbiology Results Abnormal     Procedure Component Value - Date/Time    Blood Culture - Blood, Hand, Left [[id number]]  (Normal) Collected: 05/22/23 1155    Lab Status: Preliminary result Specimen: Blood from Hand, Left Updated: 05/25/23 1346     Blood Culture No growth at 3 days    Blood Culture - Blood, Arm, Left [[id number]]  (Normal) Collected: 05/22/23 1202    Lab Status: Preliminary result Specimen: Blood from Arm, Left Updated: 05/25/23 1346     Blood Culture No growth at 3 days    Respiratory Culture - Sputum, ET Suction [[id number]] Collected: 05/22/23 0020    Lab Status: Final result Specimen: Sputum from ET Suction Updated: 05/24/23 1150     Respiratory Culture Moderate growth (3+) Normal respiratory nathan. No S. aureus or Pseudomonas aeruginosa detected. Final report.     Gram Stain Few (2+) WBCs per low power field      Occasional Epithelial cells per low power field      Few (2+) Budding yeast      Occasional Gram positive cocci    Urine Culture - Urine, Straight Cath [[id number]]  (Normal) Collected: 05/21/23 0240    Lab Status: Final result Specimen: Urine from  Straight Cath Updated: 05/22/23 0816     Urine Culture No growth    S. Pneumo Ag Urine or CSF - Urine, Urine, Clean Catch [[id number]]  (Normal) Collected: 05/21/23 0240    Lab Status: Final result Specimen: Urine, Clean Catch Updated: 05/21/23 1356     Strep Pneumo Ag Negative    Legionella Antigen, Urine - Urine, Urine, Clean Catch [[id number]]  (Normal) Collected: 05/21/23 0240    Lab Status: Final result Specimen: Urine, Clean Catch Updated: 05/21/23 1356     LEGIONELLA ANTIGEN, URINE Negative    MRSA Screen, PCR (Inpatient) - Swab, Nares [[id number]]  (Normal) Collected: 05/20/23 0523    Lab Status: Final result Specimen: Swab from Nares Updated: 05/20/23 0736     MRSA PCR Negative    Narrative:      The negative predictive value of this diagnostic test is high and should only be used to consider de-escalating anti-MRSA therapy. A positive result may indicate colonization with MRSA and must be correlated clinically.  MRSA Negative          Adult Transesophageal Echo (KAT) W/ Cont if Necessary Per Protocol    Result Date: 5/25/2023  •  There is a large 2.9 x 1.7 cm mass in the right atrium which appears adherent to the atrial aspect of one of the 2 pacemaker leads. The edges are smooth with some peripheral hyperechoic regions. This appears to be more consistent with older thrombus than acute vegetation. Does move in and out of the tricuspid valve plane with the cardiac cycle. •  There is a moderate-sized (1.7 mm) mobile mass on the tricuspid valve that is consistent with a vegetation.Mild tricuspid valve regurgitation is present. No evidence of significant tricuspid valve stenosis is present. There is a at least 1.7 cm x 0.6 cm highly mobile vegetation on the tricuspid valve. The attachment point is difficult to discern due to artifact from the surrounding pacemaker wires however it is freely prolapsing across the valve throughout the cardiac cycle. •  Left ventricular systolic function is mildly decreased.  Estimated left ventricular EF = 45% •  Left ventricular systolic function is mildly decreased. Estimated left ventricular EF = 45% Normal left ventricular cavity size and wall thickness noted. There is left ventricular global hypokinesis noted. Left ventricular diastolic function is consistent with (grade I) impaired relaxation.       Results for orders placed during the hospital encounter of 05/19/23    Adult Transesophageal Echo (KAT) W/ Cont if Necessary Per Protocol    Interpretation Summary  •  There is a large 2.9 x 1.7 cm mass in the right atrium which appears adherent to the atrial aspect of one of the 2 pacemaker leads. The edges are smooth with some peripheral hyperechoic regions. This appears to be more consistent with older thrombus than acute vegetation. Does move in and out of the tricuspid valve plane with the cardiac cycle.  •  There is a moderate-sized (1.7 mm) mobile mass on the tricuspid valve that is consistent with a vegetation.Mild tricuspid valve regurgitation is present. No evidence of significant tricuspid valve stenosis is present. There is a at least 1.7 cm x 0.6 cm highly mobile vegetation on the tricuspid valve. The attachment point is difficult to discern due to artifact from the surrounding pacemaker wires however it is freely prolapsing across the valve throughout the cardiac cycle.  •  Left ventricular systolic function is mildly decreased. Estimated left ventricular EF = 45%  •  Left ventricular systolic function is mildly decreased. Estimated left ventricular EF = 45% Normal left ventricular cavity size and wall thickness noted. There is left ventricular global hypokinesis noted. Left ventricular diastolic function is consistent with (grade I) impaired relaxation.      Current medications:  Scheduled Meds:amiodarone, 200 mg, Oral, Q12H  apixaban, 5 mg, Oral, Q12H  aspirin, 81 mg, Oral, Daily  atorvastatin, 80 mg, Oral, Nightly  carvedilol, 25 mg, Oral, BID With Meals  DAPTOmycin, 8  mg/kg, Intravenous, Q24H  empagliflozin, 10 mg, Oral, Daily  fluticasone, 2 spray, Nasal, Daily  Insulin Lispro, 0-9 Units, Subcutaneous, 4x Daily With Meals & Nightly  lisinopril, 5 mg, Oral, Q24H  megestrol, 400 mg, Oral, Daily  [START ON 5/27/2023] pantoprazole, 40 mg, Oral, Q AM  senna-docusate sodium, 2 tablet, Oral, BID  sodium chloride, 10 mL, Intravenous, Q12H  terazosin, 1 mg, Oral, Daily      Continuous Infusions:   PRN Meds:.•  acetaminophen  •  senna-docusate sodium **AND** polyethylene glycol **AND** [DISCONTINUED] bisacodyl **AND** bisacodyl  •  Calcium Replacement - Follow Nurse / BPA Driven Protocol  •  cyclobenzaprine  •  dextrose  •  ipratropium-albuterol  •  Magnesium Cardiology Dose Replacement - Follow Nurse / BPA Driven Protocol  •  nitroglycerin  •  ondansetron  •  Phosphorus Replacement - Follow Nurse / BPA Driven Protocol  •  Potassium Replacement - Follow Nurse / BPA Driven Protocol  •  sodium chloride  •  sodium chloride  •  sodium chloride    Assessment & Plan   Assessment & Plan     Active Hospital Problems    Diagnosis  POA   • **Pneumonia of left lower lobe due to infectious organism [J18.9]  Yes   • Acute respiratory failure with hypoxia [J96.01]  Unknown   • Shock, likely multifactorial [R57.9]  Unknown   • Atrial fibrillation with RVR [I48.91]  No   • Hypotension [I95.9]  Yes   • Pulmonary embolus [I26.99]  Yes   • Confusion [R41.0]  Yes   • GERD (gastroesophageal reflux disease) [K21.9]  Yes   • History of CVA (cerebrovascular accident) [Z86.73]  Not Applicable   • HFrEF (EF 30%) [I50.20]  Yes      Resolved Hospital Problems   No resolved problems to display.        Brief Hospital Course to date:  Timmy Guajardo is a 72 y.o. male with PMHx significant for PE on Eliquis, HTN, dyslipidemia, HFrEF (30% on 10/2022), T2DM, CVA with right-sided residual weakness, and GERD.  He was admitted to the ICU on 5/21/2023 as a transfer from the floor.  He was initially admitted with a pulmonary  embolism and a left lower lobe pneumonia versus infarct on 5/19/2023.  The morning of 5/21 he suddenly went into A-fib with RVR became hypotensive with prompted transition to the ICU. He underwent KAT 5/25 due to bacteremia and found to have vegetation on pacer wire and TV. Transferred to the floor 5/25.     This patient's problems and plans were partially entered by my partner and updated as appropriate by me 05/26/23.  All problems are new to me today    A fib RVR  -Resolved  -Continue Coreg, Amiodarone   -Continue Eliquis for now (may need to hold pending CTS eval)   -Cardiology following    Staph epidermidis Bacteremia  Vegetation pacer wire/Tricusupid valve   -KAT 5/25 with vegetation noted on pacer lead and TV  -CTS consulted for surgical evaluation   -ID following, Continue Daptomycin daily, likely 6 weeks   -Repeat blood Cx NGTD  -Cardiology following     Hx PE  -On Eliquis, pending CTS eval, may need to place on heparin gtt     HTN  -Continue Lisinopril     HFrEF  -Continue Coreg  -Continue Jardiance     GERD  -Continue PPI    Chronic Severe Malnutrition   -resume Megace  -RD following     Expected Discharge Location and Transportation: [facility]  Expected Discharge   Expected Discharge Date: 5/31/2023; Expected Discharge Time:      DVT prophylaxis:  Medical DVT prophylaxis orders are present.     AM-PAC 6 Clicks Score (PT): 10 (05/24/23 1131)    CODE STATUS:   Code Status and Medical Interventions:   Ordered at: 05/20/23 0011     Level Of Support Discussed With:    Patient     Code Status (Patient has no pulse and is not breathing):    CPR (Attempt to Resuscitate)     Medical Interventions (Patient has pulse or is breathing):    Full Support       Yvonne Foster, DO  05/26/23

## 2023-05-26 NOTE — PROGRESS NOTES
INFECTIOUS DISEASE CONSULT/INITIAL HOSPITAL VISIT    Timmy Guajardo  1950  [id number]    Date of Consult: 5/26/2023    Admission Date: 5/19/2023      Requesting Provider: No ref. provider found  Evaluating Physician: Travon Brito MD    Reason for Consultation: Positive blood cultures    History of present illness:    Patient is a 72 y.o. male with past medical history of pulmonary embolism, dyslipidemia, heart failure, type II d. mellitus, CVA with right-sided residual weakness and GERD.      Patient admitted to hospital for pulmonary embolism left lower lobe pneumonia versus infarction on May/9/2023 patient had atrial fibrillation with RVR with hypotension has been transferred the ICU.      Patient found have positive blood cultures for staph coccus epidermidis.  We are being consulted for further interpretation of these positive blood cultures.    Patient reports having indwelling pacemaker/ ICD patient says this is fired 1 time since he has had it placed        5/24/23; no events overnight Noemy antibiotics no complaints    5/25/2023 patient had KAT which was remarkable for right atrial mass and vegetation on pacemaker lead patient feels well no complaints no events overnight    5/26/23; no events overnight; no fever, rash, sore throat  Past Medical History:   Diagnosis Date   • Cardiomyopathy    • CHF (congestive heart failure)    • Coronary artery disease    • COVID-19    • Diabetes mellitus    • GERD (gastroesophageal reflux disease)    • HTN (hypertension)    • Pleural effusion    • Stroke     Right sided weakness   • Stroke        Past Surgical History:   Procedure Laterality Date   • AMPUTATION DIGIT Left 5/12/2021    Procedure: AMPUTATION DIGIT - GREAT TOE AMPUTATION LEFT;  Surgeon: Dario Marmolejo MD;  Location: [facility];  Service: General;  Laterality: Left;   • AORTAGRAM N/A 5/11/2021    Procedure: AORTAGRAM WITH RUNOFFS, LEFT SFA BALLOON ANGIOPLASTY;  Surgeon: Tim Mahan MD;   Location:  [facility];  Service: Vascular;  Laterality: N/A;  FL TIME 6 MIN 54 SECS  82 MGY  CONTRAST VISIPAQUE 100ML     • CARDIAC CATHETERIZATION     • CARDIAC PACEMAKER PLACEMENT      pacemaker/defibrillator, [employer]   • COLONOSCOPY N/A 3/31/2023    Procedure: COLONOSCOPY;  Surgeon: Brunner, Mark I, MD;  Location:  [facility];  Service: Gastroenterology;  Laterality: N/A;   • ENDOSCOPY N/A 3/29/2023    Procedure: ESOPHAGOGASTRODUODENOSCOPY;  Surgeon: Brunner, Mark I, MD;  Location:  [facility];  Service: Gastroenterology;  Laterality: N/A;   • HERNIA REPAIR Bilateral     Inguinal hernia   • HIP TROCHANTERIC NAILING WITH INTRAMEDULLARY HIP SCREW Right 10/18/2022    Procedure: HIP TROCHANTERIC NAILING WITH INTRAMEDULLARY HIP SCREW RIGHT;  Surgeon: Bj Steinberg MD;  Location:  [facility];  Service: Orthopedics;  Laterality: Right;   • THORACOSCOPY VIDEO ASSISTED WITH LOBECTOMY Right 4/12/2023    Procedure: BRONCHOSCOPY, THORACOSCOPY VIDEO ASSISTED WITH DECORTICATION, MECHANICAL PLEURODESIS;  Surgeon: Tim Mahan MD;  Location:  [facility];  Service: Cardiothoracic;  Laterality: Right;       Family History   Problem Relation Age of Onset   • Alzheimer's disease Mother    • Kidney failure Mother    • Cancer Father    • Heart failure Father        Social History     Socioeconomic History   • Marital status: Single   • Number of children: 0   Tobacco Use   • Smoking status: Former     Packs/day: 1.00     Years: 30.00     Pack years: 30.00     Types: Cigarettes     Quit date: 2013     Years since quitting: 10.4   • Smokeless tobacco: Never   • Tobacco comments:     quit 2015   Vaping Use   • Vaping Use: Never used   Substance and Sexual Activity   • Alcohol use: Never   • Drug use: Never   • Sexual activity: Defer       Allergies   Allergen Reactions   • Other Unknown - Low Severity     Mercury metals- as a child         Medication:    Current Facility-Administered Medications:   •   acetaminophen (TYLENOL) tablet 650 mg, 650 mg, Oral, Q4H PRN, Jarrett Buenrostro MD  •  amiodarone (PACERONE) tablet 200 mg, 200 mg, Oral, Q12H, Jarrett Buenrostro MD, 200 mg at 05/26/23 0837  •  apixaban (ELIQUIS) tablet 5 mg, 5 mg, Oral, Q12H, Jarrett Buenrostro MD, 5 mg at 05/26/23 0836  •  aspirin chewable tablet 81 mg, 81 mg, Oral, Daily, Jarrett Buenrostro MD, 81 mg at 05/26/23 0837  •  atorvastatin (LIPITOR) tablet 80 mg, 80 mg, Oral, Nightly, Jarrett Buenrostro MD, 80 mg at 05/25/23 2131  •  sennosides-docusate (PERICOLACE) 8.6-50 MG per tablet 2 tablet, 2 tablet, Oral, BID, 2 tablet at 05/26/23 0837 **AND** polyethylene glycol (MIRALAX) packet 17 g, 17 g, Oral, Daily PRN **AND** [DISCONTINUED] bisacodyl (DULCOLAX) EC tablet 5 mg, 5 mg, Oral, Daily PRN **AND** bisacodyl (DULCOLAX) suppository 10 mg, 10 mg, Rectal, Daily PRN, Jarrett Buenrostro MD  •  Calcium Replacement - Follow Nurse / BPA Driven Protocol, , Does not apply, PRN, Jarrett Buenrostro MD  •  carvedilol (COREG) tablet 25 mg, 25 mg, Oral, BID With Meals, Jarrett Buenrostro MD, 25 mg at 05/26/23 0836  •  cyclobenzaprine (FLEXERIL) tablet 5 mg, 5 mg, Oral, TID PRN, Jarrett Buenrostro MD  •  DAPTOmycin (CUBICIN) 500 mg in sodium chloride 0.9 % 50 mL IVPB, 8 mg/kg, Intravenous, Q24H, Jarrett Buenrostro MD, Last Rate: 100 mL/hr at 05/26/23 1148, 500 mg at 05/26/23 1148  •  dextrose (D50W) (25 g/50 mL) IV injection 50 mL, 50 mL, Intravenous, Q1H PRN, Jarrett Buenrostro MD, 50 mL at 05/25/23 0600  •  empagliflozin (JARDIANCE) tablet 10 mg, 10 mg, Oral, Daily, Jarrett Buenrostro MD, 10 mg at 05/26/23 0838  •  fluticasone (FLONASE) 50 MCG/ACT nasal spray 2 spray, 2 spray, Nasal, Daily, Jarrett Buenrostro MD, 2 spray at 05/26/23 1148  •  Insulin Lispro (humaLOG) injection 0-9 Units, 0-9 Units, Subcutaneous, 4x Daily With Meals & Nightly, Jarrett Buenrostro MD  •  ipratropium-albuterol (DUO-NEB) nebulizer solution 3 mL, 3 mL,  Nebulization, Q6H PRN, Jarrett Buenrostro MD  •  lisinopril (PRINIVIL,ZESTRIL) tablet 5 mg, 5 mg, Oral, Q24H, Jarrett Buenrostro MD, 5 mg at 05/26/23 0836  •  Magnesium Cardiology Dose Replacement - Follow Nurse / BPA Driven Protocol, , Does not apply, PRN, Jarrett Buenrostro MD  •  magnesium sulfate 4g/100mL (PREMIX) infusion, 4 g, Intravenous, Once, Yvonne Foster DO  •  megestrol (MEGACE) 40 MG/ML suspension 400 mg, 400 mg, Oral, Daily, Yvonne Foster DO, 400 mg at 05/26/23 1242  •  nitroglycerin (NITROSTAT) SL tablet 0.4 mg, 0.4 mg, Sublingual, Q5 Min PRN, Jarrett Buenrostro MD  •  ondansetron (ZOFRAN) injection 4 mg, 4 mg, Intravenous, Q6H PRN, Jarrett Buenrostro MD  •  [START ON 5/27/2023] pantoprazole (PROTONIX) EC tablet 40 mg, 40 mg, Oral, Q AM, Yvonne Foster DO  •  Phosphorus Replacement - Follow Nurse / BPA Driven Protocol, , Does not apply, PRN, Jarrett Buenrostro MD  •  potassium chloride (K-DUR,KLOR-CON) CR tablet 40 mEq, 40 mEq, Oral, Q4H, Yvonne Foster, DO  •  Potassium Replacement - Follow Nurse / BPA Driven Protocol, , Does not apply, PRN, Jarrett Buenrostro MD  •  sodium chloride 0.9 % flush 10 mL, 10 mL, Intravenous, PRN, Jarrett Buenrostro MD  •  sodium chloride 0.9 % flush 10 mL, 10 mL, Intravenous, Q12H, Jarrett Buenrostro MD, 10 mL at 05/26/23 0838  •  sodium chloride 0.9 % flush 10 mL, 10 mL, Intravenous, PRN, Jarrett Buenrostro MD  •  sodium chloride 0.9 % infusion 40 mL, 40 mL, Intravenous, PRN, Jarrett Buernostro MD, 40 mL at 05/26/23 1149  •  terazosin (HYTRIN) capsule 1 mg, 1 mg, Oral, Daily, Jarrett Buenrostro MD, 1 mg at 05/26/23 0837    Antibiotics:  Anti-Infectives (From admission, onward)    Ordered     Dose/Rate Route Frequency Start Stop    05/23/23 0920  DAPTOmycin (CUBICIN) 500 mg in sodium chloride 0.9 % 50 mL IVPB        Ordering Provider: Jarrett Buenrostro MD    8 mg/kg × 65.2 kg  100 mL/hr over 30 Minutes Intravenous Every 24  Hours 23 1100 23 1059    23  piperacillin-tazobactam (ZOSYN) 3.375 g in iso-osmotic dextrose 50 ml (premix)        Ordering Provider: Brady Dumont DO    3.375 g  over 30 Minutes Intravenous Once 23 2323    23  vancomycin 1250 mg/250 mL 0.9% NS IVPB ([facility])        Ordering Provider: Brady Dumont DO    20 mg/kg × 63.5 kg  over 90 Minutes Intravenous Once 23 0052            Review of Systems:  See HPI    Physical Exam:   Vital Signs  Temp (24hrs), Av.6 °F (36.4 °C), Min:97.4 °F (36.3 °C), Max:98 °F (36.7 °C)    Temp  Min: 97.4 °F (36.3 °C)  Max: 98 °F (36.7 °C)  BP  Min: 138/81  Max: 175/88  Pulse  Min: 70  Max: 70  Resp  Min: 14  Max: 16  SpO2  Min: 95 %  Max: 100 %    GENERAL: Awake and alert, in no acute distress.   HEENT: Normocephalic, atraumatic.  PERRL. EOMI. No conjunctival injection. No icterus.  No external oral lesions    HEART: RRR; No murmur  LUNGS: Clear to auscultation bilaterally   ABDOMEN: Soft, nontender  EXT:  No edema  :  Without Rosenberg catheter.  MSK: No joint effusions or erythema  SKIN: Warm and dry without cutaneous eruptions on Inspection/palpation.        Laboratory Data    Results from last 7 days   Lab Units 23  1020 23  1636 23  0307   WBC 10*3/mm3 10.77 8.66 17.84*   HEMOGLOBIN g/dL 10.2* 10.6* 8.6*   HEMATOCRIT % 32.9* 34.9* 27.2*   PLATELETS 10*3/mm3 193 232 213     Results from last 7 days   Lab Units 23  1014   SODIUM mmol/L 142   POTASSIUM mmol/L 3.5   CHLORIDE mmol/L 106   CO2 mmol/L 21.0*   BUN mg/dL 17   CREATININE mg/dL 0.95   GLUCOSE mg/dL 158*   CALCIUM mg/dL 9.0     Results from last 7 days   Lab Units 23  0618   ALK PHOS U/L 127*   BILIRUBIN mg/dL 0.7   ALT (SGPT) U/L 35   AST (SGOT) U/L 44*             Results from last 7 days   Lab Units 23  0618   LACTATE mmol/L 0.8     Results from last 7 days   Lab Units 23  1014   CK TOTAL U/L 14*     Results from last 7  days   Lab Units 05/21/23 1934   VANCOMYCIN TR mcg/mL 14.70     Estimated Creatinine Clearance: 61.4 mL/min (by C-G formula based on SCr of 0.95 mg/dL).      Microbiology:  Blood Culture   Date Value Ref Range Status   05/19/2023 Staphylococcus epidermidis (C)  Final   05/19/2023 Staphylococcus epidermidis (C)  Final     BCID, PCR   Date Value Ref Range Status   05/19/2023 (A) Negative by BCID PCR. Culture to Follow. Final    Staph spp, not aureus or lugdunensis. Identification by BCID2 PCR.     No results found for: CULTURES, HSVCX, URCX  No results found for: EYECULTURE, GCCX, HSVCULTURE, LABHSV  No results found for: LEGIONELLA, MRSACX, MUMPSCX, MYCOPLASCX  No results found for: NOCARDIACX, STOOLCX  Urine Culture   Date Value Ref Range Status   05/21/2023 No growth  Final     No results found for: VIRALCULTU, WOUNDCX        Radiology:  Imaging Results (Last 72 Hours)     Procedure Component Value Units Date/Time    XR Chest 1 View [[id number]] Collected: 05/24/23 0704     Updated: 05/24/23 0709    Narrative:      XR CHEST 1 VW    Date of Exam: 5/24/2023 2:26 AM EDT    Indication: Intubated Patient    Comparison: Chest x-ray 5/23/2023    Findings:  Pacemaker device is present. Esophagogastric tube seen past the distal esophagus. Internal jugular central venous catheter tip terminates at the super vena cava. There is perihilar opacity. There are small pleural effusions. There is increased opacity in   the left lower lobe. No pneumothorax.    Impression:      Impression:  Pulmonary edema. Increased consolidation retrocardiac left lower lobe. Stable perihilar opacity left greater than right. Small pleural effusions.  Support devices have appropriate position.      Electronically Signed: Micaela Horn    5/24/2023 7:06 AM EDT    Workstation ID: [id number]            Impression:   Left lower lobe infiltrate from infarction versus infection  Left lower lobe pulmonary embolism  Leukocytosis with neutrophilia  High-grade  positive blood culture for staph coccus epidermidis  Elevated procalcitonin  Pacemaker infection  Right atrial endocarditis  PLAN/RECOMMENDATIONS:   Thank you for asking us to see Timmy Guajardo, I recommend the following:  Given the presence indwelling endovascular hardware staphylococcus epidermidis bacteremia is concerning    High-grade positive blood cultures and indwelling hardware probably warrants further investigation    Patient does have leukocytosis and markedly elevated procalcitonin    Having both a pulmonary embolism and a bacterial pneumonia unless from a septic emboli would be less expected.    Follow-up repeat blood cultures    Appreciate cardiology assistance if patient has pacemaker infection we need at least consider the possibility and feasibility of removal of endovascular hardware.    If this can be removed this will increase chance for clinical cure          Continue daptomycin 6 mg/kg every 24 hours may extend this length of treatment to up to 6 weeks  Follow-up repeated blood cultures from 5/22 remain no growth at 3 days  Reasonable to follow procalcitonin to see if this is descending    Favor removal of hardware required for clearance of infection      Travon Brito MD  5/26/2023  13:53 EDT

## 2023-05-26 NOTE — PROGRESS NOTES
"[facility]  IP Progress Note    PROBLEM LIST:  CARDIAC  Coronary Artery Disease:   [facility], data unknown apparently normal    Myocardium:   LVEF multiple echoes around 30s  Echo, 5/23/2023: LVEF 45%, G2 DD, RV dilated    Valvular:  Mild TR, mild MR    Electrical:   A-fib  [employer] ICD  KAT, 5/25/2023: Vegetation/mass on pacer lead/tricuspid valve    Percardium:   Small pericardial effusion    VASCULAR:  Arterial  Cerebrovascular disease:   History of CVA with right-sided deficit  Negative saline test    Peripheral vascular disease:   ABIs, 5/6/2021: Left RADHA 0.67 severe stenosis of SFA.  Angioplasty L SFA  Left great toe amputation    AAA:   Mild dilatation of ascending aorta  Infrarenal AAA 3.2    Venous:  History of pulmonary embolisms      CARDIAC RISK FACTORS:         Hypertension         Dyslipidemia:          Tobacco Use: Former Smoker    NON-CARDIAC:  COVID-19  GERD  Gangrene of left toe of the foot  Pleural effusion    SURGERIES:  Left digit amputation  Bilateral hernia repair  Right hip repair  Thoracoscopy with lobectomy        HOSPITAL COURSE:  Patient admitted with sepsis/pneumonia and respiratory failure has been doing fine.  Initial blood cultures were positive which are negative now.  KAT revealed 2 masses on the pacer wire/tricuspid valve.      CHIEF COMPLAINTS:  Respiratory failure      Subjective   Patient feeling much better.      Objective     Blood pressure 175/88, pulse 70, temperature 97.5 °F (36.4 °C), temperature source Oral, resp. rate 16, height 177.8 cm (70\"), weight 60 kg (132 lb 3.2 oz), SpO2 97 %.     Intake/Output Summary (Last 24 hours) at 5/26/2023 0842  Last data filed at 5/26/2023 0700  Gross per 24 hour   Intake --   Output 850 ml   Net -850 ml       PHYSICAL EXAM:  Constitutional:       General: Cachectic     Appearance: Little anxious    Neck:     JVP: Not elevated     Carotid artery: No carotid bruit    Pulmonary:      Effort: " Pulmonary effort is normal.      Breath sounds: Good breath sounds    Cardiovascular:      Normal rate. Regular rhythm. Normal S1. Normal S2.      Murmurs: There is no murmur.      No gallop. No click. No rub.     Abdominal:      General: Bowel sounds are normal.      Palpations: Abdomen is soft.      Tenderness: There is no abdominal tenderness.    Extremities:     Pulses: Good distal pulses     Edema: No edema    RESULR REVIEW:    I reviewed the patient's new clinical results.      MEDICATIONS:    amiodarone, 200 mg, Oral, Q12H  apixaban, 5 mg, Oral, Q12H  aspirin, 81 mg, Oral, Daily  atorvastatin, 80 mg, Oral, Nightly  carvedilol, 25 mg, Oral, BID With Meals  DAPTOmycin, 8 mg/kg, Intravenous, Q24H  empagliflozin, 10 mg, Oral, Daily  fluticasone, 2 spray, Nasal, Daily  Insulin Lispro, 0-9 Units, Subcutaneous, 4x Daily With Meals & Nightly  lisinopril, 5 mg, Oral, Q24H  pantoprazole, 40 mg, Intravenous, Q24H  senna-docusate sodium, 2 tablet, Oral, BID  sodium chloride, 10 mL, Intravenous, Q12H  terazosin, 1 mg, Oral, Daily          Results from last 7 days   Lab Units 05/25/23  1636   WBC 10*3/mm3 8.66   HEMOGLOBIN g/dL 10.6*   HEMATOCRIT % 34.9*   PLATELETS 10*3/mm3 232     Results from last 7 days   Lab Units 05/25/23  0501 05/23/23  0432 05/22/23  0618   SODIUM mmol/L 145   < > 136   POTASSIUM mmol/L 3.8   < > 4.3   CHLORIDE mmol/L 109*   < > 104   CO2 mmol/L 22.0   < > 16.0*   BUN mg/dL 26*   < > 29*   CREATININE mg/dL 1.22   < > 1.57*   CALCIUM mg/dL 9.2   < > 8.0*   BILIRUBIN mg/dL  --   --  0.7   ALK PHOS U/L  --   --  127*   ALT (SGPT) U/L  --   --  35   AST (SGOT) U/L  --   --  44*   GLUCOSE mg/dL 69   < > 230*    < > = values in this interval not displayed.     Results from last 7 days   Lab Units 05/22/23  0618 05/20/23  0111   INR  1.76* 2.23*     Lab Results   Component Value Date    TROPONINT 62 (C) 05/21/2023                 Iron   Date Value Ref Range Status   10/20/2022 39 (L) 59 - 158 mcg/dL Final      Iron Saturation   Date Value Ref Range Status   10/20/2022 18 (L) 20 - 50 % Final     TIBC   Date Value Ref Range Status   10/20/2022 218 (L) 298 - 536 mcg/dL Final      Hemoglobin A1C   Date Value Ref Range Status   03/25/2023 6.30 (H) 4.80 - 5.60 % Final     Magnesium   Date Value Ref Range Status   05/25/2023 2.2 1.6 - 2.4 mg/dL Final        Tele: Sinus Rythym      ASSESSMENT:     1. Vegetation on pacer lead  2. Sepsis  3. History of pulmonary embolisms  4. Respiratory failure  5. Heart failure          PLAN:     1. Based on KAT findings we will consult cardiothoracic surgeon for possible removal of the mass.  If he is deemed too high risk we might try percutaneous removal of the clot.  2. We will continue with rest of his medications.  Antibiotics per ID.  3. If we can go for procedure we might need to hold his Eliquis and switch him to heparin.

## 2023-05-26 NOTE — PROGRESS NOTES
Subjective     Interval History:   Patient admitted 5/19/23 with pulmonary embolism and LLL PNA. He went into afib RVR 5/21/23 and became hypotensive and was moved to ICU. He underwent KAT 5/25/23 due to bacteremia that revealed a moderate sized mobile mass on pacer wire and tricuspid valve.  He reports he underwent [employer] BiV ICD implantation by Dr.Schoen in 2015 due to cardiomyopathy and heart failure.  He has been followed at [facility] most recently and BiV ICD with normal function.  Most recent echo 5/25/2023 revealed LVEF 45% improved from last year (30%) and patient denies symptoms of heart failure currently. He has never received ICD shock.    Review of Systems:   Pertinent positives in HPI, all others reviewed and negative.      Objective     Physical Exam:     General Appearance:    Alert, cooperative, in no acute distress   Lungs:     Clear to auscultation,respirations regular, even and                unlabored    Heart:    Regular rhythm and normal rate, normal S1 and S2   Chest Wall:    No abnormalities observed   Abdomen:     Normal bowel sounds, no masses, no organomegaly, soft      nontender, nondistended, no guarding, no rebound                tenderness   Extremities:  Moves all extremities well, no edema, no cyanosis, no            redness              Pulses:   Pulses palpable and equal bilaterally   Skin:   No bleeding, bruising or rash       I personally viewed and interpreted the patient's EKG/Telemetry data    Telemetry: V paced, HR 70 bpm    Assessment & Plan   1. 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 Cardiomyopathy/ HFrEF NYHA I  -5/23/2023: LVEF 45%, G2 DD, RV dilated  -s/p [facility] BiV ICD implantation 2015 by Dr. Schoen, followed by [facility]    3. Afib  -controlled on coreg, amiodarone  -anticoagulation: Eliquis    Long discussion with the patient  today.  He is at high risk for transvenous laser extraction and possible transvenous removal of extremely large vegetation adhering both to lead and tricuspid valve.  We will await CT surgery evaluation first before for final decision to be made.    Electronically signed by YUE Hill, 05/26/23, 2:40 PM EDT.      I, Glen Quintana MD, personally performed the services face to face as described and documented by the above named individual. I have made any necessary edits and it is both accurate and complete 5/26/2023  17:30 EDT

## 2023-05-26 NOTE — PLAN OF CARE
Goal Outcome Evaluation:      Patient is resting in bed, alert and orientedx3. No s/s of pain noted at this time. No s/s of SOB noted at this time. VS stable. Call light in reach

## 2023-05-27 PROBLEM — E88.09 HYPOALBUMINEMIA: Status: RESOLVED | Noted: 2023-03-24 | Resolved: 2023-05-27

## 2023-05-27 PROBLEM — I26.99 BILATERAL PULMONARY EMBOLISM: Status: RESOLVED | Noted: 2023-03-24 | Resolved: 2023-05-27

## 2023-05-27 PROBLEM — I48.0 PAROXYSMAL ATRIAL FIBRILLATION: Status: ACTIVE | Noted: 2023-05-21

## 2023-05-27 PROBLEM — E43 SEVERE MALNUTRITION: Status: RESOLVED | Noted: 2023-03-24 | Resolved: 2023-05-27

## 2023-05-27 PROBLEM — R63.4 UNINTENTIONAL WEIGHT LOSS: Status: RESOLVED | Noted: 2023-03-24 | Resolved: 2023-05-27

## 2023-05-27 PROBLEM — I33.0 TRICUSPID VALVE VEGETATION: Status: ACTIVE | Noted: 2023-05-27

## 2023-05-27 PROBLEM — R09.89 SUSPECTED CEREBROVASCULAR ACCIDENT (CVA): Status: RESOLVED | Noted: 2021-05-04 | Resolved: 2023-05-27

## 2023-05-27 PROBLEM — D69.6 THROMBOCYTOPENIA: Status: RESOLVED | Noted: 2021-05-04 | Resolved: 2023-05-27

## 2023-05-27 PROBLEM — D72.829 LEUKOCYTOSIS: Status: RESOLVED | Noted: 2021-05-04 | Resolved: 2023-05-27

## 2023-05-27 PROBLEM — R73.9 HYPERGLYCEMIA: Status: RESOLVED | Noted: 2021-05-05 | Resolved: 2023-05-27

## 2023-05-27 PROBLEM — I42.9 CARDIOMYOPATHY: Status: ACTIVE | Noted: 2023-05-27

## 2023-05-27 LAB
ANION GAP SERPL CALCULATED.3IONS-SCNC: 13 MMOL/L (ref 5–15)
BACTERIA SPEC AEROBE CULT: NORMAL
BACTERIA SPEC AEROBE CULT: NORMAL
BASOPHILS # BLD AUTO: 0.02 10*3/MM3 (ref 0–0.2)
BASOPHILS NFR BLD AUTO: 0.2 % (ref 0–1.5)
BUN SERPL-MCNC: 16 MG/DL (ref 8–23)
BUN/CREAT SERPL: 18.4 (ref 7–25)
CALCIUM SPEC-SCNC: 8.5 MG/DL (ref 8.6–10.5)
CHLORIDE SERPL-SCNC: 105 MMOL/L (ref 98–107)
CO2 SERPL-SCNC: 20 MMOL/L (ref 22–29)
CREAT SERPL-MCNC: 0.87 MG/DL (ref 0.76–1.27)
DEPRECATED RDW RBC AUTO: 52 FL (ref 37–54)
EGFRCR SERPLBLD CKD-EPI 2021: 92.8 ML/MIN/1.73
EOSINOPHIL # BLD AUTO: 0.04 10*3/MM3 (ref 0–0.4)
EOSINOPHIL NFR BLD AUTO: 0.3 % (ref 0.3–6.2)
ERYTHROCYTE [DISTWIDTH] IN BLOOD BY AUTOMATED COUNT: 14.6 % (ref 12.3–15.4)
GLUCOSE BLDC GLUCOMTR-MCNC: 116 MG/DL (ref 70–130)
GLUCOSE BLDC GLUCOMTR-MCNC: 137 MG/DL (ref 70–130)
GLUCOSE BLDC GLUCOMTR-MCNC: 149 MG/DL (ref 70–130)
GLUCOSE BLDC GLUCOMTR-MCNC: 150 MG/DL (ref 70–130)
GLUCOSE SERPL-MCNC: 106 MG/DL (ref 65–99)
HCT VFR BLD AUTO: 34.4 % (ref 37.5–51)
HGB BLD-MCNC: 10.6 G/DL (ref 13–17.7)
IMM GRANULOCYTES # BLD AUTO: 0.2 10*3/MM3 (ref 0–0.05)
IMM GRANULOCYTES NFR BLD AUTO: 1.6 % (ref 0–0.5)
LYMPHOCYTES # BLD AUTO: 1.1 10*3/MM3 (ref 0.7–3.1)
LYMPHOCYTES NFR BLD AUTO: 8.6 % (ref 19.6–45.3)
MAGNESIUM SERPL-MCNC: 2.5 MG/DL (ref 1.6–2.4)
MCH RBC QN AUTO: 29.9 PG (ref 26.6–33)
MCHC RBC AUTO-ENTMCNC: 30.8 G/DL (ref 31.5–35.7)
MCV RBC AUTO: 97.2 FL (ref 79–97)
MONOCYTES # BLD AUTO: 0.8 10*3/MM3 (ref 0.1–0.9)
MONOCYTES NFR BLD AUTO: 6.2 % (ref 5–12)
NEUTROPHILS NFR BLD AUTO: 10.66 10*3/MM3 (ref 1.7–7)
NEUTROPHILS NFR BLD AUTO: 83.1 % (ref 42.7–76)
NRBC BLD AUTO-RTO: 0 /100 WBC (ref 0–0.2)
PLATELET # BLD AUTO: 156 10*3/MM3 (ref 140–450)
PMV BLD AUTO: 10.3 FL (ref 6–12)
POTASSIUM SERPL-SCNC: 4.3 MMOL/L (ref 3.5–5.2)
RBC # BLD AUTO: 3.54 10*6/MM3 (ref 4.14–5.8)
SODIUM SERPL-SCNC: 138 MMOL/L (ref 136–145)
WBC NRBC COR # BLD: 12.82 10*3/MM3 (ref 3.4–10.8)

## 2023-05-27 PROCEDURE — 99231 SBSQ HOSP IP/OBS SF/LOW 25: CPT | Performed by: FAMILY MEDICINE

## 2023-05-27 PROCEDURE — 85025 COMPLETE CBC W/AUTO DIFF WBC: CPT | Performed by: INTERNAL MEDICINE

## 2023-05-27 PROCEDURE — 99232 SBSQ HOSP IP/OBS MODERATE 35: CPT | Performed by: INTERNAL MEDICINE

## 2023-05-27 PROCEDURE — 63710000001 INSULIN LISPRO (HUMAN) PER 5 UNITS: Performed by: INTERNAL MEDICINE

## 2023-05-27 PROCEDURE — 82948 REAGENT STRIP/BLOOD GLUCOSE: CPT

## 2023-05-27 PROCEDURE — 25010000002 DAPTOMYCIN PER 1 MG: Performed by: INTERNAL MEDICINE

## 2023-05-27 PROCEDURE — 83735 ASSAY OF MAGNESIUM: CPT | Performed by: FAMILY MEDICINE

## 2023-05-27 PROCEDURE — 80048 BASIC METABOLIC PNL TOTAL CA: CPT | Performed by: FAMILY MEDICINE

## 2023-05-27 RX ADMIN — CARVEDILOL 25 MG: 12.5 TABLET, FILM COATED ORAL at 09:33

## 2023-05-27 RX ADMIN — ATORVASTATIN CALCIUM 80 MG: 40 TABLET, FILM COATED ORAL at 21:56

## 2023-05-27 RX ADMIN — CARVEDILOL 25 MG: 12.5 TABLET, FILM COATED ORAL at 18:13

## 2023-05-27 RX ADMIN — PANTOPRAZOLE SODIUM 40 MG: 40 TABLET, DELAYED RELEASE ORAL at 05:26

## 2023-05-27 RX ADMIN — MEGESTROL ACETATE 400 MG: 40 SUSPENSION ORAL at 09:33

## 2023-05-27 RX ADMIN — APIXABAN 5 MG: 5 TABLET, FILM COATED ORAL at 21:56

## 2023-05-27 RX ADMIN — DOCUSATE SODIUM 50 MG AND SENNOSIDES 8.6 MG 2 TABLET: 8.6; 5 TABLET, FILM COATED ORAL at 09:33

## 2023-05-27 RX ADMIN — INSULIN LISPRO 2 UNITS: 100 INJECTION, SOLUTION INTRAVENOUS; SUBCUTANEOUS at 11:59

## 2023-05-27 RX ADMIN — LISINOPRIL 5 MG: 5 TABLET ORAL at 09:33

## 2023-05-27 RX ADMIN — EMPAGLIFLOZIN 10 MG: 10 TABLET, FILM COATED ORAL at 09:33

## 2023-05-27 RX ADMIN — TERAZOSIN HYDROCHLORIDE 1 MG: 1 CAPSULE ORAL at 09:33

## 2023-05-27 RX ADMIN — ASPIRIN 81 MG 81 MG: 81 TABLET ORAL at 09:33

## 2023-05-27 RX ADMIN — FLUTICASONE PROPIONATE 2 SPRAY: 50 SPRAY, METERED NASAL at 09:33

## 2023-05-27 RX ADMIN — DOCUSATE SODIUM 50 MG AND SENNOSIDES 8.6 MG 2 TABLET: 8.6; 5 TABLET, FILM COATED ORAL at 21:56

## 2023-05-27 RX ADMIN — AMIODARONE HYDROCHLORIDE 200 MG: 200 TABLET ORAL at 09:34

## 2023-05-27 RX ADMIN — DAPTOMYCIN 500 MG: 500 INJECTION, POWDER, LYOPHILIZED, FOR SOLUTION INTRAVENOUS at 11:59

## 2023-05-27 RX ADMIN — Medication 10 ML: at 21:57

## 2023-05-27 RX ADMIN — APIXABAN 5 MG: 5 TABLET, FILM COATED ORAL at 09:33

## 2023-05-27 RX ADMIN — AMIODARONE HYDROCHLORIDE 200 MG: 200 TABLET ORAL at 21:56

## 2023-05-27 RX ADMIN — Medication 10 ML: at 09:37

## 2023-05-27 NOTE — PROGRESS NOTES
[facility]  PROGRESS NOTE    Patient Name: Timmy Guajardo  : 1950  MRN: [id number]    Date of Admission: 2023  Primary Care Physician: Arsen Seals APRN    Subjective   Subjective     CC:  F/U vegetation TV/pacer lead    HPI:  Patient seen and examined. No complaints. Eating breakfast.     ROS:  Gen- No fevers, chills  CV- No chest pain, palpitations  Resp- No cough, dyspnea  GI- No N/V/D, abd pain    Objective   Objective     Vital Signs:   Temp:  [97.4 °F (36.3 °C)-98.3 °F (36.8 °C)] 98.3 °F (36.8 °C)  Heart Rate:  [69-73] 70  Resp:  [16-18] 18  BP: (110-160)/(61-82) 153/82  Flow (L/min):  [1] 1     Physical Exam:  Constitutional: No acute distress, awake, alert  HENT: NCAT, mucous membranes moist  Respiratory: Clear to auscultation bilaterally, respiratory effort normal 1L NC  Cardiovascular: RRR, +murmur, No rubs or gallops  Gastrointestinal: Positive bowel sounds, soft, nontender, nondistended  Musculoskeletal: No bilateral ankle edema  Psychiatric: Appropriate affect, cooperative  Neurologic: Oriented x 3, PARISH, speech clear  Skin: No rashes, CVC R IJ    Results Reviewed:  LAB RESULTS:      Lab 23  0700 23  1020 23  1636 23  0307 23  2020 23  1300 23  0432 23  1155 23  0618 23  0711 23  0233   WBC 12.82* 10.77 8.66 17.84*  --   --  20.17*  --  20.19*  --  12.80*   HEMOGLOBIN 10.6* 10.2* 10.6* 8.6*  --   --  8.4*  --  8.1*  --  10.0*   HEMATOCRIT 34.4* 32.9* 34.9* 27.2*  --   --  27.9*  --  25.7*  --  31.7*   PLATELETS 156 193 232 213  --   --  188  --  200  --  198   NEUTROS ABS 10.66* 9.19* 7.09* 15.85*  --   --  18.41*  --  17.14*  --  10.38*   IMMATURE GRANS (ABS) 0.20* 0.10* 0.14* 0.20*  --   --  0.35*  --  0.34*  --  0.08*   LYMPHS ABS 1.10 0.77 0.81 1.03  --   --  0.74  --  1.60  --  1.09   MONOS ABS 0.80 0.66 0.60 0.74  --   --  0.65  --  1.05*  --  1.03*   EOS ABS 0.04 0.03  0.01 0.00  --   --  0.00  --  0.03  --  0.16   MCV 97.2* 96.8 99.1* 97.8*  --   --  103.7*  --  98.8*  --  98.4*   PROCALCITONIN  --   --   --   --   --   --   --   --  43.40*  --  0.31*   LACTATE  --   --   --   --   --   --   --   --  0.8  --   --    LDH  --   --   --   --   --   --   --   --   --   --  272*   PROTIME  --   --   --   --   --   --   --   --  20.7*  --   --    APTT  --   --   --  93.9* 94.9* 92.0* 49.1* 65.2 63.9   < >  --    HEPARIN ANTI-XA  --   --   --   --   --  >1.10*  --   --   --   --   --     < > = values in this interval not displayed.         Lab 05/27/23  0700 05/26/23  2153 05/26/23  1014 05/25/23  0501 05/24/23  0307 05/23/23  0432 05/22/23  0618   SODIUM 138  --  142 145 142 136 136   POTASSIUM 4.3 4.3 3.5 3.8 4.1 4.9 4.3   CHLORIDE 105  --  106 109* 110* 104 104   CO2 20.0*  --  21.0* 22.0 20.0* 17.0* 16.0*   ANION GAP 13.0  --  15.0 14.0 12.0 15.0 16.0*   BUN 16  --  17 26* 29* 30* 29*   CREATININE 0.87  --  0.95 1.22 1.25 1.48* 1.57*   EGFR 92.8  --  86.1 63.8 61.9 50.6* 47.1*   GLUCOSE 106*  --  158* 69 74 131* 230*   CALCIUM 8.5*  --  9.0 9.2 8.8 8.3* 8.0*   MAGNESIUM 2.5*  --  1.9 2.2  --  2.6* 2.8*   PHOSPHORUS  --   --  3.5  --   --   --  5.0*         Lab 05/22/23  0618 05/21/23  0433   TOTAL PROTEIN 5.9* 6.2   ALBUMIN 2.6* 2.3*   GLOBULIN 3.3 3.9   ALT (SGPT) 35 62*   AST (SGOT) 44* 97*   BILIRUBIN 0.7 0.5   ALK PHOS 127* 138*         Lab 05/22/23  0618 05/21/23  0433 05/21/23  0233   PROBNP  --   --  1,949.0*   HSTROP T  --  62* 62*   PROTIME 20.7*  --   --    INR 1.76*  --   --                  Lab 05/24/23  0341 05/23/23  0320 05/22/23  0333   PH, ARTERIAL 7.375 7.332* 7.330*   PCO2, ARTERIAL 36.1 36.5 32.7*   PO2 ART 83.2 91.2 128.0*   FIO2 28 28 40   HCO3 ART 21.1 19.3* 17.2*   BASE EXCESS ART -3.7* -6.0* -7.9*   CARBOXYHEMOGLOBIN 1.1 1.2 0.9     Brief Urine Lab Results  (Last result in the past 365 days)      Color   Clarity   Blood   Leuk Est   Nitrite   Protein   CREAT    Urine HCG        05/21/23 0240 Yellow   Turbid   Negative   Negative   Negative   30 mg/dL (1+)                 Microbiology Results Abnormal     Procedure Component Value - Date/Time    Blood Culture - Blood, Hand, Left [[id number]]  (Normal) Collected: 05/22/23 1155    Lab Status: Preliminary result Specimen: Blood from Hand, Left Updated: 05/26/23 1346     Blood Culture No growth at 4 days    Blood Culture - Blood, Arm, Left [[id number]]  (Normal) Collected: 05/22/23 1202    Lab Status: Preliminary result Specimen: Blood from Arm, Left Updated: 05/26/23 1346     Blood Culture No growth at 4 days    Respiratory Culture - Sputum, ET Suction [[id number]] Collected: 05/22/23 0020    Lab Status: Final result Specimen: Sputum from ET Suction Updated: 05/24/23 1150     Respiratory Culture Moderate growth (3+) Normal respiratory nathan. No S. aureus or Pseudomonas aeruginosa detected. Final report.     Gram Stain Few (2+) WBCs per low power field      Occasional Epithelial cells per low power field      Few (2+) Budding yeast      Occasional Gram positive cocci    Urine Culture - Urine, Straight Cath [[id number]]  (Normal) Collected: 05/21/23 0240    Lab Status: Final result Specimen: Urine from Straight Cath Updated: 05/22/23 0816     Urine Culture No growth    S. Pneumo Ag Urine or CSF - Urine, Urine, Clean Catch [[id number]]  (Normal) Collected: 05/21/23 0240    Lab Status: Final result Specimen: Urine, Clean Catch Updated: 05/21/23 1356     Strep Pneumo Ag Negative    Legionella Antigen, Urine - Urine, Urine, Clean Catch [[id number]]  (Normal) Collected: 05/21/23 0240    Lab Status: Final result Specimen: Urine, Clean Catch Updated: 05/21/23 1356     LEGIONELLA ANTIGEN, URINE Negative    MRSA Screen, PCR (Inpatient) - Swab, Nares [[id number]]  (Normal) Collected: 05/20/23 0523    Lab Status: Final result Specimen: Swab from Nares Updated: 05/20/23 0736     MRSA PCR Negative    Narrative:      The negative predictive value  of this diagnostic test is high and should only be used to consider de-escalating anti-MRSA therapy. A positive result may indicate colonization with MRSA and must be correlated clinically.  MRSA Negative          Adult Transesophageal Echo (KAT) W/ Cont if Necessary Per Protocol    Result Date: 5/25/2023  •  There is a large 2.9 x 1.7 cm mass in the right atrium which appears adherent to the atrial aspect of one of the 2 pacemaker leads. The edges are smooth with some peripheral hyperechoic regions. This appears to be more consistent with older thrombus than acute vegetation. Does move in and out of the tricuspid valve plane with the cardiac cycle. •  There is a moderate-sized (1.7 mm) mobile mass on the tricuspid valve that is consistent with a vegetation.Mild tricuspid valve regurgitation is present. No evidence of significant tricuspid valve stenosis is present. There is a at least 1.7 cm x 0.6 cm highly mobile vegetation on the tricuspid valve. The attachment point is difficult to discern due to artifact from the surrounding pacemaker wires however it is freely prolapsing across the valve throughout the cardiac cycle. •  Left ventricular systolic function is mildly decreased. Estimated left ventricular EF = 45% •  Left ventricular systolic function is mildly decreased. Estimated left ventricular EF = 45% Normal left ventricular cavity size and wall thickness noted. There is left ventricular global hypokinesis noted. Left ventricular diastolic function is consistent with (grade I) impaired relaxation.       Results for orders placed during the hospital encounter of 05/19/23    Adult Transesophageal Echo (KAT) W/ Cont if Necessary Per Protocol    Interpretation Summary  •  There is a large 2.9 x 1.7 cm mass in the right atrium which appears adherent to the atrial aspect of one of the 2 pacemaker leads. The edges are smooth with some peripheral hyperechoic regions. This appears to be more consistent with older  thrombus than acute vegetation. Does move in and out of the tricuspid valve plane with the cardiac cycle.  •  There is a moderate-sized (1.7 mm) mobile mass on the tricuspid valve that is consistent with a vegetation.Mild tricuspid valve regurgitation is present. No evidence of significant tricuspid valve stenosis is present. There is a at least 1.7 cm x 0.6 cm highly mobile vegetation on the tricuspid valve. The attachment point is difficult to discern due to artifact from the surrounding pacemaker wires however it is freely prolapsing across the valve throughout the cardiac cycle.  •  Left ventricular systolic function is mildly decreased. Estimated left ventricular EF = 45%  •  Left ventricular systolic function is mildly decreased. Estimated left ventricular EF = 45% Normal left ventricular cavity size and wall thickness noted. There is left ventricular global hypokinesis noted. Left ventricular diastolic function is consistent with (grade I) impaired relaxation.      Current medications:  Scheduled Meds:amiodarone, 200 mg, Oral, Q12H  apixaban, 5 mg, Oral, Q12H  aspirin, 81 mg, Oral, Daily  atorvastatin, 80 mg, Oral, Nightly  carvedilol, 25 mg, Oral, BID With Meals  DAPTOmycin, 8 mg/kg, Intravenous, Q24H  empagliflozin, 10 mg, Oral, Daily  fluticasone, 2 spray, Nasal, Daily  Insulin Lispro, 0-9 Units, Subcutaneous, 4x Daily With Meals & Nightly  lisinopril, 5 mg, Oral, Q24H  megestrol, 400 mg, Oral, Daily  pantoprazole, 40 mg, Oral, Q AM  senna-docusate sodium, 2 tablet, Oral, BID  sodium chloride, 10 mL, Intravenous, Q12H  terazosin, 1 mg, Oral, Daily      Continuous Infusions:   PRN Meds:.•  acetaminophen  •  senna-docusate sodium **AND** polyethylene glycol **AND** [DISCONTINUED] bisacodyl **AND** bisacodyl  •  Calcium Replacement - Follow Nurse / BPA Driven Protocol  •  cyclobenzaprine  •  dextrose  •  ipratropium-albuterol  •  Magnesium Cardiology Dose Replacement - Follow Nurse / BPA Driven Protocol  •   nitroglycerin  •  ondansetron  •  Phosphorus Replacement - Follow Nurse / BPA Driven Protocol  •  Potassium Replacement - Follow Nurse / BPA Driven Protocol  •  sodium chloride  •  sodium chloride  •  sodium chloride    Assessment & Plan   Assessment & Plan     Active Hospital Problems    Diagnosis  POA   • **Pneumonia of left lower lobe due to infectious organism [J18.9]  Yes   • Acute respiratory failure with hypoxia [J96.01]  Unknown   • Shock, likely multifactorial [R57.9]  Unknown   • Atrial fibrillation with RVR [I48.91]  No   • Hypotension [I95.9]  Yes   • Pulmonary embolus [I26.99]  Yes   • Confusion [R41.0]  Yes   • GERD (gastroesophageal reflux disease) [K21.9]  Yes   • History of CVA (cerebrovascular accident) [Z86.73]  Not Applicable   • HFrEF (EF 30%) [I50.20]  Yes      Resolved Hospital Problems   No resolved problems to display.        Brief Hospital Course to date:  Timmy Guajardo is a 72 y.o. male with PMHx significant for PE on Eliquis, HTN, dyslipidemia, HFrEF (30% on 10/2022), T2DM, CVA with right-sided residual weakness, and GERD.  He was admitted to the ICU on 5/21/2023 as a transfer from the floor.  He was initially admitted with a pulmonary embolism and a left lower lobe pneumonia versus infarct on 5/19/2023.  The morning of 5/21 he suddenly went into A-fib with RVR became hypotensive with prompted transition to the ICU. He underwent KAT 5/25 due to bacteremia and found to have vegetation on pacer wire and TV. Transferred to the floor 5/25.     This patient's problems and plans were partially entered by my partner and updated as appropriate by me 05/27/23.    A fib RVR-->Resolved  -Continue Coreg, Amiodarone   -Continue Eliquis for now, may need heparin gtt pending timing of procedure    -Cardiology following    Staph epidermidis Bacteremia  Vegetation pacer wire/Tricusupid valve   -KAT 5/25 with vegetation noted on pacer lead and TV  -CTS following for possible surgical intervention   -ID  following, Continue Daptomycin daily, likely 6 weeks   -EP following, Dr Quintana. High risk for transvenous laser extraction and possible transvenous removal of extremely large vegetation adhering both to lead and tricuspid valve.  -Repeat blood Cx NGTD    Hx PE  -On Eliquis, may need to place on heparin gtt pending timing of procedures (defer to CTS/EP)     HTN  -Continue Lisinopril     HFrEF  -Continue Coreg  -Continue Jardiance     GERD  -Continue PPI    Chronic Severe Malnutrition   -Continue Megace  -RD following     CVC in place  -Discussed with RN to obtain peripheral IV and DC CVC.     Expected Discharge Location and Transportation: [facility]  Expected Discharge   Expected Discharge Date: 5/31/2023; Expected Discharge Time:      DVT prophylaxis:  Medical DVT prophylaxis orders are present.     AM-PAC 6 Clicks Score (PT): 10 (05/24/23 1131)    CODE STATUS:   Code Status and Medical Interventions:   Ordered at: 05/20/23 0011     Level Of Support Discussed With:    Patient     Code Status (Patient has no pulse and is not breathing):    CPR (Attempt to Resuscitate)     Medical Interventions (Patient has pulse or is breathing):    Full Support       Yvonne Foster,   05/27/23

## 2023-05-27 NOTE — PROGRESS NOTES
INFECTIOUS DISEASE f/u     Timmy Guajardo  1950  [id number]    Date of Consult: 5/27/2023    Admission Date: 5/19/2023      Requesting Provider: No ref. provider found  Evaluating Physician: Travon Brito MD    Reason for Consultation: Positive blood cultures    History of present illness:    Patient is a 72 y.o. male with past medical history of pulmonary embolism, dyslipidemia, heart failure, type II d. mellitus, CVA with right-sided residual weakness and GERD.      Patient admitted to hospital for pulmonary embolism left lower lobe pneumonia versus infarction on May/9/2023 patient had atrial fibrillation with RVR with hypotension has been transferred the ICU.      Patient found have positive blood cultures for staph coccus epidermidis.  We are being consulted for further interpretation of these positive blood cultures.    Patient reports having indwelling pacemaker/ ICD patient says this is fired 1 time since he has had it placed        5/24/23; no events overnight Noemy antibiotics no complaints    5/25/2023 patient had KAT which was remarkable for right atrial mass and vegetation on pacemaker lead patient feels well no complaints no events overnight    5/26/23; no events overnight; no fever, rash, sore throat    5/27/23; patient is doing well tolerating antibiotics no events overnight denies fevers rash or sore throat  Past Medical History:   Diagnosis Date   • Cardiomyopathy    • CHF (congestive heart failure)    • Coronary artery disease    • COVID-19    • Diabetes mellitus    • GERD (gastroesophageal reflux disease)    • HTN (hypertension)    • Pleural effusion    • Stroke     Right sided weakness   • Stroke        Past Surgical History:   Procedure Laterality Date   • AMPUTATION DIGIT Left 5/12/2021    Procedure: AMPUTATION DIGIT - GREAT TOE AMPUTATION LEFT;  Surgeon: Dario Marmolejo MD;  Location: [facility];  Service: General;  Laterality: Left;   • AORTAGRAM N/A 5/11/2021    Procedure:  AORTAGRAM WITH RUNOFFS, LEFT SFA BALLOON ANGIOPLASTY;  Surgeon: Tim Mahan MD;  Location:  [facility];  Service: Vascular;  Laterality: N/A;  FL TIME 6 MIN 54 SECS  82 MGY  CONTRAST VISIPAQUE 100ML     • CARDIAC CATHETERIZATION     • CARDIAC PACEMAKER PLACEMENT      pacemaker/defibrillator, [employer]   • COLONOSCOPY N/A 3/31/2023    Procedure: COLONOSCOPY;  Surgeon: Brunner, Mark I, MD;  Location:  [facility];  Service: Gastroenterology;  Laterality: N/A;   • ENDOSCOPY N/A 3/29/2023    Procedure: ESOPHAGOGASTRODUODENOSCOPY;  Surgeon: Brunner, Mark I, MD;  Location:  [facility];  Service: Gastroenterology;  Laterality: N/A;   • HERNIA REPAIR Bilateral     Inguinal hernia   • HIP TROCHANTERIC NAILING WITH INTRAMEDULLARY HIP SCREW Right 10/18/2022    Procedure: HIP TROCHANTERIC NAILING WITH INTRAMEDULLARY HIP SCREW RIGHT;  Surgeon: Bj Steinberg MD;  Location:  [facility];  Service: Orthopedics;  Laterality: Right;   • THORACOSCOPY VIDEO ASSISTED WITH LOBECTOMY Right 4/12/2023    Procedure: BRONCHOSCOPY, THORACOSCOPY VIDEO ASSISTED WITH DECORTICATION, MECHANICAL PLEURODESIS;  Surgeon: Tim Mahan MD;  Location:  [facility];  Service: Cardiothoracic;  Laterality: Right;       Family History   Problem Relation Age of Onset   • Alzheimer's disease Mother    • Kidney failure Mother    • Cancer Father    • Heart failure Father        Social History     Socioeconomic History   • Marital status: Single   • Number of children: 0   Tobacco Use   • Smoking status: Former     Packs/day: 1.00     Years: 30.00     Pack years: 30.00     Types: Cigarettes     Quit date: 2013     Years since quitting: 10.4   • Smokeless tobacco: Never   • Tobacco comments:     quit 2015   Vaping Use   • Vaping Use: Never used   Substance and Sexual Activity   • Alcohol use: Never   • Drug use: Never   • Sexual activity: Defer       Allergies   Allergen Reactions   • Other Unknown - Low Severity     Mercury metals- as a  child         Medication:    Current Facility-Administered Medications:   •  acetaminophen (TYLENOL) tablet 650 mg, 650 mg, Oral, Q4H PRN, Jarrett Buenrostro MD  •  amiodarone (PACERONE) tablet 200 mg, 200 mg, Oral, Q12H, Jarrett Buenrostro MD, 200 mg at 05/27/23 0934  •  apixaban (ELIQUIS) tablet 5 mg, 5 mg, Oral, Q12H, Jarrett Buenrostro MD, 5 mg at 05/27/23 0933  •  aspirin chewable tablet 81 mg, 81 mg, Oral, Daily, Jarrett Buenrostro MD, 81 mg at 05/27/23 0933  •  atorvastatin (LIPITOR) tablet 80 mg, 80 mg, Oral, Nightly, Jarrett Buenrostro MD, 80 mg at 05/26/23 2156  •  sennosides-docusate (PERICOLACE) 8.6-50 MG per tablet 2 tablet, 2 tablet, Oral, BID, 2 tablet at 05/27/23 0933 **AND** polyethylene glycol (MIRALAX) packet 17 g, 17 g, Oral, Daily PRN **AND** [DISCONTINUED] bisacodyl (DULCOLAX) EC tablet 5 mg, 5 mg, Oral, Daily PRN **AND** bisacodyl (DULCOLAX) suppository 10 mg, 10 mg, Rectal, Daily PRN, Jarrett Buenrostro MD  •  Calcium Replacement - Follow Nurse / BPA Driven Protocol, , Does not apply, PRN, Jarrett Buenrostro MD  •  carvedilol (COREG) tablet 25 mg, 25 mg, Oral, BID With Meals, Jarrett Buenrostro MD, 25 mg at 05/27/23 0933  •  cyclobenzaprine (FLEXERIL) tablet 5 mg, 5 mg, Oral, TID PRN, Jarrett Buenrostro MD  •  DAPTOmycin (CUBICIN) 500 mg in sodium chloride 0.9 % 50 mL IVPB, 8 mg/kg, Intravenous, Q24H, Jarrett Buenrostro MD, Last Rate: 100 mL/hr at 05/26/23 1148, 500 mg at 05/26/23 1148  •  dextrose (D50W) (25 g/50 mL) IV injection 50 mL, 50 mL, Intravenous, Q1H PRN, Jarrett Buenrostro MD, 50 mL at 05/25/23 0600  •  empagliflozin (JARDIANCE) tablet 10 mg, 10 mg, Oral, Daily, Jarrett Buenrostro MD, 10 mg at 05/27/23 0933  •  fluticasone (FLONASE) 50 MCG/ACT nasal spray 2 spray, 2 spray, Nasal, Daily, Jarrett Buenrostro MD, 2 spray at 05/27/23 0933  •  Insulin Lispro (humaLOG) injection 0-9 Units, 0-9 Units, Subcutaneous, 4x Daily With Meals & Nightly, Jarrett Buenrostro  MD NISSA, 4 Units at 05/26/23 2155  •  ipratropium-albuterol (DUO-NEB) nebulizer solution 3 mL, 3 mL, Nebulization, Q6H PRN, Jarrett Buenrostro MD  •  lisinopril (PRINIVIL,ZESTRIL) tablet 5 mg, 5 mg, Oral, Q24H, Jarrett Buenrostro MD, 5 mg at 05/27/23 0933  •  Magnesium Cardiology Dose Replacement - Follow Nurse / BPA Driven Protocol, , Does not apply, PRN, Jarrett Buenrostro MD  •  megestrol (MEGACE) 40 MG/ML suspension 400 mg, 400 mg, Oral, Daily, Yvonne Foster DO, 400 mg at 05/27/23 0933  •  nitroglycerin (NITROSTAT) SL tablet 0.4 mg, 0.4 mg, Sublingual, Q5 Min PRN, Jarrett Buenrostro MD  •  ondansetron (ZOFRAN) injection 4 mg, 4 mg, Intravenous, Q6H PRN, Jarrett Buenrostro MD  •  pantoprazole (PROTONIX) EC tablet 40 mg, 40 mg, Oral, Q AM, Yvonne Foster, DO, 40 mg at 05/27/23 0526  •  Phosphorus Replacement - Follow Nurse / BPA Driven Protocol, , Does not apply, PRN, Jarrett Buenrostro MD  •  Potassium Replacement - Follow Nurse / BPA Driven Protocol, , Does not apply, PRN, Jarrett Buenrostro MD  •  sodium chloride 0.9 % flush 10 mL, 10 mL, Intravenous, PRN, Jarrett Buenrostro MD  •  sodium chloride 0.9 % flush 10 mL, 10 mL, Intravenous, Q12H, Jarrett Buenrostro MD, 10 mL at 05/27/23 0937  •  sodium chloride 0.9 % flush 10 mL, 10 mL, Intravenous, PRN, Jarrett Buenrostro MD  •  sodium chloride 0.9 % infusion 40 mL, 40 mL, Intravenous, PRN, Jarrett Buenrostro MD, 40 mL at 05/26/23 1149  •  terazosin (HYTRIN) capsule 1 mg, 1 mg, Oral, Daily, Jarrett Buenrostro MD, 1 mg at 05/27/23 0933    Antibiotics:  Anti-Infectives (From admission, onward)    Ordered     Dose/Rate Route Frequency Start Stop    05/23/23 0920  DAPTOmycin (CUBICIN) 500 mg in sodium chloride 0.9 % 50 mL IVPB        Ordering Provider: Jarrett Buenrostro MD    8 mg/kg × 65.2 kg  100 mL/hr over 30 Minutes Intravenous Every 24 Hours 05/23/23 1100 05/30/23 1059    05/19/23 2036  piperacillin-tazobactam (ZOSYN) 3.375 g in  iso-osmotic dextrose 50 ml (premix)        Ordering Provider: Brady Dumont DO    3.375 g  over 30 Minutes Intravenous Once 23  vancomycin 1250 mg/250 mL 0.9% NS IVPB ([facility])        Ordering Provider: Brady Dumont DO    20 mg/kg × 63.5 kg  over 90 Minutes Intravenous Once 23 0052            Review of Systems:  See HPI    Physical Exam:   Vital Signs  Temp (24hrs), Av.9 °F (36.6 °C), Min:97.4 °F (36.3 °C), Max:98.3 °F (36.8 °C)    Temp  Min: 97.4 °F (36.3 °C)  Max: 98.3 °F (36.8 °C)  BP  Min: 110/61  Max: 160/76  Pulse  Min: 69  Max: 73  Resp  Min: 16  Max: 18  SpO2  Min: 91 %  Max: 96 %    GENERAL: Awake and alert, in no acute distress.   HEENT: Normocephalic, atraumatic.  PERRL. EOMI. No conjunctival injection. No icterus.  No external oral lesions    HEART: RRR; No murmur  LUNGS: Clear to auscultation bilaterally   ABDOMEN: Soft, nontender  EXT:  No edema  :  Without Rosenberg catheter.  MSK: No joint effusions or erythema  SKIN: Warm and dry without cutaneous eruptions on Inspection/palpation.        Laboratory Data    Results from last 7 days   Lab Units 23  0700 23  1020 23  1636   WBC 10*3/mm3 12.82* 10.77 8.66   HEMOGLOBIN g/dL 10.6* 10.2* 10.6*   HEMATOCRIT % 34.4* 32.9* 34.9*   PLATELETS 10*3/mm3 156 193 232     Results from last 7 days   Lab Units 23  0700   SODIUM mmol/L 138   POTASSIUM mmol/L 4.3   CHLORIDE mmol/L 105   CO2 mmol/L 20.0*   BUN mg/dL 16   CREATININE mg/dL 0.87   GLUCOSE mg/dL 106*   CALCIUM mg/dL 8.5*     Results from last 7 days   Lab Units 23  0618   ALK PHOS U/L 127*   BILIRUBIN mg/dL 0.7   ALT (SGPT) U/L 35   AST (SGOT) U/L 44*             Results from last 7 days   Lab Units 23  0618   LACTATE mmol/L 0.8     Results from last 7 days   Lab Units 23  1014   CK TOTAL U/L 14*     Results from last 7 days   Lab Units 23  1934   VANCOMYCIN TR mcg/mL 14.70     Estimated Creatinine  Clearance: 68.2 mL/min (by C-G formula based on SCr of 0.87 mg/dL).      Microbiology:  Blood Culture   Date Value Ref Range Status   05/19/2023 Staphylococcus epidermidis (C)  Final   05/19/2023 Staphylococcus epidermidis (C)  Final     BCID, PCR   Date Value Ref Range Status   05/19/2023 (A) Negative by BCID PCR. Culture to Follow. Final    Staph spp, not aureus or lugdunensis. Identification by BCID2 PCR.     No results found for: CULTURES, HSVCX, URCX  No results found for: EYECULTURE, GCCX, HSVCULTURE, LABHSV  No results found for: LEGIONELLA, MRSACX, MUMPSCX, MYCOPLASCX  No results found for: NOCARDIACX, STOOLCX  Urine Culture   Date Value Ref Range Status   05/21/2023 No growth  Final     No results found for: VIRALCULTU, WOUNDCX        Radiology:  Imaging Results (Last 72 Hours)     ** No results found for the last 72 hours. **            Impression:   Left lower lobe infiltrate from infarction versus infection  Left lower lobe pulmonary embolism  Leukocytosis with neutrophilia  High-grade positive blood culture for staph coccus epidermidis  Elevated procalcitonin  Pacemaker infection  Right atrial endocarditis  PLAN/RECOMMENDATIONS:   Thank you for asking us to see Timmy Guajardo, I recommend the following:  Given the presence indwelling endovascular hardware staphylococcus epidermidis bacteremia is concerning    High-grade positive blood cultures and indwelling hardware probably warrants further investigation    Patient does have leukocytosis and markedly elevated procalcitonin    Having both a pulmonary embolism and a bacterial pneumonia unless from a septic emboli would be less expected.    Follow-up repeat blood cultures    Appreciate cardiology assistance if patient has pacemaker infection we need at least consider the possibility and feasibility of removal of endovascular hardware.    If this can be removed this will increase chance for clinical cure          Continue daptomycin 6 mg/kg every 24  hours may extend this length of treatment to up to 6 weeks  Follow-up repeated blood cultures from 5/22 remain no growth at 3 days  Reasonable to follow procalcitonin to see if this is descending    Favor removal of hardware required for clearance of infection  Appreciate CT surgery's opinion regarding management of tricuspid vegetation and atrial mass    Continue antibiotics    Travon Brito MD  5/27/2023  10:29 EDT

## 2023-05-27 NOTE — PROGRESS NOTES
"  [facility]  PROGRESS NOTE    Date of Admission: 5/19/2023  Date of Service: 05/27/23    Primary Care Physician: Arsen Seals APRN    Chief Complaint: f/u endocarditis, cardiomyopathy   Problem List:   Pneumonia of left lower lobe due to infectious organism    HFrEF (EF 30%)    History of CVA (cerebrovascular accident)    GERD (gastroesophageal reflux disease)    Confusion    Pulmonary embolus    Atrial fibrillation with RVR    Hypotension    Acute respiratory failure with hypoxia    Shock, likely multifactorial      Subjective      Patient laying in bed, asymptomatic from cardiac standpoint    Objective   Vitals: /81 (BP Location: Left arm, Patient Position: Lying)   Pulse 70   Temp 97.7 °F (36.5 °C) (Oral)   Resp 17   Ht 177.8 cm (70\")   Wt 61 kg (134 lb 6.4 oz)   SpO2 96%   BMI 19.28 kg/m²     Physical Exam:  GENERAL: Alert, cooperative, in no acute distress.   HEENT: Normocephalic, no jugular venous distention  HEART: Regular rhythm, normal rate, and no murmurs, gallops, or rubs.   LUNGS: No wheezing, rales or rhonchi anteriorly   NEUROLOGIC: No focal abnormalities involving strength or sensation are noted.   EXTREMITIES: No clubbing, cyanosis, or edema noted.     Results:  Results from last 7 days   Lab Units 05/27/23  0700 05/26/23  1020 05/25/23  1636   WBC 10*3/mm3 12.82* 10.77 8.66   HEMOGLOBIN g/dL 10.6* 10.2* 10.6*   HEMATOCRIT % 34.4* 32.9* 34.9*   PLATELETS 10*3/mm3 156 193 232     Results from last 7 days   Lab Units 05/27/23  0700 05/26/23  2153 05/26/23  1014 05/25/23  0501   SODIUM mmol/L 138  --  142 145   POTASSIUM mmol/L 4.3 4.3 3.5 3.8   CHLORIDE mmol/L 105  --  106 109*   CO2 mmol/L 20.0*  --  21.0* 22.0   BUN mg/dL 16  --  17 26*   CREATININE mg/dL 0.87  --  0.95 1.22   GLUCOSE mg/dL 106*  --  158* 69      Lab Results   Component Value Date    CHOL 187 05/05/2021    TRIG 138 05/05/2021    HDL 38 (L) 05/05/2021     (H) 05/05/2021 "    AST 44 (H) 05/22/2023    ALT 35 05/22/2023         Results from last 7 days   Lab Units 05/24/23  0307 05/23/23  2020 05/23/23  1300 05/22/23  1155 05/22/23  0618   PROTIME Seconds  --   --   --   --  20.7*   INR   --   --   --   --  1.76*   APTT seconds 93.9* 94.9* 92.0*   < > 63.9    < > = values in this interval not displayed.     Results from last 7 days   Lab Units 05/26/23  1014 05/21/23  0433 05/21/23  0233   CK TOTAL U/L 14*  --   --    HSTROP T ng/L  --  62* 62*     Results from last 7 days   Lab Units 05/21/23  0233   PROBNP pg/mL 1,949.0*       Intake/Output Summary (Last 24 hours) at 5/27/2023 1150  Last data filed at 5/27/2023 0900  Gross per 24 hour   Intake 1200 ml   Output 600 ml   Net 600 ml     I personally reviewed the patient's EKG/Telemetry data    Radiology Data:   KAT 5/25/23:  Interpretation Summary       •  There is a large 2.9 x 1.7 cm mass in the right atrium which appears adherent to the atrial aspect of one of the 2 pacemaker leads. The edges are smooth with some peripheral hyperechoic regions. This appears to be more consistent with older thrombus than acute vegetation. Does move in and out of the tricuspid valve plane with the cardiac cycle.  •  There is a moderate-sized (1.7 mm) mobile mass on the tricuspid valve that is consistent with a vegetation.Mild tricuspid valve regurgitation is present. No evidence of significant tricuspid valve stenosis is present. There is a at least 1.7 cm x 0.6 cm highly mobile vegetation on the tricuspid valve. The attachment point is difficult to discern due to artifact from the surrounding pacemaker wires however it is freely prolapsing across the valve throughout the cardiac cycle.  •  Left ventricular systolic function is mildly decreased. Estimated left ventricular EF = 45%  •  Left ventricular systolic function is mildly decreased. Estimated left ventricular EF = 45% Normal left ventricular cavity size and wall thickness noted. There is left  ventricular global hypokinesis noted. Left ventricular diastolic function is consistent with (grade I) impaired relaxation.      Current Medications:  amiodarone, 200 mg, Oral, Q12H  apixaban, 5 mg, Oral, Q12H  aspirin, 81 mg, Oral, Daily  atorvastatin, 80 mg, Oral, Nightly  carvedilol, 25 mg, Oral, BID With Meals  DAPTOmycin, 8 mg/kg, Intravenous, Q24H  empagliflozin, 10 mg, Oral, Daily  fluticasone, 2 spray, Nasal, Daily  Insulin Lispro, 0-9 Units, Subcutaneous, 4x Daily With Meals & Nightly  lisinopril, 5 mg, Oral, Q24H  megestrol, 400 mg, Oral, Daily  pantoprazole, 40 mg, Oral, Q AM  senna-docusate sodium, 2 tablet, Oral, BID  sodium chloride, 10 mL, Intravenous, Q12H  terazosin, 1 mg, Oral, Daily           Assessment and Plan:   1. Vegetation on pacer wire and tricuspid valve  - CT surgery consult, due to size of vegetation and involvement of TV  - CTS following, await their recommendations   - ID following for antibiotics   Discussed in detail with Dr. Quintana and Dr. Mueller, still determining whether percutaneous vegectomy is feasible.     2. Cardiomyopathy/ HFrEF NYHA I  -5/23/2023: LVEF 45%, G2 DD, RV dilated  -s/p BSC BiV ICD implantation 2015 by Dr. Schoen, followed by [facility]     3. Afib  -controlled on coreg, amiodarone  -anticoagulation: Eliquis     He is at high risk for transvenous laser extraction and possible transvenous removal of extremely large vegetation adhering both to lead and tricuspid valve.  We will await CT surgery evaluation first before for final decision to be made.      Electronically signed by Margy Parisi PA-C, 05/27/23, 11:52 AM EDT.    I have seen and examined the patient, performing a face-to-face diagnostic evaluation with plan of care reviewed and developed with the Advanced Practice Clinician and nursing staff. I have addended and modified the above history of present illness, physical examination, and assessment and plan to reflect my findings and  impressions. > 50% of the combined encounter time was performed by Dr. Salinas.    García Salinas MD, [facility], [facility]  05/27/23

## 2023-05-27 NOTE — PLAN OF CARE
Goal Outcome Evaluation:      Patient is resting in bed, alert and orientedx4, noted some confusion at times. VS stable. Was able to inserted 22 g peripheral IV, but patient hard stick, noted CVT surgery recommended 6 weeks antibiotic therapy which will need PICC line access. Dr. Foster notified, she was ok with leaving IJ in place as right now. Call light in reach

## 2023-05-27 NOTE — PROGRESS NOTES
* No surgery found *       LOS: 8 days   Patient Care Team:  Arsen Seals APRN as PCP - General (Family Medicine)    Chief complaint: Bacteremia    Subjective  No events overnight    Objective    Vital Signs  Temp:  [97.4 °F (36.3 °C)-98.3 °F (36.8 °C)] 98.3 °F (36.8 °C)  Heart Rate:  [69-73] 70  Resp:  [16-18] 18  BP: (110-160)/(61-82) 153/82    Physical Exam:   General Appearance: alert, appears stated age and cooperative   Lungs: clear bilaterally   Heart: Regular rate and rhythm        Results   Results from last 7 days   Lab Units 05/27/23  0700   WBC 10*3/mm3 12.82*   HEMOGLOBIN g/dL 10.6*   HEMATOCRIT % 34.4*   PLATELETS 10*3/mm3 156     Results from last 7 days   Lab Units 05/27/23  0700   SODIUM mmol/L 138   POTASSIUM mmol/L 4.3   CHLORIDE mmol/L 105   CO2 mmol/L 20.0*   BUN mg/dL 16   CREATININE mg/dL 0.87   GLUCOSE mg/dL 106*   CALCIUM mg/dL 8.5*               Assessment  Tricuspid valve vegetation with bacteremia and right atrial mass      Plan   Blood thinners for pulmonary embolus  6 weeks of IV antibiotics  Need to follow for the tricuspid valve vegetation  And right atrial mass      Bandar Bustos PA-C  05/27/23  09:12 EDT

## 2023-05-28 PROBLEM — E43 SEVERE MALNUTRITION: Status: ACTIVE | Noted: 2023-05-28

## 2023-05-28 PROBLEM — R53.1 ACUTE RIGHT-SIDED WEAKNESS: Status: RESOLVED | Noted: 2021-05-04 | Resolved: 2023-05-28

## 2023-05-28 PROBLEM — I95.9 HYPOTENSION: Status: RESOLVED | Noted: 2023-05-21 | Resolved: 2023-05-28

## 2023-05-28 PROBLEM — I42.9 CARDIOMYOPATHY: Status: RESOLVED | Noted: 2023-05-27 | Resolved: 2023-05-28

## 2023-05-28 PROBLEM — Z95.810 CARDIAC RESYNCHRONIZATION THERAPY DEFIBRILLATOR (CRT-D) IN PLACE: Status: ACTIVE | Noted: 2021-05-04

## 2023-05-28 PROBLEM — R41.0 CONFUSION: Status: RESOLVED | Noted: 2023-05-19 | Resolved: 2023-05-28

## 2023-05-28 LAB
ANION GAP SERPL CALCULATED.3IONS-SCNC: 14 MMOL/L (ref 5–15)
BUN SERPL-MCNC: 14 MG/DL (ref 8–23)
BUN/CREAT SERPL: 16.7 (ref 7–25)
CALCIUM SPEC-SCNC: 9 MG/DL (ref 8.6–10.5)
CHLORIDE SERPL-SCNC: 103 MMOL/L (ref 98–107)
CO2 SERPL-SCNC: 21 MMOL/L (ref 22–29)
CREAT SERPL-MCNC: 0.84 MG/DL (ref 0.76–1.27)
DEPRECATED RDW RBC AUTO: 52.2 FL (ref 37–54)
EGFRCR SERPLBLD CKD-EPI 2021: 93.8 ML/MIN/1.73
ERYTHROCYTE [DISTWIDTH] IN BLOOD BY AUTOMATED COUNT: 14.7 % (ref 12.3–15.4)
GLUCOSE BLDC GLUCOMTR-MCNC: 110 MG/DL (ref 70–130)
GLUCOSE BLDC GLUCOMTR-MCNC: 116 MG/DL (ref 70–130)
GLUCOSE BLDC GLUCOMTR-MCNC: 127 MG/DL (ref 70–130)
GLUCOSE BLDC GLUCOMTR-MCNC: 86 MG/DL (ref 70–130)
GLUCOSE SERPL-MCNC: 83 MG/DL (ref 65–99)
HCT VFR BLD AUTO: 34.4 % (ref 37.5–51)
HGB BLD-MCNC: 10.8 G/DL (ref 13–17.7)
MCH RBC QN AUTO: 30.5 PG (ref 26.6–33)
MCHC RBC AUTO-ENTMCNC: 31.4 G/DL (ref 31.5–35.7)
MCV RBC AUTO: 97.2 FL (ref 79–97)
PLATELET # BLD AUTO: 148 10*3/MM3 (ref 140–450)
PMV BLD AUTO: 11.6 FL (ref 6–12)
POTASSIUM SERPL-SCNC: 4.1 MMOL/L (ref 3.5–5.2)
RBC # BLD AUTO: 3.54 10*6/MM3 (ref 4.14–5.8)
SODIUM SERPL-SCNC: 138 MMOL/L (ref 136–145)
WBC NRBC COR # BLD: 11.04 10*3/MM3 (ref 3.4–10.8)

## 2023-05-28 PROCEDURE — 85027 COMPLETE CBC AUTOMATED: CPT | Performed by: FAMILY MEDICINE

## 2023-05-28 PROCEDURE — 99231 SBSQ HOSP IP/OBS SF/LOW 25: CPT | Performed by: FAMILY MEDICINE

## 2023-05-28 PROCEDURE — 82948 REAGENT STRIP/BLOOD GLUCOSE: CPT

## 2023-05-28 PROCEDURE — 80048 BASIC METABOLIC PNL TOTAL CA: CPT | Performed by: FAMILY MEDICINE

## 2023-05-28 PROCEDURE — 99232 SBSQ HOSP IP/OBS MODERATE 35: CPT | Performed by: INTERNAL MEDICINE

## 2023-05-28 PROCEDURE — 25010000002 DAPTOMYCIN PER 1 MG: Performed by: FAMILY MEDICINE

## 2023-05-28 RX ORDER — LISINOPRIL 10 MG/1
10 TABLET ORAL
Status: DISCONTINUED | OUTPATIENT
Start: 2023-05-29 | End: 2023-06-03 | Stop reason: HOSPADM

## 2023-05-28 RX ADMIN — APIXABAN 5 MG: 5 TABLET, FILM COATED ORAL at 10:24

## 2023-05-28 RX ADMIN — ATORVASTATIN CALCIUM 80 MG: 40 TABLET, FILM COATED ORAL at 20:16

## 2023-05-28 RX ADMIN — AMIODARONE HYDROCHLORIDE 200 MG: 200 TABLET ORAL at 10:24

## 2023-05-28 RX ADMIN — AMIODARONE HYDROCHLORIDE 200 MG: 200 TABLET ORAL at 20:16

## 2023-05-28 RX ADMIN — DOCUSATE SODIUM 50 MG AND SENNOSIDES 8.6 MG 2 TABLET: 8.6; 5 TABLET, FILM COATED ORAL at 20:16

## 2023-05-28 RX ADMIN — MEGESTROL ACETATE 400 MG: 40 SUSPENSION ORAL at 10:24

## 2023-05-28 RX ADMIN — Medication 10 ML: at 10:26

## 2023-05-28 RX ADMIN — CARVEDILOL 25 MG: 12.5 TABLET, FILM COATED ORAL at 17:56

## 2023-05-28 RX ADMIN — PANTOPRAZOLE SODIUM 40 MG: 40 TABLET, DELAYED RELEASE ORAL at 05:57

## 2023-05-28 RX ADMIN — DAPTOMYCIN 500 MG: 500 INJECTION, POWDER, LYOPHILIZED, FOR SOLUTION INTRAVENOUS at 12:21

## 2023-05-28 RX ADMIN — TERAZOSIN HYDROCHLORIDE 1 MG: 1 CAPSULE ORAL at 10:23

## 2023-05-28 RX ADMIN — Medication 10 ML: at 20:15

## 2023-05-28 RX ADMIN — FLUTICASONE PROPIONATE 2 SPRAY: 50 SPRAY, METERED NASAL at 10:26

## 2023-05-28 RX ADMIN — CARVEDILOL 25 MG: 12.5 TABLET, FILM COATED ORAL at 10:24

## 2023-05-28 RX ADMIN — APIXABAN 5 MG: 5 TABLET, FILM COATED ORAL at 20:16

## 2023-05-28 RX ADMIN — EMPAGLIFLOZIN 10 MG: 10 TABLET, FILM COATED ORAL at 10:24

## 2023-05-28 RX ADMIN — ASPIRIN 81 MG 81 MG: 81 TABLET ORAL at 10:24

## 2023-05-28 NOTE — PROGRESS NOTES
Cardiology Progress Note      Reason for visit:    Tricuspid valve and pacemaker lead vegetation  Paroxysmal atrial fibrillation  Systolic heart failure    IDENTIFICATION: 72-year-old gentleman who resides in [place], [place]      Active Hospital Problems    Diagnosis  POA    **Pneumonia of left lower lobe due to infectious organism [J18.9]  Yes     Priority: High    Acute infective endocarditis [I33.0]  Yes     Priority: High     KAT (5/25/2023): Large mobile mass is associated with ICD leads and tricuspid valve consistent with endocarditis      Cardiac resynchronization therapy defibrillator (CRT-D) in place [Z95.810]  Yes     Priority: Medium     [employer] CRT ICD implanted by Dr. Shoen at [place], 2015  Echo (5/25/2023): Large mobile mass is associated with ICD leads consistent with vegetation      Paroxysmal atrial fibrillation [I48.0]  No     Priority: Low     Chads Vasc= 7      Severe Malnutrition (HCC) [E43]  Yes    Acute respiratory failure with hypoxia [J96.01]  Yes    Shock, likely multifactorial [R57.9]  Yes    Pulmonary embolus [I26.99]  Yes     CT angiogram (5/19/2023): Left lower lobe pulmonary emboli      GERD (gastroesophageal reflux disease) [K21.9]  Yes    Hyperlipidemia [E78.5]  Yes    HFrEF (heart failure with reduced ejection fraction) [I50.20]  Yes     Echo (5/5/2021): LVEF 25%  Echo (10/24/2022): LVEF30%  Echo (5/22/2023): LVEF 45%. EF appears improved from previous with persistent stable regional wall motion abnormality of the inferolateral wall      HTN [I10]  Yes            No complaints           Vital Sign Min/Max for last 24 hours  Temp  Min: 97.7 °F (36.5 °C)  Max: 98.2 °F (36.8 °C)   BP  Min: 135/69  Max: 179/86   Pulse  Min: 69  Max: 70   Resp  Min: 16  Max: 18   SpO2  Min: 91 %  Max: 95 %   Flow (L/min)  Min: 1  Max: 1      Intake/Output Summary (Last 24 hours) at 5/28/2023 1252  Last data filed at 5/27/2023 2349  Gross per 24 hour   Intake 240 ml   Output 1000 ml   Net  -760 ml           Physical Exam  Constitutional:       General: He is awake.   Cardiovascular:      Rate and Rhythm: Normal rate and regular rhythm.   Pulmonary:      Effort: Pulmonary effort is normal.      Breath sounds: Normal breath sounds.   Musculoskeletal:      Right lower leg: No edema.      Left lower leg: No edema.   Skin:     General: Skin is warm and dry.   Neurological:      Mental Status: He is alert and oriented to person, place, and time.   Psychiatric:         Behavior: Behavior is cooperative.       Tele: Paced    Results Review (reviewed the patient's recent labs in the electronic medical record):     EKG (5/21/2023): Paced    CXR (5/22/2023): Stable bilateral pleural effusions    KAT (5/25/2023): Vegetation on pacer leads and tricuspid valve.  LVEF 45%    Results from last 7 days   Lab Units 05/28/23  0630 05/27/23  0700 05/26/23  2153 05/26/23  1014 05/25/23  0501 05/24/23  0307 05/23/23  0432 05/22/23  0618   SODIUM mmol/L 138 138  --  142 145 142 136 136   POTASSIUM mmol/L 4.1 4.3 4.3 3.5 3.8 4.1 4.9 4.3   CHLORIDE mmol/L 103 105  --  106 109* 110* 104 104   BUN mg/dL 14 16  --  17 26* 29* 30* 29*   CREATININE mg/dL 0.84 0.87  --  0.95 1.22 1.25 1.48* 1.57*   MAGNESIUM mg/dL  --  2.5*  --  1.9 2.2  --  2.6* 2.8*         Results from last 7 days   Lab Units 05/28/23  0630 05/27/23  0700 05/26/23  1020   WBC 10*3/mm3 11.04* 12.82* 10.77   HEMOGLOBIN g/dL 10.8* 10.6* 10.2*   HEMATOCRIT % 34.4* 34.4* 32.9*   PLATELETS 10*3/mm3 148 156 193       Lab Results   Component Value Date    HGBA1C 6.30 (H) 03/25/2023       Lab Results   Component Value Date    CHOL 187 05/05/2021    TRIG 138 05/05/2021    HDL 38 (L) 05/05/2021     (H) 05/05/2021              Endocarditis involving tricuspid valve/ICD leads  ID following and recommends 6 weeks IV antibiotic  High risk for lead extraction given large size vegetation associated with ICD leads  Patient probably needs AngioVac removal of vegetations.   Uncertain whether his can be performed at [facility].  CTS to discuss with Dr. Mahan     Chronic HFrEF  Stable, euvolemic  Continue carvedilol, lisinopril, empagliflozin     Paroxysmal atrial fibrillation  Maintaining sinus on amiodarone  Continue apixaban for stroke prophylaxis     Pulmonary embolus, probably septic  CT angiogram shows left lower lobe pulmonary emboli           Continue IV antibiotics  AngioVasc vegectomy recommended.  Unclear whether CT surgery has this capacity.  May need transfer to [facility]. Spike Costa MD [facility], [facility]  Interventional and General Cardiology

## 2023-05-28 NOTE — PROGRESS NOTES
[facility] Medicine Services  PROGRESS NOTE    Patient Name: Timmy Guajardo  : 1950  MRN: [id number]    Date of Admission: 2023  Primary Care Physician: Arsen Seals APRN    Subjective   Subjective     CC:  F/U vegetation TV/pacer lead    HPI:  Patient seen and examined. No complaints. Eating breakfast. In good spirits. Awaiting CTS/EP decision on procedure for vegetation removal.     ROS:  Gen- No fevers, chills  CV- No chest pain, palpitations  Resp- No cough, dyspnea  GI- No N/V/D, abd pain    Objective   Objective     Vital Signs:   Temp:  [97.7 °F (36.5 °C)-98.2 °F (36.8 °C)] 98.2 °F (36.8 °C)  Heart Rate:  [69-70] 70  Resp:  [16-18] 16  BP: (135-179)/(69-98) 163/78  Flow (L/min):  [1] 1     Physical Exam:  Constitutional: No acute distress, awake, alert  HENT: NCAT, mucous membranes moist  Respiratory: Clear to auscultation bilaterally, respiratory effort normal 1L NC  Cardiovascular: RRR, +murmur, No rubs or gallops  Gastrointestinal: Positive bowel sounds, soft, nontender, nondistended  Musculoskeletal: No bilateral ankle edema  Psychiatric: Appropriate affect, cooperative  Neurologic: Oriented x 3, PARISH, speech clear  Skin: No rashes, CVC R IJ    Results Reviewed:  LAB RESULTS:      Lab 23  0630 23  0700 23  1020 23  1636 23  0307 23  2020 23  1300 23  0432 23  1155 23  0618   WBC 11.04* 12.82* 10.77 8.66 17.84*  --   --  20.17*  --  20.19*   HEMOGLOBIN 10.8* 10.6* 10.2* 10.6* 8.6*  --   --  8.4*  --  8.1*   HEMATOCRIT 34.4* 34.4* 32.9* 34.9* 27.2*  --   --  27.9*  --  25.7*   PLATELETS 148 156 193 232 213  --   --  188  --  200   NEUTROS ABS  --  10.66* 9.19* 7.09* 15.85*  --   --  18.41*  --  17.14*   IMMATURE GRANS (ABS)  --  0.20* 0.10* 0.14* 0.20*  --   --  0.35*  --  0.34*   LYMPHS ABS  --  1.10 0.77 0.81 1.03  --   --  0.74  --  1.60   MONOS ABS  --  0.80 0.66 0.60 0.74  --   --  0.65  --  1.05*    EOS ABS  --  0.04 0.03 0.01 0.00  --   --  0.00  --  0.03   MCV 97.2* 97.2* 96.8 99.1* 97.8*  --   --  103.7*  --  98.8*   PROCALCITONIN  --   --   --   --   --   --   --   --   --  43.40*   LACTATE  --   --   --   --   --   --   --   --   --  0.8   PROTIME  --   --   --   --   --   --   --   --   --  20.7*   APTT  --   --   --   --  93.9* 94.9* 92.0* 49.1* 65.2 63.9   HEPARIN ANTI-XA  --   --   --   --   --   --  >1.10*  --   --   --          Lab 05/28/23  0630 05/27/23  0700 05/26/23  2153 05/26/23  1014 05/25/23  0501 05/24/23  0307 05/23/23  0432 05/22/23  0618   SODIUM 138 138  --  142 145 142 136 136   POTASSIUM 4.1 4.3 4.3 3.5 3.8 4.1 4.9 4.3   CHLORIDE 103 105  --  106 109* 110* 104 104   CO2 21.0* 20.0*  --  21.0* 22.0 20.0* 17.0* 16.0*   ANION GAP 14.0 13.0  --  15.0 14.0 12.0 15.0 16.0*   BUN 14 16  --  17 26* 29* 30* 29*   CREATININE 0.84 0.87  --  0.95 1.22 1.25 1.48* 1.57*   EGFR 93.8 92.8  --  86.1 63.8 61.9 50.6* 47.1*   GLUCOSE 83 106*  --  158* 69 74 131* 230*   CALCIUM 9.0 8.5*  --  9.0 9.2 8.8 8.3* 8.0*   MAGNESIUM  --  2.5*  --  1.9 2.2  --  2.6* 2.8*   PHOSPHORUS  --   --   --  3.5  --   --   --  5.0*         Lab 05/22/23  0618   TOTAL PROTEIN 5.9*   ALBUMIN 2.6*   GLOBULIN 3.3   ALT (SGPT) 35   AST (SGOT) 44*   BILIRUBIN 0.7   ALK PHOS 127*         Lab 05/22/23  0618   PROTIME 20.7*   INR 1.76*                 Lab 05/24/23  0341 05/23/23  0320 05/22/23  0333   PH, ARTERIAL 7.375 7.332* 7.330*   PCO2, ARTERIAL 36.1 36.5 32.7*   PO2 ART 83.2 91.2 128.0*   FIO2 28 28 40   HCO3 ART 21.1 19.3* 17.2*   BASE EXCESS ART -3.7* -6.0* -7.9*   CARBOXYHEMOGLOBIN 1.1 1.2 0.9     Brief Urine Lab Results  (Last result in the past 365 days)      Color   Clarity   Blood   Leuk Est   Nitrite   Protein   CREAT   Urine HCG        05/21/23 0240 Yellow   Turbid   Negative   Negative   Negative   30 mg/dL (1+)                 Microbiology Results Abnormal     Procedure Component Value - Date/Time    Blood Culture -  Blood, Hand, Left [[id number]]  (Normal) Collected: 05/22/23 1155    Lab Status: Final result Specimen: Blood from Hand, Left Updated: 05/27/23 1346     Blood Culture No growth at 5 days    Blood Culture - Blood, Arm, Left [[id number]]  (Normal) Collected: 05/22/23 1202    Lab Status: Final result Specimen: Blood from Arm, Left Updated: 05/27/23 1346     Blood Culture No growth at 5 days    Respiratory Culture - Sputum, ET Suction [[id number]] Collected: 05/22/23 0020    Lab Status: Final result Specimen: Sputum from ET Suction Updated: 05/24/23 1150     Respiratory Culture Moderate growth (3+) Normal respiratory nathan. No S. aureus or Pseudomonas aeruginosa detected. Final report.     Gram Stain Few (2+) WBCs per low power field      Occasional Epithelial cells per low power field      Few (2+) Budding yeast      Occasional Gram positive cocci    Urine Culture - Urine, Straight Cath [[id number]]  (Normal) Collected: 05/21/23 0240    Lab Status: Final result Specimen: Urine from Straight Cath Updated: 05/22/23 0816     Urine Culture No growth    S. Pneumo Ag Urine or CSF - Urine, Urine, Clean Catch [[id number]]  (Normal) Collected: 05/21/23 0240    Lab Status: Final result Specimen: Urine, Clean Catch Updated: 05/21/23 1356     Strep Pneumo Ag Negative    Legionella Antigen, Urine - Urine, Urine, Clean Catch [[id number]]  (Normal) Collected: 05/21/23 0240    Lab Status: Final result Specimen: Urine, Clean Catch Updated: 05/21/23 1356     LEGIONELLA ANTIGEN, URINE Negative    MRSA Screen, PCR (Inpatient) - Swab, Nares [[id number]]  (Normal) Collected: 05/20/23 0523    Lab Status: Final result Specimen: Swab from Nares Updated: 05/20/23 0736     MRSA PCR Negative    Narrative:      The negative predictive value of this diagnostic test is high and should only be used to consider de-escalating anti-MRSA therapy. A positive result may indicate colonization with MRSA and must be correlated clinically.  MRSA Negative          No  radiology results from the last 24 hrs    Results for orders placed during the hospital encounter of 05/19/23    Adult Transesophageal Echo (KAT) W/ Cont if Necessary Per Protocol    Interpretation Summary  •  There is a large 2.9 x 1.7 cm mass in the right atrium which appears adherent to the atrial aspect of one of the 2 pacemaker leads. The edges are smooth with some peripheral hyperechoic regions. This appears to be more consistent with older thrombus than acute vegetation. Does move in and out of the tricuspid valve plane with the cardiac cycle.  •  There is a moderate-sized (1.7 mm) mobile mass on the tricuspid valve that is consistent with a vegetation.Mild tricuspid valve regurgitation is present. No evidence of significant tricuspid valve stenosis is present. There is a at least 1.7 cm x 0.6 cm highly mobile vegetation on the tricuspid valve. The attachment point is difficult to discern due to artifact from the surrounding pacemaker wires however it is freely prolapsing across the valve throughout the cardiac cycle.  •  Left ventricular systolic function is mildly decreased. Estimated left ventricular EF = 45%  •  Left ventricular systolic function is mildly decreased. Estimated left ventricular EF = 45% Normal left ventricular cavity size and wall thickness noted. There is left ventricular global hypokinesis noted. Left ventricular diastolic function is consistent with (grade I) impaired relaxation.      Current medications:  Scheduled Meds:amiodarone, 200 mg, Oral, Q12H  apixaban, 5 mg, Oral, Q12H  aspirin, 81 mg, Oral, Daily  atorvastatin, 80 mg, Oral, Nightly  carvedilol, 25 mg, Oral, BID With Meals  DAPTOmycin, 8 mg/kg, Intravenous, Q24H  empagliflozin, 10 mg, Oral, Daily  fluticasone, 2 spray, Nasal, Daily  Insulin Lispro, 0-9 Units, Subcutaneous, 4x Daily With Meals & Nightly  [START ON 5/29/2023] lisinopril, 10 mg, Oral, Q24H  megestrol, 400 mg, Oral, Daily  pantoprazole, 40 mg, Oral, Q  AM  senna-docusate sodium, 2 tablet, Oral, BID  sodium chloride, 10 mL, Intravenous, Q12H  terazosin, 1 mg, Oral, Daily      Continuous Infusions:   PRN Meds:.•  acetaminophen  •  senna-docusate sodium **AND** polyethylene glycol **AND** [DISCONTINUED] bisacodyl **AND** bisacodyl  •  Calcium Replacement - Follow Nurse / BPA Driven Protocol  •  cyclobenzaprine  •  dextrose  •  ipratropium-albuterol  •  Magnesium Cardiology Dose Replacement - Follow Nurse / BPA Driven Protocol  •  nitroglycerin  •  ondansetron  •  Phosphorus Replacement - Follow Nurse / BPA Driven Protocol  •  Potassium Replacement - Follow Nurse / BPA Driven Protocol  •  sodium chloride  •  sodium chloride  •  sodium chloride    Assessment & Plan   Assessment & Plan     Active Hospital Problems    Diagnosis  POA   • **Pneumonia of left lower lobe due to infectious organism [J18.9]  Yes   • 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]  Yes   • Cardiomyopathy [I42.9]  Yes   • Acute respiratory failure with hypoxia [J96.01]  Yes   • Shock, likely multifactorial [R57.9]  Yes   • Paroxysmal atrial fibrillation [I48.0]  No   • Hypotension [I95.9]  Yes   • Pulmonary embolus [I26.99]  Yes   • Confusion [R41.0]  Yes   • GERD (gastroesophageal reflux disease) [K21.9]  Yes   • Hyperlipidemia [E78.5]  Yes   • History of CVA (cerebrovascular accident) [Z86.73]  Not Applicable   • HFrEF (heart failure with reduced ejection fraction) [I50.20]  Yes   • HTN [I10]  Yes   • Presence of cardiac pacemaker [Z95.0]  Yes      Resolved Hospital Problems   No resolved problems to display.        Brief Hospital Course to date:  Timmy Guajardo is a 72 y.o. male with PMHx significant for PE on Eliquis, HTN, dyslipidemia, HFrEF (30% on 10/2022), T2DM, CVA with right-sided residual weakness, and GERD.  He was admitted to the ICU on 5/21/2023 as a transfer from the floor.  He was initially admitted with a pulmonary embolism and a left lower lobe pneumonia versus infarct on 5/19/2023.  The  morning of 5/21 he suddenly went into A-fib with RVR became hypotensive with prompted transition to the ICU. He underwent KAT 5/25 due to bacteremia and found to have vegetation on pacer wire and TV. Transferred to the floor 5/25.     This patient's problems and plans were partially entered by my partner and updated as appropriate by me 05/28/23.    A fib RVR-->Resolved  -Continue Coreg, Amiodarone   -Continue Eliquis for now, may need heparin gtt pending timing of procedure    -Cardiology following    Staph epidermidis Bacteremia  Vegetation pacer wire/Tricusupid valve   -KAT 5/25 with vegetation noted on pacer lead and TV  -CTS following for possible surgical intervention   -ID following, Continue Daptomycin daily, likely 6 weeks   -EP following, Dr Quintana. High risk for transvenous laser extraction and possible transvenous removal of extremely large vegetation adhering both to lead and tricuspid valve.  -Dr. Mueller, Dr. Quintana and CT surgery to determine whether percutaneous vegectomy is feasible.  -Repeat blood Cx NGTD    Hx PE  -On Eliquis, may need to place on heparin gtt pending timing of procedures (defer to CTS/EP)     HTN  -Continue Lisinopril, increased to 10mg daily today    HFrEF  -Continue Coreg  -Continue Jardiance     GERD  -Continue PPI    Chronic Severe Malnutrition   -Continue Megace  -RD following     CVC in place  -Peripheral IV in place but patient very hard stick. Leave CVC in place until PICC placed as patient will need long term ABX.   -Will need to discuss with ID regarding timing of PICC Placement     Expected Discharge Location and Transportation: [facility]  Expected Discharge   Expected Discharge Date: 5/31/2023; Expected Discharge Time:      DVT prophylaxis:  Medical DVT prophylaxis orders are present.     AM-PAC 6 Clicks Score (PT): 10 (05/24/23 1131)    CODE STATUS:   Code Status and Medical Interventions:   Ordered at: 05/20/23 0011     Level Of Support Discussed With:    Patient      Code Status (Patient has no pulse and is not breathing):    CPR (Attempt to Resuscitate)     Medical Interventions (Patient has pulse or is breathing):    Full Support       Yvonne Foster, DO  05/28/23

## 2023-05-28 NOTE — PROGRESS NOTES
* No surgery found *       LOS: 9 days   Patient Care Team:  Arsen Seals APRN as PCP - General (Family Medicine)    Chief complaint: Bacteremia    Subjective  No events overnight    Objective    Vital Signs  Temp:  [97.7 °F (36.5 °C)-98.3 °F (36.8 °C)] 98 °F (36.7 °C)  Heart Rate:  [69-70] 70  Resp:  [16-18] 16  BP: (135-172)/(69-98) 162/83    Physical Exam:  Frail thin male   General Appearance: alert, appears stated age and cooperative   Lungs: clear bilaterally   Heart: Regular rate and rhythm        Results     Results from last 7 days   Lab Units 05/27/23  0700   WBC 10*3/mm3 12.82*   HEMOGLOBIN g/dL 10.6*   HEMATOCRIT % 34.4*   PLATELETS 10*3/mm3 156     Results from last 7 days   Lab Units 05/27/23  0700   SODIUM mmol/L 138   POTASSIUM mmol/L 4.3   CHLORIDE mmol/L 105   CO2 mmol/L 20.0*   BUN mg/dL 16   CREATININE mg/dL 0.87   GLUCOSE mg/dL 106*   CALCIUM mg/dL 8.5*               Assessment  Tricuspid valve vegetation with bacteremia and right atrial mass      Plan   Blood thinners for pulmonary embolus  6 weeks of IV antibiotics  Need to follow for the tricuspid valve vegetation  And right atrial mass pacing wire      Bandar Bustos PA-C  05/28/23  07:14 EDT

## 2023-05-28 NOTE — PROGRESS NOTES
INFECTIOUS DISEASE f/u     Timmy Guajardo  1950  [id number]    Date of Consult: 5/28/2023    Admission Date: 5/19/2023      Requesting Provider: No ref. provider found  Evaluating Physician: Travon Brito MD    Reason for Consultation: Positive blood cultures    History of present illness:    Patient is a 72 y.o. male with past medical history of pulmonary embolism, dyslipidemia, heart failure, type II d. mellitus, CVA with right-sided residual weakness and GERD.      Patient admitted to hospital for pulmonary embolism left lower lobe pneumonia versus infarction on May/9/2023 patient had atrial fibrillation with RVR with hypotension has been transferred the ICU.      Patient found have positive blood cultures for staph coccus epidermidis.  We are being consulted for further interpretation of these positive blood cultures.    Patient reports having indwelling pacemaker/ ICD patient says this is fired 1 time since he has had it placed        5/24/23; no events overnight Noemy antibiotics no complaints    5/25/2023 patient had KAT which was remarkable for right atrial mass and vegetation on pacemaker lead patient feels well no complaints no events overnight    5/26/23; no events overnight; no fever, rash, sore throat    5/27/23; patient is doing well tolerating antibiotics no events overnight denies fevers rash or sore throat    5/28/23; no evetts overnight; afebrile normotensive  Past Medical History:   Diagnosis Date   • Cardiomyopathy    • CHF (congestive heart failure)    • Coronary artery disease    • COVID-19    • Diabetes mellitus    • GERD (gastroesophageal reflux disease)    • HTN (hypertension)    • Pleural effusion    • Stroke     Right sided weakness   • Stroke        Past Surgical History:   Procedure Laterality Date   • AMPUTATION DIGIT Left 5/12/2021    Procedure: AMPUTATION DIGIT - GREAT TOE AMPUTATION LEFT;  Surgeon: Dario Marmolejo MD;  Location: [facility];  Service: General;   Laterality: Left;   • AORTAGRAM N/A 5/11/2021    Procedure: AORTAGRAM WITH RUNOFFS, LEFT SFA BALLOON ANGIOPLASTY;  Surgeon: Tim Mahan MD;  Location: [facility] HYBRID SHELIA;  Service: Vascular;  Laterality: N/A;  FL TIME 6 MIN 54 SECS  82 MGY  CONTRAST VISIPAQUE 100ML     • CARDIAC CATHETERIZATION     • CARDIAC PACEMAKER PLACEMENT      pacemaker/defibrillator, [employer]   • COLONOSCOPY N/A 3/31/2023    Procedure: COLONOSCOPY;  Surgeon: Brunner, Mark I, MD;  Location:  [facility];  Service: Gastroenterology;  Laterality: N/A;   • ENDOSCOPY N/A 3/29/2023    Procedure: ESOPHAGOGASTRODUODENOSCOPY;  Surgeon: Brunner, Mark I, MD;  Location: [facility] ENDOSCOPY;  Service: Gastroenterology;  Laterality: N/A;   • HERNIA REPAIR Bilateral     Inguinal hernia   • HIP TROCHANTERIC NAILING WITH INTRAMEDULLARY HIP SCREW Right 10/18/2022    Procedure: HIP TROCHANTERIC NAILING WITH INTRAMEDULLARY HIP SCREW RIGHT;  Surgeon: Bj Steinberg MD;  Location: [facility] OR;  Service: Orthopedics;  Laterality: Right;   • THORACOSCOPY VIDEO ASSISTED WITH LOBECTOMY Right 4/12/2023    Procedure: BRONCHOSCOPY, THORACOSCOPY VIDEO ASSISTED WITH DECORTICATION, MECHANICAL PLEURODESIS;  Surgeon: Tim Mahan MD;  Location:  [facility];  Service: Cardiothoracic;  Laterality: Right;       Family History   Problem Relation Age of Onset   • Alzheimer's disease Mother    • Kidney failure Mother    • Cancer Father    • Heart failure Father        Social History     Socioeconomic History   • Marital status: Single   • Number of children: 0   Tobacco Use   • Smoking status: Former     Packs/day: 1.00     Years: 30.00     Pack years: 30.00     Types: Cigarettes     Quit date: 2013     Years since quitting: 10.4   • Smokeless tobacco: Never   • Tobacco comments:     quit 2015   Vaping Use   • Vaping Use: Never used   Substance and Sexual Activity   • Alcohol use: Never   • Drug use: Never   • Sexual activity: Defer       Allergies   Allergen  Reactions   • Other Unknown - Low Severity     Mercury metals- as a child         Medication:    Current Facility-Administered Medications:   •  acetaminophen (TYLENOL) tablet 650 mg, 650 mg, Oral, Q4H PRN, Jarrett Buenrostro MD  •  amiodarone (PACERONE) tablet 200 mg, 200 mg, Oral, Q12H, Jarrett Buenrostro MD, 200 mg at 05/28/23 1024  •  apixaban (ELIQUIS) tablet 5 mg, 5 mg, Oral, Q12H, Jarrett Buenrostro MD, 5 mg at 05/28/23 1024  •  aspirin chewable tablet 81 mg, 81 mg, Oral, Daily, Jarrett Buenrostro MD, 81 mg at 05/28/23 1024  •  atorvastatin (LIPITOR) tablet 80 mg, 80 mg, Oral, Nightly, Jarrett Buenrostro MD, 80 mg at 05/27/23 2156  •  sennosides-docusate (PERICOLACE) 8.6-50 MG per tablet 2 tablet, 2 tablet, Oral, BID, 2 tablet at 05/27/23 2156 **AND** polyethylene glycol (MIRALAX) packet 17 g, 17 g, Oral, Daily PRN **AND** [DISCONTINUED] bisacodyl (DULCOLAX) EC tablet 5 mg, 5 mg, Oral, Daily PRN **AND** bisacodyl (DULCOLAX) suppository 10 mg, 10 mg, Rectal, Daily PRN, Jarrett Buenrostro MD  •  Calcium Replacement - Follow Nurse / BPA Driven Protocol, , Does not apply, PRN, Jarrett Buenrostro MD  •  carvedilol (COREG) tablet 25 mg, 25 mg, Oral, BID With Meals, Jarrett Buenrostro MD, 25 mg at 05/28/23 1024  •  cyclobenzaprine (FLEXERIL) tablet 5 mg, 5 mg, Oral, TID PRN, Jarrett Buenrostro MD  •  DAPTOmycin (CUBICIN) 500 mg in sodium chloride 0.9 % 50 mL IVPB, 8 mg/kg, Intravenous, Q24H, Yvonne Foster DO, Last Rate: 100 mL/hr at 05/28/23 1221, 500 mg at 05/28/23 1221  •  dextrose (D50W) (25 g/50 mL) IV injection 50 mL, 50 mL, Intravenous, Q1H PRN, Jarrett Buenrostro MD, 50 mL at 05/25/23 0600  •  empagliflozin (JARDIANCE) tablet 10 mg, 10 mg, Oral, Daily, Jarrett Buenrostro MD, 10 mg at 05/28/23 1024  •  fluticasone (FLONASE) 50 MCG/ACT nasal spray 2 spray, 2 spray, Nasal, Daily, Jarrett Buenrostro MD, 2 spray at 05/28/23 1026  •  Insulin Lispro (humaLOG) injection 0-9 Units, 0-9  Units, Subcutaneous, 4x Daily With Meals & Nightly, Jarrett Buenrostro MD, 2 Units at 05/27/23 1159  •  ipratropium-albuterol (DUO-NEB) nebulizer solution 3 mL, 3 mL, Nebulization, Q6H PRN, Jarrett Buenrostro MD  •  [START ON 5/29/2023] lisinopril (PRINIVIL,ZESTRIL) tablet 10 mg, 10 mg, Oral, Q24H, Maddie Lindsey APRN  •  Magnesium Cardiology Dose Replacement - Follow Nurse / BPA Driven Protocol, , Does not apply, Porter DOTY Matthew M, MD  •  megestrol (MEGACE) 40 MG/ML suspension 400 mg, 400 mg, Oral, Daily, Yvonne Foster DO, 400 mg at 05/28/23 1024  •  nitroglycerin (NITROSTAT) SL tablet 0.4 mg, 0.4 mg, Sublingual, Q5 Min PRN, Jarrett Buenrostro MD  •  ondansetron (ZOFRAN) injection 4 mg, 4 mg, Intravenous, Q6H PRN, Jarrett Buenrostro MD  •  pantoprazole (PROTONIX) EC tablet 40 mg, 40 mg, Oral, Q AM, Yvonne Foster DO, 40 mg at 05/28/23 0557  •  Phosphorus Replacement - Follow Nurse / BPA Driven Protocol, , Does not apply, Porter DOTY Matthew M, MD  •  Potassium Replacement - Follow Nurse / BPA Driven Protocol, , Does not apply, PRN, Jarrett Buenrostro MD  •  sodium chloride 0.9 % flush 10 mL, 10 mL, Intravenous, PRN, Jarrett Buenrostro MD  •  sodium chloride 0.9 % flush 10 mL, 10 mL, Intravenous, Q12H, Jarrett Buenrostro MD, 10 mL at 05/28/23 1026  •  sodium chloride 0.9 % flush 10 mL, 10 mL, Intravenous, PRN, Jarrett Buenrostro MD  •  sodium chloride 0.9 % infusion 40 mL, 40 mL, Intravenous, PRN, Jarrett Buenrostro MD, 40 mL at 05/26/23 1149  •  terazosin (HYTRIN) capsule 1 mg, 1 mg, Oral, Daily, Jarrett Buenrostro MD, 1 mg at 05/28/23 1023    Antibiotics:  Anti-Infectives (From admission, onward)    Ordered     Dose/Rate Route Frequency Start Stop    05/23/23 0920  DAPTOmycin (CUBICIN) 500 mg in sodium chloride 0.9 % 50 mL IVPB        Ordering Provider: Yvonne Foster DO    8 mg/kg × 65.2 kg  100 mL/hr over 30 Minutes Intravenous Every 24 Hours 05/23/23 1100  23 1037    23  piperacillin-tazobactam (ZOSYN) 3.375 g in iso-osmotic dextrose 50 ml (premix)        Ordering Provider: Brady Dumont DO    3.375 g  over 30 Minutes Intravenous Once 23  vancomycin 1250 mg/250 mL 0.9% NS IVPB ([facility])        Ordering Provider: Brady Dumont DO    20 mg/kg × 63.5 kg  over 90 Minutes Intravenous Once 23 0052            Review of Systems:  See HPI    Physical Exam:   Vital Signs  Temp (24hrs), Av.9 °F (36.6 °C), Min:97.7 °F (36.5 °C), Max:98.2 °F (36.8 °C)    Temp  Min: 97.7 °F (36.5 °C)  Max: 98.2 °F (36.8 °C)  BP  Min: 135/69  Max: 179/86  Pulse  Min: 69  Max: 70  Resp  Min: 16  Max: 18  SpO2  Min: 91 %  Max: 95 %    GENERAL: resting quietly  in no acute distress.   HEENT: Normocephalic, atraumatic.  PERRL. EOMI. No conjunctival injection. No icterus.  No external oral lesions    HEART: RRR; No murmur  LUNGS: symmetrical bilaterally   ABDOMEN: Soft, nontender  EXT:  No edema  :  Without Rosenberg catheter.  MSK: No joint effusions or erythema  SKIN: Warm and dry without cutaneous eruptions on Inspection/palpation.        Laboratory Data    Results from last 7 days   Lab Units 23  0630 23  0700 23  1020   WBC 10*3/mm3 11.04* 12.82* 10.77   HEMOGLOBIN g/dL 10.8* 10.6* 10.2*   HEMATOCRIT % 34.4* 34.4* 32.9*   PLATELETS 10*3/mm3 148 156 193     Results from last 7 days   Lab Units 23  0630   SODIUM mmol/L 138   POTASSIUM mmol/L 4.1   CHLORIDE mmol/L 103   CO2 mmol/L 21.0*   BUN mg/dL 14   CREATININE mg/dL 0.84   GLUCOSE mg/dL 83   CALCIUM mg/dL 9.0     Results from last 7 days   Lab Units 23  0618   ALK PHOS U/L 127*   BILIRUBIN mg/dL 0.7   ALT (SGPT) U/L 35   AST (SGOT) U/L 44*             Results from last 7 days   Lab Units 23  0618   LACTATE mmol/L 0.8     Results from last 7 days   Lab Units 23  1014   CK TOTAL U/L 14*     Results from last 7 days   Lab Units  05/21/23 1934   VANCOMYCIN TR mcg/mL 14.70     Estimated Creatinine Clearance: 70.7 mL/min (by C-G formula based on SCr of 0.84 mg/dL).      Microbiology:  Blood Culture   Date Value Ref Range Status   05/19/2023 Staphylococcus epidermidis (C)  Final   05/19/2023 Staphylococcus epidermidis (C)  Final     BCID, PCR   Date Value Ref Range Status   05/19/2023 (A) Negative by BCID PCR. Culture to Follow. Final    Staph spp, not aureus or lugdunensis. Identification by BCID2 PCR.     No results found for: CULTURES, HSVCX, URCX  No results found for: EYECULTURE, GCCX, HSVCULTURE, LABHSV  No results found for: LEGIONELLA, MRSACX, MUMPSCX, MYCOPLASCX  No results found for: NOCARDIACX, STOOLCX  Urine Culture   Date Value Ref Range Status   05/21/2023 No growth  Final     No results found for: VIRALCULTU, WOUNDCX        Radiology:  Imaging Results (Last 72 Hours)     ** No results found for the last 72 hours. **            Impression:   Left lower lobe infiltrate from infarction versus infection  Left lower lobe pulmonary embolism  Leukocytosis with neutrophilia  High-grade positive blood culture for staph coccus epidermidis  Elevated procalcitonin  Pacemaker infection  Right atrial endocarditis  PLAN/RECOMMENDATIONS:   Thank you for asking us to see Timmy Guajardo, I recommend the following:  Given the presence indwelling endovascular hardware staphylococcus epidermidis bacteremia is concerning    High-grade positive blood cultures and indwelling hardware probably warrants further investigation    Patient does have leukocytosis and markedly elevated procalcitonin    Having both a pulmonary embolism and a bacterial pneumonia unless from a septic emboli would be less expected.    Follow-up repeat blood cultures    Appreciate cardiology assistance if patient has pacemaker infection we need at least consider the possibility and feasibility of removal of endovascular hardware.    If this can be removed this will increase chance  for clinical cure          Continue daptomycin 6 mg/kg every 24 hours may extend this length of treatment to up to 6 weeks  Follow-up repeated blood cultures from 5/22 remain no growth at 3 days  Reasonable to follow procalcitonin to see if this is descending    angiovac resection fo atrial mass vs endovascular hardware extraction    Continue antibiotics    Travon Brito MD  5/28/2023  13:26 EDT

## 2023-05-29 LAB
ANION GAP SERPL CALCULATED.3IONS-SCNC: 15 MMOL/L (ref 5–15)
BUN SERPL-MCNC: 15 MG/DL (ref 8–23)
BUN/CREAT SERPL: 19 (ref 7–25)
CALCIUM SPEC-SCNC: 8.9 MG/DL (ref 8.6–10.5)
CHLORIDE SERPL-SCNC: 103 MMOL/L (ref 98–107)
CO2 SERPL-SCNC: 21 MMOL/L (ref 22–29)
CREAT SERPL-MCNC: 0.79 MG/DL (ref 0.76–1.27)
DEPRECATED RDW RBC AUTO: 52 FL (ref 37–54)
EGFRCR SERPLBLD CKD-EPI 2021: 95.6 ML/MIN/1.73
ERYTHROCYTE [DISTWIDTH] IN BLOOD BY AUTOMATED COUNT: 15 % (ref 12.3–15.4)
GLUCOSE BLDC GLUCOMTR-MCNC: 110 MG/DL (ref 70–130)
GLUCOSE BLDC GLUCOMTR-MCNC: 121 MG/DL (ref 70–130)
GLUCOSE BLDC GLUCOMTR-MCNC: 211 MG/DL (ref 70–130)
GLUCOSE BLDC GLUCOMTR-MCNC: 90 MG/DL (ref 70–130)
GLUCOSE SERPL-MCNC: 88 MG/DL (ref 65–99)
HCT VFR BLD AUTO: 36.2 % (ref 37.5–51)
HGB BLD-MCNC: 11.3 G/DL (ref 13–17.7)
MCH RBC QN AUTO: 30.1 PG (ref 26.6–33)
MCHC RBC AUTO-ENTMCNC: 31.2 G/DL (ref 31.5–35.7)
MCV RBC AUTO: 96.5 FL (ref 79–97)
PLATELET # BLD AUTO: 145 10*3/MM3 (ref 140–450)
PMV BLD AUTO: 11.1 FL (ref 6–12)
POTASSIUM SERPL-SCNC: 4.3 MMOL/L (ref 3.5–5.2)
RBC # BLD AUTO: 3.75 10*6/MM3 (ref 4.14–5.8)
SODIUM SERPL-SCNC: 139 MMOL/L (ref 136–145)
WBC NRBC COR # BLD: 11.44 10*3/MM3 (ref 3.4–10.8)

## 2023-05-29 PROCEDURE — 82948 REAGENT STRIP/BLOOD GLUCOSE: CPT

## 2023-05-29 PROCEDURE — 25010000002 DAPTOMYCIN PER 1 MG: Performed by: FAMILY MEDICINE

## 2023-05-29 PROCEDURE — 63710000001 INSULIN LISPRO (HUMAN) PER 5 UNITS: Performed by: INTERNAL MEDICINE

## 2023-05-29 PROCEDURE — 80048 BASIC METABOLIC PNL TOTAL CA: CPT | Performed by: FAMILY MEDICINE

## 2023-05-29 PROCEDURE — 85027 COMPLETE CBC AUTOMATED: CPT | Performed by: FAMILY MEDICINE

## 2023-05-29 PROCEDURE — 99232 SBSQ HOSP IP/OBS MODERATE 35: CPT | Performed by: INTERNAL MEDICINE

## 2023-05-29 RX ADMIN — AMIODARONE HYDROCHLORIDE 200 MG: 200 TABLET ORAL at 20:23

## 2023-05-29 RX ADMIN — PANTOPRAZOLE SODIUM 40 MG: 40 TABLET, DELAYED RELEASE ORAL at 05:18

## 2023-05-29 RX ADMIN — FLUTICASONE PROPIONATE 2 SPRAY: 50 SPRAY, METERED NASAL at 08:59

## 2023-05-29 RX ADMIN — ATORVASTATIN CALCIUM 80 MG: 40 TABLET, FILM COATED ORAL at 20:24

## 2023-05-29 RX ADMIN — TERAZOSIN HYDROCHLORIDE 1 MG: 1 CAPSULE ORAL at 08:58

## 2023-05-29 RX ADMIN — ASPIRIN 81 MG 81 MG: 81 TABLET ORAL at 08:58

## 2023-05-29 RX ADMIN — DAPTOMYCIN 500 MG: 500 INJECTION, POWDER, LYOPHILIZED, FOR SOLUTION INTRAVENOUS at 11:44

## 2023-05-29 RX ADMIN — CARVEDILOL 25 MG: 12.5 TABLET, FILM COATED ORAL at 17:20

## 2023-05-29 RX ADMIN — AMIODARONE HYDROCHLORIDE 200 MG: 200 TABLET ORAL at 08:59

## 2023-05-29 RX ADMIN — Medication 10 ML: at 09:06

## 2023-05-29 RX ADMIN — EMPAGLIFLOZIN 10 MG: 10 TABLET, FILM COATED ORAL at 08:58

## 2023-05-29 RX ADMIN — CARVEDILOL 25 MG: 12.5 TABLET, FILM COATED ORAL at 08:58

## 2023-05-29 RX ADMIN — APIXABAN 5 MG: 5 TABLET, FILM COATED ORAL at 08:59

## 2023-05-29 RX ADMIN — MEGESTROL ACETATE 400 MG: 40 SUSPENSION ORAL at 08:59

## 2023-05-29 RX ADMIN — INSULIN LISPRO 4 UNITS: 100 INJECTION, SOLUTION INTRAVENOUS; SUBCUTANEOUS at 21:32

## 2023-05-29 RX ADMIN — LISINOPRIL 10 MG: 10 TABLET ORAL at 08:59

## 2023-05-29 RX ADMIN — APIXABAN 5 MG: 5 TABLET, FILM COATED ORAL at 20:24

## 2023-05-29 RX ADMIN — Medication 10 ML: at 20:24

## 2023-05-29 NOTE — PROGRESS NOTES
INFECTIOUS DISEASE f/u     Timmy Guajardo  1950  [id number]    Date of Consult: 5/23/2023    Admission Date: 5/19/2023      Requesting Provider: No ref. provider found  Evaluating Physician: Travon Brito MD    Reason for Consultation: Positive blood cultures    History of present illness:    Patient is a 72 y.o. male with past medical history of pulmonary embolism, dyslipidemia, heart failure, type II d. mellitus, CVA with right-sided residual weakness and GERD.      Patient admitted to hospital for pulmonary embolism left lower lobe pneumonia versus infarction on May/9/2023 patient had atrial fibrillation with RVR with hypotension has been transferred the ICU.      Patient found have positive blood cultures for staph coccus epidermidis.  We are being consulted for further interpretation of these positive blood cultures.    Patient reports having indwelling pacemaker/ ICD patient says this is fired 1 time since he has had it placed        5/24/23; no events overnight Noemy antibiotics no complaints    5/25/2023 patient had KAT which was remarkable for right atrial mass and vegetation on pacemaker lead patient feels well no complaints no events overnight    5/26/23; no events overnight; no fever, rash, sore throat    5/27/23; patient is doing well tolerating antibiotics no events overnight denies fevers rash or sore throat    5/28/23; no evetts overnight; afebrile normotensive    5/29/23: No new events.  Denies f/c, sob, n/v/d, rashes, or sore throat.  No pain around ppm site.     Past Medical History:   Diagnosis Date   • Cardiomyopathy    • CHF (congestive heart failure)    • Coronary artery disease    • COVID-19    • Diabetes mellitus    • GERD (gastroesophageal reflux disease)    • HTN (hypertension)    • Pleural effusion    • Stroke     Right sided weakness   • Stroke        Past Surgical History:   Procedure Laterality Date   • AMPUTATION DIGIT Left 5/12/2021    Procedure: AMPUTATION DIGIT - GREAT TOE  AMPUTATION LEFT;  Surgeon: Dario Marmolejo MD;  Location:  [facility];  Service: General;  Laterality: Left;   • AORTAGRAM N/A 5/11/2021    Procedure: AORTAGRAM WITH RUNOFFS, LEFT SFA BALLOON ANGIOPLASTY;  Surgeon: Tim Mahan MD;  Location:  [facility];  Service: Vascular;  Laterality: N/A;  FL TIME 6 MIN 54 SECS  82 MGY  CONTRAST VISIPAQUE 100ML     • CARDIAC CATHETERIZATION     • CARDIAC PACEMAKER PLACEMENT      pacemaker/defibrillator, [employer]   • COLONOSCOPY N/A 3/31/2023    Procedure: COLONOSCOPY;  Surgeon: Brunner, Mark I, MD;  Location:  [facility];  Service: Gastroenterology;  Laterality: N/A;   • ENDOSCOPY N/A 3/29/2023    Procedure: ESOPHAGOGASTRODUODENOSCOPY;  Surgeon: Brunner, Mark I, MD;  Location:  [facility];  Service: Gastroenterology;  Laterality: N/A;   • HERNIA REPAIR Bilateral     Inguinal hernia   • HIP TROCHANTERIC NAILING WITH INTRAMEDULLARY HIP SCREW Right 10/18/2022    Procedure: HIP TROCHANTERIC NAILING WITH INTRAMEDULLARY HIP SCREW RIGHT;  Surgeon: Bj Steinberg MD;  Location:  [facility];  Service: Orthopedics;  Laterality: Right;   • THORACOSCOPY VIDEO ASSISTED WITH LOBECTOMY Right 4/12/2023    Procedure: BRONCHOSCOPY, THORACOSCOPY VIDEO ASSISTED WITH DECORTICATION, MECHANICAL PLEURODESIS;  Surgeon: Tim Mahan MD;  Location:  [facility];  Service: Cardiothoracic;  Laterality: Right;       Family History   Problem Relation Age of Onset   • Alzheimer's disease Mother    • Kidney failure Mother    • Cancer Father    • Heart failure Father        Social History     Socioeconomic History   • Marital status: Single   • Number of children: 0   Tobacco Use   • Smoking status: Former     Packs/day: 1.00     Years: 30.00     Pack years: 30.00     Types: Cigarettes     Quit date: 2013     Years since quitting: 10.4   • Smokeless tobacco: Never   • Tobacco comments:     quit 2015   Vaping Use   • Vaping Use: Never used   Substance and Sexual Activity   • Alcohol use:  Never   • Drug use: Never   • Sexual activity: Defer       Allergies   Allergen Reactions   • Other Unknown - Low Severity     Mercury metals- as a child         Medication:    Current Facility-Administered Medications:   •  acetaminophen (TYLENOL) tablet 650 mg, 650 mg, Oral, Q4H PRN, Jarrett Buenrostro MD  •  amiodarone (PACERONE) tablet 200 mg, 200 mg, Oral, Q12H, Jarrett Buenrostro MD, 200 mg at 05/29/23 0859  •  apixaban (ELIQUIS) tablet 5 mg, 5 mg, Oral, Q12H, Jarrett Buenrostro MD, 5 mg at 05/29/23 0859  •  aspirin chewable tablet 81 mg, 81 mg, Oral, Daily, Jarrett Buenrostro MD, 81 mg at 05/29/23 0858  •  atorvastatin (LIPITOR) tablet 80 mg, 80 mg, Oral, Nightly, Jarrett Buenrostro MD, 80 mg at 05/28/23 2016  •  sennosides-docusate (PERICOLACE) 8.6-50 MG per tablet 2 tablet, 2 tablet, Oral, BID, 2 tablet at 05/28/23 2016 **AND** polyethylene glycol (MIRALAX) packet 17 g, 17 g, Oral, Daily PRN **AND** [DISCONTINUED] bisacodyl (DULCOLAX) EC tablet 5 mg, 5 mg, Oral, Daily PRN **AND** bisacodyl (DULCOLAX) suppository 10 mg, 10 mg, Rectal, Daily PRN, Jarrett Buenrostro MD  •  Calcium Replacement - Follow Nurse / BPA Driven Protocol, , Does not apply, PRN, Jarrett Buenrostro MD  •  carvedilol (COREG) tablet 25 mg, 25 mg, Oral, BID With Meals, Jarrett Buenrostro MD, 25 mg at 05/29/23 0858  •  cyclobenzaprine (FLEXERIL) tablet 5 mg, 5 mg, Oral, TID PRN, Jarrett Buenrostro MD  •  DAPTOmycin (CUBICIN) 500 mg in sodium chloride 0.9 % 50 mL IVPB, 8 mg/kg, Intravenous, Q24H, Yvonne Foster DO, Last Rate: 100 mL/hr at 05/29/23 1144, 500 mg at 05/29/23 1144  •  dextrose (D50W) (25 g/50 mL) IV injection 50 mL, 50 mL, Intravenous, Q1H PRN, Jarrett Buenrostro MD, 50 mL at 05/25/23 0600  •  empagliflozin (JARDIANCE) tablet 10 mg, 10 mg, Oral, Daily, Jarrett Buenrostro MD, 10 mg at 05/29/23 0858  •  fluticasone (FLONASE) 50 MCG/ACT nasal spray 2 spray, 2 spray, Nasal, Daily, Jarrett Buenrostro MD, 2  spray at 05/29/23 0859  •  Insulin Lispro (humaLOG) injection 0-9 Units, 0-9 Units, Subcutaneous, 4x Daily With Meals & Nightly, Jarrett Buenrostro MD, 2 Units at 05/27/23 1159  •  ipratropium-albuterol (DUO-NEB) nebulizer solution 3 mL, 3 mL, Nebulization, Q6H PRN, Jarrett Buenrostro MD  •  lisinopril (PRINIVIL,ZESTRIL) tablet 10 mg, 10 mg, Oral, Q24H, Maddie Lindsey APRN, 10 mg at 05/29/23 0859  •  Magnesium Cardiology Dose Replacement - Follow Nurse / BPA Driven Protocol, , Does not apply, Porter DOTY Matthew M, MD  •  megestrol (MEGACE) 40 MG/ML suspension 400 mg, 400 mg, Oral, Daily, Yvonne Foster, DO, 400 mg at 05/29/23 0859  •  nitroglycerin (NITROSTAT) SL tablet 0.4 mg, 0.4 mg, Sublingual, Q5 Min PRN, Jarrett Buenrostro MD  •  ondansetron (ZOFRAN) injection 4 mg, 4 mg, Intravenous, Q6H PRN, Jarrett Buenrostro MD  •  pantoprazole (PROTONIX) EC tablet 40 mg, 40 mg, Oral, Q AM, Yvonne Foster, DO, 40 mg at 05/29/23 0518  •  Phosphorus Replacement - Follow Nurse / BPA Driven Protocol, , Does not apply, Porter DOTY Matthew M, MD  •  Potassium Replacement - Follow Nurse / BPA Driven Protocol, , Does not apply, PRPorter ARRIETA Matthew M, MD  •  sodium chloride 0.9 % flush 10 mL, 10 mL, Intravenous, PRN, Jarrett Buenrostro MD  •  sodium chloride 0.9 % flush 10 mL, 10 mL, Intravenous, Q12H, Jarrett Buenrostro MD, 10 mL at 05/29/23 0906  •  sodium chloride 0.9 % flush 10 mL, 10 mL, Intravenous, PRN, Jarrett Buenrostro MD  •  sodium chloride 0.9 % infusion 40 mL, 40 mL, Intravenous, PRN, Jarrett Buenrostro MD, 40 mL at 05/26/23 1149  •  terazosin (HYTRIN) capsule 1 mg, 1 mg, Oral, Daily, Jarrett Buenrostro MD, 1 mg at 05/29/23 0858    Antibiotics:  Anti-Infectives (From admission, onward)    Ordered     Dose/Rate Route Frequency Start Stop    05/23/23 0920  DAPTOmycin (CUBICIN) 500 mg in sodium chloride 0.9 % 50 mL IVPB        Ordering Provider: Yvonne Foster DO    8 mg/kg ×  65.2 kg  100 mL/hr over 30 Minutes Intravenous Every 24 Hours 23 1100 23 1037    23  piperacillin-tazobactam (ZOSYN) 3.375 g in iso-osmotic dextrose 50 ml (premix)        Ordering Provider: Brady Dumont DO    3.375 g  over 30 Minutes Intravenous Once 233    23  vancomycin 1250 mg/250 mL 0.9% NS IVPB ([facility])        Ordering Provider: Brady Dumont DO    20 mg/kg × 63.5 kg  over 90 Minutes Intravenous Once 23 0052            Review of Systems:  See HPI    Physical Exam:   Vital Signs  Temp (24hrs), Av °F (36.7 °C), Min:97.6 °F (36.4 °C), Max:98.4 °F (36.9 °C)    Temp  Min: 97.6 °F (36.4 °C)  Max: 98.4 °F (36.9 °C)  BP  Min: 127/66  Max: 175/87  Pulse  Min: 69  Max: 71  Resp  Min: 14  Max: 20  SpO2  Min: 90 %  Max: 92 %    GENERAL: resting quietly  in no acute distress.   HEENT: Normocephalic, atraumatic.  PERRL. EOMI. No conjunctival injection. No icterus.  No external oral lesions  HEART: RRR; No murmur  LUNGS: symmetrical bilaterally   ABDOMEN: Soft, nontender  EXT:  No edema  :  Without Rosenberg catheter.  MSK: No joint effusions or erythema  SKIN: Warm and dry without cutaneous eruptions on Inspection/palpation.  Ppm site without erythema or tenderness.       Laboratory Data    Results from last 7 days   Lab Units 23  0516 23  0630 23  0700   WBC 10*3/mm3 11.44* 11.04* 12.82*   HEMOGLOBIN g/dL 11.3* 10.8* 10.6*   HEMATOCRIT % 36.2* 34.4* 34.4*   PLATELETS 10*3/mm3 145 148 156     Results from last 7 days   Lab Units 23  0516   SODIUM mmol/L 139   POTASSIUM mmol/L 4.3   CHLORIDE mmol/L 103   CO2 mmol/L 21.0*   BUN mg/dL 15   CREATININE mg/dL 0.79   GLUCOSE mg/dL 88   CALCIUM mg/dL 8.9                     Results from last 7 days   Lab Units 23  1014   CK TOTAL U/L 14*         Estimated Creatinine Clearance: 76.3 mL/min (by C-G formula based on SCr of 0.79 mg/dL).      Microbiology:  Microbiology Results (last  10 days)     Procedure Component Value - Date/Time    Blood Culture - Blood, Arm, Left [[id number]]  (Normal) Collected: 05/22/23 1202    Lab Status: Final result Specimen: Blood from Arm, Left Updated: 05/27/23 1346     Blood Culture No growth at 5 days    Blood Culture - Blood, Hand, Left [[id number]]  (Normal) Collected: 05/22/23 1155    Lab Status: Final result Specimen: Blood from Hand, Left Updated: 05/27/23 1346     Blood Culture No growth at 5 days    Respiratory Culture - Sputum, ET Suction [[id number]] Collected: 05/22/23 0020    Lab Status: Final result Specimen: Sputum from ET Suction Updated: 05/24/23 1150     Respiratory Culture Moderate growth (3+) Normal respiratory nathan. No S. aureus or Pseudomonas aeruginosa detected. Final report.     Gram Stain Few (2+) WBCs per low power field      Occasional Epithelial cells per low power field      Few (2+) Budding yeast      Occasional Gram positive cocci    Legionella Antigen, Urine - Urine, Urine, Clean Catch [[id number]]  (Normal) Collected: 05/21/23 0240    Lab Status: Final result Specimen: Urine, Clean Catch Updated: 05/21/23 1356     LEGIONELLA ANTIGEN, URINE Negative    S. Pneumo Ag Urine or CSF - Urine, Urine, Clean Catch [[id number]]  (Normal) Collected: 05/21/23 0240    Lab Status: Final result Specimen: Urine, Clean Catch Updated: 05/21/23 1356     Strep Pneumo Ag Negative    Urine Culture - Urine, Straight Cath [[id number]]  (Normal) Collected: 05/21/23 0240    Lab Status: Final result Specimen: Urine from Straight Cath Updated: 05/22/23 0816     Urine Culture No growth    MRSA Screen, PCR (Inpatient) - Swab, Nares [[id number]]  (Normal) Collected: 05/20/23 0523    Lab Status: Final result Specimen: Swab from Nares Updated: 05/20/23 0736     MRSA PCR Negative    Narrative:      The negative predictive value of this diagnostic test is high and should only be used to consider de-escalating anti-MRSA therapy. A positive result may indicate colonization  with MRSA and must be correlated clinically.  MRSA Negative    Blood Culture - Blood, Wrist, Right [[id number]]  (Abnormal)  (Susceptibility) Collected: 05/19/23 2229    Lab Status: Final result Specimen: Blood from Wrist, Right Updated: 05/23/23 0627     Blood Culture Staphylococcus epidermidis     Isolated from Aerobic and Anaerobic Bottles     Gram Stain Aerobic Bottle Gram positive cocci in groups      Anaerobic Bottle Gram positive cocci in groups    Susceptibility      Staphylococcus epidermidis      JOSEPH      Oxacillin Resistant     Vancomycin Susceptible                       Susceptibility Comments     Staphylococcus epidermidis    This isolate does not demonstrate inducible clindamycin resistance in vitro.               Blood Culture - Blood, Hand, Right [[id number]]  (Abnormal) Collected: 05/19/23 2229    Lab Status: Final result Specimen: Blood from Hand, Right Updated: 05/23/23 0627     Blood Culture Staphylococcus epidermidis     Isolated from Aerobic and Anaerobic Bottles     Gram Stain Aerobic Bottle Gram positive cocci in groups      Anaerobic Bottle Gram positive cocci in groups    Narrative:      Refer to previous blood culture collected on 05/19/2023 2229 for MICs    Blood Culture ID, PCR - Blood, Wrist, Right [[id number]]  (Abnormal) Collected: 05/19/23 2229    Lab Status: Final result Specimen: Blood from Wrist, Right Updated: 05/21/23 1005     BCID, PCR Staph spp, not aureus or lugdunensis. Identification by BCID2 PCR.                Radiology:  Imaging Results (Last 72 Hours)     ** No results found for the last 72 hours. **            Impression:   Left lower lobe infiltrate from infarction versus infection  Left lower lobe pulmonary embolism  Leukocytosis with neutrophilia  High-grade positive blood culture for staph coccus epidermidis, follow up blood cultures negative.   Elevated procalcitonin  Pacemaker infection  Right atrial endocarditis    PLAN/RECOMMENDATIONS:     Thank you for asking us  to see Timmy Guajardo, I recommend the following:    Given the presence indwelling endovascular hardware staphylococcus epidermidis bacteremia is concerning    High-grade positive blood cultures and indwelling hardware probably warrants further investigation    Patient did have leukocytosis and markedly elevated procalcitonin    Having both a pulmonary embolism and a bacterial pneumonia unless from a septic emboli would be less expected.    Appreciate cardiology assistance if patient has pacemaker infection we need at least consider the possibility and feasibility of removal of endovascular hardware.    If this can be removed this will increase chance for clinical cure    angiovac resection fo atrial mass vs endovascular hardware extraction      Continue daptomycin 6 mg/kg every 24 hours may extend this length of treatment to up to 6 weeks  Follow-up repeated blood cultures from 5/22 remain no growth at 5 days  Reasonable to follow procalcitonin to see if this is descending        Copied text in this note has been reviewed and is accurate as of 05/29/23    Travon Brito MD saw and examined patient, verified hx and PE, read pertinent radiographic studies, reviewed labs and micro data, and formulated dx, plan for treatment and all medical decision making.      FUNMI BlountC for MD Doc Abreu PA  5/29/2023  12:58 EDT

## 2023-05-29 NOTE — PROGRESS NOTES
* No surgery found *       LOS: 10 days   Patient Care Team:  Arsen Seals APRN as PCP - General (Family Medicine)    Chief complaint: Bacteremia    Subjective  Resting in bed comfortably    Objective    Vital Signs  Temp:  [97.6 °F (36.4 °C)-98.4 °F (36.9 °C)] 98.4 °F (36.9 °C)  Heart Rate:  [69-71] 70  Resp:  [14-18] 18  BP: (144-179)/(74-86) 149/74    Physical Exam:  Frail thin male   General Appearance: alert, appears stated age and cooperative   Lungs: clear bilaterally   Heart: Regular rate and rhythm        Results     Results from last 7 days   Lab Units 05/29/23  0516   WBC 10*3/mm3 11.44*   HEMOGLOBIN g/dL 11.3*   HEMATOCRIT % 36.2*   PLATELETS 10*3/mm3 145     Results from last 7 days   Lab Units 05/28/23  0630   SODIUM mmol/L 138   POTASSIUM mmol/L 4.1   CHLORIDE mmol/L 103   CO2 mmol/L 21.0*   BUN mg/dL 14   CREATININE mg/dL 0.84   GLUCOSE mg/dL 83   CALCIUM mg/dL 9.0         Assessment  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

## 2023-05-29 NOTE — PROGRESS NOTES
Cardiology Progress Note      Reason for visit:    Tricuspid valve and pacemaker lead vegetation  Paroxysmal atrial fibrillation  Systolic heart failure    IDENTIFICATION: 72-year-old gentleman who resides in [place], [place]      Active Hospital Problems    Diagnosis  POA    **Pneumonia of left lower lobe due to infectious organism [J18.9]  Yes     Priority: High    Acute infective endocarditis [I33.0]  Yes     Priority: High     KAT (5/25/2023): Large mobile mass is associated with ICD leads and tricuspid valve consistent with endocarditis      Cardiac resynchronization therapy defibrillator (CRT-D) in place [Z95.810]  Yes     Priority: Medium     [employer] CRT ICD implanted by Dr. Shoen at [facility], 2015  Echo (5/25/2023): Large mobile mass is associated with ICD leads consistent with vegetation      Paroxysmal atrial fibrillation [I48.0]  No     Priority: Low     Chads Vasc= 7      Severe Malnutrition (HCC) [E43]  Yes    Acute respiratory failure with hypoxia [J96.01]  Yes    Shock, likely multifactorial [R57.9]  Yes    Pulmonary embolus [I26.99]  Yes     CT angiogram (5/19/2023): Left lower lobe pulmonary emboli      GERD (gastroesophageal reflux disease) [K21.9]  Yes    Hyperlipidemia [E78.5]  Yes    HFrEF (heart failure with reduced ejection fraction) [I50.20]  Yes     Echo (5/5/2021): LVEF 25%  Echo (10/24/2022): LVEF30%  Echo (5/22/2023): LVEF 45%. EF appears improved from previous with persistent stable regional wall motion abnormality of the inferolateral wall      HTN [I10]  Yes            No complaints           Vital Sign Min/Max for last 24 hours  Temp  Min: 97.6 °F (36.4 °C)  Max: 98.4 °F (36.9 °C)   BP  Min: 144/80  Max: 175/87   Pulse  Min: 69  Max: 71   Resp  Min: 14  Max: 20   SpO2  Min: 90 %  Max: 92 %   Flow (L/min)  Min: 1  Max: 1      Intake/Output Summary (Last 24 hours) at 5/29/2023 1201  Last data filed at 5/29/2023 0340  Gross per 24 hour   Intake --   Output 1250 ml   Net -1250  ml             Physical Exam  Constitutional:       General: He is awake.   Cardiovascular:      Rate and Rhythm: Normal rate and regular rhythm.   Pulmonary:      Effort: Pulmonary effort is normal.      Breath sounds: Normal breath sounds.   Musculoskeletal:      Right lower leg: No edema.      Left lower leg: No edema.   Skin:     General: Skin is warm and dry.   Neurological:      Mental Status: He is alert and oriented to person, place, and time.   Psychiatric:         Behavior: Behavior is cooperative.       Tele: Paced    Results Review (reviewed the patient's recent labs in the electronic medical record):     EKG (5/21/2023): Paced    CXR (5/22/2023): Stable bilateral pleural effusions    KAT (5/25/2023): Vegetation on pacer leads and tricuspid valve.  LVEF 45%    Results from last 7 days   Lab Units 05/29/23  0516 05/28/23  0630 05/27/23  0700 05/26/23  2153 05/26/23  1014 05/25/23  0501 05/24/23  0307 05/23/23  0432   SODIUM mmol/L 139 138 138  --  142 145 142 136   POTASSIUM mmol/L 4.3 4.1 4.3   < > 3.5 3.8 4.1 4.9   CHLORIDE mmol/L 103 103 105  --  106 109* 110* 104   BUN mg/dL 15 14 16  --  17 26* 29* 30*   CREATININE mg/dL 0.79 0.84 0.87  --  0.95 1.22 1.25 1.48*   MAGNESIUM mg/dL  --   --  2.5*  --  1.9 2.2  --  2.6*    < > = values in this interval not displayed.           Results from last 7 days   Lab Units 05/29/23  0516 05/28/23  0630 05/27/23  0700   WBC 10*3/mm3 11.44* 11.04* 12.82*   HEMOGLOBIN g/dL 11.3* 10.8* 10.6*   HEMATOCRIT % 36.2* 34.4* 34.4*   PLATELETS 10*3/mm3 145 148 156         Lab Results   Component Value Date    HGBA1C 6.30 (H) 03/25/2023       Lab Results   Component Value Date    CHOL 187 05/05/2021    TRIG 138 05/05/2021    HDL 38 (L) 05/05/2021     (H) 05/05/2021              Endocarditis involving tricuspid valve/ICD leads  ID following and recommends 6 weeks IV antibiotic  High risk for lead extraction given large size vegetation associated with ICD leads  Patient  probably needs AngioVac removal of vegetations.  Uncertain whether his can be performed at [facility].  CTS to discuss with Dr. Mahan     Chronic HFrEF  Stable, euvolemic  Continue carvedilol, lisinopril, empagliflozin     Paroxysmal atrial fibrillation  Maintaining sinus on amiodarone  Continue apixaban for stroke prophylaxis     Pulmonary embolus, probably septic  CT angiogram shows left lower lobe pulmonary emboli           Continue IV antibiotics  AngioVasc vegectomy recommended.  Unclear whether CT surgery has this capacity.  May need transfer to [facility]. . Spike Costa MD FAC, [facility]  Interventional and General Cardiology

## 2023-05-29 NOTE — PLAN OF CARE
"Goal Outcome Evaluation:              Outcome Evaluation: VSS; Pt O2 > 90% on 2L NC. Pt with little appetite.  Pt requested to have diabetic fliendly chocolate \"Boost\" added to his meal plan; dietician notified.  Pt resting comfortably in bed at this time.  Will continue to monitor.         "

## 2023-05-29 NOTE — PROGRESS NOTES
[facility] Medicine Services  PROGRESS NOTE    Patient Name: Timmy Guajardo  : 1950  MRN: [id number]    Date of Admission: 2023  Primary Care Physician: Arsen Seals APRN    Subjective   Subjective     CC:  F/U vegetation TV/pacer lead    HPI:  Resting in bed in no acute distress and does not have any specific complaints.  Overall patient is comfortable.    ROS:  Gen- No fevers, chills  CV- No chest pain, palpitations  Resp- No cough, dyspnea  GI- No N/V/D, abd pain    Objective   Objective     Vital Signs:   Temp:  [97.6 °F (36.4 °C)-98.4 °F (36.9 °C)] 97.8 °F (36.6 °C)  Heart Rate:  [69-71] 70  Resp:  [14-20] 20  BP: (127-175)/(66-87) 127/66  Flow (L/min):  [1-2] 2     Physical Exam:  Constitutional: No acute distress  HENT: NCAT, mucous membranes moist  Respiratory: Clear to auscultation bilaterally, respiratory effort normal 1L NC  Cardiovascular: RRR, No rubs or gallops  Gastrointestinal: Positive bowel sounds, soft, nontender, nondistended  Musculoskeletal: No bilateral ankle edema, low muscle mass.  Psychiatric: Appropriate affect, cooperative  Neurologic: Awake, alert, oriented x3, no focality appreciated speech clear  Skin: No rashes    Results Reviewed:  LAB RESULTS:      Lab 23  0516 23  0630 23  0700 23  1020 23  1636 23  0307 23  2020 23  1300 23  0432   WBC 11.44* 11.04* 12.82* 10.77 8.66 17.84*  --   --  20.17*   HEMOGLOBIN 11.3* 10.8* 10.6* 10.2* 10.6* 8.6*  --   --  8.4*   HEMATOCRIT 36.2* 34.4* 34.4* 32.9* 34.9* 27.2*  --   --  27.9*   PLATELETS 145 148 156 193 232 213  --   --  188   NEUTROS ABS  --   --  10.66* 9.19* 7.09* 15.85*  --   --  18.41*   IMMATURE GRANS (ABS)  --   --  0.20* 0.10* 0.14* 0.20*  --   --  0.35*   LYMPHS ABS  --   --  1.10 0.77 0.81 1.03  --   --  0.74   MONOS ABS  --   --  0.80 0.66 0.60 0.74  --   --  0.65   EOS ABS  --   --  0.04 0.03 0.01 0.00  --   --  0.00   MCV 96.5  97.2* 97.2* 96.8 99.1* 97.8*  --   --  103.7*   APTT  --   --   --   --   --  93.9* 94.9* 92.0* 49.1*   HEPARIN ANTI-XA  --   --   --   --   --   --   --  >1.10*  --          Lab 05/29/23  0516 05/28/23  0630 05/27/23  0700 05/26/23  2153 05/26/23  1014 05/25/23  0501 05/24/23  0307 05/23/23  0432   SODIUM 139 138 138  --  142 145   < > 136   POTASSIUM 4.3 4.1 4.3 4.3 3.5 3.8   < > 4.9   CHLORIDE 103 103 105  --  106 109*   < > 104   CO2 21.0* 21.0* 20.0*  --  21.0* 22.0   < > 17.0*   ANION GAP 15.0 14.0 13.0  --  15.0 14.0   < > 15.0   BUN 15 14 16  --  17 26*   < > 30*   CREATININE 0.79 0.84 0.87  --  0.95 1.22   < > 1.48*   EGFR 95.6 93.8 92.8  --  86.1 63.8   < > 50.6*   GLUCOSE 88 83 106*  --  158* 69   < > 131*   CALCIUM 8.9 9.0 8.5*  --  9.0 9.2   < > 8.3*   MAGNESIUM  --   --  2.5*  --  1.9 2.2  --  2.6*   PHOSPHORUS  --   --   --   --  3.5  --   --   --     < > = values in this interval not displayed.                         Lab 05/24/23  0341 05/23/23  0320   PH, ARTERIAL 7.375 7.332*   PCO2, ARTERIAL 36.1 36.5   PO2 ART 83.2 91.2   FIO2 28 28   HCO3 ART 21.1 19.3*   BASE EXCESS ART -3.7* -6.0*   CARBOXYHEMOGLOBIN 1.1 1.2     Brief Urine Lab Results  (Last result in the past 365 days)      Color   Clarity   Blood   Leuk Est   Nitrite   Protein   CREAT   Urine HCG        05/21/23 0240 Yellow   Turbid   Negative   Negative   Negative   30 mg/dL (1+)                 Microbiology Results Abnormal     Procedure Component Value - Date/Time    Blood Culture - Blood, Hand, Left [[id number]]  (Normal) Collected: 05/22/23 1155    Lab Status: Final result Specimen: Blood from Hand, Left Updated: 05/27/23 1346     Blood Culture No growth at 5 days    Blood Culture - Blood, Arm, Left [[id number]]  (Normal) Collected: 05/22/23 1202    Lab Status: Final result Specimen: Blood from Arm, Left Updated: 05/27/23 1346     Blood Culture No growth at 5 days    Respiratory Culture - Sputum, ET Suction [[id number]] Collected:  05/22/23 0020    Lab Status: Final result Specimen: Sputum from ET Suction Updated: 05/24/23 1150     Respiratory Culture Moderate growth (3+) Normal respiratory nathan. No S. aureus or Pseudomonas aeruginosa detected. Final report.     Gram Stain Few (2+) WBCs per low power field      Occasional Epithelial cells per low power field      Few (2+) Budding yeast      Occasional Gram positive cocci    Urine Culture - Urine, Straight Cath [[id number]]  (Normal) Collected: 05/21/23 0240    Lab Status: Final result Specimen: Urine from Straight Cath Updated: 05/22/23 0816     Urine Culture No growth    S. Pneumo Ag Urine or CSF - Urine, Urine, Clean Catch [[id number]]  (Normal) Collected: 05/21/23 0240    Lab Status: Final result Specimen: Urine, Clean Catch Updated: 05/21/23 1356     Strep Pneumo Ag Negative    Legionella Antigen, Urine - Urine, Urine, Clean Catch [[id number]]  (Normal) Collected: 05/21/23 0240    Lab Status: Final result Specimen: Urine, Clean Catch Updated: 05/21/23 1356     LEGIONELLA ANTIGEN, URINE Negative    MRSA Screen, PCR (Inpatient) - Swab, Nares [[id number]]  (Normal) Collected: 05/20/23 0523    Lab Status: Final result Specimen: Swab from Nares Updated: 05/20/23 0736     MRSA PCR Negative    Narrative:      The negative predictive value of this diagnostic test is high and should only be used to consider de-escalating anti-MRSA therapy. A positive result may indicate colonization with MRSA and must be correlated clinically.  MRSA Negative          No radiology results from the last 24 hrs    Results for orders placed during the hospital encounter of 05/19/23    Adult Transesophageal Echo (KAT) W/ Cont if Necessary Per Protocol    Interpretation Summary  •  There is a large 2.9 x 1.7 cm mass in the right atrium which appears adherent to the atrial aspect of one of the 2 pacemaker leads. The edges are smooth with some peripheral hyperechoic regions. This appears to be more consistent with older  thrombus than acute vegetation. Does move in and out of the tricuspid valve plane with the cardiac cycle.  •  There is a moderate-sized (1.7 mm) mobile mass on the tricuspid valve that is consistent with a vegetation.Mild tricuspid valve regurgitation is present. No evidence of significant tricuspid valve stenosis is present. There is a at least 1.7 cm x 0.6 cm highly mobile vegetation on the tricuspid valve. The attachment point is difficult to discern due to artifact from the surrounding pacemaker wires however it is freely prolapsing across the valve throughout the cardiac cycle.  •  Left ventricular systolic function is mildly decreased. Estimated left ventricular EF = 45%  •  Left ventricular systolic function is mildly decreased. Estimated left ventricular EF = 45% Normal left ventricular cavity size and wall thickness noted. There is left ventricular global hypokinesis noted. Left ventricular diastolic function is consistent with (grade I) impaired relaxation.      Current medications:  Scheduled Meds:amiodarone, 200 mg, Oral, Q12H  apixaban, 5 mg, Oral, Q12H  aspirin, 81 mg, Oral, Daily  atorvastatin, 80 mg, Oral, Nightly  carvedilol, 25 mg, Oral, BID With Meals  DAPTOmycin, 8 mg/kg, Intravenous, Q24H  empagliflozin, 10 mg, Oral, Daily  fluticasone, 2 spray, Nasal, Daily  Insulin Lispro, 0-9 Units, Subcutaneous, 4x Daily With Meals & Nightly  lisinopril, 10 mg, Oral, Q24H  megestrol, 400 mg, Oral, Daily  pantoprazole, 40 mg, Oral, Q AM  senna-docusate sodium, 2 tablet, Oral, BID  sodium chloride, 10 mL, Intravenous, Q12H  terazosin, 1 mg, Oral, Daily      Continuous Infusions:   PRN Meds:.•  acetaminophen  •  senna-docusate sodium **AND** polyethylene glycol **AND** [DISCONTINUED] bisacodyl **AND** bisacodyl  •  Calcium Replacement - Follow Nurse / BPA Driven Protocol  •  cyclobenzaprine  •  dextrose  •  ipratropium-albuterol  •  Magnesium Cardiology Dose Replacement - Follow Nurse / BPA Driven Protocol  •   nitroglycerin  •  ondansetron  •  Phosphorus Replacement - Follow Nurse / BPA Driven Protocol  •  Potassium Replacement - Follow Nurse / BPA Driven Protocol  •  sodium chloride  •  sodium chloride  •  sodium chloride    Assessment & Plan   Assessment & Plan     Active Hospital Problems    Diagnosis  POA   • **Pneumonia of left lower lobe due to infectious organism [J18.9]  Yes   • Severe Malnutrition (HCC) [E43]  Yes   • Acute infective endocarditis [I33.0]  Yes   • Acute respiratory failure with hypoxia [J96.01]  Yes   • Shock, likely multifactorial [R57.9]  Yes   • Paroxysmal atrial fibrillation [I48.0]  No   • Pulmonary embolus [I26.99]  Yes   • GERD (gastroesophageal reflux disease) [K21.9]  Yes   • Hyperlipidemia [E78.5]  Yes   • HFrEF (heart failure with reduced ejection fraction) [I50.20]  Yes   • HTN [I10]  Yes   • Cardiac resynchronization therapy defibrillator (CRT-D) in place [Z95.810]  Yes      Resolved Hospital Problems    Diagnosis Date Resolved POA   • Cardiomyopathy [I42.9] 05/28/2023 Yes   • Hypotension [I95.9] 05/28/2023 Yes   • Confusion [R41.0] 05/28/2023 Yes        Brief Hospital Course to date:  Timmy Guajardo is a 72 y.o. male with PMHx significant for PE on Eliquis, HTN, dyslipidemia, HFrEF (30% on 10/2022), T2DM, CVA with right-sided residual weakness, and GERD.  He was admitted to the ICU on 5/21/2023 as a transfer from the floor.  He was initially admitted with a pulmonary embolism and a left lower lobe pneumonia versus infarct on 5/19/2023.  The morning of 5/21 he suddenly went into A-fib with RVR became hypotensive with prompted transition to the ICU. He underwent KAT 5/25 due to bacteremia and found to have vegetation on pacer wire and TV. Transferred to the floor 5/25.     This patient's problems and plans were partially entered by my partner and updated as appropriate by me 05/29/23.    A fib RVR-->Resolved  -Continue Coreg, Amiodarone   -On Eliquis  -Cardiology following    Vikki  epidermidis Bacteremia  Vegetation pacer wire/Tricusupid valve   -KAT 5/25 with vegetation noted on pacer lead and TV  -CTS following for possible surgical intervention   -ID following, Continue Daptomycin daily, likely 6 weeks   -EP following, Dr Quintana. High risk for transvenous laser extraction and possible transvenous removal of extremely large vegetation adhering both to lead and tricuspid valve.  -Dr. Mueller, Dr. Quintana and CT surgery to determine whether percutaneous vegectomy is feasible.  -Repeat blood Cx NGTD    Hx PE  -On Eliquis, may need to place on heparin gtt pending timing of procedures (defer to CTS/EP)     HTN  -Continue Lisinopril    HFrEF  -Continue Coreg  -Continue Jardiance     GERD  -Continue PPI    Chronic Severe Malnutrition   -Continue Megace  -RD following     CVC in place  -Peripheral IV in place but patient very hard stick. Leave CVC in place until PICC placed as patient will need long term ABX.       Expected Discharge Location and Transportation: [facility]  Expected Discharge   Expected Discharge Date: 5/31/2023; Expected Discharge Time:      DVT prophylaxis:  Medical DVT prophylaxis orders are present.     AM-PAC 6 Clicks Score (PT): 12 (05/29/23 0840)    CODE STATUS:   Code Status and Medical Interventions:   Ordered at: 05/20/23 0011     Level Of Support Discussed With:    Patient     Code Status (Patient has no pulse and is not breathing):    CPR (Attempt to Resuscitate)     Medical Interventions (Patient has pulse or is breathing):    Full Support       Trell Leonardo MD  05/29/23

## 2023-05-30 LAB
GLUCOSE BLDC GLUCOMTR-MCNC: 103 MG/DL (ref 70–130)
GLUCOSE BLDC GLUCOMTR-MCNC: 139 MG/DL (ref 70–130)
GLUCOSE BLDC GLUCOMTR-MCNC: 139 MG/DL (ref 70–130)
GLUCOSE BLDC GLUCOMTR-MCNC: 148 MG/DL (ref 70–130)

## 2023-05-30 PROCEDURE — 97110 THERAPEUTIC EXERCISES: CPT

## 2023-05-30 PROCEDURE — 99232 SBSQ HOSP IP/OBS MODERATE 35: CPT | Performed by: INTERNAL MEDICINE

## 2023-05-30 PROCEDURE — 82948 REAGENT STRIP/BLOOD GLUCOSE: CPT

## 2023-05-30 PROCEDURE — 25010000002 DAPTOMYCIN PER 1 MG: Performed by: FAMILY MEDICINE

## 2023-05-30 PROCEDURE — 97530 THERAPEUTIC ACTIVITIES: CPT

## 2023-05-30 RX ADMIN — FLUTICASONE PROPIONATE 2 SPRAY: 50 SPRAY, METERED NASAL at 09:23

## 2023-05-30 RX ADMIN — EMPAGLIFLOZIN 10 MG: 10 TABLET, FILM COATED ORAL at 09:24

## 2023-05-30 RX ADMIN — LISINOPRIL 10 MG: 10 TABLET ORAL at 09:24

## 2023-05-30 RX ADMIN — MEGESTROL ACETATE 400 MG: 40 SUSPENSION ORAL at 09:23

## 2023-05-30 RX ADMIN — CARVEDILOL 25 MG: 12.5 TABLET, FILM COATED ORAL at 10:14

## 2023-05-30 RX ADMIN — APIXABAN 5 MG: 5 TABLET, FILM COATED ORAL at 21:59

## 2023-05-30 RX ADMIN — APIXABAN 5 MG: 5 TABLET, FILM COATED ORAL at 09:24

## 2023-05-30 RX ADMIN — AMIODARONE HYDROCHLORIDE 200 MG: 200 TABLET ORAL at 21:59

## 2023-05-30 RX ADMIN — DAPTOMYCIN 500 MG: 500 INJECTION, POWDER, LYOPHILIZED, FOR SOLUTION INTRAVENOUS at 12:20

## 2023-05-30 RX ADMIN — ATORVASTATIN CALCIUM 80 MG: 40 TABLET, FILM COATED ORAL at 21:58

## 2023-05-30 RX ADMIN — AMIODARONE HYDROCHLORIDE 200 MG: 200 TABLET ORAL at 09:24

## 2023-05-30 RX ADMIN — CARVEDILOL 25 MG: 12.5 TABLET, FILM COATED ORAL at 17:56

## 2023-05-30 RX ADMIN — Medication 10 ML: at 22:00

## 2023-05-30 RX ADMIN — PANTOPRAZOLE SODIUM 40 MG: 40 TABLET, DELAYED RELEASE ORAL at 06:50

## 2023-05-30 RX ADMIN — ASPIRIN 81 MG 81 MG: 81 TABLET ORAL at 09:24

## 2023-05-30 RX ADMIN — TERAZOSIN HYDROCHLORIDE 1 MG: 1 CAPSULE ORAL at 09:24

## 2023-05-30 RX ADMIN — Medication 10 ML: at 09:25

## 2023-05-30 NOTE — PLAN OF CARE
Goal Outcome Evaluation:  Plan of Care Reviewed With: patient        Progress: declining  Outcome Evaluation: Limited activity tolerance today; required significant increased edu, motivation, and encouragement for participation.  Participated in static/dynamic sitting balance activities with CGA/Bonnie.  Con to demonstrate decreased functional endurance, activity tolerance, strength, and balance compared to baseline level of function.  Con to progress pt as able per PT POC.  Rec SNF upon d/c.

## 2023-05-30 NOTE — PROGRESS NOTES
Cardiology Inpatient Progress Note    Timmy Guajardo  1950  [id number]    Reason for visit:    · Tricuspid valve and pacemaker lead vegetation  · Paroxysmal atrial fibrillation  · Systolic heart failure    IDENTIFICATION: 72-year-old gentleman who resides in [place], [place]    PROBLEM LIST:  CARDIAC  Coronary Artery Disease:   [facility], data unknown apparently normal     Myocardium:   LVEF multiple echoes around 30s  Echo, 5/23/2023: LVEF 45%, G2 DD, RV dilated     Valvular:  Mild TR, mild MR     Electrical:   A-fib  [employer] ICD  KAT, 5/25/2023: Vegetation/mass on pacer lead/tricuspid valve     Percardium:   Small pericardial effusion     VASCULAR:  Arterial  Cerebrovascular disease:   History of CVA with right-sided deficit  Negative saline test     Peripheral vascular disease:   ABIs, 5/6/2021: Left RADHA 0.67 severe stenosis of SFA.  Angioplasty L SFA  Left great toe amputation     AAA:   Mild dilatation of ascending aorta  Infrarenal AAA 3.2     Venous:  History of pulmonary embolisms        CARDIAC RISK FACTORS:         Hypertension         Dyslipidemia:          Tobacco Use: Former Smoker     NON-CARDIAC:  COVID-19  GERD  Gangrene of left toe of the foot  Pleural effusion     SURGERIES:  Left digit amputation  Bilateral hernia repair  Right hip repair  Thoracoscopy with lobectomy       Subjective:             Vital Sign Min/Max for last 24 hours  Temp  Min: 97.6 °F (36.4 °C)  Max: 98.2 °F (36.8 °C)   BP  Min: 125/61  Max: 163/78   Pulse  Min: 69  Max: 71   Resp  Min: 16  Max: 20   SpO2  Min: 90 %  Max: 93 %   Flow (L/min)  Min: 0  Max: 2      Intake/Output Summary (Last 24 hours) at 5/30/2023 1012  Last data filed at 5/30/2023 0900  Gross per 24 hour   Intake 480 ml   Output 1150 ml   Net -670 ml           Constitutional:       Appearance: Healthy appearance. Not in distress.   Eyes:      Conjunctiva/sclera: Conjunctivae normal.   HENT:    Mouth/Throat:      Pharynx: Oropharynx is clear.   Neck:       Vascular: JVD normal.   Pulmonary:      Effort: Pulmonary effort is normal.      Breath sounds: No wheezing. No rhonchi. No rales.   Cardiovascular:      Normal rate. Regular rhythm.      Murmurs: There is no murmur.      No rub.   Pulses:     Intact distal pulses.   Edema:     Peripheral edema absent.   Abdominal:      General: There is no distension.   Musculoskeletal:         General: No deformity. Skin:     General: Skin is warm and dry.   Neurological:      General: No focal deficit present.      Mental Status: Alert and oriented to person, place and time.       Tele: Paced    Results Review (reviewed the patient's recent labs in the electronic medical record):     EKG (5/21/2023): Paced    CXR (5/22/2023): Stable bilateral pleural effusions    KAT (5/25/2023): Vegetation on pacer leads and tricuspid valve.  LVEF 45%    Results from last 7 days   Lab Units 05/29/23  0516 05/28/23  0630 05/27/23  0700 05/26/23  2153 05/26/23  1014 05/25/23  0501 05/24/23  0307   SODIUM mmol/L 139 138 138  --  142 145 142   POTASSIUM mmol/L 4.3 4.1 4.3   < > 3.5 3.8 4.1   CHLORIDE mmol/L 103 103 105  --  106 109* 110*   BUN mg/dL 15 14 16  --  17 26* 29*   CREATININE mg/dL 0.79 0.84 0.87  --  0.95 1.22 1.25   MAGNESIUM mg/dL  --   --  2.5*  --  1.9 2.2  --     < > = values in this interval not displayed.         Results from last 7 days   Lab Units 05/29/23  0516 05/28/23  0630 05/27/23  0700   WBC 10*3/mm3 11.44* 11.04* 12.82*   HEMOGLOBIN g/dL 11.3* 10.8* 10.6*   HEMATOCRIT % 36.2* 34.4* 34.4*   PLATELETS 10*3/mm3 145 148 156       Lab Results   Component Value Date    HGBA1C 6.30 (H) 03/25/2023       Lab Results   Component Value Date    CHOL 187 05/05/2021    TRIG 138 05/05/2021    HDL 38 (L) 05/05/2021     (H) 05/05/2021            Assessment:     Endocarditis involving tricuspid valve/ICD leads  · ID following and recommends 6 weeks IV antibiotic  · High risk for lead extraction given large size vegetation  associated with ICD leads  · Patient probably needs AngioVac removal of vegetations, Dr. Mueller looking into whether he can perform this while inpatient otherwise patient will  need to be transferred to [facility].     Chronic HFrEF  · Stable, euvolemic  · Continue carvedilol, lisinopril, empagliflozin     Paroxysmal atrial fibrillation  · Maintaining sinus on amiodarone  · Continue apixaban for stroke prophylaxis     Pulmonary embolus, probably septic  · CT angiogram shows left lower lobe pulmonary emboli         Plan:    · Continue IV antibiotics  · AngioVac vegectomy recommended and Dr. Mueller looking into whether this can be performed at [facility].  If not patient will need to be transferred to [facility].      STAFF CARDIOLOGIST:      SUMMARY:    1. 78 years old gentleman admitted with infective endocarditis with thrombus/vegetation on pacemaker leads.      PERTINENT:    1. Vegetations on lead/tricuspid valve  2. EF around 40-45%  3. Left lower lobe emboli      IMPRESSION:    1. Infective leads with vegetation      RECOMMENDATIONS:    1. I discussed the case with the surgeon and our electrophysiologist and I think at this time we will transfer patient to  with Dr. Pitts.  I have spoken with Dr. Pitts and we will get the process rolling.        I have seen and examined the patient, reviewed the above note, necessary changes were made and I agree with the final note.   Jarrett Mueller MD

## 2023-05-30 NOTE — CASE MANAGEMENT/SOCIAL WORK
"Continued Stay Note  [facility]     Patient Name: Timmy Guajardo  MRN: [id number]  Today's Date: 5/30/2023    Admit Date: 5/19/2023    Plan: TBD   Discharge Plan     Row Name 05/30/23 1526       Plan    Plan TBD    Plan Comments Per Provider Note, \"Cardiology recommending angiovac vegectomy-recommend transfer to [facility] for angiovac\".  Per Josee with [facility], They do not have Mr. Guajardo listed yet as waiting for bed availability.  CM will cont to follow the evolving plan of care, await  decision  and assist with discharge needs as recommendations become available.    Final Discharge Disposition Code 30 - still a patient               Discharge Codes    No documentation.               Expected Discharge Date and Time     Expected Discharge Date Expected Discharge Time    May 31, 2023             Violeta Mccabe RN    "

## 2023-05-30 NOTE — PROGRESS NOTES
* No surgery found *       LOS: 11 days   Patient Care Team:  Arsen Seals APRN as PCP - General (Family Medicine)  Tim Mahan MD as Surgeon (Cardiothoracic Surgery)    Chief complaint: Bacteremia    Subjective  No acute events overnight. Denies chest pain or shortness of breath.     Objective    Vital Signs  Temp:  [97.6 °F (36.4 °C)-98.2 °F (36.8 °C)] 98 °F (36.7 °C)  Heart Rate:  [69-71] 70  Resp:  [16-20] 18  BP: (125-175)/(61-87) 153/83    Physical Exam:  Frail thin male   General Appearance: alert, appears stated age and cooperative   Lungs: clear bilaterally   Heart: Regular rate and rhythm        Results     Results from last 7 days   Lab Units 05/29/23  0516   WBC 10*3/mm3 11.44*   HEMOGLOBIN g/dL 11.3*   HEMATOCRIT % 36.2*   PLATELETS 10*3/mm3 145     Results from last 7 days   Lab Units 05/29/23  0516   SODIUM mmol/L 139   POTASSIUM mmol/L 4.3   CHLORIDE mmol/L 103   CO2 mmol/L 21.0*   BUN mg/dL 15   CREATININE mg/dL 0.79   GLUCOSE mg/dL 88   CALCIUM mg/dL 8.9         Assessment  Tricuspid valve vegetation with bacteremia and right atrial mass      Plan   Blood thinners for pulmonary embolus  6 weeks of IV antibiotics  Cardiology recommending angiovac vegectomy-recommend transfer to  for angiovac. Discussed with patient and Dr. Costa  We will sign off and be available as needed        YUE Damian  05/30/23  07:35 EDT

## 2023-05-30 NOTE — PLAN OF CARE
Goal Outcome Evaluation:  Plan of Care Reviewed With: patient        Progress: no change  Outcome Evaluation: No changes overnight, VSS and on room air. 1 lumen of R IJ functioning, needs PICC line. Treatment plan and discharge plan pending.

## 2023-05-30 NOTE — PROGRESS NOTES
[facility] Medicine Services  PROGRESS NOTE    Patient Name: Timmy Guajardo  : 1950  MRN: [id number]    Date of Admission: 2023  Primary Care Physician: Arsen Seals APRN    Subjective   Subjective     CC:  F/U vegetation TV/pacer lead    HPI:  Resting in bed in no acute distress and does not have any specific complaints.  Overall patient is comfortable.    ROS:  Gen- No fevers, chills  CV- No chest pain, palpitations  Resp- No cough, dyspnea  GI- No N/V/D, abd pain    Objective   Objective     Vital Signs:   Temp:  [97.6 °F (36.4 °C)-98.2 °F (36.8 °C)] 98.2 °F (36.8 °C)  Heart Rate:  [69-71] 71  Resp:  [14-18] 16  BP: (124-163)/(61-83) 124/66     Physical Exam:  Constitutional: No acute distress  HENT: NCAT, mucous membranes moist  Respiratory: Clear to auscultation bilaterally, respiratory effort normal 1L NC  Cardiovascular: RRR, No rubs or gallops  Gastrointestinal: Positive bowel sounds, soft, nontender, nondistended  Musculoskeletal: No bilateral ankle edema, low muscle mass.  Psychiatric: Appropriate affect, cooperative  Neurologic: Awake, alert, oriented x3, no focality appreciated speech clear  Skin: No rashes    Results Reviewed:  LAB RESULTS:      Lab 23  0516 23  0630 23  0700 23  1020 23  1636 23  0307 23  0307 23   WBC 11.44* 11.04* 12.82* 10.77 8.66   < > 17.84*  --    HEMOGLOBIN 11.3* 10.8* 10.6* 10.2* 10.6*   < > 8.6*  --    HEMATOCRIT 36.2* 34.4* 34.4* 32.9* 34.9*   < > 27.2*  --    PLATELETS 145 148 156 193 232   < > 213  --    NEUTROS ABS  --   --  10.66* 9.19* 7.09*  --  15.85*  --    IMMATURE GRANS (ABS)  --   --  0.20* 0.10* 0.14*  --  0.20*  --    LYMPHS ABS  --   --  1.10 0.77 0.81  --  1.03  --    MONOS ABS  --   --  0.80 0.66 0.60  --  0.74  --    EOS ABS  --   --  0.04 0.03 0.01  --  0.00  --    MCV 96.5 97.2* 97.2* 96.8 99.1*   < > 97.8*  --    APTT  --   --   --   --   --   --  93.9*  94.9*    < > = values in this interval not displayed.         Lab 05/29/23  0516 05/28/23  0630 05/27/23  0700 05/26/23  2153 05/26/23  1014 05/25/23  0501   SODIUM 139 138 138  --  142 145   POTASSIUM 4.3 4.1 4.3 4.3 3.5 3.8   CHLORIDE 103 103 105  --  106 109*   CO2 21.0* 21.0* 20.0*  --  21.0* 22.0   ANION GAP 15.0 14.0 13.0  --  15.0 14.0   BUN 15 14 16  --  17 26*   CREATININE 0.79 0.84 0.87  --  0.95 1.22   EGFR 95.6 93.8 92.8  --  86.1 63.8   GLUCOSE 88 83 106*  --  158* 69   CALCIUM 8.9 9.0 8.5*  --  9.0 9.2   MAGNESIUM  --   --  2.5*  --  1.9 2.2   PHOSPHORUS  --   --   --   --  3.5  --                          Lab 05/24/23  0341   PH, ARTERIAL 7.375   PCO2, ARTERIAL 36.1   PO2 ART 83.2   FIO2 28   HCO3 ART 21.1   BASE EXCESS ART -3.7*   CARBOXYHEMOGLOBIN 1.1     Brief Urine Lab Results  (Last result in the past 365 days)      Color   Clarity   Blood   Leuk Est   Nitrite   Protein   CREAT   Urine HCG        05/21/23 0240 Yellow   Turbid   Negative   Negative   Negative   30 mg/dL (1+)                 Microbiology Results Abnormal     Procedure Component Value - Date/Time    Blood Culture - Blood, Hand, Left [[id number]]  (Normal) Collected: 05/22/23 1155    Lab Status: Final result Specimen: Blood from Hand, Left Updated: 05/27/23 1346     Blood Culture No growth at 5 days    Blood Culture - Blood, Arm, Left [[id number]]  (Normal) Collected: 05/22/23 1202    Lab Status: Final result Specimen: Blood from Arm, Left Updated: 05/27/23 1346     Blood Culture No growth at 5 days    Respiratory Culture - Sputum, ET Suction [[id number]] Collected: 05/22/23 0020    Lab Status: Final result Specimen: Sputum from ET Suction Updated: 05/24/23 1150     Respiratory Culture Moderate growth (3+) Normal respiratory nathan. No S. aureus or Pseudomonas aeruginosa detected. Final report.     Gram Stain Few (2+) WBCs per low power field      Occasional Epithelial cells per low power field      Few (2+) Budding yeast      Occasional  Gram positive cocci    Urine Culture - Urine, Straight Cath [[id number]]  (Normal) Collected: 05/21/23 0240    Lab Status: Final result Specimen: Urine from Straight Cath Updated: 05/22/23 0816     Urine Culture No growth    S. Pneumo Ag Urine or CSF - Urine, Urine, Clean Catch [[id number]]  (Normal) Collected: 05/21/23 0240    Lab Status: Final result Specimen: Urine, Clean Catch Updated: 05/21/23 1356     Strep Pneumo Ag Negative    Legionella Antigen, Urine - Urine, Urine, Clean Catch [[id number]]  (Normal) Collected: 05/21/23 0240    Lab Status: Final result Specimen: Urine, Clean Catch Updated: 05/21/23 1356     LEGIONELLA ANTIGEN, URINE Negative    MRSA Screen, PCR (Inpatient) - Swab, Nares [[id number]]  (Normal) Collected: 05/20/23 0523    Lab Status: Final result Specimen: Swab from Nares Updated: 05/20/23 0736     MRSA PCR Negative    Narrative:      The negative predictive value of this diagnostic test is high and should only be used to consider de-escalating anti-MRSA therapy. A positive result may indicate colonization with MRSA and must be correlated clinically.  MRSA Negative          No radiology results from the last 24 hrs    Results for orders placed during the hospital encounter of 05/19/23    Adult Transesophageal Echo (KAT) W/ Cont if Necessary Per Protocol    Interpretation Summary  •  There is a large 2.9 x 1.7 cm mass in the right atrium which appears adherent to the atrial aspect of one of the 2 pacemaker leads. The edges are smooth with some peripheral hyperechoic regions. This appears to be more consistent with older thrombus than acute vegetation. Does move in and out of the tricuspid valve plane with the cardiac cycle.  •  There is a moderate-sized (1.7 mm) mobile mass on the tricuspid valve that is consistent with a vegetation.Mild tricuspid valve regurgitation is present. No evidence of significant tricuspid valve stenosis is present. There is a at least 1.7 cm x 0.6 cm highly mobile  vegetation on the tricuspid valve. The attachment point is difficult to discern due to artifact from the surrounding pacemaker wires however it is freely prolapsing across the valve throughout the cardiac cycle.  •  Left ventricular systolic function is mildly decreased. Estimated left ventricular EF = 45%  •  Left ventricular systolic function is mildly decreased. Estimated left ventricular EF = 45% Normal left ventricular cavity size and wall thickness noted. There is left ventricular global hypokinesis noted. Left ventricular diastolic function is consistent with (grade I) impaired relaxation.      Current medications:  Scheduled Meds:amiodarone, 200 mg, Oral, Q12H  apixaban, 5 mg, Oral, Q12H  aspirin, 81 mg, Oral, Daily  atorvastatin, 80 mg, Oral, Nightly  carvedilol, 25 mg, Oral, BID With Meals  DAPTOmycin, 8 mg/kg, Intravenous, Q24H  empagliflozin, 10 mg, Oral, Daily  fluticasone, 2 spray, Nasal, Daily  Insulin Lispro, 0-9 Units, Subcutaneous, 4x Daily With Meals & Nightly  lisinopril, 10 mg, Oral, Q24H  megestrol, 400 mg, Oral, Daily  pantoprazole, 40 mg, Oral, Q AM  senna-docusate sodium, 2 tablet, Oral, BID  sodium chloride, 10 mL, Intravenous, Q12H  terazosin, 1 mg, Oral, Daily      Continuous Infusions:   PRN Meds:.•  acetaminophen  •  senna-docusate sodium **AND** polyethylene glycol **AND** [DISCONTINUED] bisacodyl **AND** bisacodyl  •  Calcium Replacement - Follow Nurse / BPA Driven Protocol  •  cyclobenzaprine  •  dextrose  •  ipratropium-albuterol  •  Magnesium Cardiology Dose Replacement - Follow Nurse / BPA Driven Protocol  •  nitroglycerin  •  ondansetron  •  Phosphorus Replacement - Follow Nurse / BPA Driven Protocol  •  Potassium Replacement - Follow Nurse / BPA Driven Protocol  •  sodium chloride  •  sodium chloride  •  sodium chloride    Assessment & Plan   Assessment & Plan     Active Hospital Problems    Diagnosis  POA   • **Pneumonia of left lower lobe due to infectious organism [J18.9]   Yes   • Severe Malnutrition (HCC) [E43]  Yes   • Acute infective endocarditis [I33.0]  Yes   • Acute respiratory failure with hypoxia [J96.01]  Yes   • Shock, likely multifactorial [R57.9]  Yes   • Paroxysmal atrial fibrillation [I48.0]  No   • Pulmonary embolus [I26.99]  Yes   • GERD (gastroesophageal reflux disease) [K21.9]  Yes   • Hyperlipidemia [E78.5]  Yes   • HFrEF (heart failure with reduced ejection fraction) [I50.20]  Yes   • HTN [I10]  Yes   • Cardiac resynchronization therapy defibrillator (CRT-D) in place [Z95.810]  Yes      Resolved Hospital Problems    Diagnosis Date Resolved POA   • Cardiomyopathy [I42.9] 05/28/2023 Yes   • Hypotension [I95.9] 05/28/2023 Yes   • Confusion [R41.0] 05/28/2023 Yes        Brief Hospital Course to date:  Timmy Guajardo is a 72 y.o. male with PMHx significant for PE on Eliquis, HTN, dyslipidemia, HFrEF (30% on 10/2022), T2DM, CVA with right-sided residual weakness, and GERD.  He was admitted to the ICU on 5/21/2023 as a transfer from the floor.  He was initially admitted with a pulmonary embolism and a left lower lobe pneumonia versus infarct on 5/19/2023.  The morning of 5/21 he suddenly went into A-fib with RVR became hypotensive with prompted transition to the ICU. He underwent KAT 5/25 due to bacteremia and found to have vegetation on pacer wire and TV. Transferred to the floor 5/25.     This patient's problems and plans were partially entered by my partner and updated as appropriate by me 05/30/23.    A fib RVR-->Resolved  -Continue Coreg, Amiodarone   -On Eliquis  -Cardiology following    Staph epidermidis Bacteremia  Vegetation pacer wire/Tricusupid valve   -KAT 5/25 with vegetation noted on pacer lead and TV  -CTS were following but they signed off.  -ID following, Continue Daptomycin daily, likely 6 weeks   -EP following, Dr Quintana. High risk for transvenous laser extraction and possible transvenous removal of extremely large vegetation adhering both to lead  and tricuspid valve.  -Cardiology recommending angiovac vegectomy at .  Cardiology needs to make arrangements and then the hospitalist will take her to the transfer to .  We will discuss this further with cardiology  -Repeat blood Cx NGTD    Hx PE  -On Eliquis, may need to place on heparin gtt pending timing of procedures (defer to CTS/EP)     HTN  -Continue Lisinopril    HFrEF  -Continue Coreg  -Continue Jardiance     GERD  -Continue PPI    Chronic Severe Malnutrition   -Continue Megace  -RD following     CVC in place  -Peripheral IV in place but patient very hard stick. Leave CVC in place until PICC placed as patient will need long term ABX.       Expected Discharge Location and Transportation: [facility]  Expected Discharge   Expected Discharge Date: 5/31/2023; Expected Discharge Time:      DVT prophylaxis:  Medical DVT prophylaxis orders are present.     AM-PAC 6 Clicks Score (PT): 12 (05/30/23 1352)    CODE STATUS:   Code Status and Medical Interventions:   Ordered at: 05/20/23 0011     Level Of Support Discussed With:    Patient     Code Status (Patient has no pulse and is not breathing):    CPR (Attempt to Resuscitate)     Medical Interventions (Patient has pulse or is breathing):    Full Support       Trell Leonardo MD  05/30/23

## 2023-05-30 NOTE — THERAPY TREATMENT NOTE
Patient Name: Timmy Guajardo  : 1950    MRN: [id number]                              Today's Date: 2023       Admit Date: 2023    Visit Dx:     ICD-10-CM ICD-9-CM   1. Confusion  R41.0 298.9   2. Leukocytosis, unspecified type  D72.829 288.60   3. Referred by health care professional  Z02.89 V68.89   4. Abnormal CXR  R93.89 793.2   5. Atrial fibrillation, unspecified type  I48.91 427.31   6. Dysphagia, unspecified type  R13.10 787.20     Patient Active Problem List   Diagnosis   • HFrEF (heart failure with reduced ejection fraction)   • HTN   • Cardiac resynchronization therapy defibrillator (CRT-D) in place   • Gangrene of toe of left foot   • Closed displaced intertrochanteric fracture of right femur, initial encounter   • Right exudative pleural effusion status post decortication 2023   • History of CVA (cerebrovascular accident)   • Debility   • GERD (gastroesophageal reflux disease)   • Hyperlipidemia   • Rt Apical Lung Nodule vs Scarring (needs follow up)   • Pneumonia of left lower lobe due to infectious organism   • Pulmonary embolus   • Paroxysmal atrial fibrillation   • Acute respiratory failure with hypoxia   • Shock, likely multifactorial   • Acute infective endocarditis   • Severe Malnutrition (HCC)     Past Medical History:   Diagnosis Date   • Cardiomyopathy    • CHF (congestive heart failure)    • Coronary artery disease    • COVID-19    • Diabetes mellitus    • GERD (gastroesophageal reflux disease)    • HTN (hypertension)    • Pleural effusion    • Stroke     Right sided weakness   • Stroke      Past Surgical History:   Procedure Laterality Date   • AMPUTATION DIGIT Left 2021    Procedure: AMPUTATION DIGIT - GREAT TOE AMPUTATION LEFT;  Surgeon: Dario Marmolejo MD;  Location: [facility] OR;  Service: General;  Laterality: Left;   • AORTAGRAM N/A 2021    Procedure: AORTAGRAM WITH RUNOFFS, LEFT SFA BALLOON ANGIOPLASTY;  Surgeon: Tim Mahan MD;  Location: [facility]  HYBRID SHELIA;  Service: Vascular;  Laterality: N/A;  FL TIME 6 MIN 54 SECS  82 MGY  CONTRAST VISIPAQUE 100ML     • CARDIAC CATHETERIZATION     • CARDIAC PACEMAKER PLACEMENT      pacemaker/defibrillator, [employer]   • COLONOSCOPY N/A 3/31/2023    Procedure: COLONOSCOPY;  Surgeon: Brunner, Mark I, MD;  Location:  [facility];  Service: Gastroenterology;  Laterality: N/A;   • ENDOSCOPY N/A 3/29/2023    Procedure: ESOPHAGOGASTRODUODENOSCOPY;  Surgeon: Brunner, Mark I, MD;  Location:  [facility];  Service: Gastroenterology;  Laterality: N/A;   • HERNIA REPAIR Bilateral     Inguinal hernia   • HIP TROCHANTERIC NAILING WITH INTRAMEDULLARY HIP SCREW Right 10/18/2022    Procedure: HIP TROCHANTERIC NAILING WITH INTRAMEDULLARY HIP SCREW RIGHT;  Surgeon: jB Steinberg MD;  Location:  [facility];  Service: Orthopedics;  Laterality: Right;   • THORACOSCOPY VIDEO ASSISTED WITH LOBECTOMY Right 4/12/2023    Procedure: BRONCHOSCOPY, THORACOSCOPY VIDEO ASSISTED WITH DECORTICATION, MECHANICAL PLEURODESIS;  Surgeon: Tim Mahan MD;  Location:  [facility];  Service: Cardiothoracic;  Laterality: Right;      General Information     Row Name 05/30/23 1045          OT Time and Intention    Document Type therapy note (daily note)  -     Mode of Treatment occupational therapy  -     Row Name 05/30/23 1045          General Information    Patient Profile Reviewed yes  -BAILEY     Existing Precautions/Restrictions fall;other (see comments)  remote CVA with residual R sided weakness  -     Barriers to Rehab medically complex;previous functional deficit  -     Row Name 05/30/23 1045          Cognition    Orientation Status (Cognition) oriented to;person;place  -     Row Name 05/30/23 1045          Safety Issues, Functional Mobility    Safety Issues Affecting Function (Mobility) awareness of need for assistance;insight into deficits/self-awareness;judgment;problem-solving;safety precaution awareness;safety precautions  follow-through/compliance;sequencing abilities  -     Impairments Affecting Function (Mobility) balance;coordination;endurance/activity tolerance;motor planning;motor control;pain;postural/trunk control;strength;shortness of breath;sensation/sensory awareness  -     Cognitive Impairments, Mobility Safety/Performance insight into deficits/self-awareness;judgment;problem-solving/reasoning;safety precaution awareness;safety precaution follow-through;sequencing abilities  -           User Key  (r) = Recorded By, (t) = Taken By, (c) = Cosigned By    Initials Name Provider Type    Barbara Alvarez OT Occupational Therapist                 Mobility/ADL's     Row Name 05/30/23 4970          Bed Mobility    Bed Mobility supine-sit;scooting/bridging  -     Scooting/Bridging [place] (Bed Mobility) moderate assist (50% patient effort);2 person assist;verbal cues;nonverbal cues (demo/gesture)  -     Supine-Sit [place] (Bed Mobility) minimum assist (75% patient effort);2 person assist;verbal cues;nonverbal cues (demo/gesture)  -BAILEY     Sit-Supine [place] (Bed Mobility) minimum assist (75% patient effort);2 person assist;verbal cues;nonverbal cues (demo/gesture)  -     Assistive Device (Bed Mobility) bed rails;draw sheet;head of bed elevated  -     Comment, (Bed Mobility) max encouragement required to transfer to EOB, cues for sequencing and assist for advancing BLE's over bed surface  -     Row Name 05/30/23 3630          Transfers    Comment, (Transfers) Pt declined transfer to chair or OOB activity, agreeable to supine TE  -     Row Name 05/30/23 8080          Functional Mobility    Functional Mobility- Ind. Level not tested  -     Row Name 05/30/23 2900          Grooming Assessment/Training    Comment, (Grooming) declined oral care when offered  -           User Key  (r) = Recorded By, (t) = Taken By, (c) = Cosigned By    Initials Name Provider Type    Barbara Alvarez OT Occupational  Therapist               Obj/Interventions     Row Name 05/30/23 1355          Shoulder (Therapeutic Exercise)    Shoulder (Therapeutic Exercise) AAROM (active assistive range of motion)  -     Shoulder AAROM (Therapeutic Exercise) bilateral;flexion;extension;scapular protraction;scapular retraction;supine;10 repetitions;other (see comments)  assist for RUE with pt actively moving LUE  -     Row Name 05/30/23 1353          Motor Skills    Therapeutic Exercise shoulder  -BAILEY     Row Name 05/30/23 [facility]          Balance    Balance Assessment sitting static balance;sitting dynamic balance  -     Static Sitting Balance contact guard;verbal cues;non-verbal cues (demo/gesture)  -BAILEY     Dynamic Sitting Balance minimal assist;non-verbal cues (demo/gesture)  -BAILEY     Position, Sitting Balance unsupported;sitting edge of bed  -BAILEY     Balance Interventions sitting;static  -BAILEY     Comment, Balance Pt demonstrated limited tolerance to sitting unsupported at EOB; unable to maintain forward gaze with head up, c/o severe pain in neck and back  -[facility] Name 05/30/23 [facility]          Hip (Therapeutic Exercise)    Hip (Therapeutic Exercise) other (see comments)  bridging in supine x5 reps  -           User Key  (r) = Recorded By, (t) = Taken By, (c) = Cosigned By    Initials Name Provider Type    Barbara Alvarez OT Occupational Therapist               Goals/Plan    No documentation.                Clinical Impression     [facility] Name 05/30/23 1547          Pain Assessment    Pre/Posttreatment Pain Comment Pt endorsed increased pain in neck and back while sitting unsupported at EOB, relieved by repositioning in supine; RN notified  -     Pain Intervention(s) Ambulation/increased activity;Repositioned  -[facility] Name 05/30/23 3789          Pain Scale: FACES Pre/Post-Treatment    Pain: FACES Scale, Pretreatment 0-->no hurt  -     Posttreatment Pain Rating 8-->hurts whole lot  -[facility] Name 05/30/23 4843          Plan of Care  Review    Plan of Care Reviewed With patient  -BAILEY     Progress declining  -BAILEY     Outcome Evaluation Pt declined transfer to chair or OOB activity, agreeable with encouragement to sit EOB with extremely limited tolerance to upright position. Pt participated in UE TE in supine with active assist for RUE, bridging x5 reps. Continue to recommend SNF at discharge to support goal of return to PLOF.  -BAILEY     Row Name 05/30/23 1356          Therapy Plan Review/Discharge Plan (OT)    Anticipated Discharge Disposition (OT) skilled nursing facility  -BAILEY     Row Name 05/30/23 1356          Vital Signs    O2 Delivery Pre Treatment room air  -BAILEY     O2 Delivery Intra Treatment room air  -BAILEY     O2 Delivery Post Treatment room air  -BAILEY     Pre Patient Position Supine  -BAILEY     Intra Patient Position Sitting  -BAILEY     Post Patient Position Supine  -BAILEY     Row Name 05/30/23 1356          Positioning and Restraints    Pre-Treatment Position in bed  -BAILEY     Post Treatment Position bed  -BAILEY     In Bed notified nsg;fowlers;call light within reach;encouraged to call for assist;exit alarm on;side rails up x3  -BAILEY           User Key  (r) = Recorded By, (t) = Taken By, (c) = Cosigned By    Initials Name Provider Type    Barbara Alvarez, RICARDO Occupational Therapist               Outcome Measures     Row Name 05/30/23 1403          How much help from another is currently needed...    Putting on and taking off regular lower body clothing? 1  -BAILEY     Bathing (including washing, rinsing, and drying) 2  -BAILEY     Toileting (which includes using toilet bed pan or urinal) 1  -BAILEY     Putting on and taking off regular upper body clothing 2  -BAILEY     Taking care of personal grooming (such as brushing teeth) 2  -BAILEY     Eating meals 2  -BAILEY     AM-PAC 6 Clicks Score (OT) 10  -BAILEY     Row Name 05/30/23 1352 05/30/23 0805       How much help from another person do you currently need...    Turning from your back to your side while in flat bed without using  bedrails? 3  -BA 3  -TH    Moving from lying on back to sitting on the side of a flat bed without bedrails? 2  -BA 2  -TH    Moving to and from a bed to a chair (including a wheelchair)? 2  -BA 2  -TH    Standing up from a chair using your arms (e.g., wheelchair, bedside chair)? 2  -BA 2  -TH    Climbing 3-5 steps with a railing? 1  -BA 1  -TH    To walk in hospital room? 2  -BA 2  -TH    AM-PAC 6 Clicks Score (PT) 12  -BA 12  -TH    Highest level of mobility 4 --> Transferred to chair/commode  -BA 4 --> Transferred to chair/commode  -TH    Row Name 05/30/23 1403 05/30/23 1352       Functional Assessment    Outcome Measure Options AM-PAC 6 Clicks Daily Activity (OT)  -BAILEY AM-PAC 6 Clicks Basic Mobility (PT)  -          User Key  (r) = Recorded By, (t) = Taken By, (c) = Cosigned By    Initials Name Provider Type    Barbara Alvarez, OT Occupational Therapist     Elle Ghotra, PT Physical Therapist     Gudelia Champion, RN Registered Nurse                Occupational Therapy Education     Title: PT OT SLP Therapies (In Progress)     Topic: Occupational Therapy (In Progress)     Point: ADL training (In Progress)     Description:   Instruct learner(s) on proper safety adaptation and remediation techniques during self care or transfers.   Instruct in proper use of assistive devices.              Learning Progress Summary           Patient Acceptance, E, NR by  at 5/24/2023 1201                   Point: Home exercise program (Done)     Description:   Instruct learner(s) on appropriate technique for monitoring, assisting and/or progressing therapeutic exercises/activities.              Learning Progress Summary           Patient Acceptance, E,TB, DU,NR by BAILEY at 5/30/2023 1404    Comment: UE HEP; benefits of activity; progression of ADL goals                   Point: Precautions (In Progress)     Description:   Instruct learner(s) on prescribed precautions during self-care and functional transfers.               Learning Progress Summary           Patient Acceptance, E, NR by  at 5/24/2023 1201                   Point: Body mechanics (Done)     Description:   Instruct learner(s) on proper positioning and spine alignment during self-care, functional mobility activities and/or exercises.              Learning Progress Summary           Patient Acceptance, E,TB, DU,NR by BAILEY at 5/30/2023 1404    Comment: UE HEP; benefits of activity; progression of ADL goals    Acceptance, E, NR by  at 5/24/2023 1201                               User Key     Initials Effective Dates Name Provider Type Discipline     09/02/21 -  Abida Kuhn OT Occupational Therapist OT    BAILEY 06/16/21 -  Barbara Gee OT Occupational Therapist OT              OT Recommendation and Plan     Plan of Care Review  Plan of Care Reviewed With: patient  Progress: declining  Outcome Evaluation: Pt declined transfer to chair or OOB activity, agreeable with encouragement to sit EOB with extremely limited tolerance to upright position. Pt participated in UE TE in supine with active assist for RUE, bridging x5 reps. Continue to recommend SNF at discharge to support goal of return to PLOF.     Time Calculation:    Time Calculation- OT     Row Name 05/30/23 1045             Time Calculation- OT    OT Start Time 1045  -BAILEY      OT Received On 05/30/23  -BAILEY         Timed Charges    76853 - OT Therapeutic Exercise Minutes 12  -BAILEY      68286 - OT Self Care/Mgmt Minutes 8  -BAILEY         Total Minutes    Timed Charges Total Minutes 20  -BAILEY       Total Minutes 20  -BAILEY            User Key  (r) = Recorded By, (t) = Taken By, (c) = Cosigned By    Initials Name Provider Type    Barbara Alvarez OT Occupational Therapist              Therapy Charges for Today     Code Description Service Date Service Provider Modifiers Qty    [id number]  OT THER PROC EA 15 MIN 5/30/2023 Barbara Gee OT GO 1               Barbara Gee OT  5/30/2023

## 2023-05-30 NOTE — THERAPY TREATMENT NOTE
Patient Name: Timmy Guajardo  : 1950    MRN: [id number]                              Today's Date: 2023       Admit Date: 2023    Visit Dx:     ICD-10-CM ICD-9-CM   1. Confusion  R41.0 298.9   2. Leukocytosis, unspecified type  D72.829 288.60   3. Referred by health care professional  Z02.89 V68.89   4. Abnormal CXR  R93.89 793.2   5. Atrial fibrillation, unspecified type  I48.91 427.31   6. Dysphagia, unspecified type  R13.10 787.20     Patient Active Problem List   Diagnosis   • HFrEF (heart failure with reduced ejection fraction)   • HTN   • Cardiac resynchronization therapy defibrillator (CRT-D) in place   • Gangrene of toe of left foot   • Closed displaced intertrochanteric fracture of right femur, initial encounter   • Right exudative pleural effusion status post decortication 2023   • History of CVA (cerebrovascular accident)   • Debility   • GERD (gastroesophageal reflux disease)   • Hyperlipidemia   • Rt Apical Lung Nodule vs Scarring (needs follow up)   • Pneumonia of left lower lobe due to infectious organism   • Pulmonary embolus   • Paroxysmal atrial fibrillation   • Acute respiratory failure with hypoxia   • Shock, likely multifactorial   • Acute infective endocarditis   • Severe Malnutrition (HCC)     Past Medical History:   Diagnosis Date   • Cardiomyopathy    • CHF (congestive heart failure)    • Coronary artery disease    • COVID-19    • Diabetes mellitus    • GERD (gastroesophageal reflux disease)    • HTN (hypertension)    • Pleural effusion    • Stroke     Right sided weakness   • Stroke      Past Surgical History:   Procedure Laterality Date   • AMPUTATION DIGIT Left 2021    Procedure: AMPUTATION DIGIT - GREAT TOE AMPUTATION LEFT;  Surgeon: Dario Marmolejo MD;  Location: [facility] OR;  Service: General;  Laterality: Left;   • AORTAGRAM N/A 2021    Procedure: AORTAGRAM WITH RUNOFFS, LEFT SFA BALLOON ANGIOPLASTY;  Surgeon: Tim Mahan MD;  Location: [facility]  HYBRID SHELIA;  Service: Vascular;  Laterality: N/A;  FL TIME 6 MIN 54 SECS  82 MGY  CONTRAST VISIPAQUE 100ML     • CARDIAC CATHETERIZATION     • CARDIAC PACEMAKER PLACEMENT      pacemaker/defibrillator, [employer]   • COLONOSCOPY N/A 3/31/2023    Procedure: COLONOSCOPY;  Surgeon: Brunner, Mark I, MD;  Location: [facility] ENDOSCOPY;  Service: Gastroenterology;  Laterality: N/A;   • ENDOSCOPY N/A 3/29/2023    Procedure: ESOPHAGOGASTRODUODENOSCOPY;  Surgeon: Brunner, Mark I, MD;  Location:  [facility]COPY;  Service: Gastroenterology;  Laterality: N/A;   • HERNIA REPAIR Bilateral     Inguinal hernia   • HIP TROCHANTERIC NAILING WITH INTRAMEDULLARY HIP SCREW Right 10/18/2022    Procedure: HIP TROCHANTERIC NAILING WITH INTRAMEDULLARY HIP SCREW RIGHT;  Surgeon: Bj Steinberg MD;  Location: [facility] OR;  Service: Orthopedics;  Laterality: Right;   • THORACOSCOPY VIDEO ASSISTED WITH LOBECTOMY Right 4/12/2023    Procedure: BRONCHOSCOPY, THORACOSCOPY VIDEO ASSISTED WITH DECORTICATION, MECHANICAL PLEURODESIS;  Surgeon: Tim Mahan MD;  Location: [facility] OR;  Service: Cardiothoracic;  Laterality: Right;      General Information     Row Name 05/30/23 1133          Physical Therapy Time and Intention    Document Type therapy note (daily note)  -     Mode of Treatment physical therapy  -     Row Name 05/30/23 1133          General Information    Patient Profile Reviewed yes  -     Existing Precautions/Restrictions fall;other (see comments)  remote CVA w/ residual R sided weakness  -     Barriers to Rehab medically complex;previous functional deficit  -     Row Name 05/30/23 1133          Cognition    Orientation Status (Cognition) oriented to;person;place  -     Row Name 05/30/23 1133          Safety Issues, Functional Mobility    Safety Issues Affecting Function (Mobility) awareness of need for assistance;insight into deficits/self-awareness;safety precaution awareness;safety precautions  follow-through/compliance;sequencing abilities  -     Impairments Affecting Function (Mobility) balance;coordination;endurance/activity tolerance;motor control;pain;postural/trunk control;sensation/sensory awareness;shortness of breath;strength;cognition  -     Cognitive Impairments, Mobility Safety/Performance insight into deficits/self-awareness;problem-solving/reasoning  -     Comment, Safety Issues/Impairments (Mobility) Required significant amount of increased edu, motivation, and encouragement for participation.  -           User Key  (r) = Recorded By, (t) = Taken By, (c) = Cosigned By    Initials Name Provider Type     Elle Ghotra, PT Physical Therapist               Mobility     Row Name 05/30/23 1135          Bed Mobility    Bed Mobility supine-sit;sit-supine;scooting/bridging  -     Scooting/Bridging [place] (Bed Mobility) minimum assist (75% patient effort);moderate assist (50% patient effort);2 person assist;verbal cues;nonverbal cues (demo/gesture)  -     Supine-Sit [place] (Bed Mobility) minimum assist (75% patient effort);2 person assist;verbal cues;nonverbal cues (demo/gesture)  -     Sit-Supine [place] (Bed Mobility) minimum assist (75% patient effort);2 person assist;verbal cues;nonverbal cues (demo/gesture)  -     Assistive Device (Bed Mobility) bed rails;draw sheet;head of bed elevated  -     Comment, (Bed Mobility) Increased effort.  VCs/TCs for sequencing and hand placement.  Required assistance to bring BLEs toward EOB and back up into bed with return to supine.  Forward, flexed posture with mild L lateral lean upon sitting EOB.  Reported no dizziness with sitting EOB.  -     Row Name 05/30/23 1135          Sit-Stand Transfer    Comment, (Sit-Stand Transfer) Deferred d/t pt declining attempt 2/2 reported fatigue and weakness despite increased edu, motivation, and encouragement.  -           User Key  (r) = Recorded By, (t) = Taken By, (c) =  Cosigned By    Initials Name Provider Type     Elle Ghotra PT Physical Therapist               Obj/Interventions     Row Name 05/30/23 1323          Motor Skills    Therapeutic Exercise hip;knee;ankle  -     Row Name 05/30/23 1323          Hip (Therapeutic Exercise)    Hip (Therapeutic Exercise) strengthening exercise;AROM (active range of motion);AAROM (active assistive range of motion);other (see comments)  R hamstring stretch in supine x 5 reps with 10-15 sec hold  -     Hip AROM (Therapeutic Exercise) bilateral;external rotation;left;internal rotation;supine;5 repetitions  -     Hip AAROM (Therapeutic Exercise) right;internal rotation;supine;5 repetitions  -     Hip Strengthening (Therapeutic Exercise) bilateral;heel slides;supine;3 repetitions  -     Row Name 05/30/23 1323          Knee (Therapeutic Exercise)    Knee (Therapeutic Exercise) strengthening exercise  -     Knee Strengthening (Therapeutic Exercise) bilateral;SLR (straight leg raise);supine;5 repetitions  -     Row Name 05/30/23 1323          Ankle (Therapeutic Exercise)    Ankle (Therapeutic Exercise) AROM (active range of motion);AAROM (active assistive range of motion);other (see comments)  B calf stretch in supine x 5 reps with 10-15 sec hold  -     Ankle AROM (Therapeutic Exercise) bilateral;plantarflexion;left;dorsiflexion;supine;5 repetitions  -     Ankle AAROM (Therapeutic Exercise) right;dorsiflexion;supine;5 repetitions  -     Row Name 05/30/23 1323          Balance    Balance Assessment sitting static balance;sitting dynamic balance  -     Static Sitting Balance contact guard;verbal cues;non-verbal cues (demo/gesture)  -     Dynamic Sitting Balance minimal assist;verbal cues;non-verbal cues (demo/gesture)  -     Position, Sitting Balance unsupported;sitting edge of bed  -     Balance Interventions sitting;static;dynamic;occupation based/functional task  -     Comment, Balance Significant forward,  flexed posture with sitting EOB, along with intermittent mild L lateral lean.  VCs/TCs for postural correction, but unable to sustain for longer periods of time d/t increased fatigue and weakness.  Seated lateral weight shift to each side with Bonnie/CGA.  Declined attempt at further reps d/t fatigue and weakness.  Decreased toleration to sitting EOB for ~3-4 min interval.  -BA           User Key  (r) = Recorded By, (t) = Taken By, (c) = Cosigned By    Initials Name Provider Type    Elle Rodriguez, PT Physical Therapist               Goals/Plan    No documentation.                Clinical Impression     Row Name 05/30/23 1344          Pain    Pretreatment Pain Rating 0/10 - no pain  -BA     Posttreatment Pain Rating 0/10 - no pain  -BA     Row Name 05/30/23 1346          Plan of Care Review    Plan of Care Reviewed With patient  -BA     Progress declining  -BA     Outcome Evaluation Limited activity tolerance today; required significant increased edu, motivation, and encouragement for participation.  Participated in static/dynamic sitting balance activities with CGA/Bonnie.  Con to demonstrate decreased functional endurance, activity tolerance, strength, and balance compared to baseline level of function.  Con to progress pt as able per PT POC.  Rec SNF upon d/c.  -BA     Row Name 05/30/23 1342          Vital Signs    Pre Systolic BP Rehab 147  (taken prior to arrival)  -BA     Pre Treatment Diastolic BP 77  -BA     Intra Systolic BP Rehab 119  sitting EOB  -BA     Intra Treatment Diastolic BP 68  -BA     Post Systolic BP Rehab 113  supine  -BA     Post Treatment Diastolic BP 63  -BA     Posttreatment Heart Rate (beats/min) 69  -BA     O2 Delivery Pre Treatment room air  -BA     O2 Delivery Intra Treatment room air  -BA     Post SpO2 (%) 90  -BA     O2 Delivery Post Treatment room air  -BA     Pre Patient Position Supine  -BA     Intra Patient Position Sitting  -BA     Post Patient Position Supine  -BA     Row  Name 05/30/23 1345          Positioning and Restraints    Pre-Treatment Position in bed  -BA     Post Treatment Position bed  -BA     In Bed notified nsg;supine;fowlers;call light within reach;encouraged to call for assist;exit alarm on;side rails up x3  -BA           User Key  (r) = Recorded By, (t) = Taken By, (c) = Cosigned By    Initials Name Provider Type    Elle Rodriguez, ANNE MARIE Physical Therapist               Outcome Measures     Row Name 05/30/23 1352 05/30/23 0805       How much help from another person do you currently need...    Turning from your back to your side while in flat bed without using bedrails? 3  -BA 3  -TH    Moving from lying on back to sitting on the side of a flat bed without bedrails? 2  -BA 2  -TH    Moving to and from a bed to a chair (including a wheelchair)? 2  -BA 2  -TH    Standing up from a chair using your arms (e.g., wheelchair, bedside chair)? 2  -BA 2  -TH    Climbing 3-5 steps with a railing? 1  -BA 1  -TH    To walk in hospital room? 2  -BA 2  -TH    AM-PAC 6 Clicks Score (PT) 12  -BA 12  -TH    Highest level of mobility 4 --> Transferred to chair/commode  -BA 4 --> Transferred to chair/commode  -TH    Row Name 05/30/23 1352          Functional Assessment    Outcome Measure Options AM-PAC 6 Clicks Basic Mobility (PT)  -           User Key  (r) = Recorded By, (t) = Taken By, (c) = Cosigned By    Initials Name Provider Type    Elle Rodriguez, PT Physical Therapist    TH Gudelia Champion RN Registered Nurse                             Physical Therapy Education     Title: PT OT SLP Therapies (In Progress)     Topic: Physical Therapy (In Progress)     Point: Mobility training (In Progress)     Learning Progress Summary           Patient Acceptance, E, NR by STACIE at 5/30/2023 1352    Acceptance, E,TB, NR by SHELDON at 5/24/2023 1132   Family Acceptance, E,TB, NR by SHEDLON at 5/24/2023 1132                   Point: Home exercise program (In Progress)     Learning Progress Summary            Patient Acceptance, E, NR by BA at 5/30/2023 1352                   Point: Body mechanics (In Progress)     Learning Progress Summary           Patient Acceptance, E, NR by BA at 5/30/2023 1352    Acceptance, E,TB, NR by AY at 5/24/2023 1132   Family Acceptance, E,TB, NR by AY at 5/24/2023 1132                   Point: Precautions (In Progress)     Learning Progress Summary           Patient Acceptance, E, NR by BA at 5/30/2023 1352    Acceptance, E,TB, NR by AY at 5/24/2023 1132   Family Acceptance, E,TB, NR by AY at 5/24/2023 1132                               User Key     Initials Effective Dates Name Provider Type Discipline    AY 11/10/20 -  Alie Trujillo, PT Physical Therapist PT     09/21/21 -  Elle Ghotra PT Physical Therapist PT              PT Recommendation and Plan     Plan of Care Reviewed With: patient  Progress: declining  Outcome Evaluation: Limited activity tolerance today; required significant increased edu, motivation, and encouragement for participation.  Participated in static/dynamic sitting balance activities with CGA/Bonnie.  Con to demonstrate decreased functional endurance, activity tolerance, strength, and balance compared to baseline level of function.  Con to progress pt as able per PT POC.  Rec SNF upon d/c.     Time Calculation:    PT Charges     Row Name 05/30/23 1353             Time Calculation    Start Time 1024  -BA      PT Received On 05/30/23  -BA         Time Calculation- PT    Total Timed Code Minutes- PT 25 minute(s)  -BA         Timed Charges    16748 - PT Therapeutic Exercise Minutes 14  -BA      95055 - PT Therapeutic Activity Minutes 11  -BA         Total Minutes    Timed Charges Total Minutes 25  -BA       Total Minutes 25  -BA            User Key  (r) = Recorded By, (t) = Taken By, (c) = Cosigned By    Initials Name Provider Type    BA Elle Ghotra, PT Physical Therapist              Therapy Charges for Today     Code Description Service Date  Service Provider Modifiers Qty    21041492618 HC PT THER PROC EA 15 MIN 5/30/2023 Elle Ghotra, PT GP 1    17905303006 HC PT THERAPEUTIC ACT EA 15 MIN 5/30/2023 Elle Ghotra, PT GP 1          PT G-Codes  Outcome Measure Options: AM-PAC 6 Clicks Basic Mobility (PT)  AM-PAC 6 Clicks Score (PT): 12  AM-PAC 6 Clicks Score (OT): 10  PT Discharge Summary  Anticipated Discharge Disposition (PT): skilled nursing facility    Elle Ghotra, PT  5/30/2023

## 2023-05-30 NOTE — PLAN OF CARE
Goal Outcome Evaluation:  Plan of Care Reviewed With: patient        Progress: declining  Outcome Evaluation: Pt declined transfer to chair or OOB activity, agreeable with encouragement to sit EOB with extremely limited tolerance to upright position. Pt participated in UE TE in supine with active assist for RUE, bridging x5 reps. Continue to recommend SNF at discharge to support goal of return to PLOF.

## 2023-05-30 NOTE — PLAN OF CARE
Goal Outcome Evaluation:              Outcome Evaluation: VSS; O2 > 90% on room air.  No needs verbalized at this time.  Call light within reach.

## 2023-05-31 LAB
BASOPHILS # BLD AUTO: 0.03 10*3/MM3 (ref 0–0.2)
BASOPHILS NFR BLD AUTO: 0.2 % (ref 0–1.5)
DEPRECATED RDW RBC AUTO: 51.3 FL (ref 37–54)
EOSINOPHIL # BLD AUTO: 0.04 10*3/MM3 (ref 0–0.4)
EOSINOPHIL NFR BLD AUTO: 0.3 % (ref 0.3–6.2)
ERYTHROCYTE [DISTWIDTH] IN BLOOD BY AUTOMATED COUNT: 14.9 % (ref 12.3–15.4)
GLUCOSE BLDC GLUCOMTR-MCNC: 135 MG/DL (ref 70–130)
GLUCOSE BLDC GLUCOMTR-MCNC: 137 MG/DL (ref 70–130)
GLUCOSE BLDC GLUCOMTR-MCNC: 162 MG/DL (ref 70–130)
GLUCOSE BLDC GLUCOMTR-MCNC: 98 MG/DL (ref 70–130)
HCT VFR BLD AUTO: 37.6 % (ref 37.5–51)
HGB BLD-MCNC: 12.2 G/DL (ref 13–17.7)
IMM GRANULOCYTES # BLD AUTO: 0.12 10*3/MM3 (ref 0–0.05)
IMM GRANULOCYTES NFR BLD AUTO: 0.9 % (ref 0–0.5)
LYMPHOCYTES # BLD AUTO: 1.28 10*3/MM3 (ref 0.7–3.1)
LYMPHOCYTES NFR BLD AUTO: 9.8 % (ref 19.6–45.3)
MCH RBC QN AUTO: 30.4 PG (ref 26.6–33)
MCHC RBC AUTO-ENTMCNC: 32.4 G/DL (ref 31.5–35.7)
MCV RBC AUTO: 93.8 FL (ref 79–97)
MONOCYTES # BLD AUTO: 0.89 10*3/MM3 (ref 0.1–0.9)
MONOCYTES NFR BLD AUTO: 6.8 % (ref 5–12)
NEUTROPHILS NFR BLD AUTO: 10.72 10*3/MM3 (ref 1.7–7)
NEUTROPHILS NFR BLD AUTO: 82 % (ref 42.7–76)
NRBC BLD AUTO-RTO: 0 /100 WBC (ref 0–0.2)
PLATELET # BLD AUTO: 155 10*3/MM3 (ref 140–450)
PMV BLD AUTO: 11.7 FL (ref 6–12)
RBC # BLD AUTO: 4.01 10*6/MM3 (ref 4.14–5.8)
WBC NRBC COR # BLD: 13.08 10*3/MM3 (ref 3.4–10.8)

## 2023-05-31 PROCEDURE — 99232 SBSQ HOSP IP/OBS MODERATE 35: CPT | Performed by: INTERNAL MEDICINE

## 2023-05-31 PROCEDURE — 25010000002 DAPTOMYCIN PER 1 MG: Performed by: FAMILY MEDICINE

## 2023-05-31 PROCEDURE — 99231 SBSQ HOSP IP/OBS SF/LOW 25: CPT

## 2023-05-31 PROCEDURE — 63710000001 INSULIN LISPRO (HUMAN) PER 5 UNITS: Performed by: INTERNAL MEDICINE

## 2023-05-31 PROCEDURE — 85025 COMPLETE CBC W/AUTO DIFF WBC: CPT | Performed by: INTERNAL MEDICINE

## 2023-05-31 PROCEDURE — 82948 REAGENT STRIP/BLOOD GLUCOSE: CPT

## 2023-05-31 RX ADMIN — FLUTICASONE PROPIONATE 2 SPRAY: 50 SPRAY, METERED NASAL at 08:38

## 2023-05-31 RX ADMIN — LISINOPRIL 10 MG: 10 TABLET ORAL at 08:37

## 2023-05-31 RX ADMIN — ATORVASTATIN CALCIUM 80 MG: 40 TABLET, FILM COATED ORAL at 21:20

## 2023-05-31 RX ADMIN — Medication 10 ML: at 21:23

## 2023-05-31 RX ADMIN — CARVEDILOL 25 MG: 12.5 TABLET, FILM COATED ORAL at 08:37

## 2023-05-31 RX ADMIN — TERAZOSIN HYDROCHLORIDE 1 MG: 1 CAPSULE ORAL at 08:37

## 2023-05-31 RX ADMIN — CARVEDILOL 25 MG: 12.5 TABLET, FILM COATED ORAL at 17:32

## 2023-05-31 RX ADMIN — MEGESTROL ACETATE 400 MG: 40 SUSPENSION ORAL at 08:38

## 2023-05-31 RX ADMIN — APIXABAN 5 MG: 5 TABLET, FILM COATED ORAL at 08:37

## 2023-05-31 RX ADMIN — APIXABAN 5 MG: 5 TABLET, FILM COATED ORAL at 21:23

## 2023-05-31 RX ADMIN — Medication 10 ML: at 08:38

## 2023-05-31 RX ADMIN — AMIODARONE HYDROCHLORIDE 200 MG: 200 TABLET ORAL at 21:23

## 2023-05-31 RX ADMIN — PANTOPRAZOLE SODIUM 40 MG: 40 TABLET, DELAYED RELEASE ORAL at 05:41

## 2023-05-31 RX ADMIN — DOCUSATE SODIUM 50 MG AND SENNOSIDES 8.6 MG 2 TABLET: 8.6; 5 TABLET, FILM COATED ORAL at 08:37

## 2023-05-31 RX ADMIN — EMPAGLIFLOZIN 10 MG: 10 TABLET, FILM COATED ORAL at 08:37

## 2023-05-31 RX ADMIN — INSULIN LISPRO 2 UNITS: 100 INJECTION, SOLUTION INTRAVENOUS; SUBCUTANEOUS at 17:32

## 2023-05-31 RX ADMIN — ASPIRIN 81 MG 81 MG: 81 TABLET ORAL at 08:37

## 2023-05-31 RX ADMIN — DAPTOMYCIN 500 MG: 500 INJECTION, POWDER, LYOPHILIZED, FOR SOLUTION INTRAVENOUS at 13:15

## 2023-05-31 RX ADMIN — AMIODARONE HYDROCHLORIDE 200 MG: 200 TABLET ORAL at 08:37

## 2023-05-31 NOTE — PROGRESS NOTES
INFECTIOUS DISEASE f/u     Timmy Guajardo  1950  [id number]    Date of Consult: 5/23/2023    Admission Date: 5/19/2023      Requesting Provider: No ref. provider found  Evaluating Physician: Travon Brito MD    Reason for Consultation: Positive blood cultures    History of present illness:    Patient is a 72 y.o. male with past medical history of pulmonary embolism, dyslipidemia, heart failure, type II d. mellitus, CVA with right-sided residual weakness and GERD.      Patient admitted to hospital for pulmonary embolism left lower lobe pneumonia versus infarction on May/9/2023 patient had atrial fibrillation with RVR with hypotension has been transferred the ICU.      Patient found have positive blood cultures for staph coccus epidermidis.  We are being consulted for further interpretation of these positive blood cultures.    Patient reports having indwelling pacemaker/ ICD patient says this is fired 1 time since he has had it placed        5/24/23; no events overnight Noemy antibiotics no complaints    5/25/2023 patient had KAT which was remarkable for right atrial mass and vegetation on pacemaker lead patient feels well no complaints no events overnight    5/26/23; no events overnight; no fever, rash, sore throat    5/27/23; patient is doing well tolerating antibiotics no events overnight denies fevers rash or sore throat    5/28/23; no evetts overnight; afebrile normotensive    5/29/23: No new events.  Denies f/c, sob, n/v/d, rashes, or sore throat.  No pain around ppm site.     5/30/23 patient is doing well no fevers rash or sore throat  5/31/2023 patient to be transferred to  no events overnight afebrile normotensive      Past Medical History:   Diagnosis Date   • Cardiomyopathy    • CHF (congestive heart failure)    • Coronary artery disease    • COVID-19    • Diabetes mellitus    • GERD (gastroesophageal reflux disease)    • HTN (hypertension)    • Pleural effusion    • Stroke     Right sided  weakness   • Stroke        Past Surgical History:   Procedure Laterality Date   • AMPUTATION DIGIT Left 5/12/2021    Procedure: AMPUTATION DIGIT - GREAT TOE AMPUTATION LEFT;  Surgeon: Dario Marmolejo MD;  Location:  [facility];  Service: General;  Laterality: Left;   • AORTAGRAM N/A 5/11/2021    Procedure: AORTAGRAM WITH RUNOFFS, LEFT SFA BALLOON ANGIOPLASTY;  Surgeon: Tim Mahan MD;  Location:  [facility];  Service: Vascular;  Laterality: N/A;  FL TIME 6 MIN 54 SECS  82 MGY  CONTRAST VISIPAQUE 100ML     • CARDIAC CATHETERIZATION     • CARDIAC PACEMAKER PLACEMENT      pacemaker/defibrillator, [employer]   • COLONOSCOPY N/A 3/31/2023    Procedure: COLONOSCOPY;  Surgeon: Brunner, Mark I, MD;  Location:  [facility];  Service: Gastroenterology;  Laterality: N/A;   • ENDOSCOPY N/A 3/29/2023    Procedure: ESOPHAGOGASTRODUODENOSCOPY;  Surgeon: Brunner, Mark I, MD;  Location:  [facility];  Service: Gastroenterology;  Laterality: N/A;   • HERNIA REPAIR Bilateral     Inguinal hernia   • HIP TROCHANTERIC NAILING WITH INTRAMEDULLARY HIP SCREW Right 10/18/2022    Procedure: HIP TROCHANTERIC NAILING WITH INTRAMEDULLARY HIP SCREW RIGHT;  Surgeon: Bj Steinberg MD;  Location:  [facility];  Service: Orthopedics;  Laterality: Right;   • THORACOSCOPY VIDEO ASSISTED WITH LOBECTOMY Right 4/12/2023    Procedure: BRONCHOSCOPY, THORACOSCOPY VIDEO ASSISTED WITH DECORTICATION, MECHANICAL PLEURODESIS;  Surgeon: Tim Mahan MD;  Location:  [facility];  Service: Cardiothoracic;  Laterality: Right;       Family History   Problem Relation Age of Onset   • Alzheimer's disease Mother    • Kidney failure Mother    • Cancer Father    • Heart failure Father        Social History     Socioeconomic History   • Marital status: Single   • Number of children: 0   Tobacco Use   • Smoking status: Former     Packs/day: 1.00     Years: 30.00     Pack years: 30.00     Types: Cigarettes     Quit date: 2013     Years since quitting:  10.4   • Smokeless tobacco: Never   • Tobacco comments:     quit 2015   Vaping Use   • Vaping Use: Never used   Substance and Sexual Activity   • Alcohol use: Never   • Drug use: Never   • Sexual activity: Defer       Allergies   Allergen Reactions   • Other Unknown - Low Severity     Mercury metals- as a child         Medication:    Current Facility-Administered Medications:   •  acetaminophen (TYLENOL) tablet 650 mg, 650 mg, Oral, Q4H PRN, Jarrett Buenrostro MD  •  amiodarone (PACERONE) tablet 200 mg, 200 mg, Oral, Q12H, Jarrett Buenrostro MD, 200 mg at 05/31/23 0837  •  apixaban (ELIQUIS) tablet 5 mg, 5 mg, Oral, Q12H, Jarrett Buenrostro MD, 5 mg at 05/31/23 0837  •  aspirin chewable tablet 81 mg, 81 mg, Oral, Daily, Jarrett Buenrostro MD, 81 mg at 05/31/23 0837  •  atorvastatin (LIPITOR) tablet 80 mg, 80 mg, Oral, Nightly, Jarrett Buenrostro MD, 80 mg at 05/30/23 2158  •  sennosides-docusate (PERICOLACE) 8.6-50 MG per tablet 2 tablet, 2 tablet, Oral, BID, 2 tablet at 05/31/23 0837 **AND** polyethylene glycol (MIRALAX) packet 17 g, 17 g, Oral, Daily PRN **AND** [DISCONTINUED] bisacodyl (DULCOLAX) EC tablet 5 mg, 5 mg, Oral, Daily PRN **AND** bisacodyl (DULCOLAX) suppository 10 mg, 10 mg, Rectal, Daily PRN, Jarrett Buenrostro MD  •  Calcium Replacement - Follow Nurse / BPA Driven Protocol, , Does not apply, PRN, Jarrett Buenrostro MD  •  carvedilol (COREG) tablet 25 mg, 25 mg, Oral, BID With Meals, Jarrett Buenrostro MD, 25 mg at 05/31/23 0837  •  cyclobenzaprine (FLEXERIL) tablet 5 mg, 5 mg, Oral, TID PRN, Jarrett Buenrostro MD  •  DAPTOmycin (CUBICIN) 500 mg in sodium chloride 0.9 % 50 mL IVPB, 8 mg/kg, Intravenous, Q24H, Yvonne Foster DO, Last Rate: 100 mL/hr at 05/31/23 1315, 500 mg at 05/31/23 1315  •  dextrose (D50W) (25 g/50 mL) IV injection 50 mL, 50 mL, Intravenous, Q1H PRN, Jarrett Buenrostro MD, 50 mL at 05/25/23 0600  •  empagliflozin (JARDIANCE) tablet 10 mg, 10 mg, Oral,  Daily, Jarrett Buenrostro MD, 10 mg at 05/31/23 0837  •  fluticasone (FLONASE) 50 MCG/ACT nasal spray 2 spray, 2 spray, Nasal, Daily, Jarrett Buenrostro MD, 2 spray at 05/31/23 0838  •  Insulin Lispro (humaLOG) injection 0-9 Units, 0-9 Units, Subcutaneous, 4x Daily With Meals & Nightly, Jarrett Buenrostro MD, 4 Units at 05/29/23 2132  •  ipratropium-albuterol (DUO-NEB) nebulizer solution 3 mL, 3 mL, Nebulization, Q6H PRN, Jarrett Buenrostro MD  •  lisinopril (PRINIVIL,ZESTRIL) tablet 10 mg, 10 mg, Oral, Q24H, Maddie Lindsey APRN, 10 mg at 05/31/23 0837  •  Magnesium Cardiology Dose Replacement - Follow Nurse / BPA Driven Protocol, , Does not apply, PRN, Jarrett Buenrostro MD  •  megestrol (MEGACE) 40 MG/ML suspension 400 mg, 400 mg, Oral, Daily, Yvonne Foster, DO, 400 mg at 05/31/23 0838  •  nitroglycerin (NITROSTAT) SL tablet 0.4 mg, 0.4 mg, Sublingual, Q5 Min PRN, Jarrett Buenrostro MD  •  ondansetron (ZOFRAN) injection 4 mg, 4 mg, Intravenous, Q6H PRN, Jarrett Buenrostro MD  •  pantoprazole (PROTONIX) EC tablet 40 mg, 40 mg, Oral, Q AM, Yvonne Foster, DO, 40 mg at 05/31/23 0541  •  Phosphorus Replacement - Follow Nurse / BPA Driven Protocol, , Does not apply, Porter DOTY Matthew M, MD  •  Potassium Replacement - Follow Nurse / BPA Driven Protocol, , Does not apply, Porter DOTY Matthew M, MD  •  sodium chloride 0.9 % flush 10 mL, 10 mL, Intravenous, PRN, Jarrett Buenrostro MD  •  sodium chloride 0.9 % flush 10 mL, 10 mL, Intravenous, Q12H, Jarrett Buenrostro MD, 10 mL at 05/31/23 0838  •  sodium chloride 0.9 % flush 10 mL, 10 mL, Intravenous, PRN, Jarrett Buenrostro MD  •  sodium chloride 0.9 % infusion 40 mL, 40 mL, Intravenous, PRN, Jarrett Buenrostro MD, 40 mL at 05/26/23 1149  •  terazosin (HYTRIN) capsule 1 mg, 1 mg, Oral, Daily, Jarrett Buenrostro MD, 1 mg at 05/31/23 0837    Antibiotics:  Anti-Infectives (From admission, onward)    Ordered     Dose/Rate Route  Frequency Start Stop    23 0920  DAPTOmycin (CUBICIN) 500 mg in sodium chloride 0.9 % 50 mL IVPB        Ordering Provider: Yvonne Foster DO    8 mg/kg × 65.2 kg  100 mL/hr over 30 Minutes Intravenous Every 24 Hours 23 1100 23 1037    23  piperacillin-tazobactam (ZOSYN) 3.375 g in iso-osmotic dextrose 50 ml (premix)        Ordering Provider: Brady Dumont DO    3.375 g  over 30 Minutes Intravenous Once 23  vancomycin 1250 mg/250 mL 0.9% NS IVPB ([facility])        Ordering Provider: Brady Dumont DO    20 mg/kg × 63.5 kg  over 90 Minutes Intravenous Once 23 0052            Review of Systems:  See HPI    Physical Exam:   Vital Signs  Temp (24hrs), Av.8 °F (36.6 °C), Min:97.6 °F (36.4 °C), Max:98.2 °F (36.8 °C)    Temp  Min: 97.6 °F (36.4 °C)  Max: 98.2 °F (36.8 °C)  BP  Min: 116/66  Max: 164/76  Pulse  Min: 69  Max: 72  Resp  Min: 16  Max: 18  SpO2  Min: 91 %  Max: 94 %    GENERAL: Awake, alert in no acute distress.   HEENT: Normocephalic, atraumatic.  PERRL. EOMI. No conjunctival injection. No icterus.  No external oral lesions  HEART: RRR; No murmur  LUNGS: symmetrical bilaterally   ABDOMEN: Soft, nontender  EXT:  No edema  :  Without Rosenberg catheter.        Laboratory Data    Results from last 7 days   Lab Units 23  0813 23  0516 23  0630   WBC 10*3/mm3 13.08* 11.44* 11.04*   HEMOGLOBIN g/dL 12.2* 11.3* 10.8*   HEMATOCRIT % 37.6 36.2* 34.4*   PLATELETS 10*3/mm3 155 145 148     Results from last 7 days   Lab Units 23  0516   SODIUM mmol/L 139   POTASSIUM mmol/L 4.3   CHLORIDE mmol/L 103   CO2 mmol/L 21.0*   BUN mg/dL 15   CREATININE mg/dL 0.79   GLUCOSE mg/dL 88   CALCIUM mg/dL 8.9                     Results from last 7 days   Lab Units 23  1014   CK TOTAL U/L 14*         Estimated Creatinine Clearance: 73.2 mL/min (by C-G formula based on SCr of 0.79 mg/dL).      Microbiology:  Microbiology  Results (last 10 days)     Procedure Component Value - Date/Time    Blood Culture - Blood, Arm, Left [[id number]]  (Normal) Collected: 05/22/23 1202    Lab Status: Final result Specimen: Blood from Arm, Left Updated: 05/27/23 1346     Blood Culture No growth at 5 days    Blood Culture - Blood, Hand, Left [[id number]]  (Normal) Collected: 05/22/23 1155    Lab Status: Final result Specimen: Blood from Hand, Left Updated: 05/27/23 1346     Blood Culture No growth at 5 days    Respiratory Culture - Sputum, ET Suction [[id number]] Collected: 05/22/23 0020    Lab Status: Final result Specimen: Sputum from ET Suction Updated: 05/24/23 1150     Respiratory Culture Moderate growth (3+) Normal respiratory nathan. No S. aureus or Pseudomonas aeruginosa detected. Final report.     Gram Stain Few (2+) WBCs per low power field      Occasional Epithelial cells per low power field      Few (2+) Budding yeast      Occasional Gram positive cocci                Radiology:  Imaging Results (Last 72 Hours)     ** No results found for the last 72 hours. **            Impression:   Left lower lobe infiltrate from infarction versus infection  Left lower lobe pulmonary embolism  Leukocytosis with neutrophilia  High-grade positive blood culture for staph coccus epidermidis, follow up blood cultures negative.   Elevated procalcitonin  Pacemaker infection  Right atrial endocarditis    PLAN/RECOMMENDATIONS:     Thank you for asking us to see Timmy Guajardo, I recommend the following:    Given the presence indwelling endovascular hardware staphylococcus epidermidis bacteremia is concerning    High-grade positive blood cultures and indwelling hardware probably warrants further investigation    Patient did have leukocytosis and markedly elevated procalcitonin    Await plan for either pacemaker device removal versus angio VAC removal of right atrial mass.    We will discuss further with cardiology          Continue daptomycin 6 mg/kg every 24 hours  may extend this length of treatment to up to 6 weeks  Folllow up bl cultures negative       Patient to be transferred to [facility] for pacemaker removal.    Once all endovascular hardware can be removed I expect patient require 6 weeks of IV antibiotics followed by transition to oral antibiotic therapy  Travon Brito MD  5/31/2023  14:55 EDT

## 2023-05-31 NOTE — PROGRESS NOTES
* No surgery found *       LOS: 12 days   Patient Care Team:  Arsen Seals APRN as PCP - General (Family Medicine)  Tim Mahan MD as Surgeon (Cardiothoracic Surgery)    Chief complaint: Bacteremia    Subjective  No complaints this morning.  No acute events overnight.    Objective    Vital Signs  Temp:  [97.6 °F (36.4 °C)-98.2 °F (36.8 °C)] 98.1 °F (36.7 °C)  Heart Rate:  [69-71] 70  Resp:  [14-18] 18  BP: (124-163)/(66-86) 127/86    Physical Exam:  Frail thin male  General Appearance: alert, appears stated age and cooperative  Lungs: clear bilaterally  Heart: Regular rate and rhythm, AV paced on monitor        Results     Results from last 7 days   Lab Units 05/29/23  0516   WBC 10*3/mm3 11.44*   HEMOGLOBIN g/dL 11.3*   HEMATOCRIT % 36.2*   PLATELETS 10*3/mm3 145     Results from last 7 days   Lab Units 05/29/23  0516   SODIUM mmol/L 139   POTASSIUM mmol/L 4.3   CHLORIDE mmol/L 103   CO2 mmol/L 21.0*   BUN mg/dL 15   CREATININE mg/dL 0.79   GLUCOSE mg/dL 88   CALCIUM mg/dL 8.9         Assessment  Tricuspid valve vegetation with bacteremia and right atrial mass      Plan   Blood thinners for pulmonary embolus  6 weeks of IV antibiotics  Cardiology recommending angiovac vegectomy at , awaiting transfer  Nothing further to add from a surgery standpoint      YUE Damian  05/31/23  07:45 EDT

## 2023-05-31 NOTE — DISCHARGE SUMMARY
[facility] Medicine Services  DISCHARGE SUMMARY    Patient Name: Timmy Guajardo  : 1950  MRN: [id number]    Date of Admission: 2023  3:28 PM  Date of Discharge:  6/3/2023  Primary Care Physician: Arsen Seals APRN    Consults       Date and Time Order Name Status Description    2023 10:17 AM Inpatient Cardiothoracic Surgery Consult Completed     2023  9:20 AM Inpatient Infectious Diseases Consult Completed     2023  4:07 AM Inpatient Cardiology Consult Completed     3/28/2023 12:58 PM Inpatient Gastroenterology Consult Completed     3/26/2023  9:07 AM Inpatient Thoracic Surgery Consult Completed     3/24/2023 10:38 PM Inpatient Hematology & Oncology Consult Completed             Hospital Course     Presenting Problem:     Active Hospital Problems    Diagnosis  POA    **Pneumonia of left lower lobe due to infectious organism [J18.9]  Yes    Severe Malnutrition (HCC) [E43]  Yes    Acute infective endocarditis [I33.0]  Yes    Acute respiratory failure with hypoxia [J96.01]  Yes    Shock, likely multifactorial [R57.9]  Yes    Paroxysmal atrial fibrillation [I48.0]  No    Pulmonary embolus [I26.99]  Yes    GERD (gastroesophageal reflux disease) [K21.9]  Yes    Hyperlipidemia [E78.5]  Yes    HFrEF (heart failure with reduced ejection fraction) [I50.20]  Yes    HTN [I10]  Yes    Cardiac resynchronization therapy defibrillator (CRT-D) in place [Z95.810]  Yes      Resolved Hospital Problems    Diagnosis Date Resolved POA    Cardiomyopathy [I42.9] 2023 Yes    Hypotension [I95.9] 2023 Yes    Confusion [R41.0] 2023 Yes          Hospital Course:  Timmy Guajardo is a 72 y.o. male  with PMHx significant for PE on Eliquis, HTN, dyslipidemia, HFrEF (30% on 10/2022), T2DM, CVA with right-sided residual weakness, and GERD.  He was admitted to the ICU on 2023 as a transfer from the floor.  He was initially admitted with a pulmonary embolism and a  left lower lobe pneumonia versus infarct on 5/19/2023.  The morning of 5/21 he suddenly went into A-fib with RVR became hypotensive with prompted transition to the ICU. He underwent KAT 5/25 due to bacteremia and found to have vegetation on pacer wire and TV. Transferred to the floor 5/25.        A fib RVR-->Resolved  -Continue Coreg, Amiodarone   -On Eliquis  -Cardiology following     Staph epidermidis Bacteremia  Vegetation pacer wire/Tricusupid valve   -KAT 5/25 with vegetation noted on pacer lead and TV  -CTS were following but they signed off.  -ID following, Continue Daptomycin daily, likely 6 weeks   -Repeat blood Cx NGTD  -EP  Dr Quintana evaluated the patient. High risk for transvenous laser extraction and possible transvenous removal of extremely large vegetation adhering both to lead and tricuspid valve.  -Cardiology recommending angiovac vegectomy at .  Cardiology contacted Dr. Pitts at  and patient was accepted by Dr. Pitts . Patent will be transferred to        Hx PE  -On Eliquis     HTN  -Continue Lisinopril     HFrEF  -Continue Coreg  -Continue Jardiance      GERD  -Continue PPI     Chronic Severe Malnutrition   -Continue Megace  -RD following     Discharge Follow Up Recommendations for outpatient labs/diagnostics:       Day of Discharge     HPI:   Resting in bed in no acute distress. No specific complaint. No fever or chills, no CP or SOB at rest, no N/V/D.    Review of Systems  As above    Vital Signs:   Temp:  [97.4 °F (36.3 °C)-98.5 °F (36.9 °C)] 97.7 °F (36.5 °C)  Heart Rate:  [70] 70  Resp:  [14-16] 14  BP: (115-143)/(55-88) 136/68      Physical Exam:  Constitutional: No acute distress  HENT: NCAT, mucous membranes moist  Respiratory: Clear to auscultation bilaterally, respiratory effort normal   Cardiovascular: RRR, No rubs or gallops  Gastrointestinal: Positive bowel sounds, soft, nontender, nondistended  Musculoskeletal: No bilateral ankle edema, low muscle mass.  Psychiatric:  Appropriate affect, cooperative  Neurologic: Awake, alert, oriented x3, no focality appreciated speech clear  Skin: No rashes    Pertinent  and/or Most Recent Results     LAB RESULTS:      Lab 05/31/23  0813 05/29/23  0516 05/28/23  0630   WBC 13.08* 11.44* 11.04*   HEMOGLOBIN 12.2* 11.3* 10.8*   HEMATOCRIT 37.6 36.2* 34.4*   PLATELETS 155 145 148   NEUTROS ABS 10.72*  --   --    IMMATURE GRANS (ABS) 0.12*  --   --    LYMPHS ABS 1.28  --   --    MONOS ABS 0.89  --   --    EOS ABS 0.04  --   --    MCV 93.8 96.5 97.2*         Lab 05/29/23  0516 05/28/23  0630   SODIUM 139 138   POTASSIUM 4.3 4.1   CHLORIDE 103 103   CO2 21.0* 21.0*   ANION GAP 15.0 14.0   BUN 15 14   CREATININE 0.79 0.84   EGFR 95.6 93.8   GLUCOSE 88 83   CALCIUM 8.9 9.0                         Brief Urine Lab Results  (Last result in the past 365 days)        Color   Clarity   Blood   Leuk Est   Nitrite   Protein   CREAT   Urine HCG        05/21/23 0240 Yellow   Turbid   Negative   Negative   Negative   30 mg/dL (1+)                 Microbiology Results (last 10 days)       ** No results found for the last 240 hours. **            Adult Transesophageal Echo (KAT) W/ Cont if Necessary Per Protocol    Result Date: 5/25/2023    There is a large 2.9 x 1.7 cm mass in the right atrium which appears adherent to the atrial aspect of one of the 2 pacemaker leads. The edges are smooth with some peripheral hyperechoic regions. This appears to be more consistent with older thrombus than acute vegetation. Does move in and out of the tricuspid valve plane with the cardiac cycle.   There is a moderate-sized (1.7 mm) mobile mass on the tricuspid valve that is consistent with a vegetation.Mild tricuspid valve regurgitation is present. No evidence of significant tricuspid valve stenosis is present. There is a at least 1.7 cm x 0.6 cm highly mobile vegetation on the tricuspid valve. The attachment point is difficult to discern due to artifact from the surrounding  pacemaker wires however it is freely prolapsing across the valve throughout the cardiac cycle.   Left ventricular systolic function is mildly decreased. Estimated left ventricular EF = 45%   Left ventricular systolic function is mildly decreased. Estimated left ventricular EF = 45% Normal left ventricular cavity size and wall thickness noted. There is left ventricular global hypokinesis noted. Left ventricular diastolic function is consistent with (grade I) impaired relaxation.     XR Chest 1 View    Result Date: 5/24/2023  XR CHEST 1 VW Date of Exam: 5/24/2023 2:26 AM EDT Indication: Intubated Patient Comparison: Chest x-ray 5/23/2023 Findings: Pacemaker device is present. Esophagogastric tube seen past the distal esophagus. Internal jugular central venous catheter tip terminates at the super vena cava. There is perihilar opacity. There are small pleural effusions. There is increased opacity in  the left lower lobe. No pneumothorax.    Impression: Pulmonary edema. Increased consolidation retrocardiac left lower lobe. Stable perihilar opacity left greater than right. Small pleural effusions. Support devices have appropriate position. Electronically Signed: Micaela Horn  5/24/2023 7:06 AM EDT  Workstation ID: [id number]       Results for orders placed during the hospital encounter of 05/04/21    Duplex Lower Extremity Art / Grafts - Left CAR    Interpretation Summary  · Left RADHA is moderately reduced, 0.67  · There is atherosclerotic disease throughout the left lower extremity  · There are abnormal monophasic waveforms starting at the level of the SFA  · The left proximal superficial femoral artery demonstrates 76-99% stenosis.  · The left proximal deep femoral artery demonstrates <50% stenosis.  · The left distal superficial femoral artery demonstrates 50-75% stenosis.  · The infrapopliteal vessels are patent      Results for orders placed during the hospital encounter of 05/04/21    Duplex Lower Extremity Art / Grafts  - Left CAR    Interpretation Summary  · Left RADHA is moderately reduced, 0.67  · There is atherosclerotic disease throughout the left lower extremity  · There are abnormal monophasic waveforms starting at the level of the SFA  · The left proximal superficial femoral artery demonstrates 76-99% stenosis.  · The left proximal deep femoral artery demonstrates <50% stenosis.  · The left distal superficial femoral artery demonstrates 50-75% stenosis.  · The infrapopliteal vessels are patent      Results for orders placed during the hospital encounter of 05/19/23    Adult Transesophageal Echo (KAT) W/ Cont if Necessary Per Protocol    Interpretation Summary    There is a large 2.9 x 1.7 cm mass in the right atrium which appears adherent to the atrial aspect of one of the 2 pacemaker leads. The edges are smooth with some peripheral hyperechoic regions. This appears to be more consistent with older thrombus than acute vegetation. Does move in and out of the tricuspid valve plane with the cardiac cycle.    There is a moderate-sized (1.7 mm) mobile mass on the tricuspid valve that is consistent with a vegetation.Mild tricuspid valve regurgitation is present. No evidence of significant tricuspid valve stenosis is present. There is a at least 1.7 cm x 0.6 cm highly mobile vegetation on the tricuspid valve. The attachment point is difficult to discern due to artifact from the surrounding pacemaker wires however it is freely prolapsing across the valve throughout the cardiac cycle.    Left ventricular systolic function is mildly decreased. Estimated left ventricular EF = 45%    Left ventricular systolic function is mildly decreased. Estimated left ventricular EF = 45% Normal left ventricular cavity size and wall thickness noted. There is left ventricular global hypokinesis noted. Left ventricular diastolic function is consistent with (grade I) impaired relaxation.          Discharge Details        Discharge Medications        New  Medications        Instructions Start Date   amiodarone 200 MG tablet  Commonly known as: PACERONE   200 mg, Oral, Every 12 Hours Scheduled      DAPTOmycin 500 mg in sodium chloride 0.9 % 50 mL   8 mg/kg (500 mg), Intravenous, Every 24 Hours   Start Date: June 4, 2023     empagliflozin 10 MG tablet tablet  Commonly known as: JARDIANCE   10 mg, Oral, Daily   Start Date: June 4, 2023            Changes to Medications        Instructions Start Date   lisinopril 10 MG tablet  Commonly known as: PRINIVILZESTRIL  What changed:   medication strength  how much to take   10 mg, Oral, Every 24 Hours Scheduled   Start Date: June 4, 2023            Continue These Medications        Instructions Start Date   acetaminophen 325 MG tablet  Commonly known as: TYLENOL   650 mg, Oral, Every 8 Hours Scheduled      ALBUTEROL IN   Inhalation      apixaban 5 MG tablet tablet  Commonly known as: ELIQUIS   5 mg, Oral, Every 12 Hours Scheduled      aspirin 81 MG chewable tablet   81 mg, Oral, Daily      atorvastatin 80 MG tablet  Commonly known as: LIPITOR   80 mg, Oral, Nightly      carvedilol 25 MG tablet  Commonly known as: COREG   50 mg, Oral, 2 Times Daily With Meals      cyclobenzaprine 5 MG tablet  Commonly known as: FLEXERIL   5 mg, Oral, 3 Times Daily PRN      docusate sodium 100 MG capsule   100 mg, Oral, 2 Times Daily      famotidine 20 MG tablet  Commonly known as: PEPCID   20 mg, Oral, Daily      fluticasone 50 MCG/ACT nasal spray  Commonly known as: FLONASE   2 sprays, Nasal, Daily      gabapentin 100 MG capsule  Commonly known as: NEURONTIN   100 mg, Oral, 3 Times Daily      Lokelma 10 g pack  Generic drug: sodium zirconium cyclosilicate   10 g, Oral      melatonin 5 MG tablet tablet   5 mg, Oral, Nightly PRN      mirtazapine 7.5 MG half tablet  Commonly known as: REMERON   15 mg, Oral, Nightly      nystatin 100,000 unit/mL suspension  Commonly known as: MYCOSTATIN   2 Times Daily PRN      ondansetron 4 MG tablet  Commonly  known as: ZOFRAN   4 mg, Oral, Every 6 Hours PRN      pantoprazole 40 MG EC tablet  Commonly known as: PROTONIX   40 mg, Oral, Daily, evening      polyethylene glycol 17 g packet  Commonly known as: MIRALAX   17 g, Oral, Daily      PROBIOTIC DAILY PO   Oral      senna 8.6 MG tablet  Commonly known as: SENOKOT   1 tablet, Oral, Daily      terazosin 1 MG capsule  Commonly known as: HYTRIN   1 capsule, Oral      Trulicity 0.75 MG/0.5ML solution pen-injector  Generic drug: Dulaglutide   No dose, route, or frequency recorded.      vitamin C 500 MG tablet  Commonly known as: ASCORBIC ACID   No dose, route, or frequency recorded.      Vitamin D-3 125 MCG (5000 UT) tablet   Oral, Daily               Allergies   Allergen Reactions    Other Unknown - Low Severity     Mercury metals- as a child         Discharge Disposition:transfer to   Transfer to Another Facility    Diet:  Hospital:  Diet Order   Procedures    Diet: Cardiac Diets, Diabetic Diets; Healthy Heart (2-3 Na+); Consistent Carbohydrate; Texture: Soft to Chew (NDD 3); Soft to Chew: Whole Meat; Fluid Consistency: Thin (IDDSI 0)       Activity:  as tolerated           CODE STATUS:    Code Status and Medical Interventions:   Ordered at: 05/20/23 0011     Level Of Support Discussed With:    Patient     Code Status (Patient has no pulse and is not breathing):    CPR (Attempt to Resuscitate)     Medical Interventions (Patient has pulse or is breathing):    Full Support       Future Appointments   Date Time Provider Department Center   7/7/2023 11:00 AM Sancho Helm APRN [facility] [id number] Hensley MD  06/03/23      Time Spent on Discharge:  I spent  35 minutes on this discharge activity which included: face-to-face encounter with the patient, reviewing the data in the system, coordination of the care with the nursing staff as well as consultants, documentation, and entering orders.

## 2023-05-31 NOTE — PROGRESS NOTES
Psychiatric Medicine Services  PROGRESS NOTE    Patient Name: Timmy Guajardo  : 1950  MRN: [id number]    Date of Admission: 2023  Primary Care Physician: Arsen Seals APRN    Subjective   Subjective     CC:  F/U vegetation TV/pacer lead    HPI:  Resting in bed in no acute distress and does not have any specific complaints.  Overall patient is comfortable.    ROS:  Gen- No fevers, chills  CV- No chest pain, palpitations  Resp- No cough, dyspnea  GI- No N/V/D, abd pain    Objective   Objective     Vital Signs:   Temp:  [97.6 °F (36.4 °C)-98.2 °F (36.8 °C)] 97.6 °F (36.4 °C)  Heart Rate:  [69-72] 70  Resp:  [16-18] 18  BP: (116-164)/(66-86) 116/66     Physical Exam:  Constitutional: No acute distress  HENT: NCAT, mucous membranes moist  Respiratory: Clear to auscultation bilaterally, respiratory effort normal 1L NC  Cardiovascular: RRR, No rubs or gallops  Gastrointestinal: Positive bowel sounds, soft, nontender, nondistended  Musculoskeletal: No bilateral ankle edema, low muscle mass.  Psychiatric: Appropriate affect, cooperative  Neurologic: Awake, alert, oriented x3, no focality appreciated speech clear  Skin: No rashes    Results Reviewed:  LAB RESULTS:      Lab 23  0813 23  0516 23  0630 23  0700 23  1020 23  1636   WBC 13.08* 11.44* 11.04* 12.82* 10.77 8.66   HEMOGLOBIN 12.2* 11.3* 10.8* 10.6* 10.2* 10.6*   HEMATOCRIT 37.6 36.2* 34.4* 34.4* 32.9* 34.9*   PLATELETS 155 145 148 156 193 232   NEUTROS ABS 10.72*  --   --  10.66* 9.19* 7.09*   IMMATURE GRANS (ABS) 0.12*  --   --  0.20* 0.10* 0.14*   LYMPHS ABS 1.28  --   --  1.10 0.77 0.81   MONOS ABS 0.89  --   --  0.80 0.66 0.60   EOS ABS 0.04  --   --  0.04 0.03 0.01   MCV 93.8 96.5 97.2* 97.2* 96.8 99.1*         Lab 23  0516 23  0630 23  0700 23  2153 23  1014 23  0501   SODIUM 139 138 138  --  142 145   POTASSIUM 4.3 4.1 4.3 4.3 3.5 3.8    CHLORIDE 103 103 105  --  106 109*   CO2 21.0* 21.0* 20.0*  --  21.0* 22.0   ANION GAP 15.0 14.0 13.0  --  15.0 14.0   BUN 15 14 16  --  17 26*   CREATININE 0.79 0.84 0.87  --  0.95 1.22   EGFR 95.6 93.8 92.8  --  86.1 63.8   GLUCOSE 88 83 106*  --  158* 69   CALCIUM 8.9 9.0 8.5*  --  9.0 9.2   MAGNESIUM  --   --  2.5*  --  1.9 2.2   PHOSPHORUS  --   --   --   --  3.5  --                          Brief Urine Lab Results  (Last result in the past 365 days)      Color   Clarity   Blood   Leuk Est   Nitrite   Protein   CREAT   Urine HCG        05/21/23 0240 Yellow   Turbid   Negative   Negative   Negative   30 mg/dL (1+)                 Microbiology Results Abnormal     Procedure Component Value - Date/Time    Blood Culture - Blood, Hand, Left [[id number]]  (Normal) Collected: 05/22/23 1155    Lab Status: Final result Specimen: Blood from Hand, Left Updated: 05/27/23 1346     Blood Culture No growth at 5 days    Blood Culture - Blood, Arm, Left [[id number]]  (Normal) Collected: 05/22/23 1202    Lab Status: Final result Specimen: Blood from Arm, Left Updated: 05/27/23 1346     Blood Culture No growth at 5 days    Respiratory Culture - Sputum, ET Suction [[id number]] Collected: 05/22/23 0020    Lab Status: Final result Specimen: Sputum from ET Suction Updated: 05/24/23 1150     Respiratory Culture Moderate growth (3+) Normal respiratory nathan. No S. aureus or Pseudomonas aeruginosa detected. Final report.     Gram Stain Few (2+) WBCs per low power field      Occasional Epithelial cells per low power field      Few (2+) Budding yeast      Occasional Gram positive cocci    Urine Culture - Urine, Straight Cath [[id number]]  (Normal) Collected: 05/21/23 0240    Lab Status: Final result Specimen: Urine from Straight Cath Updated: 05/22/23 0816     Urine Culture No growth    S. Pneumo Ag Urine or CSF - Urine, Urine, Clean Catch [[id number]]  (Normal) Collected: 05/21/23 0240    Lab Status: Final result Specimen: Urine, Clean  Catch Updated: 05/21/23 1356     Strep Pneumo Ag Negative    Legionella Antigen, Urine - Urine, Urine, Clean Catch [[id number]]  (Normal) Collected: 05/21/23 0240    Lab Status: Final result Specimen: Urine, Clean Catch Updated: 05/21/23 1356     LEGIONELLA ANTIGEN, URINE Negative    MRSA Screen, PCR (Inpatient) - Swab, Nares [[id number]]  (Normal) Collected: 05/20/23 0523    Lab Status: Final result Specimen: Swab from Nares Updated: 05/20/23 0736     MRSA PCR Negative    Narrative:      The negative predictive value of this diagnostic test is high and should only be used to consider de-escalating anti-MRSA therapy. A positive result may indicate colonization with MRSA and must be correlated clinically.  MRSA Negative          No radiology results from the last 24 hrs    Results for orders placed during the hospital encounter of 05/19/23    Adult Transesophageal Echo (KAT) W/ Cont if Necessary Per Protocol    Interpretation Summary  •  There is a large 2.9 x 1.7 cm mass in the right atrium which appears adherent to the atrial aspect of one of the 2 pacemaker leads. The edges are smooth with some peripheral hyperechoic regions. This appears to be more consistent with older thrombus than acute vegetation. Does move in and out of the tricuspid valve plane with the cardiac cycle.  •  There is a moderate-sized (1.7 mm) mobile mass on the tricuspid valve that is consistent with a vegetation.Mild tricuspid valve regurgitation is present. No evidence of significant tricuspid valve stenosis is present. There is a at least 1.7 cm x 0.6 cm highly mobile vegetation on the tricuspid valve. The attachment point is difficult to discern due to artifact from the surrounding pacemaker wires however it is freely prolapsing across the valve throughout the cardiac cycle.  •  Left ventricular systolic function is mildly decreased. Estimated left ventricular EF = 45%  •  Left ventricular systolic function is mildly decreased. Estimated  left ventricular EF = 45% Normal left ventricular cavity size and wall thickness noted. There is left ventricular global hypokinesis noted. Left ventricular diastolic function is consistent with (grade I) impaired relaxation.      Current medications:  Scheduled Meds:amiodarone, 200 mg, Oral, Q12H  apixaban, 5 mg, Oral, Q12H  aspirin, 81 mg, Oral, Daily  atorvastatin, 80 mg, Oral, Nightly  carvedilol, 25 mg, Oral, BID With Meals  DAPTOmycin, 8 mg/kg, Intravenous, Q24H  empagliflozin, 10 mg, Oral, Daily  fluticasone, 2 spray, Nasal, Daily  Insulin Lispro, 0-9 Units, Subcutaneous, 4x Daily With Meals & Nightly  lisinopril, 10 mg, Oral, Q24H  megestrol, 400 mg, Oral, Daily  pantoprazole, 40 mg, Oral, Q AM  senna-docusate sodium, 2 tablet, Oral, BID  sodium chloride, 10 mL, Intravenous, Q12H  terazosin, 1 mg, Oral, Daily      Continuous Infusions:   PRN Meds:.•  acetaminophen  •  senna-docusate sodium **AND** polyethylene glycol **AND** [DISCONTINUED] bisacodyl **AND** bisacodyl  •  Calcium Replacement - Follow Nurse / BPA Driven Protocol  •  cyclobenzaprine  •  dextrose  •  ipratropium-albuterol  •  Magnesium Cardiology Dose Replacement - Follow Nurse / BPA Driven Protocol  •  nitroglycerin  •  ondansetron  •  Phosphorus Replacement - Follow Nurse / BPA Driven Protocol  •  Potassium Replacement - Follow Nurse / BPA Driven Protocol  •  sodium chloride  •  sodium chloride  •  sodium chloride    Assessment & Plan   Assessment & Plan     Active Hospital Problems    Diagnosis  POA   • **Pneumonia of left lower lobe due to infectious organism [J18.9]  Yes   • Severe Malnutrition (HCC) [E43]  Yes   • Acute infective endocarditis [I33.0]  Yes   • Acute respiratory failure with hypoxia [J96.01]  Yes   • Shock, likely multifactorial [R57.9]  Yes   • Paroxysmal atrial fibrillation [I48.0]  No   • Pulmonary embolus [I26.99]  Yes   • GERD (gastroesophageal reflux disease) [K21.9]  Yes   • Hyperlipidemia [E78.5]  Yes   • HFrEF  (heart failure with reduced ejection fraction) [I50.20]  Yes   • HTN [I10]  Yes   • Cardiac resynchronization therapy defibrillator (CRT-D) in place [Z95.810]  Yes      Resolved Hospital Problems    Diagnosis Date Resolved POA   • Cardiomyopathy [I42.9] 05/28/2023 Yes   • Hypotension [I95.9] 05/28/2023 Yes   • Confusion [R41.0] 05/28/2023 Yes        Brief Hospital Course to date:  Timmy Guajardo is a 72 y.o. male with PMHx significant for PE on Eliquis, HTN, dyslipidemia, HFrEF (30% on 10/2022), T2DM, CVA with right-sided residual weakness, and GERD.  He was admitted to the ICU on 5/21/2023 as a transfer from the floor.  He was initially admitted with a pulmonary embolism and a left lower lobe pneumonia versus infarct on 5/19/2023.  The morning of 5/21 he suddenly went into A-fib with RVR became hypotensive with prompted transition to the ICU. He underwent KAT 5/25 due to bacteremia and found to have vegetation on pacer wire and TV. Transferred to the floor 5/25.     This patient's problems and plans were partially entered by my partner and updated as appropriate by me 05/31/23.    A fib RVR-->Resolved  -Continue Coreg, Amiodarone   -On Eliquis  -Cardiology following    Staph epidermidis Bacteremia  Vegetation pacer wire/Tricusupid valve   -KAT 5/25 with vegetation noted on pacer lead and TV  -CTS were following but they signed off.  -ID following, Continue Daptomycin daily, likely 6 weeks   -EP following, Dr Quintana. High risk for transvenous laser extraction and possible transvenous removal of extremely large vegetation adhering both to lead and tricuspid valve.  -Cardiology recommending angiovac vegectomy at . Dr. Mueller has contacted  and talked to Dr. Pitts who has accepted the patient. Patient will be transferred to  when bed available.  -Repeat blood Cx NGTD    Hx PE  -On Eliquis, may need to place on heparin gtt pending timing of procedures (defer to CTS/EP)     HTN  -Continue  Lisinopril    HFrEF  -Continue Coreg  -Continue Jardiance     GERD  -Continue PPI    Chronic Severe Malnutrition   -Continue Megace  -RD following     CVC in place  -Peripheral IV in place but patient very hard stick. Leave CVC in place until PICC placed as patient will need long term ABX.       Expected Discharge Location and Transportation: [facility]  Expected Discharge   Expected Discharge Date: 6/2/2023; Expected Discharge Time:      DVT prophylaxis:  Medical DVT prophylaxis orders are present.     AM-PAC 6 Clicks Score (PT): 12 (05/31/23 0800)    CODE STATUS:   Code Status and Medical Interventions:   Ordered at: 05/20/23 0011     Level Of Support Discussed With:    Patient     Code Status (Patient has no pulse and is not breathing):    CPR (Attempt to Resuscitate)     Medical Interventions (Patient has pulse or is breathing):    Full Support       Trell Leonardo MD  05/31/23

## 2023-05-31 NOTE — CASE MANAGEMENT/SOCIAL WORK
Continued Stay Note  [facility]     Patient Name: Timmy Guajardo  MRN: [id number]  Today's Date: 5/31/2023    Admit Date: 5/19/2023    Plan:    Discharge Plan     Row Name 05/31/23 0958       Plan    Plan     Patient/Family in Agreement with Plan other (see comments)    Plan Comments I spoke w/Adilia on  Bed Availability [facility]ne [phone], pt has not been accepted by  yet, and pt has no bed assignment, advised not to arrange transportation. They have started process thru [facility]. Updated Dr. Leonardo in MDR. Pt aware and agreeable, asked me to call his sister, left  for her to return call. His sister, Mary Ellen Miranda returned my call and was updated to above plan.     Final Discharge Disposition Code 02 - short term hospital for  care               Discharge Codes    No documentation.               Expected Discharge Date and Time     Expected Discharge Date Expected Discharge Time    Jun 2, 2023             Varinder Martinez RN

## 2023-05-31 NOTE — PLAN OF CARE
Goal Outcome Evaluation:           Progress: no change  Outcome Evaluation: PT VSS, NSR, A/Ox4, RA sats >92%. PT had no C/O pain or SOA during the shift. Continued to receive IV ABX. Waiting on a bed placement at  for Angiovac Vegectomy. Care ongoing.

## 2023-05-31 NOTE — PROGRESS NOTES
INFECTIOUS DISEASE f/u     Timmy Guajardo  1950  [id number]    Date of Consult: 5/23/2023    Admission Date: 5/19/2023      Requesting Provider: No ref. provider found  Evaluating Physician: Travon Brito MD    Reason for Consultation: Positive blood cultures    History of present illness:    Patient is a 72 y.o. male with past medical history of pulmonary embolism, dyslipidemia, heart failure, type II d. mellitus, CVA with right-sided residual weakness and GERD.      Patient admitted to hospital for pulmonary embolism left lower lobe pneumonia versus infarction on May/9/2023 patient had atrial fibrillation with RVR with hypotension has been transferred the ICU.      Patient found have positive blood cultures for staph coccus epidermidis.  We are being consulted for further interpretation of these positive blood cultures.    Patient reports having indwelling pacemaker/ ICD patient says this is fired 1 time since he has had it placed        5/24/23; no events overnight Noemy antibiotics no complaints    5/25/2023 patient had KAT which was remarkable for right atrial mass and vegetation on pacemaker lead patient feels well no complaints no events overnight    5/26/23; no events overnight; no fever, rash, sore throat    5/27/23; patient is doing well tolerating antibiotics no events overnight denies fevers rash or sore throat    5/28/23; no evetts overnight; afebrile normotensive    5/29/23: No new events.  Denies f/c, sob, n/v/d, rashes, or sore throat.  No pain around ppm site.     5/30/23 patient is doing well no fevers rash or sore throat    Past Medical History:   Diagnosis Date   • Cardiomyopathy    • CHF (congestive heart failure)    • Coronary artery disease    • COVID-19    • Diabetes mellitus    • GERD (gastroesophageal reflux disease)    • HTN (hypertension)    • Pleural effusion    • Stroke     Right sided weakness   • Stroke        Past Surgical History:   Procedure Laterality Date   • AMPUTATION  DIGIT Left 5/12/2021    Procedure: AMPUTATION DIGIT - GREAT TOE AMPUTATION LEFT;  Surgeon: Dario Marmolejo MD;  Location:  [facility];  Service: General;  Laterality: Left;   • AORTAGRAM N/A 5/11/2021    Procedure: AORTAGRAM WITH RUNOFFS, LEFT SFA BALLOON ANGIOPLASTY;  Surgeon: Tim Mahan MD;  Location:  [facility];  Service: Vascular;  Laterality: N/A;  FL TIME 6 MIN 54 SECS  82 MGY  CONTRAST VISIPAQUE 100ML     • CARDIAC CATHETERIZATION     • CARDIAC PACEMAKER PLACEMENT      pacemaker/defibrillator, [employer]   • COLONOSCOPY N/A 3/31/2023    Procedure: COLONOSCOPY;  Surgeon: Brunner, Mark I, MD;  Location:  [facility];  Service: Gastroenterology;  Laterality: N/A;   • ENDOSCOPY N/A 3/29/2023    Procedure: ESOPHAGOGASTRODUODENOSCOPY;  Surgeon: Brunner, Mark I, MD;  Location:  [facility];  Service: Gastroenterology;  Laterality: N/A;   • HERNIA REPAIR Bilateral     Inguinal hernia   • HIP TROCHANTERIC NAILING WITH INTRAMEDULLARY HIP SCREW Right 10/18/2022    Procedure: HIP TROCHANTERIC NAILING WITH INTRAMEDULLARY HIP SCREW RIGHT;  Surgeon: Bj Steinberg MD;  Location:  [facility];  Service: Orthopedics;  Laterality: Right;   • THORACOSCOPY VIDEO ASSISTED WITH LOBECTOMY Right 4/12/2023    Procedure: BRONCHOSCOPY, THORACOSCOPY VIDEO ASSISTED WITH DECORTICATION, MECHANICAL PLEURODESIS;  Surgeon: Tim Mahan MD;  Location:  [facility];  Service: Cardiothoracic;  Laterality: Right;       Family History   Problem Relation Age of Onset   • Alzheimer's disease Mother    • Kidney failure Mother    • Cancer Father    • Heart failure Father        Social History     Socioeconomic History   • Marital status: Single   • Number of children: 0   Tobacco Use   • Smoking status: Former     Packs/day: 1.00     Years: 30.00     Pack years: 30.00     Types: Cigarettes     Quit date: 2013     Years since quitting: 10.4   • Smokeless tobacco: Never   • Tobacco comments:     quit 2015   Vaping Use   • Vaping  Use: Never used   Substance and Sexual Activity   • Alcohol use: Never   • Drug use: Never   • Sexual activity: Defer       Allergies   Allergen Reactions   • Other Unknown - Low Severity     Mercury metals- as a child         Medication:    Current Facility-Administered Medications:   •  acetaminophen (TYLENOL) tablet 650 mg, 650 mg, Oral, Q4H PRN, Jarrett Buenrostro MD  •  amiodarone (PACERONE) tablet 200 mg, 200 mg, Oral, Q12H, Jarrett Buenrostro MD, 200 mg at 05/30/23 2159  •  apixaban (ELIQUIS) tablet 5 mg, 5 mg, Oral, Q12H, Jarrett Buenrostro MD, 5 mg at 05/30/23 2159  •  aspirin chewable tablet 81 mg, 81 mg, Oral, Daily, Jarrett Buenrostro MD, 81 mg at 05/30/23 0924  •  atorvastatin (LIPITOR) tablet 80 mg, 80 mg, Oral, Nightly, Jarrett Buenrostro MD, 80 mg at 05/30/23 2158  •  sennosides-docusate (PERICOLACE) 8.6-50 MG per tablet 2 tablet, 2 tablet, Oral, BID, 2 tablet at 05/28/23 2016 **AND** polyethylene glycol (MIRALAX) packet 17 g, 17 g, Oral, Daily PRN **AND** [DISCONTINUED] bisacodyl (DULCOLAX) EC tablet 5 mg, 5 mg, Oral, Daily PRN **AND** bisacodyl (DULCOLAX) suppository 10 mg, 10 mg, Rectal, Daily PRN, Jarrett Buenrostro MD  •  Calcium Replacement - Follow Nurse / BPA Driven Protocol, , Does not apply, PRN, Jarrett Buenrostro MD  •  carvedilol (COREG) tablet 25 mg, 25 mg, Oral, BID With Meals, Jarrett Buenrostro MD, 25 mg at 05/30/23 1756  •  cyclobenzaprine (FLEXERIL) tablet 5 mg, 5 mg, Oral, TID PRN, Jarrett Buenrostro MD  •  DAPTOmycin (CUBICIN) 500 mg in sodium chloride 0.9 % 50 mL IVPB, 8 mg/kg, Intravenous, Q24H, Foster, Yvonne D, DO, Last Rate: 100 mL/hr at 05/30/23 1220, 500 mg at 05/30/23 1220  •  dextrose (D50W) (25 g/50 mL) IV injection 50 mL, 50 mL, Intravenous, Q1H PRN, Jarrett Buenrostro MD, 50 mL at 05/25/23 0600  •  empagliflozin (JARDIANCE) tablet 10 mg, 10 mg, Oral, Daily, Jarrett Buenrostro MD, 10 mg at 05/30/23 0924  •  fluticasone (FLONASE) 50 MCG/ACT nasal  spray 2 spray, 2 spray, Nasal, Daily, Jarrett Buenrostro MD, 2 spray at 05/30/23 0923  •  Insulin Lispro (humaLOG) injection 0-9 Units, 0-9 Units, Subcutaneous, 4x Daily With Meals & Nightly, Jarrett Buenrsotro MD, 4 Units at 05/29/23 2132  •  ipratropium-albuterol (DUO-NEB) nebulizer solution 3 mL, 3 mL, Nebulization, Q6H PRN, Jarrett Buenrostro MD  •  lisinopril (PRINIVIL,ZESTRIL) tablet 10 mg, 10 mg, Oral, Q24H, Maddie Lindsey APRN, 10 mg at 05/30/23 0924  •  Magnesium Cardiology Dose Replacement - Follow Nurse / BPA Driven Protocol, , Does not apply, PRN, Jarrett Buenrostro MD  •  megestrol (MEGACE) 40 MG/ML suspension 400 mg, 400 mg, Oral, Daily, Yvonne Foster, DO, 400 mg at 05/30/23 0923  •  nitroglycerin (NITROSTAT) SL tablet 0.4 mg, 0.4 mg, Sublingual, Q5 Min PRN, Jarrett Buenrostro MD  •  ondansetron (ZOFRAN) injection 4 mg, 4 mg, Intravenous, Q6H PRN, Jarrett Buenrostro MD  •  pantoprazole (PROTONIX) EC tablet 40 mg, 40 mg, Oral, Q AM, Yvonne Foster, DO, 40 mg at 05/30/23 0650  •  Phosphorus Replacement - Follow Nurse / BPA Driven Protocol, , Does not apply, PRN, Jarrett Buenrostro MD  •  Potassium Replacement - Follow Nurse / BPA Driven Protocol, , Does not apply, PRN, Jarrett Buenrostro MD  •  sodium chloride 0.9 % flush 10 mL, 10 mL, Intravenous, PRN, Jarrett Buenrostro MD  •  sodium chloride 0.9 % flush 10 mL, 10 mL, Intravenous, Q12H, Jarrett Buenrostro MD, 10 mL at 05/30/23 2200  •  sodium chloride 0.9 % flush 10 mL, 10 mL, Intravenous, PRN, Jarrett Buenrostro MD  •  sodium chloride 0.9 % infusion 40 mL, 40 mL, Intravenous, PRN, Jarrtet Buenrostro MD, 40 mL at 05/26/23 1149  •  terazosin (HYTRIN) capsule 1 mg, 1 mg, Oral, Daily, Jarrett Buenrostro MD, 1 mg at 05/30/23 0924    Antibiotics:  Anti-Infectives (From admission, onward)    Ordered     Dose/Rate Route Frequency Start Stop    05/23/23 0920  DAPTOmycin (CUBICIN) 500 mg in sodium chloride 0.9 % 50 mL  IVPB        Ordering Provider: Yvonne Foster DO    8 mg/kg × 65.2 kg  100 mL/hr over 30 Minutes Intravenous Every 24 Hours 23 1100 23 1037    23  piperacillin-tazobactam (ZOSYN) 3.375 g in iso-osmotic dextrose 50 ml (premix)        Ordering Provider: Brady Dumont DO    3.375 g  over 30 Minutes Intravenous Once 233    23  vancomycin 1250 mg/250 mL 0.9% NS IVPB ([facility])        Ordering Provider: Brady Dumont DO    20 mg/kg × 63.5 kg  over 90 Minutes Intravenous Once 23 005            Review of Systems:  See HPI    Physical Exam:   Vital Signs  Temp (24hrs), Av.9 °F (36.6 °C), Min:97.6 °F (36.4 °C), Max:98.2 °F (36.8 °C)    Temp  Min: 97.6 °F (36.4 °C)  Max: 98.2 °F (36.8 °C)  BP  Min: 124/66  Max: 163/78  Pulse  Min: 69  Max: 71  Resp  Min: 14  Max: 18  SpO2  Min: 90 %  Max: 94 %    GENERAL: Awake, alert in no acute distress.   HEENT: Normocephalic, atraumatic.  PERRL. EOMI. No conjunctival injection. No icterus.  No external oral lesions  HEART: RRR; No murmur  LUNGS: symmetrical bilaterally   ABDOMEN: Soft, nontender  EXT:  No edema  :  Without Rosenberg catheter.  MSK: No joint effusions or erythema  SKIN: Warm and dry without cutaneous eruptions on Inspection/palpation.        Laboratory Data    Results from last 7 days   Lab Units 23  0516 23  0630 23  0700   WBC 10*3/mm3 11.44* 11.04* 12.82*   HEMOGLOBIN g/dL 11.3* 10.8* 10.6*   HEMATOCRIT % 36.2* 34.4* 34.4*   PLATELETS 10*3/mm3 145 148 156     Results from last 7 days   Lab Units 23  0516   SODIUM mmol/L 139   POTASSIUM mmol/L 4.3   CHLORIDE mmol/L 103   CO2 mmol/L 21.0*   BUN mg/dL 15   CREATININE mg/dL 0.79   GLUCOSE mg/dL 88   CALCIUM mg/dL 8.9                     Results from last 7 days   Lab Units 23  1014   CK TOTAL U/L 14*         Estimated Creatinine Clearance: 73.7 mL/min (by C-G formula based on SCr of 0.79  mg/dL).      Microbiology:  Microbiology Results (last 10 days)     Procedure Component Value - Date/Time    Blood Culture - Blood, Arm, Left [[id number]]  (Normal) Collected: 05/22/23 1202    Lab Status: Final result Specimen: Blood from Arm, Left Updated: 05/27/23 1346     Blood Culture No growth at 5 days    Blood Culture - Blood, Hand, Left [[id number]]  (Normal) Collected: 05/22/23 1155    Lab Status: Final result Specimen: Blood from Hand, Left Updated: 05/27/23 1346     Blood Culture No growth at 5 days    Respiratory Culture - Sputum, ET Suction [[id number]] Collected: 05/22/23 0020    Lab Status: Final result Specimen: Sputum from ET Suction Updated: 05/24/23 1150     Respiratory Culture Moderate growth (3+) Normal respiratory nathan. No S. aureus or Pseudomonas aeruginosa detected. Final report.     Gram Stain Few (2+) WBCs per low power field      Occasional Epithelial cells per low power field      Few (2+) Budding yeast      Occasional Gram positive cocci                Radiology:  Imaging Results (Last 72 Hours)     ** No results found for the last 72 hours. **            Impression:   Left lower lobe infiltrate from infarction versus infection  Left lower lobe pulmonary embolism  Leukocytosis with neutrophilia  High-grade positive blood culture for staph coccus epidermidis, follow up blood cultures negative.   Elevated procalcitonin  Pacemaker infection  Right atrial endocarditis    PLAN/RECOMMENDATIONS:     Thank you for asking us to see Timmy Guajardo, I recommend the following:    Given the presence indwelling endovascular hardware staphylococcus epidermidis bacteremia is concerning    High-grade positive blood cultures and indwelling hardware probably warrants further investigation    Patient did have leukocytosis and markedly elevated procalcitonin    Await plan for either pacemaker device removal versus angio VAC removal of right atrial mass.    We will discuss further with  cardiology          Continue daptomycin 6 mg/kg every 24 hours may extend this length of treatment to up to 6 weeks  Folllow up bl cultures negative        Copied text in this note has been reviewed and is accurate as of 05/31/23    Travon Brito MD saw and examined patient, verified hx and PE, read pertinent radiographic studies, reviewed labs and micro data, and formulated dx, plan for treatment and all medical decision making.      FUNMI BlountC for MD Travon Abreu MD  5/31/2023  03:56 EDT

## 2023-06-01 LAB
GLUCOSE BLDC GLUCOMTR-MCNC: 129 MG/DL (ref 70–130)
GLUCOSE BLDC GLUCOMTR-MCNC: 188 MG/DL (ref 70–130)
GLUCOSE BLDC GLUCOMTR-MCNC: 218 MG/DL (ref 70–130)
GLUCOSE BLDC GLUCOMTR-MCNC: 95 MG/DL (ref 70–130)

## 2023-06-01 PROCEDURE — 82948 REAGENT STRIP/BLOOD GLUCOSE: CPT

## 2023-06-01 PROCEDURE — 63710000001 INSULIN LISPRO (HUMAN) PER 5 UNITS: Performed by: INTERNAL MEDICINE

## 2023-06-01 PROCEDURE — 99232 SBSQ HOSP IP/OBS MODERATE 35: CPT | Performed by: PHYSICIAN ASSISTANT

## 2023-06-01 PROCEDURE — 97110 THERAPEUTIC EXERCISES: CPT

## 2023-06-01 PROCEDURE — 25010000002 DAPTOMYCIN PER 1 MG: Performed by: FAMILY MEDICINE

## 2023-06-01 PROCEDURE — 99232 SBSQ HOSP IP/OBS MODERATE 35: CPT | Performed by: INTERNAL MEDICINE

## 2023-06-01 RX ORDER — MEGESTROL ACETATE 40 MG/ML
400 SUSPENSION ORAL
Status: DISCONTINUED | OUTPATIENT
Start: 2023-06-01 | End: 2023-06-03 | Stop reason: HOSPADM

## 2023-06-01 RX ADMIN — CARVEDILOL 25 MG: 12.5 TABLET, FILM COATED ORAL at 09:27

## 2023-06-01 RX ADMIN — AMIODARONE HYDROCHLORIDE 200 MG: 200 TABLET ORAL at 20:58

## 2023-06-01 RX ADMIN — INSULIN LISPRO 4 UNITS: 100 INJECTION, SOLUTION INTRAVENOUS; SUBCUTANEOUS at 20:58

## 2023-06-01 RX ADMIN — INSULIN LISPRO 2 UNITS: 100 INJECTION, SOLUTION INTRAVENOUS; SUBCUTANEOUS at 12:51

## 2023-06-01 RX ADMIN — APIXABAN 5 MG: 5 TABLET, FILM COATED ORAL at 09:27

## 2023-06-01 RX ADMIN — ASPIRIN 81 MG 81 MG: 81 TABLET ORAL at 09:28

## 2023-06-01 RX ADMIN — PANTOPRAZOLE SODIUM 40 MG: 40 TABLET, DELAYED RELEASE ORAL at 06:10

## 2023-06-01 RX ADMIN — ATORVASTATIN CALCIUM 80 MG: 40 TABLET, FILM COATED ORAL at 20:58

## 2023-06-01 RX ADMIN — Medication 10 ML: at 09:32

## 2023-06-01 RX ADMIN — CARVEDILOL 25 MG: 12.5 TABLET, FILM COATED ORAL at 17:38

## 2023-06-01 RX ADMIN — DAPTOMYCIN 500 MG: 500 INJECTION, POWDER, LYOPHILIZED, FOR SOLUTION INTRAVENOUS at 12:50

## 2023-06-01 RX ADMIN — APIXABAN 5 MG: 5 TABLET, FILM COATED ORAL at 20:58

## 2023-06-01 RX ADMIN — DOCUSATE SODIUM 50 MG AND SENNOSIDES 8.6 MG 2 TABLET: 8.6; 5 TABLET, FILM COATED ORAL at 09:28

## 2023-06-01 RX ADMIN — Medication 10 ML: at 20:59

## 2023-06-01 RX ADMIN — MEGESTROL ACETATE 400 MG: 40 SUSPENSION ORAL at 09:28

## 2023-06-01 RX ADMIN — TERAZOSIN HYDROCHLORIDE 1 MG: 1 CAPSULE ORAL at 09:28

## 2023-06-01 RX ADMIN — FLUTICASONE PROPIONATE 2 SPRAY: 50 SPRAY, METERED NASAL at 09:31

## 2023-06-01 RX ADMIN — AMIODARONE HYDROCHLORIDE 200 MG: 200 TABLET ORAL at 09:28

## 2023-06-01 RX ADMIN — LISINOPRIL 10 MG: 10 TABLET ORAL at 09:28

## 2023-06-01 RX ADMIN — DOCUSATE SODIUM 50 MG AND SENNOSIDES 8.6 MG 2 TABLET: 8.6; 5 TABLET, FILM COATED ORAL at 20:58

## 2023-06-01 RX ADMIN — EMPAGLIFLOZIN 10 MG: 10 TABLET, FILM COATED ORAL at 09:28

## 2023-06-01 NOTE — CASE MANAGEMENT/SOCIAL WORK
Continued Stay Note  [facility]     Patient Name: Timmy Guajardo  MRN: [id number]  Today's Date: 6/1/2023    Admit Date: 5/19/2023    Plan:    Discharge Plan     Row Name 06/01/23 0837       Plan    Plan     Patient/Family in Agreement with Plan yes    Plan Comments I called  Bed Availability line, pt has been accepted by Dr. Tatum, cardiologist, but has no bed availability @ this time.    Final Discharge Disposition Code 02 - short term hospital for  care               Discharge Codes    No documentation.               Expected Discharge Date and Time     Expected Discharge Date Expected Discharge Time    Jun 2, 2023             Varinder Martinez RN

## 2023-06-01 NOTE — PROGRESS NOTES
INFECTIOUS DISEASE f/u     Timmy Guajardo  1950  [id number]    Date of Consult: 5/23/2023    Admission Date: 5/19/2023      Requesting Provider: No ref. provider found  Evaluating Physician: Travon Brito MD    Reason for Consultation: Positive blood cultures    History of present illness:    Patient is a 72 y.o. male with past medical history of pulmonary embolism, dyslipidemia, heart failure, type II d. mellitus, CVA with right-sided residual weakness and GERD.      Patient admitted to hospital for pulmonary embolism left lower lobe pneumonia versus infarction on May/9/2023 patient had atrial fibrillation with RVR with hypotension has been transferred the ICU.      Patient found have positive blood cultures for staph coccus epidermidis.  We are being consulted for further interpretation of these positive blood cultures.    Patient reports having indwelling pacemaker/ ICD patient says this is fired 1 time since he has had it placed        5/24/23; no events overnight Noemy antibiotics no complaints    5/25/2023 patient had KAT which was remarkable for right atrial mass and vegetation on pacemaker lead patient feels well no complaints no events overnight    5/26/23; no events overnight; no fever, rash, sore throat    5/27/23; patient is doing well tolerating antibiotics no events overnight denies fevers rash or sore throat    5/28/23; no evetts overnight; afebrile normotensive    5/29/23: No new events.  Denies f/c, sob, n/v/d, rashes, or sore throat.  No pain around ppm site.     5/30/23 patient is doing well no fevers rash or sore throat  5/31/2023 patient to be transferred to  no events overnight afebrile normotensive    6/1/23; awaiting transfer to [facility] down the street.  Afebrile normotensive, no complaints  Past Medical History:   Diagnosis Date   • Cardiomyopathy    • CHF (congestive heart failure)    • Coronary artery disease    • COVID-19    • Diabetes mellitus    • GERD  (gastroesophageal reflux disease)    • HTN (hypertension)    • Pleural effusion    • Stroke     Right sided weakness   • Stroke        Past Surgical History:   Procedure Laterality Date   • AMPUTATION DIGIT Left 5/12/2021    Procedure: AMPUTATION DIGIT - GREAT TOE AMPUTATION LEFT;  Surgeon: Dario Marmolejo MD;  Location:  [facility];  Service: General;  Laterality: Left;   • AORTAGRAM N/A 5/11/2021    Procedure: AORTAGRAM WITH RUNOFFS, LEFT SFA BALLOON ANGIOPLASTY;  Surgeon: Tim Mahan MD;  Location:  [facility];  Service: Vascular;  Laterality: N/A;  FL TIME 6 MIN 54 SECS  82 MGY  CONTRAST VISIPAQUE 100ML     • CARDIAC CATHETERIZATION     • CARDIAC PACEMAKER PLACEMENT      pacemaker/defibrillator, [employer]   • COLONOSCOPY N/A 3/31/2023    Procedure: COLONOSCOPY;  Surgeon: Brunner, Mark I, MD;  Location:  [facility];  Service: Gastroenterology;  Laterality: N/A;   • ENDOSCOPY N/A 3/29/2023    Procedure: ESOPHAGOGASTRODUODENOSCOPY;  Surgeon: Brunner, Mark I, MD;  Location:  [facility];  Service: Gastroenterology;  Laterality: N/A;   • HERNIA REPAIR Bilateral     Inguinal hernia   • HIP TROCHANTERIC NAILING WITH INTRAMEDULLARY HIP SCREW Right 10/18/2022    Procedure: HIP TROCHANTERIC NAILING WITH INTRAMEDULLARY HIP SCREW RIGHT;  Surgeon: Bj Steinberg MD;  Location:  [facility];  Service: Orthopedics;  Laterality: Right;   • THORACOSCOPY VIDEO ASSISTED WITH LOBECTOMY Right 4/12/2023    Procedure: BRONCHOSCOPY, THORACOSCOPY VIDEO ASSISTED WITH DECORTICATION, MECHANICAL PLEURODESIS;  Surgeon: Tim Mahan MD;  Location:  [facility];  Service: Cardiothoracic;  Laterality: Right;       Family History   Problem Relation Age of Onset   • Alzheimer's disease Mother    • Kidney failure Mother    • Cancer Father    • Heart failure Father        Social History     Socioeconomic History   • Marital status: Single   • Number of children: 0   Tobacco Use   • Smoking status: Former     Packs/day: 1.00      Years: 30.00     Pack years: 30.00     Types: Cigarettes     Quit date: 2013     Years since quitting: 10.4   • Smokeless tobacco: Never   • Tobacco comments:     quit 2015   Vaping Use   • Vaping Use: Never used   Substance and Sexual Activity   • Alcohol use: Never   • Drug use: Never   • Sexual activity: Defer       Allergies   Allergen Reactions   • Other Unknown - Low Severity     Mercury metals- as a child         Medication:    Current Facility-Administered Medications:   •  acetaminophen (TYLENOL) tablet 650 mg, 650 mg, Oral, Q4H PRN, Jarrett Buenrostro MD  •  amiodarone (PACERONE) tablet 200 mg, 200 mg, Oral, Q12H, Jarrett Buenrostro MD, 200 mg at 06/01/23 0928  •  apixaban (ELIQUIS) tablet 5 mg, 5 mg, Oral, Q12H, Jarrett Buenrostro MD, 5 mg at 06/01/23 0927  •  aspirin chewable tablet 81 mg, 81 mg, Oral, Daily, Jarrett Buenrostro MD, 81 mg at 06/01/23 0928  •  atorvastatin (LIPITOR) tablet 80 mg, 80 mg, Oral, Nightly, Jarrett Buenrostro MD, 80 mg at 05/31/23 2120  •  sennosides-docusate (PERICOLACE) 8.6-50 MG per tablet 2 tablet, 2 tablet, Oral, BID, 2 tablet at 06/01/23 0928 **AND** polyethylene glycol (MIRALAX) packet 17 g, 17 g, Oral, Daily PRN **AND** [DISCONTINUED] bisacodyl (DULCOLAX) EC tablet 5 mg, 5 mg, Oral, Daily PRN **AND** bisacodyl (DULCOLAX) suppository 10 mg, 10 mg, Rectal, Daily PRN, Jarrett Buenrostro MD  •  Calcium Replacement - Follow Nurse / BPA Driven Protocol, , Does not apply, PRN, Jarrett Buenrostro MD  •  carvedilol (COREG) tablet 25 mg, 25 mg, Oral, BID With Meals, Jarrett Buenrostro MD, 25 mg at 06/01/23 0927  •  cyclobenzaprine (FLEXERIL) tablet 5 mg, 5 mg, Oral, TID PRN, Jarrett Buenrostro MD  •  DAPTOmycin (CUBICIN) 500 mg in sodium chloride 0.9 % 50 mL IVPB, 8 mg/kg, Intravenous, Q24H, Travon Brito MD, Last Rate: 100 mL/hr at 06/01/23 1250, 500 mg at 06/01/23 1250  •  dextrose (D50W) (25 g/50 mL) IV injection 50 mL, 50 mL, Intravenous, Q1H PRN,  Jarrett Buenrostro MD, 50 mL at 05/25/23 0600  •  empagliflozin (JARDIANCE) tablet 10 mg, 10 mg, Oral, Daily, Jarrett Buenrostro MD, 10 mg at 06/01/23 0928  •  fluticasone (FLONASE) 50 MCG/ACT nasal spray 2 spray, 2 spray, Nasal, Daily, Jarrett Buenrostro MD, 2 spray at 06/01/23 0931  •  Insulin Lispro (humaLOG) injection 0-9 Units, 0-9 Units, Subcutaneous, 4x Daily With Meals & Nightly, Jarrett Buenrostro MD, 2 Units at 06/01/23 1251  •  ipratropium-albuterol (DUO-NEB) nebulizer solution 3 mL, 3 mL, Nebulization, Q6H PRN, Jarrett Buenrostro MD  •  lisinopril (PRINIVIL,ZESTRIL) tablet 10 mg, 10 mg, Oral, Q24H, Maddie Lindsey APRN, 10 mg at 06/01/23 0928  •  Magnesium Cardiology Dose Replacement - Follow Nurse / BPA Driven Protocol, , Does not apply, PRN, Jarrett Buenrostro MD  •  megestrol (MEGACE) 40 MG/ML suspension 400 mg, 400 mg, Oral, BID AC, Trell Leonardo MD  •  nitroglycerin (NITROSTAT) SL tablet 0.4 mg, 0.4 mg, Sublingual, Q5 Min PRN, Jarrett Buenrostro MD  •  ondansetron (ZOFRAN) injection 4 mg, 4 mg, Intravenous, Q6H PRN, Jarrett Buenrostro MD  •  pantoprazole (PROTONIX) EC tablet 40 mg, 40 mg, Oral, Q AM, Yvonne Foster DO, 40 mg at 06/01/23 0610  •  Phosphorus Replacement - Follow Nurse / BPA Driven Protocol, , Does not apply, PRN, Jarrett Buenrostro MD  •  Potassium Replacement - Follow Nurse / BPA Driven Protocol, , Does not apply, PRN, Jarrett Buenrostro MD  •  sodium chloride 0.9 % flush 10 mL, 10 mL, Intravenous, PRN, Jarrett Buenrostro MD  •  sodium chloride 0.9 % flush 10 mL, 10 mL, Intravenous, Q12H, Jarrett Buenrostro MD, 10 mL at 06/01/23 0932  •  sodium chloride 0.9 % flush 10 mL, 10 mL, Intravenous, PRN, Jarrett Buenrostro MD  •  sodium chloride 0.9 % infusion 40 mL, 40 mL, Intravenous, PRN, Jarrett Buenrostro MD, 40 mL at 05/26/23 1149  •  terazosin (HYTRIN) capsule 1 mg, 1 mg, Oral, Daily, Jarrett Buenrostro MD, 1 mg at 06/01/23  0928    Antibiotics:  Anti-Infectives (From admission, onward)    Ordered     Dose/Rate Route Frequency Start Stop    23 0920  DAPTOmycin (CUBICIN) 500 mg in sodium chloride 0.9 % 50 mL IVPB        Ordering Provider: Travon Brito MD    8 mg/kg × 65.2 kg  100 mL/hr over 30 Minutes Intravenous Every 24 Hours 23 1100 23 1059    23  piperacillin-tazobactam (ZOSYN) 3.375 g in iso-osmotic dextrose 50 ml (premix)        Ordering Provider: Brady Dumont DO    3.375 g  over 30 Minutes Intravenous Once 23  vancomycin 1250 mg/250 mL 0.9% NS IVPB ([facility])        Ordering Provider: Brady Dumont DO    20 mg/kg × 63.5 kg  over 90 Minutes Intravenous Once 23 0052            Review of Systems:  See HPI    Physical Exam:   Vital Signs  Temp (24hrs), Av.7 °F (36.5 °C), Min:97.2 °F (36.2 °C), Max:98.4 °F (36.9 °C)    Temp  Min: 97.2 °F (36.2 °C)  Max: 98.4 °F (36.9 °C)  BP  Min: 111/63  Max: 136/71  Pulse  Min: 69  Max: 73  Resp  Min: 16  Max: 18  SpO2  Min: 90 %  Max: 94 %    GENERAL: Awake, alert in no acute distress.   HEENT: Normocephalic, atraumatic.  PERRL. EOMI. No conjunctival injection. No icterus.  No external oral lesions  HEART: RRR; No murmur  LUNGS: symmetrical bilaterally   ABDOMEN: Soft, nontender  EXT:  No edema  :  Without Rosenberg catheter.        Laboratory Data    Results from last 7 days   Lab Units 23  0813 23  0516 23  0630   WBC 10*3/mm3 13.08* 11.44* 11.04*   HEMOGLOBIN g/dL 12.2* 11.3* 10.8*   HEMATOCRIT % 37.6 36.2* 34.4*   PLATELETS 10*3/mm3 155 145 148     Results from last 7 days   Lab Units 23  0516   SODIUM mmol/L 139   POTASSIUM mmol/L 4.3   CHLORIDE mmol/L 103   CO2 mmol/L 21.0*   BUN mg/dL 15   CREATININE mg/dL 0.79   GLUCOSE mg/dL 88   CALCIUM mg/dL 8.9                     Results from last 7 days   Lab Units 23  1014   CK TOTAL U/L 14*         Estimated Creatinine  Clearance: 74.7 mL/min (by C-G formula based on SCr of 0.79 mg/dL).      Microbiology:  Microbiology Results (last 10 days)     ** No results found for the last 240 hours. **                Radiology:  Imaging Results (Last 72 Hours)     ** No results found for the last 72 hours. **            Impression:   Left lower lobe infiltrate from infarction versus infection  Left lower lobe pulmonary embolism  Leukocytosis with neutrophilia  High-grade positive blood culture for staph coccus epidermidis, follow up blood cultures negative.   Elevated procalcitonin  Pacemaker infection  Right atrial endocarditis    PLAN/RECOMMENDATIONS:     Thank you for asking us to see Timmy Guajardo, I recommend the following:    Given the presence indwelling endovascular hardware staphylococcus epidermidis bacteremia is concerning    High-grade positive blood cultures and indwelling hardware probably warrants further investigation    Patient did have leukocytosis and markedly elevated procalcitonin    Await plan for either pacemaker device removal versus angio VAC removal of right atrial mass.    We will discuss further with cardiology          Continue daptomycin 6 mg/kg every 24 hours may extend this length of treatment to up to 6 weeks  Folllow up bl cultures negative       Patient to be transferred to service of interventional cardiology at   for pacemaker removal.    Once all endovascular hardware can be removed I expect patient require 6 weeks of IV antibiotics followed by transition to oral antibiotic therapy      Travon Brito MD  6/1/2023  16:04 EDT

## 2023-06-01 NOTE — PROGRESS NOTES
[facility] Medicine Services  PROGRESS NOTE    Patient Name: Timmy Guajardo  : 1950  MRN: [id number]    Date of Admission: 2023  Primary Care Physician: Arsen Seals APRN    Subjective   Subjective     CC:  F/U vegetation TV/pacer lead    HPI:  Resting in bed in no acute distress, complains of poor appetite.  No other specific complaints.  Overall patient is comfortable.    ROS:  Gen- No fevers, chills  CV- No chest pain, palpitations  Resp- No cough, dyspnea  GI- No N/V/D, abd pain    Objective   Objective     Vital Signs:   Temp:  [97.2 °F (36.2 °C)-97.8 °F (36.6 °C)] 97.8 °F (36.6 °C)  Heart Rate:  [69-71] 70  Resp:  [16-18] 16  BP: (111-136)/(61-85) 120/63     Physical Exam:  Constitutional: No acute distress  HENT: NCAT, mucous membranes moist  Respiratory: Clear to auscultation bilaterally, respiratory effort normal 1L NC  Cardiovascular: RRR, No rubs or gallops  Gastrointestinal: Positive bowel sounds, soft, nontender, nondistended  Musculoskeletal: No bilateral ankle edema, low muscle mass.  Psychiatric: Appropriate affect, cooperative  Neurologic: Awake, alert, oriented x3, no focality appreciated speech clear  Skin: No rashes    Results Reviewed:  LAB RESULTS:      Lab 23  0813 23  0516 23  0630 23  0700 23  1020 23  1636   WBC 13.08* 11.44* 11.04* 12.82* 10.77 8.66   HEMOGLOBIN 12.2* 11.3* 10.8* 10.6* 10.2* 10.6*   HEMATOCRIT 37.6 36.2* 34.4* 34.4* 32.9* 34.9*   PLATELETS 155 145 148 156 193 232   NEUTROS ABS 10.72*  --   --  10.66* 9.19* 7.09*   IMMATURE GRANS (ABS) 0.12*  --   --  0.20* 0.10* 0.14*   LYMPHS ABS 1.28  --   --  1.10 0.77 0.81   MONOS ABS 0.89  --   --  0.80 0.66 0.60   EOS ABS 0.04  --   --  0.04 0.03 0.01   MCV 93.8 96.5 97.2* 97.2* 96.8 99.1*         Lab 23  0516 23  0630 23  0700 23  2153 23  1014   SODIUM 139 138 138  --  142   POTASSIUM 4.3 4.1 4.3 4.3 3.5   CHLORIDE 103  103 105  --  106   CO2 21.0* 21.0* 20.0*  --  21.0*   ANION GAP 15.0 14.0 13.0  --  15.0   BUN 15 14 16  --  17   CREATININE 0.79 0.84 0.87  --  0.95   EGFR 95.6 93.8 92.8  --  86.1   GLUCOSE 88 83 106*  --  158*   CALCIUM 8.9 9.0 8.5*  --  9.0   MAGNESIUM  --   --  2.5*  --  1.9   PHOSPHORUS  --   --   --   --  3.5                         Brief Urine Lab Results  (Last result in the past 365 days)      Color   Clarity   Blood   Leuk Est   Nitrite   Protein   CREAT   Urine HCG        05/21/23 0240 Yellow   Turbid   Negative   Negative   Negative   30 mg/dL (1+)                 Microbiology Results Abnormal     Procedure Component Value - Date/Time    Blood Culture - Blood, Hand, Left [[id number]]  (Normal) Collected: 05/22/23 1155    Lab Status: Final result Specimen: Blood from Hand, Left Updated: 05/27/23 1346     Blood Culture No growth at 5 days    Blood Culture - Blood, Arm, Left [[id number]]  (Normal) Collected: 05/22/23 1202    Lab Status: Final result Specimen: Blood from Arm, Left Updated: 05/27/23 1346     Blood Culture No growth at 5 days    Respiratory Culture - Sputum, ET Suction [[id number]] Collected: 05/22/23 0020    Lab Status: Final result Specimen: Sputum from ET Suction Updated: 05/24/23 1150     Respiratory Culture Moderate growth (3+) Normal respiratory nathan. No S. aureus or Pseudomonas aeruginosa detected. Final report.     Gram Stain Few (2+) WBCs per low power field      Occasional Epithelial cells per low power field      Few (2+) Budding yeast      Occasional Gram positive cocci    Urine Culture - Urine, Straight Cath [[id number]]  (Normal) Collected: 05/21/23 0240    Lab Status: Final result Specimen: Urine from Straight Cath Updated: 05/22/23 0816     Urine Culture No growth    S. Pneumo Ag Urine or CSF - Urine, Urine, Clean Catch [[id number]]  (Normal) Collected: 05/21/23 0240    Lab Status: Final result Specimen: Urine, Clean Catch Updated: 05/21/23 1356     Strep Pneumo Ag Negative     Legionella Antigen, Urine - Urine, Urine, Clean Catch [[id number]]  (Normal) Collected: 05/21/23 0240    Lab Status: Final result Specimen: Urine, Clean Catch Updated: 05/21/23 1356     LEGIONELLA ANTIGEN, URINE Negative    MRSA Screen, PCR (Inpatient) - Swab, Nares [[id number]]  (Normal) Collected: 05/20/23 0523    Lab Status: Final result Specimen: Swab from Nares Updated: 05/20/23 0736     MRSA PCR Negative    Narrative:      The negative predictive value of this diagnostic test is high and should only be used to consider de-escalating anti-MRSA therapy. A positive result may indicate colonization with MRSA and must be correlated clinically.  MRSA Negative          No radiology results from the last 24 hrs    Results for orders placed during the hospital encounter of 05/19/23    Adult Transesophageal Echo (KAT) W/ Cont if Necessary Per Protocol    Interpretation Summary  •  There is a large 2.9 x 1.7 cm mass in the right atrium which appears adherent to the atrial aspect of one of the 2 pacemaker leads. The edges are smooth with some peripheral hyperechoic regions. This appears to be more consistent with older thrombus than acute vegetation. Does move in and out of the tricuspid valve plane with the cardiac cycle.  •  There is a moderate-sized (1.7 mm) mobile mass on the tricuspid valve that is consistent with a vegetation.Mild tricuspid valve regurgitation is present. No evidence of significant tricuspid valve stenosis is present. There is a at least 1.7 cm x 0.6 cm highly mobile vegetation on the tricuspid valve. The attachment point is difficult to discern due to artifact from the surrounding pacemaker wires however it is freely prolapsing across the valve throughout the cardiac cycle.  •  Left ventricular systolic function is mildly decreased. Estimated left ventricular EF = 45%  •  Left ventricular systolic function is mildly decreased. Estimated left ventricular EF = 45% Normal left ventricular cavity size  and wall thickness noted. There is left ventricular global hypokinesis noted. Left ventricular diastolic function is consistent with (grade I) impaired relaxation.      Current medications:  Scheduled Meds:amiodarone, 200 mg, Oral, Q12H  apixaban, 5 mg, Oral, Q12H  aspirin, 81 mg, Oral, Daily  atorvastatin, 80 mg, Oral, Nightly  carvedilol, 25 mg, Oral, BID With Meals  DAPTOmycin, 8 mg/kg, Intravenous, Q24H  empagliflozin, 10 mg, Oral, Daily  fluticasone, 2 spray, Nasal, Daily  Insulin Lispro, 0-9 Units, Subcutaneous, 4x Daily With Meals & Nightly  lisinopril, 10 mg, Oral, Q24H  megestrol, 400 mg, Oral, BID AC  pantoprazole, 40 mg, Oral, Q AM  senna-docusate sodium, 2 tablet, Oral, BID  sodium chloride, 10 mL, Intravenous, Q12H  terazosin, 1 mg, Oral, Daily      Continuous Infusions:   PRN Meds:.•  acetaminophen  •  senna-docusate sodium **AND** polyethylene glycol **AND** [DISCONTINUED] bisacodyl **AND** bisacodyl  •  Calcium Replacement - Follow Nurse / BPA Driven Protocol  •  cyclobenzaprine  •  dextrose  •  ipratropium-albuterol  •  Magnesium Cardiology Dose Replacement - Follow Nurse / BPA Driven Protocol  •  nitroglycerin  •  ondansetron  •  Phosphorus Replacement - Follow Nurse / BPA Driven Protocol  •  Potassium Replacement - Follow Nurse / BPA Driven Protocol  •  sodium chloride  •  sodium chloride  •  sodium chloride    Assessment & Plan   Assessment & Plan     Active Hospital Problems    Diagnosis  POA   • **Pneumonia of left lower lobe due to infectious organism [J18.9]  Yes   • Severe Malnutrition (HCC) [E43]  Yes   • Acute infective endocarditis [I33.0]  Yes   • Acute respiratory failure with hypoxia [J96.01]  Yes   • Shock, likely multifactorial [R57.9]  Yes   • Paroxysmal atrial fibrillation [I48.0]  No   • Pulmonary embolus [I26.99]  Yes   • GERD (gastroesophageal reflux disease) [K21.9]  Yes   • Hyperlipidemia [E78.5]  Yes   • HFrEF (heart failure with reduced ejection fraction) [I50.20]  Yes   •  HTN [I10]  Yes   • Cardiac resynchronization therapy defibrillator (CRT-D) in place [Z95.810]  Yes      Resolved Hospital Problems    Diagnosis Date Resolved POA   • Cardiomyopathy [I42.9] 05/28/2023 Yes   • Hypotension [I95.9] 05/28/2023 Yes   • Confusion [R41.0] 05/28/2023 Yes        Brief Hospital Course to date:  Timmy Guajardo is a 72 y.o. male with PMHx significant for PE on Eliquis, HTN, dyslipidemia, HFrEF (30% on 10/2022), T2DM, CVA with right-sided residual weakness, and GERD.  He was admitted to the ICU on 5/21/2023 as a transfer from the floor.  He was initially admitted with a pulmonary embolism and a left lower lobe pneumonia versus infarct on 5/19/2023.  The morning of 5/21 he suddenly went into A-fib with RVR became hypotensive with prompted transition to the ICU. He underwent KAT 5/25 due to bacteremia and found to have vegetation on pacer wire and TV. Transferred to the floor 5/25.     This patient's problems and plans were partially entered by my partner and updated as appropriate by me 06/01/23.    A fib RVR-->Resolved  -Continue Coreg, Amiodarone   -On Eliquis  -Cardiology following    Staph epidermidis Bacteremia  Vegetation pacer wire/Tricusupid valve   -KAT 5/25 with vegetation noted on pacer lead and TV  -CTS were following but they signed off.  -ID following, Continue Daptomycin daily, likely 6 weeks   -EP following, Dr Quintana. High risk for transvenous laser extraction and possible transvenous removal of extremely large vegetation adhering both to lead and tricuspid valve.  -Cardiology recommending angiovac vegectomy at . Dr. Mueller has contacted  and talked to Dr. Pitts who has accepted the patient. Patient will be transferred to  when bed available.  -Repeat blood Cx NGTD    Hx PE  -On Eliquis, may need to place on heparin gtt pending timing of procedures (defer to CTS/EP)     HTN  -Continue Lisinopril    HFrEF  -Continue Coreg  -Continue Jardiance     GERD  -Continue  PPI    Chronic Severe Malnutrition   -Continue Megace  -RD following     CVC in place  -Peripheral IV in place but patient very hard stick. Leave CVC in place until PICC placed as patient will need long term ABX.       Expected Discharge Location and Transportation: SNF  Expected Discharge   Expected Discharge Date: 6/2/2023; Expected Discharge Time:      DVT prophylaxis:  Medical DVT prophylaxis orders are present.     AM-PAC 6 Clicks Score (PT): 12 (06/01/23 0953)    CODE STATUS:   Code Status and Medical Interventions:   Ordered at: 05/20/23 0011     Level Of Support Discussed With:    Patient     Code Status (Patient has no pulse and is not breathing):    CPR (Attempt to Resuscitate)     Medical Interventions (Patient has pulse or is breathing):    Full Support       Trell Leonardo MD  06/01/23

## 2023-06-01 NOTE — PLAN OF CARE
Goal Outcome Evaluation:  Plan of Care Reviewed With: patient        Progress: no change  Outcome Evaluation: Pt declined all bed mobility, EOB, and OOB mobility despite increased edu and max motivation and encouragement.  Agreeable to supine BLE ther ex.  Con to demonstrate weakness, decreased functional endurance, decreased activity tolerance, and instability/decreased indep with functional mobility compared to baseline level of function.  Con to progress pt as able per PT POC.  Rec SNF upon d/c.

## 2023-06-01 NOTE — PLAN OF CARE
Goal Outcome Evaluation:      Pt has Megace frequency increased to BID. Vitals are stable with paced rhythm, (AV and V Paced). Waiting on bed placement at .

## 2023-06-01 NOTE — CASE MANAGEMENT/SOCIAL WORK
Continued Stay Note  [facility]     Patient Name: Timmy Guajardo  MRN: [id number]  Today's Date: 6/1/2023    Admit Date: 5/19/2023    Plan:    Discharge Plan     Row Name 06/01/23 1626       Plan    Plan     Patient/Family in Agreement with Plan other (see comments)    Plan Comments Per  Bed Availability, still no bed available @ this time. If bed does become available after hours, per Kay mcmullen/transport, call [phone], which will connect to [facility] and transport can be arranged.     Final Discharge Disposition Code 02 - short term hospital for  care               Discharge Codes    No documentation.               Expected Discharge Date and Time     Expected Discharge Date Expected Discharge Time    Jun 2, 2023             Varinder Martinez RN

## 2023-06-01 NOTE — PROGRESS NOTES
Cardiology Inpatient Progress Note    Timmy Guajardo  1950  [id number]    Reason for visit:    · Tricuspid valve and pacemaker lead vegetation  · Paroxysmal atrial fibrillation  · Systolic heart failure        PROBLEM LIST:  CARDIAC  Coronary Artery Disease:   LHC, data unknown apparently normal     Myocardium:   LVEF multiple echoes around 30s  Echo, 5/23/2023: LVEF 45%, G2 DD, RV dilated     Valvular:  Mild TR, mild MR     Electrical:   A-fib  [employer] ICD  KAT, 5/25/2023: Vegetation/mass on pacer lead/tricuspid valve     Percardium:   Small pericardial effusion     VASCULAR:  Arterial  Cerebrovascular disease:   History of CVA with right-sided deficit  Negative saline test     Peripheral vascular disease:   ABIs, 5/6/2021: Left RADHA 0.67 severe stenosis of SFA.  Angioplasty L SFA  Left great toe amputation     AAA:   Mild dilatation of ascending aorta  Infrarenal AAA 3.2     Venous:  History of pulmonary embolisms        CARDIAC RISK FACTORS:         Hypertension         Dyslipidemia:          Tobacco Use: Former Smoker     NON-CARDIAC:  COVID-19  GERD  Gangrene of left toe of the foot  Pleural effusion     SURGERIES:  Left digit amputation  Bilateral hernia repair  Right hip repair  Thoracoscopy with lobectomy       Subjective:    Patient sitting in bed with no complaints.  Does complain that he has not been able to sleep well due to disruptions in the night.         Vital Sign Min/Max for last 24 hours  Temp  Min: 97.2 °F (36.2 °C)  Max: 97.8 °F (36.6 °C)   BP  Min: 111/63  Max: 136/71   Pulse  Min: 69  Max: 72   Resp  Min: 16  Max: 18   SpO2  Min: 90 %  Max: 94 %   No data recorded      Intake/Output Summary (Last 24 hours) at 6/1/2023 0938  Last data filed at 6/1/2023 0900  Gross per 24 hour   Intake 420 ml   Output 200 ml   Net 220 ml           Constitutional:       Appearance: Healthy appearance. Not in distress.   Eyes:      Conjunctiva/sclera: Conjunctivae normal.   HENT:    Mouth/Throat:       Pharynx: Oropharynx is clear.   Neck:      Vascular: JVD normal.   Pulmonary:      Effort: Pulmonary effort is normal.      Breath sounds: No wheezing. No rhonchi. No rales.   Cardiovascular:      Normal rate. Regular rhythm.      Murmurs: There is no murmur.      No rub.   Pulses:     Intact distal pulses.   Edema:     Peripheral edema absent.   Abdominal:      General: There is no distension.   Musculoskeletal:         General: No deformity. Skin:     General: Skin is warm and dry.   Neurological:      General: No focal deficit present.      Mental Status: Alert and oriented to person, place and time.       Tele: Paced    Results Review (reviewed the patient's recent labs in the electronic medical record):     EKG (5/21/2023): Paced    CXR (5/22/2023): Stable bilateral pleural effusions    KAT (5/25/2023): Vegetation on pacer leads and tricuspid valve.  LVEF 45%    Results from last 7 days   Lab Units 05/29/23  0516 05/28/23  0630 05/27/23  0700 05/26/23  2153 05/26/23  1014   SODIUM mmol/L 139 138 138  --  142   POTASSIUM mmol/L 4.3 4.1 4.3   < > 3.5   CHLORIDE mmol/L 103 103 105  --  106   BUN mg/dL 15 14 16  --  17   CREATININE mg/dL 0.79 0.84 0.87  --  0.95   MAGNESIUM mg/dL  --   --  2.5*  --  1.9    < > = values in this interval not displayed.         Results from last 7 days   Lab Units 05/31/23  0813 05/29/23  0516 05/28/23  0630   WBC 10*3/mm3 13.08* 11.44* 11.04*   HEMOGLOBIN g/dL 12.2* 11.3* 10.8*   HEMATOCRIT % 37.6 36.2* 34.4*   PLATELETS 10*3/mm3 155 145 148       Lab Results   Component Value Date    HGBA1C 6.30 (H) 03/25/2023       Lab Results   Component Value Date    CHOL 187 05/05/2021    TRIG 138 05/05/2021    HDL 38 (L) 05/05/2021     (H) 05/05/2021            Assessment:     Endocarditis involving tricuspid valve/ICD leads  · ID following and recommends 6 weeks IV antibiotic  · High risk for lead extraction given large size vegetation associated with ICD leads  · Patient needs  angio vac removal of vegetations.  Plan for transfer to [facility].  If patient is still here on Monday could consider doing the procedure here at [facility] if able.  · Dr. Mueller spoke to Dr. Pitts who agrees to accept patient at [facility].     Chronic HFrEF  · Stable, euvolemic  · Continue carvedilol 25 mg twice daily, Jardiance 10 mg daily, lisinopril 10 mg daily     Paroxysmal atrial fibrillation  · Maintaining sinus on amiodarone, currently paced  · Continue apixaban for stroke prophylaxis     Pulmonary embolus, probably septic  · CT angiogram shows left lower lobe pulmonary emboli         Plan:    · Continue IV antibiotics  · Awaiting transfer to [facility] for Angio VAC.  · Remain on amiodarone 200 mg twice daily and Eliquis 5 mg twice daily.  · Remain on carvedilol, Jardiance, and lisinopril for GDMT.    Electronically signed by Radha Chavarria PA-C, 06/01/23, 10:13 AM EDT.

## 2023-06-01 NOTE — THERAPY TREATMENT NOTE
Patient Name: Timmy Guajardo  : 1950    MRN: [id number]                              Today's Date: 2023       Admit Date: 2023    Visit Dx:     ICD-10-CM ICD-9-CM   1. Confusion  R41.0 298.9   2. Leukocytosis, unspecified type  D72.829 288.60   3. Referred by health care professional  Z02.89 V68.89   4. Abnormal CXR  R93.89 793.2   5. Atrial fibrillation, unspecified type  I48.91 427.31   6. Dysphagia, unspecified type  R13.10 787.20     Patient Active Problem List   Diagnosis   • HFrEF (heart failure with reduced ejection fraction)   • HTN   • Cardiac resynchronization therapy defibrillator (CRT-D) in place   • Gangrene of toe of left foot   • Closed displaced intertrochanteric fracture of right femur, initial encounter   • Right exudative pleural effusion status post decortication 2023   • History of CVA (cerebrovascular accident)   • Debility   • GERD (gastroesophageal reflux disease)   • Hyperlipidemia   • Rt Apical Lung Nodule vs Scarring (needs follow up)   • Pneumonia of left lower lobe due to infectious organism   • Pulmonary embolus   • Paroxysmal atrial fibrillation   • Acute respiratory failure with hypoxia   • Shock, likely multifactorial   • Acute infective endocarditis   • Severe Malnutrition (HCC)     Past Medical History:   Diagnosis Date   • Cardiomyopathy    • CHF (congestive heart failure)    • Coronary artery disease    • COVID-19    • Diabetes mellitus    • GERD (gastroesophageal reflux disease)    • HTN (hypertension)    • Pleural effusion    • Stroke     Right sided weakness   • Stroke      Past Surgical History:   Procedure Laterality Date   • AMPUTATION DIGIT Left 2021    Procedure: AMPUTATION DIGIT - GREAT TOE AMPUTATION LEFT;  Surgeon: Dario Marmolejo MD;  Location: [facility] OR;  Service: General;  Laterality: Left;   • AORTAGRAM N/A 2021    Procedure: AORTAGRAM WITH RUNOFFS, LEFT SFA BALLOON ANGIOPLASTY;  Surgeon: Tim Mahan MD;  Location: [facility] HYBRID  SHELIA;  Service: Vascular;  Laterality: N/A;  FL TIME 6 MIN 54 SECS  82 MGY  CONTRAST VISIPAQUE 100ML     • CARDIAC CATHETERIZATION     • CARDIAC PACEMAKER PLACEMENT      pacemaker/defibrillator, [employer]   • COLONOSCOPY N/A 3/31/2023    Procedure: COLONOSCOPY;  Surgeon: Brunner, Mark I, MD;  Location: [facility] ENDOSCOPY;  Service: Gastroenterology;  Laterality: N/A;   • ENDOSCOPY N/A 3/29/2023    Procedure: ESOPHAGOGASTRODUODENOSCOPY;  Surgeon: Brunner, Mark I, MD;  Location:  [facility]COPY;  Service: Gastroenterology;  Laterality: N/A;   • HERNIA REPAIR Bilateral     Inguinal hernia   • HIP TROCHANTERIC NAILING WITH INTRAMEDULLARY HIP SCREW Right 10/18/2022    Procedure: HIP TROCHANTERIC NAILING WITH INTRAMEDULLARY HIP SCREW RIGHT;  Surgeon: Bj Steinberg MD;  Location: [facility] OR;  Service: Orthopedics;  Laterality: Right;   • THORACOSCOPY VIDEO ASSISTED WITH LOBECTOMY Right 4/12/2023    Procedure: BRONCHOSCOPY, THORACOSCOPY VIDEO ASSISTED WITH DECORTICATION, MECHANICAL PLEURODESIS;  Surgeon: Tim Mahan MD;  Location: [facility] OR;  Service: Cardiothoracic;  Laterality: Right;      General Information     Row Name 06/01/23 0934          Physical Therapy Time and Intention    Document Type therapy note (daily note)  -BA     Mode of Treatment physical therapy  -BA     Row Name 06/01/23 0934          General Information    Patient Profile Reviewed yes  -BA     Existing Precautions/Restrictions fall;other (see comments)  remote CVA w/ residual R sided weakness  -BA     Barriers to Rehab medically complex;previous functional deficit  -BA     Row Name 06/01/23 0934          Cognition    Orientation Status (Cognition) oriented x 3  -BA     Row Name 06/01/23 0934          Safety Issues, Functional Mobility    Safety Issues Affecting Function (Mobility) awareness of need for assistance;insight into deficits/self-awareness;judgment;problem-solving;safety precaution awareness;safety precautions  follow-through/compliance;sequencing abilities  -     Impairments Affecting Function (Mobility) balance;coordination;endurance/activity tolerance;motor control;postural/trunk control;sensation/sensory awareness;shortness of breath;strength;cognition  -     Cognitive Impairments, Mobility Safety/Performance insight into deficits/self-awareness;problem-solving/reasoning;awareness, need for assistance  -           User Key  (r) = Recorded By, (t) = Taken By, (c) = Cosigned By    Initials Name Provider Type    Elle Rodriguez, ANNE MARIE Physical Therapist               Mobility     Row Name 06/01/23 0937          Bed Mobility    Comment, (Bed Mobility) Pt declined attempt at all bed mobility and EOB despite increased edu and max motivation and encouragement.  Reported too fatigued and feels dizzy when sits up.  Only agreeable to supine ther ex.  -           User Key  (r) = Recorded By, (t) = Taken By, (c) = Cosigned By    Initials Name Provider Type    Elle Rodriguez PT Physical Therapist               Obj/Interventions     Row Name 06/01/23 0938          Motor Skills    Therapeutic Exercise hip;knee;ankle  -     Row Name 06/01/23 0938          Hip (Therapeutic Exercise)    Hip (Therapeutic Exercise) strengthening exercise;AROM (active range of motion);AAROM (active assistive range of motion);other (see comments)  supine SLR hamstring stretch to RLE x 5 reps with 15 sec hold  -     Hip AROM (Therapeutic Exercise) bilateral;external rotation;left;internal rotation;supine;10 repetitions  -     Hip AAROM (Therapeutic Exercise) right;internal rotation;supine;10 repetitions  -     Hip Strengthening (Therapeutic Exercise) bilateral;aBduction;aDduction;10 repetitions;heel slides;5 repetitions;supine  -     Row Name 06/01/23 0938          Knee (Therapeutic Exercise)    Knee (Therapeutic Exercise) strengthening exercise  -     Knee Strengthening (Therapeutic Exercise) bilateral;SLR (straight leg  raise);supine;5 repetitions;other (see comments)  with AAROM RLE  -BA     Row Name 06/01/23 0938          Ankle (Therapeutic Exercise)    Ankle (Therapeutic Exercise) AROM (active range of motion);AAROM (active assistive range of motion);other (see comments)  B calf stretch in supine x 5 reps with 15 sec hold  -BA     Ankle AROM (Therapeutic Exercise) bilateral;plantarflexion;left;dorsiflexion;supine;10 repetitions  -BA     Ankle AAROM (Therapeutic Exercise) right;dorsiflexion;supine;10 repetitions  -BA           User Key  (r) = Recorded By, (t) = Taken By, (c) = Cosigned By    Initials Name Provider Type    Elle Rodriguez, PT Physical Therapist               Goals/Plan    No documentation.                Clinical Impression     Row Name 06/01/23 0944          Pain    Pretreatment Pain Rating 0/10 - no pain  -BA     Posttreatment Pain Rating 0/10 - no pain  -     Row Name 06/01/23 0944          Plan of Care Review    Plan of Care Reviewed With patient  -     Progress no change  -     Outcome Evaluation Pt declined all bed mobility, EOB, and OOB mobility despite increased edu and max motivation and encouragement.  Agreeable to supine BLE ther ex.  Con to demonstrate weakness, decreased functional endurance, decreased activity tolerance, and instability/decreased indep with functional mobility compared to baseline level of function.  Con to progress pt as able per PT POC.  Rec SNF upon d/c.  -     Row Name 06/01/23 0921          Vital Signs    Pre Systolic BP Rehab --  VSS; RN cleared for activity.  -BA     Pretreatment Heart Rate (beats/min) 73  -BA     Posttreatment Heart Rate (beats/min) 71  -BA     Pre SpO2 (%) 92  -BA     O2 Delivery Pre Treatment room air  -BA     Intra SpO2 (%) 90  -BA     O2 Delivery Intra Treatment room air  -BA     Post SpO2 (%) 91  -BA     O2 Delivery Post Treatment room air  -BA     Pre Patient Position Supine  -BA     Intra Patient Position Supine  -BA     Post Patient  Position Supine  -     Row Name 06/01/23 0944          Positioning and Restraints    Pre-Treatment Position in bed  -BA     Post Treatment Position bed  -BA     In Bed notified nsg;supine;fowlers;call light within reach;encouraged to call for assist;exit alarm on;with nsg;side rails up x3  -BA           User Key  (r) = Recorded By, (t) = Taken By, (c) = Cosigned By    Initials Name Provider Type    Elle Rodriguez, ANNE MARIE Physical Therapist               Outcome Measures     Row Name 06/01/23 0953          How much help from another person do you currently need...    Turning from your back to your side while in flat bed without using bedrails? 3  -BA     Moving from lying on back to sitting on the side of a flat bed without bedrails? 2  -BA     Moving to and from a bed to a chair (including a wheelchair)? 2  -BA     Standing up from a chair using your arms (e.g., wheelchair, bedside chair)? 2  -BA     Climbing 3-5 steps with a railing? 1  -BA     To walk in hospital room? 2  -BA     AM-PAC 6 Clicks Score (PT) 12  -BA     Highest level of mobility 4 --> Transferred to chair/commode  -BA     Row Name 06/01/23 0953          Functional Assessment    Outcome Measure Options AM-PAC 6 Clicks Basic Mobility (PT)  -           User Key  (r) = Recorded By, (t) = Taken By, (c) = Cosigned By    Initials Name Provider Type    Elle Rodriguez, ANNE MARIE Physical Therapist                             Physical Therapy Education     Title: PT OT SLP Therapies (In Progress)     Topic: Physical Therapy (In Progress)     Point: Mobility training (In Progress)     Learning Progress Summary           Patient Acceptance, E, NR by STACIE at 5/30/2023 1352    Acceptance, E,TB, NR by SHELDON at 5/24/2023 1132   Family Acceptance, E,TB, NR by SHELDON at 5/24/2023 1132                   Point: Home exercise program (In Progress)     Learning Progress Summary           Patient Acceptance, E, NR by STACIE at 6/1/2023 0953    Acceptance, E, NR by STACIE at  5/30/2023 1352                   Point: Body mechanics (In Progress)     Learning Progress Summary           Patient Acceptance, E, NR by BA at 5/30/2023 1352    Acceptance, E,TB, NR by AY at 5/24/2023 1132   Family Acceptance, E,TB, NR by AY at 5/24/2023 1132                   Point: Precautions (In Progress)     Learning Progress Summary           Patient Acceptance, E, NR by BA at 5/30/2023 1352    Acceptance, E,TB, NR by AY at 5/24/2023 1132   Family Acceptance, E,TB, NR by AY at 5/24/2023 1132                               User Key     Initials Effective Dates Name Provider Type Discipline     11/10/20 -  Alie Trujillo, PT Physical Therapist PT     09/21/21 -  Elle Ghotra PT Physical Therapist PT              PT Recommendation and Plan     Plan of Care Reviewed With: patient  Progress: no change  Outcome Evaluation: Pt declined all bed mobility, EOB, and OOB mobility despite increased edu and max motivation and encouragement.  Agreeable to supine BLE ther ex.  Con to demonstrate weakness, decreased functional endurance, decreased activity tolerance, and instability/decreased indep with functional mobility compared to baseline level of function.  Con to progress pt as able per PT POC.  Rec SNF upon d/c.     Time Calculation:    PT Charges     Row Name 06/01/23 0954             Time Calculation    Start Time 0856  -      PT Received On 06/01/23  -         Time Calculation- PT    Total Timed Code Minutes- PT 26 minute(s)  -BA         Timed Charges    17588 - PT Therapeutic Exercise Minutes 26  -BA         Total Minutes    Timed Charges Total Minutes 26  -BA       Total Minutes 26  -BA            User Key  (r) = Recorded By, (t) = Taken By, (c) = Cosigned By    Initials Name Provider Type     Elle Ghotra, PT Physical Therapist              Therapy Charges for Today     Code Description Service Date Service Provider Modifiers Qty    [id number] HC PT THER PROC EA 15 MIN 6/1/2023 Anthony Braswell  Elle, PT GP 2          PT G-Codes  Outcome Measure Options: AM-PAC 6 Clicks Basic Mobility (PT)  AM-PAC 6 Clicks Score (PT): 12  AM-PAC 6 Clicks Score (OT): 10  PT Discharge Summary  Anticipated Discharge Disposition (PT): skilled nursing facility    Elle Ghotra, PT  6/1/2023

## 2023-06-02 LAB
GLUCOSE BLDC GLUCOMTR-MCNC: 112 MG/DL (ref 70–130)
GLUCOSE BLDC GLUCOMTR-MCNC: 186 MG/DL (ref 70–130)
GLUCOSE BLDC GLUCOMTR-MCNC: 224 MG/DL (ref 70–130)
GLUCOSE BLDC GLUCOMTR-MCNC: 96 MG/DL (ref 70–130)

## 2023-06-02 PROCEDURE — 25010000002 DAPTOMYCIN PER 1 MG: Performed by: INTERNAL MEDICINE

## 2023-06-02 PROCEDURE — 99232 SBSQ HOSP IP/OBS MODERATE 35: CPT | Performed by: INTERNAL MEDICINE

## 2023-06-02 PROCEDURE — 82948 REAGENT STRIP/BLOOD GLUCOSE: CPT

## 2023-06-02 PROCEDURE — 63710000001 INSULIN LISPRO (HUMAN) PER 5 UNITS: Performed by: INTERNAL MEDICINE

## 2023-06-02 RX ADMIN — FLUTICASONE PROPIONATE 2 SPRAY: 50 SPRAY, METERED NASAL at 10:03

## 2023-06-02 RX ADMIN — INSULIN LISPRO 4 UNITS: 100 INJECTION, SOLUTION INTRAVENOUS; SUBCUTANEOUS at 12:32

## 2023-06-02 RX ADMIN — Medication 10 ML: at 12:33

## 2023-06-02 RX ADMIN — APIXABAN 5 MG: 5 TABLET, FILM COATED ORAL at 20:35

## 2023-06-02 RX ADMIN — ATORVASTATIN CALCIUM 80 MG: 40 TABLET, FILM COATED ORAL at 20:35

## 2023-06-02 RX ADMIN — CARVEDILOL 25 MG: 12.5 TABLET, FILM COATED ORAL at 10:02

## 2023-06-02 RX ADMIN — Medication 10 ML: at 20:36

## 2023-06-02 RX ADMIN — AMIODARONE HYDROCHLORIDE 200 MG: 200 TABLET ORAL at 20:35

## 2023-06-02 RX ADMIN — AMIODARONE HYDROCHLORIDE 200 MG: 200 TABLET ORAL at 10:02

## 2023-06-02 RX ADMIN — APIXABAN 5 MG: 5 TABLET, FILM COATED ORAL at 10:03

## 2023-06-02 RX ADMIN — PANTOPRAZOLE SODIUM 40 MG: 40 TABLET, DELAYED RELEASE ORAL at 05:51

## 2023-06-02 RX ADMIN — DAPTOMYCIN 500 MG: 500 INJECTION, POWDER, LYOPHILIZED, FOR SOLUTION INTRAVENOUS at 12:32

## 2023-06-02 RX ADMIN — INSULIN LISPRO 2 UNITS: 100 INJECTION, SOLUTION INTRAVENOUS; SUBCUTANEOUS at 21:22

## 2023-06-02 RX ADMIN — MEGESTROL ACETATE 400 MG: 400 SUSPENSION ORAL at 17:11

## 2023-06-02 RX ADMIN — CARVEDILOL 25 MG: 12.5 TABLET, FILM COATED ORAL at 17:11

## 2023-06-02 RX ADMIN — ASPIRIN 81 MG 81 MG: 81 TABLET ORAL at 10:02

## 2023-06-02 RX ADMIN — EMPAGLIFLOZIN 10 MG: 10 TABLET, FILM COATED ORAL at 10:03

## 2023-06-02 RX ADMIN — TERAZOSIN HYDROCHLORIDE 1 MG: 1 CAPSULE ORAL at 10:02

## 2023-06-02 RX ADMIN — LISINOPRIL 10 MG: 10 TABLET ORAL at 10:02

## 2023-06-02 RX ADMIN — MEGESTROL ACETATE 400 MG: 400 SUSPENSION ORAL at 10:03

## 2023-06-02 NOTE — PLAN OF CARE
Goal Outcome Evaluation:   Pt is in paced rhythm (AV and V), vitals stable on room air. Still waiting on bed at , [facility] transport # listed in case management note for when bed becomes available.

## 2023-06-02 NOTE — PROGRESS NOTES
[facility] Medicine Services  PROGRESS NOTE    Patient Name: Timmy Guajardo  : 1950  MRN: [id number]    Date of Admission: 2023  Primary Care Physician: Arsen Seals APRN    Subjective   Subjective     CC:  F/U vegetation TV/pacer lead    HPI:  Resting in bed in no acute distress.  Patient tells me his appetite seems to be a little better.  No  specific complaints.  Overall patient is comfortable.    ROS:  Gen- No fevers, chills  CV- No chest pain, palpitations  Resp- No cough, dyspnea  GI- No N/V/D, abd pain    Objective   Objective     Vital Signs:   Temp:  [97.6 °F (36.4 °C)-98.8 °F (37.1 °C)] 98 °F (36.7 °C)  Heart Rate:  [70] 70  Resp:  [14-16] 14  BP: (108-147)/(58-92) 125/67     Physical Exam:  Constitutional: No acute distress  HENT: NCAT, mucous membranes moist  Respiratory: Clear to auscultation bilaterally, respiratory effort normal 1L NC  Cardiovascular: RRR, No rubs or gallops  Gastrointestinal: Positive bowel sounds, soft, nontender, nondistended  Musculoskeletal: No bilateral ankle edema, low muscle mass.  Psychiatric: Appropriate affect, cooperative  Neurologic: Awake, alert, oriented x3, no focality appreciated speech clear  Skin: No rashes    Results Reviewed:  LAB RESULTS:      Lab 23  0813 23  0516 23  0630 23  0700   WBC 13.08* 11.44* 11.04* 12.82*   HEMOGLOBIN 12.2* 11.3* 10.8* 10.6*   HEMATOCRIT 37.6 36.2* 34.4* 34.4*   PLATELETS 155 145 148 156   NEUTROS ABS 10.72*  --   --  10.66*   IMMATURE GRANS (ABS) 0.12*  --   --  0.20*   LYMPHS ABS 1.28  --   --  1.10   MONOS ABS 0.89  --   --  0.80   EOS ABS 0.04  --   --  0.04   MCV 93.8 96.5 97.2* 97.2*         Lab 23  0516 23  0630 23  0700 23  2153   SODIUM 139 138 138  --    POTASSIUM 4.3 4.1 4.3 4.3   CHLORIDE 103 103 105  --    CO2 21.0* 21.0* 20.0*  --    ANION GAP 15.0 14.0 13.0  --    BUN 15 14 16  --    CREATININE 0.79 0.84 0.87  --    EGFR 95.6  93.8 92.8  --    GLUCOSE 88 83 106*  --    CALCIUM 8.9 9.0 8.5*  --    MAGNESIUM  --   --  2.5*  --                          Brief Urine Lab Results  (Last result in the past 365 days)      Color   Clarity   Blood   Leuk Est   Nitrite   Protein   CREAT   Urine HCG        05/21/23 0240 Yellow   Turbid   Negative   Negative   Negative   30 mg/dL (1+)                 Microbiology Results Abnormal     Procedure Component Value - Date/Time    Blood Culture - Blood, Hand, Left [[id number]]  (Normal) Collected: 05/22/23 1155    Lab Status: Final result Specimen: Blood from Hand, Left Updated: 05/27/23 1346     Blood Culture No growth at 5 days    Blood Culture - Blood, Arm, Left [[id number]]  (Normal) Collected: 05/22/23 1202    Lab Status: Final result Specimen: Blood from Arm, Left Updated: 05/27/23 1346     Blood Culture No growth at 5 days    Respiratory Culture - Sputum, ET Suction [[id number]] Collected: 05/22/23 0020    Lab Status: Final result Specimen: Sputum from ET Suction Updated: 05/24/23 1150     Respiratory Culture Moderate growth (3+) Normal respiratory nathan. No S. aureus or Pseudomonas aeruginosa detected. Final report.     Gram Stain Few (2+) WBCs per low power field      Occasional Epithelial cells per low power field      Few (2+) Budding yeast      Occasional Gram positive cocci    Urine Culture - Urine, Straight Cath [[id number]]  (Normal) Collected: 05/21/23 0240    Lab Status: Final result Specimen: Urine from Straight Cath Updated: 05/22/23 0816     Urine Culture No growth    S. Pneumo Ag Urine or CSF - Urine, Urine, Clean Catch [[id number]]  (Normal) Collected: 05/21/23 0240    Lab Status: Final result Specimen: Urine, Clean Catch Updated: 05/21/23 1356     Strep Pneumo Ag Negative    Legionella Antigen, Urine - Urine, Urine, Clean Catch [[id number]]  (Normal) Collected: 05/21/23 0240    Lab Status: Final result Specimen: Urine, Clean Catch Updated: 05/21/23 1356     LEGIONELLA ANTIGEN, URINE Negative     MRSA Screen, PCR (Inpatient) - Swab, Nares [[id number]]  (Normal) Collected: 05/20/23 0523    Lab Status: Final result Specimen: Swab from Nares Updated: 05/20/23 0736     MRSA PCR Negative    Narrative:      The negative predictive value of this diagnostic test is high and should only be used to consider de-escalating anti-MRSA therapy. A positive result may indicate colonization with MRSA and must be correlated clinically.  MRSA Negative          No radiology results from the last 24 hrs    Results for orders placed during the hospital encounter of 05/19/23    Adult Transesophageal Echo (KAT) W/ Cont if Necessary Per Protocol    Interpretation Summary  •  There is a large 2.9 x 1.7 cm mass in the right atrium which appears adherent to the atrial aspect of one of the 2 pacemaker leads. The edges are smooth with some peripheral hyperechoic regions. This appears to be more consistent with older thrombus than acute vegetation. Does move in and out of the tricuspid valve plane with the cardiac cycle.  •  There is a moderate-sized (1.7 mm) mobile mass on the tricuspid valve that is consistent with a vegetation.Mild tricuspid valve regurgitation is present. No evidence of significant tricuspid valve stenosis is present. There is a at least 1.7 cm x 0.6 cm highly mobile vegetation on the tricuspid valve. The attachment point is difficult to discern due to artifact from the surrounding pacemaker wires however it is freely prolapsing across the valve throughout the cardiac cycle.  •  Left ventricular systolic function is mildly decreased. Estimated left ventricular EF = 45%  •  Left ventricular systolic function is mildly decreased. Estimated left ventricular EF = 45% Normal left ventricular cavity size and wall thickness noted. There is left ventricular global hypokinesis noted. Left ventricular diastolic function is consistent with (grade I) impaired relaxation.      Current medications:  Scheduled Meds:amiodarone, 200  mg, Oral, Q12H  apixaban, 5 mg, Oral, Q12H  aspirin, 81 mg, Oral, Daily  atorvastatin, 80 mg, Oral, Nightly  carvedilol, 25 mg, Oral, BID With Meals  DAPTOmycin, 8 mg/kg, Intravenous, Q24H  empagliflozin, 10 mg, Oral, Daily  fluticasone, 2 spray, Nasal, Daily  Insulin Lispro, 0-9 Units, Subcutaneous, 4x Daily With Meals & Nightly  lisinopril, 10 mg, Oral, Q24H  megestrol, 400 mg, Oral, BID AC  pantoprazole, 40 mg, Oral, Q AM  senna-docusate sodium, 2 tablet, Oral, BID  sodium chloride, 10 mL, Intravenous, Q12H  terazosin, 1 mg, Oral, Daily      Continuous Infusions:   PRN Meds:.•  acetaminophen  •  senna-docusate sodium **AND** polyethylene glycol **AND** [DISCONTINUED] bisacodyl **AND** bisacodyl  •  Calcium Replacement - Follow Nurse / BPA Driven Protocol  •  cyclobenzaprine  •  dextrose  •  ipratropium-albuterol  •  Magnesium Cardiology Dose Replacement - Follow Nurse / BPA Driven Protocol  •  nitroglycerin  •  ondansetron  •  Phosphorus Replacement - Follow Nurse / BPA Driven Protocol  •  Potassium Replacement - Follow Nurse / BPA Driven Protocol  •  sodium chloride  •  sodium chloride  •  sodium chloride    Assessment & Plan   Assessment & Plan     Active Hospital Problems    Diagnosis  POA   • **Pneumonia of left lower lobe due to infectious organism [J18.9]  Yes   • Severe Malnutrition (HCC) [E43]  Yes   • Acute infective endocarditis [I33.0]  Yes   • Acute respiratory failure with hypoxia [J96.01]  Yes   • Shock, likely multifactorial [R57.9]  Yes   • Paroxysmal atrial fibrillation [I48.0]  No   • Pulmonary embolus [I26.99]  Yes   • GERD (gastroesophageal reflux disease) [K21.9]  Yes   • Hyperlipidemia [E78.5]  Yes   • HFrEF (heart failure with reduced ejection fraction) [I50.20]  Yes   • HTN [I10]  Yes   • Cardiac resynchronization therapy defibrillator (CRT-D) in place [Z95.810]  Yes      Resolved Hospital Problems    Diagnosis Date Resolved POA   • Cardiomyopathy [I42.9] 05/28/2023 Yes   • Hypotension  [I95.9] 05/28/2023 Yes   • Confusion [R41.0] 05/28/2023 Yes        Brief Hospital Course to date:  Timmy Guajardo is a 72 y.o. male with PMHx significant for PE on Eliquis, HTN, dyslipidemia, HFrEF (30% on 10/2022), T2DM, CVA with right-sided residual weakness, and GERD.  He was admitted to the ICU on 5/21/2023 as a transfer from the floor.  He was initially admitted with a pulmonary embolism and a left lower lobe pneumonia versus infarct on 5/19/2023.  The morning of 5/21 he suddenly went into A-fib with RVR became hypotensive with prompted transition to the ICU. He underwent KAT 5/25 due to bacteremia and found to have vegetation on pacer wire and TV. Transferred to the floor 5/25.     This patient's problems and plans were partially entered by my partner and updated as appropriate by me 06/02/23.    A fib RVR-->Resolved  -Continue Coreg, Amiodarone   -On Eliquis  -Cardiology following    Staph epidermidis Bacteremia  Vegetation pacer wire/Tricusupid valve   -KAT 5/25 with vegetation noted on pacer lead and TV  -CTS were following but they signed off.  -ID following, Continue Daptomycin daily, likely 6 weeks   -EP following, Dr Quintana. High risk for transvenous laser extraction and possible transvenous removal of extremely large vegetation adhering both to lead and tricuspid valve.  -Cardiology recommending angiovac vegectomy at . Dr. Mueller has contacted [facility] and talked to Dr. Pitts who has accepted the patient. Patient will be transferred to  when bed available.  -Repeat blood Cx NGTD    Hx PE  -On Eliquis, may need to place on heparin gtt pending timing of procedures (defer to CTS/EP)     HTN  -Continue Lisinopril    HFrEF  -Continue Coreg  -Continue Jardiance     GERD  -Continue PPI    Chronic Severe Malnutrition   -Continue Megace  -RD following     CVC in place  -Peripheral IV in place but patient very hard stick. Leave CVC in place until PICC placed as patient will need long term ABX.       NOTE:  -If  bed becomes available at  patient should be transferred to  cardiology.  There is a transfer summary which needs to be updated.  If not, probably our cardiology will try to vaginectomy here    Expected Discharge Location and Transportation: Transfer to   Expected Discharge   Expected Discharge Date: 6/5/2023; Expected Discharge Time:      DVT prophylaxis:  Medical DVT prophylaxis orders are present.     AM-PAC 6 Clicks Score (PT): 12 (06/02/23 0820)    CODE STATUS:   Code Status and Medical Interventions:   Ordered at: 05/20/23 0011     Level Of Support Discussed With:    Patient     Code Status (Patient has no pulse and is not breathing):    CPR (Attempt to Resuscitate)     Medical Interventions (Patient has pulse or is breathing):    Full Support       Trell Leonardo MD  06/02/23

## 2023-06-02 NOTE — PROGRESS NOTES
INFECTIOUS DISEASE f/u     Timmy Guajardo  1950  [id number]    Date of Consult: 5/23/2023    Admission Date: 5/19/2023      Requesting Provider: No ref. provider found  Evaluating Physician: Travon Brito MD    Reason for Consultation: Positive blood cultures    History of present illness:    Patient is a 72 y.o. male with past medical history of pulmonary embolism, dyslipidemia, heart failure, type II d. mellitus, CVA with right-sided residual weakness and GERD.      Patient admitted to hospital for pulmonary embolism left lower lobe pneumonia versus infarction on May/9/2023 patient had atrial fibrillation with RVR with hypotension has been transferred the ICU.      Patient found have positive blood cultures for staph coccus epidermidis.  We are being consulted for further interpretation of these positive blood cultures.    Patient reports having indwelling pacemaker/ ICD patient says this is fired 1 time since he has had it placed        5/24/23; no events overnight Noemy antibiotics no complaints    5/25/2023 patient had KAT which was remarkable for right atrial mass and vegetation on pacemaker lead patient feels well no complaints no events overnight    5/26/23; no events overnight; no fever, rash, sore throat    5/27/23; patient is doing well tolerating antibiotics no events overnight denies fevers rash or sore throat    5/28/23; no evetts overnight; afebrile normotensive    5/29/23: No new events.  Denies f/c, sob, n/v/d, rashes, or sore throat.  No pain around ppm site.     5/30/23 patient is doing well no fevers rash or sore throat  5/31/2023 patient to be transferred to  no events overnight afebrile normotensive    6/1/23; awaiting transfer to [facility] down the street.  Afebrile normotensive, no complaints    6/2/2023 awaiting bed at [facility] no events overnight  Past Medical History:   Diagnosis Date   • Cardiomyopathy    • CHF (congestive heart failure)    • Coronary artery  disease    • COVID-19    • Diabetes mellitus    • GERD (gastroesophageal reflux disease)    • HTN (hypertension)    • Pleural effusion    • Stroke     Right sided weakness   • Stroke        Past Surgical History:   Procedure Laterality Date   • AMPUTATION DIGIT Left 5/12/2021    Procedure: AMPUTATION DIGIT - GREAT TOE AMPUTATION LEFT;  Surgeon: Dario Marmolejo MD;  Location:  [facility];  Service: General;  Laterality: Left;   • AORTAGRAM N/A 5/11/2021    Procedure: AORTAGRAM WITH RUNOFFS, LEFT SFA BALLOON ANGIOPLASTY;  Surgeon: Tim Mahan MD;  Location:  [facility];  Service: Vascular;  Laterality: N/A;  FL TIME 6 MIN 54 SECS  82 MGY  CONTRAST VISIPAQUE 100ML     • CARDIAC CATHETERIZATION     • CARDIAC PACEMAKER PLACEMENT      pacemaker/defibrillator, [employer]   • COLONOSCOPY N/A 3/31/2023    Procedure: COLONOSCOPY;  Surgeon: Brunner, Mark I, MD;  Location:  [facility];  Service: Gastroenterology;  Laterality: N/A;   • ENDOSCOPY N/A 3/29/2023    Procedure: ESOPHAGOGASTRODUODENOSCOPY;  Surgeon: Brunner, Mark I, MD;  Location:  [facility];  Service: Gastroenterology;  Laterality: N/A;   • HERNIA REPAIR Bilateral     Inguinal hernia   • HIP TROCHANTERIC NAILING WITH INTRAMEDULLARY HIP SCREW Right 10/18/2022    Procedure: HIP TROCHANTERIC NAILING WITH INTRAMEDULLARY HIP SCREW RIGHT;  Surgeon: Bj Steinberg MD;  Location:  [facility];  Service: Orthopedics;  Laterality: Right;   • THORACOSCOPY VIDEO ASSISTED WITH LOBECTOMY Right 4/12/2023    Procedure: BRONCHOSCOPY, THORACOSCOPY VIDEO ASSISTED WITH DECORTICATION, MECHANICAL PLEURODESIS;  Surgeon: Tim Mahan MD;  Location:  [facility];  Service: Cardiothoracic;  Laterality: Right;       Family History   Problem Relation Age of Onset   • Alzheimer's disease Mother    • Kidney failure Mother    • Cancer Father    • Heart failure Father        Social History     Socioeconomic History   • Marital status: Single   • Number of children: 0    Tobacco Use   • Smoking status: Former     Packs/day: 1.00     Years: 30.00     Pack years: 30.00     Types: Cigarettes     Quit date: 2013     Years since quitting: 10.4   • Smokeless tobacco: Never   • Tobacco comments:     quit 2015   Vaping Use   • Vaping Use: Never used   Substance and Sexual Activity   • Alcohol use: Never   • Drug use: Never   • Sexual activity: Defer       Allergies   Allergen Reactions   • Other Unknown - Low Severity     Mercury metals- as a child         Medication:    Current Facility-Administered Medications:   •  acetaminophen (TYLENOL) tablet 650 mg, 650 mg, Oral, Q4H PRN, Jarrett Buenrostro MD  •  amiodarone (PACERONE) tablet 200 mg, 200 mg, Oral, Q12H, Jarrett Buenrostro MD, 200 mg at 06/02/23 1002  •  apixaban (ELIQUIS) tablet 5 mg, 5 mg, Oral, Q12H, Jarrett Buenrostro MD, 5 mg at 06/02/23 1003  •  aspirin chewable tablet 81 mg, 81 mg, Oral, Daily, Jarrett Buenrostro MD, 81 mg at 06/02/23 1002  •  atorvastatin (LIPITOR) tablet 80 mg, 80 mg, Oral, Nightly, Jarrett Buenrostro MD, 80 mg at 06/01/23 2058  •  sennosides-docusate (PERICOLACE) 8.6-50 MG per tablet 2 tablet, 2 tablet, Oral, BID, 2 tablet at 06/01/23 2058 **AND** polyethylene glycol (MIRALAX) packet 17 g, 17 g, Oral, Daily PRN **AND** [DISCONTINUED] bisacodyl (DULCOLAX) EC tablet 5 mg, 5 mg, Oral, Daily PRN **AND** bisacodyl (DULCOLAX) suppository 10 mg, 10 mg, Rectal, Daily PRN, Jarrett Buenrostro MD  •  Calcium Replacement - Follow Nurse / BPA Driven Protocol, , Does not apply, PRN, Jarrett Buenrostro MD  •  carvedilol (COREG) tablet 25 mg, 25 mg, Oral, BID With Meals, Jarrett Buenrostro MD, 25 mg at 06/02/23 1002  •  cyclobenzaprine (FLEXERIL) tablet 5 mg, 5 mg, Oral, TID PRN, Jarrett Buenrostro MD  •  DAPTOmycin (CUBICIN) 500 mg in sodium chloride 0.9 % 50 mL IVPB, 8 mg/kg, Intravenous, Q24H, Travon Brtio MD, Last Rate: 100 mL/hr at 06/02/23 1232, 500 mg at 06/02/23 1232  •  dextrose (D50W)  (25 g/50 mL) IV injection 50 mL, 50 mL, Intravenous, Q1H PRN, Jarrett Buenrostro MD, 50 mL at 05/25/23 0600  •  empagliflozin (JARDIANCE) tablet 10 mg, 10 mg, Oral, Daily, Jarrett Buenrostro MD, 10 mg at 06/02/23 1003  •  fluticasone (FLONASE) 50 MCG/ACT nasal spray 2 spray, 2 spray, Nasal, Daily, Jarrett Buenrostro MD, 2 spray at 06/02/23 1003  •  Insulin Lispro (humaLOG) injection 0-9 Units, 0-9 Units, Subcutaneous, 4x Daily With Meals & Nightly, Jarrett Buenrostro MD, 4 Units at 06/02/23 1232  •  ipratropium-albuterol (DUO-NEB) nebulizer solution 3 mL, 3 mL, Nebulization, Q6H PRN, Jarrett Buenrostro MD  •  lisinopril (PRINIVIL,ZESTRIL) tablet 10 mg, 10 mg, Oral, Q24H, aMddie Lindsey, APRN, 10 mg at 06/02/23 1002  •  Magnesium Cardiology Dose Replacement - Follow Nurse / BPA Driven Protocol, , Does not apply, PRN, Jarrett Buenrostro MD  •  megestrol (MEGACE) 40 MG/ML suspension 400 mg, 400 mg, Oral, BID AC, Trell Leonardo MD, 400 mg at 06/02/23 1003  •  nitroglycerin (NITROSTAT) SL tablet 0.4 mg, 0.4 mg, Sublingual, Q5 Min PRN, Jarrett Buenrostro MD  •  ondansetron (ZOFRAN) injection 4 mg, 4 mg, Intravenous, Q6H PRN, Jarrett Buenrostro MD  •  pantoprazole (PROTONIX) EC tablet 40 mg, 40 mg, Oral, Q AM, Yvonne Foster DO, 40 mg at 06/02/23 0551  •  Phosphorus Replacement - Follow Nurse / BPA Driven Protocol, , Does not apply, PRN, Jarrett Buenrostro MD  •  Potassium Replacement - Follow Nurse / BPA Driven Protocol, , Does not apply, PRN, Jarrett Buenrostro MD  •  sodium chloride 0.9 % flush 10 mL, 10 mL, Intravenous, PRN, Jarrett Buenrostro MD  •  sodium chloride 0.9 % flush 10 mL, 10 mL, Intravenous, Q12H, Jarrett Buenrostro MD, 10 mL at 06/02/23 1233  •  sodium chloride 0.9 % flush 10 mL, 10 mL, Intravenous, PRN, Jarrett Buenrostro MD  •  sodium chloride 0.9 % infusion 40 mL, 40 mL, Intravenous, PRN, Jarrett Buenrostro MD, 40 mL at 05/26/23 1149  •  terazosin (HYTRIN)  capsule 1 mg, 1 mg, Oral, Daily, Jarrett Buenrostro MD, 1 mg at 23 1002    Antibiotics:  Anti-Infectives (From admission, onward)    Ordered     Dose/Rate Route Frequency Start Stop    23 0920  DAPTOmycin (CUBICIN) 500 mg in sodium chloride 0.9 % 50 mL IVPB        Ordering Provider: Travon Brito MD    8 mg/kg × 65.2 kg  100 mL/hr over 30 Minutes Intravenous Every 24 Hours 23 1100 23 1059    23  piperacillin-tazobactam (ZOSYN) 3.375 g in iso-osmotic dextrose 50 ml (premix)        Ordering Provider: Brady Dumont DO    3.375 g  over 30 Minutes Intravenous Once 23  vancomycin 1250 mg/250 mL 0.9% NS IVPB ([facility])        Ordering Provider: Brady Dumont DO    20 mg/kg × 63.5 kg  over 90 Minutes Intravenous Once 23 0052            Review of Systems:  See HPI    Physical Exam:   Vital Signs  Temp (24hrs), Av.3 °F (36.8 °C), Min:97.6 °F (36.4 °C), Max:98.8 °F (37.1 °C)    Temp  Min: 97.6 °F (36.4 °C)  Max: 98.8 °F (37.1 °C)  BP  Min: 108/58  Max: 147/92  Pulse  Min: 70  Max: 70  Resp  Min: 14  Max: 16  SpO2  Min: 91 %  Max: 93 %    GENERAL: Awake, alert in no acute distress.   HEENT: Normocephalic, atraumatic.  PERRL. EOMI. No conjunctival injection. No icterus.  No external oral lesions  HEART: RRR; No murmur  LUNGS: symmetrical bilaterally   ABDOMEN: Soft, nontender  EXT:  No edema  :  Without Rosenberg catheter.        Laboratory Data    Results from last 7 days   Lab Units 23  0813 23  0516 23  0630   WBC 10*3/mm3 13.08* 11.44* 11.04*   HEMOGLOBIN g/dL 12.2* 11.3* 10.8*   HEMATOCRIT % 37.6 36.2* 34.4*   PLATELETS 10*3/mm3 155 145 148     Results from last 7 days   Lab Units 23  0516   SODIUM mmol/L 139   POTASSIUM mmol/L 4.3   CHLORIDE mmol/L 103   CO2 mmol/L 21.0*   BUN mg/dL 15   CREATININE mg/dL 0.79   GLUCOSE mg/dL 88   CALCIUM mg/dL 8.9                             Estimated Creatinine  Clearance: 74.8 mL/min (by C-G formula based on SCr of 0.79 mg/dL).      Microbiology:  Microbiology Results (last 10 days)     ** No results found for the last 240 hours. **                Radiology:  Imaging Results (Last 72 Hours)     ** No results found for the last 72 hours. **            Impression:   Left lower lobe infiltrate from infarction versus infection  Left lower lobe pulmonary embolism  Leukocytosis with neutrophilia  High-grade positive blood culture for staph coccus epidermidis, follow up blood cultures negative.   Elevated procalcitonin  Pacemaker infection  Right atrial endocarditis    PLAN/RECOMMENDATIONS:     Thank you for asking us to see Timmy Guajardo, I recommend the following:    Given the presence indwelling endovascular hardware staphylococcus epidermidis bacteremia is concerning    High-grade positive blood cultures and indwelling hardware probably warrants further investigation    Patient did have leukocytosis and markedly elevated procalcitonin    Await plan for either pacemaker device removal versus angio VAC removal of right atrial mass.    We will discuss further with cardiology          Continue daptomycin 6 mg/kg every 24 hours may extend this length of treatment to up to 6 weeks  Folllow up bl cultures negative       Patient to be transferred to service of interventional cardiology at   for pacemaker removal.    Unclear when patient will be accepted at [facility] if this takes exceptionally long, time may need to reinquire from our interventional cardiologist if this can be performed at our hospital at [facility]    Once all endovascular hardware can be removed I expect patient require 6 weeks of IV antibiotics followed by transition to oral antibiotic therapy    We will reassess next week if patient still here otherwise contact my partners if problems arise  Travon Brito MD  6/2/2023  15:07 EDT

## 2023-06-02 NOTE — PROGRESS NOTES
Stop by this morning to evaluate the patient and he was sleeping.  Reviewed the patient's chart and no significant changes.  Patient is still pacing.  He is still waiting for transfer to the [facility].  If he is still here on Monday Dr. Mueller will determine if there is any further care we can provide here at [facility] if he is still pending transfer.    Radha Chavarria PA-C

## 2023-06-02 NOTE — CASE MANAGEMENT/SOCIAL WORK
Continued Stay Note  [facility]     Patient Name: Timmy Guajardo  MRN: [id number]  Today's Date: 6/2/2023    Admit Date: 5/19/2023    Plan:    Discharge Plan     Row Name 06/02/23 0927       Plan    Plan     Patient/Family in Agreement with Plan yes    Plan Comments Per  bed availability, no bed assignment @ this time. Pt updated in room and left VM w/sisterMary Ellen.    Final Discharge Disposition Code 02 - short term hospital for  care               Discharge Codes    No documentation.               Expected Discharge Date and Time     Expected Discharge Date Expected Discharge Time    Jun 5, 2023             Varinder Martinez RN

## 2023-06-03 VITALS
DIASTOLIC BLOOD PRESSURE: 68 MMHG | HEART RATE: 70 BPM | OXYGEN SATURATION: 95 % | BODY MASS INDEX: 19.39 KG/M2 | HEIGHT: 70 IN | SYSTOLIC BLOOD PRESSURE: 136 MMHG | TEMPERATURE: 97.7 F | WEIGHT: 135.4 LBS | RESPIRATION RATE: 14 BRPM

## 2023-06-03 LAB
GLUCOSE BLDC GLUCOMTR-MCNC: 109 MG/DL (ref 70–130)
GLUCOSE BLDC GLUCOMTR-MCNC: 139 MG/DL (ref 70–130)
GLUCOSE BLDC GLUCOMTR-MCNC: 266 MG/DL (ref 70–130)

## 2023-06-03 PROCEDURE — 99239 HOSP IP/OBS DSCHRG MGMT >30: CPT | Performed by: INTERNAL MEDICINE

## 2023-06-03 PROCEDURE — 25010000002 DAPTOMYCIN PER 1 MG: Performed by: INTERNAL MEDICINE

## 2023-06-03 PROCEDURE — 82948 REAGENT STRIP/BLOOD GLUCOSE: CPT

## 2023-06-03 PROCEDURE — 63710000001 INSULIN LISPRO (HUMAN) PER 5 UNITS: Performed by: INTERNAL MEDICINE

## 2023-06-03 RX ORDER — AMIODARONE HYDROCHLORIDE 200 MG/1
200 TABLET ORAL EVERY 12 HOURS SCHEDULED
Start: 2023-06-03

## 2023-06-03 RX ORDER — LISINOPRIL 10 MG/1
10 TABLET ORAL
Start: 2023-06-04

## 2023-06-03 RX ADMIN — DAPTOMYCIN 500 MG: 500 INJECTION, POWDER, LYOPHILIZED, FOR SOLUTION INTRAVENOUS at 12:28

## 2023-06-03 RX ADMIN — EMPAGLIFLOZIN 10 MG: 10 TABLET, FILM COATED ORAL at 09:35

## 2023-06-03 RX ADMIN — MEGESTROL ACETATE 400 MG: 400 SUSPENSION ORAL at 09:35

## 2023-06-03 RX ADMIN — CARVEDILOL 25 MG: 12.5 TABLET, FILM COATED ORAL at 17:07

## 2023-06-03 RX ADMIN — PANTOPRAZOLE SODIUM 40 MG: 40 TABLET, DELAYED RELEASE ORAL at 05:50

## 2023-06-03 RX ADMIN — INSULIN LISPRO 6 UNITS: 100 INJECTION, SOLUTION INTRAVENOUS; SUBCUTANEOUS at 12:28

## 2023-06-03 RX ADMIN — MEGESTROL ACETATE 400 MG: 400 SUSPENSION ORAL at 17:07

## 2023-06-03 RX ADMIN — AMIODARONE HYDROCHLORIDE 200 MG: 200 TABLET ORAL at 09:35

## 2023-06-03 RX ADMIN — ASPIRIN 81 MG 81 MG: 81 TABLET ORAL at 09:35

## 2023-06-03 RX ADMIN — LISINOPRIL 10 MG: 10 TABLET ORAL at 09:35

## 2023-06-03 RX ADMIN — CARVEDILOL 25 MG: 12.5 TABLET, FILM COATED ORAL at 09:35

## 2023-06-03 RX ADMIN — Medication 10 ML: at 09:36

## 2023-06-03 RX ADMIN — APIXABAN 5 MG: 5 TABLET, FILM COATED ORAL at 09:35

## 2023-06-03 RX ADMIN — TERAZOSIN HYDROCHLORIDE 1 MG: 1 CAPSULE ORAL at 09:35

## 2023-06-03 NOTE — CASE MANAGEMENT/SOCIAL WORK
Continued Stay Note  [facility]     Patient Name: Timmy Guajardo  MRN: [id number]  Today's Date: 6/3/2023    Admit Date: 5/19/2023    Plan:    Discharge Plan       Row Name 06/03/23 0819       Plan    Patient/Family in Agreement with Plan other (see comments)    Plan Comments CM called [facility] transfer line and the patient has not been assigned a bed. CM will continue to follow up.    Final Discharge Disposition Code 30 - still a patient                   Discharge Codes    No documentation.                 Expected Discharge Date and Time       Expected Discharge Date Expected Discharge Time    Jun 5, 2023               Pallavi Rincon RN

## 2023-06-03 NOTE — CASE MANAGEMENT/SOCIAL WORK
Case Management Discharge Note      Final Note: I spoke with Gia, the RN for this patient, who stated that he has been offered a bed at [facility] in room 616. CM called  to verify. EMS has been scheduled for approximately 1900 tonight. The completed PCS form is in the chart. RN to call report to .         Selected Continued Care - Admitted Since 5/19/2023       Destination    No services have been selected for the patient.                Durable Medical Equipment    No services have been selected for the patient.                Dialysis/Infusion    No services have been selected for the patient.                Home Medical Care    No services have been selected for the patient.                Therapy    No services have been selected for the patient.                Community Resources    No services have been selected for the patient.                Community & DME    No services have been selected for the patient.                    Selected Continued Care - Prior Encounters Includes continued care and service providers with selected services from prior encounters from 2/18/2023 to 6/3/2023      Discharged on 4/20/2023 Admission date: 3/24/2023 - Discharge disposition: Skilled Nursing Facility (DC - External)      Destination       Service Provider Selected Services Address Phone Fax Patient Preferred    [facility] [address] [facility] [address] [phone] --                               Final Discharge Disposition Code: 02 - short term hospital for [facility]

## 2023-06-03 NOTE — PROGRESS NOTES
[facility]  PROGRESS NOTE    Patient Name: Timmy Guajardo  : 1950  MRN: [id number]    Date of Admission: 2023  Primary Care Physician: Arsen Seals APRN    Subjective   Subjective     CC: Follow-up TV/pacer lead vegetation    HPI: No acute events overnight, patient said that he rested well, has no new complaints this morning.    ROS:  Gen- No fevers, chills  CV- No chest pain, palpitations  Resp- No cough, dyspnea  GI- No N/V/D, abd pain     Objective   Objective     Vital Signs:   Temp:  [97.4 °F (36.3 °C)-98.6 °F (37 °C)] 98.1 °F (36.7 °C)  Heart Rate:  [70] 70  Resp:  [14-16] 14  BP: (108-125)/(55-88) 120/88     Physical Exam:  Constitutional: No acute distress, awake, alert  HENT: NCAT, mucous membranes moist  Respiratory: Clear to auscultation bilaterally, respiratory effort normal   Cardiovascular: RRR, no murmurs, rubs, or gallops  Gastrointestinal: Positive bowel sounds, soft, nontender, nondistended  Musculoskeletal: No bilateral ankle edema  Psychiatric: Appropriate affect, cooperative  Neurologic: Oriented x 3, nonfocal  Skin: No rashes     Results Reviewed:  LAB RESULTS:      Lab 23  0813 23  0516 23  0630   WBC 13.08* 11.44* 11.04*   HEMOGLOBIN 12.2* 11.3* 10.8*   HEMATOCRIT 37.6 36.2* 34.4*   PLATELETS 155 145 148   NEUTROS ABS 10.72*  --   --    IMMATURE GRANS (ABS) 0.12*  --   --    LYMPHS ABS 1.28  --   --    MONOS ABS 0.89  --   --    EOS ABS 0.04  --   --    MCV 93.8 96.5 97.2*         Lab 23  0516 23  0630   SODIUM 139 138   POTASSIUM 4.3 4.1   CHLORIDE 103 103   CO2 21.0* 21.0*   ANION GAP 15.0 14.0   BUN 15 14   CREATININE 0.79 0.84   EGFR 95.6 93.8   GLUCOSE 88 83   CALCIUM 8.9 9.0                         Brief Urine Lab Results  (Last result in the past 365 days)        Color   Clarity   Blood   Leuk Est   Nitrite   Protein   CREAT   Urine HCG        23 0240 Yellow   Turbid   Negative    Negative   Negative   30 mg/dL (1+)                   Microbiology Results Abnormal       Procedure Component Value - Date/Time    Blood Culture - Blood, Hand, Left [[id number]]  (Normal) Collected: 05/22/23 1155    Lab Status: Final result Specimen: Blood from Hand, Left Updated: 05/27/23 1346     Blood Culture No growth at 5 days    Blood Culture - Blood, Arm, Left [[id number]]  (Normal) Collected: 05/22/23 1202    Lab Status: Final result Specimen: Blood from Arm, Left Updated: 05/27/23 1346     Blood Culture No growth at 5 days    Respiratory Culture - Sputum, ET Suction [[id number]] Collected: 05/22/23 0020    Lab Status: Final result Specimen: Sputum from ET Suction Updated: 05/24/23 1150     Respiratory Culture Moderate growth (3+) Normal respiratory nathan. No S. aureus or Pseudomonas aeruginosa detected. Final report.     Gram Stain Few (2+) WBCs per low power field      Occasional Epithelial cells per low power field      Few (2+) Budding yeast      Occasional Gram positive cocci    Urine Culture - Urine, Straight Cath [[id number]]  (Normal) Collected: 05/21/23 0240    Lab Status: Final result Specimen: Urine from Straight Cath Updated: 05/22/23 0816     Urine Culture No growth    S. Pneumo Ag Urine or CSF - Urine, Urine, Clean Catch [[id number]]  (Normal) Collected: 05/21/23 0240    Lab Status: Final result Specimen: Urine, Clean Catch Updated: 05/21/23 1356     Strep Pneumo Ag Negative    Legionella Antigen, Urine - Urine, Urine, Clean Catch [[id number]]  (Normal) Collected: 05/21/23 0240    Lab Status: Final result Specimen: Urine, Clean Catch Updated: 05/21/23 1356     LEGIONELLA ANTIGEN, URINE Negative    MRSA Screen, PCR (Inpatient) - Swab, Nares [[id number]]  (Normal) Collected: 05/20/23 0523    Lab Status: Final result Specimen: Swab from Nares Updated: 05/20/23 0736     MRSA PCR Negative    Narrative:      The negative predictive value of this diagnostic test is high and should only be used to consider  de-escalating anti-MRSA therapy. A positive result may indicate colonization with MRSA and must be correlated clinically.  MRSA Negative            No radiology results from the last 24 hrs    Results for orders placed during the hospital encounter of 05/19/23    Adult Transesophageal Echo (KAT) W/ Cont if Necessary Per Protocol    Interpretation Summary    There is a large 2.9 x 1.7 cm mass in the right atrium which appears adherent to the atrial aspect of one of the 2 pacemaker leads. The edges are smooth with some peripheral hyperechoic regions. This appears to be more consistent with older thrombus than acute vegetation. Does move in and out of the tricuspid valve plane with the cardiac cycle.    There is a moderate-sized (1.7 mm) mobile mass on the tricuspid valve that is consistent with a vegetation.Mild tricuspid valve regurgitation is present. No evidence of significant tricuspid valve stenosis is present. There is a at least 1.7 cm x 0.6 cm highly mobile vegetation on the tricuspid valve. The attachment point is difficult to discern due to artifact from the surrounding pacemaker wires however it is freely prolapsing across the valve throughout the cardiac cycle.    Left ventricular systolic function is mildly decreased. Estimated left ventricular EF = 45%    Left ventricular systolic function is mildly decreased. Estimated left ventricular EF = 45% Normal left ventricular cavity size and wall thickness noted. There is left ventricular global hypokinesis noted. Left ventricular diastolic function is consistent with (grade I) impaired relaxation.      Current medications:  Scheduled Meds:amiodarone, 200 mg, Oral, Q12H  apixaban, 5 mg, Oral, Q12H  aspirin, 81 mg, Oral, Daily  atorvastatin, 80 mg, Oral, Nightly  carvedilol, 25 mg, Oral, BID With Meals  DAPTOmycin, 8 mg/kg, Intravenous, Q24H  empagliflozin, 10 mg, Oral, Daily  fluticasone, 2 spray, Nasal, Daily  Insulin Lispro, 0-9 Units, Subcutaneous, 4x Daily  With Meals & Nightly  lisinopril, 10 mg, Oral, Q24H  megestrol, 400 mg, Oral, BID AC  pantoprazole, 40 mg, Oral, Q AM  senna-docusate sodium, 2 tablet, Oral, BID  sodium chloride, 10 mL, Intravenous, Q12H  terazosin, 1 mg, Oral, Daily      Continuous Infusions:   PRN Meds:.  acetaminophen    senna-docusate sodium **AND** polyethylene glycol **AND** [DISCONTINUED] bisacodyl **AND** bisacodyl    Calcium Replacement - Follow Nurse / BPA Driven Protocol    cyclobenzaprine    dextrose    ipratropium-albuterol    Magnesium Cardiology Dose Replacement - Follow Nurse / BPA Driven Protocol    nitroglycerin    ondansetron    Phosphorus Replacement - Follow Nurse / BPA Driven Protocol    Potassium Replacement - Follow Nurse / BPA Driven Protocol    sodium chloride    sodium chloride    sodium chloride    Assessment & Plan   Assessment & Plan     Active Hospital Problems    Diagnosis  POA    **Pneumonia of left lower lobe due to infectious organism [J18.9]  Yes    Severe Malnutrition (HCC) [E43]  Yes    Acute infective endocarditis [I33.0]  Yes    Acute respiratory failure with hypoxia [J96.01]  Yes    Shock, likely multifactorial [R57.9]  Yes    Paroxysmal atrial fibrillation [I48.0]  No    Pulmonary embolus [I26.99]  Yes    GERD (gastroesophageal reflux disease) [K21.9]  Yes    Hyperlipidemia [E78.5]  Yes    HFrEF (heart failure with reduced ejection fraction) [I50.20]  Yes    HTN [I10]  Yes    Cardiac resynchronization therapy defibrillator (CRT-D) in place [Z95.810]  Yes      Resolved Hospital Problems    Diagnosis Date Resolved POA    Cardiomyopathy [I42.9] 05/28/2023 Yes    Hypotension [I95.9] 05/28/2023 Yes    Confusion [R41.0] 05/28/2023 Yes        Brief Hospital Course to date:  Timmy Guajardo is a 72 y.o. male with PMHx significant for PE on Eliquis, HTN, dyslipidemia, HFrEF (30% on 10/2022), T2DM, CVA with right-sided residual weakness, and GERD.  He was admitted to the ICU on 5/21/2023 as a transfer from the floor.   He was initially admitted with a pulmonary embolism and a left lower lobe pneumonia versus infarct on 5/19/2023.  The morning of 5/21 he suddenly went into A-fib with RVR became hypotensive with prompted transition to the ICU. He underwent KAT 5/25 due to bacteremia and found to have vegetation on pacer wire and TV. Transferred to the floor 5/25.      This patient's problems and plans were partially entered by my partner and updated as appropriate by me 06/03/23.      A fib RVR-->Resolved  -Continue Coreg, Amiodarone   -On Eliquis  -Cardiology following     Staph epidermidis Bacteremia  Vegetation pacer wire/Tricusupid valve   -KAT 5/25 with vegetation noted on pacer lead and TV  -CTS were following but they signed off.  -ID following, Continue Daptomycin daily, likely for 6 weeks   -EP following, Dr Quintana. High risk for transvenous laser extraction and possible transvenous removal of extremely large vegetation adhering both to lead and tricuspid valve.  -Cardiology recommending angiovac vegectomy at . Dr. Mueller has contacted [facility] and talked to Dr. Pitts who has accepted the patient. Patient will be transferred to  when bed available.  -Repeat blood Cx NGTD     Hx PE  -On Eliquis, may need to place on heparin gtt pending timing of procedures (defer to CTS/EP)      HTN  -Continue Lisinopril     HFrEF  -Continue Coreg  -Continue Jardiance      GERD  -Continue PPI     Chronic Severe Malnutrition   -Continue Megace  -RD following     CVC in place  -Peripheral IV in place but patient very hard stick. Leave CVC in place until PICC placed as patient will need long term ABX.       NOTE:  -If bed becomes available at  patient should be transferred to [facility] cardiology.  There is a transfer summary which needs to be updated.  If not, probably our cardiology will try to vaginectomy here     Expected Discharge Location and Transportation: Transfer to   Expected Discharge   Expected Discharge Date: 6/5/2023; Expected  Discharge Time:      DVT prophylaxis:  Medical DVT prophylaxis orders are present.     AM-PAC 6 Clicks Score (PT): 12 (06/02/23 0820)    CODE STATUS:   Code Status and Medical Interventions:   Ordered at: 05/20/23 0011     Level Of Support Discussed With:    Patient     Code Status (Patient has no pulse and is not breathing):    CPR (Attempt to Resuscitate)     Medical Interventions (Patient has pulse or is breathing):    Full Support       Arash Hensley MD  06/03/23

## (undated) DEVICE — TP SILK DURAPORE 3IN

## (undated) DEVICE — PK THORACOTOMY 10

## (undated) DEVICE — SYR LUERLOK 50ML

## (undated) DEVICE — MEDI-VAC YANKAUER SUCTION HANDLE W/BULBOUS TIP: Brand: CARDINAL HEALTH

## (undated) DEVICE — 3M™ MEDIPORE™ H SOFT CLOTH SURGICAL TAPE, 2863, 3 IN X 10 YD, 12/CASE: Brand: 3M™ MEDIPORE™

## (undated) DEVICE — GLV SURG PREMIERPRO MIC LTX PF SZ8.5 BRN

## (undated) DEVICE — Device

## (undated) DEVICE — CLTH CLENS READYCLEANSE PERI CARE PK/5

## (undated) DEVICE — HYBRID CO2 TUBING/CAP SET FOR OLYMPUS® SCOPES & CO2 SOURCE: Brand: ERBE

## (undated) DEVICE — RADIFOCUS GLIDECATH: Brand: GLIDECATH

## (undated) DEVICE — INTRO ACCSR BLNT TP

## (undated) DEVICE — CONTAINER,SPECIMEN,OR STERILE,4OZ: Brand: MEDLINE

## (undated) DEVICE — SCRW LK STRDRV TI 5X38M STRL
Type: IMPLANTABLE DEVICE | Site: HIP | Status: NON-FUNCTIONAL
Removed: 2022-10-18

## (undated) DEVICE — DRSNG GZ PETROLTM XEROFORM CURAD 4X4IN STRL

## (undated) DEVICE — SI AVANTI+ 6F STD W/GW  NO OBT: Brand: AVANTI

## (undated) DEVICE — SAFESECURE,SECUREMENT,FOLEY CATH,STERILE: Brand: MEDLINE

## (undated) DEVICE — DRN WND CH RND FUL/FLUT NO/TROC 3/8IN 28F

## (undated) DEVICE — CONTN GRAD MEAS TRIANG 32OZ BLK

## (undated) DEVICE — KT ORCA ORCAPOD DISP STRL

## (undated) DEVICE — GLV SURG BIOGELULTRATOUCH POLYISPRN PF LF SZ7 STRL

## (undated) DEVICE — BANDAGE,GAUZE,BULKEE II,4.5"X4.1YD,STRL: Brand: MEDLINE

## (undated) DEVICE — NDL PERC 1PRT THNWALL W/BASEPLT 18G 7CM

## (undated) DEVICE — SUT SILK 2/0 30IN A305H

## (undated) DEVICE — FIRST STEP BEDSIDE ADD WATER KIT - RESEALABLE STAND-UP POUCH, ENDOSCOPIC CLEANING PAD - 1 POUCH: Brand: FIRST STEP BEDSIDE ADD WATER KIT - RESEALABLE STAND-UP POUCH, ENDOSCOPIC CLEANIN

## (undated) DEVICE — LUBE JELLY FOIL PACK 1.4 OZ: Brand: MEDLINE INDUSTRIES, INC.

## (undated) DEVICE — LAPAROVUE VISIBILITY SYSTEM LAPAROSCOPIC SOLUTIONS: Brand: LAPAROVUE

## (undated) DEVICE — THE BITE BLOCK MAXI, LATEX FREE STRAP IS USED TO PROTECT THE ENDOSCOPE INSERTION TUBE FROM BEING BITTEN BY THE PATIENT.

## (undated) DEVICE — ANTIBACTERIAL UNDYED BRAIDED (POLYGLACTIN 910), SYNTHETIC ABSORBABLE SUTURE: Brand: COATED VICRYL

## (undated) DEVICE — RADIFOCUS GLIDEWIRE: Brand: GLIDEWIRE

## (undated) DEVICE — ADAPT CLN LUM OLYMP AIR/H20

## (undated) DEVICE — SINGLE-USE BIOPSY FORCEPS: Brand: RADIAL JAW 4

## (undated) DEVICE — PATIENT RETURN ELECTRODE, SINGLE-USE, CONTACT QUALITY MONITORING, ADULT, WITH 9FT CORD, FOR PATIENTS WEIGING OVER 33LBS. (15KG): Brand: MEGADYNE

## (undated) DEVICE — ELECTRD BLD EZ CLN STD 2.5IN

## (undated) DEVICE — SUT ETHIB 1 CT1 30IN  X425H

## (undated) DEVICE — PENCL ROCKRSWCH MEGADYNE W/HOLSTR 10FT SS

## (undated) DEVICE — LUBE GEL ENDOGLIDE 1.1OZ

## (undated) DEVICE — SAFELINER SUCTION CANISTER 1000CC: Brand: DEROYAL

## (undated) DEVICE — POWER SHELL: Brand: SIGNIA

## (undated) DEVICE — GRADUATE CONTN 1000ML

## (undated) DEVICE — AVANTI + 4F STD W/GW: Brand: AVANTI

## (undated) DEVICE — TOTAL TRAY, 16FR 10ML SIL FOLEY, URN: Brand: MEDLINE

## (undated) DEVICE — SUT VIC 2/0 CT2 27IN J269H

## (undated) DEVICE — GLV SURG SENSICARE W/ALOE PF LF 9 STRL

## (undated) DEVICE — SPNG GZ WOVN 4X4IN 12PLY 10/BX STRL

## (undated) DEVICE — GLV SURG PREMIERPRO MIC LTX PF SZ7 BRN

## (undated) DEVICE — BIT DRL 3FLUT QC CALIB 4.2X330X100MM

## (undated) DEVICE — ELECTRD BLD EZ CLN MOD XLNG 2.75IN

## (undated) DEVICE — GLIDEX™ COATED HYDROPHILIC GUIDEWIRE: Brand: MAGIC TORQUE™

## (undated) DEVICE — GW FOR TROCH NAIL 3.2X400MM

## (undated) DEVICE — SYR LUERLOK 30CC

## (undated) DEVICE — PK ANGIO OR 10

## (undated) DEVICE — DRAPE,TOP,102X53,STERILE: Brand: MEDLINE

## (undated) DEVICE — GOWN,REINF,POLY,ECL,PP SLV,3XL,XLONG: Brand: MEDLINE

## (undated) DEVICE — SOLIDIFIER LIQ PREMISORB 1500CC

## (undated) DEVICE — GOWN,NON-REINFORCED,SIRUS,SET IN SLV,XL: Brand: MEDLINE

## (undated) DEVICE — CATH GUIDE SOFTVU FLUSH HT PIG .035 4F 65CM

## (undated) DEVICE — PK MAJ FX HIP 10

## (undated) DEVICE — TUBING, SUCTION, 1/4" X 10', STRAIGHT: Brand: MEDLINE

## (undated) DEVICE — 3M™ STERI-DRAPE™ ISOLATION BAG, 10 PER CARTON / 4 CARTONS PER CASE, 1003: Brand: 3M™ STERI-DRAPE™

## (undated) DEVICE — OASIS DRAIN, SINGLE, INLINE & ATS COMPATIBLE: Brand: OASIS

## (undated) DEVICE — TBG INJ CONTRL EXCITE RA 1200PSI 48IN

## (undated) DEVICE — TP PLSTC CLR TRNSPORE 1IN SURG

## (undated) DEVICE — ADHS SKIN PREMIERPRO EXOFIN TOPICAL HI/VISC .5ML

## (undated) DEVICE — SPNG VERSALON 4X4 4PLY NONSTRL LF BG/200

## (undated) DEVICE — SHROD PENCL MEGADYNE ATTACHAVAC W/CONN/22MM

## (undated) DEVICE — INFLATION DEVICE: Brand: ENCORE™ 26

## (undated) DEVICE — PK EXTREM LOWR 10

## (undated) DEVICE — ENDOPATH 5MM ENDOSCOPIC BLUNT TIP DISSECTORS (12 POUCHES CONTAINING 3 DISSECTORS EACH): Brand: ENDOPATH

## (undated) DEVICE — SOL IRR H2O BTL 1000ML STRL

## (undated) DEVICE — ANTIBACTERIAL UNDYED BRAIDED (POLYGLACTIN 910), SYNTHETIC ABSORBABLE SURGICAL SUTURE: Brand: COATED VICRYL

## (undated) DEVICE — ELECTRD BLD EZ CLN MOD 6.5IN

## (undated) DEVICE — SYS CLS SKIN PREMIERPRO EXOFINFUSION 22CM